# Patient Record
Sex: MALE | Race: WHITE | HISPANIC OR LATINO | Employment: OTHER | URBAN - METROPOLITAN AREA
[De-identification: names, ages, dates, MRNs, and addresses within clinical notes are randomized per-mention and may not be internally consistent; named-entity substitution may affect disease eponyms.]

---

## 2023-03-12 ENCOUNTER — APPOINTMENT (OUTPATIENT)
Dept: GENERAL RADIOLOGY | Facility: HOSPITAL | Age: 73
DRG: 64 | End: 2023-03-12
Payer: MEDICAID

## 2023-03-12 ENCOUNTER — APPOINTMENT (OUTPATIENT)
Dept: CT IMAGING | Facility: HOSPITAL | Age: 73
DRG: 64 | End: 2023-03-12
Payer: MEDICAID

## 2023-03-12 ENCOUNTER — HOSPITAL ENCOUNTER (INPATIENT)
Facility: HOSPITAL | Age: 73
LOS: 57 days | DRG: 64 | End: 2023-05-08
Attending: EMERGENCY MEDICINE | Admitting: INTERNAL MEDICINE
Payer: MEDICAID

## 2023-03-12 DIAGNOSIS — R41.82 ALTERED MENTAL STATUS, UNSPECIFIED ALTERED MENTAL STATUS TYPE: ICD-10-CM

## 2023-03-12 DIAGNOSIS — I62.9 INTRACRANIAL HEMORRHAGE: Primary | ICD-10-CM

## 2023-03-12 LAB
ALBUMIN SERPL-MCNC: 4.9 G/DL (ref 3.5–5.2)
ALBUMIN/GLOB SERPL: 1.8 G/DL
ALP SERPL-CCNC: 107 U/L (ref 39–117)
ALT SERPL W P-5'-P-CCNC: 25 U/L (ref 1–41)
ANION GAP SERPL CALCULATED.3IONS-SCNC: 14.3 MMOL/L (ref 5–15)
APTT PPP: 30.3 SECONDS (ref 22.7–35.4)
AST SERPL-CCNC: 35 U/L (ref 1–40)
BACTERIA UR QL AUTO: NORMAL /HPF
BASOPHILS # BLD AUTO: 0.03 10*3/MM3 (ref 0–0.2)
BASOPHILS NFR BLD AUTO: 0.3 % (ref 0–1.5)
BILIRUB SERPL-MCNC: 0.7 MG/DL (ref 0–1.2)
BILIRUB UR QL STRIP: NEGATIVE
BUN SERPL-MCNC: 15 MG/DL (ref 8–23)
BUN/CREAT SERPL: 17 (ref 7–25)
CALCIUM SPEC-SCNC: 9.5 MG/DL (ref 8.6–10.5)
CHLORIDE SERPL-SCNC: 103 MMOL/L (ref 98–107)
CLARITY UR: CLEAR
CO2 SERPL-SCNC: 23.7 MMOL/L (ref 22–29)
COLOR UR: ABNORMAL
CREAT SERPL-MCNC: 0.88 MG/DL (ref 0.76–1.27)
D-LACTATE SERPL-SCNC: 2 MMOL/L (ref 0.5–2)
DEPRECATED RDW RBC AUTO: 43.8 FL (ref 37–54)
EGFRCR SERPLBLD CKD-EPI 2021: 91.9 ML/MIN/1.73
EOSINOPHIL # BLD AUTO: 0.02 10*3/MM3 (ref 0–0.4)
EOSINOPHIL NFR BLD AUTO: 0.2 % (ref 0.3–6.2)
ERYTHROCYTE [DISTWIDTH] IN BLOOD BY AUTOMATED COUNT: 13.3 % (ref 12.3–15.4)
ETHANOL BLD-MCNC: <10 MG/DL (ref 0–10)
ETHANOL UR QL: <0.01 %
GLOBULIN UR ELPH-MCNC: 2.7 GM/DL
GLUCOSE BLDC GLUCOMTR-MCNC: 103 MG/DL (ref 70–130)
GLUCOSE BLDC GLUCOMTR-MCNC: 97 MG/DL (ref 70–130)
GLUCOSE SERPL-MCNC: 112 MG/DL (ref 65–99)
GLUCOSE UR STRIP-MCNC: NEGATIVE MG/DL
HCT VFR BLD AUTO: 46.4 % (ref 37.5–51)
HGB BLD-MCNC: 15.9 G/DL (ref 13–17.7)
HGB UR QL STRIP.AUTO: NEGATIVE
HYALINE CASTS UR QL AUTO: NORMAL /LPF
IMM GRANULOCYTES # BLD AUTO: 0.03 10*3/MM3 (ref 0–0.05)
IMM GRANULOCYTES NFR BLD AUTO: 0.3 % (ref 0–0.5)
INR PPP: 1.07 (ref 0.9–1.1)
KETONES UR QL STRIP: ABNORMAL
LEUKOCYTE ESTERASE UR QL STRIP.AUTO: NEGATIVE
LIPASE SERPL-CCNC: 17 U/L (ref 13–60)
LYMPHOCYTES # BLD AUTO: 1.49 10*3/MM3 (ref 0.7–3.1)
LYMPHOCYTES NFR BLD AUTO: 13 % (ref 19.6–45.3)
MCH RBC QN AUTO: 31 PG (ref 26.6–33)
MCHC RBC AUTO-ENTMCNC: 34.3 G/DL (ref 31.5–35.7)
MCV RBC AUTO: 90.4 FL (ref 79–97)
MONOCYTES # BLD AUTO: 0.97 10*3/MM3 (ref 0.1–0.9)
MONOCYTES NFR BLD AUTO: 8.5 % (ref 5–12)
NEUTROPHILS NFR BLD AUTO: 77.7 % (ref 42.7–76)
NEUTROPHILS NFR BLD AUTO: 8.91 10*3/MM3 (ref 1.7–7)
NITRITE UR QL STRIP: NEGATIVE
NRBC BLD AUTO-RTO: 0.1 /100 WBC (ref 0–0.2)
PH UR STRIP.AUTO: 5.5 [PH] (ref 5–8)
PLATELET # BLD AUTO: 252 10*3/MM3 (ref 140–450)
PMV BLD AUTO: 10.7 FL (ref 6–12)
POTASSIUM SERPL-SCNC: 3.8 MMOL/L (ref 3.5–5.2)
PROCALCITONIN SERPL-MCNC: 0.07 NG/ML (ref 0–0.25)
PROT SERPL-MCNC: 7.6 G/DL (ref 6–8.5)
PROT UR QL STRIP: ABNORMAL
PROTHROMBIN TIME: 14.1 SECONDS (ref 11.7–14.2)
RBC # BLD AUTO: 5.13 10*6/MM3 (ref 4.14–5.8)
RBC # UR STRIP: NORMAL /HPF
REF LAB TEST METHOD: NORMAL
SODIUM SERPL-SCNC: 141 MMOL/L (ref 136–145)
SP GR UR STRIP: >=1.03 (ref 1–1.03)
SQUAMOUS #/AREA URNS HPF: NORMAL /HPF
TROPONIN T SERPL HS-MCNC: 9 NG/L
UROBILINOGEN UR QL STRIP: ABNORMAL
WBC # UR STRIP: NORMAL /HPF
WBC NRBC COR # BLD: 11.45 10*3/MM3 (ref 3.4–10.8)

## 2023-03-12 PROCEDURE — 85610 PROTHROMBIN TIME: CPT | Performed by: EMERGENCY MEDICINE

## 2023-03-12 PROCEDURE — 82077 ASSAY SPEC XCP UR&BREATH IA: CPT | Performed by: EMERGENCY MEDICINE

## 2023-03-12 PROCEDURE — 87040 BLOOD CULTURE FOR BACTERIA: CPT | Performed by: EMERGENCY MEDICINE

## 2023-03-12 PROCEDURE — 80053 COMPREHEN METABOLIC PANEL: CPT | Performed by: EMERGENCY MEDICINE

## 2023-03-12 PROCEDURE — 80307 DRUG TEST PRSMV CHEM ANLYZR: CPT | Performed by: EMERGENCY MEDICINE

## 2023-03-12 PROCEDURE — 71045 X-RAY EXAM CHEST 1 VIEW: CPT

## 2023-03-12 PROCEDURE — 84145 PROCALCITONIN (PCT): CPT | Performed by: EMERGENCY MEDICINE

## 2023-03-12 PROCEDURE — 74176 CT ABD & PELVIS W/O CONTRAST: CPT

## 2023-03-12 PROCEDURE — 93010 ELECTROCARDIOGRAM REPORT: CPT | Performed by: INTERNAL MEDICINE

## 2023-03-12 PROCEDURE — 84484 ASSAY OF TROPONIN QUANT: CPT | Performed by: EMERGENCY MEDICINE

## 2023-03-12 PROCEDURE — 36415 COLL VENOUS BLD VENIPUNCTURE: CPT | Performed by: EMERGENCY MEDICINE

## 2023-03-12 PROCEDURE — 82962 GLUCOSE BLOOD TEST: CPT

## 2023-03-12 PROCEDURE — 81001 URINALYSIS AUTO W/SCOPE: CPT | Performed by: EMERGENCY MEDICINE

## 2023-03-12 PROCEDURE — 25010000002 ONDANSETRON PER 1 MG: Performed by: EMERGENCY MEDICINE

## 2023-03-12 PROCEDURE — 85730 THROMBOPLASTIN TIME PARTIAL: CPT | Performed by: EMERGENCY MEDICINE

## 2023-03-12 PROCEDURE — 93005 ELECTROCARDIOGRAM TRACING: CPT | Performed by: EMERGENCY MEDICINE

## 2023-03-12 PROCEDURE — 70450 CT HEAD/BRAIN W/O DYE: CPT

## 2023-03-12 PROCEDURE — 85025 COMPLETE CBC W/AUTO DIFF WBC: CPT | Performed by: EMERGENCY MEDICINE

## 2023-03-12 PROCEDURE — P9612 CATHETERIZE FOR URINE SPEC: HCPCS

## 2023-03-12 PROCEDURE — 25010000002 LEVETRIRACETAM PER 10 MG: Performed by: EMERGENCY MEDICINE

## 2023-03-12 PROCEDURE — 99285 EMERGENCY DEPT VISIT HI MDM: CPT

## 2023-03-12 PROCEDURE — 83690 ASSAY OF LIPASE: CPT | Performed by: EMERGENCY MEDICINE

## 2023-03-12 PROCEDURE — 83605 ASSAY OF LACTIC ACID: CPT | Performed by: EMERGENCY MEDICINE

## 2023-03-12 RX ORDER — MAGNESIUM SULFATE HEPTAHYDRATE 40 MG/ML
2 INJECTION, SOLUTION INTRAVENOUS AS NEEDED
Status: DISCONTINUED | OUTPATIENT
Start: 2023-03-12 | End: 2023-05-04

## 2023-03-12 RX ORDER — LEVETIRACETAM 500 MG/5ML
1000 INJECTION, SOLUTION, CONCENTRATE INTRAVENOUS ONCE
Status: COMPLETED | OUTPATIENT
Start: 2023-03-12 | End: 2023-03-12

## 2023-03-12 RX ORDER — SODIUM CHLORIDE 0.9 % (FLUSH) 0.9 %
10 SYRINGE (ML) INJECTION AS NEEDED
Status: DISCONTINUED | OUTPATIENT
Start: 2023-03-12 | End: 2023-05-08 | Stop reason: HOSPADM

## 2023-03-12 RX ORDER — CALCIUM GLUCONATE 20 MG/ML
1 INJECTION, SOLUTION INTRAVENOUS AS NEEDED
Status: DISCONTINUED | OUTPATIENT
Start: 2023-03-12 | End: 2023-05-04

## 2023-03-12 RX ORDER — AMOXICILLIN 250 MG
2 CAPSULE ORAL 2 TIMES DAILY
Status: DISCONTINUED | OUTPATIENT
Start: 2023-03-12 | End: 2023-04-15

## 2023-03-12 RX ORDER — POTASSIUM CHLORIDE 1.5 G/1.77G
40 POWDER, FOR SOLUTION ORAL AS NEEDED
Status: DISCONTINUED | OUTPATIENT
Start: 2023-03-12 | End: 2023-05-04

## 2023-03-12 RX ORDER — MAGNESIUM SULFATE HEPTAHYDRATE 40 MG/ML
4 INJECTION, SOLUTION INTRAVENOUS AS NEEDED
Status: DISCONTINUED | OUTPATIENT
Start: 2023-03-12 | End: 2023-05-04

## 2023-03-12 RX ORDER — BISACODYL 5 MG/1
5 TABLET, DELAYED RELEASE ORAL DAILY PRN
Status: DISCONTINUED | OUTPATIENT
Start: 2023-03-12 | End: 2023-04-15

## 2023-03-12 RX ORDER — POTASSIUM CHLORIDE 750 MG/1
40 TABLET, FILM COATED, EXTENDED RELEASE ORAL AS NEEDED
Status: DISCONTINUED | OUTPATIENT
Start: 2023-03-12 | End: 2023-05-04

## 2023-03-12 RX ORDER — SODIUM CHLORIDE 9 MG/ML
40 INJECTION, SOLUTION INTRAVENOUS AS NEEDED
Status: DISCONTINUED | OUTPATIENT
Start: 2023-03-12 | End: 2023-05-08 | Stop reason: HOSPADM

## 2023-03-12 RX ORDER — POTASSIUM CHLORIDE 7.45 MG/ML
10 INJECTION INTRAVENOUS
Status: DISCONTINUED | OUTPATIENT
Start: 2023-03-12 | End: 2023-05-04

## 2023-03-12 RX ORDER — SODIUM CHLORIDE 9 MG/ML
75 INJECTION, SOLUTION INTRAVENOUS CONTINUOUS
Status: DISCONTINUED | OUTPATIENT
Start: 2023-03-12 | End: 2023-03-15

## 2023-03-12 RX ORDER — ONDANSETRON 2 MG/ML
4 INJECTION INTRAMUSCULAR; INTRAVENOUS ONCE
Status: COMPLETED | OUTPATIENT
Start: 2023-03-12 | End: 2023-03-12

## 2023-03-12 RX ORDER — SODIUM CHLORIDE 0.9 % (FLUSH) 0.9 %
10 SYRINGE (ML) INJECTION AS NEEDED
Status: DISCONTINUED | OUTPATIENT
Start: 2023-03-12 | End: 2023-05-04

## 2023-03-12 RX ORDER — LABETALOL HYDROCHLORIDE 5 MG/ML
20 INJECTION, SOLUTION INTRAVENOUS
Status: DISCONTINUED | OUTPATIENT
Start: 2023-03-12 | End: 2023-03-16

## 2023-03-12 RX ORDER — BISACODYL 10 MG
10 SUPPOSITORY, RECTAL RECTAL DAILY PRN
Status: DISCONTINUED | OUTPATIENT
Start: 2023-03-12 | End: 2023-04-15

## 2023-03-12 RX ORDER — POLYETHYLENE GLYCOL 3350 17 G/17G
17 POWDER, FOR SOLUTION ORAL DAILY PRN
Status: DISCONTINUED | OUTPATIENT
Start: 2023-03-12 | End: 2023-04-15

## 2023-03-12 RX ORDER — SODIUM CHLORIDE 0.9 % (FLUSH) 0.9 %
10 SYRINGE (ML) INJECTION EVERY 12 HOURS SCHEDULED
Status: DISCONTINUED | OUTPATIENT
Start: 2023-03-12 | End: 2023-05-08 | Stop reason: HOSPADM

## 2023-03-12 RX ADMIN — Medication 10 ML: at 22:59

## 2023-03-12 RX ADMIN — LEVETIRACETAM 1000 MG: 100 INJECTION INTRAVENOUS at 20:40

## 2023-03-12 RX ADMIN — SODIUM CHLORIDE 75 ML/HR: 9 INJECTION, SOLUTION INTRAVENOUS at 22:58

## 2023-03-12 RX ADMIN — NICARDIPINE HYDROCHLORIDE 5 MG/HR: 25 INJECTION, SOLUTION INTRAVENOUS at 20:03

## 2023-03-12 RX ADMIN — ONDANSETRON 4 MG: 2 INJECTION INTRAMUSCULAR; INTRAVENOUS at 20:03

## 2023-03-12 RX ADMIN — SODIUM CHLORIDE, POTASSIUM CHLORIDE, SODIUM LACTATE AND CALCIUM CHLORIDE 1000 ML: 600; 310; 30; 20 INJECTION, SOLUTION INTRAVENOUS at 19:12

## 2023-03-12 NOTE — Clinical Note
Level of Care: Critical Care [6]   Diagnosis: Intracranial hemorrhage (HCC) [774021]   Admitting Physician: TERESA SCHAEFFER JR [[id number]]   Attending Physician: TERESA SCHAEFFER JR [[id number]]

## 2023-03-12 NOTE — ED PROVIDER NOTES
EMERGENCY DEPARTMENT ENCOUNTER    Room Number:  37/37  Date seen:  3/12/2023  PCP: Provider, No Known  Historian: Patient, paramedics      HPI:  Chief Complaint: Altered mental status, vomiting, headache and chest pain  A complete HPI/ROS/PMH/PSH/SH/FH are somewhat limited due to: Language barrier and altered mental status  Context: Manuel Durant is a 73 y.o. male who presents to the ED via EMS from home for evaluation of mental status change with report of nausea and vomiting and presumed abdominal pain.  Upon arrival here, patient also complains of headache and some chest pain.  He denies abdominal pain to me.  Apparently one of his local friends or neighbors called paramedics today when they found the patient appearing ill and confused.  Little else is known about his presentation on arrival.      PAST MEDICAL HISTORY  Active Ambulatory Problems     Diagnosis Date Noted   • No Active Ambulatory Problems     Resolved Ambulatory Problems     Diagnosis Date Noted   • No Resolved Ambulatory Problems     No Additional Past Medical History         PAST SURGICAL HISTORY  No past surgical history on file.      FAMILY HISTORY  No family history on file.      SOCIAL HISTORY  Social History     Socioeconomic History   • Marital status: Unknown         ALLERGIES  Patient has no allergy information on record.        REVIEW OF SYSTEMS  Review of Systems   Unable to perform ROS: Mental status change   Cardiovascular: Positive for chest pain.   Gastrointestinal: Positive for vomiting.   Neurological: Positive for headaches.            PHYSICAL EXAM  ED Triage Vitals [03/12/23 1900]   Temp Heart Rate Resp BP SpO2   98.4 °F (36.9 °C) 102 18 (!) 190/100 98 %      Temp src Heart Rate Source Patient Position BP Location FiO2 (%)   Oral -- -- -- --       Physical Exam      GENERAL: Older  gentleman, appears ill, has some appearance of vomitus with possible blood products on the shirt that he is wearing.  No  diaphoresis.  HENT: nares patent, normocephalic, atraumatic.  Mucous membranes are dry.  EYES: no scleral icterus mild photophobia demonstrated.  Conjunctiva are normal.  Pupils are equally round and reactive  CV: regular rhythm, heart rate in the low 100s.  No murmurs.  RESPIRATORY: normal effort, no stridor.  Lungs clear bilaterally.  ABDOMEN: soft, mild diffuse tenderness to palpation is elicited but no peritoneal features are detected anywhere.  MUSCULOSKELETAL: no deformity, no edema or asymmetry  NEURO: alert, moves all extremities, follows commands but does not appear to be completely oriented with normal high functioning cognition.  PSYCH: Somewhat limited insight, cooperative  SKIN: warm, dry    Vital signs and nursing notes reviewed.        LAB RESULTS  Recent Results (from the past 24 hour(s))   Comprehensive Metabolic Panel    Collection Time: 03/12/23  7:07 PM    Specimen: Blood   Result Value Ref Range    Glucose 112 (H) 65 - 99 mg/dL    BUN 15 8 - 23 mg/dL    Creatinine 0.88 0.76 - 1.27 mg/dL    Sodium 141 136 - 145 mmol/L    Potassium 3.8 3.5 - 5.2 mmol/L    Chloride 103 98 - 107 mmol/L    CO2 23.7 22.0 - 29.0 mmol/L    Calcium 9.5 8.6 - 10.5 mg/dL    Total Protein 7.6 6.0 - 8.5 g/dL    Albumin 4.9 3.5 - 5.2 g/dL    ALT (SGPT) 25 1 - 41 U/L    AST (SGOT) 35 1 - 40 U/L    Alkaline Phosphatase 107 39 - 117 U/L    Total Bilirubin 0.7 0.0 - 1.2 mg/dL    Globulin 2.7 gm/dL    A/G Ratio 1.8 g/dL    BUN/Creatinine Ratio 17.0 7.0 - 25.0    Anion Gap 14.3 5.0 - 15.0 mmol/L    eGFR 91.9 >60.0 mL/min/1.73   Protime-INR    Collection Time: 03/12/23  7:07 PM    Specimen: Blood   Result Value Ref Range    Protime 14.1 11.7 - 14.2 Seconds    INR 1.07 0.90 - 1.10   aPTT    Collection Time: 03/12/23  7:07 PM    Specimen: Blood   Result Value Ref Range    PTT 30.3 22.7 - 35.4 seconds   Lipase    Collection Time: 03/12/23  7:07 PM    Specimen: Blood   Result Value Ref Range    Lipase 17 13 - 60 U/L   Single High  Sensitivity Troponin T    Collection Time: 03/12/23  7:07 PM    Specimen: Blood   Result Value Ref Range    HS Troponin T 9 <15 ng/L   Lactic Acid, Plasma    Collection Time: 03/12/23  7:07 PM    Specimen: Blood   Result Value Ref Range    Lactate 2.0 0.5 - 2.0 mmol/L   Procalcitonin    Collection Time: 03/12/23  7:07 PM    Specimen: Blood   Result Value Ref Range    Procalcitonin 0.07 0.00 - 0.25 ng/mL   Ethanol    Collection Time: 03/12/23  7:07 PM    Specimen: Blood   Result Value Ref Range    Ethanol <10 0 - 10 mg/dL    Ethanol % <0.010 %   CBC Auto Differential    Collection Time: 03/12/23  7:07 PM    Specimen: Blood   Result Value Ref Range    WBC 11.45 (H) 3.40 - 10.80 10*3/mm3    RBC 5.13 4.14 - 5.80 10*6/mm3    Hemoglobin 15.9 13.0 - 17.7 g/dL    Hematocrit 46.4 37.5 - 51.0 %    MCV 90.4 79.0 - 97.0 fL    MCH 31.0 26.6 - 33.0 pg    MCHC 34.3 31.5 - 35.7 g/dL    RDW 13.3 12.3 - 15.4 %    RDW-SD 43.8 37.0 - 54.0 fl    MPV 10.7 6.0 - 12.0 fL    Platelets 252 140 - 450 10*3/mm3    Neutrophil % 77.7 (H) 42.7 - 76.0 %    Lymphocyte % 13.0 (L) 19.6 - 45.3 %    Monocyte % 8.5 5.0 - 12.0 %    Eosinophil % 0.2 (L) 0.3 - 6.2 %    Basophil % 0.3 0.0 - 1.5 %    Immature Grans % 0.3 0.0 - 0.5 %    Neutrophils, Absolute 8.91 (H) 1.70 - 7.00 10*3/mm3    Lymphocytes, Absolute 1.49 0.70 - 3.10 10*3/mm3    Monocytes, Absolute 0.97 (H) 0.10 - 0.90 10*3/mm3    Eosinophils, Absolute 0.02 0.00 - 0.40 10*3/mm3    Basophils, Absolute 0.03 0.00 - 0.20 10*3/mm3    Immature Grans, Absolute 0.03 0.00 - 0.05 10*3/mm3    nRBC 0.1 0.0 - 0.2 /100 WBC   Urinalysis With Culture If Indicated - Urine, Catheter    Collection Time: 03/12/23  7:13 PM    Specimen: Urine, Catheter   Result Value Ref Range    Color, UA Dark Yellow (A) Yellow, Straw    Appearance, UA Clear Clear    pH, UA 5.5 5.0 - 8.0    Specific Gravity, UA >=1.030 1.005 - 1.030    Glucose, UA Negative Negative    Ketones, UA 15 mg/dL (1+) (A) Negative    Bilirubin, UA Negative  Negative    Blood, UA Negative Negative    Protein, UA 30 mg/dL (1+) (A) Negative    Leuk Esterase, UA Negative Negative    Nitrite, UA Negative Negative    Urobilinogen, UA 1.0 E.U./dL 0.2 - 1.0 E.U./dL   Urinalysis, Microscopic Only - Urine, Catheter    Collection Time: 03/12/23  7:13 PM    Specimen: Urine, Catheter   Result Value Ref Range    RBC, UA 0-2 None Seen, 0-2 /HPF    WBC, UA 0-2 None Seen, 0-2 /HPF    Bacteria, UA None Seen None Seen /HPF    Squamous Epithelial Cells, UA 0-2 None Seen, 0-2 /HPF    Hyaline Casts, UA None Seen None Seen /LPF    Methodology Automated Microscopy    ECG 12 Lead Other; AMS    Collection Time: 03/12/23  7:21 PM   Result Value Ref Range    QT Interval 395 ms       Ordered the above labs and reviewed the results.        RADIOLOGY  CT Abdomen Pelvis Without Contrast    Result Date: 3/12/2023  CT ABDOMEN AND PELVIS WITHOUT IV CONTRAST  HISTORY: 73-year-old male with altered mental status and vomiting.  TECHNIQUE: Radiation dose reduction techniques were utilized, including automated exposure control and exposure modulation based on body size. 3 mm images were obtained through the abdomen and pelvis without the administration of IV contrast. There is no previous CT for comparison.  FINDINGS: There is motion/breathing artifact. There is also some streak artifact from the patient's arms. Noncontrasted liver, gallbladder, spleen, pancreas, and adrenals appear unremarkable. There are no renal or ureteral stones and there is no hydronephrosis bilaterally. There is mild bilateral perinephric stranding which may be chronic. The prostate measures 4 cm in diameter and the urinary bladder is nearly collapsed. The stomach is nearly collapsed. There is no bowel ileus or obstruction. Given constraints of the breathing and motion artifact, no definitive bowel wall thickening is seen. The appendix is air-filled and appears within normal limits. There is no free fluid or lymphadenopathy. There are  moderate abdominal aortic atherosclerotic changes without aneurysmal dilatation. There is a minimal right pleural effusion.      1. No definite acute abnormality is seen. The perinephric stranding may be chronic. Please correlate clinically for UTI and possible pyelonephritis. 2. No acute bowel abnormality is seen.      CT Head Without Contrast    Result Date: 3/12/2023  CT HEAD WITHOUT CONTRAST  HISTORY: Headache, altered mental status.  TECHNIQUE: Noncontrast head CT is provided. No previous imaging for correlation.  Radiation dose reduction techniques were utilized, including automated exposure control and exposure modulation based on body size.  FINDINGS: Left thalamus and posterior basal ganglia parenchymal hemorrhage measures 4.2 x 3.2 cm and there is intraventricular extension of the left lateral ventricle. No hydrocephalus or midline shift. Patchy low-density in the cerebral white matter fairly extensively. The brain parenchyma otherwise appears normal. Extra-axial spaces and bones of the skull appear normal. Visualized orbital structures and paranasal sinuses appear normal.      Left cerebral parenchymal hemorrhage with intraventricular extension as described. Findings called to Rocky Perez in the emergency department at around 804 cm.  This report was finalized on 3/12/2023 8:07 PM by Dr. Brad De León M.D.      XR Chest 1 View    Result Date: 3/12/2023  XR CHEST 1 VW-  HISTORY: 73-year-old male with chest pain and vomiting.  FINDINGS: There is an elevated right hemidiaphragm and the increased markings at the right lower lung field may represent compressive atelectasis. As there are no previous radiographs for comparison, pneumonia cannot be excluded. There is no evidence for CHF.  This report was finalized on 3/12/2023 7:49 PM by Dr. Elizabeth Dumont M.D.        Ordered the above noted radiological studies. Reviewed by me in PACS.        PROCEDURES  Critical Care  Performed by: Rocky Perez,  MD  Authorized by: Ricardo Cormier Jr., MD     Critical care provider statement:     Critical care time (minutes):  45    Critical care time was exclusive of:  Separately billable procedures and treating other patients and teaching time    Critical care was necessary to treat or prevent imminent or life-threatening deterioration of the following conditions:  CNS failure or compromise    Critical care was time spent personally by me on the following activities:  Development of treatment plan with patient or surrogate, discussions with consultants, evaluation of patient's response to treatment, examination of patient, interpretation of cardiac output measurements, obtaining history from patient or surrogate, ordering and performing treatments and interventions, ordering and review of laboratory studies, ordering and review of radiographic studies, pulse oximetry and re-evaluation of patient's condition    I assumed direction of critical care for this patient from another provider in my specialty: no      Care discussed with: admitting provider          EKG           EKG time/Interp time: 1921/1923  Rhythm/Rate: Sinus rhythm, 85 bpm  P waves and VT: Present, 171 ms  QRS, axis: 103 ms, normal axis  ST and T waves: No ST segment elevations are notable.    Independently interpreted by me contemporaneously with treatment        MEDICATIONS GIVEN IN ER  Medications   sodium chloride 0.9 % flush 10 mL (has no administration in time range)   niCARdipine (CARDENE) 25 mg in 250 mL NS infusion kit (5 mg/hr Intravenous New Bag 3/12/23 2003)   lactated ringers bolus 1,000 mL (0 mL Intravenous Stopped 3/12/23 1955)   ondansetron (ZOFRAN) injection 4 mg (4 mg Intravenous Given 3/12/23 2003)   levETIRAcetam (KEPPRA) injection 1,000 mg (1,000 mg Intravenous Given 3/12/23 2040)           MEDICAL DECISION MAKING, PROGRESS, and CONSULTS    All labs have been independently reviewed by me.  All radiology studies have been reviewed by  me and I have also reviewed the radiology report.   EKG's independently viewed and interpreted by me.  Discussion below represents my analysis of pertinent findings related to patient's condition, differential diagnosis, treatment plan and final disposition.      Additional sources:  - Discussed/ obtained information from independent historians: Discussed with paramedics at time of arrival as they were the primary source of information for history.  They reported elevated blood pressures in route with systolic around 190.    - External (non-ED) record review: Unfortunately, there are no records on file for comparison for further review.    - Chronic or social conditions impacting care: Unknown if patient lives alone.  Paramedics told us that a neighbor or friend had called them to the scene.      Orders placed during this visit:  Orders Placed This Encounter   Procedures   • Critical Care   • Blood Culture - Blood,   • Blood Culture - Blood,   • XR Chest 1 View   • CT Head Without Contrast   • CT Abdomen Pelvis Without Contrast   • Comprehensive Metabolic Panel   • Protime-INR   • aPTT   • Lipase   • Urinalysis With Culture If Indicated - Urine, Catheter   • Single High Sensitivity Troponin T   • Lactic Acid, Plasma   • Procalcitonin   • Ethanol   • Urine Drug Screen - Urine, Clean Catch   • CBC Auto Differential   • Urinalysis, Microscopic Only - Urine, Clean Catch   • Monitor Blood Pressure   • Cardiac Monitoring   • Pulse Oximetry, Continuous   • Neurosurgery (on-call MD unless specified)   • Pulmonology (on-call MD unless specified)   • ECG 12 Lead Other; AMS   • Insert Peripheral IV   • Inpatient Admission   • ED Bed Request   • CBC & Differential             Differential diagnosis:    Acute stroke, subarachnoid hemorrhage, sepsis, UTI, alcohol or substance abuse.      Independent interpretation of labs, radiology studies, and discussions with consultants:  ED Course as of 03/12/23 2112   Sun Mar 12, 2023    1907 Patient just arrived via EMS.  History information is a little bit limited due to his condition as he does seem to be a little bit confused or altered with his mentation, however I do not have a clear understanding what his baseline cognition is.  He complains of a headache and chest pain to me.  There was report of nausea and vomiting at the scene.  He was found to be hypertensive here.  He is not able to establish me a timeline of onset for the symptoms at all.  Therefore, I certainly do not think that he is a reasonable candidate for thrombolytic intervention even if there was a high clinical suspicion for acute ischemic stroke.  Rather we will pursue multiple investigations to explore possible reasons for his clinical presentation here with confusion.  Checking a head CT as well as a chest x-ray.  Checking alcohol level and urine drug screen as well as urinalysis for possible UTI or other infection.  We will start some IV fluids and antiemetics for now.  We will continue to monitor his blood pressure carefully. [MUNIR]   1952 I just independently interpreted the head CT and my finding is: Acute hemorrhagic component in the left basal ganglia region. [MUNIR]   1953 Paging neurosurgery at this time.  Also placing an order for nicardipine drip. [MUNIR]   1958 I independently interpreted the chest x-ray and my findings are: Low lung volumes.  Mild interstitial changes noted in the right lower lobe which may be atelectasis versus possible infiltrate. [MUNIR]   2009 I just discussed with neurosurgery Dr. Logan about this patient.  He reviewed the head CT independently himself.  He request that we keep the blood pressure below 140 systolic.  He request that we get a CT scan in the morning as well.  And he request that we give a dose of Keppra.  Will page ICU doctor at this time also. [MUNIR]   2031 I just discussed with Dr. Cormier from the ICU about this patient.  He agrees to accept him for further management tonight.  He  agrees with no need for antibiotics at this time since the CT abdomen and pelvis does not seem to indicate any infiltrative change in the lower lung fields and patient shows no sign of respiratory distress. [MUNIR]   2033 Blood pressure seems to be improving overall on the nicardipine drip.  We will continue to monitor that carefully.  Mental status has not really changed.  He is still arousable and will respond to verbal stimulation and follow commands but seems confused.  No further vomiting here. [MUNIR]   2111 I reviewed the CT abdomen pelvis which was reassuring for no particularly worrisome findings.  I had ordered that test because of his vomiting when he first arrived but I think his vomiting is simply a result of the intracranial hemorrhage problem. [MUNIR]   2111 BP: 134/69 [MUNIR]      ED Course User Index  [MUNIR] Rocky Perez MD             Patient was placed in face mask during triage.  Patient was wearing face mask throughout encounter.  I wore personal protective equipment throughout the encounter.  Hand hygiene was performed before and after patient encounter.     DIAGNOSIS  Final diagnoses:   Intracranial hemorrhage (HCC)   Altered mental status, unspecified altered mental status type         DISPOSITION  Admit to ICU            Latest Documented Vital Signs:  As of 21:12 EDT  BP- 152/80 HR- 93 Temp- 98.4 °F (36.9 °C) (Oral) O2 sat- 93%              --    Please note that portions of this were completed with a voice recognition program.       Note Disclaimer: At [facility], we believe that sharing information builds trust and better relationships. You are receiving this note because you are receiving care at [facility] or recently visited. It is possible you will see health information before a provider has talked with you about it. This kind of information can be easy to misunderstand. To help you fully understand what it means for your health, we urge you to discuss this note with your  provider.           Rocky Perez MD  03/12/23 2482

## 2023-03-12 NOTE — ED NOTES
Pt from home via EMS. EMS reports the pt is Egyptian speaking. EMS reports there being dried bloody emesis next to pt on the floor and in the garbage. Pt C/o cp, and headache to the MD in pt room.

## 2023-03-13 ENCOUNTER — APPOINTMENT (OUTPATIENT)
Dept: CT IMAGING | Facility: HOSPITAL | Age: 73
DRG: 64 | End: 2023-03-13
Payer: MEDICAID

## 2023-03-13 LAB
AMPHET+METHAMPHET UR QL: NEGATIVE
ANION GAP SERPL CALCULATED.3IONS-SCNC: 12 MMOL/L (ref 5–15)
BARBITURATES UR QL SCN: NEGATIVE
BENZODIAZ UR QL SCN: NEGATIVE
BUN SERPL-MCNC: 16 MG/DL (ref 8–23)
BUN/CREAT SERPL: 18.6 (ref 7–25)
CALCIUM SPEC-SCNC: 8.2 MG/DL (ref 8.6–10.5)
CANNABINOIDS SERPL QL: NEGATIVE
CHLORIDE SERPL-SCNC: 107 MMOL/L (ref 98–107)
CO2 SERPL-SCNC: 23 MMOL/L (ref 22–29)
COCAINE UR QL: NEGATIVE
CREAT SERPL-MCNC: 0.86 MG/DL (ref 0.76–1.27)
DEPRECATED RDW RBC AUTO: 44.7 FL (ref 37–54)
EGFRCR SERPLBLD CKD-EPI 2021: 92.6 ML/MIN/1.73
ERYTHROCYTE [DISTWIDTH] IN BLOOD BY AUTOMATED COUNT: 13.3 % (ref 12.3–15.4)
GLUCOSE BLDC GLUCOMTR-MCNC: 101 MG/DL (ref 70–130)
GLUCOSE BLDC GLUCOMTR-MCNC: 105 MG/DL (ref 70–130)
GLUCOSE BLDC GLUCOMTR-MCNC: 96 MG/DL (ref 70–130)
GLUCOSE SERPL-MCNC: 97 MG/DL (ref 65–99)
HCT VFR BLD AUTO: 43.3 % (ref 37.5–51)
HGB BLD-MCNC: 14.9 G/DL (ref 13–17.7)
MCH RBC QN AUTO: 31.6 PG (ref 26.6–33)
MCHC RBC AUTO-ENTMCNC: 34.4 G/DL (ref 31.5–35.7)
MCV RBC AUTO: 91.7 FL (ref 79–97)
METHADONE UR QL SCN: NEGATIVE
OPIATES UR QL: NEGATIVE
OXYCODONE UR QL SCN: NEGATIVE
PLATELET # BLD AUTO: 207 10*3/MM3 (ref 140–450)
PMV BLD AUTO: 10.8 FL (ref 6–12)
POTASSIUM SERPL-SCNC: 3.6 MMOL/L (ref 3.5–5.2)
QT INTERVAL: 395 MS
RBC # BLD AUTO: 4.72 10*6/MM3 (ref 4.14–5.8)
SODIUM SERPL-SCNC: 142 MMOL/L (ref 136–145)
WBC NRBC COR # BLD: 10.66 10*3/MM3 (ref 3.4–10.8)

## 2023-03-13 PROCEDURE — 70450 CT HEAD/BRAIN W/O DYE: CPT

## 2023-03-13 PROCEDURE — 99222 1ST HOSP IP/OBS MODERATE 55: CPT | Performed by: NEUROLOGICAL SURGERY

## 2023-03-13 PROCEDURE — 82962 GLUCOSE BLOOD TEST: CPT

## 2023-03-13 PROCEDURE — 80048 BASIC METABOLIC PNL TOTAL CA: CPT | Performed by: INTERNAL MEDICINE

## 2023-03-13 PROCEDURE — 85027 COMPLETE CBC AUTOMATED: CPT | Performed by: INTERNAL MEDICINE

## 2023-03-13 RX ADMIN — Medication 10 ML: at 08:46

## 2023-03-13 RX ADMIN — Medication 10 ML: at 20:52

## 2023-03-13 RX ADMIN — DOCUSATE SODIUM 50MG AND SENNOSIDES 8.6MG 2 TABLET: 8.6; 5 TABLET, FILM COATED ORAL at 08:39

## 2023-03-13 RX ADMIN — NICARDIPINE HYDROCHLORIDE 7.5 MG/HR: 25 INJECTION, SOLUTION INTRAVENOUS at 13:36

## 2023-03-13 RX ADMIN — NICARDIPINE HYDROCHLORIDE 7.5 MG/HR: 25 INJECTION, SOLUTION INTRAVENOUS at 02:40

## 2023-03-13 RX ADMIN — SODIUM CHLORIDE 75 ML/HR: 9 INJECTION, SOLUTION INTRAVENOUS at 11:34

## 2023-03-13 RX ADMIN — NICARDIPINE HYDROCHLORIDE 12.5 MG/HR: 25 INJECTION, SOLUTION INTRAVENOUS at 09:51

## 2023-03-13 RX ADMIN — NICARDIPINE HYDROCHLORIDE 15 MG/HR: 25 INJECTION, SOLUTION INTRAVENOUS at 07:43

## 2023-03-13 RX ADMIN — NICARDIPINE HYDROCHLORIDE 15 MG/HR: 25 INJECTION, SOLUTION INTRAVENOUS at 05:20

## 2023-03-13 NOTE — PROGRESS NOTES
LOS: 1 day   Patient Care Team:  Provider, No Known as PCP - General    Chief Complaint:  F/up hemorrhagic stroke and critical care management.    Subjective   Interval History  I reviewed the admission note, progress notes, PMH, PSH, Family hx, social history, imagings and prior records on this admission, summarized the finding in my note and formulated a transition of care plan.    Patient moves all his extremities but seems to be somnolent.  He is able to protect his airways.  He was moaning when I entered the room.  No significant desaturation.  He is on RA.  He vomited twice this morning but otherwise he is hemodynamically stable and there is no worsening level of consciousness.    Nursing staff reported that he appears to be weaker on the right.  NIH 11.  Blood pressure is elevated.      REVIEW OF SYSTEMS:   Cannot obtain due to aphasia    Ventilator/Non-Invasive Ventilation Settings (From admission, onward)    None                Physical Exam:     Vital Signs  Temp:  [98.1 °F (36.7 °C)-98.4 °F (36.9 °C)] 98.3 °F (36.8 °C)  Heart Rate:  [] 84  Resp:  [18] 18  BP: (100-190)/() 113/67    Intake/Output Summary (Last 24 hours) at 3/13/2023 0718  Last data filed at 3/13/2023 0500  Gross per 24 hour   Intake 1050 ml   Output --   Net 1050 ml     Flowsheet Rows    Flowsheet Row First Filed Value   Admission Height --   Admission Weight 69.7 kg (153 lb 10.6 oz) Documented at 03/12/2023 2147          PPE used per hospital policy    General Appearance:   Somnolent but awakens to verbal stimulus.  Not in distress.  Snoring noted   ENMT:  Yudelka 3.  Dry tongue.  External ears normal.   Eyes:  Pupils equal and reactive to light.  Injected conjunctiva   Neck:   Large. Trachea midline. No thyromegaly.   Lungs:    Clear to auscultation,respirations regular, even and nonlabored    Heart:   Regular rhythm and normal rate, normal S1 and S2, no         murmur   Skin:    No rash or ecchymosis   Abdomen:    Obese. Soft. No tenderness. No HSM.   Neuro/psych:  Somnolent.  Awakens to verbal stimulus.  Does not follow commands but moves all his extremities.  Exam is limited due to lack of cooperation.  Aphasic.   Extremities:  No cyanosis, clubbing or edema.  Warm extremities and well-perfused          Results Review:        Results from last 7 days   Lab Units 03/13/23  0259 03/12/23 1907   SODIUM mmol/L 142 141   POTASSIUM mmol/L 3.6 3.8   CHLORIDE mmol/L 107 103   CO2 mmol/L 23.0 23.7   BUN mg/dL 16 15   CREATININE mg/dL 0.86 0.88   GLUCOSE mg/dL 97 112*   CALCIUM mg/dL 8.2* 9.5     Results from last 7 days   Lab Units 03/12/23 1907   HSTROP T ng/L 9     Results from last 7 days   Lab Units 03/13/23  0259 03/12/23 1907   WBC 10*3/mm3 10.66 11.45*   HEMOGLOBIN g/dL 14.9 15.9   HEMATOCRIT % 43.3 46.4   PLATELETS 10*3/mm3 207 252     Results from last 7 days   Lab Units 03/12/23 1907   INR  1.07   APTT seconds 30.3         I reviewed the patient's new clinical results.        Medication Review:   senna-docusate sodium, 2 tablet, Oral, BID  sodium chloride, 10 mL, Intravenous, Q12H        niCARdipine, 5-15 mg/hr, Last Rate: 12.5 mg/hr (03/13/23 0608)  sodium chloride, 75 mL/hr, Last Rate: 75 mL/hr (03/12/23 4972)        Diagnostic imaging:  I personally and independently reviewed the following images:  CT brain 3/13/23: Left intracranial hemorrhage.         Assessment     1. Acute intracranial hemorrhage, left thalamic and basal ganglia, likely hypertensive in etiology  2. Intraventricular hemorrhage, left lateral ventricle  3. Brain edema and compression, 4 mm shift  4. Hypertensive emergency  5. Abnormal CT abdomen: Perinephric stranding.  No UTI  6. Snoring, suspected sleep apnea    All problems new to me    Plan     · ICU care.  Neurochecks.  Monitoring for signs of increased intracranial pressure which include alteration in mental status, hemodynamic instability, or vomiting.  CT  brain in a.m.  · Nicardipine to keep SBP <140  · Maintenance IV fluids normal saline 1 n.p.o.  · He will likely need enteral nutrition at some point but will hold off for now due to vomiting.  Will reassess tomorrow.  He will probably need a core track as his unlikely to be able to swallow soon.  · PT OT  · DVT prophylaxis with SCD  · Head of bed elevation.  Aspiration precaution.  · Outpatient evaluation for sleep apnea      Jelena Abbott MD  03/13/23  07:18 EDT      Time: Critical care 35 min      This note was dictated utilizing [employer] dictation

## 2023-03-13 NOTE — PROGRESS NOTES
Continued Stay Note  [facility]     Patient Name: Manuel Durant  MRN: [id number]  Today's Date: 3/13/2023    Admit Date: 3/12/2023    Plan: Undetermined   Discharge Plan     Row Name 03/13/23 1542       Plan    Plan Undetermined    Plan Comments Spoke to patient at bedside  He is lethargic  difficult to understand  CCP to follow up for discharge plan               Discharge Codes    No documentation.                     Jennifer Thomas RN

## 2023-03-13 NOTE — H&P
Patient Care Team:  Provider, No Known as PCP - General      Subjective     Patient is a 73 y.o. male.  Apparently found down by some friends EMS called and CT shows a large left parenchymal hemorrhage with intraventricular extension.  Patient is not responding verbally English or Eritrean.  They have been unable to contact any family.      Review of Systems:  Unable to obtain      History  No past medical history on file.  No past surgical history on file.  Social History     Socioeconomic History   • Marital status: Unknown     No family history on file.      Allergies:  Patient has no allergy information on record.    Medications:  Prior to Admission medications    Not on File     levETIRAcetam, 1,000 mg, Intravenous, Once      niCARdipine, 5-15 mg/hr, Last Rate: 5 mg/hr (03/12/23 2003)        Objective     Vital Signs  Vital Sign Min/Max for last 24 hours  Temp  Min: 98.4 °F (36.9 °C)  Max: 98.4 °F (36.9 °C)   BP  Min: 167/92  Max: 190/100   Pulse  Min: 68  Max: 102   Resp  Min: 18  Max: 18   SpO2  Min: 98 %  Max: 98 %   No data recorded   No data recorded     No intake or output data in the 24 hours ending 03/12/23 2033  No intake/output data recorded.  Last Weight and Admission Weight    There were no vitals filed for this visit.      There is no height or weight on file to calculate BMI.           Physical Exam:  General Appearance: Elderly  male he is resting in bed he does not follow commands or speak.  English or Eritrean  Eyes: Conjunctiva are clear and anicteric pupils are small about 1-1/2 mm not a lot of reaction to light.  ENT: Is hard to get him to open his mouth very far it looks like he has bitten the tip of his tongue no gross bleeding just a linear red line on the inferior margin of his tongue on the right side.  Mucous membranes are little dry  Neck: Trachea midline no visible jugular venous tension no palpable adenopathy no crepitance  Lungs: Clear nonlabored symmetric  expansion  Cardiac: Regular rate rhythm no murmur  Abdomen: Soft no palpable hepatosplenomegaly or masses  : Not examined  Musculoskeletal: Grossly normal  Skin: Warm and dry no jaundice no petechiae noted  Neuro: Patient is not following commands in English or Greenlandic he is nonverbal.  He seems to have a subtle right facial droop.  He keeps his right arm flexed and his fingers contracted and his arm is fairly rigid.  He does have some movement of his left arm but actually seems to move it less than the right he does withdraw a little bit on both to nailbed pressure.  He seems to be spontaneously moving the right limb some and he definitely withdraws to nailbed pressure he has very minimal withdraw in the left lower extremity to nailbed pressure.  Extremities/P Vascular: No clubbing no cyanosis no edema palpable radial and dorsalis pedis pulses  MSE: Unable to assess      Labs:  Results from last 7 days   Lab Units 03/12/23  1907   GLUCOSE mg/dL 112*   SODIUM mmol/L 141   POTASSIUM mmol/L 3.8   CO2 mmol/L 23.7   CHLORIDE mmol/L 103   ANION GAP mmol/L 14.3   CREATININE mg/dL 0.88   BUN mg/dL 15   BUN / CREAT RATIO  17.0   CALCIUM mg/dL 9.5   ALK PHOS U/L 107   TOTAL PROTEIN g/dL 7.6   ALT (SGPT) U/L 25   AST (SGOT) U/L 35   BILIRUBIN mg/dL 0.7   ALBUMIN g/dL 4.9   GLOBULIN gm/dL 2.7     CrCl cannot be calculated (Unknown ideal weight.).      Results from last 7 days   Lab Units 03/12/23  1907   WBC 10*3/mm3 11.45*   RBC 10*6/mm3 5.13   HEMOGLOBIN g/dL 15.9   HEMATOCRIT % 46.4   MCV fL 90.4   MCH pg 31.0   MCHC g/dL 34.3   RDW % 13.3   RDW-SD fl 43.8   MPV fL 10.7   PLATELETS 10*3/mm3 252   NEUTROPHIL % % 77.7*   LYMPHOCYTE % % 13.0*   MONOCYTES % % 8.5   EOSINOPHIL % % 0.2*   BASOPHIL % % 0.3   IMM GRAN % % 0.3   NEUTROS ABS 10*3/mm3 8.91*   LYMPHS ABS 10*3/mm3 1.49   MONOS ABS 10*3/mm3 0.97*   EOS ABS 10*3/mm3 0.02   BASOS ABS 10*3/mm3 0.03   IMMATURE GRANS (ABS) 10*3/mm3 0.03   NRBC /100 WBC 0.1         Results  from last 7 days   Lab Units 03/12/23  1907   HSTROP T ng/L 9             Results from last 7 days   Lab Units 03/12/23  1907   LACTATE mmol/L 2.0   PROCALCITONIN ng/mL 0.07     Results from last 7 days   Lab Units 03/12/23  1907   INR  1.07     Microbiology Results (last 10 days)     ** No results found for the last 240 hours. **            Diagnostics:  CT Abdomen Pelvis Without Contrast    Result Date: 3/12/2023  CT ABDOMEN AND PELVIS WITHOUT IV CONTRAST  HISTORY: 73-year-old male with altered mental status and vomiting.  TECHNIQUE: Radiation dose reduction techniques were utilized, including automated exposure control and exposure modulation based on body size. 3 mm images were obtained through the abdomen and pelvis without the administration of IV contrast. There is no previous CT for comparison.  FINDINGS: There is motion/breathing artifact. There is also some streak artifact from the patient's arms. Noncontrasted liver, gallbladder, spleen, pancreas, and adrenals appear unremarkable. There are no renal or ureteral stones and there is no hydronephrosis bilaterally. There is mild bilateral perinephric stranding which may be chronic. The prostate measures 4 cm in diameter and the urinary bladder is nearly collapsed. The stomach is nearly collapsed. There is no bowel ileus or obstruction. Given constraints of the breathing and motion artifact, no definitive bowel wall thickening is seen. The appendix is air-filled and appears within normal limits. There is no free fluid or lymphadenopathy. There are moderate abdominal aortic atherosclerotic changes without aneurysmal dilatation. There is a minimal right pleural effusion.      1. No definite acute abnormality is seen. The perinephric stranding may be chronic. Please correlate clinically for UTI and possible pyelonephritis. 2. No acute bowel abnormality is seen.      CT Head Without Contrast    Result Date: 3/12/2023  CT HEAD WITHOUT CONTRAST  HISTORY: Headache,  altered mental status.  TECHNIQUE: Noncontrast head CT is provided. No previous imaging for correlation.  Radiation dose reduction techniques were utilized, including automated exposure control and exposure modulation based on body size.  FINDINGS: Left thalamus and posterior basal ganglia parenchymal hemorrhage measures 4.2 x 3.2 cm and there is intraventricular extension of the left lateral ventricle. No hydrocephalus or midline shift. Patchy low-density in the cerebral white matter fairly extensively. The brain parenchyma otherwise appears normal. Extra-axial spaces and bones of the skull appear normal. Visualized orbital structures and paranasal sinuses appear normal.      Left cerebral parenchymal hemorrhage with intraventricular extension as described. Findings called to Rocky Perez in the emergency department at around 804 cm.  This report was finalized on 3/12/2023 8:07 PM by Dr. Brad De León M.D.      XR Chest 1 View    Result Date: 3/12/2023  XR CHEST 1 VW-  HISTORY: 73-year-old male with chest pain and vomiting.  FINDINGS: There is an elevated right hemidiaphragm and the increased markings at the right lower lung field may represent compressive atelectasis. As there are no previous radiographs for comparison, pneumonia cannot be excluded. There is no evidence for CHF.  This report was finalized on 3/12/2023 7:49 PM by Dr. Elizabeth Dumont M.D.          I did independently review the CT head and there is a large left intraparenchymal hemorrhage that extends into the basal ganglia area with intraventricular extension into the lateral left ventricle  Chest x-ray looks like there might be a little compressive atelectasis the left base CT abdomen reviewed cuts through the lung base do not reveal any infiltrates very minimal atelectasis just high riding diaphragm.  CT abdomen report noted did not appreciate too much the stranding around both kidneys question chronic versus acute    Assessment & Plan     1. Left  intracranial hemorrhage left thalamic and basal ganglia area with extension into the left lateral ventricle.  He is likely to worsen concern would be with developing hydrocephalus or increasing edema we will have to monitor his airway closely.  Neurosurgery has already been consulted and will rely on their guidance for following this we will try and keep his blood pressure under 140 systolic as requested.  2. Hypertensive emergency blood pressure controlled now with Cardene continue  3. Questionable perinephric stranding bilaterally on CT acute versus chronic is urinalysis really does not suggest any urinary infections I think we just acknowledge and follow.  4. Fluids/electrolytes/nutrition we will start some IV fluids.      Ricardo Cormier Jr, MD  03/12/23  20:33 EDT    Time: Critical care time 44 minutes

## 2023-03-13 NOTE — PLAN OF CARE
Goal Outcome Evaluation:       Pt is alert to self. Was able to reach pts wife in [place] with the help of Tiffanie Swanson ( nurse assistant). Pt has a nephew (Jg Durant) in [place] who will be coming to [place] Tues morning. Pt weaned off Cardene. Pt can understand English and will try to follow commands. His speech is garbled so hard to understand him. Adequate UOP. NSR. SBP < 140. Afebrile. Plan for follow-up CT in AM.

## 2023-03-13 NOTE — PLAN OF CARE
Problem: Fall Injury Risk  Goal: Absence of Fall and Fall-Related Injury  3/12/2023 2250 by Silke Grace, RN  Outcome: Ongoing, Progressing  3/12/2023 2222 by Silke Grace, RN  Outcome: Ongoing, Progressing     Problem: Skin Injury Risk Increased  Goal: Skin Health and Integrity  Outcome: Ongoing, Progressing   Goal Outcome Evaluation:

## 2023-03-13 NOTE — CONSULTS
Patient Care Team:  Provider, Shakila Known as PCP - General    Chief complaint hemorrhagic stroke    Subjective .     History of present illness: This patient was apparently found down by some friends.  EMS was called and he was transported to the hospital in [place].  CT scan there was performed and revealed a hemorrhagic stroke and he was transferred here.  Patient does not respond verbally in English or Lithuanian he apparently does not speak English.    Review of Systems  Review of systems not obtained due to patient factors.  History    No past medical history on file., No past surgical history on file.,   Family History   Family history unknown: Yes   ,   Social History     Socioeconomic History   • Marital status: Unknown   Tobacco Use   • Smoking status: Never   • Smokeless tobacco: Never   Vaping Use   • Vaping Use: Unknown   Substance and Sexual Activity   • Alcohol use: Never   • Drug use: Never   • Sexual activity: Never     E-cigarette/Vaping   • E-cigarette/Vaping Use Unknown If Ever Used    • Passive Exposure No    • Counseling Given No      E-cigarette/Vaping Substances   • Nicotine No    • THC No    • CBD No    • Flavoring No      E-cigarette/Vaping Devices   • Disposable No    • Pre-filled or Refillable Cartridge No    • Refillable Tank No    • Pre-filled Pod No        ,   No medications prior to admission.    and Allergies:  Patient has no known allergies.    Objective     Vital Signs   Temp:  [98.1 °F (36.7 °C)-98.4 °F (36.9 °C)] 98.1 °F (36.7 °C)  Heart Rate:  [] 128  Resp:  [18] 18  BP: (100-190)/() 121/69    Physical Exam:   Physical Exam  Neurological:      Deep Tendon Reflexes:      Reflex Scores:       Bicep reflexes are 3+ on the right side and 2+ on the left side.       Patellar reflexes are 3+ on the right side and 2+ on the left side.         Neurologic Exam     Mental Status   Level of consciousness: responsive to painful stimuli    Cranial Nerves     CN VII   Right facial  weakness: central    Motor Exam Patient has a right hemiparesis     Gait, Coordination, and Reflexes     Reflexes   Right biceps: 3+  Left biceps: 2+  Right patellar: 3+  Left patellar: 2+  Right plantar: upgoing  Left plantar: normal      Results Review:   I reviewed the patient's new clinical results.  I reviewed his CT of the head.  I compared it to his CT done at the outside hospital.  There is no change in the size of the hemorrhage in the left basal ganglia.  There is some blood in the ventricles as well.      Assessment & Plan       Intracranial hemorrhage (HCC)      I discussed the situation with the nurses.  There is no family available to talk to.  Assuming the hemorrhage does not get any bigger the patient should survive this.  He will probably be left with some element of right hemiparesis.  From my point of view we need to control his blood pressure to 140 or less.  We can start physical therapy and Occupational Therapy today.  We will recheck a CT in the morning.    I discussed the patients findings and my recommendations with nursing staff    Jorge Logan MD  03/13/23  09:24 EDT

## 2023-03-13 NOTE — NURSING NOTE
Pt arrived to ICU around 2130. Pt is Tajik speaking only. Ipad  used. NIH 11. Pt intermittently follows commands. Right sided weakness in upper and lower extremity. PERRLA. Pt aware he is in [place], but does not know he is in the hospital or why he is here. Pt reports he has no PMH or family to contact. See am labs. See am CT.

## 2023-03-13 NOTE — ED NOTES
.Nursing report ED to floor  Manuel Durant  73 y.o.  male    HPI :   Chief Complaint   Patient presents with    Altered Mental Status       Admitting doctor:   Ricardo Cormier Jr., MD    Admitting diagnosis:   The primary encounter diagnosis was Intracranial hemorrhage (HCC). A diagnosis of Altered mental status, unspecified altered mental status type was also pertinent to this visit.    Code status:   Current Code Status       Date Active Code Status Order ID Comments User Context       Not on file            Allergies:   Patient has no allergy information on record.    Isolation:   No active isolations    Intake and Output  No intake or output data in the 24 hours ending 03/12/23 2039    Weight:   There were no vitals filed for this visit.    Most recent vitals:   Vitals:    03/12/23 1900 03/12/23 2003 03/12/23 2006   BP: (!) 190/100 167/92 (!) 187/101   Pulse: 102 68 71   Resp: 18     Temp: 98.4 °F (36.9 °C)     TempSrc: Oral     SpO2: 98%  98%       Active LDAs/IV Access:   Lines, Drains & Airways       Active LDAs       Name Placement date Placement time Site Days    Peripheral IV 03/12/23 1900 Left Antecubital 03/12/23 1900  Antecubital  less than 1                    Labs (abnormal labs have a star):   Labs Reviewed   COMPREHENSIVE METABOLIC PANEL - Abnormal; Notable for the following components:       Result Value    Glucose 112 (*)     All other components within normal limits    Narrative:     GFR Normal >60  Chronic Kidney Disease <60  Kidney Failure <15    The GFR formula is only valid for adults with stable renal function between ages 18 and 70.   URINALYSIS W/ CULTURE IF INDICATED - Abnormal; Notable for the following components:    Color, UA Dark Yellow (*)     Ketones, UA 15 mg/dL (1+) (*)     Protein, UA 30 mg/dL (1+) (*)     All other components within normal limits    Narrative:     In absence of clinical symptoms, the presence of pyuria, bacteria, and/or nitrites on the urinalysis result  "does not correlate with infection.   CBC WITH AUTO DIFFERENTIAL - Abnormal; Notable for the following components:    WBC 11.45 (*)     Neutrophil % 77.7 (*)     Lymphocyte % 13.0 (*)     Eosinophil % 0.2 (*)     Neutrophils, Absolute 8.91 (*)     Monocytes, Absolute 0.97 (*)     All other components within normal limits   PROTIME-INR - Normal   APTT - Normal   LIPASE - Normal   SINGLE HSTROPONIN T - Normal    Narrative:     High Sensitive Troponin T Reference Range:  <10.0 ng/L- Negative Female for AMI  <15.0 ng/L- Negative Male for AMI  >=10 - Abnormal Female indicating possible myocardial injury.  >=15 - Abnormal Male indicating possible myocardial injury.   Clinicians would have to utilize clinical acumen, EKG, Troponin, and serial changes to determine if it is an Acute Myocardial Infarction or myocardial injury due to an underlying chronic condition.        LACTIC ACID, PLASMA - Normal   PROCALCITONIN - Normal    Narrative:     As a Marker for Sepsis (Non-Neonates):    1. <0.5 ng/mL represents a low risk of severe sepsis and/or septic shock.  2. >2 ng/mL represents a high risk of severe sepsis and/or septic shock.    As a Marker for Lower Respiratory Tract Infections that require antibiotic therapy:    PCT on Admission    Antibiotic Therapy       6-12 Hrs later    >0.5                Strongly Recommended  >0.25 - <0.5        Recommended   0.1 - 0.25          Discouraged              Remeasure/reassess PCT  <0.1                Strongly Discouraged     Remeasure/reassess PCT    As 28 day mortality risk marker: \"Change in Procalcitonin Result\" (>80% or <=80%) if Day 0 (or Day 1) and Day 4 values are available. Refer to http://www.[facility]-pct-calculator.com    Change in PCT <=80%  A decrease of PCT levels below or equal to 80% defines a positive change in PCT test result representing a higher risk for 28-day all-cause mortality of patients diagnosed with severe sepsis for septic shock.    Change in PCT >80%  A " decrease of PCT levels of more than 80% defines a negative change in PCT result representing a lower risk for 28-day all-cause mortality of patients diagnosed with severe sepsis or septic shock.      BLOOD CULTURE   BLOOD CULTURE   ETHANOL   URINALYSIS, MICROSCOPIC ONLY   URINE DRUG SCREEN   CBC AND DIFFERENTIAL    Narrative:     The following orders were created for panel order CBC & Differential.  Procedure                               Abnormality         Status                     ---------                               -----------         ------                     CBC Auto Differential[[id number]]        Abnormal            Final result                 Please view results for these tests on the individual orders.       EKG:   ECG 12 Lead Other; AMS   Preliminary Result   HEART RATE= 85  bpm   RR Interval= 706  ms   CT Interval= 171  ms   P Horizontal Axis= -62  deg   P Front Axis= 16  deg   QRSD Interval= 103  ms   QT Interval= 395  ms   QRS Axis= -18  deg   T Wave Axis= 40  deg   - ABNORMAL ECG -   Sinus rhythm   Left atrial enlargement   Inferior infarct, old   Electronically Signed By:    Date and Time of Study: 2023-03-12 19:21:25          Meds given in ED:   Medications   sodium chloride 0.9 % flush 10 mL (has no administration in time range)   niCARdipine (CARDENE) 25 mg in 250 mL NS infusion kit (5 mg/hr Intravenous New Bag 3/12/23 2003)   levETIRAcetam (KEPPRA) injection 1,000 mg (has no administration in time range)   lactated ringers bolus 1,000 mL (0 mL Intravenous Stopped 3/12/23 1955)   ondansetron (ZOFRAN) injection 4 mg (4 mg Intravenous Given 3/12/23 2003)       Imaging results:  CT Abdomen Pelvis Without Contrast    Result Date: 3/12/2023  1. No definite acute abnormality is seen. The perinephric stranding may be chronic. Please correlate clinically for UTI and possible pyelonephritis. 2. No acute bowel abnormality is seen.      CT Head Without Contrast    Result Date: 3/12/2023  Left cerebral  parenchymal hemorrhage with intraventricular extension as described. Findings called to Rocky Perez in the emergency department at around 804 cm.  This report was finalized on 3/12/2023 8:07 PM by Dr. Brad De León M.D.       Ambulatory status:   - bedrest    Social issues:   Social History     Socioeconomic History    Marital status: Unknown       NIH Stroke Scale:         Juli Alcaraz RN  03/12/23 20:39 EDT

## 2023-03-14 ENCOUNTER — APPOINTMENT (OUTPATIENT)
Dept: CT IMAGING | Facility: HOSPITAL | Age: 73
DRG: 64 | End: 2023-03-14
Payer: MEDICAID

## 2023-03-14 LAB
GLUCOSE BLDC GLUCOMTR-MCNC: 87 MG/DL (ref 70–130)
GLUCOSE BLDC GLUCOMTR-MCNC: 93 MG/DL (ref 70–130)
GLUCOSE BLDC GLUCOMTR-MCNC: 95 MG/DL (ref 70–130)

## 2023-03-14 PROCEDURE — 97166 OT EVAL MOD COMPLEX 45 MIN: CPT

## 2023-03-14 PROCEDURE — 97535 SELF CARE MNGMENT TRAINING: CPT

## 2023-03-14 PROCEDURE — 82962 GLUCOSE BLOOD TEST: CPT

## 2023-03-14 PROCEDURE — 97163 PT EVAL HIGH COMPLEX 45 MIN: CPT

## 2023-03-14 PROCEDURE — 92610 EVALUATE SWALLOWING FUNCTION: CPT

## 2023-03-14 PROCEDURE — 97110 THERAPEUTIC EXERCISES: CPT

## 2023-03-14 PROCEDURE — 99232 SBSQ HOSP IP/OBS MODERATE 35: CPT | Performed by: NURSE PRACTITIONER

## 2023-03-14 PROCEDURE — 70450 CT HEAD/BRAIN W/O DYE: CPT

## 2023-03-14 RX ADMIN — DOCUSATE SODIUM 50MG AND SENNOSIDES 8.6MG 2 TABLET: 8.6; 5 TABLET, FILM COATED ORAL at 20:47

## 2023-03-14 RX ADMIN — Medication 10 ML: at 20:48

## 2023-03-14 RX ADMIN — NICARDIPINE HYDROCHLORIDE 15 MG/HR: 25 INJECTION, SOLUTION INTRAVENOUS at 22:16

## 2023-03-14 RX ADMIN — LABETALOL HYDROCHLORIDE 20 MG: 5 INJECTION, SOLUTION INTRAVENOUS at 06:46

## 2023-03-14 RX ADMIN — NICARDIPINE HYDROCHLORIDE 7.5 MG/HR: 25 INJECTION, SOLUTION INTRAVENOUS at 19:47

## 2023-03-14 RX ADMIN — Medication 10 ML: at 09:44

## 2023-03-14 RX ADMIN — NICARDIPINE HYDROCHLORIDE 7.5 MG/HR: 25 INJECTION, SOLUTION INTRAVENOUS at 15:44

## 2023-03-14 RX ADMIN — NICARDIPINE HYDROCHLORIDE 5 MG/HR: 25 INJECTION, SOLUTION INTRAVENOUS at 05:34

## 2023-03-14 RX ADMIN — SODIUM CHLORIDE 75 ML/HR: 9 INJECTION, SOLUTION INTRAVENOUS at 00:56

## 2023-03-14 RX ADMIN — LABETALOL HYDROCHLORIDE 20 MG: 5 INJECTION, SOLUTION INTRAVENOUS at 01:19

## 2023-03-14 RX ADMIN — DOCUSATE SODIUM 50MG AND SENNOSIDES 8.6MG 2 TABLET: 8.6; 5 TABLET, FILM COATED ORAL at 09:45

## 2023-03-14 RX ADMIN — NICARDIPINE HYDROCHLORIDE 10 MG/HR: 25 INJECTION, SOLUTION INTRAVENOUS at 12:40

## 2023-03-14 NOTE — PLAN OF CARE
The pt was admitted to [facility] 2/2 to [facility]. The pt speaks little English and Vietnamese is his primary language. He is independent at baseline and works at [employer]. R side UE/LE weakness, visual inattention and balance deficits present during OT/PT evaluations today. Mod-Max A x2 required for bed mobility. Mod-Max A provided for sitting balance 2/2 to extensor tone in his trunk/sliding towards the EOB. He became lethargic and increased weakness present. Max A x2 provided to assist him to supine. /64 and 133/60 after several minutes. Min A provided for oral hygiene. OT to continue working with the pt during his hospital admission. Anticipate a d/c to acute rehab pending progress/insurance approval.     Patient was not wearing a face mask during this therapy encounter. Therapist used appropriate personal protective equipment including mask and gloves. Mask used was standard procedure mask. Appropriate PPE was worn during the entire therapy session. Hand hygiene was completed before and after therapy session. Patient is not in enhanced droplet precautions.

## 2023-03-14 NOTE — THERAPY EVALUATION
Acute Care - Speech Language Pathology   Swallow Initial Evaluation [facility]     Patient Name: Manuel Durant  : 1950  MRN: [id number]  Today's Date: 3/14/2023               Admit Date: 3/12/2023    Visit Dx:     ICD-10-CM ICD-9-CM   1. Intracranial hemorrhage (HCC)  I62.9 432.9   2. Altered mental status, unspecified altered mental status type  R41.82 780.97     Patient Active Problem List   Diagnosis   • Intracranial hemorrhage (HCC)     History reviewed. No pertinent past medical history.  History reviewed. No pertinent surgical history.    SLP Recommendation and Plan  SLP Swallowing Diagnosis: suspected pharyngeal dysphagia (23)  SLP Diet Recommendation: chopped, mechanical ground textures, nectar thick liquids (23)  Recommended Precautions and Strategies: upright posture during/after eating, small bites of food and sips of liquid, general aspiration precautions, assist with feeding (23)  SLP Rec. for Method of Medication Administration: meds crushed, with thick liquids, with puree, as tolerated (23)     Monitor for Signs of Aspiration: yes, notify SLP if any concerns (23)  Recommended Diagnostics: reassess via [place]FSS ([facility] (23)     Anticipated Discharge Disposition (SLP): anticipate therapy at next level of care (23)  Rehab Potential/Prognosis, Swallowing: adequate, monitor progress closely (23)  Therapy Frequency (Swallow): PRN (23)  Predicted Duration Therapy Intervention (Days): until discharge (23)                                               SWALLOW EVALUATION (last 72 hours)     SLP Adult Swallow Evaluation     Row Name 23                   Rehab Evaluation    Document Type evaluation  -CB        Subjective Information no complaints  -CB        Patient Observations alert;cooperative;agree to therapy  -CB        Patient Effort adequate  -CB        Comment Patient speaks  "minimal English, family at bedside  -CB           General Information    Patient Profile Reviewed yes  -CB        Pertinent History Of Current Problem 73 year old male admitted with altered mental status.  Per imaging - \"Left intracranial hemorrhage left thalamic and basal ganglia area with extension into the left lateral ventricle.\"  Pt is currently NPO pending SLP evaluation.  -CB        Current Method of Nutrition NPO  -CB        Precautions/Limitations, Vision WFL;for purposes of eval  -CB        Precautions/Limitations, Hearing WFL;for purposes of eval  -CB        Prior Level of Function-Communication English as a second language;receptive language impairment;expressive language impairment  -CB        Prior Level of Function-Swallowing safe, efficient swallowing in all situations  -CB        Plans/Goals Discussed with patient and family;agreed upon  -CB           Clinical Swallow Eval    Clinical Swallow Evaluation Summary Pt presents with right lingual deviation, natural dentition.  Trial of water from cup with multiple swallows noted, NTL with initially no overt s/s aspiration but when self-fed, coughing noted after multiple sequential sips.  Puree and mechanical soft with rotary mastication noted, bolus cohesion/oral clearing noted.  Regular with mod residuals remaining in right buccal cavity, verbal cues to perform lingual sweep with removal of bolus noted.  Pt appears appropriate for mechanical soft/NTL, meds crushed w/puree or NTL, MBSS to assess pharyngeal stage of swallow, upright for all PO, alternate liquids/solids, feed only when alert.  ST to follow.  -CB           SLP Evaluation Clinical Impression    SLP Swallowing Diagnosis suspected pharyngeal dysphagia  -CB        Functional Impact risk of aspiration/pneumonia  -CB        Rehab Potential/Prognosis, Swallowing adequate, monitor progress closely  -CB           Recommendations    Therapy Frequency (Swallow) PRN  -CB        Predicted Duration " Therapy Intervention (Days) until discharge  -CB        SLP Diet Recommendation chopped;mechanical ground textures;nectar thick liquids  -CB        Recommended Diagnostics reassess via VFSS ([facility])  -CB        Recommended Precautions and Strategies upright posture during/after eating;small bites of food and sips of liquid;general aspiration precautions;assist with feeding  -CB        Oral Care Recommendations Oral Care BID/PRN  -CB        SLP Rec. for Method of Medication Administration meds crushed;with thick liquids;with puree;as tolerated  -CB        Monitor for Signs of Aspiration yes;notify SLP if any concerns  -CB        Anticipated Discharge Disposition (SLP) anticipate therapy at next level of care  -CB           (LTG) Swallow    (LTG) Swallow Pt will participate in MBSS to assess pahrygneal stage of swallow.  -CB        Routt (Swallow Long Term Goal) independently (over 90% accuracy)  -CB        Time Frame (Swallow Long Term Goal) by discharge  -CB              User Key  (r) = Recorded By, (t) = Taken By, (c) = Cosigned By    Initials Name Effective Dates    Marlee Herrera CCC-SLP 11/10/22 -                 EDUCATION  The patient has been educated in the following areas:   Dysphagia (Swallowing Impairment).        SLP GOALS     Row Name 03/14/23 1050             (LTG) Swallow    (LTG) Swallow Pt will participate in MBSS to assess pahrygneal stage of swallow.  -CB      Routt (Swallow Long Term Goal) independently (over 90% accuracy)  -CB      Time Frame (Swallow Long Term Goal) by discharge  -CB            User Key  (r) = Recorded By, (t) = Taken By, (c) = Cosigned By    Initials Name Provider Type    Marlee Herrera CCC-SLP Speech and Language Pathologist                   Time Calculation:    Time Calculation- SLP     Row Name 03/14/23 1121             Time Calculation- SLP    SLP Start Time 1040  -CB      SLP Received On 03/14/23  -CB         Untimed Charges    [id number] Eval Oral Pharyng  Swallow Minutes 45  -CB         Total Minutes    Untimed Charges Total Minutes 45  -CB       Total Minutes 45  -CB            User Key  (r) = Recorded By, (t) = Taken By, (c) = Cosigned By    Initials Name Provider Type    Marlee Herrera CCC-SLP Speech and Language Pathologist                Therapy Charges for Today     Code Description Service Date Service Provider Modifiers Qty    [id number]  ST EVAL ORAL PHARYNG SWALLOW 3 3/14/2023 Marlee Marroquin CCC-SLP GN 1               DEEPAK Yates  3/14/2023

## 2023-03-14 NOTE — PROGRESS NOTES
"Discharge Planning Assessment  [facility]     Patient Name: Manuel Durant  MRN: [id number]  Today's Date: 3/14/2023    Admit Date: 3/12/2023    Plan: Undetermined   Discharge Needs Assessment     Row Name 03/14/23 1133       Living Environment    People in Home alone    Current Living Arrangements apartment    Potentially Unsafe Housing Conditions none    Primary Care Provided by self    Provides Primary Care For no one    Family Caregiver if Needed none    Quality of Family Relationships supportive    Able to Return to Prior Arrangements other (see comments)       Resource/Environmental Concerns    Resource/Environmental Concerns none       Food Insecurity    Within the past 12 months, you worried that your food would run out before you got the money to buy more. Never true    Within the past 12 months, the food you bought just didn't last and you didn't have money to get more. Never true       Transition Planning    Patient/Family Anticipates Transition to inpatient rehabilitation facility;home with help/services;home;home with family    Patient/Family Anticipated Services at Transition none    Transportation Anticipated family or friend will provide       Discharge Needs Assessment    Equipment Currently Used at Home none    Concerns to be Addressed discharge planning               Discharge Plan     Row Name 03/14/23 1139       Plan    Plan Undetermined    Plan Comments CCP spoke to patient’s nephew at bedside.   CCP role explained.  Discharge planning discussed.  Face sheet verified.   Pt emergency contact is his son Ariel.   Pt lives in an apartment alone.  He uses no DME to ambulate  He is independent weith ADL's.  Spoke to tim Jonas who translated for patient.  [phone].  Pt works at [employer]  He does not have insurance.  He does not have a green card.  He has a SSN for tax purposes.  He does not take any medication  He uses the \"clinic\" for care.   PCCP following for discharge needs          "     Continued Care and Services - Admitted Since 3/12/2023    Coordination has not been started for this encounter.          Demographic Summary    No documentation.                Functional Status    No documentation.                Psychosocial    No documentation.                Abuse/Neglect    No documentation.                Legal    No documentation.                Substance Abuse    No documentation.                Patient Forms    No documentation.                   Jennifer Thomas RN

## 2023-03-14 NOTE — PROGRESS NOTES
LOS: 2 days   Patient Care Team:  Provider, No Known as PCP - General    Chief Complaint:  F/up hemorrhagic stroke and critical care management.    Subjective   Interval History  Seems to be doing better today.  He can speak legibly.  Exam slightly limited due to language barrier.  He moves all his extremities but he appears to be significantly weaker than his right arm.    REVIEW OF SYSTEMS:   Gastrointestinal: No more vomiting episodes.  He stated that his hungry.  Respiratory: On RA.  Denies shortness of breath or cough    Ventilator/Non-Invasive Ventilation Settings (From admission, onward)    None                Physical Exam:     Vital Signs  Temp:  [97.9 °F (36.6 °C)-98.4 °F (36.9 °C)] 98 °F (36.7 °C)  Heart Rate:  [] 84  BP: (117-163)/() 159/84    Intake/Output Summary (Last 24 hours) at 3/14/2023 0854  Last data filed at 3/14/2023 0500  Gross per 24 hour   Intake --   Output 1700 ml   Net -1700 ml     Flowsheet Rows    Flowsheet Row First Filed Value   Admission Height --   Admission Weight 69.7 kg (153 lb 10.6 oz) Documented at 03/12/2023 2147          PPE used per hospital policy    General Appearance:   Alert.  Cooperative.  NAD.   ENMT:  Jha 3.  Dry tongue.  External ears normal.   Eyes:  Pupils equal and reactive to light.  Injected conjunctiva   Neck:   Large. Trachea midline. No thyromegaly.   Lungs:    Clear to auscultation,respirations regular, even and nonlabored    Heart:   Regular rhythm and normal rate, normal S1 and S2, no         murmur   Skin:   No rash or ecchymosis   Abdomen:    Obese. Soft. No tenderness. No HSM.   Neuro/psych:  Conscious, alert.  Moves all extremities.  Strength 5/5 on the left, 4/5 right leg and 2/5 right arm.   Extremities:  No cyanosis, clubbing or edema.  Warm extremities and well-perfused          Results Review:        Results from last 7 days   Lab Units 03/13/23  0259 03/12/23  1907   SODIUM mmol/L  142 141   POTASSIUM mmol/L 3.6 3.8   CHLORIDE mmol/L 107 103   CO2 mmol/L 23.0 23.7   BUN mg/dL 16 15   CREATININE mg/dL 0.86 0.88   GLUCOSE mg/dL 97 112*   CALCIUM mg/dL 8.2* 9.5     Results from last 7 days   Lab Units 03/12/23 1907   HSTROP T ng/L 9     Results from last 7 days   Lab Units 03/13/23  0259 03/12/23 1907   WBC 10*3/mm3 10.66 11.45*   HEMOGLOBIN g/dL 14.9 15.9   HEMATOCRIT % 43.3 46.4   PLATELETS 10*3/mm3 207 252     Results from last 7 days   Lab Units 03/12/23 1907   INR  1.07   APTT seconds 30.3         I reviewed the patient's new clinical results.        Medication Review:   senna-docusate sodium, 2 tablet, Oral, BID  sodium chloride, 10 mL, Intravenous, Q12H        niCARdipine, 5-15 mg/hr, Last Rate: Stopped (03/14/23 0652)  sodium chloride, 75 mL/hr, Last Rate: 75 mL/hr (03/14/23 0056)        Diagnostic imaging:  I personally and independently reviewed the following images:  CT brain 3/13/23: Left intracranial hemorrhage.       CT brain 3/14/2023: No change      Assessment     1. Acute intracranial hemorrhage, left thalamic and basal ganglia, likely hypertensive in etiology  2. Intraventricular hemorrhage, left lateral ventricle  3. Brain edema and compression, 2.5 mm shift  4. Hypertensive emergency  5. Abnormal CT abdomen: Perinephric stranding.  No UTI  6. Snoring, suspected sleep apnea        Plan     · ICU care.  Neurochecks.  Monitoring for signs of increased intracranial pressure which include alteration in mental status, hemodynamic instability, or vomiting.  CT brain in a.m.  · Nicardipine to keep SBP <140.  Start PRN Labetalol  · Maintenance IV fluids normal saline while NPO.  Speech eval  · PT OT  · A.m. labs  · DVT prophylaxis with SCD  · Head of bed elevation.  Aspiration precaution.  · Outpatient evaluation for sleep apnea      Jelena Abbott MD  03/14/23  08:54 EDT            This note was dictated utilizing [employer] dictation

## 2023-03-14 NOTE — PLAN OF CARE
Goal Outcome Evaluation:  Plan of Care Reviewed With: (P) patient           Outcome Evaluation: (P) 72 y/o male pt admitted to [facility] on 3/12 with an intracranial hemorrhage after being brought in by a neighbor with c/o alterned mental status, nausea/vomiting, and R side weakness. Pt is [place]ish speaking, does understand some english and is able to follow simple commmands however full, accurate hx is not able to be obtained. CCP note states pt lives alone in an apartment and he is IND with all ADLs and mob at baseline, currently employeed at [employer]. Today at evaluation, he comes to EOB with mod Ax2, maintains static sitting with mod/max Ax2 with significant extensor tone and R lean. BP elevates to 147/64 in sitting, attempted to let it level out however pt appeared to be less oriented, and returns to bed with mod Ax2, supine BP returned to 133/60. He presents with mild R visual neglect, R hemiparesis with tone present in UE and LE, strength, and balance deficits imparing function and will benefit from skilled PT to address. Pt does not currently have insurance so will continue to discuss and update [facility] in regards to d/c plan.

## 2023-03-14 NOTE — PLAN OF CARE
Goal Outcome Evaluation:      Pt more alert throughout shift. PRN labetalol given. Cardene restarted to maintain SBP less than 140. See am CT.

## 2023-03-14 NOTE — PROGRESS NOTES
[facility] NEUROSURGERY INTRACRANIAL PROGRESS NOTE    PATIENT IDENTIFICATION:   Name:  Manuel Durant      MRN:  [id number]     73 y.o.  male               CC: ICH      Subjective     Interval History:  Had CTH. On cardene. Family located. Patient now verbalizing some and following commands. Admits to headache.     ROS:  HEENT: headache  GI: No nausea, vomiting  Neuro: Right-sided weakness, speech difficulties    Objective     Vital signs in last 24 hours:  Temp:  [98 °F (36.7 °C)-98.4 °F (36.9 °C)] 98.2 °F (36.8 °C)  Heart Rate:  [64-97] 89  BP: (118-163)/(56-85) 133/67      Intake/Output this shift:  No intake/output data recorded.      Intake/Output last 3 shifts:  I/O last 3 completed shifts:  In: 1050 [I.V.:1050]  Out: 1700 [Urine:1700]    LABS:  Blood culture 3/12-NGTD     IMAGING STUDIES:  CT head- left thalamic and internal capsule intraparenchymal hemorrhage with vasogenic edema and mild left to right midline shift.    I personally viewed and interpreted the patient's ET head.  Also reviewed by discussed with Dr. Logan..    Meds reviewed/changed: Yes  Cardene drip  Labetalol 20 mg IV every 2 minutes as needed-2 doses    Physical Exam:    General:   Awake, alert, speaks very little English.  Able to answer some questions in [place]ic.  Oriented to self.  Slight dysarthria.  Cranial nerve II:  Able to accurately count fingers  CN III IV VI: Extraocular movements are full , PERRL 4 mm  CN VII:    Right facial weakness-moderate  Motor: Normal movement left side.  Right side hemiparesis.  Withdraws to pain   reflexes: Right toe upgoing  Sensation: Able to sense touch bilateral  Station and Gait: Per PT note 3/14-comes to EOB with mod Ax2, maintains static sitting with mod/max Ax2 with significant extensor tone and R lean.   Coordination:  Finger-to-nose test intact left upper extremity.  Unable to assess right side due to weakness   Extremities:   Wearing SCD    Assessment & Plan     ASSESSMENT:      Intracranial  "hemorrhage (HCC)    Patient with spontaneous left thalamic/internal capsule hemorrhage with extension into the left lateral ventricle.  Likely related to hypertension.  More alert today and conversing some in his native language.  He does understand some English.  He follows commands on the left but is hemiparetic on the right.  He does endorse some headache.  Blood pressure has been around 140 but still above at times.  Discussed with nursing.  Currently on Cardene drip.  CT head is remained stable over serial studies.  From neurosurgery standpoint, okay for transfer to floor once blood pressure is controlled under 140.  He may begin working with therapies.  Will need to consider MRI after some of the clot has resolved.  He will need rehab at discharge but upon review of other notes, financial concerns are present as patient currently has no insurance.  Note that he passed speech eval for modified diet    PLAN:   Keep SBP <140  Therapies and rehab evals  Neuro check every 4 hours  Okay for transfer to floor once SBP controlled    I discussed the patient's findings and my recommendations with patient, nursing staff and Dr. Logan    During patient visit, I utilized appropriate personal protective equipment including mask and gloves.  Mask used was standard procedure mask. Appropriate PPE was worn during the entire visit.  Hand hygiene was completed before and after.      LOS: 2 days       Roxanna Ramirez, APRN  3/14/2023  15:53 EDT    \"Dictated utilizing Dragon dictation\".      "

## 2023-03-14 NOTE — THERAPY EVALUATION
Patient Name: Manuel Durant  : 1950    MRN: [id number]                              Today's Date: 3/14/2023       Admit Date: 3/12/2023    Visit Dx:     ICD-10-CM ICD-9-CM   1. Intracranial hemorrhage (HCC)  I62.9 432.9   2. Altered mental status, unspecified altered mental status type  R41.82 780.97     Patient Active Problem List   Diagnosis   • Intracranial hemorrhage (HCC)     History reviewed. No pertinent past medical history.  History reviewed. No pertinent surgical history.   General Information     Row Name 23 1612          OT Time and Intention    Document Type evaluation  -RB     Mode of Treatment co-treatment;physical therapy;occupational therapy  -RB     Row Name 23 161          General Information    Patient Profile Reviewed yes  -RB     Prior Level of Function independent:;ADL's;transfer  -RB     Existing Precautions/Restrictions fall  -RB     Barriers to Rehab medically complex;environmental barriers  -RB     Row Name 23 1612          Living Environment    People in Home alone  -RB     Row Name 23 1612          Cognition    Orientation Status (Cognition) oriented x 3  -RB     Row Name 23 161          Safety Issues, Functional Mobility    Safety Issues Affecting Function (Mobility) safety precautions follow-through/compliance;safety precaution awareness;awareness of need for assistance;insight into deficits/self-awareness  -RB     Impairments Affecting Function (Mobility) balance;strength;endurance/activity tolerance;cognition;coordination;motor control;motor planning;visual/perceptual;postural/trunk control;range of motion (ROM)  -RB           User Key  (r) = Recorded By, (t) = Taken By, (c) = Cosigned By    Initials Name Provider Type    RB Ana Lui OT Occupational Therapist                 Mobility/ADL's     Row Name 23 1614          Bed Mobility    Bed Mobility supine-sit;sit-supine  -RB     Supine-Sit [place] (Bed Mobility) moderate  assist (50% patient effort);2 person assist;verbal cues;nonverbal cues (demo/gesture)  -RB     Sit-Supine [place] (Bed Mobility) moderate assist (50% patient effort);2 person assist;verbal cues;nonverbal cues (demo/gesture)  -RB     Bed Mobility, Safety Issues decreased use of arms for pushing/pulling;decreased use of legs for bridging/pushing;impaired trunk control for bed mobility  -RB     Assistive Device (Bed Mobility) bed rails  -RB     Row Name 03/14/23 1614          Sit-Stand Transfer    Sit-Stand [place] (Transfers) not tested;unable to assess  -RB     Row Name 03/14/23 1614          Stand-Sit Transfer    Stand-Sit [place] (Transfers) not tested;unable to assess  -RB     Row Name 03/14/23 1614          Functional Mobility    Functional Mobility- Ind. Level not appropriate to assess  -RB     Row Name 03/14/23 1614          Activities of Daily Living    BADL Assessment/Intervention grooming;lower body dressing  -RB     Row Name 03/14/23 1614          Grooming Assessment/Training    [place] Level (Grooming) grooming skills;set up;verbal cues;nonverbal cues (demo/gesture);minimum assist (75% patient effort)  -RB     Position (Grooming) supine  -RB     Comment, (Grooming) able to brush his teeth via [place] on suction with use of LUE  -RB     Row Name 03/14/23 1614          Lower Body Dressing Assessment/Training    [place] Level (Lower Body Dressing) lower body dressing skills;dependent (less than 25% patient effort)  -RB     Position (Lower Body Dressing) supine  -RB           User Key  (r) = Recorded By, (t) = Taken By, (c) = Cosigned By    Initials Name Provider Type    RB Ana Lui OT Occupational Therapist               Obj/Interventions     Row Name 03/14/23 1616          Sensory Assessment (Somatosensory)    Sensory Assessment (Somatosensory) unable/difficult to assess  -RB     Row Name 03/14/23 1616          Vision Assessment/Intervention    Vision Assessment Comment  Appears he has R inattention  -RB     Row Name 03/14/23 1616          Range of Motion Comprehensive    Comment, General Range of Motion RUE impaired, trace movement seen in RUE/RLE  -RB     Row Name 03/14/23 1616          Strength Comprehensive (MMT)    Comment, General Manual Muscle Testing (MMT) Assessment RUE/RLE 1/5  -RB     Row Name 03/14/23 1616          Motor Skills    Therapeutic Exercise --  OT provided passive/AAROM in his RUE  -RB     Row Name 03/14/23 1616          Balance    Comment, Balance Extensor tone in his trunk - strong posterior/R lateral lean requiring Mod-Max A to maintain  -RB           User Key  (r) = Recorded By, (t) = Taken By, (c) = Cosigned By    Initials Name Provider Type    Ana Banks OT Occupational Therapist               Goals/Plan     Row Name 03/14/23 1618          Bed Mobility Goal 1 (OT)    Activity/Assistive Device (Bed Mobility Goal 1, OT) bed mobility activities, all  -RB     [place] Level/Cues Needed (Bed Mobility Goal 1, OT) minimum assist (75% or more patient effort)  -RB     Time Frame (Bed Mobility Goal 1, OT) short term goal (STG);2 weeks  -RB     Progress/Outcomes (Bed Mobility Goal 1, OT) continuing progress toward goal  -RB     Row Name 03/14/23 1618          Transfer Goal 1 (OT)    Activity/Assistive Device (Transfer Goal 1, OT) transfers, all  -RB     [place] Level/Cues Needed (Transfer Goal 1, OT) minimum assist (75% or more patient effort)  -RB     Time Frame (Transfer Goal 1, OT) short term goal (STG);2 weeks  -RB     Progress/Outcome (Transfer Goal 1, OT) continuing progress toward goal  -RB     Row Name 03/14/23 1618          Dressing Goal 1 (OT)    Activity/Device (Dressing Goal 1, OT) dressing skills, all  -RB     [place]/Cues Needed (Dressing Goal 1, OT) minimum assist (75% or more patient effort)  -RB     Time Frame (Dressing Goal 1, OT) short term goal (STG);2 weeks  -RB     Progress/Outcome (Dressing Goal 1, OT) continuing  progress toward goal  -RB     Row Name 03/14/23 1618          Toileting Goal 1 (OT)    Activity/Device (Toileting Goal 1, OT) toileting skills, all  -RB     [place] Level/Cues Needed (Toileting Goal 1, OT) minimum assist (75% or more patient effort)  -RB     Time Frame (Toileting Goal 1, OT) short term goal (STG);2 weeks  -RB     Progress/Outcome (Toileting Goal 1, OT) continuing progress toward goal  -RB     Row Name 03/14/23 1618          Grooming Goal 1 (OT)    Activity/Device (Grooming Goal 1, OT) grooming skills, all  -RB     [place] (Grooming Goal 1, OT) supervision required  -RB     Time Frame (Grooming Goal 1, OT) short term goal (STG);2 weeks  -RB     Progress/Outcome (Grooming Goal 1, OT) continuing progress toward goal  -RB     Row Name 03/14/23 1618          Self-Feeding Goal 1 (OT)    Activity/Device (Self-Feeding Goal 1, OT) self-feeding skills, all  -RB     [place] Level/Cues Needed (Self-Feeding Goal 1, OT) supervision required  -RB     Time Frame (Self-Feeding Goal 1, OT) short term goal (STG);2 weeks  -RB     Progress/Outcomes (Self-Feeding Goal 1, OT) continuing progress toward goal  -RB     Row Name 03/14/23 1618          Therapy Assessment/Plan (OT)    Planned Therapy Interventions (OT) transfer/mobility retraining;strengthening exercise;ROM/therapeutic exercise;activity tolerance training;adaptive equipment training;BADL retraining;functional balance retraining;occupation/activity based interventions;patient/caregiver education/training;neuromuscular control/coordination retraining;cognitive/visual perception retraining;edema control/reduction;passive ROM/stretching  -RB           User Key  (r) = Recorded By, (t) = Taken By, (c) = Cosigned By    Initials Name Provider Type    RB Ana Lui, OT Occupational Therapist               Clinical Impression     Row Name 03/14/23 1617          Pain Assessment    Pretreatment Pain Rating 0/10 - no pain  -RB     Posttreatment Pain  Rating 0/10 - no pain  -RB     Row Name 03/14/23 1617          Plan of Care Review    Plan of Care Reviewed With patient  -RB     Progress no change  -RB     Row Name 03/14/23 1617          Therapy Assessment/Plan (OT)    Rehab Potential (OT) good, to achieve stated therapy goals  -RB     Criteria for Skilled Therapeutic Interventions Met (OT) yes;skilled treatment is necessary  -RB     Therapy Frequency (OT) 5 times/wk  -RB     Row Name 03/14/23 1617          Therapy Plan Review/Discharge Plan (OT)    Anticipated Discharge Disposition (OT) inpatient rehabilitation facility;skilled nursing facility  -RB     Row Name 03/14/23 1617          Vital Signs    Pre Systolic BP Rehab 131  -RB     Pre Treatment Diastolic BP 64  -RB     Intra Systolic BP Rehab 147  -RB     Intra Treatment Diastolic BP 64  -RB     Post Systolic BP Rehab 133  -RB     Post Treatment Diastolic BP 60  -RB     O2 Delivery Pre Treatment room air  -RB     O2 Delivery Intra Treatment room air  -RB     O2 Delivery Post Treatment room air  -RB     Pre Patient Position Supine  -RB     Intra Patient Position Standing  -RB     Post Patient Position Supine  -RB     Row Name 03/14/23 1617          Positioning and Restraints    Pre-Treatment Position in bed  -RB     Post Treatment Position bed  -RB     In Bed notified nsg;supine;call light within reach;encouraged to call for assist;exit alarm on;fowlers  -RB           User Key  (r) = Recorded By, (t) = Taken By, (c) = Cosigned By    Initials Name Provider Type    RB Ana Lui, MARK Occupational Therapist               Outcome Measures     Row Name 03/14/23 1618          How much help from another is currently needed...    Putting on and taking off regular lower body clothing? 1  -RB     Bathing (including washing, rinsing, and drying) 1  -RB     Toileting (which includes using toilet bed pan or urinal) 1  -RB     Putting on and taking off regular upper body clothing 1  -RB     Taking care of personal  grooming (such as brushing teeth) 2  -RB     Eating meals 1  -RB     AM-PAC 6 Clicks Score (OT) 7  -RB     Row Name 03/14/23 1458          How much help from another person do you currently need...    Turning from your back to your side while in flat bed without using bedrails? 2  -PC (r) MO (t) PC (c)     Moving from lying on back to sitting on the side of a flat bed without bedrails? 1  -PC (r) MO (t) PC (c)     Moving to and from a bed to a chair (including a wheelchair)? 1  -PC (r) MO (t) PC (c)     Standing up from a chair using your arms (e.g., wheelchair, bedside chair)? 1  -PC (r) MO (t) PC (c)     Climbing 3-5 steps with a railing? 1  -PC (r) MO (t) PC (c)     To walk in hospital room? 1  -PC (r) MO (t) PC (c)     AM-PAC 6 Clicks Score (PT) 7  -PC (r) MO (t)     Highest level of mobility 2 --> Bed activities/dependent transfer  -PC (r) MO (t)     Row Name 03/14/23 1618          Modified [place] Scale    Modified [place] Scale 4 - Moderately severe disability.  Unable to walk without assistance, and unable to attend to own bodily needs without assistance.  -RB     Row Name 03/14/23 1618 03/14/23 1458       Functional Assessment    Outcome Measure Options AM-PAC 6 Clicks Daily Activity (OT);Modified [place]  -RB AM-PAC 6 Clicks Basic Mobility (PT)  -PC (r) MO (t) PC (c)          User Key  (r) = Recorded By, (t) = Taken By, (c) = Cosigned By    Initials Name Provider Type    PC Theresa Gardner, PT Physical Therapist    Ana Banks, OT Occupational Therapist    Lacie Carson, PT Student PT Student                Occupational Therapy Education     Title: PT OT SLP Therapies (In Progress)     Topic: Occupational Therapy (Not Started)     Point: ADL training (Not Started)     Description:   Instruct learner(s) on proper safety adaptation and remediation techniques during self care or transfers.   Instruct in proper use of assistive devices.              Learner Progress:  Not documented in this visit.           Point: Home exercise program (Not Started)     Description:   Instruct learner(s) on appropriate technique for monitoring, assisting and/or progressing therapeutic exercises/activities.              Learner Progress:  Not documented in this visit.          Point: Precautions (Not Started)     Description:   Instruct learner(s) on prescribed precautions during self-care and functional transfers.              Learner Progress:  Not documented in this visit.          Point: Body mechanics (Not Started)     Description:   Instruct learner(s) on proper positioning and spine alignment during self-care, functional mobility activities and/or exercises.              Learner Progress:  Not documented in this visit.                          OT Recommendation and Plan  Planned Therapy Interventions (OT): transfer/mobility retraining, strengthening exercise, ROM/therapeutic exercise, activity tolerance training, adaptive equipment training, BADL retraining, functional balance retraining, occupation/activity based interventions, patient/caregiver education/training, neuromuscular control/coordination retraining, cognitive/visual perception retraining, edema control/reduction, passive ROM/stretching  Therapy Frequency (OT): 5 times/wk  Plan of Care Review  Plan of Care Reviewed With: patient  Progress: no change     Time Calculation:    Time Calculation- OT     Row Name 03/14/23 1611             Time Calculation- OT    OT Start Time 1318  -RB      OT Stop Time 1340  -RB      OT Time Calculation (min) 22 min  -RB      Total Timed Code Minutes- OT 12 minute(s)  -RB      OT Received On 03/14/23  -RB      OT - Next Appointment 03/15/23  -RB      OT Goal Re-Cert Due Date 03/28/23  -RB         Timed Charges    61619 - OT Self Care/Mgmt Minutes 12  -RB         Untimed Charges    OT Eval/Re-eval Minutes 10  -RB         Total Minutes    Timed Charges Total Minutes 12  -RB      Untimed Charges Total Minutes 10  -RB       Total Minutes  22  -RB            User Key  (r) = Recorded By, (t) = Taken By, (c) = Cosigned By    Initials Name Provider Type    RB Ana Lui OT Occupational Therapist              Therapy Charges for Today     Code Description Service Date Service Provider Modifiers Qty    [id number]  OT SELF CARE/MGMT/TRAIN EA 15 MIN 3/14/2023 Ana Lui OT GO 1    [id number]  OT EVAL MOD COMPLEXITY 3 3/14/2023 Ana Lui OT GO 1               Ana Lui OT  3/14/2023

## 2023-03-14 NOTE — PLAN OF CARE
Goal Outcome Evaluation:            BSE completed, patient appears appropriate for mechanical soft diet/nectar thickened liquids, meds crushed w/puree or thick liquids, upright for all PO, feed only when alert, alternate liquids/solids, assist with feeding.  MBSS next date to assess pharyngeal stage of swallow.  ST to follow.  SLE/BOBBI pending.

## 2023-03-14 NOTE — THERAPY EVALUATION
Patient Name: Manuel Durant  : 1950    MRN: [id number]                              Today's Date: 3/14/2023       Admit Date: 3/12/2023    Visit Dx:     ICD-10-CM ICD-9-CM   1. Intracranial hemorrhage (HCC)  I62.9 432.9   2. Altered mental status, unspecified altered mental status type  R41.82 780.97     Patient Active Problem List   Diagnosis   • Intracranial hemorrhage (HCC)     History reviewed. No pertinent past medical history.  History reviewed. No pertinent surgical history.   General Information     Row Name 23 [facility]          Physical Therapy Time and Intention    Document Type evaluation (P)   -MO     Mode of Treatment co-treatment;physical therapy;occupational therapy (P)   -MO     Row Name 23 [facility]          General Information    Patient Profile Reviewed yes (P)   -MO     Prior Level of Function independent: (P)   IND with ADLs and mob at baseline  -MO     Existing Precautions/Restrictions no known precautions/restrictions (P)   -MO     Barriers to Rehab -- (P)   Currently has no insurance  -MO     Row Name 23 [facility]          Living Environment    People in Home alone (P)   -MO     Row Name 23 [facility]          Stairs Within Home, Primary    Stairs, Within Home, Primary No stairs in apartment (P)   -MO     Number of Stairs, Within Home, Primary none (P)   -MO     Row Name 23 [facility]          Cognition    Orientation Status (Cognition) oriented x 3 (P)   -MO     Row Name 23 [facility]          Safety Issues, Functional Mobility    Impairments Affecting Function (Mobility) balance;endurance/activity tolerance;muscle tone abnormal;postural/trunk control;range of motion (ROM);strength (P)   -MO           User Key  (r) = Recorded By, (t) = Taken By, (c) = Cosigned By    Initials Name Provider Type    Lacie Carson, PT Student PT Student               Mobility     Row Name 23 1435          Bed Mobility    Bed Mobility supine-sit;sit-supine (P)   -MO     Supine-Sit  Andrew (Bed Mobility) moderate assist (50% patient effort);2 person assist (P)   -MO     Sit-Supine Andrew (Bed Mobility) moderate assist (50% patient effort);2 person assist (P)   -MO     Comment, (Bed Mobility) Assist required for BLE and trunk support (P)   -MO     Row Name 03/14/23 1435          Sit-Stand Transfer    Sit-Stand Andrew (Transfers) not tested (P)   -MO     Comment, (Sit-Stand Transfer) Not appropraite to assess at this time (P)   -MO           User Key  (r) = Recorded By, (t) = Taken By, (c) = Cosigned By    Initials Name Provider Type    Lacie Carson, PT Student PT Student               Obj/Interventions     Row Name 03/14/23 1436          Range of Motion Comprehensive    General Range of Motion lower extremity range of motion deficits identified (P)   -MO     Comment, General Range of Motion LLE WFL; no AROM in RLE. Increased tone with passive ROM in RLE, loosens with movement, close to functional (P)   -MO     Row Name 03/14/23 1436          Strength Comprehensive (MMT)    General Manual Muscle Testing (MMT) Assessment lower extremity strength deficits identified (P)   -MO     Comment, General Manual Muscle Testing (MMT) Assessment LLE WFL. RLE grossly 1/5 (P)   -MO     Row Name 03/14/23 1436          Balance    Balance Assessment sitting static balance (P)   -MO     Static Sitting Balance moderate assist;maximum assist;2-person assist (P)   -MO     Position, Sitting Balance sitting edge of bed (P)   -MO     Comment, Balance Significant extensor tone in sitting with R lean, requires consistent mod/max A to maintain. Following cueing to lean forward, able to sustain for several seconds with assist before tone kicks back in. Significant posterior lean with assisted RLE extension (P)   -MO           User Key  (r) = Recorded By, (t) = Taken By, (c) = Cosigned By    Initials Name Provider Type    Lacie Carson, PT Student PT Student               Goals/Plan     Row Name  03/14/23 1457          Bed Mobility Goal 1 (PT)    Activity/Assistive Device (Bed Mobility Goal 1, PT) sit to supine/supine to sit (P)   -MO     Pike Level/Cues Needed (Bed Mobility Goal 1, PT) minimum assist (75% or more patient effort) (P)   -MO     Time Frame (Bed Mobility Goal 1, PT) 2 weeks (P)   -MO     [facility] Name 03/14/23 1457          Transfer Goal 1 (PT)    Activity/Assistive Device (Transfer Goal 1, PT) sit-to-stand/stand-to-sit;bed-to-chair/chair-to-bed (P)   -MO     [place]ike Level/Cues Needed (Transfer Goal 1, PT) minimum assist (75% or more patient effort) (P)   -MO     Time Frame (Transfer Goal 1, PT) 3 weeks (P)   -MO     [facility] Name 03/14/23 1457          Gait Training Goal 1 (PT)    Activity/Assistive Device (Gait Training Goal 1, PT) gait (walking locomotion) (P)   -MO     Pike Level (Gait Training Goal 1, PT) minimum assist (75% or more patient effort) (P)   -MO     Time Frame (Gait Training Goal 1, PT) 3 weeks (P)   -MO     Row Name 03/14/23 1457          Therapy Assessment/Plan (PT)    Planned Therapy Interventions (PT) balance training;bed mobility training;gait training;home exercise program;neuromuscular re-education;patient/family education;ROM (range of motion);strengthening;stretching;transfer training (P)   -MO           User Key  (r) = Recorded By, (t) = Taken By, (c) = Cosigned By    Initials Name Provider Type    Lacie Carson, PT Student PT Student               Clinical Impression     Row Name 03/14/23 2918          Pain    Additional Documentation Pain Scale: FACES Pre/Post-Treatment (Group) (P)   -MO     Row Name 03/14/23 9777          Pain Scale: FACES Pre/Post-Treatment    Pain: FACES Scale, Pretreatment 4-->hurts little more (P)   -MO     Posttreatment Pain Rating 4-->hurts little more (P)   -MO     Pre/Posttreatment Pain Comment Pain increases with movement, appears to subside quickly with rest (P)   -MO     Row Name 03/14/23 2750          Plan of Care Review     Plan of Care Reviewed With patient (P)   -MO     Outcome Evaluation 72 y/o male pt admitted to [facility] on 3/12 with an intracranial hemorrhage after being brought in by a neighbor with c/o alterned mental status, nausea/vomiting, and R side weakness. Pt is Latvian speaking, does understand some english and is able to follow simple commmands however full, accurate hx is not able to be obtained. [facility] [place] lives alone in an apartment and he is IND with all ADLs and mob at baseline, currently employeed at [employer] in [place]. Today at evaluation, he comes to EOB with mod Ax2, maintains static sitting with mod/max Ax2 with significant extensor tone and R lean. BP elevates to 147/64 in sitting, attempted to let it level out however pt appeared to be less oriented, and returns to bed with mod Ax2, supine BP returned to 133/60. He presents with mild R visual neglect, R hemiparesis with tone present in UE and LE, strength, and balance deficits imparing function and will benefit from skilled PT to address. Pt does not currently have insurance so will continue to discuss and update [facility] [place] in regards to d/c plan. (P)   -MO     Row Name 03/14/23 1442          Therapy Assessment/Plan (PT)    Rehab Potential (PT) fair, will monitor progress closely (P)   -MO     Criteria for Skilled Interventions Met (PT) yes;meets criteria;skilled treatment is necessary (P)   -MO     Therapy Frequency (PT) 6 times/wk (P)   -MO     Row Name 03/14/23 1442          Vital Signs    Pre Systolic BP Rehab 131 (P)   -MO     Pre Treatment Diastolic BP 64 (P)   -MO     Intra Systolic BP Rehab 147 (P)   -MO     Intra Treatment Diastolic BP 64 (P)   -MO     Post Systolic BP Rehab 133 (P)   -MO     Post Treatment Diastolic BP 60 (P)   -MO     Posttreatment Heart Rate (beats/min) 80 (P)   -MO     O2 Delivery Pre Treatment room air (P)   -MO     O2 Delivery Intra Treatment room air (P)   -MO     Post SpO2 (%) 92 (P)   -MO     O2 Delivery Post Treatment room air (P)    -MO     Row Name 03/14/23 1442          Positioning and Restraints    Pre-Treatment Position in bed (P)   -MO     Post Treatment Position bed (P)   -MO     In Bed supine;call light within reach;encouraged to call for assist;exit alarm on (P)   -MO           User Key  (r) = Recorded By, (t) = Taken By, (c) = Cosigned By    Initials Name Provider Type    Lacie Carson, PT Student PT Student               Outcome Measures     Row Name 03/14/23 1458          How much help from another person do you currently need...    Turning from your back to your side while in flat bed without using bedrails? 2 (P)   -MO     Moving from lying on back to sitting on the side of a flat bed without bedrails? 1 (P)   -MO     Moving to and from a bed to a chair (including a wheelchair)? 1 (P)   -MO     Standing up from a chair using your arms (e.g., wheelchair, bedside chair)? 1 (P)   -MO     Climbing 3-5 steps with a railing? 1 (P)   -MO     To walk in hospital room? 1 (P)   -MO     AM-PAC 6 Clicks Score (PT) 7 (P)   -MO     Highest level of mobility 2 --> Bed activities/dependent transfer (P)   -MO     Row Name 03/14/23 1458          Functional Assessment    Outcome Measure Options AM-PAC 6 Clicks Basic Mobility (PT) (P)   -MO           User Key  (r) = Recorded By, (t) = Taken By, (c) = Cosigned By    Initials Name Provider Type    Lacie Carson, PT Student PT Student                             Physical Therapy Education     Title: PT OT SLP Therapies (Done)     Topic: Physical Therapy (Done)     Point: Mobility training (Done)     Learning Progress Summary           Patient Acceptance, E,D, DU,NR by MO at 3/14/2023 1458                   Point: Home exercise program (Done)     Learning Progress Summary           Patient Acceptance, E,D, DU,NR by MO at 3/14/2023 1458                   Point: Body mechanics (Done)     Learning Progress Summary           Patient Acceptance, E,D, DU,NR by MO at 3/14/2023 1458                   Point:  Precautions (Done)     Learning Progress Summary           Patient Acceptance, E,D, DU,NR by MO at 3/14/2023 2208                               User Key     Initials Effective Dates Name Provider Type Discipline    MO 01/27/23 -  Lacie Tanner, PT Student PT Student PT              PT Recommendation and Plan  Planned Therapy Interventions (PT): (P) balance training, bed mobility training, gait training, home exercise program, neuromuscular re-education, patient/family education, ROM (range of motion), strengthening, stretching, transfer training  Plan of Care Reviewed With: (P) patient  Outcome Evaluation: (P) 74 y/o male pt admitted to [facility] on 3/12 with an intracranial hemorrhage after being brought in by a neighbor with c/o alterned mental status, nausea/vomiting, and R side weakness. Pt is Bolivian speaking, does understand some english and is able to follow simple commmands however full, accurate hx is not able to be obtained. [facility] note states pt lives alone in an apartment and he is IND with all ADLs and mob at baseline, currently employeed at [employer]. Today at evaluation, he comes to EOB with mod Ax2, maintains static sitting with mod/max Ax2 with significant extensor tone and R lean. BP elevates to 147/64 in sitting, attempted to let it level out however pt appeared to be less oriented, and returns to bed with mod Ax2, supine BP returned to 133/60. He presents with mild R visual neglect, R hemiparesis with tone present in UE and LE, strength, and balance deficits imparing function and will benefit from skilled PT to address. Pt does not currently have insurance so will continue to discuss and update [facility] in regards to d/c plan.     Time Calculation:    PT Charges     Row Name 03/14/23 6111             Time Calculation    Start Time 1318 (P)   -MO      Stop Time 1340 (P)   -MO      Time Calculation (min) 22 min (P)   -MO      PT Received On 03/14/23 (P)   -MO      PT - Next Appointment 03/15/23 (P)   -MO      PT  Goal Re-Cert Due Date 03/21/23 (P)   -MO         Time Calculation- PT    Total Timed Code Minutes- PT 15 minute(s) (P)   -MO            User Key  (r) = Recorded By, (t) = Taken By, (c) = Cosigned By    Initials Name Provider Type    Lacie Carson, PT Student PT Student              Therapy Charges for Today     Code Description Service Date Service Provider Modifiers Qty    27280102494 HC PT THER PROC EA 15 MIN 3/14/2023 Lacie Tanner, PT Student GP 1    42336660399 HC PT EVAL HIGH COMPLEXITY 3 3/14/2023 Lacie Tanner, PT Student GP 1          PT G-Codes  Outcome Measure Options: (P) AM-PAC 6 Clicks Basic Mobility (PT)  AM-PAC 6 Clicks Score (PT): (P) 7       Lacie Tanner PT Student  3/14/2023

## 2023-03-14 NOTE — PROGRESS NOTES
[facility] Acute Inpt Rehab Note     Referral received via stroke order set.  Please note this is a screening only, rehab admissions will not actively be evaluating this patient.  If felt patient is appropriate for our services once therapies begin, please call our office at [phone], to initiate a full referral.    Thank you,   Sarah Grace RN   Rehab Admission Nurse
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
## 2023-03-15 ENCOUNTER — APPOINTMENT (OUTPATIENT)
Dept: GENERAL RADIOLOGY | Facility: HOSPITAL | Age: 73
DRG: 64 | End: 2023-03-15
Payer: MEDICAID

## 2023-03-15 LAB
ANION GAP SERPL CALCULATED.3IONS-SCNC: 13.6 MMOL/L (ref 5–15)
BASOPHILS # BLD AUTO: 0.04 10*3/MM3 (ref 0–0.2)
BASOPHILS NFR BLD AUTO: 0.4 % (ref 0–1.5)
BUN SERPL-MCNC: 13 MG/DL (ref 8–23)
BUN/CREAT SERPL: 17.6 (ref 7–25)
CALCIUM SPEC-SCNC: 8.1 MG/DL (ref 8.6–10.5)
CHLORIDE SERPL-SCNC: 113 MMOL/L (ref 98–107)
CHOLEST SERPL-MCNC: 156 MG/DL (ref 0–200)
CO2 SERPL-SCNC: 17.4 MMOL/L (ref 22–29)
CREAT SERPL-MCNC: 0.74 MG/DL (ref 0.76–1.27)
DEPRECATED RDW RBC AUTO: 44.5 FL (ref 37–54)
EGFRCR SERPLBLD CKD-EPI 2021: 96.9 ML/MIN/1.73
EOSINOPHIL # BLD AUTO: 0.07 10*3/MM3 (ref 0–0.4)
EOSINOPHIL NFR BLD AUTO: 0.8 % (ref 0.3–6.2)
ERYTHROCYTE [DISTWIDTH] IN BLOOD BY AUTOMATED COUNT: 13.2 % (ref 12.3–15.4)
GLUCOSE BLDC GLUCOMTR-MCNC: 107 MG/DL (ref 70–130)
GLUCOSE BLDC GLUCOMTR-MCNC: 134 MG/DL (ref 70–130)
GLUCOSE BLDC GLUCOMTR-MCNC: 138 MG/DL (ref 70–130)
GLUCOSE SERPL-MCNC: 97 MG/DL (ref 65–99)
HBA1C MFR BLD: 5.6 % (ref 4.8–5.6)
HCT VFR BLD AUTO: 38.5 % (ref 37.5–51)
HDLC SERPL-MCNC: 38 MG/DL (ref 40–60)
HGB BLD-MCNC: 13.1 G/DL (ref 13–17.7)
IMM GRANULOCYTES # BLD AUTO: 0.04 10*3/MM3 (ref 0–0.05)
IMM GRANULOCYTES NFR BLD AUTO: 0.4 % (ref 0–0.5)
LDLC SERPL CALC-MCNC: 102 MG/DL (ref 0–100)
LDLC/HDLC SERPL: 2.65 {RATIO}
LYMPHOCYTES # BLD AUTO: 1.27 10*3/MM3 (ref 0.7–3.1)
LYMPHOCYTES NFR BLD AUTO: 14.2 % (ref 19.6–45.3)
MCH RBC QN AUTO: 31.5 PG (ref 26.6–33)
MCHC RBC AUTO-ENTMCNC: 34 G/DL (ref 31.5–35.7)
MCV RBC AUTO: 92.5 FL (ref 79–97)
MONOCYTES # BLD AUTO: 0.88 10*3/MM3 (ref 0.1–0.9)
MONOCYTES NFR BLD AUTO: 9.9 % (ref 5–12)
NEUTROPHILS NFR BLD AUTO: 6.62 10*3/MM3 (ref 1.7–7)
NEUTROPHILS NFR BLD AUTO: 74.3 % (ref 42.7–76)
NRBC BLD AUTO-RTO: 0.1 /100 WBC (ref 0–0.2)
PLATELET # BLD AUTO: 185 10*3/MM3 (ref 140–450)
PMV BLD AUTO: 11 FL (ref 6–12)
POTASSIUM SERPL-SCNC: 3.5 MMOL/L (ref 3.5–5.2)
RBC # BLD AUTO: 4.16 10*6/MM3 (ref 4.14–5.8)
SODIUM SERPL-SCNC: 144 MMOL/L (ref 136–145)
TRIGL SERPL-MCNC: 86 MG/DL (ref 0–150)
VLDLC SERPL-MCNC: 16 MG/DL (ref 5–40)
WBC NRBC COR # BLD: 8.92 10*3/MM3 (ref 3.4–10.8)

## 2023-03-15 PROCEDURE — 97530 THERAPEUTIC ACTIVITIES: CPT

## 2023-03-15 PROCEDURE — 83036 HEMOGLOBIN GLYCOSYLATED A1C: CPT | Performed by: INTERNAL MEDICINE

## 2023-03-15 PROCEDURE — 85025 COMPLETE CBC W/AUTO DIFF WBC: CPT | Performed by: INTERNAL MEDICINE

## 2023-03-15 PROCEDURE — 74230 X-RAY XM SWLNG FUNCJ C+: CPT

## 2023-03-15 PROCEDURE — 80048 BASIC METABOLIC PNL TOTAL CA: CPT | Performed by: INTERNAL MEDICINE

## 2023-03-15 PROCEDURE — 80061 LIPID PANEL: CPT | Performed by: INTERNAL MEDICINE

## 2023-03-15 PROCEDURE — 92611 MOTION FLUOROSCOPY/SWALLOW: CPT

## 2023-03-15 PROCEDURE — 99232 SBSQ HOSP IP/OBS MODERATE 35: CPT | Performed by: NURSE PRACTITIONER

## 2023-03-15 PROCEDURE — 82962 GLUCOSE BLOOD TEST: CPT

## 2023-03-15 PROCEDURE — 97110 THERAPEUTIC EXERCISES: CPT

## 2023-03-15 RX ORDER — BACLOFEN 10 MG/1
5 TABLET ORAL EVERY 8 HOURS SCHEDULED
Status: DISCONTINUED | OUTPATIENT
Start: 2023-03-15 | End: 2023-03-21

## 2023-03-15 RX ORDER — AMLODIPINE BESYLATE 10 MG/1
10 TABLET ORAL
Status: DISCONTINUED | OUTPATIENT
Start: 2023-03-15 | End: 2023-03-18

## 2023-03-15 RX ADMIN — LABETALOL HYDROCHLORIDE 20 MG: 5 INJECTION, SOLUTION INTRAVENOUS at 04:39

## 2023-03-15 RX ADMIN — LABETALOL HYDROCHLORIDE 20 MG: 5 INJECTION, SOLUTION INTRAVENOUS at 01:20

## 2023-03-15 RX ADMIN — BACLOFEN 5 MG: 10 TABLET ORAL at 22:24

## 2023-03-15 RX ADMIN — BACLOFEN 5 MG: 10 TABLET ORAL at 13:12

## 2023-03-15 RX ADMIN — NICARDIPINE HYDROCHLORIDE 7.5 MG/HR: 25 INJECTION, SOLUTION INTRAVENOUS at 13:18

## 2023-03-15 RX ADMIN — DOCUSATE SODIUM 50MG AND SENNOSIDES 8.6MG 2 TABLET: 8.6; 5 TABLET, FILM COATED ORAL at 20:52

## 2023-03-15 RX ADMIN — NICARDIPINE HYDROCHLORIDE 15 MG/HR: 25 INJECTION, SOLUTION INTRAVENOUS at 00:02

## 2023-03-15 RX ADMIN — AMLODIPINE BESYLATE 10 MG: 10 TABLET ORAL at 09:59

## 2023-03-15 RX ADMIN — METOPROLOL TARTRATE 25 MG: 25 TABLET ORAL at 09:59

## 2023-03-15 RX ADMIN — BARIUM SULFATE 1 TEASPOON(S): 0.6 CREAM ORAL at 14:12

## 2023-03-15 RX ADMIN — METOPROLOL TARTRATE 25 MG: 25 TABLET ORAL at 20:52

## 2023-03-15 RX ADMIN — NICARDIPINE HYDROCHLORIDE 12.5 MG/HR: 25 INJECTION, SOLUTION INTRAVENOUS at 20:51

## 2023-03-15 RX ADMIN — BARIUM SULFATE 4 ML: 980 POWDER, FOR SUSPENSION ORAL at 14:12

## 2023-03-15 RX ADMIN — BARIUM SULFATE 50 ML: 400 SUSPENSION ORAL at 14:12

## 2023-03-15 RX ADMIN — Medication 10 ML: at 20:56

## 2023-03-15 RX ADMIN — NICARDIPINE HYDROCHLORIDE 12.5 MG/HR: 25 INJECTION, SOLUTION INTRAVENOUS at 01:58

## 2023-03-15 RX ADMIN — NICARDIPINE HYDROCHLORIDE 10 MG/HR: 25 INJECTION, SOLUTION INTRAVENOUS at 18:00

## 2023-03-15 RX ADMIN — Medication 10 ML: at 09:59

## 2023-03-15 RX ADMIN — NICARDIPINE HYDROCHLORIDE 12.5 MG/HR: 25 INJECTION, SOLUTION INTRAVENOUS at 23:23

## 2023-03-15 NOTE — THERAPY EVALUATION
Acute Care - Speech Language Pathology   Swallow Initial Evaluation [facility]     Patient Name: Manuel Durant  : 1950  MRN: [id number]  Today's Date: 3/15/2023               Admit Date: 3/12/2023    Visit Dx:     ICD-10-CM ICD-9-CM   1. Intracranial hemorrhage (HCC)  I62.9 432.9   2. Altered mental status, unspecified altered mental status type  R41.82 780.97     Patient Active Problem List   Diagnosis   • Intracranial hemorrhage (HCC)     History reviewed. No pertinent past medical history.  History reviewed. No pertinent surgical history.    SLP Recommendation and Plan  SLP Swallowing Diagnosis: moderate, oral dysphagia, pharyngeal dysphagia (03/15/23 1500)  SLP Diet Recommendation: honey thick liquids, puree (03/15/23 1500)  Recommended Precautions and Strategies: upright posture during/after eating, small bites of food and sips of liquid, assist with feeding (03/15/23 1500)  SLP Rec. for Method of Medication Administration: meds whole, meds crushed, with puree, as tolerated (03/15/23 1500)     Monitor for Signs of Aspiration: yes, notify SLP if any concerns (03/15/23 1500)  Recommended Diagnostics: reassess via clinical swallow evaluation, SLE/Cog/Motor Speech Evaluation (03/15/23 1500)     Anticipated Discharge Disposition (SLP): anticipate therapy at next level of care (03/15/23 1500)     Therapy Frequency (Swallow): PRN (03/15/23 1500)  Predicted Duration Therapy Intervention (Days): until discharge (03/15/23 1500)                                        Plan of Care Reviewed With: patient  Outcome Evaluation: VFSS completed. Pt with silent aspiration nectar and severe oral residue with solids. SLP recs honey and puree. Straws okay. Meds crushed in puree.  ipad utilized.      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\"  -    Current Method of Nutrition mechanical ground textures;nectar/syrup-thick liquids  -    Precautions/Limitations, Vision difficult to assess  -    Precautions/Limitations, Hearing difficult to assess  -    Prior Level of Function-Communication English as a second language;receptive language impairment;expressive language impairment  -    Prior Level of Function-Swallowing safe, efficient swallowing in all situations  -    Plans/Goals Discussed with patient;agreed upon  -    Barriers to Rehab medically complex  -    Patient's Goals for Discharge patient did not state  -          Pain: FACES Scale, Pretreatment 2-->hurts little bit  -    Posttreatment Pain Rating 2-->hurts little bit  -          Dentition Assessment natural, present and adequate  -          Oral Motor General Assessment oral labial or buccal impairment  -          Clinical Swallow Evaluation Summary --          VFSS Summary Patient presents with moderate oropharyngeal dysphagia characterized by poor labial seal, ineffective response to aspiration, swallow mistiming, and buccal weakness.  Swallow elicited at the valleculae with honey via spoon and straw without penetration. Adequate swallow initiation with honey via cup. Trace posterior aspiration during the swallow with nectar via cup without cough response. Anterior loss with nectar and honey trials. Swallow elicited at the valleculae with puree. No oral residue post swallow. Minimal mastication of mech soft before swallow elicited. Poor bolus formation with regular. Severe oral residue, which patient was unable to clear despite cues and a liquid wash. SLP removed remaining bolus from oral cavity.  -          Summary Statement Radiologist present: Dr. Carranza. Severe oral " residue with solids. Trace aspiration nectar via cup. No aspiration or penetration demonstrated with puree or honey trials.  -          SLP Swallowing Diagnosis moderate;oral dysphagia;pharyngeal dysphagia  -    Functional Impact risk of aspiration/pneumonia  -    Rehab Potential/Prognosis, Swallowing --          Therapy Frequency (Swallow) PRN  -    Predicted Duration Therapy Intervention (Days) until discharge  -    SLP Diet Recommendation honey thick liquids;puree  -    Recommended Diagnostics reassess via clinical swallow evaluation;SLE/Cog/Motor Speech Evaluation  -    Recommended Precautions and Strategies upright posture during/after eating;small bites of food and sips of liquid;assist with feeding  -    Oral Care Recommendations Oral Care BID/PRN  -    SLP Rec. for Method of Medication Administration meds whole;meds crushed;with puree;as tolerated  -    Monitor for Signs of Aspiration yes;notify SLP if any concerns  -    Anticipated Discharge Disposition (SLP) anticipate therapy at next level of care  -          Swallow LTGs Patient will demonstrate functional swallow for  -          Diet Texture (Demonstrate functional swallow) pureed textures  -    Liquid viscosity (Demonstrate functional swallow) honey/  moderately thick liquids  -    [place] (Demonstrate functional swallow) independently (over 90% accuracy)  -          (LTG) Swallow Pt will participate in MBSS to assess pahrygneal stage of swallow.  -    [place] (Swallow Long Term Goal) independently (over 90% accuracy)  -    Time Frame (Swallow Long Term Goal) by discharge  -    Progress/Outcomes (Swallow Long Term Goal) goal met  -          User Key  (r) = Recorded By, (t) = Taken By, (c) = Cosigned By    Initials Name Effective Dates     Radha Reinoso MS CCC-SLP 06/16/21 -     CB Marlee Marroquin CCC-SLP 11/10/22 -                 EDUCATION  The patient has been educated in the following areas:    Dysphagia (Swallowing Impairment).        SLP GOALS     Row Name 03/15/23 1500 03/14/23 1050          (LTG) Patient will demonstrate functional swallow for    Diet Texture (Demonstrate functional swallow) pureed textures  -SH --     Liquid viscosity (Demonstrate functional swallow) honey/  moderately thick liquids  -SH --     [place] (Demonstrate functional swallow) independently (over 90% accuracy)  -SH --        (LTG) Swallow    (LTG) Swallow Pt will participate in MBSS to assess pahrygneal stage of swallow.  -SH Pt will participate in MBSS to assess pahrygneal stage of swallow.  -CB     [place] (Swallow Long Term Goal) independently (over 90% accuracy)  -SH independently (over 90% accuracy)  -CB     Time Frame (Swallow Long Term Goal) by discharge  -SH by discharge  -CB     Progress/Outcomes (Swallow Long Term Goal) goal met  - --           User Key  (r) = Recorded By, (t) = Taken By, (c) = Cosigned By    Initials Name Provider Type     Radha Reinoso MS CCC-SLP Speech and Language Pathologist    Marlee Herrera CCC-SLP Speech and Language Pathologist                   Time Calculation:    Time Calculation- SLP     Row Name 03/15/23 1529             Time Calculation- SLP    SLP Start Time 1315  -      SLP Received On 03/15/23  -         Untimed Charges    78044-BI Motion Fluoro Eval Swallow Minutes 75  -SH         Total Minutes    Untimed Charges Total Minutes 75  -SH       Total Minutes 75  -SH            User Key  (r) = Recorded By, (t) = Taken By, (c) = Cosigned By    Initials Name Provider Type     Radha Reinoso MS CCC-SLP Speech and Language Pathologist                Therapy Charges for Today     Code Description Service Date Service Provider Modifiers Qty    [id number]  ST MOTION FLUORO EVAL SWALLOW 5 3/15/2023 Radha Reinoso MS CCC-SLP GN 1               Radha Reinoso MS CCC-SLP  3/15/2023

## 2023-03-15 NOTE — PLAN OF CARE
Goal Outcome Evaluation:  Plan of Care Reviewed With: patient           Outcome Evaluation: VFSS completed. Pt with silent aspiration nectar and severe oral residue with solids. SLP recs honey and puree. Straws okay. Meds crushed in puree.  ipad utilized.      Patient was not wearing a face mask during this therapy encounter. Therapist used appropriate personal protective equipment including mask, eye protection and gloves.  Mask used was standard procedure mask. Appropriate PPE was worn during the entire therapy session. Hand hygiene was completed before and after therapy session. Patient is not in enhanced droplet precautions.

## 2023-03-15 NOTE — PROGRESS NOTES
[facility] NEUROSURGERY INTRACRANIAL PROGRESS NOTE    PATIENT IDENTIFICATION:   Name:  Manuel Durant      MRN:  [id number]     73 y.o.  male               CC: ICH      Subjective     Interval History:  service utilized for visit.  Patient reports mild headache and low back pain.  He states back pain is chronic every few days.  He does report some pain in his right arm.   feels that his speech is a little difficult to understand and has some issues answering appropriately.     ROS:  HEENT: headache  MS: Back pain  GI: No nausea, vomiting  Neuro: Right-sided weakness, speech difficulties    Objective     Vital signs in last 24 hours:  Temp:  [98.3 °F (36.8 °C)-98.7 °F (37.1 °C)] 98.3 °F (36.8 °C)  Heart Rate:  [] 72  BP: (122-155)/(55-82) 139/61      Intake/Output this shift:  No intake/output data recorded.      Intake/Output last 3 shifts:  I/O last 3 completed shifts:  In: -   Out: 1700 [Urine:1700]    LABS:  Blood culture 3/12-NGTD     IMAGING STUDIES:  No new imaging    I personally viewed and interpreted the patient's chart.    Meds reviewed/changed: Yes  Cardene drip  Labetalol 20 mg IV every 2 minutes as needed-2 doses    Physical Exam:    General:   Awake, alert, speaks very little English.   utilized.  Some dysarthria and possibly some mild aphasia.  Is able to name a pen appropriately.  He knows he is in the hospital and can state his name.    Cranial nerve II:  Able to accurately count fingers  CN III IV VI: Not crossing midline to right well today.  Some right-sided visual neglect, PERRL 4 mm  CN VII: Right facial weakness-moderate.  Motor:    Normal movement left side.  Right Arm weakness> leg weakness.  Antigravity with leg.  Increased tone right arm and leg  Sensation: Pain sensation intact right  Station and Gait: Per PT note 3/14-comes to EOB with mod Ax2, maintains static sitting with mod/max Ax2 with significant extensor tone and R lean.  "  Coordination:  Finger-to-nose test intact left upper extremity.  Unable to assess right side due to weakness   Extremities:   Not wearing SCD    Assessment & Plan     ASSESSMENT:      Intracranial hemorrhage (HCC)    Patient remains in ICU on Cardene drip for blood pressure control after spontaneously thalamic/internal capsule hemorrhage with intraventricular extension.  He does report a mild headache today.  He seems to be quite uncomfortable in bed and states he is having back pain.  Per his report it is chronic but he does appear quite uncomfortable.  Is uncertain whether he fell so we will get CT of thoracic and lumbar spine to evaluate for acute abnormality.  He remains with right-sided weakness although he is moving the leg better.  His tone is increased.  We will try some baclofen which will help both his back pain and his increased tone.  He worked with therapies yesterday.  He is safe for transfer to floor from our standpoint once his blood pressure is well controlled under 140.  Nursing reports they are awaiting oral medications.  He will definitely need some level of rehab at discharge.  Consider MRI brain once clot dissipating.    PLAN:   Keep SBP <140  Therapies and rehab evals  Neuro check every 4 hours  Okay for transfer to floor once SBP controlled  Baclofen 5 mg PO TID  CT T and L spine  SCD RE-ORDERED    I discussed the patient's findings and my recommendations with patient and nursing staff    During patient visit, I utilized appropriate personal protective equipment including mask and gloves.  Mask used was standard procedure mask. Appropriate PPE was worn during the entire visit.  Hand hygiene was completed before and after.      chief complaint,  exam, MDM data copied forward from previous encounters in EMR is unchanged.        LOS: 3 days       Roxanna Ramirez, APRN  3/15/2023  08:48 EDT    \"Dictated utilizing Dragon dictation\".      "

## 2023-03-15 NOTE — PLAN OF CARE
Goal Outcome Evaluation:      Neuro exam unchanged. Pt alert to self. Cardene maxed to maintain SBP less than 140. PRN Lebatolol given. Dr. Cormier notified and new orders given. 750 cc UOP. Will continue to monitor. See am labs.

## 2023-03-15 NOTE — DISCHARGE PLACEMENT REQUEST
"Manuel Durant (73 y.o. Male)     Date of Birth   1950    Social Security Number       Address   [address] [place] [address]    Home Phone   [phone]    MRN   [id number]       Catholic   None    Marital Status   Unknown                            Admission Date   3/12/23    Admission Type   Emergency    Admitting Provider   Ricardo Cormier Jr., MD    Attending Provider   Ricardo Cormier Jr., MD    Department, Room/Bed   [facility] INTENSIVE CARE, I371/1       Discharge Date       Discharge Disposition       Discharge Destination                               Attending Provider: Ricardo Cormier Jr., MD    Allergies: No Known Allergies    Isolation: None   Infection: None   Code Status: CPR    Ht: 167.6 cm (66\")   Wt: 71.3 kg (157 lb 3 oz)    Admission Cmt: None   Principal Problem: Intracranial hemorrhage (HCC) [I62.9]                 Active Insurance as of 3/12/2023     Patient has no active insurance coverage on file for 3/12/2023.          Emergency Contacts      (Rel.) Home Phone Work Phone Mobile Phone    ABRAM Klein (Friend) -- -- [phone]    (SON)Ariel -- -- [phone]    [employer] (Friend) -- -- [phone]    Bernard Sevilla (Friend) -- -- [phone]    Jg Durant (Relative) -- -- [phone]              "

## 2023-03-15 NOTE — PLAN OF CARE
Goal Outcome Evaluation:              Outcome Evaluation: ANDRADE evaluating patient, VFSS rescheduled to 1315.

## 2023-03-15 NOTE — THERAPY TREATMENT NOTE
Patient Name: Manuel Durant  : 1950    MRN: [id number]                              Today's Date: 3/15/2023       Admit Date: 3/12/2023    Visit Dx:     ICD-10-CM ICD-9-CM   1. Intracranial hemorrhage (HCC)  I62.9 432.9   2. Altered mental status, unspecified altered mental status type  R41.82 780.97     Patient Active Problem List   Diagnosis   • Intracranial hemorrhage (HCC)     History reviewed. No pertinent past medical history.  History reviewed. No pertinent surgical history.   General Information     Row Name 03/15/23 1133          Physical Therapy Time and Intention    Document Type therapy note (daily note) (P)   -MO     Mode of Treatment co-treatment;physical therapy;occupational therapy (P)   -MO     Row Name 03/15/23 1133          General Information    Patient Profile Reviewed yes (P)   -MO     Existing Precautions/Restrictions fall (P)   -MO     Row Name 03/15/23 1133          Cognition    Orientation Status (Cognition) oriented x 3 (P)   -MO     Row Name 03/15/23 1133          Safety Issues, Functional Mobility    Impairments Affecting Function (Mobility) balance;endurance/activity tolerance;strength;motor control;visual/perceptual;postural/trunk control;range of motion (ROM);cognition;grasp;pain;muscle tone abnormal (P)   -MO           User Key  (r) = Recorded By, (t) = Taken By, (c) = Cosigned By    Initials Name Provider Type    Lacie Carson, PT Student PT Student               Mobility     Row Name 03/15/23 1134          Bed Mobility    Bed Mobility supine-sit;sit-supine (P)   -MO     Supine-Sit [place] (Bed Mobility) moderate assist (50% patient effort);2 person assist;maximum assist (25% patient effort) (P)   -MO     Sit-Supine [place] (Bed Mobility) moderate assist (50% patient effort);maximum assist (25% patient effort);2 person assist (P)   -MO     Assistive Device (Bed Mobility) bed rails;draw sheet (P)   -MO     Row Name 03/15/23 1134          Sit-Stand Transfer     Sit-Stand [place] (Transfers) not tested (P)   -MO           User Key  (r) = Recorded By, (t) = Taken By, (c) = Cosigned By    Initials Name Provider Type    Lacie Carson, PT Student PT Student               Obj/Interventions     Row Name 03/15/23 1136          Motor Skills    Therapeutic Exercise -- (P)   Worked on trunk activation and WBing through BL elbows with push to come up. X8 bilaterally  -MO     Row Name 03/15/23 1136          Balance    Balance Assessment sitting static balance;sitting dynamic balance (P)   -MO     Static Sitting Balance maximum assist;2-person assist (P)   -MO     Dynamic Sitting Balance maximum assist;2-person assist (P)   -MO     Position, Sitting Balance sitting edge of bed (P)   -MO     Comment, Balance EOB sitting 7-8 minutes. Max Ax2 d/t significant R lean and extensor tone. Once positioned in midline, pt is able to maintain with min A for several seconds before leaning to R again. Requires mod/max Ax1 when working on WBing through RUE, min/mod Ax1 with LUE. (P)   -MO           User Key  (r) = Recorded By, (t) = Taken By, (c) = Cosigned By    Initials Name Provider Type    Lacie Carson, PT Student PT Student               Goals/Plan    No documentation.                Clinical Impression     Row Name 03/15/23 1149          Pain Scale: FACES Pre/Post-Treatment    Pain: FACES Scale, Pretreatment 4-->hurts little more (P)   -MO     Posttreatment Pain Rating 4-->hurts little more (P)   -MO     Pre/Posttreatment Pain Comment Back pain, increased generalized pain with movement (P)   -MO     Row Name 03/15/23 1148          Plan of Care Review    Plan of Care Reviewed With patient (P)   -MO     Outcome Evaluation Pt seen this am for PT/OT co-treat. Pt has improved RLE active movement today, performing several unassisted heel slides as well as requiring less assist with BLE to come to side of the bed. He sits with mod/max Ax2 and maintains sitting for 7-8 minutes with max Ax2.  Today pt is able to intermittently maintain midline with min Ax1 for several seconds on multiple occasions. Worked on upright tolerance, midline control, and increasing trunk activation with WBing through BUE.  Of note, BP elevates to 154/80 upon sitting, attempted to let level out for 4 minutes and richard to 159/80. Returned to supine with mod/max Ax2. (P)   -MO     Row Name 03/15/23 1149          Vital Signs    Pre Systolic BP Rehab 136 (P)   -MO     Pre Treatment Diastolic BP 61 (P)   -MO     Intra Systolic BP Rehab 154 (P)   -MO     Intra Treatment Diastolic BP 80 (P)   -MO     Post Systolic BP Rehab 137 (P)   -MO     Post Treatment Diastolic BP 88 (P)   -MO     Posttreatment Heart Rate (beats/min) 83 (P)   -MO     Post SpO2 (%) 95 (P)   -MO     O2 Delivery Post Treatment room air (P)   -MO     Row Name 03/15/23 1143          Positioning and Restraints    Pre-Treatment Position in bed (P)   -MO     Post Treatment Position bed (P)   -MO     In Bed supine;call light within reach;encouraged to call for assist;exit alarm on (P)   -MO           User Key  (r) = Recorded By, (t) = Taken By, (c) = Cosigned By    Initials Name Provider Type    Lacie Carson, PT Student PT Student               Outcome Measures     Row Name 03/15/23 2959          How much help from another person do you currently need...    Turning from your back to your side while in flat bed without using bedrails? 2 (P)   -MO     Moving from lying on back to sitting on the side of a flat bed without bedrails? 1 (P)   -MO     Moving to and from a bed to a chair (including a wheelchair)? 1 (P)   -MO     Standing up from a chair using your arms (e.g., wheelchair, bedside chair)? 1 (P)   -MO     Climbing 3-5 steps with a railing? 1 (P)   -MO     To walk in hospital room? 1 (P)   -MO     AM-PAC 6 Clicks Score (PT) 7 (P)   -MO     Highest level of mobility 2 --> Bed activities/dependent transfer (P)   -MO     Row Name 03/15/23 4980          Functional  Assessment    Outcome Measure Options AM-PAC 6 Clicks Basic Mobility (PT) (P)   -MO           User Key  (r) = Recorded By, (t) = Taken By, (c) = Cosigned By    Initials Name Provider Type    MO Lacie Tanner, PT Student PT Student                             Physical Therapy Education     Title: PT OT SLP Therapies (In Progress)     Topic: Physical Therapy (In Progress)     Point: Mobility training (Done)     Learning Progress Summary           Patient Acceptance, E, DU,NR by MO at 3/15/2023 1200    Acceptance, E, NR by LM at 3/15/2023 0521    Acceptance, E,D, DU,NR by MO at 3/14/2023 1458                   Point: Home exercise program (In Progress)     Learning Progress Summary           Patient Acceptance, E, NR by LM at 3/15/2023 0521    Acceptance, E,D, DU,NR by MO at 3/14/2023 1458                   Point: Body mechanics (Done)     Learning Progress Summary           Patient Acceptance, E, DU,NR by MO at 3/15/2023 1200    Acceptance, E, NR by LM at 3/15/2023 0521    Acceptance, E,D, DU,NR by MO at 3/14/2023 1458                   Point: Precautions (Done)     Learning Progress Summary           Patient Acceptance, E, DU,NR by MO at 3/15/2023 1200    Acceptance, E, NR by LM at 3/15/2023 0521    Acceptance, E,D, DU,NR by MO at 3/14/2023 1458                               User Key     Initials Effective Dates Name Provider Type Discipline     10/13/22 -  Silke Grace, RN Registered Nurse Nurse    MO 01/27/23 -  Lacie Tanner, PT Student PT Student PT              PT Recommendation and Plan  Planned Therapy Interventions (PT): balance training, bed mobility training, gait training, home exercise program, neuromuscular re-education, patient/family education, ROM (range of motion), strengthening, stretching, transfer training  Plan of Care Reviewed With: (P) patient  Outcome Evaluation: (P) Pt seen this am for PT/OT co-treat. Pt has improved RLE active movement today, performing several unassisted heel slides as  well as requiring less assist with BLE to come to side of the bed. He sits with mod/max Ax2 and maintains sitting for 7-8 minutes with max Ax2. Today pt is able to intermittently maintain midline with min Ax1 for several seconds on multiple occasions. Worked on upright tolerance, midline control, and increasing trunk activation with WBing through BUE.  Of note, BP elevates to 154/80 upon sitting, attempted to let level out for 4 minutes and richard to 159/80. Returned to supine with mod/max Ax2.     Time Calculation:    PT Charges     Row Name 03/15/23 1202             Time Calculation    Start Time 1104 (P)   -MO      Stop Time 1128 (P)   -MO      Time Calculation (min) 24 min (P)   -MO      PT Received On 03/15/23 (P)   -MO      PT - Next Appointment 03/16/23 (P)   -MO         Time Calculation- PT    Total Timed Code Minutes- PT 24 minute(s) (P)   -MO            User Key  (r) = Recorded By, (t) = Taken By, (c) = Cosigned By    Initials Name Provider Type    Lacie Carson PT Student PT Student              Therapy Charges for Today     Code Description Service Date Service Provider Modifiers Qty    59672464191 HC PT THER PROC EA 15 MIN 3/14/2023 Lacie Tanner, PT Student GP 1    11455804724 HC PT EVAL HIGH COMPLEXITY 3 3/14/2023 Lacie Tanner, PT Student GP 1    76726875713 HC PT THER PROC EA 15 MIN 3/15/2023 Lacie Tanner, PT Student GP 2          PT G-Codes  Outcome Measure Options: (P) AM-PAC 6 Clicks Basic Mobility (PT)  AM-PAC 6 Clicks Score (PT): (P) 7  AM-PAC 6 Clicks Score (OT): 7  Modified [place]x Scale: 4 - Moderately severe disability.  Unable to walk without assistance, and unable to attend to own bodily needs without assistance.       Lacie Tanner PT Student  3/15/2023

## 2023-03-15 NOTE — PLAN OF CARE
Goal Outcome Evaluation:  Plan of Care Reviewed With: patient        Progress: no change     Pt alert, but confused. Remembers he is at the hospital.  used. NIH 11. Right sided neglect noted. IV Cardene in place and infusing. Pt endorse back pain- Baclofen started per Neurosurgery. Video swallow completed. Pt tolerating diet. No emesis or nausea complaints. Son called and updated. SCDs in place. RN to continue to monitor and progress towards goals as tolerated.

## 2023-03-15 NOTE — PLAN OF CARE
Goal Outcome Evaluation:  Plan of Care Reviewed With: patient           Outcome Evaluation: Pt seen for OT, cotx with PT due to medical complexity. Per RN pt having CT of spine later to r/o fx following being found down. Also reported pt had preexisting back condition that is painful. Pt agreeable to participate in OT. Performed bed mobilty with max assist x2. Sat eob with min to max assist for balance, cont to demo significant pushing, extensor tone to right. Cues and assist to correct. Sat eob to perform balance tasks, wb laterally and rom to right UE. Cont to demo poor control of right side, extensor tone limiting ability to perform functional tasks. Plan to cont OT. Would benefit from inpatient rehab. OT wore appropriate PPE during session and performed hand hygiene before/after session.

## 2023-03-15 NOTE — PROGRESS NOTES
LOS: 3 days   Patient Care Team:  Provider, No Known as PCP - General    Chief Complaint:  F/up hemorrhagic stroke and critical care management.    Subjective   Interval History  Blood pressure remains elevated.  Remains aphasic but also exam is limited due to grazier barrier.  I could understand some of his words in English.  He does follow commands.    REVIEW OF SYSTEMS:   Could not obtain    Ventilator/Non-Invasive Ventilation Settings (From admission, onward)    None                Physical Exam:     Vital Signs  Temp:  [98.2 °F (36.8 °C)-98.7 °F (37.1 °C)] 98.3 °F (36.8 °C)  Heart Rate:  [] 72  BP: (122-159)/(55-84) 139/61  No intake or output data in the 24 hours ending 03/15/23 0737  Flowsheet Rows    Flowsheet Row First Filed Value   Admission Height --   Admission Weight 69.7 kg (153 lb 10.6 oz) Documented at 03/12/2023 2147          PPE used per hospital policy    General Appearance:   Alert.  Cooperative.  NAD.   ENMT:  Jha 3.  Dry tongue.  External ears normal.   Eyes:  Pupils equal and reactive to light.  Injected conjunctiva   Neck:   Large. Trachea midline. No thyromegaly.   Lungs:    Clear to auscultation,respirations regular, even and nonlabored    Heart:   Regular rhythm and normal rate, normal S1 and S2, no         murmur   Skin:   No rash or ecchymosis   Abdomen:    Obese. Soft. No tenderness. No HSM.   Neuro/psych:  Conscious, alert.  Moves all extremities.  Strength 5/5 on the left, 2/5 on the right.  Contracture of the right arm.  Only withdraw right leg to stimulus   Extremities:  No cyanosis, clubbing or edema.  Warm extremities and well-perfused          Results Review:        Results from last 7 days   Lab Units 03/15/23  0333 03/13/23  0259 03/12/23  1907   SODIUM mmol/L 144 142 141   POTASSIUM mmol/L 3.5 3.6 3.8   CHLORIDE mmol/L 113* 107 103   CO2 mmol/L 17.4* 23.0 23.7   BUN mg/dL 13 16 15   CREATININE mg/dL 0.74* 0.86 0.88    GLUCOSE mg/dL 97 97 112*   CALCIUM mg/dL 8.1* 8.2* 9.5     Results from last 7 days   Lab Units 03/12/23 1907   HSTROP T ng/L 9     Results from last 7 days   Lab Units 03/15/23  0333 03/13/23  0259 03/12/23 1907   WBC 10*3/mm3 8.92 10.66 11.45*   HEMOGLOBIN g/dL 13.1 14.9 15.9   HEMATOCRIT % 38.5 43.3 46.4   PLATELETS 10*3/mm3 185 207 252     Results from last 7 days   Lab Units 03/12/23 1907   INR  1.07   APTT seconds 30.3         I reviewed the patient's new clinical results.        Medication Review:   amLODIPine, 10 mg, Oral, Q24H  metoprolol tartrate, 25 mg, Oral, Q12H  senna-docusate sodium, 2 tablet, Oral, BID  sodium chloride, 10 mL, Intravenous, Q12H        niCARdipine, 5-15 mg/hr, Last Rate: 10 mg/hr (03/15/23 0637)  sodium chloride, 75 mL/hr, Last Rate: 75 mL/hr (03/14/23 0056)        Diagnostic imaging:  I personally and independently reviewed the following images:  CT brain 3/13/23: Left intracranial hemorrhage.       CT brain 3/14/2023: No change      Assessment     1. Acute intracranial hemorrhage, left thalamic and basal ganglia, likely hypertensive in etiology  2. Intraventricular hemorrhage, left lateral ventricle  3. Brain edema and compression, 2.5 mm shift  4. Hypertensive emergency  5. Abnormal CT abdomen: Perinephric stranding.  No UTI  6. Snoring, suspected sleep apnea        Plan       · Nicardipine to keep SBP <140.  Amlodipine 10 mg daily. PRN labetalol  · VFSS today  · Baclofen initiated for back pain  · DC IV fluid  · PT OT  · Thoracic and lumbar spine CT per neurosurgery  · DVT prophylaxis with SCD  · Head of bed elevation.  Aspiration precaution.  · Outpatient evaluation for sleep apnea    Dispo: Can be transferred to telemetry once blood pressure is controlled off drips.    Jelena Abbott MD  03/15/23  07:37 EDT            This note was dictated utilizing [employer] dictation

## 2023-03-15 NOTE — THERAPY TREATMENT NOTE
Patient Name: Manuel Durant  : 1950    MRN: [id number]                              Today's Date: 3/15/2023       Admit Date: 3/12/2023    Visit Dx:     ICD-10-CM ICD-9-CM   1. Intracranial hemorrhage (HCC)  I62.9 432.9   2. Altered mental status, unspecified altered mental status type  R41.82 780.97     Patient Active Problem List   Diagnosis   • Intracranial hemorrhage (HCC)     History reviewed. No pertinent past medical history.  History reviewed. No pertinent surgical history.   General Information     Row Name 03/15/23 1218          OT Time and Intention    Document Type therapy note (daily note)  -KB     Mode of Treatment occupational therapy;co-treatment  cotx due to medical complexity  -KB     Row Name 03/15/23 1218          General Information    Patient Profile Reviewed yes  -KB     Existing Precautions/Restrictions fall  -KB     Row Name 03/15/23 1218          Cognition    Orientation Status (Cognition) oriented x 3  -KB     Row Name 03/15/23 1218          Safety Issues, Functional Mobility    Safety Issues Affecting Function (Mobility) insight into deficits/self-awareness;safety precautions follow-through/compliance  -KB     Impairments Affecting Function (Mobility) balance;strength;endurance/activity tolerance;cognition;coordination;motor control;motor planning;visual/perceptual;postural/trunk control;range of motion (ROM)  -KB           User Key  (r) = Recorded By, (t) = Taken By, (c) = Cosigned By    Initials Name Provider Type    KB Dayna Stephenson OT Occupational Therapist                 Mobility/ADL's     Row Name 03/15/23 1220          Bed Mobility    Bed Mobility supine-sit-supine  -KB     Supine-Sit Borden (Bed Mobility) maximum assist (25% patient effort);2 person assist  -KB     Sit-Supine [place] (Bed Mobility) maximum assist (25% patient effort);2 person assist  -KB     Bed Mobility, Safety Issues decreased use of arms for pushing/pulling;decreased use of legs for  bridging/pushing;impaired trunk control for bed mobility  -     Assistive Device (Bed Mobility) bed rails  -[facility] Name 03/15/23 1220          Sit-Stand Transfer    Sit-Stand Saunders (Transfers) not tested;unable to assess  -[facility] Name 03/15/23 1220          Stand-Sit Transfer    Stand-Sit Saunders (Transfers) not tested;unable to assess  -[facility] Name 03/15/23 1220          Functional Mobility    Functional Mobility- Ind. Level not appropriate to assess  -           User Key  (r) = Recorded By, (t) = Taken By, (c) = Cosigned By    Initials Name Provider Type     Dayna Stephenson OT Occupational Therapist               Obj/Interventions     [facility] Name 03/15/23 1221          Sensory Assessment (Somatosensory)    Sensory Assessment (Somatosensory) unable/difficult to assess  -[facility] Name 03/15/23 1221          Vision Assessment/Intervention    Visual Impairment/Limitations unable/difficult to assess  -[facility] Name 03/15/23 1221          Shoulder (Therapeutic Exercise)    Shoulder (Therapeutic Exercise) PROM (passive range of motion)  -     Shoulder PROM (Therapeutic Exercise) right;5 repetitions;flexion;extension  -[facility] Name 03/15/23 1221          Elbow/Forearm (Therapeutic Exercise)    Elbow/Forearm (Therapeutic Exercise) PROM (passive range of motion)  -     Elbow/Forearm PROM (Therapeutic Exercise) right;flexion;extension;5 repetitions  -[facility] Name 03/15/23 1221          Wrist (Therapeutic Exercise)    Wrist (Therapeutic Exercise) PROM (passive range of motion)  -     Wrist PROM (Therapeutic Exercise) right;flexion;extension  -[facility] Name 03/15/23 1221          Hand (Therapeutic Exercise)    Hand (Therapeutic Exercise) PROM (passive range of motion)  -     Hand PROM (Therapeutic Exercise) bilateral;finger flexion;finger extension  -[facility] Name 03/15/23 1221          Motor Skills    Motor Skills muscle tone  -     Muscle Tone right;hypertonia;flaccidity   -KB     Therapeutic Exercise shoulder;wrist;hand;elbow/forearm  -     Row Name 03/15/23 1221          Balance    Balance Assessment sitting static balance  -     Static Sitting Balance minimal assist;maximum assist;2-person assist  -     Comment, Balance cont to demo extensor tone in trunk, pushing to R, mod/max cues and assist to correct  -           User Key  (r) = Recorded By, (t) = Taken By, (c) = Cosigned By    Initials Name Provider Type    Dayna Randolph OT Occupational Therapist               Goals/Plan    No documentation.                Clinical Impression     Row Name 03/15/23 1227          Pain Assessment    Pre/Posttreatment Pain Comment unable to rate but grimaced a litte due to prexisting  -     Row Name 03/15/23 1227          Pain Scale: FACES Pre/Post-Treatment    Pain: FACES Scale, Pretreatment 4-->hurts little more  -KB     Posttreatment Pain Rating 4-->hurts little more  -KB     Row Name 03/15/23 1227          Plan of Care Review    Plan of Care Reviewed With patient  -     Outcome Evaluation Pt seen for OT, cotx with PT due to medical complexity. Per RN pt having CT of spine later to r/o fx following being found down. Also reported pt had preexisting back condition that is painful. Pt agreeable to participate in OT. Performed bed mobilty with max assist x2. Sat eob with min to max assist for balance, cont to demo significant pushing, extensor tone to right. Cues and assist to correct. Sat eob to perform balance tasks, wb laterally and rom to right UE. Cont to demo poor control of right side, extensor tone limiting ability to perform functional tasks. Plan to cont OT. Would benefit from inpatient rehab  -     Row Name 03/15/23 1227          Therapy Assessment/Plan (OT)    Rehab Potential (OT) good, to achieve stated therapy goals  -     Criteria for Skilled Therapeutic Interventions Met (OT) yes;skilled treatment is necessary  -     Therapy Frequency (OT) 5 times/wk  -      Row Name 03/15/23 1227          Therapy Plan Review/Discharge Plan (OT)    Anticipated Discharge Disposition (OT) inpatient rehabilitation facility;skilled nursing facility  -KB     Row Name 03/15/23 1227          Vital Signs    Pre Systolic BP Rehab 136  -KB     Pre Treatment Diastolic BP 61  -KB     Intra Systolic BP Rehab 159  -KB     Intra Treatment Diastolic BP 80  -KB     Row Name 03/15/23 1227          Positioning and Restraints    Pre-Treatment Position in bed  -KB     Post Treatment Position bed  -KB     In Bed notified nsg;call light within reach;encouraged to call for assist;supine;with nsg  -KB           User Key  (r) = Recorded By, (t) = Taken By, (c) = Cosigned By    Initials Name Provider Type    Dayna Randolph OT Occupational Therapist               Outcome Measures     Row Name 03/15/23 1240          How much help from another is currently needed...    Putting on and taking off regular lower body clothing? 1  -KB     Bathing (including washing, rinsing, and drying) 1  -KB     Toileting (which includes using toilet bed pan or urinal) 1  -KB     Putting on and taking off regular upper body clothing 1  -KB     Taking care of personal grooming (such as brushing teeth) 2  -KB     Eating meals 1  -KB     AM-PAC 6 Clicks Score (OT) 7  -KB     Row Name 03/15/23 1159          How much help from another person do you currently need...    Turning from your back to your side while in flat bed without using bedrails? 2 (P)   -MO     Moving from lying on back to sitting on the side of a flat bed without bedrails? 1 (P)   -MO     Moving to and from a bed to a chair (including a wheelchair)? 1 (P)   -MO     Standing up from a chair using your arms (e.g., wheelchair, bedside chair)? 1 (P)   -MO     Climbing 3-5 steps with a railing? 1 (P)   -MO     To walk in hospital room? 1 (P)   -MO     AM-PAC 6 Clicks Score (PT) 7 (P)   -MO     Highest level of mobility 2 --> Bed activities/dependent transfer (P)   -MO      Row Name 03/15/23 1240          Modified Kimberly Scale    Modified Kimberly Scale 4 - Moderately severe disability.  Unable to walk without assistance, and unable to attend to own bodily needs without assistance.  -KB     Row Name 03/15/23 1159          Functional Assessment    Outcome Measure Options AM-PAC 6 Clicks Basic Mobility (PT) (P)   -MO           User Key  (r) = Recorded By, (t) = Taken By, (c) = Cosigned By    Initials Name Provider Type    Dayna Randolph, OT Occupational Therapist    Lacie Carson, PT Student PT Student                Occupational Therapy Education     Title: PT OT SLP Therapies (In Progress)     Topic: Occupational Therapy (In Progress)     Point: ADL training (In Progress)     Description:   Instruct learner(s) on proper safety adaptation and remediation techniques during self care or transfers.   Instruct in proper use of assistive devices.              Learning Progress Summary           Patient Acceptance, E, NR by LM at 3/15/2023 0521                   Point: Home exercise program (In Progress)     Description:   Instruct learner(s) on appropriate technique for monitoring, assisting and/or progressing therapeutic exercises/activities.              Learning Progress Summary           Patient Acceptance, E, NR by LM at 3/15/2023 0521                   Point: Precautions (In Progress)     Description:   Instruct learner(s) on prescribed precautions during self-care and functional transfers.              Learning Progress Summary           Patient Acceptance, E, NR by LM at 3/15/2023 0521                   Point: Body mechanics (In Progress)     Description:   Instruct learner(s) on proper positioning and spine alignment during self-care, functional mobility activities and/or exercises.              Learning Progress Summary           Patient Acceptance, E, NR by LM at 3/15/2023 0521                               User Key     Initials Effective Dates Name Provider Type Discipline     MAGDALENE 10/13/22 -  Silke Grace, RN Registered Nurse Nurse              OT Recommendation and Plan  Therapy Frequency (OT): 5 times/wk  Plan of Care Review  Plan of Care Reviewed With: patient  Outcome Evaluation: Pt seen for OT, cotx with PT due to medical complexity. Per RN pt having CT of spine later to r/o fx following being found down. Also reported pt had preexisting back condition that is painful. Pt agreeable to participate in OT. Performed bed mobilty with max assist x2. Sat eob with min to max assist for balance, cont to demo significant pushing, extensor tone to right. Cues and assist to correct. Sat eob to perform balance tasks, wb laterally and rom to right UE. Cont to demo poor control of right side, extensor tone limiting ability to perform functional tasks. Plan to cont OT. Would benefit from inpatient rehab     Time Calculation:    Time Calculation- OT     Row Name 03/15/23 1240             Time Calculation- OT    OT Start Time 1104  -KB      OT Stop Time 1127  -KB      OT Time Calculation (min) 23 min  -KB      Total Timed Code Minutes- OT 23 minute(s)  -KB      OT Received On 03/15/23  -KB      OT - Next Appointment 03/16/23  -KB      OT Goal Re-Cert Due Date 03/28/23  -KB         Timed Charges    21425 - OT Therapeutic Activity Minutes 23  -KB         Total Minutes    Timed Charges Total Minutes 23  -KB       Total Minutes 23  -KB            User Key  (r) = Recorded By, (t) = Taken By, (c) = Cosigned By    Initials Name Provider Type    KB Dayna Stephenson OT Occupational Therapist              Therapy Charges for Today     Code Description Service Date Service Provider Modifiers Qty    [id number]  OT THERAPEUTIC ACT EA 15 MIN 3/15/2023 Dayna Stephenson OT GO 2               Dayna Stephenson OT  3/15/2023

## 2023-03-15 NOTE — PLAN OF CARE
Goal Outcome Evaluation:  Plan of Care Reviewed With: (P) patient           Outcome Evaluation: (P) Pt seen this am for PT/OT co-treat. Pt has improved RLE active movement today, performing several unassisted heel slides as well as requiring less assist with BLE to come to side of the bed. He sits with mod/max Ax2 and maintains sitting for 7-8 minutes with max Ax2. Today pt is able to intermittently maintain midline with min Ax1 for several seconds on multiple occasions. Worked on upright tolerance, midline control, and increasing trunk activation with WBing through BUE.  Of note, BP elevates to 154/80 upon sitting, attempted to let level out for 4 minutes and richard to 159/80. Returned to supine with mod/max Ax2.

## 2023-03-16 ENCOUNTER — APPOINTMENT (OUTPATIENT)
Dept: CT IMAGING | Facility: HOSPITAL | Age: 73
DRG: 64 | End: 2023-03-16
Payer: MEDICAID

## 2023-03-16 PROBLEM — I16.1 HYPERTENSIVE EMERGENCY: Status: ACTIVE | Noted: 2023-03-16

## 2023-03-16 LAB
ANION GAP SERPL CALCULATED.3IONS-SCNC: 10.2 MMOL/L (ref 5–15)
BUN SERPL-MCNC: 14 MG/DL (ref 8–23)
BUN/CREAT SERPL: 17.3 (ref 7–25)
CALCIUM SPEC-SCNC: 8.6 MG/DL (ref 8.6–10.5)
CHLORIDE SERPL-SCNC: 114 MMOL/L (ref 98–107)
CO2 SERPL-SCNC: 19.8 MMOL/L (ref 22–29)
CREAT SERPL-MCNC: 0.81 MG/DL (ref 0.76–1.27)
DEPRECATED RDW RBC AUTO: 44.7 FL (ref 37–54)
EGFRCR SERPLBLD CKD-EPI 2021: 94.3 ML/MIN/1.73
ERYTHROCYTE [DISTWIDTH] IN BLOOD BY AUTOMATED COUNT: 13.3 % (ref 12.3–15.4)
GLUCOSE BLDC GLUCOMTR-MCNC: 113 MG/DL (ref 70–130)
GLUCOSE SERPL-MCNC: 121 MG/DL (ref 65–99)
HCT VFR BLD AUTO: 39.1 % (ref 37.5–51)
HGB BLD-MCNC: 13.4 G/DL (ref 13–17.7)
MCH RBC QN AUTO: 31.5 PG (ref 26.6–33)
MCHC RBC AUTO-ENTMCNC: 34.3 G/DL (ref 31.5–35.7)
MCV RBC AUTO: 92 FL (ref 79–97)
PLATELET # BLD AUTO: 199 10*3/MM3 (ref 140–450)
PMV BLD AUTO: 10.9 FL (ref 6–12)
POTASSIUM SERPL-SCNC: 3.4 MMOL/L (ref 3.5–5.2)
POTASSIUM SERPL-SCNC: 3.8 MMOL/L (ref 3.5–5.2)
RBC # BLD AUTO: 4.25 10*6/MM3 (ref 4.14–5.8)
SODIUM SERPL-SCNC: 144 MMOL/L (ref 136–145)
WBC NRBC COR # BLD: 10.28 10*3/MM3 (ref 3.4–10.8)

## 2023-03-16 PROCEDURE — 80048 BASIC METABOLIC PNL TOTAL CA: CPT | Performed by: INTERNAL MEDICINE

## 2023-03-16 PROCEDURE — 72131 CT LUMBAR SPINE W/O DYE: CPT

## 2023-03-16 PROCEDURE — 72128 CT CHEST SPINE W/O DYE: CPT

## 2023-03-16 PROCEDURE — 97110 THERAPEUTIC EXERCISES: CPT

## 2023-03-16 PROCEDURE — 85027 COMPLETE CBC AUTOMATED: CPT | Performed by: INTERNAL MEDICINE

## 2023-03-16 PROCEDURE — 84132 ASSAY OF SERUM POTASSIUM: CPT | Performed by: INTERNAL MEDICINE

## 2023-03-16 PROCEDURE — 82962 GLUCOSE BLOOD TEST: CPT

## 2023-03-16 PROCEDURE — 99232 SBSQ HOSP IP/OBS MODERATE 35: CPT | Performed by: NURSE PRACTITIONER

## 2023-03-16 RX ORDER — ACETAMINOPHEN 325 MG/1
650 TABLET ORAL EVERY 6 HOURS PRN
Status: DISCONTINUED | OUTPATIENT
Start: 2023-03-16 | End: 2023-05-08 | Stop reason: HOSPADM

## 2023-03-16 RX ORDER — LABETALOL HYDROCHLORIDE 5 MG/ML
20 INJECTION, SOLUTION INTRAVENOUS EVERY 4 HOURS PRN
Status: DISCONTINUED | OUTPATIENT
Start: 2023-03-16 | End: 2023-05-04

## 2023-03-16 RX ADMIN — METOPROLOL TARTRATE 25 MG: 25 TABLET ORAL at 21:10

## 2023-03-16 RX ADMIN — LABETALOL HYDROCHLORIDE 20 MG: 5 INJECTION, SOLUTION INTRAVENOUS at 21:23

## 2023-03-16 RX ADMIN — POTASSIUM CHLORIDE FOR ORAL SOLUTION 40 MEQ: 1.5 POWDER, FOR SOLUTION ORAL at 14:40

## 2023-03-16 RX ADMIN — LABETALOL HYDROCHLORIDE 20 MG: 5 INJECTION, SOLUTION INTRAVENOUS at 11:41

## 2023-03-16 RX ADMIN — NICARDIPINE HYDROCHLORIDE 7.5 MG/HR: 25 INJECTION, SOLUTION INTRAVENOUS at 03:48

## 2023-03-16 RX ADMIN — DOCUSATE SODIUM 50MG AND SENNOSIDES 8.6MG 2 TABLET: 8.6; 5 TABLET, FILM COATED ORAL at 21:10

## 2023-03-16 RX ADMIN — AMLODIPINE BESYLATE 10 MG: 10 TABLET ORAL at 08:31

## 2023-03-16 RX ADMIN — BACLOFEN 5 MG: 10 TABLET ORAL at 21:15

## 2023-03-16 RX ADMIN — NICARDIPINE HYDROCHLORIDE 12.5 MG/HR: 25 INJECTION, SOLUTION INTRAVENOUS at 01:19

## 2023-03-16 RX ADMIN — POTASSIUM CHLORIDE FOR ORAL SOLUTION 40 MEQ: 1.5 POWDER, FOR SOLUTION ORAL at 05:33

## 2023-03-16 RX ADMIN — BACLOFEN 5 MG: 10 TABLET ORAL at 14:40

## 2023-03-16 RX ADMIN — Medication 10 ML: at 21:23

## 2023-03-16 RX ADMIN — Medication 10 ML: at 11:41

## 2023-03-16 RX ADMIN — ACETAMINOPHEN 650 MG: 325 TABLET, FILM COATED ORAL at 11:41

## 2023-03-16 RX ADMIN — LABETALOL HYDROCHLORIDE 20 MG: 5 INJECTION, SOLUTION INTRAVENOUS at 02:22

## 2023-03-16 RX ADMIN — METOPROLOL TARTRATE 25 MG: 25 TABLET ORAL at 08:31

## 2023-03-16 RX ADMIN — ACETAMINOPHEN 650 MG: 325 TABLET, FILM COATED ORAL at 21:47

## 2023-03-16 RX ADMIN — BACLOFEN 5 MG: 10 TABLET ORAL at 05:33

## 2023-03-16 NOTE — PROGRESS NOTES
[facility] NEUROSURGERY INTRACRANIAL PROGRESS NOTE    PATIENT IDENTIFICATION:   Name:  Manuel Durant      MRN:  [id number]     73 y.o.  male               CC: ICH      Subjective     Interval History:  service utilized for visit.  Denies headache.  Reports mild low back pain.  Worked with PT yesterday.  CCP evaluating rehab options.  Cardene off this morning.    ROS:  HEENT: No headache  MS: Mild back pain  GI: No nausea, vomiting  Neuro: Right-sided weakness, speech difficulties    Objective     Vital signs in last 24 hours:  Temp:  [98.5 °F (36.9 °C)-99.9 °F (37.7 °C)] 98.6 °F (37 °C)  Heart Rate:  [] 75  Resp:  [16] 16  BP: (105-165)/() 141/79      Intake/Output this shift:  I/O this shift:  In: 240 [P.O.:240]  Out: -       Intake/Output last 3 shifts:  I/O last 3 completed shifts:  In: 5003.2 [P.O.:1200; I.V.:3803.2]  Out: 300 [Urine:300]    LABS:  Results from last 7 days   Lab Units 03/16/23  0346 03/15/23  0333 03/13/23  0259   WBC 10*3/mm3 10.28 8.92 10.66   HEMOGLOBIN g/dL 13.4 13.1 14.9   HEMATOCRIT % 39.1 38.5 43.3   PLATELETS 10*3/mm3 199 185 207     Results from last 7 days   Lab Units 03/16/23  0346 03/15/23  0333 03/13/23  0259 03/12/23  1907   SODIUM mmol/L 144 144 142 141   POTASSIUM mmol/L 3.4* 3.5 3.6 3.8   CHLORIDE mmol/L 114* 113* 107 103   CO2 mmol/L 19.8* 17.4* 23.0 23.7   BUN mg/dL 14 13 16 15   CREATININE mg/dL 0.81 0.74* 0.86 0.88   CALCIUM mg/dL 8.6 8.1* 8.2* 9.5   BILIRUBIN mg/dL  --   --   --  0.7   ALK PHOS U/L  --   --   --  107   ALT (SGPT) U/L  --   --   --  25   AST (SGOT) U/L  --   --   --  35   GLUCOSE mg/dL 121* 97 97 112*       Blood culture 3/12-NGTD     IMAGING STUDIES:  CT thoracic and lumbar spine-no evidence of fracture or malalignment. Fairly diffuse ankylosis right side of anterior vertebral bodies in the thoracic spine.  Anterior osteophytosis at multiple levels throughout the lumbar spine as well.  Severe DDD L5/S1 with mild retrolisthesis of L5  on S1.  Moderate least severe to severe canal stenosis L3/4 and L4/5 due to disc bulging and ligamentum flavum hypertrophy.    I personally viewed and interpreted the patient's chart.    Meds reviewed/changed: Yes  Baclofen 5 mg p.o. every 8 hours  Cardene drip- off  Labetalol 20 mg IV every 2 minutes as needed-1 doses    Physical Exam:    General:   Awake, alert, speaks very little English.   utilized.  Some dysarthria and is becoming more clear that he has some aphasia, likely more receptive as he has some difficulty following commands.  Continues to have some right-sided neglect  Cranial nerve II:  Able to accurately count fingers  CN III IV VI: Some movement past midline to right today.  No disconjugate gaze.    CN VII: Right facial weakness-moderate.  Motor:    Normal movement left side. Withdrawals RLE> RUE.  Increased tone right arm and leg  Sensation: Pain sensation intact right  Station and Gait: Per PT note 3/15-improved RLE active movement today, performing several unassisted heel slides as well as requiring less assist with BLE to come to side of the bed. He sits with mod/max Ax2 and maintains sitting for 7-8 minutes with max Ax2. Today pt is able to intermittently maintain midline with min Ax1 for several seconds on multiple occasions. Worked on upright tolerance, midline control, and increasing trunk activation with WBing through BUE.    Coordination:  Finger-to-nose test intact left upper extremity.  Unable to assess right side due to weakness   Extremities:    wearing SCD    Assessment & Plan     ASSESSMENT:      Intracranial hemorrhage (HCC)    Patient with spontaneous left basal ganglia/internal capsule hemorrhage remains in ICU due to blood pressure issues.  He is right hemiparetic with weakness more in the arm than the leg.  He has some withdrawal of the leg but minimal movement of the arm.  Tone still seems elevated but somewhat better than yesterday after induction of baclofen.  CT  "lumbar and thoracic spine for back pain show no evidence of any acute abnormality.  He does have some spinal stenosis at L3/4.  With use of  he is evaluated today and reports no headache.  He does have some difficulty following some commands which I believe is related to aphasia from dominant side hemorrhage.  He remains with some right-sided neglect.  His Cardene was stopped this morning and he is now on oral medications.  [facility] is assisting with rehab evaluations.  We will continue to follow.  We will determine clearance for DVT prophylaxis after CT tomorrow.    PLAN:   CTH AM  Tylenol PRN  Keep SBP <140  Cont Therapies and rehab evals  Okay for transfer to floor once SBP controlled    I discussed the patient's findings and my recommendations with patient, nursing staff and Dr. Logan    During patient visit, I utilized appropriate personal protective equipment including mask and gloves.  Mask used was standard procedure mask. Appropriate PPE was worn during the entire visit.  Hand hygiene was completed before and after.      chief complaint, exam, MDM data copied forward from previous encounters in EMR is unchanged.           LOS: 4 days       Roxanna Ramirez, APRN  3/16/2023  13:06 EDT    \"Dictated utilizing Dragon dictation\".      "

## 2023-03-16 NOTE — PLAN OF CARE
Goal Outcome Evaluation:  Plan of Care Reviewed With: (P) patient           Outcome Evaluation: (P) Pt seen this pm for PT, agreeable to treatment upon entry. Pt comes to EOB max Ax2 and sits ~8mins max A with intermitent Min on multiple occasions for several seconds. He performed seated ther ex working on BL WBing, trunk activation, and upright tolerance, good improvement shown with WBing through RUE, able to push back up into sitting with CGA/Min A. Pt performs STS x3 with min/mod Ax2 for several seconds, good midline control and LE support, fatigues quickly by 3rd attempt. Returned to supine Max x2. Pt will benefit from continued standing trials and potential transfer training to chair.

## 2023-03-16 NOTE — CONSULTS
Internal Medicine Consult  INTERNAL MEDICINE   [facility]       Patient Identification:  Name: Manuel Durant  Age: 73 y.o.  Sex: male  :  1950  MRN: [id number]                   Primary Care Physician: Provider, No Known                               Requesting physician: Dr. Abbott  Date of consultation: 3/16/2023    Reason for consultation: Management of medical issues out of ICU.    History of Present Illness:   Patient is a 81-year-old male who was admitted to our facility on 3/12/2022 when he was propped to the emergency room at [facility] via EMS as per request by his friends found him down.  Work-up in the emergency room revealed hemorrhagic stroke with intraventricular bleed.  He was noted to have poorly controlled blood pressure and right-sided hemiplegia.  Patient was transferred to our facility and admitted to ICU and had neurosurgical evaluation.  Patient has been managed conservatively by neurosurgery service with controlled blood pressure requiring Cardene drip.  Patient was subsequently improved and had a stable hemodynamics after Cardene drip was turned off.  Patient is cleared to be transferred out of ICU by neurosurgery service with continued management of his blood pressure.  Patient speaks Croatian and does not have much English language ability.  Tradition [employer] jimmy was used to talk to the patient.  At present he denies any new complaints such as headache nausea vomiting.  Does have some difficulty in delivering words and has dysarthria.  Has continued weakness of the right side with some movement in the right leg.  Internal medicine service consulted to manage his blood pressure and other medical issues out of ICU.      Past Medical History:  History reviewed. No pertinent past medical history.  Past Surgical History:  History reviewed. No pertinent surgical history.   Home Meds:  No medications prior to admission.     Current Meds:     Current  Facility-Administered Medications:   •  acetaminophen (TYLENOL) tablet 650 mg, 650 mg, Oral, Q6H PRN, Jelena Abbott MD, 650 mg at 03/16/23 1141  •  amLODIPine (NORVASC) tablet 10 mg, 10 mg, Oral, Q24H, Jelena Abbott MD, 10 mg at 03/16/23 0831  •  baclofen (LIORESAL) tablet 5 mg, 5 mg, Oral, Q8H, Jelena Abbott MD, 5 mg at 03/16/23 1440  •  sennosides-docusate (PERICOLACE) 8.6-50 MG per tablet 2 tablet, 2 tablet, Oral, BID, 2 tablet at 03/15/23 2052 **AND** polyethylene glycol (MIRALAX) packet 17 g, 17 g, Oral, Daily PRN **AND** bisacodyl (DULCOLAX) EC tablet 5 mg, 5 mg, Oral, Daily PRN **AND** bisacodyl (DULCOLAX) suppository 10 mg, 10 mg, Rectal, Daily PRN, Jelena Abbott MD  •  calcium gluconate 1g/50ml 0.675% NaCl IV SOLN, 1 g, Intravenous, PRN **AND** calcium gluconate 6 g in sodium chloride 0.9 % 500 mL IVPB, 6 g, Intravenous, PRN **AND** Calcium, , , PRN, Jelena Abbott MD  •  labetalol (NORMODYNE,TRANDATE) injection 20 mg, 20 mg, Intravenous, Q4H PRN, Jelena Abbott MD  •  Magnesium Sulfate - Total Dose 10 grams - Magnesium 1 or Less, 2 g, Intravenous, PRN **OR** Magnesium Sulfate - Total Dose 6 grams - Magnesium 1.1 - 1.5, 2 g, Intravenous, PRN **OR** Magnesium Sulfate - Total Dose 4 grams - Magnesium 1.6 - 1.9, 4 g, Intravenous, PRN, Jelena Abbott MD  •  metoprolol tartrate (LOPRESSOR) tablet 25 mg, 25 mg, Oral, Q12H, Jelena Abbott MD, 25 mg at 03/16/23 0831  •  potassium chloride (K-DUR,KLOR-CON) ER tablet 40 mEq, 40 mEq, Oral, PRN **OR** potassium chloride (KLOR-CON) packet 40 mEq, 40 mEq, Oral, PRN, 40 mEq at 03/16/23 1440 **OR** potassium chloride 10 mEq in 100 mL IVPB, 10 mEq, Intravenous, Q1H PRN, Jelena Abbott MD  •  potassium phosphate 45 mmol in sodium chloride 0.9 % 500 mL infusion, 45 mmol, Intravenous, PRN **OR** potassium phosphate 30 mmol in sodium chloride 0.9 % 250 mL infusion, 30 mmol, Intravenous, PRN **OR** potassium phosphate 15 mmol in sodium chloride 0.9 % 100 mL infusion, 15  "mmol, Intravenous, PRN **OR** sodium phosphates 40 mmol in sodium chloride 0.9 % 500 mL IVPB, 40 mmol, Intravenous, PRN **OR** sodium phosphates 20 mmol in sodium chloride 0.9 % 250 mL IVPB, 20 mmol, Intravenous, PRN, Jelena Abbott MD  •  [COMPLETED] Insert Peripheral IV, , , Once **AND** sodium chloride 0.9 % flush 10 mL, 10 mL, Intravenous, PRN, Jelena Abbott MD  •  sodium chloride 0.9 % flush 10 mL, 10 mL, Intravenous, Q12H, Jelena Abbott MD, 10 mL at 03/16/23 1141  •  sodium chloride 0.9 % flush 10 mL, 10 mL, Intravenous, PRNScar Rami, MD  •  sodium chloride 0.9 % infusion 40 mL, 40 mL, Intravenous, PRMELBA, Jelena Abbott MD  Allergies:  No Known Allergies  Social History:   Social History     Tobacco Use   • Smoking status: Never   • Smokeless tobacco: Never   Substance Use Topics   • Alcohol use: Never      Family History:  Family History   Family history unknown: Yes          Review of Systems  See history of present illness and past medical history.  Limited due to stroke and speech impediment and language.  Using Tianji  and review of chart following review system was recorded.  Constitutional: Remarkable for no fever or chills  Cardiovascular: Remarkable for no chest pain or shortness of breath  GI: Remarkable for no nausea vomiting or diarrhea  : Remarkable for no burning urination frequency urgency patient has external catheter in place  Neurological: Remarkable for no headache or photophobia right-sided weakness still present  Musculoskeletal: Remarkable for no new joint aches and pain.  Have some abrasion on the elbow.      Vitals:   /92 (BP Location: Right arm, Patient Position: Lying)   Pulse 78   Temp 97.8 °F (36.6 °C) (Oral)   Resp 16   Ht 167.6 cm (66\")   Wt 78.4 kg (172 lb 13.5 oz)   SpO2 94%   BMI 27.90 kg/m²   I/O:     Intake/Output Summary (Last 24 hours) at 3/16/2023 1607  Last data filed at 3/16/2023 1439  Gross per 24 hour   Intake 4763.17 ml   Output 580 ml "   Net 4183.17 ml     Exam:  Patient is examined using the personal protective equipment as per guidelines from infection control for this particular patient as enacted.  Hand washing was performed before and after patient interaction.  General Appearance:   Awake cooperative male who speaks Sami and does not understand   Head:    Normocephalic, without obvious abnormality, atraumatic   Eyes:    PERRL, conjunctiva/corneas clear, EOM's intact, both eyes   Ears:    Normal external ear canals, both ears   Nose:   Nares normal, septum midline, mucosa normal, no drainage    or sinus tenderness   Throat:  No tongue deviation noted   Neck:   Supple, symmetrical, trachea midline, no adenopathy;     thyroid:  no enlargement/tenderness/nodules; no carotid    bruit or JVD   Back:     Symmetric, no curvature, ROM normal, no CVA tenderness   Lungs:     Clear to auscultation bilaterally, respirations unlabored   Chest Wall:    No tenderness or deformity    Heart:   S1-S2 regular   Abdomen:    Soft nontender   Extremities:  Disuse edema and contracture of the right upper extremity and abrasion on the external aspect of the right elbow   Pulses:   Pulses palpable in all extremities; symmetric all extremities   Skin:   Skin color normal, Skin is warm and dry,  no rashes or palpable lesions   Neurologic:  Dysarthria associated with right-sided hemiplegia with some movement in the right leg.       Data Review:      I reviewed the patient's new clinical results.  Results from last 7 days   Lab Units 03/16/23  0346 03/15/23  0333 03/13/23  0259 03/12/23  1907   WBC 10*3/mm3 10.28 8.92 10.66 11.45*   HEMOGLOBIN g/dL 13.4 13.1 14.9 15.9   PLATELETS 10*3/mm3 199 185 207 252     Results from last 7 days   Lab Units 03/16/23  0346 03/15/23  0333 03/13/23 0259 03/12/23  1907   SODIUM mmol/L 144 144 142 141   POTASSIUM mmol/L 3.4* 3.5 3.6 3.8   CHLORIDE mmol/L 114* 113* 107 103   CO2 mmol/L 19.8* 17.4* 23.0 23.7   BUN mg/dL 14 13 16 15    CREATININE mg/dL 0.81 0.74* 0.86 0.88   CALCIUM mg/dL 8.6 8.1* 8.2* 9.5   GLUCOSE mg/dL 121* 97 97 112*     CT Abdomen Pelvis Without Contrast    Result Date: 3/13/2023  1. No definite acute abnormality is seen. The perinephric stranding may be chronic. Please correlate clinically for UTI and possible pyelonephritis. 2. No acute bowel abnormality is seen.  This report was finalized on 3/13/2023 7:23 AM by Dr. Elizabeth Dumont M.D.      CT Head Without Contrast    Result Date: 3/14/2023  Electronically signed by Katt Dickens MD on 03-14-23 at 0742    CT Head Without Contrast    Result Date: 3/13/2023  Electronically signed by Jerad Pulido M.D. on 03-13-23 at 0647    CT Head Without Contrast    Result Date: 3/12/2023  Left cerebral parenchymal hemorrhage with intraventricular extension as described. Findings called to Rocky Perez in the emergency department at around 804 cm.  This report was finalized on 3/12/2023 8:07 PM by Dr. Brad De León M.D.      CT Thoracic Spine Without Contrast    Result Date: 3/16/2023  Electronically signed by Jerad Pulido M.D. on 03-16-23 at 0502    CT Lumbar Spine Without Contrast    Result Date: 3/16/2023  Electronically signed by Jerad Pulido M.D. on 03-16-23 at 0500    Microbiology Results (last 10 days)     Procedure Component Value - Date/Time    Blood Culture - Blood, Arm, Left [[id number]]  (Normal) Collected: 03/12/23 2149    Lab Status: Preliminary result Specimen: Blood from Arm, Left Updated: 03/16/23 2215     Blood Culture No growth at 4 days    Blood Culture - Blood, Arm, Left [[id number]]  (Normal) Collected: 03/12/23 1933    Lab Status: Preliminary result Specimen: Blood from Arm, Left Updated: 03/16/23 1946     Blood Culture No growth at 4 days        ECG 12 Lead Other; AMS   Final Result   HEART RATE= 85  bpm   RR Interval= 706  ms   ME Interval= 171  ms   P Horizontal Axis= -62  deg   P Front Axis= 16  deg   QRSD Interval= 103  ms   QT Interval= 395  ms   QRS  Axis= -18  deg   T Wave Axis= 40  deg   - NORMAL ECG -   Sinus rhythm   No Prior Tracing for Comparison   Electronically Signed By: Karyn Lee ([facility]) 13-Mar-2023 08:09:56   Date and Time of Study: 2023-03-12 19:21:25          Assessment:  Active Hospital Problems    Diagnosis  POA   • **Intracranial hemorrhage (HCC) [I62.9]  Yes   • Hypertensive emergency [I16.1]  Unknown   · Right-sided hemiplegia  · Dysarthria with dysphagia and speech evaluation on 3/15/2023 noted silent aspiration with nectar and severe oral residue with solids.  Patient is recommended to have honey and puréed diet straws are okay meds to be crushed.    Recommendations:  · Continue with aspiration precaution  · Continue OT PT evaluation  · Case management consultation for placement  · Decubitus prevention  · Continue with speech therapy recommendation about diet and consistency and aspiration precautions.  · Follow-up and repeat CT scan of the head in the morning  · Continue with metoprolol with close monitoring of blood pressure with goal to keep systolic blood pressure 140 or less.    Arie Lopez MD   3/16/2023  16:07 EDT    Parts of this note may be an electronic transcription/translation of spoken language to printed text using the Dragon dictation system.

## 2023-03-16 NOTE — PLAN OF CARE
Goal Outcome Evaluation:  Plan of Care Reviewed With: patient        Progress: no change     Pt alert, remains confused and having some aphasia.  iPad used. NIH 11, right side neglect present. Able to withdraw to pain on RLE. PRN Tylenol and Baclofen given for back pain and right arm discomfort. RUE elevated, slight edema noted and increased tone still present. Pt weaned off Cardene this AM. PRN Labetalol given once. Large loose BM this shift. Tolerates diet and requiring assistance. K replacement completed-recheck 1840. Telemetry orders in and report given to 5P RN. Son updated. RN to continue to monitor and progress towards goals as tolerated.

## 2023-03-16 NOTE — PROGRESS NOTES
Continued Stay Note  [facility]     Patient Name: Manuel Durant  MRN: [id number]  Today's Date: 3/16/2023    Admit Date: 3/12/2023    Plan: SNF referrals pending   Discharge Plan     Row Name 03/16/23 0845       Plan    Plan SNF referrals pending    Plan Comments Spoke with Flakita from [facility] and she will speak to family to see if they could admit patient with a contract.  Other SNF referrals  pending  [facility]P following for needs               Discharge Codes    No documentation.                     Jennifer Thomas RN

## 2023-03-16 NOTE — THERAPY TREATMENT NOTE
Patient Name: Manuel Durant  : 1950    MRN: [id number]                              Today's Date: 3/16/2023       Admit Date: 3/12/2023    Visit Dx:     ICD-10-CM ICD-9-CM   1. Intracranial hemorrhage (HCC)  I62.9 432.9   2. Altered mental status, unspecified altered mental status type  R41.82 780.97     Patient Active Problem List   Diagnosis   • Intracranial hemorrhage (HCC)     History reviewed. No pertinent past medical history.  History reviewed. No pertinent surgical history.   General Information     Row Name 23 [facility]          Physical Therapy Time and Intention    Document Type therapy note (daily note) (P)   -MO     Mode of Treatment physical therapy (P)   -MO     Row Name 23 [facility]          General Information    Patient Profile Reviewed yes (P)   -MO     Existing Precautions/Restrictions fall (P)   -MO     Row Name 23 142          Cognition    Orientation Status (Cognition) oriented x 3 (P)   -MO     Row Name 23 142          Safety Issues, Functional Mobility    Impairments Affecting Function (Mobility) balance;strength;endurance/activity tolerance;cognition;pain;muscle tone abnormal;postural/trunk control (P)   -MO           User Key  (r) = Recorded By, (t) = Taken By, (c) = Cosigned By    Initials Name Provider Type    Lacie Carson, PT Student PT Student               Mobility     Row Name 23 1430          Bed Mobility    Bed Mobility supine-sit;sit-supine (P)   -MO     Supine-Sit Gonzales (Bed Mobility) maximum assist (25% patient effort);2 person assist (P)   -MO     Sit-Supine Gonzales (Bed Mobility) maximum assist (25% patient effort);2 person assist (P)   -MO     Assistive Device (Bed Mobility) bed rails;draw sheet (P)   -MO     Row Name 23 1430          Sit-Stand Transfer    Sit-Stand Gonzales (Transfers) moderate assist (50% patient effort);2 person assist (P)   -MO     Comment, (Sit-Stand Transfer) STS x3 with HHA, Mod Ax2, several  seconds each attempt. Good WBing through RLE, able to stand and maintain midline, good trunk control (P)   -MO           User Key  (r) = Recorded By, (t) = Taken By, (c) = Cosigned By    Initials Name Provider Type    Lacie Carson, PT Student PT Student               Obj/Interventions     Row Name 03/16/23 1434          Motor Skills    Therapeutic Exercise -- (P)   Seated EOB 8 minutes. 2x10 BL trunk activation with WBing through elbows and pushing back up. Worked on core control with reaching outside of CAROL with LUE  -MO     Row Name 03/16/23 1434          Balance    Static Sitting Balance minimal assist;maximum assist;2-person assist (P)   -MO     Dynamic Sitting Balance maximum assist;2-person assist;minimal assist (P)   -MO     Position, Sitting Balance sitting edge of bed (P)   -MO     Comment, Balance R lean, Min A with wbing on R and pushing back up, max A when wbing on L. Static sitting Max A, with intermitent independence for several seconds (P)   -MO           User Key  (r) = Recorded By, (t) = Taken By, (c) = Cosigned By    Initials Name Provider Type    Lacie Carson, PT Student PT Student               Goals/Plan    No documentation.                Clinical Impression     Row Name 03/16/23 1441          Pain    Additional Documentation Pain Scale: FACES Pre/Post-Treatment (Group) (P)   -MO     [facility] Name 03/16/23 1441          Pain Scale: FACES Pre/Post-Treatment    Pain: FACES Scale, Pretreatment 2-->hurts little bit (P)   -MO     Posttreatment Pain Rating 2-->hurts little bit (P)   -MO     Row Name 03/16/23 1441          Plan of Care Review    Plan of Care Reviewed With patient (P)   -MO     Outcome Evaluation Pt seen this pm for PT, agreeable to treatment upon entry. Pt comes to EOB max Ax2 and sits ~8mins max A with intermitent Min on multiple occasions for several seconds. He performed seated ther ex working on BL WBing, trunk activation, and upright tolerance, good improvement shown with WBing  through RUE, able to push back up into sitting with CGA/Min A. Pt performs STS x3 with min/mod Ax2 for several seconds, good midline control and LE support, fatigues quickly by 3rd attempt. Returned to supine Max x2. Pt will benefit from continued standing trials and potential transfer training to chair. (P)   -MO     Row Name 03/16/23 1441          Vital Signs    Intra Systolic BP Rehab 134 (P)   -MO     Intra Treatment Diastolic BP 78 (P)   -MO     O2 Delivery Pre Treatment room air (P)   -MO     O2 Delivery Intra Treatment room air (P)   -MO     O2 Delivery Post Treatment room air (P)   -MO     Row Name 03/16/23 1441          Positioning and Restraints    Pre-Treatment Position in bed (P)   -MO     Post Treatment Position bed (P)   -MO     In Bed supine;call light within reach;encouraged to call for assist;notified nsg (P)   -MO           User Key  (r) = Recorded By, (t) = Taken By, (c) = Cosigned By    Initials Name Provider Type    Lacie Carson, PT Student PT Student               Outcome Measures     Row Name 03/16/23 1451          How much help from another person do you currently need...    Turning from your back to your side while in flat bed without using bedrails? 2 (P)   -MO     Moving from lying on back to sitting on the side of a flat bed without bedrails? 1 (P)   -MO     Moving to and from a bed to a chair (including a wheelchair)? 1 (P)   -MO     Standing up from a chair using your arms (e.g., wheelchair, bedside chair)? 1 (P)   -MO     Climbing 3-5 steps with a railing? 1 (P)   -MO     To walk in hospital room? 1 (P)   -MO     AM-PAC 6 Clicks Score (PT) 7 (P)   -MO     Highest level of mobility 2 --> Bed activities/dependent transfer (P)   -MO     Row Name 03/16/23 1451          Functional Assessment    Outcome Measure Options AM-PAC 6 Clicks Basic Mobility (PT) (P)   -MO           User Key  (r) = Recorded By, (t) = Taken By, (c) = Cosigned By    Initials Name Provider Type    Lacie Carson, PT  Student PT Student                             Physical Therapy Education     Title: PT OT SLP Therapies (In Progress)     Topic: Physical Therapy (Done)     Point: Mobility training (Done)     Learning Progress Summary           Patient Acceptance, E,D, DU,NR by MO at 3/16/2023 1451    Acceptance, E, DU,NR by MO at 3/15/2023 1200    Acceptance, E, NR by LM at 3/15/2023 0521    Acceptance, E,D, DU,NR by MO at 3/14/2023 1458                   Point: Home exercise program (Done)     Learning Progress Summary           Patient Acceptance, E,D, DU,NR by MO at 3/16/2023 1451    Acceptance, E, NR by LM at 3/15/2023 0521    Acceptance, E,D, DU,NR by MO at 3/14/2023 1458                   Point: Body mechanics (Done)     Learning Progress Summary           Patient Acceptance, E,D, DU,NR by MO at 3/16/2023 1451    Acceptance, E, DU,NR by MO at 3/15/2023 1200    Acceptance, E, NR by LM at 3/15/2023 0521    Acceptance, E,D, DU,NR by MO at 3/14/2023 1458                   Point: Precautions (Done)     Learning Progress Summary           Patient Acceptance, E,D, DU,NR by MO at 3/16/2023 1451    Acceptance, E, DU,NR by MO at 3/15/2023 1200    Acceptance, E, NR by LM at 3/15/2023 0521    Acceptance, E,D, DU,NR by MO at 3/14/2023 1458                               User Key     Initials Effective Dates Name Provider Type Discipline     10/13/22 -  Silke Grace, RN Registered Nurse Nurse    MO 01/27/23 -  Lacie Tanner, JOSE ELIAS Student PT Student PT              PT Recommendation and Plan  Planned Therapy Interventions (PT): balance training, bed mobility training, gait training, home exercise program, neuromuscular re-education, patient/family education, ROM (range of motion), strengthening, stretching, transfer training  Plan of Care Reviewed With: (P) patient  Outcome Evaluation: (P) Pt seen this pm for PT, agreeable to treatment upon entry. Pt comes to EOB max Ax2 and sits ~8mins max A with intermitent Min on multiple occasions for  several seconds. He performed seated ther ex working on BL WBing, trunk activation, and upright tolerance, good improvement shown with WBing through RUE, able to push back up into sitting with CGA/Min A. Pt performs STS x3 with min/mod Ax2 for several seconds, good midline control and LE support, fatigues quickly by 3rd attempt. Returned to supine Max x2. Pt will benefit from continued standing trials and potential transfer training to chair.     Time Calculation:    PT Charges     Row Name 03/16/23 1451             Time Calculation    Start Time 1357 (P)   -MO      Stop Time 1412 (P)   -MO      Time Calculation (min) 15 min (P)   -MO      PT Received On 03/16/23 (P)   -MO      PT - Next Appointment 03/17/23 (P)   -MO         Time Calculation- PT    Total Timed Code Minutes- PT 15 minute(s) (P)   -MO            User Key  (r) = Recorded By, (t) = Taken By, (c) = Cosigned By    Initials Name Provider Type    Lacie Carson, PT Student PT Student              Therapy Charges for Today     Code Description Service Date Service Provider Modifiers Qty    93029365051 HC PT THER PROC EA 15 MIN 3/15/2023 Lacie Tanner, PT Student GP 2    76289663255 HC PT THER PROC EA 15 MIN 3/16/2023 Lacie Tanner, PT Student GP 1    71692880271 HC PT THER SUPP EA 15 MIN 3/16/2023 Lacie Tanner, PT Student GP 1          PT G-Codes  Outcome Measure Options: (P) AM-PAC 6 Clicks Basic Mobility (PT)  AM-PAC 6 Clicks Score (PT): (P) 7  AM-PAC 6 Clicks Score (OT): 7  Modified [place] Scale: 4 - Moderately severe disability.  Unable to walk without assistance, and unable to attend to own bodily needs without assistance.       Lacie Tanner PT Student  3/16/2023

## 2023-03-16 NOTE — PLAN OF CARE
Problem: Fall Injury Risk  Goal: Absence of Fall and Fall-Related Injury  Outcome: Ongoing, Progressing  Intervention: Identify and Manage Contributors  Recent Flowsheet Documentation  Taken 3/16/2023 1800 by Angel Luis Cr RN  Medication Review/Management: medications reviewed  Taken 3/16/2023 1610 by Angel Luis Cr RN  Medication Review/Management: medications reviewed  Intervention: Promote Injury-Free Environment  Recent Flowsheet Documentation  Taken 3/16/2023 1800 by Angel Luis Cr RN  Safety Promotion/Fall Prevention:   activity supervised   assistive device/personal items within reach   clutter free environment maintained   elopement precautions   fall prevention program maintained   gait belt   lighting adjusted   mobility aid in reach   muscle strengthening facilitated   nonskid shoes/slippers when out of bed   safety round/check completed   room organization consistent   toileting scheduled  Taken 3/16/2023 1610 by Angel Luis Cr RN  Safety Promotion/Fall Prevention:   activity supervised   assistive device/personal items within reach   clutter free environment maintained   lighting adjusted   elopement precautions   fall prevention program maintained   gait belt   mobility aid in reach   muscle strengthening facilitated   nonskid shoes/slippers when out of bed   room organization consistent   safety round/check completed   toileting scheduled     Problem: Skin Injury Risk Increased  Goal: Skin Health and Integrity  Outcome: Ongoing, Progressing  Intervention: Optimize Skin Protection  Recent Flowsheet Documentation  Taken 3/16/2023 1800 by Angel Luis Cr RN  Head of Bed (HOB) Positioning: HOB at 30 degrees  Taken 3/16/2023 1610 by Angel Luis Cr RN  Head of Bed (HOB) Positioning: HOB at 30-45 degrees  Pressure Reduction Devices: alternating pressure pump (ADD)     Problem: Adult Inpatient Plan of Care  Goal: Plan of Care Review  Outcome: Ongoing, Progressing  Goal: Patient-Specific Goal  (Individualized)  Outcome: Ongoing, Progressing  Goal: Absence of Hospital-Acquired Illness or Injury  Outcome: Ongoing, Progressing  Intervention: Identify and Manage Fall Risk  Recent Flowsheet Documentation  Taken 3/16/2023 1800 by Angel Luis Cr RN  Safety Promotion/Fall Prevention:   activity supervised   assistive device/personal items within reach   clutter free environment maintained   elopement precautions   fall prevention program maintained   gait belt   lighting adjusted   mobility aid in reach   muscle strengthening facilitated   nonskid shoes/slippers when out of bed   safety round/check completed   room organization consistent   toileting scheduled  Taken 3/16/2023 1610 by Angel Luis Cr RN  Safety Promotion/Fall Prevention:   activity supervised   assistive device/personal items within reach   clutter free environment maintained   lighting adjusted   elopement precautions   fall prevention program maintained   gait belt   mobility aid in reach   muscle strengthening facilitated   nonskid shoes/slippers when out of bed   room organization consistent   safety round/check completed   toileting scheduled  Intervention: Prevent Skin Injury  Recent Flowsheet Documentation  Taken 3/16/2023 1800 by Angel Luis Cr RN  Body Position: sitting up in bed  Taken 3/16/2023 1610 by Angel Luis Cr RN  Body Position: supine  Intervention: Prevent and Manage VTE (Venous Thromboembolism) Risk  Recent Flowsheet Documentation  Taken 3/16/2023 1800 by Angel Luis Cr RN  Activity Management: activity adjusted per tolerance  VTE Prevention/Management:   bilateral   sequential compression devices on  Taken 3/16/2023 1610 by Angel Luis Cr RN  Activity Management: activity adjusted per tolerance  VTE Prevention/Management:   bilateral   sequential compression devices on  Intervention: Prevent Infection  Recent Flowsheet Documentation  Taken 3/16/2023 1800 by Angel Luis Cr RN  Infection Prevention:   cohorting  utilized   environmental surveillance performed   equipment surfaces disinfected   hand hygiene promoted   personal protective equipment utilized   rest/sleep promoted   single patient room provided   visitors restricted/screened  Taken 3/16/2023 1610 by Angel Luis Cr RN  Infection Prevention:   cohorting utilized   environmental surveillance performed   equipment surfaces disinfected   hand hygiene promoted   personal protective equipment utilized   rest/sleep promoted   single patient room provided   visitors restricted/screened  Goal: Optimal Comfort and Wellbeing  Outcome: Ongoing, Progressing  Goal: Readiness for Transition of Care  Outcome: Ongoing, Progressing   Goal Outcome Evaluation:

## 2023-03-16 NOTE — PROGRESS NOTES
LOS: 4 days   Patient Care Team:  Provider, No Known as PCP - General    Chief Complaint:  F/up hemorrhagic stroke and critical care management.    Subjective   Interval History  Blood pressure remains elevated but improved compared to yesterday.  Seems to be aphasic.  Still weak on his right side.    REVIEW OF SYSTEMS:   Musculoskeletal: Back pain.  Gastrointestinal: No nausea, vomiting or abdominal pain  Respiratory: No shortness of breath or cough.  On RA.    Ventilator/Non-Invasive Ventilation Settings (From admission, onward)    None                Physical Exam:     Vital Signs  Temp:  [98.2 °F (36.8 °C)-99.9 °F (37.7 °C)] 98.9 °F (37.2 °C)  Heart Rate:  [] 77  Resp:  [16] 16  BP: (105-165)/() 134/62    Intake/Output Summary (Last 24 hours) at 3/16/2023 0817  Last data filed at 3/16/2023 0336  Gross per 24 hour   Intake 4763.17 ml   Output 300 ml   Net 4463.17 ml     Flowsheet Rows    Flowsheet Row First Filed Value   Admission Height --   Admission Weight 69.7 kg (153 lb 10.6 oz) Documented at 03/12/2023 2147          PPE used per hospital policy    General Appearance:   Alert.  Cooperative.  NAD.   ENMT:  Jha 3.  Dry tongue.  External ears normal.   Eyes:  Pupils equal and reactive to light.  Injected conjunctiva   Neck:   Large. Trachea midline. No thyromegaly.   Lungs:    Clear to auscultation,respirations regular, even and nonlabored    Heart:   Regular rhythm and normal rate, normal S1 and S2, no         murmur   Skin:   No rash or ecchymosis   Abdomen:    Obese. Soft. No tenderness. No HSM.   Neuro/psych:  Conscious, alert.  Moves all extremities.  Strength 5/5 on the left, 1/5 in the right leg and only withdraw to pain slightly at with the right arm which is contracted   Extremities:  No cyanosis, clubbing or edema.  Warm extremities and well-perfused          Results Review:        Results from last 7 days   Lab Units 03/16/23  4642  03/15/23  0333 03/13/23  0259   SODIUM mmol/L 144 144 142   POTASSIUM mmol/L 3.4* 3.5 3.6   CHLORIDE mmol/L 114* 113* 107   CO2 mmol/L 19.8* 17.4* 23.0   BUN mg/dL 14 13 16   CREATININE mg/dL 0.81 0.74* 0.86   GLUCOSE mg/dL 121* 97 97   CALCIUM mg/dL 8.6 8.1* 8.2*     Results from last 7 days   Lab Units 03/12/23  1907   HSTROP T ng/L 9     Results from last 7 days   Lab Units 03/16/23  0346 03/15/23  0333 03/13/23  0259   WBC 10*3/mm3 10.28 8.92 10.66   HEMOGLOBIN g/dL 13.4 13.1 14.9   HEMATOCRIT % 39.1 38.5 43.3   PLATELETS 10*3/mm3 199 185 207     Results from last 7 days   Lab Units 03/12/23 1907   INR  1.07   APTT seconds 30.3         I reviewed the patient's new clinical results.        Medication Review:   amLODIPine, 10 mg, Oral, Q24H  baclofen, 5 mg, Oral, Q8H  metoprolol tartrate, 25 mg, Oral, Q12H  senna-docusate sodium, 2 tablet, Oral, BID  sodium chloride, 10 mL, Intravenous, Q12H        niCARdipine, 5-15 mg/hr, Last Rate: 5 mg/hr (03/16/23 0653)        Diagnostic imaging:  I personally and independently reviewed the following images:  CT brain 3/13/23: Left intracranial hemorrhage.       CT brain 3/14/2023: No change  CT thoracic and lumbar spine 3/16/2023: No fracture or spondylolisthesis    Assessment     1. Acute intracranial hemorrhage, left thalamic and basal ganglia, likely hypertensive in etiology  2. Intraventricular hemorrhage, left lateral ventricle  3. Brain edema and compression, 2.5 mm shift  4. Hypertensive emergency  5. Abnormal CT abdomen: Perinephric stranding.  No UTI  6. Snoring, suspected sleep apnea  7. Disturbance: Hypokalemia        Plan       · Nicardipine to keep SBP <140.  Amlodipine 10 mg daily.  Metoprolol 25 mg twice daily. Add Labetalol 20 mg every 4 hours as needed for SBP >160 in order to wean off nicardipine.    · Baclofen initiated for back pain.  CT spine negative  · PT OT  · DVT prophylaxis with SCD for now but we would use subcu heparin or Lovenox when okay with  neurosurgery  · Head of bed elevation.  Aspiration precaution.  · Outpatient evaluation for sleep apnea    Dispo: Transfer to telemetry after ensuring blood pressure is stable off nicardipine.  Consult medicine team to address the issues above.  Patient will not be discharged soon.    Jelena Abbott MD  03/16/23  08:17 EDT            This note was dictated utilizing [facility] dictation

## 2023-03-17 ENCOUNTER — APPOINTMENT (OUTPATIENT)
Dept: CARDIOLOGY | Facility: HOSPITAL | Age: 73
DRG: 64 | End: 2023-03-17
Payer: MEDICAID

## 2023-03-17 ENCOUNTER — APPOINTMENT (OUTPATIENT)
Dept: CT IMAGING | Facility: HOSPITAL | Age: 73
DRG: 64 | End: 2023-03-17
Payer: MEDICAID

## 2023-03-17 PROBLEM — E87.6 HYPOKALEMIA: Status: ACTIVE | Noted: 2023-03-17

## 2023-03-17 PROBLEM — I82.409 ACUTE DVT (DEEP VENOUS THROMBOSIS): Status: ACTIVE | Noted: 2023-03-17

## 2023-03-17 PROBLEM — I10 HTN (HYPERTENSION): Status: ACTIVE | Noted: 2023-03-17

## 2023-03-17 LAB
BACTERIA SPEC AEROBE CULT: NORMAL
BACTERIA SPEC AEROBE CULT: NORMAL
BH CV LOW VAS RIGHT SOLEAL VESSEL: 1
BH CV LOWER VASCULAR LEFT COMMON FEMORAL AUGMENT: NORMAL
BH CV LOWER VASCULAR LEFT COMMON FEMORAL COMPETENT: NORMAL
BH CV LOWER VASCULAR LEFT COMMON FEMORAL COMPRESS: NORMAL
BH CV LOWER VASCULAR LEFT COMMON FEMORAL PHASIC: NORMAL
BH CV LOWER VASCULAR LEFT COMMON FEMORAL SPONT: NORMAL
BH CV LOWER VASCULAR LEFT DISTAL FEMORAL COMPRESS: NORMAL
BH CV LOWER VASCULAR LEFT GASTRONEMIUS COMPRESS: NORMAL
BH CV LOWER VASCULAR LEFT GREATER SAPH AK COMPRESS: NORMAL
BH CV LOWER VASCULAR LEFT GREATER SAPH BK COMPRESS: NORMAL
BH CV LOWER VASCULAR LEFT LESSER SAPH COMPRESS: NORMAL
BH CV LOWER VASCULAR LEFT MID FEMORAL AUGMENT: NORMAL
BH CV LOWER VASCULAR LEFT MID FEMORAL COMPETENT: NORMAL
BH CV LOWER VASCULAR LEFT MID FEMORAL COMPRESS: NORMAL
BH CV LOWER VASCULAR LEFT MID FEMORAL PHASIC: NORMAL
BH CV LOWER VASCULAR LEFT MID FEMORAL SPONT: NORMAL
BH CV LOWER VASCULAR LEFT PERONEAL COMPRESS: NORMAL
BH CV LOWER VASCULAR LEFT POPLITEAL AUGMENT: NORMAL
BH CV LOWER VASCULAR LEFT POPLITEAL COMPETENT: NORMAL
BH CV LOWER VASCULAR LEFT POPLITEAL COMPRESS: NORMAL
BH CV LOWER VASCULAR LEFT POPLITEAL PHASIC: NORMAL
BH CV LOWER VASCULAR LEFT POPLITEAL SPONT: NORMAL
BH CV LOWER VASCULAR LEFT POSTERIOR TIBIAL COMPRESS: NORMAL
BH CV LOWER VASCULAR LEFT PROFUNDA FEMORAL COMPRESS: NORMAL
BH CV LOWER VASCULAR LEFT PROXIMAL FEMORAL COMPRESS: NORMAL
BH CV LOWER VASCULAR LEFT SAPHENOFEMORAL JUNCTION COMPRESS: NORMAL
BH CV LOWER VASCULAR RIGHT COMMON FEMORAL AUGMENT: NORMAL
BH CV LOWER VASCULAR RIGHT COMMON FEMORAL COMPETENT: NORMAL
BH CV LOWER VASCULAR RIGHT COMMON FEMORAL COMPRESS: NORMAL
BH CV LOWER VASCULAR RIGHT COMMON FEMORAL PHASIC: NORMAL
BH CV LOWER VASCULAR RIGHT COMMON FEMORAL SPONT: NORMAL
BH CV LOWER VASCULAR RIGHT DISTAL FEMORAL COMPRESS: NORMAL
BH CV LOWER VASCULAR RIGHT GASTRONEMIUS COMPRESS: NORMAL
BH CV LOWER VASCULAR RIGHT GREATER SAPH AK COMPRESS: NORMAL
BH CV LOWER VASCULAR RIGHT GREATER SAPH BK COMPRESS: NORMAL
BH CV LOWER VASCULAR RIGHT LESSER SAPH COMPRESS: NORMAL
BH CV LOWER VASCULAR RIGHT MID FEMORAL AUGMENT: NORMAL
BH CV LOWER VASCULAR RIGHT MID FEMORAL COMPETENT: NORMAL
BH CV LOWER VASCULAR RIGHT MID FEMORAL COMPRESS: NORMAL
BH CV LOWER VASCULAR RIGHT MID FEMORAL PHASIC: NORMAL
BH CV LOWER VASCULAR RIGHT MID FEMORAL SPONT: NORMAL
BH CV LOWER VASCULAR RIGHT PERONEAL COMPRESS: NORMAL
BH CV LOWER VASCULAR RIGHT POPLITEAL AUGMENT: NORMAL
BH CV LOWER VASCULAR RIGHT POPLITEAL COMPETENT: NORMAL
BH CV LOWER VASCULAR RIGHT POPLITEAL COMPRESS: NORMAL
BH CV LOWER VASCULAR RIGHT POPLITEAL PHASIC: NORMAL
BH CV LOWER VASCULAR RIGHT POPLITEAL SPONT: NORMAL
BH CV LOWER VASCULAR RIGHT POSTERIOR TIBIAL COMPRESS: NORMAL
BH CV LOWER VASCULAR RIGHT PROFUNDA FEMORAL COMPRESS: NORMAL
BH CV LOWER VASCULAR RIGHT PROXIMAL FEMORAL COMPRESS: NORMAL
BH CV LOWER VASCULAR RIGHT SAPHENOFEMORAL JUNCTION COMPRESS: NORMAL
BH CV LOWER VASCULAR RIGHT SOLEAL COMPRESS: NORMAL
BH CV LOWER VASCULAR RIGHT SOLEAL THROMBUS: NORMAL
GLUCOSE BLDC GLUCOMTR-MCNC: 110 MG/DL (ref 70–130)
MAXIMAL PREDICTED HEART RATE: 149 BPM
STRESS TARGET HR: 127 BPM

## 2023-03-17 PROCEDURE — 93970 EXTREMITY STUDY: CPT

## 2023-03-17 PROCEDURE — 99232 SBSQ HOSP IP/OBS MODERATE 35: CPT | Performed by: NURSE PRACTITIONER

## 2023-03-17 PROCEDURE — 25010000002 ENOXAPARIN PER 10 MG: Performed by: NURSE PRACTITIONER

## 2023-03-17 PROCEDURE — 70450 CT HEAD/BRAIN W/O DYE: CPT

## 2023-03-17 PROCEDURE — 82962 GLUCOSE BLOOD TEST: CPT

## 2023-03-17 RX ORDER — VALSARTAN 80 MG/1
80 TABLET ORAL
Status: DISCONTINUED | OUTPATIENT
Start: 2023-03-17 | End: 2023-03-18

## 2023-03-17 RX ORDER — ENOXAPARIN SODIUM 100 MG/ML
40 INJECTION SUBCUTANEOUS EVERY 24 HOURS
Status: DISCONTINUED | OUTPATIENT
Start: 2023-03-17 | End: 2023-03-17 | Stop reason: ALTCHOICE

## 2023-03-17 RX ORDER — CARVEDILOL 6.25 MG/1
6.25 TABLET ORAL 2 TIMES DAILY WITH MEALS
Status: DISCONTINUED | OUTPATIENT
Start: 2023-03-17 | End: 2023-03-18

## 2023-03-17 RX ORDER — CARVEDILOL 6.25 MG/1
6.25 TABLET ORAL 2 TIMES DAILY WITH MEALS
Status: DISCONTINUED | OUTPATIENT
Start: 2023-03-17 | End: 2023-03-17

## 2023-03-17 RX ORDER — NIFEDIPINE 30 MG/1
30 TABLET, EXTENDED RELEASE ORAL
Status: DISCONTINUED | OUTPATIENT
Start: 2023-03-17 | End: 2023-03-17

## 2023-03-17 RX ORDER — ENOXAPARIN SODIUM 100 MG/ML
1 INJECTION SUBCUTANEOUS EVERY 12 HOURS
Status: DISCONTINUED | OUTPATIENT
Start: 2023-03-17 | End: 2023-03-24

## 2023-03-17 RX ADMIN — Medication 10 ML: at 22:27

## 2023-03-17 RX ADMIN — BACLOFEN 5 MG: 10 TABLET ORAL at 14:25

## 2023-03-17 RX ADMIN — DOCUSATE SODIUM 50MG AND SENNOSIDES 8.6MG 2 TABLET: 8.6; 5 TABLET, FILM COATED ORAL at 22:13

## 2023-03-17 RX ADMIN — LABETALOL HYDROCHLORIDE 20 MG: 5 INJECTION, SOLUTION INTRAVENOUS at 12:11

## 2023-03-17 RX ADMIN — CARVEDILOL 6.25 MG: 6.25 TABLET, FILM COATED ORAL at 17:59

## 2023-03-17 RX ADMIN — AMLODIPINE BESYLATE 10 MG: 10 TABLET ORAL at 09:00

## 2023-03-17 RX ADMIN — BACLOFEN 5 MG: 10 TABLET ORAL at 05:48

## 2023-03-17 RX ADMIN — ACETAMINOPHEN 650 MG: 325 TABLET, FILM COATED ORAL at 22:13

## 2023-03-17 RX ADMIN — BACLOFEN 5 MG: 10 TABLET ORAL at 22:13

## 2023-03-17 RX ADMIN — CARVEDILOL 6.25 MG: 6.25 TABLET, FILM COATED ORAL at 12:53

## 2023-03-17 RX ADMIN — LABETALOL HYDROCHLORIDE 20 MG: 5 INJECTION, SOLUTION INTRAVENOUS at 06:00

## 2023-03-17 RX ADMIN — METOPROLOL TARTRATE 25 MG: 25 TABLET ORAL at 09:00

## 2023-03-17 RX ADMIN — VALSARTAN 80 MG: 80 TABLET, FILM COATED ORAL at 17:59

## 2023-03-17 RX ADMIN — LABETALOL HYDROCHLORIDE 20 MG: 5 INJECTION, SOLUTION INTRAVENOUS at 01:56

## 2023-03-17 RX ADMIN — Medication 10 ML: at 10:51

## 2023-03-17 RX ADMIN — ENOXAPARIN SODIUM 40 MG: 100 INJECTION SUBCUTANEOUS at 12:12

## 2023-03-17 RX ADMIN — LABETALOL HYDROCHLORIDE 20 MG: 5 INJECTION, SOLUTION INTRAVENOUS at 22:26

## 2023-03-17 RX ADMIN — DOCUSATE SODIUM 50MG AND SENNOSIDES 8.6MG 2 TABLET: 8.6; 5 TABLET, FILM COATED ORAL at 10:51

## 2023-03-17 NOTE — PROGRESS NOTES
"Nutrition Services    Patient Name:  Manuel Durant  YOB: 1950  MRN: [id number]  Admit Date:  3/12/2023  Assessment Date:  03/17/23    Comment: Nutrition screen for dysphagia diet x 5 days    74 y/o male admitted d/t spontaneous left basal ganglia/internal capsule hemorrhage.     Pt off the floor for repeat head CT this morning. RN reports pt tolerating current diet. Documented PO intake 75% x 3 meals. SLP is following, pt had video swallow 3/15.     RD will continue to follow per protocol.     CLINICAL NUTRITION ASSESSMENT      Reason for Assessment Per Organizational Policy     Diagnosis/Problem   Intracranial hemorrhage   Medical/Surgical History History reviewed. No pertinent past medical history.    History reviewed. No pertinent surgical history.     Encounter Information        Nutrition History:     Food Preferences:    Supplements:    Factors Affecting Intake: No factors at this time     Anthropometrics        Current Height  Current Weight  BMI kg/m2 Height: 167.6 cm (66\")  Weight: 78.5 kg (173 lb 1 oz) (03/17/23 0400)  Body mass index is 27.93 kg/m².   Adjusted BMI (if applicable)        Admission Weight 69.7kg       Ideal Body Weight (IBW) 63.8kg   Adjusted IBW (if applicable)        Usual Body Weight (UBW)    Weight Change/Trend Gain       Weight History Wt Readings from Last 30 Encounters:   03/17/23 0400 78.5 kg (173 lb 1 oz)   03/16/23 0348 78.4 kg (172 lb 13.5 oz)   03/15/23 0503 71.3 kg (157 lb 3 oz)   03/14/23 0539 71.2 kg (156 lb 15.5 oz)   03/13/23 0436 69.7 kg (153 lb 10.6 oz)   03/12/23 2147 69.7 kg (153 lb 10.6 oz)           --  Tests/Procedures        Tests/Procedures CT scan, VFSS     Labs       Pertinent Labs    Results from last 7 days   Lab Units 03/16/23  1920 03/16/23  0346 03/15/23  0333 03/13/23  0259 03/12/23  1907   SODIUM mmol/L  --  144 144 142 141   POTASSIUM mmol/L 3.8 3.4* 3.5 3.6 3.8   CHLORIDE mmol/L  --  114* 113* 107 103   CO2 mmol/L  --  19.8* 17.4* 23.0 " 23.7   BUN mg/dL  --  14 13 16 15   CREATININE mg/dL  --  0.81 0.74* 0.86 0.88   CALCIUM mg/dL  --  8.6 8.1* 8.2* 9.5   BILIRUBIN mg/dL  --   --   --   --  0.7   ALK PHOS U/L  --   --   --   --  107   ALT (SGPT) U/L  --   --   --   --  25   AST (SGOT) U/L  --   --   --   --  35   GLUCOSE mg/dL  --  121* 97 97 112*     Results from last 7 days   Lab Units 03/16/23  0346 03/15/23  0333 03/13/23  0259 03/12/23  1907   HEMOGLOBIN g/dL 13.4 13.1   < > 15.9   HEMATOCRIT % 39.1 38.5   < > 46.4   WBC 10*3/mm3 10.28 8.92   < > 11.45*   TRIGLYCERIDES mg/dL  --  86  --   --    ALBUMIN g/dL  --   --   --  4.9    < > = values in this interval not displayed.     Results from last 7 days   Lab Units 03/16/23  0346 03/15/23  0333 03/13/23  0259 03/12/23  1907   INR   --   --   --  1.07   APTT seconds  --   --   --  30.3   PLATELETS 10*3/mm3 199 185 207 252     No results found for: COVID19  Lab Results   Component Value Date    HGBA1C 5.60 03/15/2023          Medications           Scheduled Medications amLODIPine, 10 mg, Oral, Q24H  baclofen, 5 mg, Oral, Q8H  carvedilol, 6.25 mg, Oral, BID With Meals  senna-docusate sodium, 2 tablet, Oral, BID  sodium chloride, 10 mL, Intravenous, Q12H       Infusions     PRN Medications •  acetaminophen  •  senna-docusate sodium **AND** polyethylene glycol **AND** bisacodyl **AND** bisacodyl  •  Calcium Gluconate-NaCl **AND** calcium gluconate IVPB **AND** Calcium  •  labetalol  •  magnesium sulfate **OR** magnesium sulfate **OR** magnesium sulfate  •  potassium chloride **OR** potassium chloride **OR** potassium chloride  •  potassium phosphate infusion greater than 15 mMoles **OR** potassium phosphate infusion greater than 15 mMoles **OR** potassium phosphate **OR** sodium phosphate IVPB **OR** sodium phosphate IVPB  •  [COMPLETED] Insert Peripheral IV **AND** sodium chloride  •  sodium chloride  •  sodium chloride     Physical Findings          Physical Appearance alert, disoriented    Oral/Mouth Cavity teeth missing   Edema  2+ (mild) arm   Gastrointestinal fecal incontinence, hypoactive bowel sounds, last bowel movement:3/16   Skin  skin intact   Tubes/Drains none   NFPE Unable to perform due to: pt off the floor   --  Current Nutrition Orders & Evaluation of Intake       Oral Nutrition     Food Allergies NKFA   Current PO Diet Diet: Regular/House Diet; No Mixed Consistencies, Feeding Assistance - Nursing; Texture: Pureed (NDD 1); Fluid Consistency: Honey Thick   Supplement n/a   PO Evaluation     % PO Intake     # of Days Evaluated    --  PES STATEMENT / NUTRITION DIAGNOSIS      Nutrition Dx Problem  Problem: Swallowing Difficulty  Etiology: Medical Diagnosis ICH  Signs/Symptoms: SLP/Swallow Evaluation    Comment:    --  NUTRITION INTERVENTION / PLAN OF CARE      Intervention Goal(s) Maintain nutrition status, Maintain intake and Maintain weight         RD Intervention/Action Follow Tx Progress         Prescription/Orders:       PO Diet       Supplements       Snacks       Enteral Nutrition       Parenteral Nutrition    New Prescription Ordered? No changes at this time   --      Monitor/Evaluation Per protocol, PO intake, Swallow function   Discharge Plan/Needs No discharge needs identified at this time   Education Education not appropriate at this time   --    RD to follow per protocol.      Electronically signed by:  Masha Perdomo RD  03/17/23 11:09 EDT

## 2023-03-17 NOTE — PROGRESS NOTES
"[facility] Clinical Pharmacy Services: Enoxaparin Consult    Manuel Durant has a pharmacy consult to dose full-dose enoxaparin per Dr. Maxwell's request.     Indication: DVT/PE (active thrombosis)  Home Anticoagulation: none    Relevant clinical data and objective history reviewed:  73 y.o. male 167.6 cm (66\") 78.5 kg (173 lb 1 oz)   Body mass index is 27.93 kg/m².   Results from last 7 days   Lab Units 03/16/23  0346   PLATELETS 10*3/mm3 199     Estimated Creatinine Clearance: 82.5 mL/min (by C-G formula based on SCr of 0.81 mg/dL).    Assessment/Plan    Will start patient on  80 (1mg/kg) subcutaneous every 12 hours, adjusted for renal function. Consult order will be discontinued but pharmacy will continue to follow. Patient was given 40mg today at 1200. Will start 80mg at 2100 tonight and then on a 9-9 schedule.     Jerad Wilde, [facility]  Clinical Pharmacist   "

## 2023-03-17 NOTE — PROGRESS NOTES
"[facility] Clinical Pharmacy Services: Enoxaparin Consult    Manuel Durant has a pharmacy consult to dose prophylactic enoxaparin per Roxanna KRUGER's request.     Indication: VTE Prophylaxis, I am aware that the patient has a positive soleal DVT on Doppler, but at this time I want to just began prophylaxis.  Will address the acute DVT with Dr. Logan before escalating dosing  Home Anticoagulation: none     Relevant clinical data and objective history reviewed:  73 y.o. male 167.6 cm (66\") 78.5 kg (173 lb 1 oz)   Body mass index is 27.93 kg/m².   Results from last 7 days   Lab Units 03/16/23  0346   PLATELETS 10*3/mm3 199     Estimated Creatinine Clearance: 82.5 mL/min (by C-G formula based on SCr of 0.81 mg/dL).    Assessment/Plan    Will start patient on 40mg subcutaneous every 24 hours, adjusted for renal function. Consult order will be discontinued but pharmacy will continue to follow.     Jerad Wilde, [facility]  Clinical Pharmacist   "

## 2023-03-17 NOTE — NURSING NOTE
Call placed to Neurosurgery Leanne. Attempting to complete NIH on patient using . Patient not responding to  appropriately or not able to hear  this nurse is not sure. However when this nurse talks to patient, patient will respond not sure if appropriate related to language barrier. Awaiting return call

## 2023-03-17 NOTE — PLAN OF CARE
Goal Outcome Evaluation:  Plan of Care Reviewed With: patient     Problem: Adult Inpatient Plan of Care  Goal: Absence of Hospital-Acquired Illness or Injury  Intervention: Prevent and Manage VTE (Venous Thromboembolism) Risk  Recent Flowsheet Documentation  Taken 3/16/2023 2000 by Cherry Bean, RN  Activity Management: activity adjusted per tolerance  VTE Prevention/Management:   bilateral   sequential compression devices on  Range of Motion: ROM (range of motion) performed

## 2023-03-17 NOTE — PROGRESS NOTES
Continued Stay Note  [facility]     Patient Name: Manuel Durant  MRN: [id number]  Today's Date: 3/17/2023    Admit Date: 3/12/2023    Plan: SNF referrals pending   Discharge Plan     Row Name 03/17/23 0954       Plan    Plan SNF referrals pending    Patient/Family in Agreement with Plan yes    Plan Comments SNF referrals pending insurance approval, Medicaid application in process per Med Assist note. CCP to follow. Shaniqua Harmon LCSW               Discharge Codes    No documentation.                     Leeann Harmon LCSW

## 2023-03-17 NOTE — SIGNIFICANT NOTE
03/17/23 1321   OTHER   Discipline occupational therapist   Rehab Time/Intention   Session Not Performed unable to treat, medical status change  (noted acute DVT, OT to hold today, RN aware, will f/u next service date.)   Recommendation   OT - Next Appointment 03/20/23

## 2023-03-17 NOTE — PROGRESS NOTES
The preliminary findings of today's bilateral lower extremity venous duplex are positive for acute right calf deep vein thrombosis. These preliminary findings were called to Nissa KRAFT RN on [address] park.

## 2023-03-17 NOTE — PROGRESS NOTES
Name: Manuel Durant ADMIT: 3/12/2023   : 1950  PCP: Provider, No Known    MRN: [id number] LOS: 5 days   AGE/SEX: 73 y.o. male  ROOM: [facility]     Subjective   Subjective   Resting in bed today. Attempted to use  phone but pt stated he was too tired at this time.     Objective   Objective   Vital Signs  Temp:  [97.5 °F (36.4 °C)-99 °F (37.2 °C)] 97.5 °F (36.4 °C)  Heart Rate:  [63-89] 72  Resp:  [16-18] 18  BP: (140-182)/(64-98) 169/79  SpO2:  [93 %-97 %] 93 %  on   ;   Device (Oxygen Therapy): room air  Body mass index is 27.93 kg/m².  Physical Exam  Constitutional:       General: He is not in acute distress.     Appearance: He is ill-appearing. He is not toxic-appearing or diaphoretic.   Cardiovascular:      Rate and Rhythm: Normal rate.      Heart sounds: No murmur heard.    No gallop.   Pulmonary:      Effort: Pulmonary effort is normal. No respiratory distress.      Breath sounds: Normal breath sounds. No wheezing.   Abdominal:      General: Abdomen is flat. There is no distension.      Palpations: Abdomen is soft.      Tenderness: There is no abdominal tenderness.   Musculoskeletal:      Right lower leg: No edema.      Left lower leg: No edema.         Results Review     I reviewed the patient's new clinical results.  Results from last 7 days   Lab Units 23  0346 03/15/23  0333 03/13/23  0259 03/12/23  1907   WBC 10*3/mm3 10.28 8.92 10.66 11.45*   HEMOGLOBIN g/dL 13.4 13.1 14.9 15.9   PLATELETS 10*3/mm3 199 185 207 252     Results from last 7 days   Lab Units 23  1920 23  0346 03/15/23  0333 23  02523   SODIUM mmol/L  --  144 144 142 141   POTASSIUM mmol/L 3.8 3.4* 3.5 3.6 3.8   CHLORIDE mmol/L  --  114* 113* 107 103   CO2 mmol/L  --  19.8* 17.4* 23.0 23.7   BUN mg/dL  --  14 13 16 15   CREATININE mg/dL  --  0.81 0.74* 0.86 0.88   GLUCOSE mg/dL  --  121* 97 97 112*   EGFR mL/min/1.73  --  94.3 96.9 92.6 91.9     Results from last 7 days   Lab Units  03/12/23  1907   ALBUMIN g/dL 4.9   BILIRUBIN mg/dL 0.7   ALK PHOS U/L 107   AST (SGOT) U/L 35   ALT (SGPT) U/L 25     Results from last 7 days   Lab Units 03/16/23  0346 03/15/23  0333 03/13/23  0259 03/12/23  1907   CALCIUM mg/dL 8.6 8.1* 8.2* 9.5   ALBUMIN g/dL  --   --   --  4.9     Results from last 7 days   Lab Units 03/12/23  1907   PROCALCITONIN ng/mL 0.07   LACTATE mmol/L 2.0     Hemoglobin A1C   Date/Time Value Ref Range Status   03/15/2023 0333 5.60 4.80 - 5.60 % Final     Glucose   Date/Time Value Ref Range Status   03/17/2023 0919 110 70 - 130 mg/dL Final     Comment:     Meter: [id number] : [email or web address] Gavino Shelley RN   03/16/2023 1123 113 70 - 130 mg/dL Final     Comment:     Meter: [id number] : [id number] Whatley Kenny NA   03/15/2023 2347 107 70 - 130 mg/dL Final     Comment:     Meter: [id number] : [email or web address] Ottoniel Ennis RN   03/15/2023 1726 134 (H) 70 - 130 mg/dL Final     Comment:     Meter: [id number] : [id number] Salazar MOLINA   03/15/2023 1110 138 (H) 70 - 130 mg/dL Final     Comment:     Meter: [id number] : [id number] Salazar MOLINA       CT Head Without Contrast    Result Date: 3/17/2023  The intraparenchymal hemorrhage involving the thalamus on the left is again noted which is unchanged in size or configuration as compared to the prior examination. They're there is extension to the posterior limb of the internal capsule laterally and to the cerebral peduncle medially, unchanged. Intraventricular blood is appreciated which was present previously. The intraventricular hemorrhage has decreased slightly in volume as compared to the prior study. Midline shift to the right appears similar to the prior examination estimated to be 4 mm. There is no evidence of new hemorrhage, hydrocephalus or of acute infarction.    Radiation dose reduction techniques were utilized, including automated exposure control and exposure modulation based on body size.  This report  was finalized on 3/17/2023 12:40 PM by Dr. Lalo Hernandez M.D.      CT Thoracic Spine Without Contrast    Result Date: 3/16/2023  Electronically signed by Jerad Pulido M.D. on 03-16-23 at 0502    CT Lumbar Spine Without Contrast    Result Date: 3/16/2023  Electronically signed by Jerad Pulido M.D. on 03-16-23 at 0500    Scheduled Medications  amLODIPine, 10 mg, Oral, Q24H  baclofen, 5 mg, Oral, Q8H  carvedilol, 6.25 mg, Oral, BID With Meals  enoxaparin, 1 mg/kg, Subcutaneous, Q12H  senna-docusate sodium, 2 tablet, Oral, BID  sodium chloride, 10 mL, Intravenous, Q12H  valsartan, 80 mg, Oral, Q24H    Infusions   Diet  Diet: Regular/House Diet; No Mixed Consistencies, Feeding Assistance - Nursing; Texture: Pureed (NDD 1); Fluid Consistency: Honey Thick       Assessment/Plan     Active Hospital Problems    Diagnosis  POA   • **Intracranial hemorrhage (HCC) [I62.9]  Yes   • Acute DVT (deep venous thrombosis) (HCC) [I82.409]  Unknown   • HTN (hypertension) [I10]  Unknown   • Hypokalemia [E87.6]  Unknown   • Hypertensive emergency [I16.1]  Unknown      Resolved Hospital Problems   No resolved problems to display.       73 y.o. male admitted with Intracranial hemorrhage (HCC).      03/17/23  Plan to start AC for DVT. Adjust BP meds.    Spontaneous intracranial hemorrhage  Hypertensive emergency  -repeat CTH (3/17/23): stable L basal ganglia/internal capsule hemorrhage w/ L lateral ventricular hemorrhage  -SBP goal <140  -amlodipine 10mg  -switch metoprolol to Coreg 6.25mg BID  -start valsartan 80mg  -cont PRN labetalol for SBP >140 ; would prefer to see how BP responds to 2 new agents prior to initiating new agent given risk of dropping too quickly    Acute DVT  -Doppler (3/17/23): acute RLE DVT soleal  -start therapeutic Lovenox for now; discuss with pharmacy for best option for DOAC  -no need for Vascular consult if cleared for AC      · Therapeutic Lovenox for DVT prophylaxis.  · Full code.  · Discussed with  patient.  · Anticipate discharge to SNF when cleared by consultants      Redd Maxwell MD  [facility]  03/17/23  20:09 EDT

## 2023-03-17 NOTE — PROGRESS NOTES
[facility] NEUROSURGERY INTRACRANIAL PROGRESS NOTE    PATIENT IDENTIFICATION:   Name:  Manuel Durant      MRN:  [id number]     73 y.o.  male               CC: [facility]      Subjective     Interval History: Nursing contacted our service secondary to concerns for elevation in NIH.  Called rapid response.  Worried about decreased extraocular movements and weakness.  Patient denies headache.  Does report some pain in his left leg.  CT head ordered for this morning still pending    ROS:  HEENT: No headache  MS: Left leg pain  GI: No nausea, vomiting  Neuro: Right-sided weakness, speech difficulties    Objective     Vital signs in last 24 hours:  Temp:  [97.8 °F (36.6 °C)-99 °F (37.2 °C)] 99 °F (37.2 °C)  Heart Rate:  [61-89] 76  Resp:  [16-18] 18  BP: (120-170)/() 163/83      Intake/Output this shift:  No intake/output data recorded.      Intake/Output last 3 shifts:  I/O last 3 completed shifts:  In: 1909 [P.O.:840; I.V.:1069]  Out: 1800 [Urine:1800]    LABS:  Results from last 7 days   Lab Units 03/16/23  0346 03/15/23  0333 03/13/23  0259   WBC 10*3/mm3 10.28 8.92 10.66   HEMOGLOBIN g/dL 13.4 13.1 14.9   HEMATOCRIT % 39.1 38.5 43.3   PLATELETS 10*3/mm3 199 185 207     Results from last 7 days   Lab Units 03/16/23  1920 03/16/23  0346 03/15/23  0333 03/13/23  0259 03/12/23  1907   SODIUM mmol/L  --  144 144 142 141   POTASSIUM mmol/L 3.8 3.4* 3.5 3.6 3.8   CHLORIDE mmol/L  --  114* 113* 107 103   CO2 mmol/L  --  19.8* 17.4* 23.0 23.7   BUN mg/dL  --  14 13 16 15   CREATININE mg/dL  --  0.81 0.74* 0.86 0.88   CALCIUM mg/dL  --  8.6 8.1* 8.2* 9.5   BILIRUBIN mg/dL  --   --   --   --  0.7   ALK PHOS U/L  --   --   --   --  107   ALT (SGPT) U/L  --   --   --   --  25   AST (SGOT) U/L  --   --   --   --  35   GLUCOSE mg/dL  --  121* 97 97 112*       Blood culture 3/12-NGTD     IMAGING STUDIES:  CT head pending    I personally viewed and interpreted the patient's chart.    Meds reviewed/changed: Yes  Baclofen 5 mg p.o.  every 8 hours  Amlodipine 10 mg p.o. daily  Metoprolol 25 mg twice daily  Labetalol 20 mg IV every 4 hours as needed-3 doses    Physical Exam:    General:   Awake, alert.  More interactive today.  Still with right neglect but seems slightly better.  Continues to have some aphasia.  He is following commands more readily.  CN III IV VI: Some movement past midline to right today.  No disconjugate gaze.    CN VII: Right facial weakness-moderate.  Motor:    Normal movement left side.  No drift.  Withdrawals RLE> RUE.  Still some slight increase in tone right side but improved  Sensation: Pain sensation intact right  Station and Gait:  Per PT note 3/16-comes to EOB max Ax2 and sits ~8mins max A with intermitent Min on multiple occasions for several seconds. He performed seated ther ex working on BL WBing, trunk activation, and upright tolerance, good improvement shown with WBing through RUE, able to push back up into sitting with CGA/Min A. Pt performs STS x3 with min/mod Ax2 for several seconds, good midline control and LE support, fatigues quickly by 3rd attempt.   Coordination:  Finger-to-nose test intact left upper extremity.  Unable to assess right side due to weakness   Extremities:    wearing SCD. Tenderness L calf to palpation    Assessment & Plan     ASSESSMENT:      Intracranial hemorrhage (HCC)    Hypertensive emergency    Ongoing follow-up of patient with spontaneous left basal ganglia/internal capsule hemorrhage.  Transferred from ICU to [facility] yesterday.  Cardene drip stopped yesterday.  Blood pressure parameters still remain SBP <140, but overnight blood pressures have been 130s to 170s.  Nursing concerned this morning about NIH changes.  Patient appears essentially stable to me on exam.  I believe that his right neglect actually may be slightly better today but he still shows right neglect with difficulty with EOMs to the right.  Ongoing right hemiparesis.  Making progress per PT notes.  CCP following for  "discharge planning.  He does complain of some left leg pain.  We are awaiting repeat CT head for today.  If stable, patient may begin DVT prophylaxis.  However, complaining of some left calf pain and he is slightly tender.  Will get Doppler studies.  Discussed hypertension issues with primary service, Dr. Maxwell.  He will address.  Appreciate [facility] assistance    PLAN:   CTH today  Clear for DVT prophylaxis if CT head stable  Doppler nic LEs  Keep SBP <140, needs better control please    Addendum: CT head reveals stable area of left basal ganglia/internal capsule intracranial hemorrhage with left lateral ventricular hemorrhage.  Size is may be slightly smaller to stable but density has diminished some in both areas.  Doppler shows soleal acute RLE DVT.  We will begin DVT prophylaxis with Lovenox for now but will discuss vascular consult versus DVT treatment dose with Dr. Logan.     This part is from Dr. Logan: From my point of view it is fine to anticoagulate this patient.  I would defer to A as to whether to consult vascular surgery but it is fine with me to just anticoagulate him.    I discussed the patient's findings and my recommendations with patient, nursing staff and Dr. Logan    During patient visit, I utilized appropriate personal protective equipment including mask and gloves.  Mask used was standard procedure mask. Appropriate PPE was worn during the entire visit.  Hand hygiene was completed before and after.      chief complaint, exam, copied forward from previous encounters in EMR is unchanged.             LOS: 5 days       Roxanna Ramirez, APRN  3/17/2023  09:47 EDT    \"Dictated utilizing Dragon dictation\".      "

## 2023-03-17 NOTE — PROGRESS NOTES
Acute soleal DVT confirmed by doppler. Spoke with Dr. Logan and christian for anticoagulation- will defer to hospitalist on which medication and will also leave vascular consult up to them.    Cheyenne Newton, APRN  3/17/23  [id number]

## 2023-03-18 ENCOUNTER — APPOINTMENT (OUTPATIENT)
Dept: CT IMAGING | Facility: HOSPITAL | Age: 73
DRG: 64 | End: 2023-03-18
Payer: MEDICAID

## 2023-03-18 LAB
ANION GAP SERPL CALCULATED.3IONS-SCNC: 11.6 MMOL/L (ref 5–15)
BUN SERPL-MCNC: 21 MG/DL (ref 8–23)
BUN/CREAT SERPL: 26.3 (ref 7–25)
CALCIUM SPEC-SCNC: 8.8 MG/DL (ref 8.6–10.5)
CHLORIDE SERPL-SCNC: 107 MMOL/L (ref 98–107)
CO2 SERPL-SCNC: 20.4 MMOL/L (ref 22–29)
CREAT SERPL-MCNC: 0.8 MG/DL (ref 0.76–1.27)
DEPRECATED RDW RBC AUTO: 44.8 FL (ref 37–54)
EGFRCR SERPLBLD CKD-EPI 2021: 94.6 ML/MIN/1.73
ERYTHROCYTE [DISTWIDTH] IN BLOOD BY AUTOMATED COUNT: 13.3 % (ref 12.3–15.4)
GLUCOSE SERPL-MCNC: 86 MG/DL (ref 65–99)
HCT VFR BLD AUTO: 40.6 % (ref 37.5–51)
HGB BLD-MCNC: 13.5 G/DL (ref 13–17.7)
MAGNESIUM SERPL-MCNC: 2 MG/DL (ref 1.6–2.4)
MCH RBC QN AUTO: 30.5 PG (ref 26.6–33)
MCHC RBC AUTO-ENTMCNC: 33.3 G/DL (ref 31.5–35.7)
MCV RBC AUTO: 91.9 FL (ref 79–97)
PHOSPHATE SERPL-MCNC: 3.8 MG/DL (ref 2.5–4.5)
PLATELET # BLD AUTO: 213 10*3/MM3 (ref 140–450)
PMV BLD AUTO: 11 FL (ref 6–12)
POTASSIUM SERPL-SCNC: 4.1 MMOL/L (ref 3.5–5.2)
RBC # BLD AUTO: 4.42 10*6/MM3 (ref 4.14–5.8)
SODIUM SERPL-SCNC: 139 MMOL/L (ref 136–145)
WBC NRBC COR # BLD: 8.58 10*3/MM3 (ref 3.4–10.8)

## 2023-03-18 PROCEDURE — 83735 ASSAY OF MAGNESIUM: CPT | Performed by: STUDENT IN AN ORGANIZED HEALTH CARE EDUCATION/TRAINING PROGRAM

## 2023-03-18 PROCEDURE — 80048 BASIC METABOLIC PNL TOTAL CA: CPT | Performed by: STUDENT IN AN ORGANIZED HEALTH CARE EDUCATION/TRAINING PROGRAM

## 2023-03-18 PROCEDURE — 85027 COMPLETE CBC AUTOMATED: CPT | Performed by: STUDENT IN AN ORGANIZED HEALTH CARE EDUCATION/TRAINING PROGRAM

## 2023-03-18 PROCEDURE — 25010000002 ENOXAPARIN PER 10 MG: Performed by: STUDENT IN AN ORGANIZED HEALTH CARE EDUCATION/TRAINING PROGRAM

## 2023-03-18 PROCEDURE — 70450 CT HEAD/BRAIN W/O DYE: CPT

## 2023-03-18 PROCEDURE — 84100 ASSAY OF PHOSPHORUS: CPT | Performed by: STUDENT IN AN ORGANIZED HEALTH CARE EDUCATION/TRAINING PROGRAM

## 2023-03-18 RX ORDER — CARVEDILOL 12.5 MG/1
12.5 TABLET ORAL 2 TIMES DAILY WITH MEALS
Status: DISCONTINUED | OUTPATIENT
Start: 2023-03-18 | End: 2023-03-19

## 2023-03-18 RX ORDER — VALSARTAN 80 MG/1
80 TABLET ORAL
Status: DISCONTINUED | OUTPATIENT
Start: 2023-03-18 | End: 2023-03-21

## 2023-03-18 RX ORDER — AMLODIPINE BESYLATE 10 MG/1
10 TABLET ORAL
Status: DISCONTINUED | OUTPATIENT
Start: 2023-03-18 | End: 2023-05-08 | Stop reason: HOSPADM

## 2023-03-18 RX ADMIN — LABETALOL HYDROCHLORIDE 20 MG: 5 INJECTION, SOLUTION INTRAVENOUS at 04:57

## 2023-03-18 RX ADMIN — CARVEDILOL 12.5 MG: 12.5 TABLET, FILM COATED ORAL at 18:57

## 2023-03-18 RX ADMIN — BACLOFEN 5 MG: 10 TABLET ORAL at 06:13

## 2023-03-18 RX ADMIN — Medication 10 ML: at 20:45

## 2023-03-18 RX ADMIN — Medication 10 ML: at 11:23

## 2023-03-18 RX ADMIN — ENOXAPARIN SODIUM 80 MG: 100 INJECTION SUBCUTANEOUS at 11:23

## 2023-03-18 RX ADMIN — ENOXAPARIN SODIUM 80 MG: 100 INJECTION SUBCUTANEOUS at 20:45

## 2023-03-18 RX ADMIN — CARVEDILOL 12.5 MG: 12.5 TABLET, FILM COATED ORAL at 11:23

## 2023-03-18 RX ADMIN — AMLODIPINE BESYLATE 10 MG: 10 TABLET ORAL at 11:22

## 2023-03-18 RX ADMIN — BACLOFEN 5 MG: 10 TABLET ORAL at 23:20

## 2023-03-18 RX ADMIN — ENOXAPARIN SODIUM 80 MG: 100 INJECTION SUBCUTANEOUS at 02:44

## 2023-03-18 RX ADMIN — VALSARTAN 80 MG: 80 TABLET, FILM COATED ORAL at 11:22

## 2023-03-18 NOTE — PLAN OF CARE
Problem: Fall Injury Risk  Goal: Absence of Fall and Fall-Related Injury  Outcome: Ongoing, Progressing  Intervention: Identify and Manage Contributors  Recent Flowsheet Documentation  Taken 3/17/2023 1800 by Nissa Wilks RN  Medication Review/Management: medications reviewed  Taken 3/17/2023 1652 by Nissa Wilks RN  Medication Review/Management: medications reviewed  Taken 3/17/2023 1600 by Nissa Wilks RN  Medication Review/Management: medications reviewed  Taken 3/17/2023 1400 by Nissa Wilks RN  Medication Review/Management: medications reviewed  Taken 3/17/2023 1200 by Nissa Wilks RN  Medication Review/Management: medications reviewed  Taken 3/17/2023 1000 by Nissa Wilks RN  Medication Review/Management: medications reviewed  Intervention: Promote Injury-Free Environment  Recent Flowsheet Documentation  Taken 3/17/2023 1800 by Nissa Wilks RN  Safety Promotion/Fall Prevention:   activity supervised   assistive device/personal items within reach   clutter free environment maintained   fall prevention program maintained   nonskid shoes/slippers when out of bed   safety round/check completed  Taken 3/17/2023 1652 by Nissa Wilks RN  Safety Promotion/Fall Prevention:   activity supervised   assistive device/personal items within reach   clutter free environment maintained   fall prevention program maintained   safety round/check completed  Taken 3/17/2023 1600 by Nissa Wilks RN  Safety Promotion/Fall Prevention:   activity supervised   assistive device/personal items within reach   clutter free environment maintained   fall prevention program maintained   nonskid shoes/slippers when out of bed   safety round/check completed  Taken 3/17/2023 1400 by Nissa Wilks RN  Safety Promotion/Fall Prevention:   activity supervised   assistive device/personal items within reach   clutter free environment maintained   fall prevention program maintained   nonskid shoes/slippers when  out of bed   safety round/check completed  Taken 3/17/2023 1200 by Nissa Wilks RN  Safety Promotion/Fall Prevention:   activity supervised   assistive device/personal items within reach   clutter free environment maintained   fall prevention program maintained   nonskid shoes/slippers when out of bed  Taken 3/17/2023 1000 by Nissa Wilks, RN  Safety Promotion/Fall Prevention:   activity supervised   assistive device/personal items within reach   clutter free environment maintained   fall prevention program maintained   nonskid shoes/slippers when out of bed   safety round/check completed   Goal Outcome Evaluation:

## 2023-03-18 NOTE — PLAN OF CARE
Problem: Skin Injury Risk Increased  Goal: Skin Health and Integrity  Outcome: Ongoing, Progressing  Intervention: Optimize Skin Protection  Recent Flowsheet Documentation  Taken 3/17/2023 2000 by Cherry Bean RN  Pressure Reduction Techniques: frequent weight shift encouraged  Head of Bed (HOB) Positioning: HOB elevated  Pressure Reduction Devices: alternating pressure pump (ADD)     Problem: Communication Impairment (Stroke, Ischemic/Transient Ischemic Attack)  Goal: Improved Communication Skills  Outcome: Ongoing, Progressing   Goal Outcome Evaluation:  Plan of Care Reviewed With: patient

## 2023-03-18 NOTE — PROGRESS NOTES
Name: Manuel Durant ADMIT: 3/12/2023   : 1950  PCP: Provider, No Known    MRN: [id number] LOS: 6 days   AGE/SEX: 73 y.o. male  [facility]     Subjective   Subjective   Discussion had via  tablet.    No new issues today. No pain.  No signs of bleeding.    Objective   Objective   Vital Signs  Temp:  [97.5 °F (36.4 °C)-98.7 °F (37.1 °C)] 98.6 °F (37 °C)  Heart Rate:  [61-82] 71  Resp:  [16-18] 18  BP: (141-172)/(71-86) 161/86  SpO2:  [91 %-97 %] 92 %  on   ;   Device (Oxygen Therapy): room air  Body mass index is 26.72 kg/m².  Physical Exam  Constitutional:       General: He is not in acute distress.     Appearance: He is ill-appearing. He is not toxic-appearing or diaphoretic.   Cardiovascular:      Rate and Rhythm: Normal rate.      Heart sounds: No murmur heard.    No gallop.   Pulmonary:      Effort: Pulmonary effort is normal. No respiratory distress.      Breath sounds: Normal breath sounds. No wheezing.   Abdominal:      General: Abdomen is flat. There is no distension.      Palpations: Abdomen is soft.      Tenderness: There is no abdominal tenderness.   Musculoskeletal:      Right lower leg: No edema.      Left lower leg: No edema.         Results Review     I reviewed the patient's new clinical results.  Results from last 7 days   Lab Units 23  0559 23  0346 03/15/23  0333 03/13/23  0259   WBC 10*3/mm3 8.58 10.28 8.92 10.66   HEMOGLOBIN g/dL 13.5 13.4 13.1 14.9   PLATELETS 10*3/mm3 213 199 185 207     Results from last 7 days   Lab Units 23  0559 23  1920 23  0346 03/15/23  0333 23  0259   SODIUM mmol/L 139  --  144 144 142   POTASSIUM mmol/L 4.1 3.8 3.4* 3.5 3.6   CHLORIDE mmol/L 107  --  114* 113* 107   CO2 mmol/L 20.4*  --  19.8* 17.4* 23.0   BUN mg/dL 21  --  14 13 16   CREATININE mg/dL 0.80  --  0.81 0.74* 0.86   GLUCOSE mg/dL 86  --  121* 97 97   EGFR mL/min/1.73 94.6  --  94.3 96.9 92.6     Results from last 7 days   Lab Units  03/12/23  1907   ALBUMIN g/dL 4.9   BILIRUBIN mg/dL 0.7   ALK PHOS U/L 107   AST (SGOT) U/L 35   ALT (SGPT) U/L 25     Results from last 7 days   Lab Units 03/18/23  0559 03/16/23  0346 03/15/23  0333 03/13/23  0259 03/12/23  1907   CALCIUM mg/dL 8.8 8.6 8.1* 8.2* 9.5   ALBUMIN g/dL  --   --   --   --  4.9   MAGNESIUM mg/dL 2.0  --   --   --   --    PHOSPHORUS mg/dL 3.8  --   --   --   --      Results from last 7 days   Lab Units 03/12/23  1907   PROCALCITONIN ng/mL 0.07   LACTATE mmol/L 2.0     Glucose   Date/Time Value Ref Range Status   03/17/2023 0919 110 70 - 130 mg/dL Final     Comment:     Meter: [id number] : [email or web address] Gavino Shelley RN   03/16/2023 1123 113 70 - 130 mg/dL Final     Comment:     Meter: [id number] : [id number] Whatleysharan Cox NA   03/15/2023 2347 107 70 - 130 mg/dL Final     Comment:     Meter: [id number] : [id number] Ottoniel Ennis RN   03/15/2023 1726 134 (H) 70 - 130 mg/dL Final     Comment:     Meter: [id number] : [id number] Salazar Rowan JESSE       CT Head Without Contrast    Result Date: 3/17/2023  The intraparenchymal hemorrhage involving the thalamus on the left is again noted which is unchanged in size or configuration as compared to the prior examination. They're there is extension to the posterior limb of the internal capsule laterally and to the cerebral peduncle medially, unchanged. Intraventricular blood is appreciated which was present previously. The intraventricular hemorrhage has decreased slightly in volume as compared to the prior study. Midline shift to the right appears similar to the prior examination estimated to be 4 mm. There is no evidence of new hemorrhage, hydrocephalus or of acute infarction.    Radiation dose reduction techniques were utilized, including automated exposure control and exposure modulation based on body size.  This report was finalized on 3/17/2023 12:40 PM by Dr. Lalo Hernandez M.D.      Scheduled Medications  amLODIPine, 10  mg, Oral, QAM AC  baclofen, 5 mg, Oral, Q8H  carvedilol, 12.5 mg, Oral, BID With Meals  enoxaparin, 1 mg/kg, Subcutaneous, Q12H  senna-docusate sodium, 2 tablet, Oral, BID  sodium chloride, 10 mL, Intravenous, Q12H  valsartan, 80 mg, Oral, QAM AC    Infusions   Diet  Diet: Regular/House Diet; No Mixed Consistencies, Feeding Assistance - Nursing; Texture: Pureed (NDD 1); Fluid Consistency: Honey Thick       Assessment/Plan     Active Hospital Problems    Diagnosis  POA   • **Intracranial hemorrhage (HCC) [I62.9]  Yes   • Acute DVT (deep venous thrombosis) (HCC) [I82.409]  Unknown   • HTN (hypertension) [I10]  Unknown   • Hypokalemia [E87.6]  Unknown   • Hypertensive emergency [I16.1]  Unknown      Resolved Hospital Problems   No resolved problems to display.       73 y.o. male admitted with Intracranial hemorrhage (HCC).      03/18/23  Increase Coreg.  Plan for repeat CT head tonight after starting Lovenox yesterday per ANDRADE.    Spontaneous intracranial hemorrhage  Hypertensive emergency  -repeat CTH (3/17/23): stable L basal ganglia/internal capsule hemorrhage w/ L lateral ventricular hemorrhage  -SBP goal <140  -amlodipine 10mg, valsartan 80mg  -switched metoprolol to Coreg, increase to 12.5mg BID 3/18  -cont PRN labetalol for SBP >140 ; amlodipine and valsartan will both take several weeks to realize full BP lowering effects    Acute DVT  -Doppler (3/17/23): acute RLE DVT soleal  -start therapeutic Lovenox for now; discussed with Pharmacy, medicaid pending, DOAC at this time would be cost-prohibitive (>$600/mo) until insurance is approved  -no need for Vascular consult since cleared for AC      · Therapeutic Lovenox for DVT prophylaxis.  · Full code.  · Discussed with patient.  · Anticipate discharge to SNF when cleared by consultants      Redd Maxwell MD  [facility]  03/18/23  12:32 EDT

## 2023-03-18 NOTE — PROGRESS NOTES
Neurosurgery Progress Note    Date: 23     Patient: Manuel Durant   Sex: male   : 1950      SUBJECTIVE    Interval history:  Patient started on therapeutic Lovenox at 2100 yesterday evening due to lower extremity DVT.  Today patient reports no changes.  Still has no headache.  Continued right hemiparesis.  Moves left side well.        Current Medications:  Scheduled Meds:amLODIPine, 10 mg, Oral, QAM AC  baclofen, 5 mg, Oral, Q8H  carvedilol, 12.5 mg, Oral, BID With Meals  enoxaparin, 1 mg/kg, Subcutaneous, Q12H  senna-docusate sodium, 2 tablet, Oral, BID  sodium chloride, 10 mL, Intravenous, Q12H  valsartan, 80 mg, Oral, QAM AC      Continuous Infusions:   PRN Meds: •  acetaminophen  •  senna-docusate sodium **AND** polyethylene glycol **AND** bisacodyl **AND** bisacodyl  •  Calcium Gluconate-NaCl **AND** calcium gluconate IVPB **AND** Calcium  •  labetalol  •  magnesium sulfate **OR** magnesium sulfate **OR** magnesium sulfate  •  potassium chloride **OR** potassium chloride **OR** potassium chloride  •  potassium phosphate infusion greater than 15 mMoles **OR** potassium phosphate infusion greater than 15 mMoles **OR** potassium phosphate **OR** sodium phosphate IVPB **OR** sodium phosphate IVPB  •  [COMPLETED] Insert Peripheral IV **AND** sodium chloride  •  sodium chloride  •  sodium chloride      OBJECTIVE  Vitals:    23 0517 23 0615 23 1100 23 1125   BP: 147/73 154/74  161/86   BP Location: Left arm Left arm  Left arm   Patient Position: Lying Lying  Lying   Pulse: 66 61 74 71   Resp:     Temp: 98.5 °F (36.9 °C) 98.5 °F (36.9 °C) 98.6 °F (37 °C)    TempSrc: Oral Oral Oral    SpO2: 92%  91% 92%   Weight: 75.1 kg (165 lb 9.1 oz)      Height:           Physical exam    General  - WD/WN male, appears their stated age, awake, cooperative, in no acute distress  HEENT  - Normocephalic, atraumatic, PERRLA, EOM intact  Respiratory  - Normal respiratory rate and  effort  Musculoskeletal  - No joint redness, swelling, or tenderness  Skin  - Warm and dry, no bleeding, bruising, or rash  NEUROLOGIC  - Awake and alert  - Follows commands  - Still somewhat aphasic, possibly little better based on previous notes  - Continued right sided motor deficits with intact sensation  - Moves left side without difficulty      Results review  CBC    CBC 3/15/23 3/16/23 3/18/23   WBC 8.92 10.28 8.58   RBC 4.16 4.25 4.42   Hemoglobin 13.1 13.4 13.5   Hematocrit 38.5 39.1 40.6   MCV 92.5 92.0 91.9   MCH 31.5 31.5 30.5   MCHC 34.0 34.3 33.3   RDW 13.2 13.3 13.3   Platelets 185 199 213             BMP    BMP 3/15/23 3/16/23 3/16/23 3/18/23     0346 1920    BUN 13 14  21   Creatinine 0.74 (A) 0.81  0.80   Sodium 144 144  139   Potassium 3.5 3.4 (A) 3.8 4.1   Chloride 113 (A) 114 (A)  107   CO2 17.4 (A) 19.8 (A)  20.4 (A)   Calcium 8.1 (A) 8.6  8.8   (A) Abnormal value       Comments are available for some flowsheets but are not being displayed.                  CT Thoracic Spine Without Contrast    Result Date: 3/16/2023  Patient: SHABNAM MCDONALD  Time Out: 05:02 Exam(s): CT T SPINE EXAM:   CT Thoracic Spine Without Intravenous Contrast CLINICAL HISTORY:    Reason for exam: SPINE FRACTURE. TECHNIQUE:   Axial computed tomography images of the thoracic spine without intravenous contrast.  CTDI is 36.56 mGy and DLP is 1320.2 mGy-cm.  This CT exam was performed according to the principle of ALARA (As Low As Reasonably Achievable) by using one or more of the following dose reduction techniques: automated exposure control, adjustment of the mA and or kV according to patient size, and or use of iterative reconstruction technique. COMPARISON:   No relevant prior studies available. FINDINGS:   Vertebrae:  Unremarkable.  No acute fracture.  No spondylolisthesis.   Discs spinal canal neural foramina:  No acute findings.  No spinal canal stenosis.  Multilevel osteophytes throughout the mid and lower thoracic  spine.   Soft tissues: Small right pleural effusion and posterior right base consolidation. IMPRESSION:     No acute fractures or spondylolisthesis of the thoracic spine.  No significant spinal canal narrowing.    Impression: Electronically signed by Jerad Pulido M.D. on 03-16-23 at 0502    CT Lumbar Spine Without Contrast    Result Date: 3/16/2023  Patient: SHABNAM MCDONALD  Time Out: 05:00 Exam(s): CT L SPINE EXAM:   CT Lumbar Spine Without Intravenous Contrast CLINICAL HISTORY:    Reason for exam: SPINE FRACTURE. back pain. TECHNIQUE:   Axial computed tomography images of the lumbar spine without intravenous contrast.  CTDI is 37.66 mGy and DLP is 1067 mGy-cm.  This CT exam was performed according to the principle of ALARA (As Low As Reasonably Achievable) by using one or more of the following dose reduction techniques: automated exposure control, adjustment of the mA and or kV according to patient size, and or use of iterative reconstruction technique. COMPARISON: CT abdomen and pelvis dated 3 12 2023 FINDINGS:   Vertebrae:  Unremarkable.  No acute fracture.  No spondylolisthesis.   Discs spinal canal neural foramina:  No acute findings.  No spinal canal stenosis.  Severe degenerative disc disease at L5-S1 with moderate degenerative disc disease throughout the mid lower lumbar spine.  Posterior disc bulge at L3-L4 resulting in severe spinal canal and mild bilateral foraminal narrowing.   Soft tissues:  Unremarkable. IMPRESSION:     1. No acute fractures or spondylolisthesis of the lumbar spine. 2. Degenerative disc disease throughout the mid lower lumbar spine most prominent at L3-L4 resulting in severe spinal canal narrowing.    Impression: Electronically signed by Jerad Pulido M.D. on 03-16-23 at 0500       CT Head Without Contrast    Result Date: 3/17/2023  CT HEAD WITHOUT CONTRAST  HISTORY: Stroke. Follow-up.  COMPARISON: Comparison is made to multiple prior CT examinations of the brain with the most  recent examination being the CT examination of 03/14/2023.  FINDINGS: Again identified is an intraparenchymal hemorrhage involving the thalamus on the left with intraventricular extension. The inferior and medial aspect of the hemorrhage extends to the left cerebral peduncle which was the case previously. The intraparenchymal hematoma is unchanged in size or appearance as compared to the prior examination. The volume of intraventricular blood has decreased slightly. There is no evidence of new hemorrhage. There is approximately 4 mm of midline shift to the right, unchanged. Areas of decreased attenuation involving the white matter of cerebral hemispheres are noted bilaterally consistent with mild-to-moderate small vessel ischemic disease, unchanged. There is no evidence of hydrocephalus.      Impression: The intraparenchymal hemorrhage involving the thalamus on the left is again noted which is unchanged in size or configuration as compared to the prior examination. They're there is extension to the posterior limb of the internal capsule laterally and to the cerebral peduncle medially, unchanged. Intraventricular blood is appreciated which was present previously. The intraventricular hemorrhage has decreased slightly in volume as compared to the prior study. Midline shift to the right appears similar to the prior examination estimated to be 4 mm. There is no evidence of new hemorrhage, hydrocephalus or of acute infarction.    Radiation dose reduction techniques were utilized, including automated exposure control and exposure modulation based on body size.  This report was finalized on 3/17/2023 12:40 PM by Dr. Lalo Hernandez M.D.      CT Head Without Contrast    Result Date: 3/14/2023  Patient: SHABNAM MCDONALD  Time Out: 07:42 Exam(s): CT HEAD Without Contrast EXAM:   CT Head Without Intravenous Contrast CLINICAL HISTORY:    Reason for exam: fu. TECHNIQUE:   Axial computed tomography images of the head brain  without intravenous contrast.  CTDI is 55.44 mGy and DLP is 1017.3 mGy-cm.  This CT exam was performed according to the principle of ALARA (As Low As Reasonably Achievable) by using one or more of the following dose reduction techniques: automated exposure control, adjustment of the mA and or kV according to patient size, and or use of iterative reconstruction technique. COMPARISON: Comparison made to prior noncontrast head CT from March 13, 2023. FINDINGS:   Brain: Again, there is a large left thalamic and internal capsule intraparenchymal hemorrhage with moderate surrounding vasogenic edema and mild mass-effect with 2.5 mm of midline shift.  Moderate nonspecific white matter changes.   Ventricles: There is blood dissecting into the left lateral ventricle.  No ventriculomegaly.   Bones joints:  Unremarkable.  No acute fracture.   Soft tissues:  Unremarkable.   Sinuses:  Unremarkable as visualized.  No acute sinusitis.   Mastoid air cells:  Unremarkable as visualized.  No mastoid effusion. IMPRESSION:     Unchanged left thalamic and internal capsule parenchymal hemorrhage with surrounding vasogenic edema, and 2.5 mm of midline shift.    Impression: Electronically signed by Katt Dickens MD on 03-14-23 at 0742    CT Head Without Contrast    Result Date: 3/13/2023  Patient: SHABNAM MCDONALD  Time Out: 06:47 Exam(s): CT HEAD Without Contrast EXAM:   CT Head Without Intravenous Contrast CLINICAL HISTORY:    Reason for exam: Follow-up intracranial hemorrhage. TECHNIQUE:   Axial computed tomography images of the head brain without intravenous contrast.  CTDI is 55.44 mGy and DLP is 992.6 mGy-cm.  This CT exam was performed according to the principle of ALARA (As Low As Reasonably Achievable) by using one or more of the following dose reduction techniques: automated exposure control, adjustment of the mA and or kV according to patient size, and or use of iterative reconstruction technique. COMPARISON:   3 12 2023  FINDINGS:   Brain: Unchanged left thalamic hemorrhage measuring up to 4.1 cm in diameter with surrounding edema and intraventricular hemorrhage predominantly involving the left lateral ventricle.  No new hemorrhage.  Left to right midline shift measures approximately 4 mm, unchanged prior study.   Ventricles:  Unremarkable.  No ventriculomegaly.   Bones joints:  Unremarkable.  No acute fracture.   Soft tissues:  Unremarkable.   Sinuses:  Unremarkable as visualized.  No acute sinusitis.   Mastoid air cells:  Unremarkable as visualized.  No mastoid effusion. IMPRESSION:     1. Stable appearance of left thalamic hemorrhage with intraventricular extension.  No new hemorrhage. 2. Stable ventricular size.    Impression: Electronically signed by Jerad Pulido M.D. on 03-13-23 at 0647    CT Head Without Contrast    Result Date: 3/12/2023  CT HEAD WITHOUT CONTRAST  HISTORY: Headache, altered mental status.  TECHNIQUE: Noncontrast head CT is provided. No previous imaging for correlation.  Radiation dose reduction techniques were utilized, including automated exposure control and exposure modulation based on body size.  FINDINGS: Left thalamus and posterior basal ganglia parenchymal hemorrhage measures 4.2 x 3.2 cm and there is intraventricular extension of the left lateral ventricle. No hydrocephalus or midline shift. Patchy low-density in the cerebral white matter fairly extensively. The brain parenchyma otherwise appears normal. Extra-axial spaces and bones of the skull appear normal. Visualized orbital structures and paranasal sinuses appear normal.      Impression: Left cerebral parenchymal hemorrhage with intraventricular extension as described. Findings called to Rocky Perez in the emergency department at around 804 cm.  This report was finalized on 3/12/2023 8:07 PM by Dr. Brad De León M.D.             ASSESSMENT  This is a 73 y.o. 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  No significant change in patient's physical exam compared to previous notes.  Blood pressure has been somewhat labile and hovered around 170 while in the room.  Patient's blood pressure needs to be kept below 140 systolic.  He was anticoagulated with Lovenox starting yesterday evening d/t RLE DVT.  I have ordered a CT head to be done 24 hours after his first dose.  I will follow-up tomorrow to review this CT and make sure he has no neurologic changes.      PLAN  - CT head later tonight  - Please keep SBP <140  - Continue routine neurochecks  - Call any questions or concerns    I discussed my assessment and recommendations with Dr. Court Perdomo PA-C  03/12/2023  12:06 EDT      Part of this note may be an electronic transcription/translation of spoken language to printed text using the Dragon Dictation System.

## 2023-03-19 LAB
ANION GAP SERPL CALCULATED.3IONS-SCNC: 13 MMOL/L (ref 5–15)
BUN SERPL-MCNC: 22 MG/DL (ref 8–23)
BUN/CREAT SERPL: 30.1 (ref 7–25)
CALCIUM SPEC-SCNC: 8.2 MG/DL (ref 8.6–10.5)
CHLORIDE SERPL-SCNC: 107 MMOL/L (ref 98–107)
CO2 SERPL-SCNC: 20 MMOL/L (ref 22–29)
CREAT SERPL-MCNC: 0.73 MG/DL (ref 0.76–1.27)
DEPRECATED RDW RBC AUTO: 41.6 FL (ref 37–54)
EGFRCR SERPLBLD CKD-EPI 2021: 97.3 ML/MIN/1.73
ERYTHROCYTE [DISTWIDTH] IN BLOOD BY AUTOMATED COUNT: 12.6 % (ref 12.3–15.4)
GLUCOSE SERPL-MCNC: 85 MG/DL (ref 65–99)
HCT VFR BLD AUTO: 39.5 % (ref 37.5–51)
HGB BLD-MCNC: 13.8 G/DL (ref 13–17.7)
MAGNESIUM SERPL-MCNC: 2.1 MG/DL (ref 1.6–2.4)
MCH RBC QN AUTO: 31.3 PG (ref 26.6–33)
MCHC RBC AUTO-ENTMCNC: 34.9 G/DL (ref 31.5–35.7)
MCV RBC AUTO: 89.6 FL (ref 79–97)
PHOSPHATE SERPL-MCNC: 3.4 MG/DL (ref 2.5–4.5)
PLATELET # BLD AUTO: 208 10*3/MM3 (ref 140–450)
PMV BLD AUTO: 11.4 FL (ref 6–12)
POTASSIUM SERPL-SCNC: 3.8 MMOL/L (ref 3.5–5.2)
RBC # BLD AUTO: 4.41 10*6/MM3 (ref 4.14–5.8)
SODIUM SERPL-SCNC: 140 MMOL/L (ref 136–145)
WBC NRBC COR # BLD: 7.69 10*3/MM3 (ref 3.4–10.8)

## 2023-03-19 PROCEDURE — 85027 COMPLETE CBC AUTOMATED: CPT | Performed by: STUDENT IN AN ORGANIZED HEALTH CARE EDUCATION/TRAINING PROGRAM

## 2023-03-19 PROCEDURE — 83735 ASSAY OF MAGNESIUM: CPT | Performed by: STUDENT IN AN ORGANIZED HEALTH CARE EDUCATION/TRAINING PROGRAM

## 2023-03-19 PROCEDURE — 25010000002 ENOXAPARIN PER 10 MG: Performed by: STUDENT IN AN ORGANIZED HEALTH CARE EDUCATION/TRAINING PROGRAM

## 2023-03-19 PROCEDURE — 84100 ASSAY OF PHOSPHORUS: CPT | Performed by: STUDENT IN AN ORGANIZED HEALTH CARE EDUCATION/TRAINING PROGRAM

## 2023-03-19 PROCEDURE — 80048 BASIC METABOLIC PNL TOTAL CA: CPT | Performed by: STUDENT IN AN ORGANIZED HEALTH CARE EDUCATION/TRAINING PROGRAM

## 2023-03-19 RX ORDER — CARVEDILOL 25 MG/1
25 TABLET ORAL 2 TIMES DAILY WITH MEALS
Status: DISCONTINUED | OUTPATIENT
Start: 2023-03-19 | End: 2023-04-03

## 2023-03-19 RX ADMIN — BACLOFEN 5 MG: 10 TABLET ORAL at 14:38

## 2023-03-19 RX ADMIN — ENOXAPARIN SODIUM 80 MG: 100 INJECTION SUBCUTANEOUS at 09:55

## 2023-03-19 RX ADMIN — CARVEDILOL 25 MG: 12.5 TABLET, FILM COATED ORAL at 09:54

## 2023-03-19 RX ADMIN — Medication 10 ML: at 20:09

## 2023-03-19 RX ADMIN — VALSARTAN 80 MG: 80 TABLET, FILM COATED ORAL at 09:54

## 2023-03-19 RX ADMIN — DOCUSATE SODIUM 50MG AND SENNOSIDES 8.6MG 2 TABLET: 8.6; 5 TABLET, FILM COATED ORAL at 09:54

## 2023-03-19 RX ADMIN — BACLOFEN 5 MG: 10 TABLET ORAL at 22:55

## 2023-03-19 RX ADMIN — BACLOFEN 5 MG: 10 TABLET ORAL at 09:54

## 2023-03-19 RX ADMIN — LABETALOL HYDROCHLORIDE 20 MG: 5 INJECTION, SOLUTION INTRAVENOUS at 02:42

## 2023-03-19 RX ADMIN — AMLODIPINE BESYLATE 10 MG: 10 TABLET ORAL at 09:54

## 2023-03-19 RX ADMIN — CARVEDILOL 25 MG: 12.5 TABLET, FILM COATED ORAL at 18:26

## 2023-03-19 RX ADMIN — ENOXAPARIN SODIUM 80 MG: 100 INJECTION SUBCUTANEOUS at 20:09

## 2023-03-19 RX ADMIN — Medication 10 ML: at 09:55

## 2023-03-19 NOTE — PROGRESS NOTES
Neurosurgery Progress Note    Date: 23     Patient: Manuel Durant   Sex: male   : 1950      SUBJECTIVE    Interval history:  No adverse events overnight.  No changes in complaints or exam findings.  CT head 24hrs s/p therapeutic Lovenox reviewed below.        Current Medications:  Scheduled Meds:amLODIPine, 10 mg, Oral, QAM AC  baclofen, 5 mg, Oral, Q8H  carvedilol, 25 mg, Oral, BID With Meals  enoxaparin, 1 mg/kg, Subcutaneous, Q12H  senna-docusate sodium, 2 tablet, Oral, BID  sodium chloride, 10 mL, Intravenous, Q12H  valsartan, 80 mg, Oral, QAM AC      Continuous Infusions:   PRN Meds: •  acetaminophen  •  senna-docusate sodium **AND** polyethylene glycol **AND** bisacodyl **AND** bisacodyl  •  Calcium Gluconate-NaCl **AND** calcium gluconate IVPB **AND** Calcium  •  labetalol  •  magnesium sulfate **OR** magnesium sulfate **OR** magnesium sulfate  •  potassium chloride **OR** potassium chloride **OR** potassium chloride  •  potassium phosphate infusion greater than 15 mMoles **OR** potassium phosphate infusion greater than 15 mMoles **OR** potassium phosphate **OR** sodium phosphate IVPB **OR** sodium phosphate IVPB  •  [COMPLETED] Insert Peripheral IV **AND** sodium chloride  •  sodium chloride  •  sodium chloride      OBJECTIVE  Vitals:    23 0242 23 0426 23 0537 23 0851   BP: 167/75  140/78 162/80   BP Location: Left arm  Left arm Left arm   Patient Position: Lying  Lying Lying   Pulse: 74  63 69   Resp: 18  20 18   Temp: 98.8 °F (37.1 °C)  99 °F (37.2 °C) 98.7 °F (37.1 °C)   TempSrc: Oral  Oral Oral   SpO2: 95%  96% 96%   Weight:  75.1 kg (165 lb 9.1 oz)     Height:           Physical exam    General  - awake, cooperative, in no acute distress  HEENT  - Normocephalic, atraumatic  - PERRLA, EOM intact  Respiratory  - Normal respiratory rate and effort  NEUROLOGIC  - Awake and alert  - Follows commands  - Still somewhat aphasic, possibly little better based on previous  notes  - Continued right sided neglect, slightly more movement proximal RUE today  - Moves left side without difficulty  - Normal sensation      Results review  CBC    CBC 3/16/23 3/18/23 3/19/23   WBC 10.28 8.58 7.69   RBC 4.25 4.42 4.41   Hemoglobin 13.4 13.5 13.8   Hematocrit 39.1 40.6 39.5   MCV 92.0 91.9 89.6   MCH 31.5 30.5 31.3   MCHC 34.3 33.3 34.9   RDW 13.3 13.3 12.6   Platelets 199 213 208             BMP    BMP 3/16/23 3/16/23 3/18/23 3/19/23    0346 1920     BUN 14  21 22   Creatinine 0.81  0.80 0.73 (A)   Sodium 144  139 140   Potassium 3.4 (A) 3.8 4.1 3.8   Chloride 114 (A)  107 107   CO2 19.8 (A)  20.4 (A) 20.0 (A)   Calcium 8.6  8.8 8.2 (A)   (A) Abnormal value       Comments are available for some flowsheets but are not being displayed.                  CT Head Without Contrast    Result Date: 3/19/2023  CT HEAD WITHOUT CONTRAST  HISTORY: Intracranial hemorrhage  COMPARISON: 03/17/2023  TECHNIQUE: Axial CT imaging was obtained through the brain. IV contrast was administered.  FINDINGS: Left thalamic intraparenchymal hematoma with intraventricular extension is again seen. It measures approximately 3.8 x 2.7 cm on the current study, and appears similar in size to the prior exam. The volume of intraventricular hemorrhage also does not appear significantly changed. There is stable mass effect on the left lateral ventricle. There is midline shift of approximately 3 mm, not significantly changed. There is surrounding edema. No new areas of hemorrhage are seen. There is atrophy. There is periventricular and deep white matter microangiopathic change. There is no midline shift or mass effect. Old lacunar infarct is noted within the right sarath. Paranasal sinuses and mastoid air cells appear clear.      Impression: No significant interval change.  Radiation dose reduction techniques were utilized, including automated exposure control and exposure modulation based on body size.  This report was finalized on  3/19/2023 4:52 AM by Dr. Jaky Limon M.D.      CT Head Without Contrast    Result Date: 3/17/2023  CT HEAD WITHOUT CONTRAST  HISTORY: Stroke. Follow-up.  COMPARISON: Comparison is made to multiple prior CT examinations of the brain with the most recent examination being the CT examination of 03/14/2023.  FINDINGS: Again identified is an intraparenchymal hemorrhage involving the thalamus on the left with intraventricular extension. The inferior and medial aspect of the hemorrhage extends to the left cerebral peduncle which was the case previously. The intraparenchymal hematoma is unchanged in size or appearance as compared to the prior examination. The volume of intraventricular blood has decreased slightly. There is no evidence of new hemorrhage. There is approximately 4 mm of midline shift to the right, unchanged. Areas of decreased attenuation involving the white matter of cerebral hemispheres are noted bilaterally consistent with mild-to-moderate small vessel ischemic disease, unchanged. There is no evidence of hydrocephalus.      Impression: The intraparenchymal hemorrhage involving the thalamus on the left is again noted which is unchanged in size or configuration as compared to the prior examination. They're there is extension to the posterior limb of the internal capsule laterally and to the cerebral peduncle medially, unchanged. Intraventricular blood is appreciated which was present previously. The intraventricular hemorrhage has decreased slightly in volume as compared to the prior study. Midline shift to the right appears similar to the prior examination estimated to be 4 mm. There is no evidence of new hemorrhage, hydrocephalus or of acute infarction.    Radiation dose reduction techniques were utilized, including automated exposure control and exposure modulation based on body size.  This report was finalized on 3/17/2023 12:40 PM by Dr. Lalo Hernandez M.D.      CT Head Without Contrast    Result  Date: 3/14/2023  Patient: SHABNAM MCDONALD  Time Out: 07:42 Exam(s): CT HEAD Without Contrast EXAM:   CT Head Without Intravenous Contrast CLINICAL HISTORY:    Reason for exam: fu. TECHNIQUE:   Axial computed tomography images of the head brain without intravenous contrast.  CTDI is 55.44 mGy and DLP is 1017.3 mGy-cm.  This CT exam was performed according to the principle of ALARA (As Low As Reasonably Achievable) by using one or more of the following dose reduction techniques: automated exposure control, adjustment of the mA and or kV according to patient size, and or use of iterative reconstruction technique. COMPARISON: Comparison made to prior noncontrast head CT from March 13, 2023. FINDINGS:   Brain: Again, there is a large left thalamic and internal capsule intraparenchymal hemorrhage with moderate surrounding vasogenic edema and mild mass-effect with 2.5 mm of midline shift.  Moderate nonspecific white matter changes.   Ventricles: There is blood dissecting into the left lateral ventricle.  No ventriculomegaly.   Bones joints:  Unremarkable.  No acute fracture.   Soft tissues:  Unremarkable.   Sinuses:  Unremarkable as visualized.  No acute sinusitis.   Mastoid air cells:  Unremarkable as visualized.  No mastoid effusion. IMPRESSION:     Unchanged left thalamic and internal capsule parenchymal hemorrhage with surrounding vasogenic edema, and 2.5 mm of midline shift.    Impression: Electronically signed by Katt Dickens MD on 03-14-23 at 0742    CT Head Without Contrast    Result Date: 3/13/2023  Patient: SHABNAM MCDONALD  Time Out: 06:47 Exam(s): CT HEAD Without Contrast EXAM:   CT Head Without Intravenous Contrast CLINICAL HISTORY:    Reason for exam: Follow-up intracranial hemorrhage. TECHNIQUE:   Axial computed tomography images of the head brain without intravenous contrast.  CTDI is 55.44 mGy and DLP is 992.6 mGy-cm.  This CT exam was performed according to the principle of ALARA (As Low As  Reasonably Achievable) by using one or more of the following dose reduction techniques: automated exposure control, adjustment of the mA and or kV according to patient size, and or use of iterative reconstruction technique. COMPARISON:   3 12 2023 FINDINGS:   Brain: Unchanged left thalamic hemorrhage measuring up to 4.1 cm in diameter with surrounding edema and intraventricular hemorrhage predominantly involving the left lateral ventricle.  No new hemorrhage.  Left to right midline shift measures approximately 4 mm, unchanged prior study.   Ventricles:  Unremarkable.  No ventriculomegaly.   Bones joints:  Unremarkable.  No acute fracture.   Soft tissues:  Unremarkable.   Sinuses:  Unremarkable as visualized.  No acute sinusitis.   Mastoid air cells:  Unremarkable as visualized.  No mastoid effusion. IMPRESSION:     1. Stable appearance of left thalamic hemorrhage with intraventricular extension.  No new hemorrhage. 2. Stable ventricular size.    Impression: Electronically signed by Jerad Pulido M.D. on 03-13-23 at 0647    CT Head Without Contrast    Result Date: 3/12/2023  CT HEAD WITHOUT CONTRAST  HISTORY: Headache, altered mental status.  TECHNIQUE: Noncontrast head CT is provided. No previous imaging for correlation.  Radiation dose reduction techniques were utilized, including automated exposure control and exposure modulation based on body size.  FINDINGS: Left thalamus and posterior basal ganglia parenchymal hemorrhage measures 4.2 x 3.2 cm and there is intraventricular extension of the left lateral ventricle. No hydrocephalus or midline shift. Patchy low-density in the cerebral white matter fairly extensively. The brain parenchyma otherwise appears normal. Extra-axial spaces and bones of the skull appear normal. Visualized orbital structures and paranasal sinuses appear normal.      Impression: Left cerebral parenchymal hemorrhage with intraventricular extension as described. Findings called to Rocky  Chris in the emergency department at around 804 cm.  This report was finalized on 3/12/2023 8:07 PM by Dr. Brad De León M.D.             ASSESSMENT  This is a 73 y.o. 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  He was started on therapeutic Lovenox Friday evening.  CT done yesterday shows no interval changes.  No significant changes in patient's physical exam.  Continue routine neurochecks and blood pressure goals.  Patient likely needs rehab.  No neurosurgical intervention necessary right now.    PLAN  - ok to continue Lovenox  - Every 4 hour neurochecks  - SBP <150  - Call with any questions or concerns    I discussed my assessment and recommendations with Dr. Court Perdomo PA-C  03/12/2023  12:06 EDT      Part of this note may be an electronic transcription/translation of spoken language to printed text using the Dragon Dictation System.

## 2023-03-19 NOTE — PROGRESS NOTES
Name: Manuel Durant ADMIT: 3/12/2023   : 1950  PCP: Provider, No Known    MRN: [id number] LOS: 7 days   AGE/SEX: 73 y.o. male  ROOM: [facility]     Subjective   Subjective   Discussion via  tablet.    No new issues, no headache, no evidence of bleeding.    Objective   Objective   Vital Signs  Temp:  [98.7 °F (37.1 °C)-99.4 °F (37.4 °C)] 99 °F (37.2 °C)  Heart Rate:  [62-79] 62  Resp:  [18-20] 18  BP: (137-167)/(71-83) 137/71  SpO2:  [90 %-100 %] 90 %  on   ;   Device (Oxygen Therapy): room air  Body mass index is 26.72 kg/m².  Physical Exam  Constitutional:       General: He is not in acute distress.     Appearance: He is ill-appearing. He is not toxic-appearing or diaphoretic.   Cardiovascular:      Rate and Rhythm: Normal rate.      Heart sounds: No murmur heard.    No gallop.   Pulmonary:      Effort: Pulmonary effort is normal. No respiratory distress.      Breath sounds: Normal breath sounds. No wheezing.   Abdominal:      General: Abdomen is flat. There is no distension.      Palpations: Abdomen is soft.      Tenderness: There is no abdominal tenderness.   Musculoskeletal:      Right lower leg: No edema.      Left lower leg: No edema.         Results Review     I reviewed the patient's new clinical results.  Results from last 7 days   Lab Units 23  0547 23  0559 23  0346 03/15/23  0333   WBC 10*3/mm3 7.69 8.58 10.28 8.92   HEMOGLOBIN g/dL 13.8 13.5 13.4 13.1   PLATELETS 10*3/mm3 208 213 199 185     Results from last 7 days   Lab Units 23  0547 23  0559 23  1920 23  0346 03/15/23  0333   SODIUM mmol/L 140 139  --  144 144   POTASSIUM mmol/L 3.8 4.1 3.8 3.4* 3.5   CHLORIDE mmol/L 107 107  --  114* 113*   CO2 mmol/L 20.0* 20.4*  --  19.8* 17.4*   BUN mg/dL 22 21  --  14 13   CREATININE mg/dL 0.73* 0.80  --  0.81 0.74*   GLUCOSE mg/dL 85 86  --  121* 97   EGFR mL/min/1.73 97.3 94.6  --  94.3 96.9     Results from last 7 days   Lab Units 23  5912    ALBUMIN g/dL 4.9   BILIRUBIN mg/dL 0.7   ALK PHOS U/L 107   AST (SGOT) U/L 35   ALT (SGPT) U/L 25     Results from last 7 days   Lab Units 03/19/23  0547 03/18/23  0559 03/16/23  0346 03/15/23  0333 03/13/23  0259 03/12/23  1907   CALCIUM mg/dL 8.2* 8.8 8.6 8.1*   < > 9.5   ALBUMIN g/dL  --   --   --   --   --  4.9   MAGNESIUM mg/dL 2.1 2.0  --   --   --   --    PHOSPHORUS mg/dL 3.4 3.8  --   --   --   --     < > = values in this interval not displayed.     Results from last 7 days   Lab Units 03/12/23  1907   PROCALCITONIN ng/mL 0.07   LACTATE mmol/L 2.0     Glucose   Date/Time Value Ref Range Status   03/17/2023 0919 110 70 - 130 mg/dL Final     Comment:     Meter: [id number] : juanita Shelley RN       CT Head Without Contrast    Result Date: 3/19/2023  No significant interval change.  Radiation dose reduction techniques were utilized, including automated exposure control and exposure modulation based on body size.  This report was finalized on 3/19/2023 4:52 AM by Dr. Jaky Limon M.D.      Scheduled Medications  amLODIPine, 10 mg, Oral, QAM AC  baclofen, 5 mg, Oral, Q8H  carvedilol, 25 mg, Oral, BID With Meals  enoxaparin, 1 mg/kg, Subcutaneous, Q12H  senna-docusate sodium, 2 tablet, Oral, BID  sodium chloride, 10 mL, Intravenous, Q12H  valsartan, 80 mg, Oral, QAM AC    Infusions   Diet  Diet: Regular/House Diet; No Mixed Consistencies, Feeding Assistance - Nursing; Texture: Pureed (NDD 1); Fluid Consistency: Honey Thick       Assessment/Plan     Active Hospital Problems    Diagnosis  POA   • **Intracranial hemorrhage (HCC) [I62.9]  Yes   • Acute DVT (deep venous thrombosis) (HCC) [I82.409]  Unknown   • HTN (hypertension) [I10]  Unknown   • Hypokalemia [E87.6]  Unknown   • Hypertensive emergency [I16.1]  Unknown      Resolved Hospital Problems   No resolved problems to display.       73 y.o. male admitted with Intracranial hemorrhage (HCC).      03/19/23  Increase Coreg.  Continue working  with PT    Spontaneous intracranial hemorrhage  Hypertensive emergency  -repeat CTH (3/17/23): stable L basal ganglia/internal capsule hemorrhage w/ L lateral ventricular hemorrhage  -repeat CTH (3/18/23) following Lovenox initiation- stable  -SBP goal <140  -amlodipine 10mg, valsartan 80mg  -switched metoprolol to Coreg, increase to 25mg BID 3/19  -cont PRN labetalol for SBP >140 ; amlodipine and valsartan will both take several weeks to realize full BP lowering effects    Acute DVT  -Doppler (3/17/23): acute RLE DVT soleal  -start therapeutic Lovenox for now; discussed with Pharmacy, medicaid pending, DOAC at this time would be cost-prohibitive (>$600/mo) until insurance is approved  -no need for Vascular consult since cleared for AC      · Therapeutic Lovenox for DVT prophylaxis.  · Full code.  · Discussed with patient.  · Anticipate discharge to SNF when cleared by consultants      Redd Maxwell MD  [facility]  03/19/23  14:26 EDT

## 2023-03-19 NOTE — PLAN OF CARE
Problem: Adjustment to Illness (Stroke, Ischemic/Transient Ischemic Attack)  Goal: Optimal Coping  Outcome: Ongoing, Progressing  Intervention: Support Psychosocial Response to Stroke  Recent Flowsheet Documentation  Taken 3/18/2023 2100 by Cherry Bean RN  Supportive Measures: active listening utilized  Family/Support System Care: support provided     Problem: Adjustment to Illness (Stroke, Ischemic/Transient Ischemic Attack)  Goal: Optimal Coping  Intervention: Support Psychosocial Response to Stroke  Recent Flowsheet Documentation  Taken 3/18/2023 2100 by Cherry Bean RN  Supportive Measures: active listening utilized  Family/Support System Care: support provided   Goal Outcome Evaluation:  Plan of Care Reviewed With: patient

## 2023-03-19 NOTE — PLAN OF CARE
Goal Outcome Evaluation:  Plan of Care Reviewed With: patient      Problem: Fall Injury Risk  Goal: Absence of Fall and Fall-Related Injury  Outcome: Ongoing, Progressing     Problem: Skin Injury Risk Increased  Goal: Skin Health and Integrity  Outcome: Ongoing, Progressing     Problem: Adult Inpatient Plan of Care  Goal: Plan of Care Review  Outcome: Ongoing, Progressing  Flowsheets (Taken 3/19/2023 1411)  Plan of Care Reviewed With: patient  Goal: Patient-Specific Goal (Individualized)  Outcome: Ongoing, Progressing  Goal: Absence of Hospital-Acquired Illness or Injury  Outcome: Ongoing, Progressing  Goal: Optimal Comfort and Wellbeing  Outcome: Ongoing, Progressing  Goal: Readiness for Transition of Care  Outcome: Ongoing, Progressing     Problem: Adjustment to Illness (Stroke, Ischemic/Transient Ischemic Attack)  Goal: Optimal Coping  Outcome: Ongoing, Progressing     Problem: Bowel Elimination Impaired (Stroke, Ischemic/Transient Ischemic Attack)  Goal: Effective Bowel Elimination  Outcome: Ongoing, Progressing     Problem: Cerebral Tissue Perfusion (Stroke, Ischemic/Transient Ischemic Attack)  Goal: Optimal Cerebral Tissue Perfusion  Outcome: Ongoing, Progressing     Problem: Cognitive Impairment (Stroke, Ischemic/Transient Ischemic Attack)  Goal: Optimal Cognitive Function  Outcome: Ongoing, Progressing     Problem: Communication Impairment (Stroke, Ischemic/Transient Ischemic Attack)  Goal: Improved Communication Skills  Outcome: Ongoing, Progressing     Problem: Functional Ability Impaired (Stroke, Ischemic/Transient Ischemic Attack)  Goal: Optimal Functional Ability  Outcome: Ongoing, Progressing     Problem: Respiratory Compromise (Stroke, Ischemic/Transient Ischemic Attack)  Goal: Effective Oxygenation and Ventilation  Outcome: Ongoing, Progressing     Problem: Sensorimotor Impairment (Stroke, Ischemic/Transient Ischemic Attack)  Goal: Improved Sensorimotor Function  Outcome: Ongoing, Progressing      Problem: Swallowing Impairment (Stroke, Ischemic/Transient Ischemic Attack)  Goal: Optimal Eating and Swallowing without Aspiration  Outcome: Ongoing, Progressing     Problem: Urinary Elimination Impaired (Stroke, Ischemic/Transient Ischemic Attack)  Goal: Effective Urinary Elimination  Outcome: Ongoing, Progressing

## 2023-03-19 NOTE — SIGNIFICANT NOTE
When checked on pt , he had BM and was unaware, notified nsg and waiting for clean-up, will F/U most likely 3/20

## 2023-03-20 LAB
ANION GAP SERPL CALCULATED.3IONS-SCNC: 13 MMOL/L (ref 5–15)
BUN SERPL-MCNC: 24 MG/DL (ref 8–23)
BUN/CREAT SERPL: 31.6 (ref 7–25)
CALCIUM SPEC-SCNC: 8.6 MG/DL (ref 8.6–10.5)
CHLORIDE SERPL-SCNC: 108 MMOL/L (ref 98–107)
CO2 SERPL-SCNC: 21 MMOL/L (ref 22–29)
CREAT SERPL-MCNC: 0.76 MG/DL (ref 0.76–1.27)
DEPRECATED RDW RBC AUTO: 44.1 FL (ref 37–54)
EGFRCR SERPLBLD CKD-EPI 2021: 96.1 ML/MIN/1.73
ERYTHROCYTE [DISTWIDTH] IN BLOOD BY AUTOMATED COUNT: 13.1 % (ref 12.3–15.4)
GLUCOSE SERPL-MCNC: 94 MG/DL (ref 65–99)
HCT VFR BLD AUTO: 41 % (ref 37.5–51)
HGB BLD-MCNC: 13.4 G/DL (ref 13–17.7)
MAGNESIUM SERPL-MCNC: 2.2 MG/DL (ref 1.6–2.4)
MCH RBC QN AUTO: 30.2 PG (ref 26.6–33)
MCHC RBC AUTO-ENTMCNC: 32.7 G/DL (ref 31.5–35.7)
MCV RBC AUTO: 92.3 FL (ref 79–97)
PHOSPHATE SERPL-MCNC: 3.3 MG/DL (ref 2.5–4.5)
PLATELET # BLD AUTO: 232 10*3/MM3 (ref 140–450)
PMV BLD AUTO: 10.8 FL (ref 6–12)
POTASSIUM SERPL-SCNC: 3.7 MMOL/L (ref 3.5–5.2)
RBC # BLD AUTO: 4.44 10*6/MM3 (ref 4.14–5.8)
SODIUM SERPL-SCNC: 142 MMOL/L (ref 136–145)
WBC NRBC COR # BLD: 7.71 10*3/MM3 (ref 3.4–10.8)

## 2023-03-20 PROCEDURE — 97530 THERAPEUTIC ACTIVITIES: CPT | Performed by: PHYSICAL THERAPIST

## 2023-03-20 PROCEDURE — 83735 ASSAY OF MAGNESIUM: CPT | Performed by: STUDENT IN AN ORGANIZED HEALTH CARE EDUCATION/TRAINING PROGRAM

## 2023-03-20 PROCEDURE — 80048 BASIC METABOLIC PNL TOTAL CA: CPT | Performed by: STUDENT IN AN ORGANIZED HEALTH CARE EDUCATION/TRAINING PROGRAM

## 2023-03-20 PROCEDURE — 85027 COMPLETE CBC AUTOMATED: CPT | Performed by: STUDENT IN AN ORGANIZED HEALTH CARE EDUCATION/TRAINING PROGRAM

## 2023-03-20 PROCEDURE — 25010000002 ENOXAPARIN PER 10 MG: Performed by: STUDENT IN AN ORGANIZED HEALTH CARE EDUCATION/TRAINING PROGRAM

## 2023-03-20 PROCEDURE — 84100 ASSAY OF PHOSPHORUS: CPT | Performed by: STUDENT IN AN ORGANIZED HEALTH CARE EDUCATION/TRAINING PROGRAM

## 2023-03-20 RX ADMIN — Medication 10 ML: at 22:03

## 2023-03-20 RX ADMIN — BACLOFEN 5 MG: 10 TABLET ORAL at 14:45

## 2023-03-20 RX ADMIN — BACLOFEN 5 MG: 10 TABLET ORAL at 22:07

## 2023-03-20 RX ADMIN — LABETALOL HYDROCHLORIDE 20 MG: 5 INJECTION, SOLUTION INTRAVENOUS at 14:45

## 2023-03-20 RX ADMIN — DOCUSATE SODIUM 50MG AND SENNOSIDES 8.6MG 2 TABLET: 8.6; 5 TABLET, FILM COATED ORAL at 08:16

## 2023-03-20 RX ADMIN — ENOXAPARIN SODIUM 80 MG: 100 INJECTION SUBCUTANEOUS at 22:00

## 2023-03-20 RX ADMIN — ENOXAPARIN SODIUM 80 MG: 100 INJECTION SUBCUTANEOUS at 08:15

## 2023-03-20 RX ADMIN — Medication 10 ML: at 08:15

## 2023-03-20 RX ADMIN — VALSARTAN 80 MG: 80 TABLET, FILM COATED ORAL at 08:15

## 2023-03-20 RX ADMIN — LABETALOL HYDROCHLORIDE 20 MG: 5 INJECTION, SOLUTION INTRAVENOUS at 04:50

## 2023-03-20 RX ADMIN — CARVEDILOL 25 MG: 12.5 TABLET, FILM COATED ORAL at 08:15

## 2023-03-20 RX ADMIN — BACLOFEN 5 MG: 10 TABLET ORAL at 08:14

## 2023-03-20 RX ADMIN — LABETALOL HYDROCHLORIDE 20 MG: 5 INJECTION, SOLUTION INTRAVENOUS at 22:02

## 2023-03-20 RX ADMIN — AMLODIPINE BESYLATE 10 MG: 10 TABLET ORAL at 08:15

## 2023-03-20 NOTE — PROGRESS NOTES
Name: Manuel Durant ADMIT: 3/12/2023   : 1950  PCP: Provider, No Known    MRN: [id number] LOS: 8 days   AGE/SEX: 73 y.o. male  ROOM: [facility]/1     Subjective   Subjective   Resting in bed. Just worked with PT who stays he does continue to have a right lean. Spoke with patient using the  I-pad and then the nurse who also speaks Mohawk. He is confused to time but fairly lucid to why he is here. Denies any pain, nausea, vomiting. No dyspnea. He is eating/drinking the modified diet per nursing. Two friends came to bedside to visit during my interview.     Objective   Objective   Vital Signs  Temp:  [98.1 °F (36.7 °C)-99.3 °F (37.4 °C)] 98.7 °F (37.1 °C)  Heart Rate:  [57-74] 74  Resp:  [18-20] 18  BP: (123-164)/(64-98) 159/82  SpO2:  [93 %-97 %] 96 %  on   ;   Device (Oxygen Therapy): room air  Body mass index is 25.55 kg/m².     Physical Exam  Vitals and nursing note reviewed.   Constitutional:       Appearance: He is ill-appearing.   Cardiovascular:      Rate and Rhythm: Normal rate and regular rhythm.      Pulses: Normal pulses.   Pulmonary:      Effort: Pulmonary effort is normal. No respiratory distress.      Breath sounds: Normal breath sounds.   Abdominal:      General: Bowel sounds are normal. There is no distension.      Palpations: Abdomen is soft.      Tenderness: There is no abdominal tenderness.   Musculoskeletal:         General: No deformity or signs of injury.      Cervical back: Normal range of motion and neck supple.   Skin:     General: Skin is warm and dry.      Findings: No bruising.   Neurological:      Mental Status: He is alert. He is disoriented.      Motor: Weakness present.      Coordination: Coordination abnormal.   Psychiatric:         Mood and Affect: Mood normal.         Behavior: Behavior normal.       Results Review:       I reviewed the patient's new clinical results.  Results from last 7 days   Lab Units 23  0550 23  0547 23  0559 23  0346    WBC 10*3/mm3 7.71 7.69 8.58 10.28   HEMOGLOBIN g/dL 13.4 13.8 13.5 13.4   PLATELETS 10*3/mm3 232 208 213 199     Results from last 7 days   Lab Units 03/20/23  0550 03/19/23  0547 03/18/23  0559 03/16/23  1920 03/16/23  0346   SODIUM mmol/L 142 140 139  --  144   POTASSIUM mmol/L 3.7 3.8 4.1 3.8 3.4*   CHLORIDE mmol/L 108* 107 107  --  114*   CO2 mmol/L 21.0* 20.0* 20.4*  --  19.8*   BUN mg/dL 24* 22 21  --  14   CREATININE mg/dL 0.76 0.73* 0.80  --  0.81   GLUCOSE mg/dL 94 85 86  --  121*   Estimated Creatinine Clearance: 90.5 mL/min (by C-G formula based on SCr of 0.76 mg/dL).    Results from last 7 days   Lab Units 03/20/23  0550 03/19/23  0547 03/18/23  0559 03/16/23  0346   CALCIUM mg/dL 8.6 8.2* 8.8 8.6   MAGNESIUM mg/dL 2.2 2.1 2.0  --    PHOSPHORUS mg/dL 3.3 3.4 3.8  --        No results found for: HGBA1C, POCGLU    amLODIPine, 10 mg, Oral, QAM AC  baclofen, 5 mg, Oral, Q8H  carvedilol, 25 mg, Oral, BID With Meals  enoxaparin, 1 mg/kg, Subcutaneous, Q12H  senna-docusate sodium, 2 tablet, Oral, BID  sodium chloride, 10 mL, Intravenous, Q12H  valsartan, 80 mg, Oral, QAM AC       Diet: Regular/House Diet; No Mixed Consistencies, Feeding Assistance - Nursing; Texture: Pureed (NDD 1); Fluid Consistency: Honey Thick       Assessment/Plan     Active Hospital Problems    Diagnosis  POA   • **Intracranial hemorrhage (HCC) [I62.9]  Yes   • Acute DVT (deep venous thrombosis) (HCC) [I82.409]  Unknown   • HTN (hypertension) [I10]  Unknown   • Hypokalemia [E87.6]  Unknown   • Hypertensive emergency [I16.1]  Unknown      Resolved Hospital Problems   No resolved problems to display.     Mr. Durant is a 73 year old male who initially presented to the hospital 3/12/23 for intracranial bleed. He was found unresponsive by friends and initially taken to [facility] where he was found to have poorly controlled BP and right sided hemiplegia. He was transferred here for closer monitoring in the ICU and neurosurgical  consultation. He was cleared to move out of the ICU on 3/16/23.    · Intracranial hemorrhage: CTH 3/17/23 stable left basal ganglia/internal capsule hemorrhage with left latera ventricular hemorrhage. Started on Lovenox for DVT and repeat CTH 3/18 also stable. [facility]RADE followed. Goal BP < 150 systolic on most recent ANDRADE note.    · HTN emergency: On amlodipine 10 mg, valsartan 80 mg and Coreg 25 mg BID. Has PRN labetolol for SBP > 140. Could consider increasing ARB if need further control. Renal function stable.     · Acute DVT: found 3/17/23 with acute RLE DVT in soleal. [facility] okayed for Lovenox. Plans for eventual oral therapy but medicaid pending at this time.     · Hypokalemia: Resolved.    Discussed with patient and nurse.     CCP following for SNF referrals once medicaid approved.     PT/OT following. On modified diet per ST.    VTE Prophylaxis - Pharmacy to dose Lovenox  Code Status - Full code  Disposition - Anticipate discharge to SNF once able.      SLICK Arroyo  [facility]  03/20/23  13:31 EDT

## 2023-03-20 NOTE — PLAN OF CARE
Goal Outcome Evaluation:              Outcome Evaluation: Pt was agreeable to therapy. mod-max A for bed mobility. Strong R lean and posterior lean when positioned into neutral. Address sitting balance, trunk activation and reaching outside CAROL. Did not attempt standing this session as second person assist was not available and pt was not safe to stand with assist x1 secondary to balance impairment. Will continue to progress as tolerated.

## 2023-03-20 NOTE — PROGRESS NOTES
Continued Stay Note  [facility]     Patient Name: Manuel Durant  MRN: [id number]  Today's Date: 3/20/2023    Admit Date: 3/12/2023    Plan: SNF referrals pending   Discharge Plan     Row Name 03/20/23 1121       Plan    Plan SNF referrals pending    Patient/Family in Agreement with Plan yes    Plan Comments SNF referrals pending insurance approval. Medicaid application in process per [facility]. Shaniqua Harmon LCSW               Discharge Codes    No documentation.               Expected Discharge Date and Time     Expected Discharge Date Expected Discharge Time    Mar 22, 2023             Leeann Harmon LCSW

## 2023-03-20 NOTE — THERAPY TREATMENT NOTE
Patient Name: Manuel Durant  : 1950    MRN: [id number]                              Today's Date: 3/20/2023       Admit Date: 3/12/2023    Visit Dx:     ICD-10-CM ICD-9-CM   1. Intracranial hemorrhage (HCC)  I62.9 432.9   2. Altered mental status, unspecified altered mental status type  R41.82 780.97     Patient Active Problem List   Diagnosis   • Intracranial hemorrhage (HCC)   • Hypertensive emergency   • Acute DVT (deep venous thrombosis) (HCC)   • HTN (hypertension)   • Hypokalemia     History reviewed. No pertinent past medical history.  History reviewed. No pertinent surgical history.   General Information     Row Name 23          Physical Therapy Time and Intention    Document Type therapy note (daily note)  -     Mode of Treatment physical therapy  -           User Key  (r) = Recorded By, (t) = Taken By, (c) = Cosigned By    Initials Name Provider Type    Rita Gibbs PT Physical Therapist               Mobility     Row Name 23          Bed Mobility    Bed Mobility supine-sit;sit-supine  -     Supine-Sit [place] (Bed Mobility) moderate assist (50% patient effort);maximum assist (25% patient effort)  -NA     Sit-Supine [place] (Bed Mobility) maximum assist (25% patient effort)  -     Assistive Device (Bed Mobility) bed rails;draw sheet  -     Comment, (Bed Mobility) strong R lean upon sitting  -     Row Name 23          Sit-Stand Transfer    Comment, (Sit-Stand Transfer) not assessed due to poor sitting balance  -           User Key  (r) = Recorded By, (t) = Taken By, (c) = Cosigned By    Initials Name Provider Type    Rita Gibbs PT Physical Therapist               Obj/Interventions     Row Name 23          Balance    Static Sitting Balance maximum assist;moderate assist  -NA     Comment, Balance R and posterior lean. worked on reaching outside CAROL, WB on Lforearm  -           User Key  (r) =  Recorded By, (t) = Taken By, (c) = Cosigned By    Initials Name Provider Type    Rita Gibbs PT Physical Therapist               Goals/Plan    No documentation.                Clinical Impression     Row Name 03/20/23 1649          Pain    Pretreatment Pain Rating 0/10 - no pain  -NA     Posttreatment Pain Rating 0/10 - no pain  -     Row Name 03/20/23 1649          Plan of Care Review    Outcome Evaluation Pt was agreeable to therapy. mod-max A for bed mobility. Strong R lean and posterior lean when positioned into neutral. Address sitting balance, trunk activation and reaching outside CAROL. Did not attempt standing this session as second person assist was not available and pt was not safe to stand with assist x1 secondary to balance impairment. Will continue to progress as tolerated.  -NA     Row Name 03/20/23 1649          Positioning and Restraints    Pre-Treatment Position in bed  -     Post Treatment Position bed  -KH     In Bed fowlers;call light within reach;encouraged to call for assist;exit alarm on  -           User Key  (r) = Recorded By, (t) = Taken By, (c) = Cosigned By    Initials Name Provider Type    Rita Gibbs PT Physical Therapist               Outcome Measures     Row Name 03/20/23 1652 03/20/23 0815       How much help from another person do you currently need...    Turning from your back to your side while in flat bed without using bedrails? 2  -KH 1  -AR    Moving from lying on back to sitting on the side of a flat bed without bedrails? 2  -KH 1  -AR    Moving to and from a bed to a chair (including a wheelchair)? 1  -KH 1  -AR    Standing up from a chair using your arms (e.g., wheelchair, bedside chair)? 1  -KH 1  -AR    Climbing 3-5 steps with a railing? 1  -KH 1  -AR    To walk in hospital room? 1  -KH 1  -AR    AM-PAC 6 Clicks Score (PT) 8  -KH 6  -AR    Highest level of mobility 3 --> Sat at edge of bed  -KH 2 --> Bed activities/dependent transfer   -AR          User Key  (r) = Recorded By, (t) = Taken By, (c) = Cosigned By    Initials Name Provider Type    Rita Gibbs, PT Physical Therapist    Caprice Sandoval, RN Registered Nurse                             Physical Therapy Education     Title: PT OT SLP Therapies (Done)     Topic: Physical Therapy (Done)     Point: Mobility training (Done)     Learning Progress Summary           Patient Acceptance, E,TB,H, VU by MS at 3/16/2023 1801    Acceptance, E,D, DU,NR by MO at 3/16/2023 1451    Acceptance, E, DU,NR by MO at 3/15/2023 1200    Acceptance, E, NR by LM at 3/15/2023 0521    Acceptance, E,D, DU,NR by MO at 3/14/2023 1458   Family Acceptance, E,TB,H, VU by MS at 3/16/2023 1801                   Point: Home exercise program (Done)     Learning Progress Summary           Patient Acceptance, E,TB,H, VU by MS at 3/16/2023 1801    Acceptance, E,D, DU,NR by MO at 3/16/2023 1451    Acceptance, E, NR by LM at 3/15/2023 0521    Acceptance, E,D, DU,NR by MO at 3/14/2023 1458   Family Acceptance, E,TB,H, VU by MS at 3/16/2023 1801                   Point: Body mechanics (Done)     Learning Progress Summary           Patient Acceptance, E,TB,H, VU by MS at 3/16/2023 1801    Acceptance, E,D, DU,NR by MO at 3/16/2023 1451    Acceptance, E, DU,NR by MO at 3/15/2023 1200    Acceptance, E, NR by LM at 3/15/2023 0521    Acceptance, E,D, DU,NR by MO at 3/14/2023 1458   Family Acceptance, E,TB,H, VU by MS at 3/16/2023 1801                   Point: Precautions (Done)     Learning Progress Summary           Patient Acceptance, E,TB,H, VU by MS at 3/16/2023 1801    Acceptance, E,D, DU,NR by MO at 3/16/2023 1451    Acceptance, E, DU,NR by MO at 3/15/2023 1200    Acceptance, E, NR by LM at 3/15/2023 0521    Acceptance, E,D, DU,NR by MO at 3/14/2023 1458   Family Acceptance, E,TB,H, VU by MS at 3/16/2023 1801                               User Key     Initials Effective Dates Name Provider Type Discipline    MS  06/16/21 -  Angel Luis Cr, RN Registered Nurse Nurse    LM 10/13/22 -  Silke Grace, RN Registered Nurse Nurse    MO 01/27/23 -  Lacie Tanner, JOSE ELIAS Student PT Student PT              PT Recommendation and Plan     Outcome Evaluation: Pt was agreeable to therapy. mod-max A for bed mobility. Strong R lean and posterior lean when positioned into neutral. Address sitting balance, trunk activation and reaching outside CAROL. Did not attempt standing this session as second person assist was not available and pt was not safe to stand with assist x1 secondary to balance impairment. Will continue to progress as tolerated.     Time Calculation:    PT Charges     Row Name 03/20/23 1653             Time Calculation    Start Time 1338  -KH      Stop Time 1354  -KH      Time Calculation (min) 16 min  -KH      PT Received On 03/20/23  -      PT - Next Appointment 03/21/23  -         Time Calculation- PT    Total Timed Code Minutes- PT 16 minute(s)  -KH         Timed Charges    47090 - PT Therapeutic Activity Minutes 16  -KH         Total Minutes    Timed Charges Total Minutes 16  -KH       Total Minutes 16  -KH            User Key  (r) = Recorded By, (t) = Taken By, (c) = Cosigned By    Initials Name Provider Type    Rita Gibbs, PT Physical Therapist              Therapy Charges for Today     Code Description Service Date Service Provider Modifiers Qty    [id number]  PT THERAPEUTIC ACT EA 15 MIN 3/20/2023 Rita Zelaya, PT GP 1          PT G-Codes  Outcome Measure Options: AM-PAC 6 Clicks Basic Mobility (PT)  AM-PAC 6 Clicks Score (PT): 8  AM-PAC 6 Clicks Score (OT): 7  Modified Levy Scale: 4 - Moderately severe disability.  Unable to walk without assistance, and unable to attend to own bodily needs without assistance.       Rita Zelyaa, JOSE ELIAS  3/20/2023

## 2023-03-21 LAB
ANION GAP SERPL CALCULATED.3IONS-SCNC: 16.4 MMOL/L (ref 5–15)
BUN SERPL-MCNC: 20 MG/DL (ref 8–23)
BUN/CREAT SERPL: 28.2 (ref 7–25)
CALCIUM SPEC-SCNC: 8.5 MG/DL (ref 8.6–10.5)
CHLORIDE SERPL-SCNC: 105 MMOL/L (ref 98–107)
CO2 SERPL-SCNC: 18.6 MMOL/L (ref 22–29)
CREAT SERPL-MCNC: 0.71 MG/DL (ref 0.76–1.27)
DEPRECATED RDW RBC AUTO: 42.1 FL (ref 37–54)
EGFRCR SERPLBLD CKD-EPI 2021: 98.1 ML/MIN/1.73
ERYTHROCYTE [DISTWIDTH] IN BLOOD BY AUTOMATED COUNT: 12.9 % (ref 12.3–15.4)
GLUCOSE SERPL-MCNC: 107 MG/DL (ref 65–99)
HCT VFR BLD AUTO: 39.8 % (ref 37.5–51)
HGB BLD-MCNC: 14 G/DL (ref 13–17.7)
MAGNESIUM SERPL-MCNC: 2.1 MG/DL (ref 1.6–2.4)
MCH RBC QN AUTO: 31.9 PG (ref 26.6–33)
MCHC RBC AUTO-ENTMCNC: 35.2 G/DL (ref 31.5–35.7)
MCV RBC AUTO: 90.7 FL (ref 79–97)
PHOSPHATE SERPL-MCNC: 3.6 MG/DL (ref 2.5–4.5)
PLATELET # BLD AUTO: 234 10*3/MM3 (ref 140–450)
PMV BLD AUTO: 11.4 FL (ref 6–12)
POTASSIUM SERPL-SCNC: 4 MMOL/L (ref 3.5–5.2)
RBC # BLD AUTO: 4.39 10*6/MM3 (ref 4.14–5.8)
SODIUM SERPL-SCNC: 140 MMOL/L (ref 136–145)
WBC NRBC COR # BLD: 7.06 10*3/MM3 (ref 3.4–10.8)

## 2023-03-21 PROCEDURE — 97530 THERAPEUTIC ACTIVITIES: CPT

## 2023-03-21 PROCEDURE — 25010000002 ENOXAPARIN PER 10 MG: Performed by: STUDENT IN AN ORGANIZED HEALTH CARE EDUCATION/TRAINING PROGRAM

## 2023-03-21 PROCEDURE — 80048 BASIC METABOLIC PNL TOTAL CA: CPT | Performed by: STUDENT IN AN ORGANIZED HEALTH CARE EDUCATION/TRAINING PROGRAM

## 2023-03-21 PROCEDURE — 84100 ASSAY OF PHOSPHORUS: CPT | Performed by: STUDENT IN AN ORGANIZED HEALTH CARE EDUCATION/TRAINING PROGRAM

## 2023-03-21 PROCEDURE — 83735 ASSAY OF MAGNESIUM: CPT | Performed by: STUDENT IN AN ORGANIZED HEALTH CARE EDUCATION/TRAINING PROGRAM

## 2023-03-21 PROCEDURE — 99231 SBSQ HOSP IP/OBS SF/LOW 25: CPT | Performed by: NURSE PRACTITIONER

## 2023-03-21 PROCEDURE — 97112 NEUROMUSCULAR REEDUCATION: CPT

## 2023-03-21 PROCEDURE — 85027 COMPLETE CBC AUTOMATED: CPT | Performed by: STUDENT IN AN ORGANIZED HEALTH CARE EDUCATION/TRAINING PROGRAM

## 2023-03-21 RX ORDER — VALSARTAN 80 MG/1
120 TABLET ORAL
Status: DISCONTINUED | OUTPATIENT
Start: 2023-03-22 | End: 2023-03-23

## 2023-03-21 RX ORDER — BACLOFEN 10 MG/1
5 TABLET ORAL EVERY 12 HOURS SCHEDULED
Status: DISCONTINUED | OUTPATIENT
Start: 2023-03-21 | End: 2023-03-23

## 2023-03-21 RX ADMIN — Medication 10 ML: at 10:24

## 2023-03-21 RX ADMIN — ENOXAPARIN SODIUM 80 MG: 100 INJECTION SUBCUTANEOUS at 10:35

## 2023-03-21 RX ADMIN — BACLOFEN 5 MG: 10 TABLET ORAL at 05:08

## 2023-03-21 RX ADMIN — VALSARTAN 80 MG: 80 TABLET, FILM COATED ORAL at 10:36

## 2023-03-21 RX ADMIN — LABETALOL HYDROCHLORIDE 20 MG: 5 INJECTION, SOLUTION INTRAVENOUS at 09:50

## 2023-03-21 RX ADMIN — AMLODIPINE BESYLATE 10 MG: 10 TABLET ORAL at 10:36

## 2023-03-21 RX ADMIN — CARVEDILOL 25 MG: 12.5 TABLET, FILM COATED ORAL at 16:56

## 2023-03-21 RX ADMIN — BACLOFEN 5 MG: 10 TABLET ORAL at 22:46

## 2023-03-21 RX ADMIN — ENOXAPARIN SODIUM 80 MG: 100 INJECTION SUBCUTANEOUS at 22:47

## 2023-03-21 RX ADMIN — Medication 10 ML: at 22:47

## 2023-03-21 NOTE — PROGRESS NOTES
[facility] NEUROSURGERY INTRACRANIAL PROGRESS NOTE    PATIENT IDENTIFICATION:   Name:  Manuel Durant      MRN:  [id number]     73 y.o.  male               CC: [facility]      Subjective    #[id number] used for visit    Interval History: Blood pressure still frequently over parameter of 140.  Received several as needed doses labetalol.  Denies headache    ROS:  HEENT: No headache  MS: Left leg pain  GI: No nausea, vomiting  Neuro: Right-sided weakness, speech difficulties    Objective     Vital signs in last 24 hours:  Temp:  [98.2 °F (36.8 °C)-98.9 °F (37.2 °C)] 98.4 °F (36.9 °C)  Heart Rate:  [51-98] 51  Resp:  [16-18] 18  BP: (148-178)/(63-98) 151/79      Intake/Output this shift:  No intake/output data recorded.      Intake/Output last 3 shifts:  I/O last 3 completed shifts:  In: 570 [P.O.:570]  Out: -     LABS:  Results from last 7 days   Lab Units 03/21/23  0554 03/20/23  0550 03/19/23  0547   WBC 10*3/mm3 7.06 7.71 7.69   HEMOGLOBIN g/dL 14.0 13.4 13.8   HEMATOCRIT % 39.8 41.0 39.5   PLATELETS 10*3/mm3 234 232 208     Results from last 7 days   Lab Units 03/21/23  0554 03/20/23  0550 03/19/23  0547   SODIUM mmol/L 140 142 140   POTASSIUM mmol/L 4.0 3.7 3.8   CHLORIDE mmol/L 105 108* 107   CO2 mmol/L 18.6* 21.0* 20.0*   BUN mg/dL 20 24* 22   CREATININE mg/dL 0.71* 0.76 0.73*   CALCIUM mg/dL 8.5* 8.6 8.2*   GLUCOSE mg/dL 107* 94 85       IMAGING STUDIES:  No new imaging    I personally viewed and interpreted the patient's chart.    Meds reviewed/changed: Yes  Baclofen 5 mg p.o. every 8 hours  Lovenox-DVT treatment dose  Valsartan 80 mg daily  Amlodipine 10 mg p.o. daily  Carvedilol 25 mg p.o. twice daily- 2 doses held due to bradycardia  Labetalol 20 mg IV every 4 as needed- 3 doses    Physical Exam:    General:   Awake, alert.  Still with right neglect.  Continues to have some aphasia.  He follows commands on left side  CN III IV VI: Some slight movement past midline to right. No disconjugate gaze.    CN  VII: Right facial weakness-moderate.  Motor:    Normal movement left side.  No drift.  Withdrawals RLE> RUE.  Still some slight increase in tone right side but improved  Sensation: Pain sensation intact right  Station and Gait:  Per PT note 3/20-mod-max A for bed mobility. Strong R lean and posterior lean when positioned into neutral. Address sitting balance, trunk activation and reaching outside CAROL. Did not attempt standing this session as second person assist was not available and pt was not safe to stand with assist x1 secondary to balance impairment  Coordination:  Finger-to-nose test intact left upper extremity.  Unable to assess right side due to weakness   Extremities:   No calf tenderness    Assessment & Plan     ASSESSMENT:      Intracranial hemorrhage (HCC)    Hypertensive emergency    Acute DVT (deep venous thrombosis) (HCC)    HTN (hypertension)    Hypokalemia    Patient with spontaneous left basal ganglia/internal capsule hemorrhage remains hospitalized awaiting rehab.  Still with some issues with hypertension, goal SBP <140.  Will relax blood pressure parameters slightly to<150.  utilized  for visit today.  Denies headache.  Still has some abnormal verbal response which is likely due to some expressive aphasia.  Follows commands on left with full strength.  Right hemiparesis with slight withdrawal to pain and remains right-sided neglect full.  He continues to work with therapy.  CCP following and working on Medicaid approval so they can determine rehab placement.  Patient remains on full-strength anticoagulation with Lovenox due to acute lower extremity DVT.  We will repeat CT head tomorrow.      PLAN:   CTH AM  Keep SBP <150  Awaiting rehab placement once Medicaid approved  We will likely plan MRI on outpatient basis after hemorrhage cleared    I discussed the patient's findings and my recommendations with patient and Dr. Logan    During patient visit, I utilized appropriate personal  "protective equipment including mask and gloves.  Mask used was standard procedure mask. Appropriate PPE was worn during the entire visit.  Hand hygiene was completed before and after.      chief complaint, exam, MDM data copied forward from previous encounters in EMR is unchanged.               LOS: 9 days       Roxanna Ramirez, APRN  3/21/2023  11:41 EDT    \"Dictated utilizing Dragon dictation\".      "

## 2023-03-21 NOTE — PLAN OF CARE
Goal Outcome Evaluation:  Plan of Care Reviewed With: patient        Progress: no change  Outcome Evaluation: Pt seen for OT/PT tx session, pt awake and agreeable. Understands/speaks some english. Pt required max A x2 for bed mobility, upon coming to EOB strong R lateral leaning, increased time to achieve midline, varying levels of assist at EOB for static and dynamic sitting from CGA to max A x1-2 with engagement in trunk control tasks, WB'ing on bilateral elbows to improve posture, functional dynamic reaching with pt having difficulty reaching across midline to R side due to R side inattention. Pt will continue to benefit from skilled OT to address deficits and progress toward goals and improved (I) with ADLs. RUE remains flaccid, no trace movements noted this date.

## 2023-03-21 NOTE — THERAPY TREATMENT NOTE
Patient Name: Manuel Durant  : 1950    MRN: [id number]                              Today's Date: 3/21/2023       Admit Date: 3/12/2023    Visit Dx:     ICD-10-CM ICD-9-CM   1. Intracranial hemorrhage (HCC)  I62.9 432.9   2. Altered mental status, unspecified altered mental status type  R41.82 780.97     Patient Active Problem List   Diagnosis   • Intracranial hemorrhage (HCC)   • Hypertensive emergency   • Acute DVT (deep venous thrombosis) (HCC)   • HTN (hypertension)   • Hypokalemia     History reviewed. No pertinent past medical history.  History reviewed. No pertinent surgical history.   General Information     Row Name 23 1533          Physical Therapy Time and Intention    Document Type therapy note (daily note)  -CB     Mode of Treatment co-treatment;occupational therapy;physical therapy  -CB     Row Name 23 1533          General Information    Existing Precautions/Restrictions fall  -CB     Row Name 23 1533          Cognition    Orientation Status (Cognition) oriented to;person;place  speaks some english  -CB     Row Name 23 1533          Safety Issues, Functional Mobility    Safety Issues Affecting Function (Mobility) insight into deficits/self-awareness;safety precautions follow-through/compliance;problem-solving  -CB     Impairments Affecting Function (Mobility) balance;strength;endurance/activity tolerance;cognition;pain;muscle tone abnormal;postural/trunk control;coordination;grasp;motor control  -CB           User Key  (r) = Recorded By, (t) = Taken By, (c) = Cosigned By    Initials Name Provider Type    CB Bety Benitez PT Physical Therapist               Mobility     Row Name 23 1534          Bed Mobility    Bed Mobility supine-sit;sit-supine  -CB     Supine-Sit [place] (Bed Mobility) maximum assist (25% patient effort);2 person assist;verbal cues  -CB     Sit-Supine [place] (Bed Mobility) maximum assist (25% patient effort);2 person assist;verbal  cues  -CB     Assistive Device (Bed Mobility) bed rails;draw sheet  -CB     Comment, (Bed Mobility) R lateral lean during sitting EOB with tactile and VC to correct  -CB     Row Name 03/21/23 1534          Transfers    Comment, (Transfers) not assessed due to poor sitting balance  -CB           User Key  (r) = Recorded By, (t) = Taken By, (c) = Cosigned By    Initials Name Provider Type    CB Bety Benitez, PT Physical Therapist               Obj/Interventions     Row Name 03/21/23 1536          Balance    Balance Assessment sitting static balance;sitting dynamic balance  -CB     Comment, Balance CGA-maxAx2 for sitting balance with weightshifting onto R and L UE  -CB           User Key  (r) = Recorded By, (t) = Taken By, (c) = Cosigned By    Initials Name Provider Type    Bety Carvalho, PT Physical Therapist               Goals/Plan    No documentation.                Clinical Impression     Row Name 03/21/23 1539          Pain    Pre/Posttreatment Pain Comment pt reports everywhere  -CB     Row Name 03/21/23 1539          Plan of Care Review    Plan of Care Reviewed With patient  -CB     Progress no change  -CB     Outcome Evaluation Patient seen for PT/OT this afternoon. Pt was maxAx2 for bed mobility and R lateral lean in sitting with variable assist level [id number]. VC and tactile cues for midline posture and weightshifting onto R and LUE. Pt completed dynamic reaching and difficulty reaching across midline with R inattention. Pt will continue to benefit from skilled PT to address functional deficits. Plan rehab at [place].  -CB     Row Name 03/21/23 1539          Positioning and Restraints    Pre-Treatment Position in bed  -CB     Post Treatment Position bed  -CB     In Bed notified nsg;fowlers;call light within reach;encouraged to call for assist;exit alarm on;side rails up x3;SCD pump applied  -CB           User Key  (r) = Recorded By, (t) = Taken By, (c) = Cosigned By    Initials Name Provider Type    CB  Bety Benitez, PT Physical Therapist               Outcome Measures     Row Name 03/21/23 1543 03/21/23 0718       How much help from another person do you currently need...    Turning from your back to your side while in flat bed without using bedrails? 2  -CB 2  -HW    Moving from lying on back to sitting on the side of a flat bed without bedrails? 1  -CB 2  -HW    Moving to and from a bed to a chair (including a wheelchair)? 1  -CB 1  -HW    Standing up from a chair using your arms (e.g., wheelchair, bedside chair)? 1  -CB 1  -HW    Climbing 3-5 steps with a railing? 1  -CB 1  -HW    To walk in hospital room? 1  -CB 1  -HW    AM-PAC 6 Clicks Score (PT) 7  -CB 8  -HW    Highest level of mobility 2 --> Bed activities/dependent transfer  -CB 3 --> Sat at edge of bed  -HW    Row Name 03/21/23 1543 03/21/23 1537       Functional Assessment    Outcome Measure Options AM-PAC 6 Clicks Basic Mobility (PT)  -CB AM-PAC 6 Clicks Daily Activity (OT)  -          User Key  (r) = Recorded By, (t) = Taken By, (c) = Cosigned By    Initials Name Provider Type    Bety Carvalho, PT Physical Therapist     Karishma Spence, OT Occupational Therapist    HW Soo Donaldson, RN Registered Nurse                             Physical Therapy Education     Title: PT OT SLP Therapies (Done)     Topic: Physical Therapy (Done)     Point: Mobility training (Done)     Learning Progress Summary           Patient Acceptance, E,TB, VU,NR by CB at 3/21/2023 1544    Acceptance, E,TB,H, VU by MS at 3/16/2023 1801    Acceptance, E,D, DU,NR by MO at 3/16/2023 1451    Acceptance, E, DU,NR by MO at 3/15/2023 1200    Acceptance, E, NR by LM at 3/15/2023 0521    Acceptance, E,D, DU,NR by MO at 3/14/2023 1458   Family Acceptance, E,TB,H, VU by MS at 3/16/2023 1801                   Point: Home exercise program (Done)     Learning Progress Summary           Patient Acceptance, E,TB, VU,NR by CB at 3/21/2023 1544    Acceptance, E,TB,H, VU by MS at  3/16/2023 1801    Acceptance, E,D, DU,NR by MO at 3/16/2023 1451    Acceptance, E, NR by LM at 3/15/2023 0521    Acceptance, E,D, DU,NR by MO at 3/14/2023 1458   Family Acceptance, E,TB,H, VU by MS at 3/16/2023 1801                   Point: Body mechanics (Done)     Learning Progress Summary           Patient Acceptance, E,TB, VU,NR by CB at 3/21/2023 1544    Acceptance, E,TB,H, VU by MS at 3/16/2023 1801    Acceptance, E,D, DU,NR by MO at 3/16/2023 1451    Acceptance, E, DU,NR by MO at 3/15/2023 1200    Acceptance, E, NR by LM at 3/15/2023 0521    Acceptance, E,D, DU,NR by MO at 3/14/2023 1458   Family Acceptance, E,TB,H, VU by MS at 3/16/2023 1801                   Point: Precautions (Done)     Learning Progress Summary           Patient Acceptance, E,TB, VU,NR by CB at 3/21/2023 1544    Acceptance, E,TB,H, VU by MS at 3/16/2023 1801    Acceptance, E,D, DU,NR by MO at 3/16/2023 1451    Acceptance, E, DU,NR by MO at 3/15/2023 1200    Acceptance, E, NR by LM at 3/15/2023 0521    Acceptance, E,D, DU,NR by MO at 3/14/2023 1458   Family Acceptance, E,TB,H, VU by MS at 3/16/2023 1801                               User Key     Initials Effective Dates Name Provider Type Discipline    MS 06/16/21 -  Angel Luis Cr, RN Registered Nurse Nurse    CB 10/22/21 -  Bety Benitez, PT Physical Therapist PT    LM 10/13/22 -  Silke Grace, RN Registered Nurse Nurse    MO 01/27/23 -  Lacie Tanner, PT Student PT Student PT              PT Recommendation and Plan     Plan of Care Reviewed With: patient  Progress: no change  Outcome Evaluation: Patient seen for PT/OT this afternoon. Pt was maxAx2 for bed mobility and R lateral lean in sitting with variable assist level [id number]. VC and tactile cues for midline posture and weightshifting onto R and LUE. Pt completed dynamic reaching and difficulty reaching across midline with R inattention. Pt will continue to benefit from skilled PT to address functional deficits. [place] rehab at  [place].     Time Calculation:    PT Charges     Row Name 03/21/23 1545             Time Calculation    Start Time 1433  -CB      Stop Time 1445  -CB      Time Calculation (min) 12 min  -CB      PT Received On 03/21/23  -CB      PT - Next Appointment 03/22/23  -CB         Time Calculation- PT    Total Timed Code Minutes- PT 12 minute(s)  -CB         Timed Charges    38549 - PT Therapeutic Activity Minutes 12  -CB         Total Minutes    Timed Charges Total Minutes 12  -CB       Total Minutes 12  -CB            User Key  (r) = Recorded By, (t) = Taken By, (c) = Cosigned By    Initials Name Provider Type    CB Bety Benitez, PT Physical Therapist              Therapy Charges for Today     Code Description Service Date Service Provider Modifiers Qty    71903116329 HC PT THERAPEUTIC ACT EA 15 MIN 3/21/2023 Bety Benitez, PT GP 1          PT G-Codes  Outcome Measure Options: AM-PAC 6 Clicks Basic Mobility (PT)  AM-PAC 6 Clicks Score (PT): 7  AM-PAC 6 Clicks Score (OT): 8  Modified Yamilex Scale: 4 - Moderately severe disability.  Unable to walk without assistance, and unable to attend to own bodily needs without assistance.       Bety Benitez, JOSE ELIAS  3/21/2023

## 2023-03-21 NOTE — PLAN OF CARE
Problem: Fall Injury Risk  Goal: Absence of Fall and Fall-Related Injury  Outcome: Ongoing, Progressing     Problem: Skin Injury Risk Increased  Goal: Skin Health and Integrity  Outcome: Ongoing, Progressing     Problem: Adult Inpatient Plan of Care  Goal: Plan of Care Review  Outcome: Ongoing, Progressing  Goal: Patient-Specific Goal (Individualized)  Outcome: Ongoing, Progressing  Goal: Absence of Hospital-Acquired Illness or Injury  Outcome: Ongoing, Progressing  Goal: Optimal Comfort and Wellbeing  Outcome: Ongoing, Progressing  Goal: Readiness for Transition of Care  Outcome: Ongoing, Progressing     Problem: Adjustment to Illness (Stroke, Ischemic/Transient Ischemic Attack)  Goal: Optimal Coping  Outcome: Ongoing, Progressing     Problem: Sensorimotor Impairment (Stroke, Ischemic/Transient Ischemic Attack)  Goal: Improved Sensorimotor Function  Outcome: Ongoing, Progressing     Problem: Swallowing Impairment (Stroke, Ischemic/Transient Ischemic Attack)  Goal: Optimal Eating and Swallowing without Aspiration  Outcome: Ongoing, Progressing   Goal Outcome Evaluation:

## 2023-03-21 NOTE — THERAPY TREATMENT NOTE
Patient Name: Manuel Durant  : 1950    MRN: [id number]                              Today's Date: 3/21/2023       Admit Date: 3/12/2023    Visit Dx:     ICD-10-CM ICD-9-CM   1. Intracranial hemorrhage (HCC)  I62.9 432.9   2. Altered mental status, unspecified altered mental status type  R41.82 780.97     Patient Active Problem List   Diagnosis   • Intracranial hemorrhage (HCC)   • Hypertensive emergency   • Acute DVT (deep venous thrombosis) (HCC)   • HTN (hypertension)   • Hypokalemia     History reviewed. No pertinent past medical history.  History reviewed. No pertinent surgical history.   General Information     Row Name 23 1528          OT Time and Intention    Document Type therapy note (daily note)  -     Mode of Treatment co-treatment;occupational therapy;physical therapy  -     Row Name 23 1528          General Information    Patient Profile Reviewed yes  -MW     Existing Precautions/Restrictions fall  -     Row Name 23 1528          Cognition    Orientation Status (Cognition) oriented to;person;place  speaks some english  -     Row Name 23 1528          Safety Issues, Functional Mobility    Impairments Affecting Function (Mobility) balance;strength;endurance/activity tolerance;cognition;pain;muscle tone abnormal;postural/trunk control;coordination;grasp;motor control  -           User Key  (r) = Recorded By, (t) = Taken By, (c) = Cosigned By    Initials Name Provider Type    Karishma Williamson OT Occupational Therapist                 Mobility/ADL's     Row Name 23 1529          Bed Mobility    Bed Mobility supine-sit;sit-supine  -MW     Supine-Sit [place] (Bed Mobility) maximum assist (25% patient effort);2 person assist;verbal cues  -MW     Sit-Supine [place] (Bed Mobility) maximum assist (25% patient effort);2 person assist;verbal cues  -MW     Assistive Device (Bed Mobility) bed rails;draw sheet  -     Comment, (Bed Mobility) strong R  lateral lean on sitting, increased time and cues for posture at midline, R side neglect  -MW     Row Name 03/21/23 1529          Transfers    Comment, (Transfers) deferred due to poor sitting balance at times requiring max A x2  -MW     Row Name 03/21/23 1529          Activities of Daily Living    BADL Assessment/Intervention lower body dressing;toileting  -MW     Row Name 03/21/23 1529          Lower Body Dressing Assessment/Training    [place] Level (Lower Body Dressing) lower body dressing skills;dependent (less than 25% patient effort)  -MW     Position (Lower Body Dressing) supine  -MW     Row Name 03/21/23 1529          Toileting Assessment/Training    Comment, (Toileting) total A, not safe to attempt BSC transfer  -MW           User Key  (r) = Recorded By, (t) = Taken By, (c) = Cosigned By    Initials Name Provider Type    Karishma Williamson OT Occupational Therapist               Obj/Interventions     Row Name 03/21/23 1533          Vision Assessment/Intervention    Vision Assessment Comment R side inattention, visual scanning tasks to improve R side neglect however pt not crossing midline  -MW     Row Name 03/21/23 1533          Shoulder (Therapeutic Exercise)    Shoulder PROM (Therapeutic Exercise) flexion;extension;10 repetitions;right  -MW     Row Name 03/21/23 1533          Motor Skills    Therapeutic Exercise shoulder  -MW     Row Name 03/21/23 1533          Balance    Balance Assessment sitting static balance;sitting dynamic balance  -MW     Static Sitting Balance contact guard;maximum assist;moderate assist;minimal assist  -MW     Dynamic Sitting Balance contact guard;minimal assist;moderate assist;maximum assist  -MW     Position, Sitting Balance supported  -MW     Comment, Balance R lateral leaning heavy at times, varying levels of assist at EOB from CGA to max A x2  -MW           User Key  (r) = Recorded By, (t) = Taken By, (c) = Cosigned By    Initials Name Provider Type    JOSE R Spence  "MARK Schwarz Occupational Therapist               Goals/Plan    No documentation.                Clinical Impression     Row Name 03/21/23 1534          Pain Assessment    Pre/Posttreatment Pain Comment pt reports \"everywhere\" however did not rate pain  -     Row Name 03/21/23 1534          Plan of Care Review    Plan of Care Reviewed With patient  -MW     Progress no change  -     Outcome Evaluation Pt seen for OT/PT tx session, pt awake and agreeable. Understands/speaks some english. Pt required max A x2 for bed mobility, upon coming to EOB strong R lateral leaning, increased time to achieve midline, varying levels of assist at EOB for static and dynamic sitting from CGA to max A x1-2 with engagement in trunk control tasks, WB'ing on bilateral elbows to improve posture, functional dynamic reaching with pt having difficulty reaching across midline to R side due to R side inattention. Pt will continue to benefit from skilled OT to address deficits and progress toward goals and improved (I) with ADLs. RUE remains flaccid, no trace movements noted this date.  -     Row Name 03/21/23 1534          Therapy Plan Review/Discharge Plan (OT)    Anticipated Discharge Disposition (OT) inpatient rehabilitation facility;skilled nursing facility  -     Row Name 03/21/23 1534          Vital Signs    O2 Delivery Pre Treatment room air  -MW     O2 Delivery Intra Treatment room air  -MW     Pre Patient Position Supine  -MW     Intra Patient Position Sitting  -MW     Post Patient Position Supine  -MW     Row Name 03/21/23 1534          Positioning and Restraints    Pre-Treatment Position in bed  -MW     Post Treatment Position bed  -MW     In Bed notified nsg;fowlers;encouraged to call for assist;call light within reach;SCD pump applied;exit alarm on;side rails up x3;RUE elevated  -MW           User Key  (r) = Recorded By, (t) = Taken By, (c) = Cosigned By    Initials Name Provider Type    Karishma Williamson OT Occupational " Therapist               Outcome Measures     Row Name 03/21/23 1537          How much help from another is currently needed...    Putting on and taking off regular lower body clothing? 1  -MW     Bathing (including washing, rinsing, and drying) 1  -MW     Toileting (which includes using toilet bed pan or urinal) 1  -MW     Putting on and taking off regular upper body clothing 1  -MW     Taking care of personal grooming (such as brushing teeth) 2  -MW     Eating meals 2  -MW     AM-PAC 6 Clicks Score (OT) 8  -MW     Row Name 03/21/23 0718          How much help from another person do you currently need...    Turning from your back to your side while in flat bed without using bedrails? 2  -HW     Moving from lying on back to sitting on the side of a flat bed without bedrails? 2  -HW     Moving to and from a bed to a chair (including a wheelchair)? 1  -HW     Standing up from a chair using your arms (e.g., wheelchair, bedside chair)? 1  -HW     Climbing 3-5 steps with a railing? 1  -HW     To walk in hospital room? 1  -HW     AM-PAC 6 Clicks Score (PT) 8  -HW     Highest level of mobility 3 --> Sat at edge of bed  -HW     Row Name 03/21/23 1537          Functional Assessment    Outcome Measure Options AM-PAC 6 Clicks Daily Activity (OT)  -MW           User Key  (r) = Recorded By, (t) = Taken By, (c) = Cosigned By    Initials Name Provider Type    Karishma Williamson OT Occupational Therapist     Soo Donaldson, RN Registered Nurse                Occupational Therapy Education     Title: PT OT SLP Therapies (Done)     Topic: Occupational Therapy (Done)     Point: ADL training (Done)     Description:   Instruct learner(s) on proper safety adaptation and remediation techniques during self care or transfers.   Instruct in proper use of assistive devices.              Learning Progress Summary           Patient Acceptance, E,TB,H, VU by MS at 3/16/2023 1801    Acceptance, E, NR by MAGDALENE at 3/15/2023 0521   Family  Acceptance, E,TB,H, VU by MS at 3/16/2023 1801                   Point: Home exercise program (Done)     Description:   Instruct learner(s) on appropriate technique for monitoring, assisting and/or progressing therapeutic exercises/activities.              Learning Progress Summary           Patient Acceptance, E,TB,H, VU by MS at 3/16/2023 1801    Acceptance, E, NR by LM at 3/15/2023 0521   Family Acceptance, E,TB,H, VU by MS at 3/16/2023 1801                   Point: Precautions (Done)     Description:   Instruct learner(s) on prescribed precautions during self-care and functional transfers.              Learning Progress Summary           Patient Acceptance, E,TB,H, VU by MS at 3/16/2023 1801    Acceptance, E, NR by LM at 3/15/2023 0521   Family Acceptance, E,TB,H, VU by MS at 3/16/2023 1801                   Point: Body mechanics (Done)     Description:   Instruct learner(s) on proper positioning and spine alignment during self-care, functional mobility activities and/or exercises.              Learning Progress Summary           Patient Acceptance, E,TB,H, VU by MS at 3/16/2023 1801    Acceptance, E, NR by LM at 3/15/2023 0521   Family Acceptance, E,TB,H, VU by MS at 3/16/2023 1801                               User Key     Initials Effective Dates Name Provider Type Discipline    MS 06/16/21 -  Angel Luis Cr, RN Registered Nurse Nurse     10/13/22 -  Silke Grace, RN Registered Nurse Nurse              OT Recommendation and Plan     Plan of Care Review  Plan of Care Reviewed With: patient  Progress: no change  Outcome Evaluation: Pt seen for OT/PT tx session, pt awake and agreeable. Understands/speaks some english. Pt required max A x2 for bed mobility, upon coming to EOB strong R lateral leaning, increased time to achieve midline, varying levels of assist at EOB for static and dynamic sitting from CGA to max A x1-2 with engagement in trunk control tasks, WB'ing on bilateral elbows to improve posture,  functional dynamic reaching with pt having difficulty reaching across midline to R side due to R side inattention. Pt will continue to benefit from skilled OT to address deficits and progress toward goals and improved (I) with ADLs. ALIZA remains flaccid, no trace movements noted this date.     Time Calculation:    Time Calculation- OT     Row Name 03/21/23 1538             Time Calculation- OT    OT Start Time 1430  -MW      OT Stop Time 1445  -MW      OT Time Calculation (min) 15 min  -MW      Total Timed Code Minutes- OT 15 minute(s)  -MW      OT Received On 03/21/23  -MW      OT - Next Appointment 03/22/23  -MW         Timed Charges    35641 -  OT Neuromuscular Reeducation Minutes 15  -MW         Total Minutes    Timed Charges Total Minutes 15  -MW       Total Minutes 15  -MW            User Key  (r) = Recorded By, (t) = Taken By, (c) = Cosigned By    Initials Name Provider Type    Karishma Williamson OT Occupational Therapist              Therapy Charges for Today     Code Description Service Date Service Provider Modifiers Qty    [id number] HC OT NEUROMUSC RE EDUCATION EA 15 MIN 3/21/2023 Karishma Spence OT GO 1               Karishma Spence OT  3/21/2023

## 2023-03-21 NOTE — PLAN OF CARE
Goal Outcome Evaluation:  Plan of Care Reviewed With: patient        Progress: no change  Outcome Evaluation: Patient seen for PT/OT this afternoon. Pt was maxAx2 for bed mobility and R lateral lean in sitting with variable assist level [id number]. VC and tactile cues for midline posture and weightshifting onto R and LUE. Pt completed dynamic reaching and difficulty reaching across midline with R inattention. Pt will continue to benefit from skilled PT to address functional deficits. Plan rehab at [place].

## 2023-03-21 NOTE — PROGRESS NOTES
Name: Manuel Durant ADMIT: 3/12/2023   : 1950  PCP: Provider, No Known    MRN: [id number] LOS: 9 days   AGE/SEX: 73 y.o. male  ROOM: [facility]     Subjective   Subjective   Asleep, awakens to voice. Ipad  used but pt not verbal, only shakes head yes/no. Denies pain/HA. Unable to complete ROS    Review of Systems   Unable to perform ROS: Patient nonverbal        Objective   Objective   Vital Signs  Temp:  [98.2 °F (36.8 °C)-98.9 °F (37.2 °C)] 98.4 °F (36.9 °C)  Heart Rate:  [51-98] 51  Resp:  [16-18] 18  BP: (148-178)/(63-98) 151/79  SpO2:  [95 %-98 %] 98 %  on   ;   Device (Oxygen Therapy): room air  Body mass index is 24.62 kg/m².  Physical Exam  Vitals and nursing note reviewed.   Constitutional:       General: He is sleeping. He is not in acute distress.     Appearance: He is ill-appearing.   HENT:      Head: Normocephalic.      Mouth/Throat:      Mouth: Mucous membranes are moist.   Eyes:      Conjunctiva/sclera: Conjunctivae normal.   Cardiovascular:      Rate and Rhythm: Normal rate and regular rhythm.   Pulmonary:      Effort: Pulmonary effort is normal. No respiratory distress.      Breath sounds: No wheezing or rales.      Comments: On room air  Abdominal:      General: Bowel sounds are normal.      Palpations: Abdomen is soft.   Musculoskeletal:      Cervical back: Neck supple.      Right lower leg: No edema.      Left lower leg: No edema.   Skin:     General: Skin is warm and dry.   Neurological:      Mental Status: He is easily aroused.      Motor: Weakness present.   Psychiatric:         Behavior: Behavior is slowed.       Results Review     I reviewed the patient's new clinical results.  Results from last 7 days   Lab Units 23  0554 23  0550 23  0547 23  0559   WBC 10*3/mm3 7.06 7.71 7.69 8.58   HEMOGLOBIN g/dL 14.0 13.4 13.8 13.5   PLATELETS 10*3/mm3 234 232 208 213     Results from last 7 days   Lab Units 23  0554 23  0550 23  0547  03/18/23  0559   SODIUM mmol/L 140 142 140 139   POTASSIUM mmol/L 4.0 3.7 3.8 4.1   CHLORIDE mmol/L 105 108* 107 107   CO2 mmol/L 18.6* 21.0* 20.0* 20.4*   BUN mg/dL 20 24* 22 21   CREATININE mg/dL 0.71* 0.76 0.73* 0.80   GLUCOSE mg/dL 107* 94 85 86   EGFR mL/min/1.73 98.1 96.1 97.3 94.6       Results from last 7 days   Lab Units 03/21/23  0554 03/20/23  0550 03/19/23  0547 03/18/23  0559   CALCIUM mg/dL 8.5* 8.6 8.2* 8.8   MAGNESIUM mg/dL 2.1 2.2 2.1 2.0   PHOSPHORUS mg/dL 3.6 3.3 3.4 3.8       No results found for: HGBA1C, POCGLU    No radiology results for the last day  I have personally reviewed all medications:  Scheduled Medications  amLODIPine, 10 mg, Oral, QAM AC  baclofen, 5 mg, Oral, Q8H  carvedilol, 25 mg, Oral, BID With Meals  enoxaparin, 1 mg/kg, Subcutaneous, Q12H  senna-docusate sodium, 2 tablet, Oral, BID  sodium chloride, 10 mL, Intravenous, Q12H  [START ON 3/22/2023] valsartan, 120 mg, Oral, QAM AC    Infusions   Diet  Diet: Regular/House Diet; No Mixed Consistencies, Feeding Assistance - Nursing; Texture: Pureed (NDD 1); Fluid Consistency: Honey Thick    I have personally reviewed:  [x]  Laboratory   [x]  Microbiology   [x]  Radiology   [x]  EKG/Telemetry  [x]  Cardiology/Vascular   [x]  Pathology    [x]  Records       Assessment/Plan     Active Hospital Problems    Diagnosis  POA   • **Intracranial hemorrhage (HCC) [I62.9]  Yes   • Acute DVT (deep venous thrombosis) (HCC) [I82.409]  Unknown   • HTN (hypertension) [I10]  Unknown   • Hypokalemia [E87.6]  Unknown   • Hypertensive emergency [I16.1]  Unknown      Resolved Hospital Problems   No resolved problems to display.       73 y.o. male admitted with Intracranial hemorrhage (HCC).    Mr. Durant is a 73 year old male who initially presented to the hospital 3/12/23 for intracranial bleed. He was found unresponsive by friends and initially taken to [facility] where he was found to have poorly controlled BP and right sided hemiplegia. He was  transferred here for closer monitoring in the ICU and neurosurgical consultation. He was cleared to move out of the ICU on 3/16/23.     • Intracranial hemorrhage: CTH 3/17/23 stable left basal ganglia/internal capsule hemorrhage with left lateral ventricular hemorrhage. Started on Lovenox for DVT and repeat CTH 3/18 also stable. [facility]RADE followed. Goal BP < 150 systolic on most recent [facility] note.      • HTN emergency: On amlodipine 10 mg, valsartan 80 mg and Coreg 25 mg BID. Has PRN labetolol for SBP > 140. Increase ARB to 120 mg for better BP control; SBP ranging 140s-170s.  Renal function stable.      • Acute DVT: found 3/17/23 with acute RLE DVT in soleal. [facility]RADE okayed for Lovenox. Plans for eventual oral therapy but medicaid pending at this time.      • Hypokalemia: Resolved.     Somewhat drowsy on exam. Noted on baclofen tid for back pain. Will decrease to BID to see if helps with alertness. Continue OT/PT/ST w/plan for rehab when medicaid active    · Lovenox for DVT prophylaxis.  · Full code.  · Discussed with patient and nursing staff.  · Anticipate discharge to SNU facility once arrangements have been made.      SLICK Triana  [facility]  03/21/23  13:29 EDT

## 2023-03-21 NOTE — PLAN OF CARE
Goal Outcome Evaluation:           Progress: no change     Problem: Fall Injury Risk  Goal: Absence of Fall and Fall-Related Injury  Outcome: Ongoing, Progressing  Intervention: Promote Injury-Free Environment  Recent Flowsheet Documentation  Taken 3/21/2023 0600 by Glo Ni RN  Safety Promotion/Fall Prevention: safety round/check completed  Taken 3/21/2023 0400 by Glo Ni RN  Safety Promotion/Fall Prevention: safety round/check completed  Taken 3/21/2023 0200 by Glo Ni RN  Safety Promotion/Fall Prevention: safety round/check completed  Taken 3/21/2023 0000 by Glo Ni RN  Safety Promotion/Fall Prevention: safety round/check completed  Taken 3/20/2023 2200 by Glo Ni RN  Safety Promotion/Fall Prevention: safety round/check completed  Taken 3/20/2023 2000 by Glo Ni RN  Safety Promotion/Fall Prevention: safety round/check completed     Problem: Skin Injury Risk Increased  Goal: Skin Health and Integrity  Outcome: Ongoing, Progressing  Intervention: Optimize Skin Protection  Recent Flowsheet Documentation  Taken 3/21/2023 0600 by Glo Ni RN  Head of Bed (HOB) Positioning: HOB at 20-30 degrees  Taken 3/21/2023 0400 by Glo Ni RN  Head of Bed (HOB) Positioning: HOB at 30 degrees  Taken 3/21/2023 0200 by Glo Ni RN  Head of Bed (HOB) Positioning: HOB at 30 degrees  Taken 3/21/2023 0000 by Glo Ni RN  Head of Bed (HOB) Positioning: HOB lowered  Taken 3/20/2023 2207 by Glo Ni RN  Pressure Reduction Techniques: frequent weight shift encouraged  Pressure Reduction Devices: alternating pressure pump (ADD)  Skin Protection: adhesive use limited  Taken 3/20/2023 2200 by Glo Ni RN  Head of Bed (HOB) Positioning: HOB at 30 degrees  Taken 3/20/2023 2000 by Glo Ni RN  Head of Bed (\[facility]\"") Positioning: HOB at 20-30 degrees     Problem: Adult Inpatient Plan of Care  Goal: Plan of Care Review  Outcome: Ongoing, Progressing  Flowsheets (Taken  3/21/2023 0623)  Progress: no change  Goal: Patient-Specific Goal (Individualized)  Outcome: Ongoing, Progressing  Goal: Absence of Hospital-Acquired Illness or Injury  Outcome: Ongoing, Progressing  Intervention: Identify and Manage Fall Risk  Recent Flowsheet Documentation  Taken 3/21/2023 0600 by Glo Ni RN  Safety Promotion/Fall Prevention: safety round/check completed  Taken 3/21/2023 0400 by Glo Ni RN  Safety Promotion/Fall Prevention: safety round/check completed  Taken 3/21/2023 0200 by Glo Ni RN  Safety Promotion/Fall Prevention: safety round/check completed  Taken 3/21/2023 0000 by Glo Ni RN  Safety Promotion/Fall Prevention: safety round/check completed  Taken 3/20/2023 2200 by Glo Ni RN  Safety Promotion/Fall Prevention: safety round/check completed  Taken 3/20/2023 2000 by Glo Ni RN  Safety Promotion/Fall Prevention: safety round/check completed  Intervention: Prevent Skin Injury  Recent Flowsheet Documentation  Taken 3/21/2023 0600 by Glo Ni RN  Body Position: sitting up in bed  Taken 3/21/2023 0400 by Glo Ni RN  Body Position:   weight shifting   right  Taken 3/21/2023 0200 by Glo Ni RN  Body Position:   weight shifting   left  Taken 3/21/2023 0000 by Glo Ni RN  Body Position:   weight shifting   supine, legs elevated  Taken 3/20/2023 2207 by Glo Ni RN  Skin Protection: adhesive use limited  Taken 3/20/2023 2200 by Glo Ni RN  Body Position:   weight shifting   right  Taken 3/20/2023 2000 by Glo Ni RN  Body Position:   weight shifting   left  Intervention: Prevent and Manage VTE (Venous Thromboembolism) Risk  Recent Flowsheet Documentation  Taken 3/21/2023 0600 by Glo Ni RN  Activity Management: activity adjusted per tolerance  Taken 3/21/2023 0400 by Glo Ni RN  Activity Management: activity adjusted per tolerance  Taken 3/21/2023 0200 by Glo Ni RN  Activity Management:  activity adjusted per tolerance  Taken 3/21/2023 0000 by Glo Ni RN  Activity Management: activity adjusted per tolerance  Taken 3/20/2023 2207 by Glo Ni RN  VTE Prevention/Management: (see MAR)   left   sequential compression devices on  Taken 3/20/2023 2200 by lGo Ni RN  Activity Management: activity adjusted per tolerance  Taken 3/20/2023 2000 by Glo Ni RN  Activity Management: activity adjusted per tolerance  Goal: Optimal Comfort and Wellbeing  Outcome: Ongoing, Progressing  Intervention: Provide Person-Centered Care  Recent Flowsheet Documentation  Taken 3/20/2023 2207 by Glo Ni RN  Trust Relationship/Rapport: care explained  Goal: Readiness for Transition of Care  Outcome: Ongoing, Progressing  Intervention: Mutually Develop Transition Plan  Recent Flowsheet Documentation  Taken 3/21/2023 0302 by Glo Ni RN  Equipment Currently Used at Home: none     Problem: Adjustment to Illness (Stroke, Ischemic/Transient Ischemic Attack)  Goal: Optimal Coping  Outcome: Ongoing, Progressing  Intervention: Support Psychosocial Response to Stroke  Recent Flowsheet Documentation  Taken 3/20/2023 2207 by Glo Ni RN  Supportive Measures: active listening utilized     Problem: Bowel Elimination Impaired (Stroke, Ischemic/Transient Ischemic Attack)  Goal: Effective Bowel Elimination  Outcome: Ongoing, Progressing     Problem: Cerebral Tissue Perfusion (Stroke, Ischemic/Transient Ischemic Attack)  Goal: Optimal Cerebral Tissue Perfusion  Outcome: Ongoing, Progressing  Intervention: Protect and Optimize Cerebral Perfusion  Recent Flowsheet Documentation  Taken 3/20/2023 2207 by Glo Ni RN  Cerebral Perfusion Promotion: blood pressure monitored     Problem: Cognitive Impairment (Stroke, Ischemic/Transient Ischemic Attack)  Goal: Optimal Cognitive Function  Outcome: Ongoing, Progressing     Problem: Communication Impairment (Stroke, Ischemic/Transient Ischemic Attack)  Goal:  Improved Communication Skills  Outcome: Ongoing, Progressing  Intervention: Optimize Communication Skills  Recent Flowsheet Documentation  Taken 3/20/2023 2207 by Glo Ni RN  Communication Enhancement Strategies:  utilized     Problem: Functional Ability Impaired (Stroke, Ischemic/Transient Ischemic Attack)  Goal: Optimal Functional Ability  Outcome: Ongoing, Progressing  Intervention: Optimize Functional Ability  Recent Flowsheet Documentation  Taken 3/21/2023 0600 by Glo Ni RN  Activity Management: activity adjusted per tolerance  Taken 3/21/2023 0400 by Glo Ni RN  Activity Management: activity adjusted per tolerance  Taken 3/21/2023 0200 by Glo Ni RN  Activity Management: activity adjusted per tolerance  Taken 3/21/2023 0000 by Glo Ni RN  Activity Management: activity adjusted per tolerance  Taken 3/20/2023 2200 by Glo Ni RN  Activity Management: activity adjusted per tolerance  Taken 3/20/2023 2000 by Glo Ni RN  Activity Management: activity adjusted per tolerance     Problem: Respiratory Compromise (Stroke, Ischemic/Transient Ischemic Attack)  Goal: Effective Oxygenation and Ventilation  Outcome: Ongoing, Progressing  Intervention: Optimize Oxygenation and Ventilation  Recent Flowsheet Documentation  Taken 3/21/2023 0600 by Glo Ni RN  Head of Bed (HOB) Positioning: HOB at 20-30 degrees  Taken 3/21/2023 0400 by Glo Ni RN  Head of Bed (HOB) Positioning: HOB at 30 degrees  Taken 3/21/2023 0200 by Glo Ni RN  Head of Bed (HOB) Positioning: HOB at 30 degrees  Taken 3/21/2023 0000 by Glo Ni RN  Head of Bed (HOB) Positioning: HOB lowered  Taken 3/20/2023 2200 by Glo Ni RN  Head of Bed (HOB) Positioning: HOB at 30 degrees  Taken 3/20/2023 2000 by Glo Ni RN  Head of Bed (HOB) Positioning: HOB at 20-30 degrees     Problem: Sensorimotor Impairment (Stroke, Ischemic/Transient Ischemic Attack)  Goal: Improved  Sensorimotor Function  Outcome: Ongoing, Progressing  Intervention: Optimize Range of Motion, Motor Control and Function  Recent Flowsheet Documentation  Taken 3/21/2023 0600 by Glo Ni RN  Positioning/Transfer Devices:   pillows   in use  Taken 3/21/2023 0400 by Glo Ni RN  Positioning/Transfer Devices:   pillows   in use  Taken 3/21/2023 0200 by Glo Ni RN  Positioning/Transfer Devices:   pillows   in use  Taken 3/21/2023 0000 by Glo Ni RN  Positioning/Transfer Devices:   pillows   in use  Taken 3/20/2023 2200 by Glo Ni RN  Positioning/Transfer Devices:   pillows   in use  Taken 3/20/2023 2000 by Glo Ni RN  Positioning/Transfer Devices:   pillows   in use  Intervention: Optimize Sensory and Perceptual Ability  Recent Flowsheet Documentation  Taken 3/20/2023 2207 by Glo Ni RN  Pressure Reduction Techniques: frequent weight shift encouraged  Pressure Reduction Devices: alternating pressure pump (ADD)     Problem: Swallowing Impairment (Stroke, Ischemic/Transient Ischemic Attack)  Goal: Optimal Eating and Swallowing without Aspiration  Outcome: Ongoing, Progressing  Intervention: Optimize Eating and Swallowing  Recent Flowsheet Documentation  Taken 3/20/2023 2207 by Glo Ni RN  Feeding/Eating Techniques: feeding assistance provided     Problem: Urinary Elimination Impaired (Stroke, Ischemic/Transient Ischemic Attack)  Goal: Effective Urinary Elimination  Outcome: Ongoing, Progressing     Problem: Adjustment to Illness (Stroke, Hemorrhagic)  Goal: Optimal Coping  Outcome: Ongoing, Progressing  Intervention: Support Psychosocial Response to Stroke  Recent Flowsheet Documentation  Taken 3/20/2023 2207 by Glo Ni RN  Supportive Measures: active listening utilized     Problem: Bowel Elimination Impaired (Stroke, Hemorrhagic)  Goal: Effective Bowel Elimination  Outcome: Ongoing, Progressing     Problem: Cerebral Tissue Perfusion (Stroke, Hemorrhagic)  Goal:  Optimal Cerebral Tissue Perfusion  Outcome: Ongoing, Progressing  Intervention: Protect and Optimize Cerebral Perfusion  Recent Flowsheet Documentation  Taken 3/20/2023 2207 by Glo Ni RN  Cerebral Perfusion Promotion: blood pressure monitored     Problem: Cognitive Impairment (Stroke, Hemorrhagic)  Goal: Optimal Cognitive Function  Outcome: Ongoing, Progressing     Problem: Communication Impairment (Stroke, Hemorrhagic)  Goal: Effective Communication Skills  Outcome: Ongoing, Progressing  Intervention: Optimize Communication Skills  Recent Flowsheet Documentation  Taken 3/20/2023 2207 by Glo Ni RN  Communication Enhancement Strategies:  utilized     Problem: Functional Ability Impaired (Stroke, Hemorrhagic)  Goal: Optimal Functional Ability  Outcome: Ongoing, Progressing  Intervention: Optimize Functional Ability  Recent Flowsheet Documentation  Taken 3/21/2023 0600 by Glo Ni RN  Activity Management: activity adjusted per tolerance  Taken 3/21/2023 0400 by Glo Ni RN  Activity Management: activity adjusted per tolerance  Taken 3/21/2023 0200 by Glo Ni RN  Activity Management: activity adjusted per tolerance  Taken 3/21/2023 0000 by Glo Ni RN  Activity Management: activity adjusted per tolerance  Taken 3/20/2023 2200 by Glo Ni RN  Activity Management: activity adjusted per tolerance  Taken 3/20/2023 2000 by Glo Ni RN  Activity Management: activity adjusted per tolerance     Problem: Pain (Stroke, Hemorrhagic)  Goal: Acceptable Pain Control  Outcome: Ongoing, Progressing     Problem: Respiratory Compromise (Stroke, Hemorrhagic)  Goal: Effective Oxygenation and Ventilation  Outcome: Ongoing, Progressing  Intervention: Optimize Oxygenation and Ventilation  Recent Flowsheet Documentation  Taken 3/21/2023 0600 by Glo Ni RN  Head of Bed (HOB) Positioning: HOB at 20-30 degrees  Taken 3/21/2023 0400 by Glo Ni RN  Head of Bed (HOB)  Positioning: HOB at 30 degrees  Taken 3/21/2023 0200 by Glo Ni RN  Head of Bed (HOB) Positioning: HOB at 30 degrees  Taken 3/21/2023 0000 by Glo Ni RN  Head of Bed (HOB) Positioning: HOB lowered  Taken 3/20/2023 2200 by Glo Ni RN  Head of Bed (HOB) Positioning: HOB at 30 degrees  Taken 3/20/2023 2000 by Glo Ni RN  Head of Bed (HOB) Positioning: HOB at 20-30 degrees     Problem: Sensorimotor Impairment (Stroke, Hemorrhagic)  Goal: Improved Sensorimotor Function  Outcome: Ongoing, Progressing  Intervention: Optimize Range of Motion, Motor Control and Function  Recent Flowsheet Documentation  Taken 3/21/2023 0600 by Glo Ni RN  Positioning/Transfer Devices:   pillows   in use  Taken 3/21/2023 0400 by Glo Ni RN  Positioning/Transfer Devices:   pillows   in use  Taken 3/21/2023 0200 by Glo Ni RN  Positioning/Transfer Devices:   pillows   in use  Taken 3/21/2023 0000 by Glo Ni RN  Positioning/Transfer Devices:   pillows   in use  Taken 3/20/2023 2200 by Glo Ni RN  Positioning/Transfer Devices:   pillows   in use  Taken 3/20/2023 2000 by Glo Ni RN  Positioning/Transfer Devices:   pillows   in use  Intervention: Optimize Sensory and Perceptual Ability  Recent Flowsheet Documentation  Taken 3/20/2023 2207 by Glo Ni RN  Pressure Reduction Techniques: frequent weight shift encouraged  Pressure Reduction Devices: alternating pressure pump (ADD)     Problem: Swallowing Impairment (Stroke, Hemorrhagic)  Goal: Optimal Eating and Swallowing Without Aspiration  Outcome: Ongoing, Progressing  Intervention: Optimize Eating and Swallowing  Recent Flowsheet Documentation  Taken 3/20/2023 2207 by Glo Ni RN  Feeding/Eating Techniques: feeding assistance provided     Problem: Urinary Elimination Impaired (Stroke, Hemorrhagic)  Goal: Effective Urinary Elimination  Outcome: Ongoing, Progressing

## 2023-03-22 LAB
ANION GAP SERPL CALCULATED.3IONS-SCNC: 9 MMOL/L (ref 5–15)
BUN SERPL-MCNC: 20 MG/DL (ref 8–23)
BUN/CREAT SERPL: 27.4 (ref 7–25)
CALCIUM SPEC-SCNC: 8.4 MG/DL (ref 8.6–10.5)
CHLORIDE SERPL-SCNC: 110 MMOL/L (ref 98–107)
CO2 SERPL-SCNC: 23 MMOL/L (ref 22–29)
CREAT SERPL-MCNC: 0.73 MG/DL (ref 0.76–1.27)
DEPRECATED RDW RBC AUTO: 41.7 FL (ref 37–54)
EGFRCR SERPLBLD CKD-EPI 2021: 97.3 ML/MIN/1.73
ERYTHROCYTE [DISTWIDTH] IN BLOOD BY AUTOMATED COUNT: 12.8 % (ref 12.3–15.4)
GLUCOSE SERPL-MCNC: 98 MG/DL (ref 65–99)
HCT VFR BLD AUTO: 38.5 % (ref 37.5–51)
HGB BLD-MCNC: 13.5 G/DL (ref 13–17.7)
MAGNESIUM SERPL-MCNC: 2.3 MG/DL (ref 1.6–2.4)
MCH RBC QN AUTO: 31.4 PG (ref 26.6–33)
MCHC RBC AUTO-ENTMCNC: 35.1 G/DL (ref 31.5–35.7)
MCV RBC AUTO: 89.5 FL (ref 79–97)
PHOSPHATE SERPL-MCNC: 3.4 MG/DL (ref 2.5–4.5)
PLATELET # BLD AUTO: 248 10*3/MM3 (ref 140–450)
PMV BLD AUTO: 10.6 FL (ref 6–12)
POTASSIUM SERPL-SCNC: 3.8 MMOL/L (ref 3.5–5.2)
RBC # BLD AUTO: 4.3 10*6/MM3 (ref 4.14–5.8)
SODIUM SERPL-SCNC: 142 MMOL/L (ref 136–145)
WBC NRBC COR # BLD: 7.02 10*3/MM3 (ref 3.4–10.8)

## 2023-03-22 PROCEDURE — 97112 NEUROMUSCULAR REEDUCATION: CPT

## 2023-03-22 PROCEDURE — 97110 THERAPEUTIC EXERCISES: CPT

## 2023-03-22 PROCEDURE — 85027 COMPLETE CBC AUTOMATED: CPT | Performed by: STUDENT IN AN ORGANIZED HEALTH CARE EDUCATION/TRAINING PROGRAM

## 2023-03-22 PROCEDURE — 80048 BASIC METABOLIC PNL TOTAL CA: CPT | Performed by: STUDENT IN AN ORGANIZED HEALTH CARE EDUCATION/TRAINING PROGRAM

## 2023-03-22 PROCEDURE — 84100 ASSAY OF PHOSPHORUS: CPT | Performed by: STUDENT IN AN ORGANIZED HEALTH CARE EDUCATION/TRAINING PROGRAM

## 2023-03-22 PROCEDURE — 25010000002 ENOXAPARIN PER 10 MG: Performed by: STUDENT IN AN ORGANIZED HEALTH CARE EDUCATION/TRAINING PROGRAM

## 2023-03-22 PROCEDURE — 83735 ASSAY OF MAGNESIUM: CPT | Performed by: STUDENT IN AN ORGANIZED HEALTH CARE EDUCATION/TRAINING PROGRAM

## 2023-03-22 PROCEDURE — 92526 ORAL FUNCTION THERAPY: CPT

## 2023-03-22 RX ADMIN — ENOXAPARIN SODIUM 80 MG: 100 INJECTION SUBCUTANEOUS at 08:07

## 2023-03-22 RX ADMIN — VALSARTAN 120 MG: 80 TABLET, FILM COATED ORAL at 08:07

## 2023-03-22 RX ADMIN — CARVEDILOL 25 MG: 12.5 TABLET, FILM COATED ORAL at 08:07

## 2023-03-22 RX ADMIN — BACLOFEN 5 MG: 10 TABLET ORAL at 08:07

## 2023-03-22 RX ADMIN — ENOXAPARIN SODIUM 80 MG: 100 INJECTION SUBCUTANEOUS at 20:22

## 2023-03-22 RX ADMIN — BACLOFEN 5 MG: 10 TABLET ORAL at 20:21

## 2023-03-22 RX ADMIN — DOCUSATE SODIUM 50MG AND SENNOSIDES 8.6MG 2 TABLET: 8.6; 5 TABLET, FILM COATED ORAL at 20:21

## 2023-03-22 RX ADMIN — AMLODIPINE BESYLATE 10 MG: 10 TABLET ORAL at 08:07

## 2023-03-22 RX ADMIN — CARVEDILOL 25 MG: 12.5 TABLET, FILM COATED ORAL at 17:04

## 2023-03-22 RX ADMIN — Medication 10 ML: at 20:22

## 2023-03-22 RX ADMIN — Medication 10 ML: at 09:44

## 2023-03-22 NOTE — PLAN OF CARE
Goal Outcome Evaluation:  Plan of Care Reviewed With: patient, other (see comments) (RN)           Outcome Evaluation: Clinical swallow re-evaluation completed at bedside.  iPad used for assessment. Anterior loss occurred as pt with impulsive self-feeding skills with HTL by cup due to R labial droop. Pt with no overt s/s of aspiration with NTL by cup/straw, HTL by cup/straw, puree, or soft solids. Wet voice demonstrated with ice chip trial. Unable to upgrade pt to nectar due to silent aspiration observed during VFSS on 3/15/23. Patient with adequate munching mastication prior to swallow initiation assessed by palpation. No oral residue appreciated. Recommend HTL and soft solid/ground diet. Sitting upright, slow rate, small bites/sips, assist with feed. Meds whole or crushed in puree. Speech to follow.    Patient was not wearing a face mask during this therapy encounter. Therapist used appropriate personal protective equipment including mask, eye protection and gloves.  Mask used was standard procedure mask. Appropriate PPE was worn during the entire therapy session. Hand hygiene was completed before and after therapy session. Patient is not in enhanced droplet precautions.

## 2023-03-22 NOTE — PROGRESS NOTES
Name: Manuel Durant ADMIT: 3/12/2023   : 1950  PCP: Provider, No Known    MRN: [id number] LOS: 10 days   AGE/SEX: 73 y.o. male  ROOM: [facility]     Subjective   Subjective   Resting in bed. No family present. Spoke with him using  ipad which is a little difficult at times as he is soft spoken. He denies any obvious complaints including pain, nausea, vomiting. States he is eating. Nursing reports no changes. He is more interactive when visitors present.      Objective   Objective   Vital Signs  Temp:  [97.9 °F (36.6 °C)-98.7 °F (37.1 °C)] 98.3 °F (36.8 °C)  Heart Rate:  [58-71] 62  Resp:  [16-18] 18  BP: (121-163)/(67-88) 121/67  SpO2:  [95 %-98 %] 97 %  on   ;   Device (Oxygen Therapy): room air  Body mass index is 25.66 kg/m².     Physical Exam  Vitals and nursing note reviewed.   Constitutional:       General: He is sleeping. He is not in acute distress.     Appearance: He is ill-appearing.   HENT:      Head: Normocephalic.      Mouth/Throat:      Mouth: Mucous membranes are moist.   Eyes:      Conjunctiva/sclera: Conjunctivae normal.   Cardiovascular:      Rate and Rhythm: Normal rate and regular rhythm.   Pulmonary:      Effort: Pulmonary effort is normal. No respiratory distress.      Breath sounds: No wheezing or rales.      Comments: On room air  Abdominal:      General: Bowel sounds are normal.      Palpations: Abdomen is soft.   Musculoskeletal:      Cervical back: Neck supple.      Right lower leg: No edema.      Left lower leg: No edema.   Skin:     General: Skin is warm and dry.   Neurological:      Mental Status: He is easily aroused.      Motor: Weakness present.   Psychiatric:         Behavior: Behavior and mood is normal.     Results Review:       I reviewed the patient's new clinical results.  Results from last 7 days   Lab Units 23  0704 23  0554 23  0550 23  0547   WBC 10*3/mm3 7.02 7.06 7.71 7.69   HEMOGLOBIN g/dL 13.5 14.0 13.4 13.8   PLATELETS  10*3/mm3 248 234 232 208     Results from last 7 days   Lab Units 03/22/23  0704 03/21/23  0554 03/20/23  0550 03/19/23  0547   SODIUM mmol/L 142 140 142 140   POTASSIUM mmol/L 3.8 4.0 3.7 3.8   CHLORIDE mmol/L 110* 105 108* 107   CO2 mmol/L 23.0 18.6* 21.0* 20.0*   BUN mg/dL 20 20 24* 22   CREATININE mg/dL 0.73* 0.71* 0.76 0.73*   GLUCOSE mg/dL 98 107* 94 85   Estimated Creatinine Clearance: 94.7 mL/min (A) (by C-G formula based on SCr of 0.73 mg/dL (L)).    Results from last 7 days   Lab Units 03/22/23  0704 03/21/23  0554 03/20/23  0550 03/19/23  0547   CALCIUM mg/dL 8.4* 8.5* 8.6 8.2*   MAGNESIUM mg/dL 2.3 2.1 2.2 2.1   PHOSPHORUS mg/dL 3.4 3.6 3.3 3.4       No results found for: HGBA1C, POCGLU    amLODIPine, 10 mg, Oral, QAM AC  baclofen, 5 mg, Oral, Q12H  carvedilol, 25 mg, Oral, BID With Meals  enoxaparin, 1 mg/kg, Subcutaneous, Q12H  senna-docusate sodium, 2 tablet, Oral, BID  sodium chloride, 10 mL, Intravenous, Q12H  valsartan, 120 mg, Oral, QAM AC       Diet: Regular/House Diet; No Mixed Consistencies; Texture: Soft to Chew (NDD 3); Soft to Chew: Ground Meat; Fluid Consistency: Honey Thick       Assessment/Plan     Active Hospital Problems    Diagnosis  POA   • **Intracranial hemorrhage (HCC) [I62.9]  Yes   • Acute DVT (deep venous thrombosis) (HCC) [I82.409]  Unknown   • HTN (hypertension) [I10]  Unknown   • Hypokalemia [E87.6]  Unknown   • Hypertensive emergency [I16.1]  Unknown      Resolved Hospital Problems   No resolved problems to display.     Mr. Durant is a 73 year old male who initially presented to the hospital 3/12/23 for intracranial bleed. He was found unresponsive by friends and initially taken to [facility] where he was found to have poorly controlled BP and right sided hemiplegia. He was transferred here for closer monitoring in the ICU and neurosurgical consultation. He was cleared to move out of the ICU on 3/16/23.     • Intracranial hemorrhage: CTH 3/17/23 stable left basal  ganglia/internal capsule hemorrhage with left lateral ventricular hemorrhage. Started on Lovenox for DVT and repeat CTH 3/18 also stable. ANDRADE following. Goal BP < 150 systolic on most recent ANDRADE note. Repeat CTH ordered. Eventual outpatient MRI once hemorrhage cleared.     • HTN emergency: On amlodipine 10 mg, valsartan 120 mg and Coreg 25 mg BID. BP stable.     • Acute DVT: found 3/17/23 with acute RLE DVT in soleal. ANDRADE okayed for Lovenox. Plans for eventual oral therapy but medicaid pending at this time.      • Hypokalemia: Resolved.     Discussed with patient (via ) and nursing staff.     Medically stable once insurance/placement issues addressed.     • Lovenox for DVT prophylaxis.  • Full code.  • Anticipate discharge to SNU facility once arrangements have been made.       SLICK Arroyo  [facility]  03/22/23  16:16 EDT

## 2023-03-22 NOTE — PLAN OF CARE
Problem: Fall Injury Risk  Goal: Absence of Fall and Fall-Related Injury  Outcome: Ongoing, Progressing     Problem: Skin Injury Risk Increased  Goal: Skin Health and Integrity  Outcome: Ongoing, Progressing     Problem: Adult Inpatient Plan of Care  Goal: Plan of Care Review  Outcome: Ongoing, Progressing  Goal: Patient-Specific Goal (Individualized)  Outcome: Ongoing, Progressing  Goal: Absence of Hospital-Acquired Illness or Injury  Outcome: Ongoing, Progressing  Goal: Optimal Comfort and Wellbeing  Outcome: Ongoing, Progressing  Goal: Readiness for Transition of Care  Outcome: Ongoing, Progressing     Problem: Adjustment to Illness (Stroke, Ischemic/Transient Ischemic Attack)  Goal: Optimal Coping  Outcome: Ongoing, Progressing     Problem: Respiratory Compromise (Stroke, Ischemic/Transient Ischemic Attack)  Goal: Effective Oxygenation and Ventilation  Outcome: Ongoing, Progressing     Problem: Sensorimotor Impairment (Stroke, Ischemic/Transient Ischemic Attack)  Goal: Improved Sensorimotor Function  Outcome: Ongoing, Progressing     Problem: Swallowing Impairment (Stroke, Ischemic/Transient Ischemic Attack)  Goal: Optimal Eating and Swallowing without Aspiration  Outcome: Ongoing, Progressing     Problem: Urinary Elimination Impaired (Stroke, Ischemic/Transient Ischemic Attack)  Goal: Effective Urinary Elimination  Outcome: Ongoing, Progressing   Goal Outcome Evaluation:

## 2023-03-22 NOTE — THERAPY TREATMENT NOTE
Patient Name: Manuel Durant  : 1950    MRN: [id number]                              Today's Date: 3/22/2023       Admit Date: 3/12/2023    Visit Dx:     ICD-10-CM ICD-9-CM   1. Intracranial hemorrhage (HCC)  I62.9 432.9   2. Altered mental status, unspecified altered mental status type  R41.82 780.97     Patient Active Problem List   Diagnosis   • Intracranial hemorrhage (HCC)   • Hypertensive emergency   • Acute DVT (deep venous thrombosis) (HCC)   • HTN (hypertension)   • Hypokalemia     History reviewed. No pertinent past medical history.  History reviewed. No pertinent surgical history.   General Information     Row Name 23 1444          OT Time and Intention    Document Type therapy note (daily note)  -     Mode of Treatment co-treatment;occupational therapy;physical therapy  -     Row Name 23 1444          General Information    Patient Profile Reviewed yes  -MW     Existing Precautions/Restrictions fall  -     Row Name 23 1444          Cognition    Orientation Status (Cognition) oriented to;person  visual cues  -     Row Name 23 1444          Safety Issues, Functional Mobility    Impairments Affecting Function (Mobility) balance;strength;endurance/activity tolerance;cognition;pain;muscle tone abnormal;postural/trunk control;coordination;grasp;motor control  -           User Key  (r) = Recorded By, (t) = Taken By, (c) = Cosigned By    Initials Name Provider Type    Karishma Williamson OT Occupational Therapist                 Mobility/ADL's     Row Name 23 1444          Bed Mobility    Bed Mobility supine-sit;sit-supine  -MW     Supine-Sit [place] (Bed Mobility) maximum assist (25% patient effort);2 person assist;verbal cues  -     Sit-Supine [place] (Bed Mobility) maximum assist (25% patient effort);2 person assist;verbal cues  -MW     Assistive Device (Bed Mobility) bed rails;draw sheet;head of bed elevated  -     Comment, (Bed Mobility)  strong R lateral leaning when coming to EOB, increased positioning to midline with LUE using rail at foot of bed to support self, CGA  -MW     Row Name 03/22/23 1444          Sit-Stand Transfer    Sit-Stand [place] (Transfers) not tested  -MW     Row Name 03/22/23 1444          Activities of Daily Living    BADL Assessment/Intervention lower body dressing;toileting  -MW     Row Name 03/22/23 1444          Lower Body Dressing Assessment/Training    [place] Level (Lower Body Dressing) lower body dressing skills;dependent (less than 25% patient effort)  -MW     Row Name 03/22/23 1444          Toileting Assessment/Training    Comment, (Toileting) total A, not safe for BSC transfer, nurse aide aware of BM noted on return to supine  -           User Key  (r) = Recorded By, (t) = Taken By, (c) = Cosigned By    Initials Name Provider Type     Karishma Spence OT Occupational Therapist               Obj/Interventions     Row Name 03/22/23 1445          Shoulder (Therapeutic Exercise)    Shoulder (Therapeutic Exercise) PROM (passive range of motion)  -     Shoulder PROM (Therapeutic Exercise) right;flexion;extension;horizontal aBduction/aDduction;10 repetitions;sitting  -MW     Row Name 03/22/23 1445          Elbow/Forearm (Therapeutic Exercise)    Elbow/Forearm (Therapeutic Exercise) PROM (passive range of motion)  -     Elbow/Forearm AROM (Therapeutic Exercise) right;flexion;extension;15 repititions;sitting  -MW     Row Name 03/22/23 1445          Wrist (Therapeutic Exercise)    Wrist (Therapeutic Exercise) PROM (passive range of motion)  -     Wrist PROM (Therapeutic Exercise) right;flexion;extension;10 repetitions  -MW     Row Name 03/22/23 1445          Hand (Therapeutic Exercise)    Hand (Therapeutic Exercise) PROM (passive range of motion)  -MW     Row Name 03/22/23 1445          Motor Skills    Motor Skills functional endurance;motor control/coordination interventions  -     Functional  Endurance fair -, quick to fatigue  -     Motor Control/Coordination Interventions motor pattern re-training;neuro-muscular re-education;weight-bearing activities;therapeutic exercise/ROM;functional task specific training  -MW     Therapeutic Exercise shoulder;elbow/forearm;wrist  -     Row Name 03/22/23 1445          Balance    Balance Assessment sitting static balance;sitting dynamic balance  -MW     Static Sitting Balance contact guard;minimal assist  -MW     Dynamic Sitting Balance minimal assist  -MW     Position, Sitting Balance sitting edge of bed  -     Row Name 03/22/23 1445          Neuromuscular Re-education    Positioning (Neuromuscular Re-education) sitting  -MW     Equipment Used (Neuromuscular Re-education) x10 total reps onto bilateral elbow for increased trunk control, min A for return to midline  -MW     Comment (Neuromuscular Re-education) WB'ing to RUE while at EOB  -MW           User Key  (r) = Recorded By, (t) = Taken By, (c) = Cosigned By    Initials Name Provider Type    Karishma Williamson OT Occupational Therapist               Goals/Plan    No documentation.                Clinical Impression     Row Name 03/22/23 1450          Pain Assessment    Pretreatment Pain Rating 0/10 - no pain  -MW     Posttreatment Pain Rating 0/10 - no pain  -MW     Row Name 03/22/23 1450          Pain Scale: FACES Pre/Post-Treatment    Pain: FACES Scale, Pretreatment 0-->no hurt  -MW     Posttreatment Pain Rating 0-->no hurt  -MW     Row Name 03/22/23 1453          Plan of Care Review    Plan of Care Reviewed With patient  -MW     Progress improving  -     Outcome Evaluation Pt seen for OT/PT tx session in PM, pt awake and agreeable. Alert to self, visual cues to assist with language barrier. Pt required max A x2 for bed mobility, once at EOB, strong R lateral leaning noted requiring max A for sit balance, improved to CGA/min A x1 with LUE support at bedrail at foot of bed. Pt able to sit EOB ~10 mins  focusing on trunk control, WB'ing onto bilateral elbow, PROM to RUE and functional reaching. Pt will continue to benefit from skilled OT to address noted functional deficits, standing deferred due to BM present, nurse aide notified at end of session. Plans SNF.  -MW     Row Name 03/22/23 1450          Vital Signs    O2 Delivery Pre Treatment room air  -MW     Pre Patient Position Supine  -MW     Intra Patient Position Sitting  -MW     Post Patient Position Supine  -MW     Row Name 03/22/23 1450          Positioning and Restraints    Pre-Treatment Position in bed  -MW     Post Treatment Position bed  -MW     In Bed notified nsg;fowlers;call light within reach;encouraged to call for assist;with nsg  -MW           User Key  (r) = Recorded By, (t) = Taken By, (c) = Cosigned By    Initials Name Provider Type    Karishma Williamson, OT Occupational Therapist               Outcome Measures     Row Name 03/22/23 1452          How much help from another is currently needed...    Putting on and taking off regular lower body clothing? 1  -MW     Bathing (including washing, rinsing, and drying) 1  -MW     Toileting (which includes using toilet bed pan or urinal) 1  -MW     Putting on and taking off regular upper body clothing 1  -MW     Taking care of personal grooming (such as brushing teeth) 2  -MW     Eating meals 2  -MW     AM-PAC 6 Clicks Score (OT) 8  -MW     Row Name 03/22/23 0700          How much help from another person do you currently need...    Turning from your back to your side while in flat bed without using bedrails? 2  -HW     Moving from lying on back to sitting on the side of a flat bed without bedrails? 1  -HW     Moving to and from a bed to a chair (including a wheelchair)? 1  -HW     Standing up from a chair using your arms (e.g., wheelchair, bedside chair)? 1  -HW     Climbing 3-5 steps with a railing? 1  -HW     To walk in hospital room? 1  -HW     AM-PAC 6 Clicks Score (PT) 7  -HW     Highest level of  mobility 2 --> Bed activities/dependent transfer  -     Row Name 03/22/23 1452          Functional Assessment    Outcome Measure Options AM-PAC 6 Clicks Daily Activity (OT)  -           User Key  (r) = Recorded By, (t) = Taken By, (c) = Cosigned By    Initials Name Provider Type    Karishma Williamson OT Occupational Therapist    Soo Tamayo, RN Registered Nurse                Occupational Therapy Education     Title: PT OT SLP Therapies (Done)     Topic: Occupational Therapy (Done)     Point: ADL training (Done)     Description:   Instruct learner(s) on proper safety adaptation and remediation techniques during self care or transfers.   Instruct in proper use of assistive devices.              Learning Progress Summary           Patient Acceptance, E,TB,H, VU by MS at 3/16/2023 1801    Acceptance, E, NR by LM at 3/15/2023 0521   Family Acceptance, E,TB,H, VU by MS at 3/16/2023 1801                   Point: Home exercise program (Done)     Description:   Instruct learner(s) on appropriate technique for monitoring, assisting and/or progressing therapeutic exercises/activities.              Learning Progress Summary           Patient Acceptance, E,TB,H, VU by MS at 3/16/2023 1801    Acceptance, E, NR by LM at 3/15/2023 0521   Family Acceptance, E,TB,H, VU by MS at 3/16/2023 1801                   Point: Precautions (Done)     Description:   Instruct learner(s) on prescribed precautions during self-care and functional transfers.              Learning Progress Summary           Patient Acceptance, E,TB,H, VU by MS at 3/16/2023 1801    Acceptance, E, NR by LM at 3/15/2023 0521   Family Acceptance, E,TB,H, VU by MS at 3/16/2023 1801                   Point: Body mechanics (Done)     Description:   Instruct learner(s) on proper positioning and spine alignment during self-care, functional mobility activities and/or exercises.              Learning Progress Summary           Patient Acceptance, E,TB,H, VU by MS at  3/16/2023 1801    Acceptance, E, NR by LM at 3/15/2023 0521   Family Acceptance, E,TB,H, VU by MS at 3/16/2023 1801                               User Key     Initials Effective Dates Name Provider Type Discipline    MS 06/16/21 -  Angel Luis Cr, RN Registered Nurse Nurse     10/13/22 -  Silke Grace, RN Registered Nurse Nurse              OT Recommendation and Plan     Plan of Care Review  Plan of Care Reviewed With: patient  Progress: improving  Outcome Evaluation: Pt seen for OT/PT tx session in PM, pt awake and agreeable. Alert to self, visual cues to assist with language barrier. Pt required max A x2 for bed mobility, once at EOB, strong R lateral leaning noted requiring max A for sit balance, improved to CGA/min A x1 with LUE support at bedrail at foot of bed. Pt able to sit EOB ~10 mins focusing on trunk control, WB'ing onto bilateral elbow, PROM to RUE and functional reaching. Pt will continue to benefit from skilled OT to address noted functional deficits, standing deferred due to BM present, nurse aide notified at end of session. Plans SNF.     Time Calculation:    Time Calculation- OT     Row Name 03/22/23 1452             Time Calculation- OT    OT Start Time 1417  -MW      OT Stop Time 1435  -MW      OT Time Calculation (min) 18 min  -MW      Total Timed Code Minutes- OT 18 minute(s)  -MW      OT Received On 03/22/23  -MW      OT - Next Appointment 03/23/23  -MW         Timed Charges    14124 -  OT Neuromuscular Reeducation Minutes 18  -MW         Total Minutes    Timed Charges Total Minutes 18  -MW       Total Minutes 18  -MW            User Key  (r) = Recorded By, (t) = Taken By, (c) = Cosigned By    Initials Name Provider Type     Karishma Spence OT Occupational Therapist              Therapy Charges for Today     Code Description Service Date Service Provider Modifiers Qty    [id number]  OT NEUROMUSC RE EDUCATION EA 15 MIN 3/21/2023 Karishma Spence OT GO 1    [id number]  OT  NEUROMUSC RE EDUCATION EA 15 MIN 3/22/2023 Karishma Spence, OT GO 1               Karishma Spence, MARK  3/22/2023

## 2023-03-22 NOTE — THERAPY TREATMENT NOTE
Acute Care - Speech Language Pathology   Swallow Re-Evaluation [facility]     Patient Name: Manuel Durant  : 1950  MRN: [id number]  Today's Date: 3/22/2023               Admit Date: 3/12/2023    Visit Dx:     ICD-10-CM ICD-9-CM   1. Intracranial hemorrhage (HCC)  I62.9 432.9   2. Altered mental status, unspecified altered mental status type  R41.82 780.97     Patient Active Problem List   Diagnosis   • Intracranial hemorrhage (HCC)   • Hypertensive emergency   • Acute DVT (deep venous thrombosis) (HCC)   • HTN (hypertension)   • Hypokalemia     History reviewed. No pertinent past medical history.  History reviewed. No pertinent surgical history.    SLP Recommendation and Plan  SLP Swallowing Diagnosis: moderate, oral dysphagia, pharyngeal dysphagia (23)  SLP Diet Recommendation: honey thick liquids, mechanical ground textures (23)  Recommended Precautions and Strategies: upright posture during/after eating, small bites of food and sips of liquid, assist with feeding (23)  SLP Rec. for Method of Medication Administration: meds whole, meds crushed, with puree, as tolerated (23)     Monitor for Signs of Aspiration: yes, notify SLP if any concerns (23)  Recommended Diagnostics: reassess via clinical swallow evaluation, SLE/Cog/Motor Speech Evaluation (23)  Swallow Criteria for Skilled Therapeutic Interventions Met: demonstrates skilled criteria (23)  Anticipated Discharge Disposition (SLP): anticipate therapy at next level of care (23)  Rehab Potential/Prognosis, Swallowing: adequate, monitor progress closely (23)  Therapy Frequency (Swallow): PRN (23)  Predicted Duration Therapy Intervention (Days): until discharge (23)                                        Plan of Care Reviewed With: patient, other (see comments) (RN)  Outcome Evaluation: Clinical swallow re-evaluation completed at  bedside.  iPad used for assessment. Anterior loss occurred as pt with impulsive self-feeding skills with HTL by cup due to R labial droop. Pt with no overt s/s of aspiration with NTL by cup/straw, HTL by cup/straw, puree, or soft solids. Wet voice demonstrated with ice chip trial. Unable to upgrade pt to nectar due to silent aspiration observed during VFSS on 3/15/23. Patient with adequate munching mastication prior to swallow initiation assessed by palpation. No oral residue appreciated. Recommend HTL and soft solid/ground diet. Sitting upright, slow rate, small bites/sips, assist with feed. Meds whole or crushed in puree. Speech to follow.      SWALLOW EVALUATION (last 72 hours)     SLP Adult Swallow Evaluation     Row Name 03/22/23 1100                   Rehab Evaluation    Document Type therapy note (daily note)  -CR        Subjective Information no complaints  -CR        Patient Observations alert;cooperative  -CR        Patient Effort good  -CR        Comment  iPad used  -CR        Symptoms Noted During/After Treatment none  -CR           General Information    Patient Profile Reviewed yes  -CR        Current Method of Nutrition honey-thick liquids;pureed  -CR        Precautions/Limitations, Vision difficult to assess  -CR        Precautions/Limitations, Hearing difficult to assess  -CR        Prior Level of Function-Communication English as a second language;receptive language impairment;expressive language impairment  -CR        Prior Level of Function-Swallowing safe, efficient swallowing in all situations  -CR        Plans/Goals Discussed with patient;agreed upon  -CR        Barriers to Rehab medically complex  -CR           Pain    Additional Documentation Pain Scale: FACES Pre/Post-Treatment (Group)  -CR           Pain Scale: FACES Pre/Post-Treatment    Pain: FACES Scale, Pretreatment 0-->no hurt  -CR        Posttreatment Pain Rating 0-->no hurt  -CR           Oral Motor Structure  and Function    Dentition Assessment natural, present and adequate  -CR        Secretion Management WNL/WFL  -CR        Mucosal Quality moist, healthy  -CR           Oral Musculature and Cranial Nerve Assessment    Oral Motor General Assessment oral labial or buccal impairment  -CR        Oral Labial or Buccal Impairment, Detail, Cranial Nerve VII (Facial): right labial droop  -CR           General Eating/Swallowing Observations    Respiratory Support Currently in Use room air  -CR           SLP Evaluation Clinical Impression    SLP Swallowing Diagnosis moderate;oral dysphagia;pharyngeal dysphagia  -CR        Functional Impact risk of aspiration/pneumonia  -CR        Rehab Potential/Prognosis, Swallowing adequate, monitor progress closely  -CR        Swallow Criteria for Skilled Therapeutic Interventions Met demonstrates skilled criteria  -CR           Recommendations    Therapy Frequency (Swallow) PRN  -CR        Predicted Duration Therapy Intervention (Days) until discharge  -CR        SLP Diet Recommendation honey thick liquids;mechanical ground textures  -CR        Recommended Diagnostics reassess via clinical swallow evaluation;SLE/Cog/Motor Speech Evaluation  -CR        Recommended Precautions and Strategies upright posture during/after eating;small bites of food and sips of liquid;assist with feeding  -CR        Oral Care Recommendations Oral Care BID/PRN  -CR        SLP Rec. for Method of Medication Administration meds whole;meds crushed;with puree;as tolerated  -CR        Monitor for Signs of Aspiration yes;notify SLP if any concerns  -CR        Anticipated Discharge Disposition (SLP) anticipate therapy at next level of care  -CR           Swallow Goals (SLP)    Swallow LTGs Patient will demonstrate functional swallow for  -CR           (LTG) Patient will demonstrate functional swallow for    Diet Texture (Demonstrate functional swallow) pureed textures  -CR        Liquid viscosity (Demonstrate functional  swallow) honey/  moderately thick liquids  -CR        [place] (Demonstrate functional swallow) independently (over 90% accuracy)  -CR        Comment (Demonstrate functional swallow) Clinical swallow re-evaluation completed at bedside.  iPad used for assessment. Anterior loss occurred as pt with impulsive self-feeding skills with HTL by cup due to R labial droop. Pt with no overt s/s of aspiration with NTL by cup/straw, HTL by cup/straw, puree, or soft solids. Wet voice demonstrated with ice chip trial. Unable to upgrade pt to nectar due to silent aspiration observed during VFSS on 3/15/23. Patient with adequate munching mastication prior to swallow initiation assessed by palpation. No oral residue appreciated. Recommend HTL and soft solid/ground diet. Sitting upright, slow rate, small bites/sips, assist with feed. Meds whole or crushed in puree. Speech to follow.  -CR              User Key  (r) = Recorded By, (t) = Taken By, (c) = Cosigned By    Initials Name Effective Dates    Maryam Gould CF-SLP 11/10/22 -                 EDUCATION  The patient has been educated in the following areas:   Dysphagia (Swallowing Impairment).        SLP GOALS     Row Name 03/22/23 1100             (LTG) Patient will demonstrate functional swallow for    Diet Texture (Demonstrate functional swallow) pureed textures  -CR      Liquid viscosity (Demonstrate functional swallow) honey/  moderately thick liquids  -CR      [place] (Demonstrate functional swallow) independently (over 90% accuracy)  -CR      Comment (Demonstrate functional swallow) Clinical swallow re-evaluation completed at bedside.  iPad used for assessment. Anterior loss occurred as pt with impulsive self-feeding skills with HTL by cup due to R labial droop. Pt with no overt s/s of aspiration with NTL by cup/straw, HTL by cup/straw, puree, or soft solids. Wet voice demonstrated with ice chip trial. Unable to upgrade pt to nectar due  to silent aspiration observed during VFSS on 3/15/23. Patient with adequate munching mastication prior to swallow initiation assessed by palpation. No oral residue appreciated. Recommend HTL and soft solid/ground diet. Sitting upright, slow rate, small bites/sips, assist with feed. Meds whole or crushed in puree. Speech to follow.  -CR            User Key  (r) = Recorded By, (t) = Taken By, (c) = Cosigned By    Initials Name Provider Type    Maryam Gould CF-SLP Speech and Language Pathologist                   Time Calculation:    Time Calculation- SLP     Row Name 03/22/23 1208             Time Calculation- SLP    SLP Start Time 1115  -CR      SLP Received On 03/22/23  -CR            User Key  (r) = Recorded By, (t) = Taken By, (c) = Cosigned By    Initials Name Provider Type    Maryam Gould CF-SLP Speech and Language Pathologist                Therapy Charges for Today     Code Description Service Date Service Provider Modifiers Qty    [id number]  ST TREATMENT SWALLOW 3 3/22/2023 Maryam Chandra CF-SLP GN 1               DAVID Whitlock  3/22/2023

## 2023-03-22 NOTE — PLAN OF CARE
Goal Outcome Evaluation:           Progress: improving     Problem: Fall Injury Risk  Goal: Absence of Fall and Fall-Related Injury  Outcome: Ongoing, Progressing  Intervention: Identify and Manage Contributors  Recent Flowsheet Documentation  Taken 3/21/2023 2247 by Glo Ni RN  Medication Review/Management: medications reviewed  Intervention: Promote Injury-Free Environment  Recent Flowsheet Documentation  Taken 3/22/2023 0613 by Glo Ni RN  Safety Promotion/Fall Prevention: safety round/check completed  Taken 3/22/2023 0442 by Glo Ni RN  Safety Promotion/Fall Prevention: safety round/check completed  Taken 3/22/2023 0200 by Glo Ni RN  Safety Promotion/Fall Prevention: safety round/check completed  Taken 3/22/2023 0000 by Glo Ni RN  Safety Promotion/Fall Prevention: safety round/check completed  Taken 3/21/2023 2247 by Glo Ni RN  Safety Promotion/Fall Prevention: safety round/check completed  Taken 3/21/2023 2200 by Glo Ni RN  Safety Promotion/Fall Prevention: safety round/check completed  Taken 3/21/2023 2000 by Glo Ni RN  Safety Promotion/Fall Prevention: safety round/check completed     Problem: Skin Injury Risk Increased  Goal: Skin Health and Integrity  Outcome: Ongoing, Progressing  Intervention: Optimize Skin Protection  Recent Flowsheet Documentation  Taken 3/22/2023 0613 by Glo Ni RN  Head of Bed (HOB) Positioning: HOB at 45 degrees  Taken 3/22/2023 0442 by Glo Ni RN  Head of Bed (HOB) Positioning: HOB at 30-45 degrees  Taken 3/22/2023 0200 by Glo Ni RN  Head of Bed (HOB) Positioning: HOB at 20-30 degrees  Taken 3/22/2023 0000 by Glo Ni RN  Head of Bed (HOB) Positioning: HOB at 30 degrees  Taken 3/21/2023 2247 by Glo Ni RN  Pressure Reduction Techniques: frequent weight shift encouraged  Pressure Reduction Devices: alternating pressure pump (ADD)  Skin Protection: adhesive use limited  Taken 3/21/2023 2200  by Glo Ni RN  Head of Bed (HOB) Positioning: HOB at 20 degrees  Taken 3/21/2023 2000 by Glo Ni RN  Head of Bed (HOB) Positioning: HOB at 30 degrees     Problem: Adult Inpatient Plan of Care  Goal: Plan of Care Review  Outcome: Ongoing, Progressing  Flowsheets (Taken 3/22/2023 0627)  Progress: improving  Goal: Patient-Specific Goal (Individualized)  Outcome: Ongoing, Progressing  Goal: Absence of Hospital-Acquired Illness or Injury  Outcome: Ongoing, Progressing  Intervention: Identify and Manage Fall Risk  Recent Flowsheet Documentation  Taken 3/22/2023 0613 by Glo Ni RN  Safety Promotion/Fall Prevention: safety round/check completed  Taken 3/22/2023 0442 by Glo Ni RN  Safety Promotion/Fall Prevention: safety round/check completed  Taken 3/22/2023 0200 by Glo Ni RN  Safety Promotion/Fall Prevention: safety round/check completed  Taken 3/22/2023 0000 by Glo Ni RN  Safety Promotion/Fall Prevention: safety round/check completed  Taken 3/21/2023 2247 by Glo Ni RN  Safety Promotion/Fall Prevention: safety round/check completed  Taken 3/21/2023 2200 by Glo Ni RN  Safety Promotion/Fall Prevention: safety round/check completed  Taken 3/21/2023 2000 by Glo Ni RN  Safety Promotion/Fall Prevention: safety round/check completed  Intervention: Prevent Skin Injury  Recent Flowsheet Documentation  Taken 3/22/2023 0613 by Glo Ni RN  Body Position: sitting up in bed  Taken 3/22/2023 0442 by Glo Ni RN  Body Position:   turned   right  Taken 3/22/2023 0200 by Glo Ni RN  Body Position: sitting up in bed  Taken 3/22/2023 0000 by Glo Ni RN  Body Position:   tilted   right  Taken 3/21/2023 2247 by Glo Ni RN  Skin Protection: adhesive use limited  Taken 3/21/2023 2200 by Glo Ni RN  Body Position: sitting up in bed  Taken 3/21/2023 2000 by Glo Ni RN  Body Position:   turned   left  Intervention: Prevent and Manage  VTE (Venous Thromboembolism) Risk  Recent Flowsheet Documentation  Taken 3/22/2023 0613 by Glo Ni RN  Activity Management: activity adjusted per tolerance  Taken 3/22/2023 0442 by Glo Ni RN  Activity Management: activity adjusted per tolerance  Taken 3/22/2023 0200 by Glo Ni RN  Activity Management: activity adjusted per tolerance  Taken 3/22/2023 0000 by Glo Ni RN  Activity Management: activity adjusted per tolerance  Taken 3/21/2023 2247 by Glo Ni RN  Activity Management: activity adjusted per tolerance  VTE Prevention/Management:   sequential compression devices on   left  Taken 3/21/2023 2200 by Gol Ni RN  Activity Management: activity adjusted per tolerance  Taken 3/21/2023 2015 by Glo Ni RN  VTE Prevention/Management:   bilateral   sequential compression devices on  Taken 3/21/2023 2000 by Glo Ni RN  Activity Management: activity adjusted per tolerance  Goal: Optimal Comfort and Wellbeing  Outcome: Ongoing, Progressing  Intervention: Provide Person-Centered Care  Recent Flowsheet Documentation  Taken 3/21/2023 2247 by Glo Ni RN  Trust Relationship/Rapport: care explained  Goal: Readiness for Transition of Care  Outcome: Ongoing, Progressing     Problem: Adjustment to Illness (Stroke, Ischemic/Transient Ischemic Attack)  Goal: Optimal Coping  Outcome: Ongoing, Progressing  Intervention: Support Psychosocial Response to Stroke  Recent Flowsheet Documentation  Taken 3/21/2023 2247 by Glo Ni RN  Supportive Measures: active listening utilized  Family/Support System Care: support provided     Problem: Bowel Elimination Impaired (Stroke, Ischemic/Transient Ischemic Attack)  Goal: Effective Bowel Elimination  Outcome: Ongoing, Progressing     Problem: Cerebral Tissue Perfusion (Stroke, Ischemic/Transient Ischemic Attack)  Goal: Optimal Cerebral Tissue Perfusion  Outcome: Ongoing, Progressing     Problem: Cognitive Impairment (Stroke,  Ischemic/Transient Ischemic Attack)  Goal: Optimal Cognitive Function  Outcome: Ongoing, Progressing  Intervention: Optimize Cognitive Function  Recent Flowsheet Documentation  Taken 3/21/2023 2247 by Glo Ni RN  Reorientation Measures: clock in view     Problem: Communication Impairment (Stroke, Ischemic/Transient Ischemic Attack)  Goal: Improved Communication Skills  Outcome: Ongoing, Progressing  Intervention: Optimize Communication Skills  Recent Flowsheet Documentation  Taken 3/21/2023 2247 by Glo Ni RN  Communication Enhancement Strategies:  utilized     Problem: Functional Ability Impaired (Stroke, Ischemic/Transient Ischemic Attack)  Goal: Optimal Functional Ability  Outcome: Ongoing, Progressing  Intervention: Optimize Functional Ability  Recent Flowsheet Documentation  Taken 3/22/2023 0613 by Glo Ni RN  Activity Management: activity adjusted per tolerance  Taken 3/22/2023 0442 by Glo Ni RN  Activity Management: activity adjusted per tolerance  Taken 3/22/2023 0200 by Glo Ni RN  Activity Management: activity adjusted per tolerance  Taken 3/22/2023 0000 by Glo Ni RN  Activity Management: activity adjusted per tolerance  Taken 3/21/2023 2247 by Glo Ni RN  Activity Management: activity adjusted per tolerance  Taken 3/21/2023 2200 by Glo Ni RN  Activity Management: activity adjusted per tolerance  Taken 3/21/2023 2000 by Glo iN RN  Activity Management: activity adjusted per tolerance     Problem: Respiratory Compromise (Stroke, Ischemic/Transient Ischemic Attack)  Goal: Effective Oxygenation and Ventilation  Outcome: Ongoing, Progressing  Intervention: Optimize Oxygenation and Ventilation  Recent Flowsheet Documentation  Taken 3/22/2023 0613 by Glo Ni RN  Head of Bed (HOB) Positioning: HOB at 45 degrees  Taken 3/22/2023 0442 by Glo Ni RN  Head of Bed (HOB) Positioning: HOB at 30-45 degrees  Taken 3/22/2023 0200 by  Glo Ni RN  Head of Bed (HOB) Positioning: HOB at 20-30 degrees  Taken 3/22/2023 0000 by Glo Ni RN  Head of Bed (HOB) Positioning: HOB at 30 degrees  Taken 3/21/2023 2200 by Glo Ni RN  Head of Bed (HOB) Positioning: HOB at 20 degrees  Taken 3/21/2023 2000 by Glo Ni RN  Head of Bed (HOB) Positioning: HOB at 30 degrees     Problem: Sensorimotor Impairment (Stroke, Ischemic/Transient Ischemic Attack)  Goal: Improved Sensorimotor Function  Outcome: Ongoing, Progressing  Intervention: Optimize Range of Motion, Motor Control and Function  Recent Flowsheet Documentation  Taken 3/22/2023 0613 by Glo Ni RN  Positioning/Transfer Devices:   pillows   in use  Taken 3/22/2023 0442 by Glo iN RN  Positioning/Transfer Devices:   pillows   in use  Taken 3/22/2023 0200 by Glo Ni RN  Positioning/Transfer Devices:   pillows   in use  Taken 3/22/2023 0000 by Glo Ni RN  Positioning/Transfer Devices:   pillows   in use  Taken 3/21/2023 2200 by Glo Ni RN  Positioning/Transfer Devices:   pillows   in use  Taken 3/21/2023 2000 by Glo Ni RN  Positioning/Transfer Devices:   pillows   in use  Intervention: Optimize Sensory and Perceptual Ability  Recent Flowsheet Documentation  Taken 3/21/2023 2247 by Glo Ni RN  Pressure Reduction Techniques: frequent weight shift encouraged  Pressure Reduction Devices: alternating pressure pump (ADD)     Problem: Swallowing Impairment (Stroke, Ischemic/Transient Ischemic Attack)  Goal: Optimal Eating and Swallowing without Aspiration  Outcome: Ongoing, Progressing  Intervention: Optimize Eating and Swallowing  Recent Flowsheet Documentation  Taken 3/21/2023 2247 by Glo Ni RN  Feeding/Eating Techniques: feeding assistance provided     Problem: Urinary Elimination Impaired (Stroke, Ischemic/Transient Ischemic Attack)  Goal: Effective Urinary Elimination  Outcome: Ongoing, Progressing     Problem: Adjustment to Illness  (Stroke, Hemorrhagic)  Goal: Optimal Coping  Outcome: Ongoing, Progressing  Intervention: Support Psychosocial Response to Stroke  Recent Flowsheet Documentation  Taken 3/21/2023 2247 by Glo Ni RN  Supportive Measures: active listening utilized  Family/Support System Care: support provided     Problem: Bowel Elimination Impaired (Stroke, Hemorrhagic)  Goal: Effective Bowel Elimination  Outcome: Ongoing, Progressing     Problem: Cerebral Tissue Perfusion (Stroke, Hemorrhagic)  Goal: Optimal Cerebral Tissue Perfusion  Outcome: Ongoing, Progressing     Problem: Cognitive Impairment (Stroke, Hemorrhagic)  Goal: Optimal Cognitive Function  Outcome: Ongoing, Progressing  Intervention: Optimize Cognitive Function  Recent Flowsheet Documentation  Taken 3/21/2023 2247 by Glo Ni RN  Reorientation Measures: clock in view     Problem: Communication Impairment (Stroke, Hemorrhagic)  Goal: Effective Communication Skills  Outcome: Ongoing, Progressing  Intervention: Optimize Communication Skills  Recent Flowsheet Documentation  Taken 3/21/2023 2247 by Glo Ni RN  Communication Enhancement Strategies:  utilized     Problem: Functional Ability Impaired (Stroke, Hemorrhagic)  Goal: Optimal Functional Ability  Outcome: Ongoing, Progressing  Intervention: Optimize Functional Ability  Recent Flowsheet Documentation  Taken 3/22/2023 0613 by Glo Ni RN  Activity Management: activity adjusted per tolerance  Taken 3/22/2023 0442 by Glo Ni RN  Activity Management: activity adjusted per tolerance  Taken 3/22/2023 0200 by Glo Ni RN  Activity Management: activity adjusted per tolerance  Taken 3/22/2023 0000 by Glo Ni RN  Activity Management: activity adjusted per tolerance  Taken 3/21/2023 2247 by Glo Ni RN  Activity Management: activity adjusted per tolerance  Taken 3/21/2023 2200 by Glo Ni RN  Activity Management: activity adjusted per tolerance  Taken 3/21/2023  2000 by Swing, Ariene, RN  Activity Management: activity adjusted per tolerance     Problem: Pain (Stroke, Hemorrhagic)  Goal: Acceptable Pain Control  Outcome: Ongoing, Progressing     Problem: Respiratory Compromise (Stroke, Hemorrhagic)  Goal: Effective Oxygenation and Ventilation  Outcome: Ongoing, Progressing  Intervention: Optimize Oxygenation and Ventilation  Recent Flowsheet Documentation  Taken 3/22/2023 0613 by Glo Ni RN  Head of Bed (HOB) Positioning: HOB at 45 degrees  Taken 3/22/2023 0442 by Glo Ni RN  Head of Bed (HOB) Positioning: HOB at 30-45 degrees  Taken 3/22/2023 0200 by Glo Ni RN  Head of Bed (HOB) Positioning: HOB at 20-30 degrees  Taken 3/22/2023 0000 by Glo Ni RN  Head of Bed (HOB) Positioning: HOB at 30 degrees  Taken 3/21/2023 2200 by Glo Ni RN  Head of Bed (HOB) Positioning: HOB at 20 degrees  Taken 3/21/2023 2000 by Glo Ni RN  Head of Bed (HOB) Positioning: HOB at 30 degrees     Problem: Sensorimotor Impairment (Stroke, Hemorrhagic)  Goal: Improved Sensorimotor Function  Outcome: Ongoing, Progressing  Intervention: Optimize Range of Motion, Motor Control and Function  Recent Flowsheet Documentation  Taken 3/22/2023 0613 by Glo Ni RN  Positioning/Transfer Devices:   pillows   in use  Taken 3/22/2023 0442 by Glo Ni RN  Positioning/Transfer Devices:   pillows   in use  Taken 3/22/2023 0200 by Glo Ni RN  Positioning/Transfer Devices:   pillows   in use  Taken 3/22/2023 0000 by Glo Ni RN  Positioning/Transfer Devices:   pillows   in use  Taken 3/21/2023 2200 by Glo Ni RN  Positioning/Transfer Devices:   pillows   in use  Taken 3/21/2023 2000 by Glo Ni RN  Positioning/Transfer Devices:   pillows   in use  Intervention: Optimize Sensory and Perceptual Ability  Recent Flowsheet Documentation  Taken 3/21/2023 2247 by Glo Ni RN  Pressure Reduction Techniques: frequent weight shift  encouraged  Pressure Reduction Devices: alternating pressure pump (ADD)     Problem: Swallowing Impairment (Stroke, Hemorrhagic)  Goal: Optimal Eating and Swallowing Without Aspiration  Outcome: Ongoing, Progressing  Intervention: Optimize Eating and Swallowing  Recent Flowsheet Documentation  Taken 3/21/2023 2247 by Glo Ni RN  Feeding/Eating Techniques: feeding assistance provided     Problem: Urinary Elimination Impaired (Stroke, Hemorrhagic)  Goal: Effective Urinary Elimination  Outcome: Ongoing, Progressing

## 2023-03-22 NOTE — PLAN OF CARE
Goal Outcome Evaluation:  Plan of Care Reviewed With: patient        Progress: improving  Outcome Evaluation: Pt seen for OT/PT tx session in PM, pt awake and agreeable. Alert to self, visual cues to assist with language barrier. Pt required max A x2 for bed mobility, once at EOB, strong R lateral leaning noted requiring max A for sit balance, improved to CGA/min A x1 with LUE support at bedrail at foot of bed. Pt able to sit EOB ~10 mins focusing on trunk control, WB'ing onto bilateral elbow, PROM to RUE and functional reaching. Pt will continue to benefit from skilled OT to address noted functional deficits, standing deferred due to BM present, nurse aide notified at end of session. Plans SNF.

## 2023-03-22 NOTE — PLAN OF CARE
"Goal Outcome Evaluation:  Plan of Care Reviewed With: patient           Outcome Evaluation: Pt agreed to PT/OT session, was fatigued at end of session, so feel co-tx still appropriate ; pt shay sitting EOB working on UE WB, LE exer, and elboe push-ups, pt motivated , but fatigued quickly, when asked to perf task, pt wants to do well, but \"lunges\" and becomes unsteady req assist of 2 to maintain safety, will need SNU/[facility] at DC pending progress    Patient was not wearing a face mask during this therapy encounter. Therapist used appropriate personal protective equipment including eye protection, mask, and gloves.  Mask used was standard procedure mask. Appropriate PPE was worn during the entire therapy session. Hand hygiene was completed before and after therapy session. Patient is not in enhanced droplet precautions.    "

## 2023-03-22 NOTE — THERAPY TREATMENT NOTE
Patient Name: Manuel Durant  : 1950    MRN: [id number]                              Today's Date: 3/22/2023       Admit Date: 3/12/2023    Visit Dx:     ICD-10-CM ICD-9-CM   1. Intracranial hemorrhage (HCC)  I62.9 432.9   2. Altered mental status, unspecified altered mental status type  R41.82 780.97     Patient Active Problem List   Diagnosis   • Intracranial hemorrhage (HCC)   • Hypertensive emergency   • Acute DVT (deep venous thrombosis) (HCC)   • HTN (hypertension)   • Hypokalemia     History reviewed. No pertinent past medical history.  History reviewed. No pertinent surgical history.   General Information     Row Name 23 1535          Physical Therapy Time and Intention    Document Type therapy note (daily note)  -     Mode of Treatment co-treatment;occupational therapy;physical therapy  -     Row Name 23 1535          General Information    Patient Profile Reviewed yes  -ZACH     Existing Precautions/Restrictions fall  -     Row Name 23 1535          Cognition    Orientation Status (Cognition) oriented to;person  -     Row Name 23 1535          Safety Issues, Functional Mobility    Impairments Affecting Function (Mobility) balance;strength;endurance/activity tolerance;cognition;pain;muscle tone abnormal;postural/trunk control;coordination;grasp;motor control  -           User Key  (r) = Recorded By, (t) = Taken By, (c) = Cosigned By    Initials Name Provider Type    Tiffanie Chase PTA Physical Therapist Assistant               Mobility     Row Name 23 1536          Bed Mobility    Bed Mobility supine-sit;sit-supine  -     Supine-Sit [place] (Bed Mobility) maximum assist (25% patient effort);2 person assist;verbal cues  -ZACH     Sit-Supine [place] (Bed Mobility) maximum assist (25% patient effort);2 person assist;verbal cues  -ZACH     Assistive Device (Bed Mobility) bed rails;draw sheet;head of bed elevated  -ZACH     Comment, (Bed Mobility) heavy  "R lean , when asked to come to EOB , pt \"lunged\" and was very unsteady, sat EOB , worked on LE exer x10, cues to slow pace , pt did improve balance once he could reach rail at bottom of bed(on footboard)  -     Row Name 03/22/23 1536          Sit-Stand Transfer    Sit-Stand [place] (Transfers) not tested  -     Comment, (Sit-Stand Transfer) unable to attempt due to R wkness , poor sitting balance when not cued or assisted  -           User Key  (r) = Recorded By, (t) = Taken By, (c) = Cosigned By    Initials Name Provider Type    Tiffanie Chase PTA Physical Therapist Assistant               Obj/Interventions     Row Name 03/22/23 1539          Motor Skills    Therapeutic Exercise --  worked on elbow push-ups x5, bilat  -     Row Name 03/22/23 1539          Balance    Static Sitting Balance contact guard;minimal assist;verbal cues  -     Position, Sitting Balance sitting edge of bed  -           User Key  (r) = Recorded By, (t) = Taken By, (c) = Cosigned By    Initials Name Provider Type    Tiffanie Chase PTA Physical Therapist Assistant               Goals/Plan    No documentation.                Clinical Impression     Row Name 03/22/23 1540          Pain    Pretreatment Pain Rating 0/10 - no pain  used google translate x2 during session to assess pain  -[facility] Name 03/22/23 1540          Plan of Care Review    Plan of Care Reviewed With patient  -     Outcome Evaluation Pt agreed to PT/OT session, was fatigued at end of session, so feel co-tx still appropriate ; pt shay sitting EOB working on UE WB, LE exer, and elboe push-ups, pt motivated , but fatigued quickly, when asked to perf task, pt wants to do well, but \"lunges\" and becomes unsteady req assist of 2 to maintain safety, will need SNU/RHB at [place] pending progress  -     Row Name 03/22/23 1540          Therapy Assessment/Plan (PT)    Rehab Potential (PT) fair, will monitor progress closely  -     Criteria for Skilled " Interventions Met (PT) yes  -     Row Name 03/22/23 1540          Vital Signs    O2 Delivery Pre Treatment room air  -     Row Name 03/22/23 1540          Positioning and Restraints    Pre-Treatment Position in bed  -     Post Treatment Position bed  -     In Bed fowlers;with nsg  pt had BM and nsg called to assist in clean-up  -           User Key  (r) = Recorded By, (t) = Taken By, (c) = Cosigned By    Initials Name Provider Type    Tiffanie Chase PTA Physical Therapist Assistant               Outcome Measures     Row Name 03/22/23 1544 03/22/23 0700       How much help from another person do you currently need...    Turning from your back to your side while in flat bed without using bedrails? 2  - 2  -HW    Moving from lying on back to sitting on the side of a flat bed without bedrails? 2  - 1  -HW    Moving to and from a bed to a chair (including a wheelchair)? 1  - 1  -HW    Standing up from a chair using your arms (e.g., wheelchair, bedside chair)? 1  - 1  -HW    Climbing 3-5 steps with a railing? 1  - 1  -HW    To walk in hospital room? 1  - 1  -HW    AM-PAC 6 Clicks Score (PT) 8  - 7  -    Highest level of mobility 3 --> Sat at edge of bed  - 2 --> Bed activities/dependent transfer  -    Row Name 03/22/23 1452          Functional Assessment    Outcome Measure Options AM-PAC 6 Clicks Daily Activity (OT)  -           User Key  (r) = Recorded By, (t) = Taken By, (c) = Cosigned By    Initials Name Provider Type    Tiffanie Chase PTA Physical Therapist Assistant     Karishma Spence OT Occupational Therapist    Soo Tamayo, RN Registered Nurse                             Physical Therapy Education     Title: PT OT SLP Therapies (In Progress)     Topic: Physical Therapy (In Progress)     Point: Mobility training (In Progress)     Learning Progress Summary           Patient Acceptance, E,D, NR by ZACH at 3/22/2023 1545    Acceptance, E,TB, VU,NR by TITA at 3/21/2023  1544    Acceptance, E,TB,H, VU by MS at 3/16/2023 1801    Acceptance, E,D, DU,NR by MO at 3/16/2023 1451    Acceptance, E, DU,NR by MO at 3/15/2023 1200    Acceptance, E, NR by LM at 3/15/2023 0521    Acceptance, E,D, DU,NR by MO at 3/14/2023 1458   Family Acceptance, E,TB,H, VU by MS at 3/16/2023 1801                   Point: Home exercise program (In Progress)     Learning Progress Summary           Patient Acceptance, E,D, NR by JM at 3/22/2023 1545    Acceptance, E,TB, VU,NR by CB at 3/21/2023 1544    Acceptance, E,TB,H, VU by MS at 3/16/2023 1801    Acceptance, E,D, DU,NR by MO at 3/16/2023 1451    Acceptance, E, NR by LM at 3/15/2023 0521    Acceptance, E,D, DU,NR by MO at 3/14/2023 1458   Family Acceptance, E,TB,H, VU by MS at 3/16/2023 1801                   Point: Body mechanics (In Progress)     Learning Progress Summary           Patient Acceptance, E,D, NR by JM at 3/22/2023 1545    Acceptance, E,TB, VU,NR by CB at 3/21/2023 1544    Acceptance, E,TB,H, VU by MS at 3/16/2023 1801    Acceptance, E,D, DU,NR by MO at 3/16/2023 1451    Acceptance, E, DU,NR by MO at 3/15/2023 1200    Acceptance, E, NR by LM at 3/15/2023 0521    Acceptance, E,D, DU,NR by MO at 3/14/2023 1458   Family Acceptance, E,TB,H, VU by MS at 3/16/2023 1801                   Point: Precautions (In Progress)     Learning Progress Summary           Patient Acceptance, E,D, NR by JM at 3/22/2023 1545    Acceptance, E,TB, VU,NR by CB at 3/21/2023 1544    Acceptance, E,TB,H, VU by MS at 3/16/2023 1801    Acceptance, E,D, DU,NR by MO at 3/16/2023 1451    Acceptance, E, DU,NR by MO at 3/15/2023 1200    Acceptance, E, NR by LM at 3/15/2023 0521    Acceptance, E,D, CURT,NR by MO at 3/14/2023 1458   Family Acceptance, E,TB,H, VU by MS at 3/16/2023 1801                               User Key     Initials Effective Dates Name Provider Type Discipline    ZACH 03/07/18 -  Tiffanie Grace PTA Physical Therapist Assistant PT    MS 06/16/21 -  Shaye,  "Angel Luis, RN Registered Nurse Nurse    CB 10/22/21 -  Bety Benitez, PT Physical Therapist PT    LM 10/13/22 -  Silke Grace, RN Registered Nurse Nurse    MO 01/27/23 -  Lacie Tanner, PT Student PT Student PT              PT Recommendation and Plan     Plan of Care Reviewed With: patient  Outcome Evaluation: Pt agreed to PT/OT session, was fatigued at end of session, so feel co-tx still appropriate ; pt shay sitting EOB working on UE WB, LE exer, and elboe push-ups, pt motivated , but fatigued quickly, when asked to perf task, pt wants to do well, but \"lunges\" and becomes unsteady req assist of 2 to maintain safety, will need SNU/RHB at [place] pending progress     Time Calculation:    PT Charges     Row Name 03/22/23 1545             Time Calculation    Start Time 1417  -      Stop Time 1435  -      Time Calculation (min) 18 min  -      PT Received On 03/22/23  -ZACH      PT - Next Appointment 03/23/23  -ZACH            User Key  (r) = Recorded By, (t) = Taken By, (c) = Cosigned By    Initials Name Provider Type    Tiffanie Chase PTA Physical Therapist Assistant              Therapy Charges for Today     Code Description Service Date Service Provider Modifiers Qty    11771591236 HC PT THER PROC EA 15 MIN 3/22/2023 Tiffanie Grace PTA GP 1    72511750881 HC PT THER SUPP EA 15 MIN 3/22/2023 Tiffanie Grace PTA GP 1          PT G-Codes  Outcome Measure Options: AM-PAC 6 Clicks Daily Activity (OT)  AM-PAC 6 Clicks Score (PT): 8  AM-PAC 6 Clicks Score (OT): 8  Modified [place] Scale: 4 - Moderately severe disability.  Unable to walk without assistance, and unable to attend to own bodily needs without assistance.  PT Discharge Summary  Anticipated Discharge Disposition (PT): skilled nursing facility, inpatient rehabilitation facility    Tiffanie Grace PTA  3/22/2023    "

## 2023-03-23 ENCOUNTER — APPOINTMENT (OUTPATIENT)
Dept: CT IMAGING | Facility: HOSPITAL | Age: 73
DRG: 64 | End: 2023-03-23
Payer: MEDICAID

## 2023-03-23 LAB
ANION GAP SERPL CALCULATED.3IONS-SCNC: 10 MMOL/L (ref 5–15)
BUN SERPL-MCNC: 20 MG/DL (ref 8–23)
BUN/CREAT SERPL: 29.9 (ref 7–25)
CALCIUM SPEC-SCNC: 8.3 MG/DL (ref 8.6–10.5)
CHLORIDE SERPL-SCNC: 109 MMOL/L (ref 98–107)
CO2 SERPL-SCNC: 23 MMOL/L (ref 22–29)
CREAT SERPL-MCNC: 0.67 MG/DL (ref 0.76–1.27)
DEPRECATED RDW RBC AUTO: 41.9 FL (ref 37–54)
EGFRCR SERPLBLD CKD-EPI 2021: 99.8 ML/MIN/1.73
ERYTHROCYTE [DISTWIDTH] IN BLOOD BY AUTOMATED COUNT: 12.8 % (ref 12.3–15.4)
GLUCOSE BLDC GLUCOMTR-MCNC: 132 MG/DL (ref 70–130)
GLUCOSE SERPL-MCNC: 84 MG/DL (ref 65–99)
HCT VFR BLD AUTO: 38.9 % (ref 37.5–51)
HGB BLD-MCNC: 13.6 G/DL (ref 13–17.7)
MAGNESIUM SERPL-MCNC: 2.4 MG/DL (ref 1.6–2.4)
MCH RBC QN AUTO: 31.4 PG (ref 26.6–33)
MCHC RBC AUTO-ENTMCNC: 35 G/DL (ref 31.5–35.7)
MCV RBC AUTO: 89.8 FL (ref 79–97)
PHOSPHATE SERPL-MCNC: 3.3 MG/DL (ref 2.5–4.5)
PLATELET # BLD AUTO: 268 10*3/MM3 (ref 140–450)
PMV BLD AUTO: 11.1 FL (ref 6–12)
POTASSIUM SERPL-SCNC: 3.8 MMOL/L (ref 3.5–5.2)
RBC # BLD AUTO: 4.33 10*6/MM3 (ref 4.14–5.8)
SODIUM SERPL-SCNC: 142 MMOL/L (ref 136–145)
WBC NRBC COR # BLD: 7.64 10*3/MM3 (ref 3.4–10.8)

## 2023-03-23 PROCEDURE — 85027 COMPLETE CBC AUTOMATED: CPT | Performed by: STUDENT IN AN ORGANIZED HEALTH CARE EDUCATION/TRAINING PROGRAM

## 2023-03-23 PROCEDURE — 25010000002 ENOXAPARIN PER 10 MG: Performed by: STUDENT IN AN ORGANIZED HEALTH CARE EDUCATION/TRAINING PROGRAM

## 2023-03-23 PROCEDURE — 80048 BASIC METABOLIC PNL TOTAL CA: CPT | Performed by: STUDENT IN AN ORGANIZED HEALTH CARE EDUCATION/TRAINING PROGRAM

## 2023-03-23 PROCEDURE — 99231 SBSQ HOSP IP/OBS SF/LOW 25: CPT | Performed by: NURSE PRACTITIONER

## 2023-03-23 PROCEDURE — 70450 CT HEAD/BRAIN W/O DYE: CPT

## 2023-03-23 PROCEDURE — 97530 THERAPEUTIC ACTIVITIES: CPT

## 2023-03-23 PROCEDURE — 97110 THERAPEUTIC EXERCISES: CPT

## 2023-03-23 PROCEDURE — 83735 ASSAY OF MAGNESIUM: CPT | Performed by: STUDENT IN AN ORGANIZED HEALTH CARE EDUCATION/TRAINING PROGRAM

## 2023-03-23 PROCEDURE — 82962 GLUCOSE BLOOD TEST: CPT

## 2023-03-23 PROCEDURE — 84100 ASSAY OF PHOSPHORUS: CPT | Performed by: STUDENT IN AN ORGANIZED HEALTH CARE EDUCATION/TRAINING PROGRAM

## 2023-03-23 RX ORDER — BACLOFEN 10 MG/1
2.5 TABLET ORAL EVERY 12 HOURS SCHEDULED
Status: DISCONTINUED | OUTPATIENT
Start: 2023-03-24 | End: 2023-04-11

## 2023-03-23 RX ORDER — LOPERAMIDE HYDROCHLORIDE 2 MG/1
2 CAPSULE ORAL EVERY 6 HOURS PRN
Status: DISCONTINUED | OUTPATIENT
Start: 2023-03-23 | End: 2023-05-08 | Stop reason: HOSPADM

## 2023-03-23 RX ORDER — VALSARTAN 160 MG/1
160 TABLET ORAL
Status: DISCONTINUED | OUTPATIENT
Start: 2023-03-24 | End: 2023-05-08 | Stop reason: HOSPADM

## 2023-03-23 RX ADMIN — VALSARTAN 120 MG: 80 TABLET, FILM COATED ORAL at 10:22

## 2023-03-23 RX ADMIN — ENOXAPARIN SODIUM 80 MG: 100 INJECTION SUBCUTANEOUS at 10:23

## 2023-03-23 RX ADMIN — CARVEDILOL 25 MG: 12.5 TABLET, FILM COATED ORAL at 10:22

## 2023-03-23 RX ADMIN — CARVEDILOL 25 MG: 12.5 TABLET, FILM COATED ORAL at 18:55

## 2023-03-23 RX ADMIN — LABETALOL HYDROCHLORIDE 20 MG: 5 INJECTION, SOLUTION INTRAVENOUS at 04:35

## 2023-03-23 RX ADMIN — LOPERAMIDE HYDROCHLORIDE 2 MG: 2 CAPSULE ORAL at 18:55

## 2023-03-23 RX ADMIN — BACLOFEN 5 MG: 10 TABLET ORAL at 10:22

## 2023-03-23 RX ADMIN — ACETAMINOPHEN 650 MG: 325 TABLET, FILM COATED ORAL at 20:02

## 2023-03-23 RX ADMIN — AMLODIPINE BESYLATE 10 MG: 10 TABLET ORAL at 10:23

## 2023-03-23 RX ADMIN — ENOXAPARIN SODIUM 80 MG: 100 INJECTION SUBCUTANEOUS at 20:02

## 2023-03-23 RX ADMIN — Medication 10 ML: at 20:02

## 2023-03-23 RX ADMIN — Medication 10 ML: at 10:24

## 2023-03-23 NOTE — PROGRESS NOTES
Name: Manuel Durant ADMIT: 3/12/2023   : 1950  PCP: Provider, No Known    MRN: [id number] LOS: 11 days   AGE/SEX: 73 y.o. male  ROOM: [facility]     Subjective   Subjective   Patient appears comfortable no apparent distress.  Used translating device to communicate with patient for [place] interpretation.  Tolerating diet.  Voices no new concerns.  Discussed with RN--no overnight events.    Review of Systems   Respiratory: Negative for cough and shortness of breath.    Cardiovascular: Negative for chest pain and leg swelling.   Gastrointestinal: Negative for abdominal pain and nausea.   Musculoskeletal: Negative for gait problem and myalgias.   Neurological: Negative for light-headedness and headaches.        Objective   Objective   Vital Signs  Temp:  [97.7 °F (36.5 °C)-98.4 °F (36.9 °C)] 98.3 °F (36.8 °C)  Heart Rate:  [60-64] 63  Resp:  [18] 18  BP: (127-169)/(75-87) 164/76  SpO2:  [90 %-99 %] 99 %  on   ;   Device (Oxygen Therapy): room air  Body mass index is 25.8 kg/m².     Physical Exam  Constitutional:       General: He is not in acute distress.     Appearance: He is not toxic-appearing.   Cardiovascular:      Rate and Rhythm: Normal rate.      Heart sounds: Normal heart sounds.   Pulmonary:      Effort: Pulmonary effort is normal.      Breath sounds: Normal breath sounds.   Abdominal:      General: Bowel sounds are normal.      Palpations: Abdomen is soft.   Musculoskeletal:      Right lower leg: No edema.      Left lower leg: No edema.   Skin:     General: Skin is warm and dry.   Neurological:      Mental Status: He is alert.       Results Review     I reviewed the patient's new clinical results.  Results from last 7 days   Lab Units 23  0636 23  0704 23  0554 23  0550   WBC 10*3/mm3 7.64 7.02 7.06 7.71   HEMOGLOBIN g/dL 13.6 13.5 14.0 13.4   PLATELETS 10*3/mm3 268 248 234 232     Results from last 7 days   Lab Units 23  0636 23  0704 23  0554  03/20/23  0550   SODIUM mmol/L 142 142 140 142   POTASSIUM mmol/L 3.8 3.8 4.0 3.7   CHLORIDE mmol/L 109* 110* 105 108*   CO2 mmol/L 23.0 23.0 18.6* 21.0*   BUN mg/dL 20 20 20 24*   CREATININE mg/dL 0.67* 0.73* 0.71* 0.76   GLUCOSE mg/dL 84 98 107* 94   EGFR mL/min/1.73 99.8 97.3 98.1 96.1       Results from last 7 days   Lab Units 03/23/23  0636 03/22/23  0704 03/21/23  0554 03/20/23  0550   CALCIUM mg/dL 8.3* 8.4* 8.5* 8.6   MAGNESIUM mg/dL 2.4 2.3 2.1 2.2   PHOSPHORUS mg/dL 3.3 3.4 3.6 3.3       No results found for: HGBA1C, POCGLU    CT Head Without Contrast    Result Date: 3/23/2023   1. Since most recent head CT 4 days ago on 03/19/2023, the multilobulated acute intraparenchymal hemorrhage extending from the left thalamus through the posterior limb of the left internal capsule into the posterior left putamen is stable to perhaps slightly contracted and decreased in size by 1 mm or 2, currently measures 4.2 x 2.8 x 2.8 cm, whereas previously measured 4.3 x 3 x 3.3 cm; however, the surrounding edema continues to increase, previously tracked 5 x 5.9 cm, now tracks up to 5.9 x 6.2 x 5.5 cm. There is relatively stable mass effect on the third ventricle with partial effacement of the mid and posterior third ventricle and 4 mm left to right midline shift. There has been interval decrease in intraventricular hemorrhage with only tiny amount of residual intraventricular hemorrhage in the frontal horn and posterior trigone of the left lateral ventricle. There is stable mild hydrocephalus when compared to 03/19/2023. The frontal horn of the left lateral ventricle and frontal horn and temporal horn of the right lateral ventricle in the anterior third ventricle are clearly slightly larger than the were on the presenting head CT on 03/12/2023, but they are without significant change to equivocally very slightly larger than the were on head CT 03/19/2023, and continued follow-up is suggested. The remainder of the head CT is  unremarkable.  I communicated the results to Roxanna Ramirez from neurosurgery 03/23/2023 at 10:15 a.m.  Radiation dose reduction techniques were utilized, including automated exposure control and exposure modulation based on body size.  This report was finalized on 3/23/2023 10:33 AM by Dr. Orlando Araujo M.D.      I have personally reviewed all medications:  Scheduled Medications  amLODIPine, 10 mg, Oral, QAM AC  [START ON 3/24/2023] baclofen, 2.5 mg, Oral, Q12H  carvedilol, 25 mg, Oral, BID With Meals  enoxaparin, 1 mg/kg, Subcutaneous, Q12H  senna-docusate sodium, 2 tablet, Oral, BID  sodium chloride, 10 mL, Intravenous, Q12H  [START ON 3/24/2023] valsartan, 160 mg, Oral, QAM AC    Infusions   Diet  Diet: Regular/House Diet; No Mixed Consistencies; Texture: Soft to Chew (NDD 3); Soft to Chew: Ground Meat; Fluid Consistency: Honey Thick    I have personally reviewed:  [x]  Laboratory   []  Microbiology   [x]  Radiology   [x]  EKG/Telemetry  []  Cardiology/Vascular   []  Pathology    []  Records       Assessment/Plan     Active Hospital Problems    Diagnosis  POA   • **Intracranial hemorrhage (HCC) [I62.9]  Yes   • Acute DVT (deep venous thrombosis) (HCC) [I82.409]  Unknown   • HTN (hypertension) [I10]  Unknown   • Hypokalemia [E87.6]  Unknown   • Hypertensive emergency [I16.1]  Unknown      Resolved Hospital Problems   No resolved problems to display.       Mr. Durant is a 73 year old male who initially presented to the hospital 3/12/23 for intracranial bleed. He was found unresponsive by friends and initially taken to [facility] where he was found to have poorly controlled BP and right sided hemiplegia. He was transferred here for closer monitoring in the ICU and neurosurgical consultation. He was cleared to move out of the ICU on 3/16/23.     • Intracranial hemorrhage: CTH 3/17/23 stable left basal ganglia/internal capsule hemorrhage with left lateral ventricular hemorrhage. Started on Lovenox for DVT and  repeat CTH on 3/18 & 3/23/2023 also stable. ANDRADE following. Goal BP < 150 systolic on most recent ANDRADE note. Repeat CTH ordered. Eventual outpatient MRI once hemorrhage cleared.     • HTN emergency: SBP greater than optimal >160.  On amlodipine 10 mg, increase valsartan 120 mg to 160 mg PO daily and continue Coreg 25 mg BID dose same.      • Acute DVT: found 3/17/23 with acute RLE DVT in soleal. ANDRADE okayed for Lovenox. Plans for eventual oral therapy but medicaid pending at this time.      • Hypokalemia: Resolved.     Discussed with patient (via ) and nursing staff.      · Treatment dose enoxaparin adequate for DVT prophylaxis.  · CPR  · Discussed with patient & RN.  · Anticipate discharge (remains medically stable) once insurance/placement issues addressed.      SLICK Lockhart  [facility]  03/23/23  15:18 EDT

## 2023-03-23 NOTE — THERAPY TREATMENT NOTE
Patient Name: Manuel Durant  : 1950    MRN: [id number]                              Today's Date: 3/23/2023       Admit Date: 3/12/2023    Visit Dx:     ICD-10-CM ICD-9-CM   1. Intracranial hemorrhage (HCC)  I62.9 432.9   2. Altered mental status, unspecified altered mental status type  R41.82 780.97     Patient Active Problem List   Diagnosis   • Intracranial hemorrhage (HCC)   • Hypertensive emergency   • Acute DVT (deep venous thrombosis) (HCC)   • HTN (hypertension)   • Hypokalemia     History reviewed. No pertinent past medical history.  History reviewed. No pertinent surgical history.   General Information     Row Name 23 1526          OT Time and Intention    Document Type therapy note (daily note)  -     Mode of Treatment co-treatment;occupational therapy;physical therapy  -     Row Name 23 1526          General Information    Patient Profile Reviewed yes  -MW     Existing Precautions/Restrictions fall  -     Row Name 23 1526          Cognition    Orientation Status (Cognition) oriented to;person  -     Row Name 23 1526          Safety Issues, Functional Mobility    Impairments Affecting Function (Mobility) balance;strength;endurance/activity tolerance;cognition;pain;muscle tone abnormal;postural/trunk control;coordination;grasp;motor control  -           User Key  (r) = Recorded By, (t) = Taken By, (c) = Cosigned By    Initials Name Provider Type    Karishma Williamson OT Occupational Therapist                 Mobility/ADL's     Row Name 23 1526          Bed Mobility    Bed Mobility supine-sit;sit-supine;scooting/bridging  -MW     Scooting/Bridging Long (Bed Mobility) dependent (less than 25% patient effort);2 person assist  -MW     Supine-Sit Long (Bed Mobility) maximum assist (25% patient effort);2 person assist;verbal cues  -MW     Sit-Supine Long (Bed Mobility) maximum assist (25% patient effort);2 person assist;verbal  cues;dependent (less than 25% patient effort)  -     Bed Mobility, Safety Issues decreased use of arms for pushing/pulling;decreased use of legs for bridging/pushing;impaired trunk control for bed mobility  -     Assistive Device (Bed Mobility) bed rails;draw sheet;head of bed elevated  -     Comment, (Bed Mobility) heavy R lean, pushing with LUE strength, able to support self at EOB with LUE holding onto bedrail, unable to engage in functional reaching as pt requires support or becomes max A for sitting balance at EOB  -     Row Name 03/23/23 1526          Transfers    Transfers sit-stand transfer  -     Comment, (Transfers) x3 STS from EOB  -     Row Name 03/23/23 1526          Sit-Stand Transfer    Sit-Stand [place] (Transfers) maximum assist (25% patient effort);2 person assist;verbal cues;nonverbal cues (demo/gesture)  -     Comment, (Sit-Stand Transfer) HHAx2, pt required R knee blocked on first two stands, able to shuffle L foot towward HOB in stand with max A x2  -     Row Name 03/23/23 1526          Activities of Daily Living    BADL Assessment/Intervention lower body dressing;toileting  -     Row Name 03/23/23 1526          Lower Body Dressing Assessment/Training    [place] Level (Lower Body Dressing) lower body dressing skills;dependent (less than 25% patient effort)  -     Row Name 03/23/23 1526          Toileting Assessment/Training    [place] Level (Toileting) dependent (less than 25% patient effort)  -     Comment, (Toileting) brief and purewick  -           User Key  (r) = Recorded By, (t) = Taken By, (c) = Cosigned By    Initials Name Provider Type    Karishma Williamson OT Occupational Therapist               Obj/Interventions     Row Name 03/23/23 1529          Motor Skills    Motor Skills motor control/coordination interventions  -     Row Name 03/23/23 1529          Balance    Balance Assessment sitting static balance;sitting dynamic balance;sit to  stand dynamic balance;standing dynamic balance;standing static balance  -MW     Static Sitting Balance contact guard  with LUE using bedrail for support  -MW     Dynamic Sitting Balance maximum assist  -MW     Position, Sitting Balance unsupported;sitting edge of bed  -MW     Sit to Stand Dynamic Balance maximum assist;2-person assist  -MW     Static Standing Balance maximum assist;2-person assist  -MW     Dynamic Standing Balance maximum assist;2-person assist  -MW     Position/Device Used, Standing Balance supported;other (see comments)  HHAx2  -     Comment, Balance max A due to heavy R lateral lean at times when pt not supporting self with LUE on bed rail, pushing R  -MW           User Key  (r) = Recorded By, (t) = Taken By, (c) = Cosigned By    Initials Name Provider Type    Karishma Williamson OT Occupational Therapist               Goals/Plan    No documentation.                Clinical Impression     Row Name 03/23/23 1530          Pain Assessment    Pretreatment Pain Rating 0/10 - no pain  -MW     Posttreatment Pain Rating 0/10 - no pain  -MW     Row Name 03/23/23 1530          Plan of Care Review    Plan of Care Reviewed With patient  -     Progress improving  -     Outcome Evaluation Pt seen this date for OT/PT tx session in PM, pt awake and agreeable. Pt required max Ax2 for bed mobility, while at EOB, focusing on upper trunk control and midline positioning with LUE support. Pt required max A for sitting balnace if not supporting and holding onto bed rail with LUE. x3 STS with max a x2, on third attempt, able to shuffle L foot toward HOB to progress towward functional transfers OOB activity. Pt will continue to benefit from skilled OT to address noted functional deficits and progress toward improved (I). Plans SNF.  -     Row Name 03/23/23 1530          Therapy Plan Review/Discharge Plan (OT)    Anticipated Discharge Disposition (OT) skilled nursing facility  -     Row Name 03/23/23 1530           Vital Signs    O2 Delivery Pre Treatment room air  -MW     O2 Delivery Post Treatment room air  -MW     Pre Patient Position Supine  -MW     Intra Patient Position Standing  -MW     Post Patient Position Supine  -MW     Row Name 03/23/23 1530          Positioning and Restraints    Pre-Treatment Position in bed  -MW     Post Treatment Position bed  -MW     In Bed notified nsg;fowlers;call light within reach;encouraged to call for assist;exit alarm on;side rails up x3  nurse aide  -           User Key  (r) = Recorded By, (t) = Taken By, (c) = Cosigned By    Initials Name Provider Type    Karishma Williamson, MARK Occupational Therapist               Outcome Measures     Row Name 03/23/23 1534          How much help from another is currently needed...    Putting on and taking off regular lower body clothing? 1  -MW     Bathing (including washing, rinsing, and drying) 1  -MW     Toileting (which includes using toilet bed pan or urinal) 1  -MW     Putting on and taking off regular upper body clothing 1  -MW     Taking care of personal grooming (such as brushing teeth) 2  -MW     Eating meals 2  -MW     AM-PAC 6 Clicks Score (OT) 8  -MW     Row Name 03/23/23 0745          How much help from another person do you currently need...    Turning from your back to your side while in flat bed without using bedrails? 2  -BC     Moving from lying on back to sitting on the side of a flat bed without bedrails? 2  -BC     Moving to and from a bed to a chair (including a wheelchair)? 1  -BC     Standing up from a chair using your arms (e.g., wheelchair, bedside chair)? 1  -BC     Climbing 3-5 steps with a railing? 1  -BC     To walk in hospital room? 1  -BC     AM-PAC 6 Clicks Score (PT) 8  -BC     Highest level of mobility 3 --> Sat at edge of bed  -BC     Row Name 03/23/23 1534          Functional Assessment    Outcome Measure Options AM-PAC 6 Clicks Daily Activity (OT)  -MW           User Key  (r) = Recorded By, (t) = Taken  By, (c) = Cosigned By    Initials Name Provider Type    Karishma Williamson OT Occupational Therapist    Carolyn Cavazos, RN Registered Nurse                Occupational Therapy Education     Title: PT OT SLP Therapies (In Progress)     Topic: Occupational Therapy (Done)     Point: ADL training (Done)     Description:   Instruct learner(s) on proper safety adaptation and remediation techniques during self care or transfers.   Instruct in proper use of assistive devices.              Learning Progress Summary           Patient Acceptance, E,TB,H, VU by MS at 3/16/2023 1801    Acceptance, E, NR by LM at 3/15/2023 0521   Family Acceptance, E,TB,H, VU by MS at 3/16/2023 1801                   Point: Home exercise program (Done)     Description:   Instruct learner(s) on appropriate technique for monitoring, assisting and/or progressing therapeutic exercises/activities.              Learning Progress Summary           Patient Acceptance, E,TB,H, VU by MS at 3/16/2023 1801    Acceptance, E, NR by LM at 3/15/2023 0521   Family Acceptance, E,TB,H, VU by MS at 3/16/2023 1801                   Point: Precautions (Done)     Description:   Instruct learner(s) on prescribed precautions during self-care and functional transfers.              Learning Progress Summary           Patient Acceptance, E,TB,H, VU by MS at 3/16/2023 1801    Acceptance, E, NR by LM at 3/15/2023 0521   Family Acceptance, E,TB,H, VU by MS at 3/16/2023 1801                   Point: Body mechanics (Done)     Description:   Instruct learner(s) on proper positioning and spine alignment during self-care, functional mobility activities and/or exercises.              Learning Progress Summary           Patient Acceptance, E,TB,H, VU by MS at 3/16/2023 1801    Acceptance, E, NR by LM at 3/15/2023 0521   Family Acceptance, E,TB,H, VU by MS at 3/16/2023 1801                               User Key     Initials Effective Dates Name Provider Type Discipline    MS  06/16/21 -  Angel Luis Cr, RN Registered Nurse Nurse     10/13/22 -  Silke Grace, RN Registered Nurse Nurse              OT Recommendation and Plan     Plan of Care Review  Plan of Care Reviewed With: patient  Progress: improving  Outcome Evaluation: Pt seen this date for OT/PT tx session in PM, pt awake and agreeable. Pt required max Ax2 for bed mobility, while at EOB, focusing on upper trunk control and midline positioning with LUE support. Pt required max A for sitting balnace if not supporting and holding onto bed rail with LUE. x3 STS with max a x2, on third attempt, able to shuffle L foot toward HOB to progress towward functional transfers OOB activity. Pt will continue to benefit from skilled OT to address noted functional deficits and progress toward improved (I). Plans SNF.     Time Calculation:    Time Calculation- OT     Row Name 03/23/23 1534             Time Calculation- OT    OT Start Time 1410  -MW      OT Stop Time 1428  -MW      OT Time Calculation (min) 18 min  -MW      Total Timed Code Minutes- OT 18 minute(s)  -MW      OT Received On 03/23/23  -MW      OT - Next Appointment 03/24/23  -MW         Timed Charges    18223 -  OT Neuromuscular Reeducation Minutes 8  -MW      34021 - OT Therapeutic Activity Minutes 10  -MW         Total Minutes    Timed Charges Total Minutes 18  -MW       Total Minutes 18  -MW            User Key  (r) = Recorded By, (t) = Taken By, (c) = Cosigned By    Initials Name Provider Type     Karishma Spence OT Occupational Therapist              Therapy Charges for Today     Code Description Service Date Service Provider Modifiers Qty    13570361881  OT NEUROMUSC RE EDUCATION EA 15 MIN 3/22/2023 Karishma Spence OT GO 1    [id number]  OT THERAPEUTIC ACT EA 15 MIN 3/23/2023 Karishma Spence OT GO 1               Karishma Spence OT  3/23/2023

## 2023-03-23 NOTE — PLAN OF CARE
Goal Outcome Evaluation:  Plan of Care Reviewed With: patient        Progress: improving  Outcome Evaluation: Pt perf co-tx w/OT/PT, pt awake, but fatigued quickly; pt tolerated sitting EOB -req MAX2 to get to EOB-worked on posture ,trunk strengthening, UE WB bilat , but noted incr tone RUE w/WB this session, pt perf STS x3, then took 3 shuffle steps w/LLE toward HOB-did trial L hand bed exit and seemed to help the side-step attempts , plans SNU at [place]    Patient was not wearing a face mask during this therapy encounter. Therapist used appropriate personal protective equipment including eye protection, mask, and gloves.  Mask used was standard procedure mask. Appropriate PPE was worn during the entire therapy session. Hand hygiene was completed before and after therapy session. Patient is not in enhanced droplet precautions.

## 2023-03-23 NOTE — PROGRESS NOTES
"  [facility] NEUROSURGERY INTRACRANIAL PROGRESS NOTE    PATIENT IDENTIFICATION:   Name:  Manuel Durant      MRN:  [id number]     73 y.o.  male               CC: ICH      Subjective   Translation jimmy utilized    Interval History: repeat CTH this AM reports mild headache but states \"I am fine\".    ROS:  HEENT: No headache  GI: No nausea, vomiting  Neuro: Right-sided weakness, speech difficulties    Objective     Vital signs in last 24 hours:  Temp:  [97.7 °F (36.5 °C)-98.4 °F (36.9 °C)] 98.3 °F (36.8 °C)  Heart Rate:  [60-64] 62  Resp:  [18] 18  BP: (121-169)/(67-87) 169/87      Intake/Output this shift:  No intake/output data recorded.      Intake/Output last 3 shifts:  I/O last 3 completed shifts:  In: 150 [P.O.:150]  Out: 300 [Urine:300]    LABS:  Results from last 7 days   Lab Units 03/23/23  0636 03/22/23  0704 03/21/23  0554   WBC 10*3/mm3 7.64 7.02 7.06   HEMOGLOBIN g/dL 13.6 13.5 14.0   HEMATOCRIT % 38.9 38.5 39.8   PLATELETS 10*3/mm3 268 248 234     Results from last 7 days   Lab Units 03/23/23  0636 03/22/23  0704 03/21/23  0554   SODIUM mmol/L 142 142 140   POTASSIUM mmol/L 3.8 3.8 4.0   CHLORIDE mmol/L 109* 110* 105   CO2 mmol/L 23.0 23.0 18.6*   BUN mg/dL 20 20 20   CREATININE mg/dL 0.67* 0.73* 0.71*   CALCIUM mg/dL 8.3* 8.4* 8.5*   GLUCOSE mg/dL 84 98 107*       IMAGING STUDIES:  CT head reveals evolution of left thalamic/basal ganglia intracranial hemorrhage.  The density and size has reduced although there is some increased surrounding edema as compared with initial study on March 12.  There is also some mild hydrocephalus frontal horns of lateral ventricles and anterior 3rd ventricle when compared with initial study on March 12 although stable when compared with CT head 4 days ago.  The left lateral ventricle hemorrhage has nearly resolved.    I personally viewed and interpreted the patient's chart.    Meds reviewed/changed: Yes  Baclofen 5 mg p.o. every 12 hours  Lovenox-DVT treatment " dose  Amlodipine 10 mg p.o. daily  Carvedilol 25 mg p.o. twice daily-   Labetalol 20 mg IV every 4 as needed- 1 doses    Physical Exam:    General:   Awake, alert.  Still with right neglect.  Continues to have some aphasia.  He follows commands on left side  CN III IV VI: Some slight movement past midline to right. No disconjugate gaze.    CN VII: Right facial weakness-moderate.  Motor:    Normal movement left side.  No drift.  Withdrawals RLE> RUE.  Still some slight increase in tone right side but improved  Sensation: Pain sensation intact right  Station and Gait:  Per PT note 3/20-mod-max A for bed mobility. Strong R lean and posterior lean when positioned into neutral. Address sitting balance, trunk activation and reaching outside CAROL. Did not attempt standing this session as second person assist was not available and pt was not safe to stand with assist x1 secondary to balance impairment  Coordination:  Finger-to-nose test intact left upper extremity.  Unable to assess right side due to weakness   Extremities:   No calf tenderness    Assessment & Plan     ASSESSMENT:      Intracranial hemorrhage (HCC)    Hypertensive emergency    Acute DVT (deep venous thrombosis) (HCC)    HTN (hypertension)    Hypokalemia    Patient being followed now for over a week with spontaneous left basal ganglia/internal capsule hemorrhage.  CT today shows evolving hematoma but some increase  in edema and mild hydrocephalus since initial study but stable from study 4 days ago.  He is awake alert and answers questions appropriately.  His neurologic exam has been unchanged for multiple visits.  He remains right plegic and aphasic.  No acute neurosurgical issues at this time and no intervention recommended.  We will plan follow-up with MRI brain and MRA head as outpatient approximately 1 month once hematoma has resolved to evaluate for underlying source of hemorrhage.  In the meantime, systolic blood pressure should remain well controlled  "less than 150.  If he develops worsening symptoms, worsening headache, or lethargy, repeat CT and reconsult of our service is indicated.  In the meantime please call with questions or concerns.  His baclofen can be weaned off.    PLAN:   Keep SBP <150  Awaiting rehab placement once Medicaid approved  F/u [facility] 1 month with MRI brain +/- and MRA head    I discussed the patient's findings and my recommendations with patient, nursing staff and Dr. Logan    During patient visit, I utilized appropriate personal protective equipment including mask and gloves.  Mask used was standard procedure mask. Appropriate PPE was worn during the entire visit.  Hand hygiene was completed before and after.      chief complaint, exam, MDM data copied forward from previous encounters in EMR is unchanged.      LOS: 11 days       Roxanna Ramirez, APRN  3/23/2023  10:23 EDT    \"Dictated utilizing Dragon dictation\".      "

## 2023-03-23 NOTE — PLAN OF CARE
Goal Outcome Evaluation:  Plan of Care Reviewed With: patient        Progress: improving  Outcome Evaluation: Pt seen this date for OT/PT tx session in PM, pt awake and agreeable. Pt required max Ax2 for bed mobility, while at EOB, focusing on upper trunk control and midline positioning with LUE support. Pt required max A for sitting balnace if not supporting and holding onto bed rail with LUE. x3 STS with max a x2, on third attempt, able to shuffle L foot toward HOB to progress towward functional transfers OOB activity. Pt will continue to benefit from skilled OT to address noted functional deficits and progress toward improved (I). Plans SNF.

## 2023-03-23 NOTE — PROGRESS NOTES
Continued Stay Note  [facility]     Patient Name: Manuel Durant  MRN: [id number]  Today's Date: 3/23/2023    Admit Date: 3/12/2023    Plan: Pending   Discharge Plan     Row Name 03/23/23 0925       Plan    Plan Pending    Patient/Family in Agreement with Plan yes    Plan Comments Med Assist pursuing Medicaid, however pt likely ineligible for ongoing coverage. D/w CCP manager Jenny. Shaniqua Harmon LCSW               Discharge Codes    No documentation.               Expected Discharge Date and Time     Expected Discharge Date Expected Discharge Time    Mar 31, 2023             Leeann Harmon LCSW

## 2023-03-23 NOTE — PROGRESS NOTES
"Nutrition Services    Patient Name:  Manuel Durant  YOB: 1950  MRN: [id number]  Admit Date:  3/12/2023    FOLLOW UP - CLINICAL NUTRITION    Assessment Date:  03/23/23    Encounter Information         Reason for Encounter Follow up protocol     Current Issues Patient has been eating well (75% most meals) along with eating snacks throughout the day. Per SLP, patient is s/p VFSS and silent aspiration was observed when trying to upgrade from honey thick liquids to nectar thick. Will continue to follow      Current Nutrition Orders & Evaluation of Intake       Oral Nutrition     Current PO Diet Diet: Regular/House Diet; No Mixed Consistencies; Texture: Soft to Chew (NDD 3); Soft to Chew: Ground Meat; Fluid Consistency: Honey Thick   Supplement n/a   PO Evaluation     % PO Intake 75%     # of Days Evaluated     Factors Affecting Intake  swallow impairment   --  Anthropometrics          Height    Weight Height: 167.6 cm (66\")  Weight: 72.5 kg (159 lb 13.3 oz) (03/23/23 0427)    BMI kg/m2 Body mass index is 25.8 kg/m².    Weight trend Unknown     Labs        Pertinent Labs Reviewed, listed below     Results from last 7 days   Lab Units 03/23/23  0636 03/22/23  0704 03/21/23  0554   SODIUM mmol/L 142 142 140   POTASSIUM mmol/L 3.8 3.8 4.0   CHLORIDE mmol/L 109* 110* 105   CO2 mmol/L 23.0 23.0 18.6*   BUN mg/dL 20 20 20   CREATININE mg/dL 0.67* 0.73* 0.71*   CALCIUM mg/dL 8.3* 8.4* 8.5*   GLUCOSE mg/dL 84 98 107*     Results from last 7 days   Lab Units 03/23/23  0636 03/22/23  0704 03/21/23  0554   MAGNESIUM mg/dL 2.4 2.3 2.1   PHOSPHORUS mg/dL 3.3 3.4 3.6   HEMOGLOBIN g/dL 13.6 13.5 14.0   HEMATOCRIT % 38.9 38.5 39.8   WBC 10*3/mm3 7.64 7.02 7.06     Results from last 7 days   Lab Units 03/23/23  0636 03/22/23  0704 03/21/23  0554 03/20/23  0550 03/19/23  0547   PLATELETS 10*3/mm3 268 248 234 232 208     No results found for: COVID19  Lab Results   Component Value Date    HGBA1C 5.60 03/15/2023      "     Medications            Scheduled Medications amLODIPine, 10 mg, Oral, QAM AC  baclofen, 5 mg, Oral, Q12H  carvedilol, 25 mg, Oral, BID With Meals  enoxaparin, 1 mg/kg, Subcutaneous, Q12H  senna-docusate sodium, 2 tablet, Oral, BID  sodium chloride, 10 mL, Intravenous, Q12H  valsartan, 120 mg, Oral, QAM AC        Infusions      PRN Medications •  acetaminophen  •  senna-docusate sodium **AND** polyethylene glycol **AND** bisacodyl **AND** bisacodyl  •  Calcium Gluconate-NaCl **AND** calcium gluconate IVPB **AND** Calcium  •  labetalol  •  magnesium sulfate **OR** magnesium sulfate **OR** magnesium sulfate  •  potassium chloride **OR** potassium chloride **OR** potassium chloride  •  potassium phosphate infusion greater than 15 mMoles **OR** potassium phosphate infusion greater than 15 mMoles **OR** potassium phosphate **OR** sodium phosphate IVPB **OR** sodium phosphate IVPB  •  [COMPLETED] Insert Peripheral IV **AND** sodium chloride  •  sodium chloride  •  sodium chloride     Physical Findings          Physical Appearance alert, disoriented, room air   Oral/Mouth Cavity dental caries   Edema  1+ (trace)   Gastrointestinal diarrhea, hypoactive bowel sounds, last bowel movement:3/22   Skin  skin intact   Tubes/Drains none   NFPE Not applicable at this time   --  NUTRITION INTERVENTION / PLAN OF CARE  Intervention Goal         Intervention Goal(s) Maintain nutrition status, Maintain intake and Maintain weight     Nutrition Intervention         RD Action Follow Tx Progress and Care plan reviewed     Nutrition Prescription         Diet Prescription     Supplement Prescription n/a   EN/PN Prescription    New Prescription Ordered? No changes at this time   --  Monitor/Evaluation        Monitor Per protocol, I&O, PO intake, Pertinent labs, Weight, Skin status, GI status, Symptoms, POC/GOC, Swallow function   Discharge Needs Pending clinical course   Education Will instruct as appropriate   --    RD to follow up per  protocol.    Electronically signed by:  Richelle Milligan RD  03/23/23 11:29 EDT

## 2023-03-23 NOTE — THERAPY TREATMENT NOTE
Patient Name: Manuel Durant  : 1950    MRN: [id number]                              Today's Date: 3/23/2023       Admit Date: 3/12/2023    Visit Dx:     ICD-10-CM ICD-9-CM   1. Intracranial hemorrhage (HCC)  I62.9 432.9   2. Altered mental status, unspecified altered mental status type  R41.82 780.97     Patient Active Problem List   Diagnosis   • Intracranial hemorrhage (HCC)   • Hypertensive emergency   • Acute DVT (deep venous thrombosis) (HCC)   • HTN (hypertension)   • Hypokalemia     History reviewed. No pertinent past medical history.  History reviewed. No pertinent surgical history.   General Information     Row Name 23          Physical Therapy Time and Intention    Document Type therapy note (daily note)  -     Mode of Treatment co-treatment;occupational therapy;physical therapy  -     Row Name 23          General Information    Patient Profile Reviewed yes  -ZACH     Existing Precautions/Restrictions fall  -     Row Name 23          Cognition    Orientation Status (Cognition) oriented to;person  -     Row Name 23          Safety Issues, Functional Mobility    Impairments Affecting Function (Mobility) balance;strength;endurance/activity tolerance;cognition;pain;muscle tone abnormal;postural/trunk control;coordination;grasp;motor control  -           User Key  (r) = Recorded By, (t) = Taken By, (c) = Cosigned By    Initials Name Provider Type    Tiffanie Chase PTA Physical Therapist Assistant               Mobility     Row Name 23          Bed Mobility    Bed Mobility supine-sit;sit-supine;scooting/bridging  -     Scooting/Bridging [place] (Bed Mobility) dependent (less than 25% patient effort);2 person assist  -     Supine-Sit [place] (Bed Mobility) maximum assist (25% patient effort);2 person assist;verbal cues  -     Sit-Supine [place] (Bed Mobility) maximum assist (25% patient effort);2 person  assist;verbal cues;dependent (less than 25% patient effort)  -     Assistive Device (Bed Mobility) bed rails;draw sheet;head of bed elevated  -[facility] Name 03/23/23 1646          Transfers    Comment, (Transfers) perf x3, knee buckling bilat first attempt  -JM     Row Name 03/23/23 1646          Sit-Stand Transfer    Sit-Stand [place] (Transfers) maximum assist (25% patient effort);2 person assist;verbal cues;nonverbal cues (demo/gesture)  -JM     Row Name 03/23/23 1646          Gait/Stairs (Locomotion)    Distance in Feet (Gait) able to take 3 shuffle steps w/LLE , some drag of RLE and assist  -           User Key  (r) = Recorded By, (t) = Taken By, (c) = Cosigned By    Initials Name Provider Type    Tiffanie Chase PTA Physical Therapist Assistant               Obj/Interventions     Row Name 03/23/23 1648          Motor Skills    Therapeutic Exercise --  LAQS x10, assist R  -JM     Row Name 03/23/23 1648          Balance    Comment, Balance heavy R lean at times, incr pushing w/L but to R side during sitting activities, pt also had noted incr in R neglect this visit, had visitors enter and he could not even see them when they stood to his R side  -           User Key  (r) = Recorded By, (t) = Taken By, (c) = Cosigned By    Initials Name Provider Type    Tiffanie Chase PTA Physical Therapist Assistant               Goals/Plan    No documentation.                Clinical Impression     Row Name 03/23/23 1650          Pain    Pretreatment Pain Rating 0/10 - no pain  -     Posttreatment Pain Rating 0/10 - no pain  -JM     Row Name 03/23/23 1650          Plan of Care Review    Plan of Care Reviewed With patient  -     Progress improving  -     Outcome Evaluation Pt perf co-tx w/OT/PT, pt awake, but fatigued quickly; pt tolerated sitting EOB -req MAX2 to get to EOB-worked on posture ,trunk strengthening, UE WB bilat , but noted incr tone RUE w/WB this session, pt perf STS x3, then took 3  shuffle steps w/LLE toward HOB-did trial L hand bed exit and seemed to help the side-step attempts , plans SNU at [place]  -     Row Name 03/23/23 1650          Therapy Assessment/Plan (PT)    Rehab Potential (PT) fair, will monitor progress [facility]  -     Criteria for Skilled Interventions Met (PT) yes  -     Row Name 03/23/23 1650          Vital Signs    O2 Delivery Pre Treatment room air  -     Row Name 03/23/23 1650          Positioning and Restraints    Pre-Treatment Position in bed  -     Post Treatment Position bed  -     In Bed with nsg  nsg to check brief  -           User Key  (r) = Recorded By, (t) = Taken By, (c) = Cosigned By    Initials Name Provider Type    Tiffanie Chase PTA Physical Therapist Assistant               Outcome Measures     Row Name 03/23/23 1654 03/23/23 0745       How much help from another person do you currently need...    Turning from your back to your side while in flat bed without using bedrails? 2  - 2  -BC    Moving from lying on back to sitting on the side of a flat bed without bedrails? 2  - 2  -BC    Moving to and from a bed to a chair (including a wheelchair)? 1  - 1  -BC    Standing up from a chair using your arms (e.g., wheelchair, bedside chair)? 2  - 1  -BC    Climbing 3-5 steps with a railing? 1  - 1  -BC    To walk in hospital room? 1  - 1  -BC    AM-PAC 6 Clicks Score (PT) 9  - 8  -BC    Highest level of mobility 3 --> Sat at edge of bed  - 3 --> Sat at edge of bed  -BC    Row Name 03/23/23 1534          Functional Assessment    Outcome Measure Options AM-PAC 6 Clicks Daily Activity (OT)  -           User Key  (r) = Recorded By, (t) = Taken By, (c) = Cosigned By    Initials Name Provider Type    Tiffanie Chase PTA Physical Therapist Assistant    Karishma Williamson, OT Occupational Therapist    Carolyn Cavazos RN Registered Nurse                             Physical Therapy Education     Title: PT OT SLP Therapies (In  Progress)     Topic: Physical Therapy (In Progress)     Point: Mobility training (In Progress)     Learning Progress Summary           Patient Acceptance, E,D, NR by ZACH at 3/23/2023 1655    Acceptance, E,D, NR by JM at 3/22/2023 1545    Acceptance, E,TB, VU,NR by CB at 3/21/2023 1544    Acceptance, E,TB,H, VU by MS at 3/16/2023 1801    Acceptance, E,D, DU,NR by MO at 3/16/2023 1451    Acceptance, E, DU,NR by MO at 3/15/2023 1200    Acceptance, E, NR by LM at 3/15/2023 0521    Acceptance, E,D, DU,NR by MO at 3/14/2023 1458   Family Acceptance, E,TB,H, VU by MS at 3/16/2023 1801                   Point: Home exercise program (In Progress)     Learning Progress Summary           Patient Acceptance, E,D, NR by ZACH at 3/23/2023 1655    Acceptance, E,D, NR by ZACH at 3/22/2023 1545    Acceptance, E,TB, VU,NR by CB at 3/21/2023 1544    Acceptance, E,TB,H, VU by MS at 3/16/2023 1801    Acceptance, E,D, DU,NR by MO at 3/16/2023 1451    Acceptance, E, NR by LM at 3/15/2023 0521    Acceptance, E,D, DU,NR by MO at 3/14/2023 1458   Family Acceptance, E,TB,H, VU by MS at 3/16/2023 1801                   Point: Body mechanics (In Progress)     Learning Progress Summary           Patient Acceptance, E,D, NR by ZACH at 3/23/2023 1655    Acceptance, E,D, NR by JM at 3/22/2023 1545    Acceptance, E,TB, VU,NR by CB at 3/21/2023 1544    Acceptance, E,TB,H, VU by MS at 3/16/2023 1801    Acceptance, E,D, DU,NR by MO at 3/16/2023 1451    Acceptance, E, DU,NR by MO at 3/15/2023 1200    Acceptance, E, NR by LM at 3/15/2023 0521    Acceptance, E,D, DU,NR by MO at 3/14/2023 1458   Family Acceptance, E,TB,H, VU by MS at 3/16/2023 1801                   Point: Precautions (In Progress)     Learning Progress Summary           Patient Acceptance, E,D, NR by JM at 3/23/2023 1655    Acceptance, E,D, NR by JM at 3/22/2023 1545    Acceptance, E,TB, VU,NR by CB at 3/21/2023 1544    Acceptance, E,TB,H, VU by MS at 3/16/2023 1801    Acceptance, E,D, DU,NR by  MO at 3/16/2023 1451    Acceptance, E, DU,NR by MO at 3/15/2023 1200    Acceptance, E, NR by LM at 3/15/2023 0521    Acceptance, E,D, DU,NR by MO at 3/14/2023 1458   Family Acceptance, E,TB,H, VU by MS at 3/16/2023 1801                               User Key     Initials Effective Dates Name Provider Type Discipline     03/07/18 -  Tiffanie Grace PTA Physical Therapist Assistant PT    MS 06/16/21 -  Angel Luis Cr, RN Registered Nurse Nurse    CB 10/22/21 -  Bety Benitez, PT Physical Therapist PT    LM 10/13/22 -  Silke Grace, RN Registered Nurse Nurse    MO 01/27/23 -  Lacie Tanner, PT Student PT Student PT              PT Recommendation and Plan     Plan of Care Reviewed With: patient  Progress: improving  Outcome Evaluation: Pt perf co-tx w/OT/PT, pt awake, but fatigued quickly; pt tolerated sitting EOB -req MAX2 to get to EOB-worked on posture ,trunk strengthening, UE WB bilat , but noted incr tone RUE w/WB this session, pt perf STS x3, then took 3 shuffle steps w/LLE toward HOB-did trial L hand bed exit and seemed to help the side-step attempts , [place] SNU at [place]     Time Calculation:    PT Charges     Row Name 03/23/23 1655             Time Calculation    Start Time 1410  -      Stop Time 1428  -      Time Calculation (min) 18 min  -      PT Received On 03/23/23  -      PT - Next Appointment 03/24/23  -            User Key  (r) = Recorded By, (t) = Taken By, (c) = Cosigned By    Initials Name Provider Type     Tiffanie Grace PTA Physical Therapist Assistant              Therapy Charges for Today     Code Description Service Date Service Provider Modifiers Qty    76640369025 HC PT THER PROC EA 15 MIN 3/22/2023 Tiffanie Grace PTA GP 1    87201730374 HC PT THER SUPP EA 15 MIN 3/22/2023 Tiffanie Grace PTA GP 1    29320803460 HC PT THER PROC EA 15 MIN 3/23/2023 Tiffanie Grace PTA GP 1    32388599552 HC PT THER SUPP EA 15 MIN 3/23/2023 Grace, Tiffanie, PTA GP 1          PT  G-Codes  Outcome Measure Options: AM-PAC 6 Clicks Daily Activity (OT)  AM-PAC 6 Clicks Score (PT): 9  AM-PAC 6 Clicks Score (OT): 8  Modified [place] Scale: 4 - Moderately severe disability.  Unable to walk without assistance, and unable to attend to own bodily needs without assistance.  PT Discharge Summary  Anticipated Discharge Disposition (PT): skilled nursing facility, inpatient rehabilitation facility (pending progress)    Tiffanie Grace, PTA  3/23/2023

## 2023-03-24 LAB
ANION GAP SERPL CALCULATED.3IONS-SCNC: 11.4 MMOL/L (ref 5–15)
BUN SERPL-MCNC: 22 MG/DL (ref 8–23)
BUN/CREAT SERPL: 33.8 (ref 7–25)
CALCIUM SPEC-SCNC: 8.7 MG/DL (ref 8.6–10.5)
CHLORIDE SERPL-SCNC: 109 MMOL/L (ref 98–107)
CO2 SERPL-SCNC: 22.6 MMOL/L (ref 22–29)
CREAT SERPL-MCNC: 0.65 MG/DL (ref 0.76–1.27)
DEPRECATED RDW RBC AUTO: 43.6 FL (ref 37–54)
EGFRCR SERPLBLD CKD-EPI 2021: 100.7 ML/MIN/1.73
ERYTHROCYTE [DISTWIDTH] IN BLOOD BY AUTOMATED COUNT: 13 % (ref 12.3–15.4)
GLUCOSE SERPL-MCNC: 84 MG/DL (ref 65–99)
HCT VFR BLD AUTO: 40.4 % (ref 37.5–51)
HGB BLD-MCNC: 13.4 G/DL (ref 13–17.7)
MAGNESIUM SERPL-MCNC: 2.1 MG/DL (ref 1.6–2.4)
MCH RBC QN AUTO: 30.3 PG (ref 26.6–33)
MCHC RBC AUTO-ENTMCNC: 33.2 G/DL (ref 31.5–35.7)
MCV RBC AUTO: 91.4 FL (ref 79–97)
PHOSPHATE SERPL-MCNC: 3.2 MG/DL (ref 2.5–4.5)
PLATELET # BLD AUTO: 271 10*3/MM3 (ref 140–450)
PMV BLD AUTO: 11.2 FL (ref 6–12)
POTASSIUM SERPL-SCNC: 3.7 MMOL/L (ref 3.5–5.2)
RBC # BLD AUTO: 4.42 10*6/MM3 (ref 4.14–5.8)
SODIUM SERPL-SCNC: 143 MMOL/L (ref 136–145)
WBC NRBC COR # BLD: 7.47 10*3/MM3 (ref 3.4–10.8)

## 2023-03-24 PROCEDURE — 80048 BASIC METABOLIC PNL TOTAL CA: CPT | Performed by: STUDENT IN AN ORGANIZED HEALTH CARE EDUCATION/TRAINING PROGRAM

## 2023-03-24 PROCEDURE — 84100 ASSAY OF PHOSPHORUS: CPT | Performed by: STUDENT IN AN ORGANIZED HEALTH CARE EDUCATION/TRAINING PROGRAM

## 2023-03-24 PROCEDURE — 92523 SPEECH SOUND LANG COMPREHEN: CPT

## 2023-03-24 PROCEDURE — 83735 ASSAY OF MAGNESIUM: CPT | Performed by: STUDENT IN AN ORGANIZED HEALTH CARE EDUCATION/TRAINING PROGRAM

## 2023-03-24 PROCEDURE — 97110 THERAPEUTIC EXERCISES: CPT

## 2023-03-24 PROCEDURE — 97530 THERAPEUTIC ACTIVITIES: CPT

## 2023-03-24 PROCEDURE — 85027 COMPLETE CBC AUTOMATED: CPT | Performed by: STUDENT IN AN ORGANIZED HEALTH CARE EDUCATION/TRAINING PROGRAM

## 2023-03-24 RX ORDER — HYDRALAZINE HYDROCHLORIDE 10 MG/1
10 TABLET, FILM COATED ORAL EVERY 8 HOURS SCHEDULED
Status: DISCONTINUED | OUTPATIENT
Start: 2023-03-24 | End: 2023-05-02

## 2023-03-24 RX ADMIN — DOCUSATE SODIUM 50MG AND SENNOSIDES 8.6MG 2 TABLET: 8.6; 5 TABLET, FILM COATED ORAL at 20:45

## 2023-03-24 RX ADMIN — Medication 10 ML: at 20:46

## 2023-03-24 RX ADMIN — BACLOFEN 2.5 MG: 10 TABLET ORAL at 13:01

## 2023-03-24 RX ADMIN — CARVEDILOL 25 MG: 12.5 TABLET, FILM COATED ORAL at 13:01

## 2023-03-24 RX ADMIN — Medication 10 ML: at 13:02

## 2023-03-24 RX ADMIN — HYDRALAZINE HYDROCHLORIDE 10 MG: 10 TABLET, FILM COATED ORAL at 22:21

## 2023-03-24 RX ADMIN — APIXABAN 5 MG: 5 TABLET, FILM COATED ORAL at 20:45

## 2023-03-24 RX ADMIN — AMLODIPINE BESYLATE 10 MG: 10 TABLET ORAL at 13:00

## 2023-03-24 RX ADMIN — VALSARTAN 160 MG: 160 TABLET, FILM COATED ORAL at 13:00

## 2023-03-24 RX ADMIN — APIXABAN 5 MG: 5 TABLET, FILM COATED ORAL at 13:00

## 2023-03-24 RX ADMIN — BACLOFEN 2.5 MG: 10 TABLET ORAL at 20:45

## 2023-03-24 RX ADMIN — CARVEDILOL 25 MG: 12.5 TABLET, FILM COATED ORAL at 17:05

## 2023-03-24 NOTE — THERAPY TREATMENT NOTE
Patient Name: Manuel Durant  : 1950    MRN: [id number]                              Today's Date: 3/24/2023       Admit Date: 3/12/2023    Visit Dx:     ICD-10-CM ICD-9-CM   1. Intracranial hemorrhage (HCC)  I62.9 432.9   2. Altered mental status, unspecified altered mental status type  R41.82 780.97     Patient Active Problem List   Diagnosis   • Intracranial hemorrhage (HCC)   • Hypertensive emergency   • Acute DVT (deep venous thrombosis) (HCC)   • HTN (hypertension)   • Hypokalemia     History reviewed. No pertinent past medical history.  History reviewed. No pertinent surgical history.   General Information     Row Name 23 1554          Physical Therapy Time and Intention    Document Type therapy note (daily note)  -     Mode of Treatment co-treatment;individual therapy;physical therapy  -     Row Name 23 1555          General Information    Patient Profile Reviewed yes  -     Existing Precautions/Restrictions fall  -     Row Name 23 1554          Living Environment    People in Home alone  -     Row Name 23 1556          Cognition    Orientation Status (Cognition) oriented to;person  maybe improved, pt has language barrier and unable to properly utilize  in room on Ipad  -     Row Name 23 1558          Safety Issues, Functional Mobility    Safety Issues Affecting Function (Mobility) insight into deficits/self-awareness;judgment;problem-solving;safety precaution awareness  -     Impairments Affecting Function (Mobility) balance;coordination;cognition;endurance/activity tolerance;grasp;motor control;postural/trunk control;strength  -           User Key  (r) = Recorded By, (t) = Taken By, (c) = Cosigned By    Initials Name Provider Type    Tiffanie Chase PTA Physical Therapist Assistant               Mobility     Row Name 23 1601          Bed Mobility    Supine-Sit [place] (Bed Mobility) moderate assist (50% patient effort);verbal  cues;2 person assist  req 2 attempts, but w/cues, pt completed w/assist of 1  -     Sit-Supine [place] (Bed Mobility) 2 person assist;moderate assist (50% patient effort);maximum assist (25% patient effort)  -     Assistive Device (Bed Mobility) bed rails;draw sheet  -     Comment, (Bed Mobility) pt still pushing heavily w/LUE, educ offered on pulling instead, pt encouraged WB through RUE while sitting EOB  -     Row Name 03/24/23 1601          Sit-Stand Transfer    Sit-Stand [place] (Transfers) 2 person assist;moderate assist (50% patient effort)  -     Comment, (Sit-Stand Transfer) STS x3, R knee blocked 2 of the attempts, but on 1 attempt pt shifted wt on LLE so R knee did not require blocking  -[facility] Name 03/24/23 1601          Gait/Stairs (Locomotion)    Distance in Feet (Gait) 4 side steps to Left , mod -2  -     Deviations/Abnormal Patterns (Gait) festinating/shuffling  -           User Key  (r) = Recorded By, (t) = Taken By, (c) = Cosigned By    Initials Name Provider Type    Tiffanie Chase PTA Physical Therapist Assistant               Obj/Interventions     Row Name 03/24/23 1625          Motor Skills    Therapeutic Exercise --  LAQS x10  -           User Key  (r) = Recorded By, (t) = Taken By, (c) = Cosigned By    Initials Name Provider Type    Tiffanie Chase PTA Physical Therapist Assistant               Goals/Plan    No documentation.                Clinical Impression     Row Name 03/24/23 1626          Pain    Pretreatment Pain Rating 0/10 - no pain  -     Posttreatment Pain Rating 0/10 - no pain  -     Row Name 03/24/23 1626          Plan of Care Review    Progress improving  -     Outcome Evaluation Pt agreed to PT/OT, pt req less assist STS today, seemed to be following directions more, even w/o ; pt able to perf STS x3 and MOD of 2, took 4 side steps toward HOB ; shay EOB activities w/cues to avoid pushing too far w/LUE, plans SNU at [facility]      Row Name 03/24/23 1626          Therapy Assessment/Plan (PT)    Rehab Potential (PT) fair, will monitor progress closely  -     Criteria for Skilled Interventions Met (PT) yes  -     Row Name 03/24/23 1626          Vital Signs    O2 Delivery Pre Treatment room air  -     Row Name 03/24/23 1626          Positioning and Restraints    Pre-Treatment Position in bed  -     Post Treatment Position bed  -JM     In Bed supine;with nsg  pt needed cleaned up  -           User Key  (r) = Recorded By, (t) = Taken By, (c) = Cosigned By    Initials Name Provider Type    Tiffanie Chase PTA Physical Therapist Assistant               Outcome Measures     Row Name 03/24/23 1630 03/24/23 0930       How much help from another person do you currently need...    Turning from your back to your side while in flat bed without using bedrails? 2  - 2  -BC    Moving from lying on back to sitting on the side of a flat bed without bedrails? 2  - 2  -BC    Moving to and from a bed to a chair (including a wheelchair)? 1  - 1  -BC    Standing up from a chair using your arms (e.g., wheelchair, bedside chair)? 2  - 2  -BC    Climbing 3-5 steps with a railing? 1  - 1  -BC    To walk in hospital room? 1  - 1  -BC    AM-PAC 6 Clicks Score (PT) 9  - 9  -BC    Highest level of mobility 3 --> Sat at edge of bed  - 3 --> Sat at edge of bed  -BC    Row Name 03/24/23 1614          Functional Assessment    Outcome Measure Options AM-PAC 6 Clicks Daily Activity (OT)  -BS           User Key  (r) = Recorded By, (t) = Taken By, (c) = Cosigned By    Initials Name Provider Type    Tiffanie Chase PTA Physical Therapist Assistant    Carolyn Cavazos, RN Registered Nurse    Noni Donaldson, MARK Occupational Therapist                             Physical Therapy Education     Title: PT OT SLP Therapies (Done)     Topic: Physical Therapy (Done)     Point: Mobility training (Done)     Learning Progress Summary           Patient  Acceptance, E,D, NR,VU by JM at 3/24/2023 1631    Comment: seemed to comprehend commands this session    Acceptance, E,D, NR by JM at 3/23/2023 1655    Acceptance, E,D, NR by JM at 3/22/2023 1545    Acceptance, E,TB, VU,NR by CB at 3/21/2023 1544    Acceptance, E,TB,H, VU by MS at 3/16/2023 1801    Acceptance, E,D, DU,NR by MO at 3/16/2023 1451    Acceptance, E, DU,NR by MO at 3/15/2023 1200    Acceptance, E, NR by LM at 3/15/2023 0521    Acceptance, E,D, DU,NR by MO at 3/14/2023 1458   Family Acceptance, E,TB,H, VU by MS at 3/16/2023 1801                   Point: Home exercise program (Done)     Learning Progress Summary           Patient Acceptance, E,D, NR,VU by ZACH at 3/24/2023 1631    Comment: seemed to comprehend commands this session    Acceptance, E,D, NR by ZACH at 3/23/2023 1655    Acceptance, E,D, NR by JM at 3/22/2023 1545    Acceptance, E,TB, VU,NR by CB at 3/21/2023 1544    Acceptance, E,TB,H, VU by MS at 3/16/2023 1801    Acceptance, E,D, DU,NR by MO at 3/16/2023 1451    Acceptance, E, NR by LM at 3/15/2023 0521    Acceptance, E,D, DU,NR by MO at 3/14/2023 1458   Family Acceptance, E,TB,H, VU by MS at 3/16/2023 1801                   Point: Body mechanics (Done)     Learning Progress Summary           Patient Acceptance, E,D, NR,VU by ZACH at 3/24/2023 1631    Comment: seemed to comprehend commands this session    Acceptance, E,D, NR by ZACH at 3/23/2023 1655    Acceptance, E,D, NR by JM at 3/22/2023 1545    Acceptance, E,TB, VU,NR by CB at 3/21/2023 1544    Acceptance, E,TB,H, VU by MS at 3/16/2023 1801    Acceptance, E,D, DU,NR by MO at 3/16/2023 1451    Acceptance, E, DU,NR by MO at 3/15/2023 1200    Acceptance, E, NR by LM at 3/15/2023 0521    Acceptance, E,D, DU,NR by MO at 3/14/2023 1458   Family Acceptance, E,TB,H, VU by MS at 3/16/2023 1801                   Point: Precautions (Done)     Learning Progress Summary           Patient Acceptance, E,D, NR,VU by JM at 3/24/2023 1631    Comment: seemed to  comprehend commands this session    Acceptance, E,D, NR by  at 3/23/2023 1655    Acceptance, E,D, NR by  at 3/22/2023 1545    Acceptance, E,TB, VU,NR by CB at 3/21/2023 1544    Acceptance, E,TB,H, VU by MS at 3/16/2023 1801    Acceptance, E,D, DU,NR by MO at 3/16/2023 1451    Acceptance, E, DU,NR by MO at 3/15/2023 1200    Acceptance, E, NR by LM at 3/15/2023 0521    Acceptance, E,D, DU,NR by MO at 3/14/2023 1458   Family Acceptance, E,TB,H, VU by MS at 3/16/2023 1801                               User Key     Initials Effective Dates Name Provider Type [facility] 03/07/18 -  Tiffanie Grace PTA Physical Therapist Assistant PT    MS 06/16/21 -  Angel Luis Cr, RN Registered Nurse Nurse    CB 10/22/21 -  Bety Benitez, PT Physical Therapist PT    LM 10/13/22 -  Silke Grace, RN Registered Nurse Nurse    MO 01/27/23 -  Lacie Tanner, PT Student PT Student PT              PT Recommendation and Plan     Plan of Care Reviewed With: patient  Progress: improving  Outcome Evaluation: Pt agreed to PT/OT, pt req less assist STS today, seemed to be following directions more, even w/o ; pt able to perf STS x3 and MOD of 2, took 4 side steps toward HOB ; shay EOB activities w/cues to avoid pushing too far w/LUE, [place] SNU at [place]     Time Calculation:    PT Charges     Row Name 03/24/23 1635             Time Calculation    Start Time 1446  -      Stop Time 1505  -      Time Calculation (min) 19 min  -      PT Received On 03/24/23  -      PT - Next Appointment 03/25/23  -            User Key  (r) = Recorded By, (t) = Taken By, (c) = Cosigned By    Initials Name Provider Type    Tiffanie Chase PTA Physical Therapist Assistant              Therapy Charges for Today     Code Description Service Date Service Provider Modifiers Qty    31240114056 HC PT THER PROC EA 15 MIN 3/23/2023 Tiffanie Grace PTA GP 1    46635209375 HC PT THER SUPP EA 15 MIN 3/23/2023 Tiffanie Grace, PTA GP 1     19626360222 HC PT THER PROC EA 15 MIN 3/24/2023 Tiffanie Grace, LUCERO GP 1    92618383069 HC PT THER SUPP EA 15 MIN 3/24/2023 Tiffanie Grace PTA GP 1          PT G-Codes  Outcome Measure Options: AM-PAC 6 Clicks Daily Activity (OT)  AM-PAC 6 Clicks Score (PT): 9  AM-PAC 6 Clicks Score (OT): 10  Modified [place] Scale: 4 - Moderately severe disability.  Unable to walk without assistance, and unable to attend to own bodily needs without assistance.  PT Discharge Summary  Anticipated Discharge Disposition (PT): skilled nursing facility    Tiffanie Grace PTA  3/24/2023

## 2023-03-24 NOTE — PLAN OF CARE
Goal Outcome Evaluation:  Plan of Care Reviewed With: patient        Progress: improving  Outcome Evaluation: Pt agreed to PT/OT, pt req less assist STS today, seemed to be following directions more, even w/o ; pt able to perf STS x3 and MOD of 2, took 4 side steps toward HOB ; shay EOB activities w/cues to avoid pushing too far w/LUE, [place] SNU at [place]    Patient was not wearing a face mask during this therapy encounter. Therapist used appropriate personal protective equipment including eye protection, mask, and gloves.  Mask used was standard procedure mask. Appropriate PPE was worn during the entire therapy session. Hand hygiene was completed before and after therapy session. Patient is not in enhanced droplet precautions.

## 2023-03-24 NOTE — PLAN OF CARE
Goal Outcome Evaluation:  Plan of Care Reviewed With: patient        Progress: no change  Outcome Evaluation: Speech language evaluation completed, iPad  utilized throughout. MS Aphasia Screening Test (MAST) administered. Pt obtains a total score of 35/100 with combined receptive and expressive subscales.     (MAST) Pt obtains a total score of 15/50 within the expressive subscale. Subscores include: naming (0/10) circumlocutory “That is the right answer,” “What is the name of the name I’m asking?”; automatic speech (2/10) - can count, mod cues required; repetition: (8/10) breakdown only at sentence level; writing (0/10); verbal fluency: (5/10) circumlocutions present. (MAST) Pt obtains a total score of 20/50 within the receptive subscale. Subscores include: yes/no accuracy: (6/20) - yes bias only; object recognition (8/10); following instructions: (6/10) - difficulty with two step directions; reading instructions: (0/10) - reads aloud, unable to follow directions he has read.     Per , pt speech sounds slurred, frequent requests for clarification from  are required ranging from 1-4 for any phrase level responses and 50 of single word responses. Subjectively, fast rate, poor transitions between word with intelligibility further decreased as length increases.     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 Moderate dysarthria. Goals implemented to target: expressive and receptive language, motor speech. Will continue to follow while in house. Pt will require ongoing dysphagia, speech, language therapy at next level of care.

## 2023-03-24 NOTE — PLAN OF CARE
Problem: Fall Injury Risk  Goal: Absence of Fall and Fall-Related Injury  Outcome: Ongoing, Progressing  Intervention: Identify and Manage Contributors  Recent Flowsheet Documentation  Taken 3/23/2023 2002 by Elyssa Thorne RN  Medication Review/Management: medications reviewed  Intervention: Promote Injury-Free Environment  Recent Flowsheet Documentation  Taken 3/23/2023 2002 by Elyssa Thorne RN  Safety Promotion/Fall Prevention:   safety round/check completed   assistive device/personal items within reach     Problem: Skin Injury Risk Increased  Goal: Skin Health and Integrity  Outcome: Ongoing, Progressing  Intervention: Optimize Skin Protection  Recent Flowsheet Documentation  Taken 3/23/2023 2002 by Elyssa Thorne RN  Head of Bed (HOB) Positioning: HOB elevated     Problem: Adult Inpatient Plan of Care  Goal: Plan of Care Review  Outcome: Ongoing, Progressing  Flowsheets (Taken 3/23/2023 2131)  Progress: improving  Plan of Care Reviewed With: patient  Goal: Patient-Specific Goal (Individualized)  Outcome: Ongoing, Progressing  Goal: Absence of Hospital-Acquired Illness or Injury  Outcome: Ongoing, Progressing  Intervention: Identify and Manage Fall Risk  Recent Flowsheet Documentation  Taken 3/23/2023 2002 by Elyssa Thorne RN  Safety Promotion/Fall Prevention:   safety round/check completed   assistive device/personal items within reach  Intervention: Prevent Skin Injury  Recent Flowsheet Documentation  Taken 3/23/2023 2002 by Elyssa Thorne RN  Body Position:   supine   tilted  Intervention: Prevent and Manage VTE (Venous Thromboembolism) Risk  Recent Flowsheet Documentation  Taken 3/23/2023 2002 by Elyssa Thorne RN  Activity Management: activity adjusted per tolerance  Goal: Optimal Comfort and Wellbeing  Outcome: Ongoing, Progressing  Intervention: Provide Person-Centered Care  Recent Flowsheet Documentation  Taken 3/23/2023 2002 by Elyssa Thorne RN  Trust  Relationship/Rapport:   care explained   reassurance provided  Goal: Readiness for Transition of Care  Outcome: Ongoing, Progressing     Problem: Adjustment to Illness (Stroke, Ischemic/Transient Ischemic Attack)  Goal: Optimal Coping  Outcome: Ongoing, Progressing     Problem: Bowel Elimination Impaired (Stroke, Ischemic/Transient Ischemic Attack)  Goal: Effective Bowel Elimination  Outcome: Ongoing, Progressing     Problem: Cerebral Tissue Perfusion (Stroke, Ischemic/Transient Ischemic Attack)  Goal: Optimal Cerebral Tissue Perfusion  Outcome: Ongoing, Progressing     Problem: Cognitive Impairment (Stroke, Ischemic/Transient Ischemic Attack)  Goal: Optimal Cognitive Function  Outcome: Ongoing, Progressing     Problem: Communication Impairment (Stroke, Ischemic/Transient Ischemic Attack)  Goal: Improved Communication Skills  Outcome: Ongoing, Progressing     Problem: Functional Ability Impaired (Stroke, Ischemic/Transient Ischemic Attack)  Goal: Optimal Functional Ability  Outcome: Ongoing, Progressing  Intervention: Optimize Functional Ability  Recent Flowsheet Documentation  Taken 3/23/2023 2002 by Elyssa Thorne RN  Activity Management: activity adjusted per tolerance     Problem: Respiratory Compromise (Stroke, Ischemic/Transient Ischemic Attack)  Goal: Effective Oxygenation and Ventilation  Outcome: Ongoing, Progressing  Intervention: Optimize Oxygenation and Ventilation  Recent Flowsheet Documentation  Taken 3/23/2023 2002 by Elyssa Thorne RN  Head of Bed (HOB) Positioning: HOB elevated     Problem: Sensorimotor Impairment (Stroke, Ischemic/Transient Ischemic Attack)  Goal: Improved Sensorimotor Function  Outcome: Ongoing, Progressing     Problem: Swallowing Impairment (Stroke, Ischemic/Transient Ischemic Attack)  Goal: Optimal Eating and Swallowing without Aspiration  Outcome: Ongoing, Progressing     Problem: Urinary Elimination Impaired (Stroke, Ischemic/Transient Ischemic Attack)  Goal:  Effective Urinary Elimination  Outcome: Ongoing, Progressing     Problem: Adjustment to Illness (Stroke, Hemorrhagic)  Goal: Optimal Coping  Outcome: Ongoing, Progressing     Problem: Bowel Elimination Impaired (Stroke, Hemorrhagic)  Goal: Effective Bowel Elimination  Outcome: Ongoing, Progressing     Problem: Cerebral Tissue Perfusion (Stroke, Hemorrhagic)  Goal: Optimal Cerebral Tissue Perfusion  Outcome: Ongoing, Progressing     Problem: Cognitive Impairment (Stroke, Hemorrhagic)  Goal: Optimal Cognitive Function  Outcome: Ongoing, Progressing     Problem: Communication Impairment (Stroke, Hemorrhagic)  Goal: Effective Communication Skills  Outcome: Ongoing, Progressing     Problem: Functional Ability Impaired (Stroke, Hemorrhagic)  Goal: Optimal Functional Ability  Outcome: Ongoing, Progressing  Intervention: Optimize Functional Ability  Recent Flowsheet Documentation  Taken 3/23/2023 2002 by Elyssa Thorne RN  Activity Management: activity adjusted per tolerance     Problem: Pain (Stroke, Hemorrhagic)  Goal: Acceptable Pain Control  Outcome: Ongoing, Progressing     Problem: Respiratory Compromise (Stroke, Hemorrhagic)  Goal: Effective Oxygenation and Ventilation  Outcome: Ongoing, Progressing  Intervention: Optimize Oxygenation and Ventilation  Recent Flowsheet Documentation  Taken 3/23/2023 2002 by Elyssa Thorne RN  Head of Bed (HOB) Positioning: HOB elevated     Problem: Sensorimotor Impairment (Stroke, Hemorrhagic)  Goal: Improved Sensorimotor Function  Outcome: Ongoing, Progressing     Problem: Swallowing Impairment (Stroke, Hemorrhagic)  Goal: Optimal Eating and Swallowing Without Aspiration  Outcome: Ongoing, Progressing     Problem: Urinary Elimination Impaired (Stroke, Hemorrhagic)  Goal: Effective Urinary Elimination  Outcome: Ongoing, Progressing   Goal Outcome Evaluation:  Plan of Care Reviewed With: patient        Progress: improving

## 2023-03-24 NOTE — PLAN OF CARE
Goal Outcome Evaluation:           Progress: improving  Outcome Evaluation: Pt agreeable to OT/PT session this p.m. Pt participated in bed mobility with Mod Ax2 and was dependent for LBD and toileting tasks. Pt continues to present with strength, endurance, and balance impacting ADL independence. Weight shifting and weight bearing implemented to address L lateral lean. Continue with skilled IPOT services per POC. Rec d/c to SNF.

## 2023-03-24 NOTE — PROGRESS NOTES
Name: Manuel Durant ADMIT: 3/12/2023   : 1950  PCP: Provider, No Known    MRN: [id number] LOS: 12 days   AGE/SEX: 73 y.o. male  ROOM: [facility]     Subjective   Subjective   Patient appears comfortable & in no apparent distress.  Used translating device to communicate with patient for Serbian interpretation with minimal understanding.  Tolerating diet.  Voices no new concerns.  Discussed with RN--no overnight events.    Review of Systems   Respiratory: Negative for cough and shortness of breath.    Cardiovascular: Negative for chest pain and leg swelling.   Gastrointestinal: Negative for abdominal pain and nausea.   Musculoskeletal: Negative for gait problem and myalgias.   Neurological: Negative for light-headedness and headaches.        Objective   Objective   Vital Signs  Temp:  [98.2 °F (36.8 °C)-99.1 °F (37.3 °C)] 98.2 °F (36.8 °C)  Heart Rate:  [53-66] 63  Resp:  [18] 18  BP: (139-178)/(75-88) 154/80  SpO2:  [91 %-97 %] 91 %  on   ;   Device (Oxygen Therapy): room air  Body mass index is 25.23 kg/m².     Physical Exam  Constitutional:       General: He is not in acute distress.     Appearance: He is not toxic-appearing.   Cardiovascular:      Rate and Rhythm: Normal rate.      Heart sounds: Normal heart sounds.   Pulmonary:      Effort: Pulmonary effort is normal.      Breath sounds: Normal breath sounds.   Abdominal:      General: Bowel sounds are normal.      Palpations: Abdomen is soft.   Musculoskeletal:      Right lower leg: No edema.      Left lower leg: No edema.   Skin:     General: Skin is warm and dry.   Neurological:      Mental Status: He is alert.       Physical exam on 3/24/2023 as per above.    Results Review     I reviewed the patient's new clinical results.  Results from last 7 days   Lab Units 23  0643 23  0636 23  0704 23  0554   WBC 10*3/mm3 7.47 7.64 7.02 7.06   HEMOGLOBIN g/dL 13.4 13.6 13.5 14.0   PLATELETS 10*3/mm3 271 268 248 234     Results from  11-Jan-2023 14:25 last 7 days   Lab Units 03/24/23  0643 03/23/23  0636 03/22/23  0704 03/21/23  0554   SODIUM mmol/L 143 142 142 140   POTASSIUM mmol/L 3.7 3.8 3.8 4.0   CHLORIDE mmol/L 109* 109* 110* 105   CO2 mmol/L 22.6 23.0 23.0 18.6*   BUN mg/dL 22 20 20 20   CREATININE mg/dL 0.65* 0.67* 0.73* 0.71*   GLUCOSE mg/dL 84 84 98 107*   EGFR mL/min/1.73 100.7 99.8 97.3 98.1       Results from last 7 days   Lab Units 03/24/23  0643 03/23/23  0636 03/22/23  0704 03/21/23  0554   CALCIUM mg/dL 8.7 8.3* 8.4* 8.5*   MAGNESIUM mg/dL 2.1 2.4 2.3 2.1   PHOSPHORUS mg/dL 3.2 3.3 3.4 3.6       Glucose   Date/Time Value Ref Range Status   03/23/2023 1659 132 (H) 70 - 130 mg/dL Final     Comment:     Meter: [id number] : [id number] Jhon Rojas JESSE       CT Head Without Contrast    Result Date: 3/23/2023   1. Since most recent head CT 4 days ago on 03/19/2023, the multilobulated acute intraparenchymal hemorrhage extending from the left thalamus through the posterior limb of the left internal capsule into the posterior left putamen is stable to perhaps slightly contracted and decreased in size by 1 mm or 2, currently measures 4.2 x 2.8 x 2.8 cm, whereas previously measured 4.3 x 3 x 3.3 cm; however, the surrounding edema continues to increase, previously tracked 5 x 5.9 cm, now tracks up to 5.9 x 6.2 x 5.5 cm. There is relatively stable mass effect on the third ventricle with partial effacement of the mid and posterior third ventricle and 4 mm left to right midline shift. There has been interval decrease in intraventricular hemorrhage with only tiny amount of residual intraventricular hemorrhage in the frontal horn and posterior trigone of the left lateral ventricle. There is stable mild hydrocephalus when compared to 03/19/2023. The frontal horn of the left lateral ventricle and frontal horn and temporal horn of the right lateral ventricle in the anterior third ventricle are clearly slightly larger than the were on the presenting head CT on  03/12/2023, but they are without significant change to equivocally very slightly larger than the were on head CT 03/19/2023, and continued follow-up is suggested. The remainder of the head CT is unremarkable.  I communicated the results to Roxanna Ramirez from neurosurgery 03/23/2023 at 10:15 a.m.  Radiation dose reduction techniques were utilized, including automated exposure control and exposure modulation based on body size.  This report was finalized on 3/23/2023 10:33 AM by Dr. Orlando Araujo M.D.      I have personally reviewed all medications:  Scheduled Medications  amLODIPine, 10 mg, Oral, QAM AC  apixaban, 5 mg, Oral, Q12H  baclofen, 2.5 mg, Oral, Q12H  carvedilol, 25 mg, Oral, BID With Meals  hydrALAZINE, 10 mg, Oral, Q8H  senna-docusate sodium, 2 tablet, Oral, BID  sodium chloride, 10 mL, Intravenous, Q12H  valsartan, 160 mg, Oral, QAM AC    Infusions   Diet  Diet: Regular/House Diet; No Mixed Consistencies; Texture: Soft to Chew (NDD 3); Soft to Chew: Ground Meat; Fluid Consistency: Honey Thick    I have personally reviewed:  [x]  Laboratory   []  Microbiology   [x]  Radiology   [x]  EKG/Telemetry  []  Cardiology/Vascular   []  Pathology    []  Records       Assessment/Plan     Active Hospital Problems    Diagnosis  POA   • **Intracranial hemorrhage (HCC) [I62.9]  Yes   • Acute DVT (deep venous thrombosis) (HCC) [I82.409]  Unknown   • HTN (hypertension) [I10]  Unknown   • Hypokalemia [E87.6]  Unknown   • Hypertensive emergency [I16.1]  Unknown      Resolved Hospital Problems   No resolved problems to display.       Mr. Durant is a 73 year old male who initially presented to the hospital 3/12/23 for intracranial bleed. He was found unresponsive by friends and initially taken to [facility] where he was found to have poorly controlled BP and right sided hemiplegia. He was transferred here for closer monitoring in the ICU and neurosurgical consultation. He was cleared to move out of the ICU on  3/16/23.     • Intracranial hemorrhage: CTH 3/17/23 stable left basal ganglia/internal capsule hemorrhage with left lateral ventricular hemorrhage. Started on Lovenox for DVT and repeat CTH on 3/18 & 3/23/2023 also stable. ANDRADE following. Goal BP < 150 systolic on most recent ANDRADE note. Repeat CTH stable. Eventual outpatient MRI once hemorrhage cleared.     • HTN emergency: SBP slightly greater than optimal at present.  SBP goal less than 150.  Amlodipine 10 mg PO daily, valsartan 160 mg PO daily, Coreg 25 mg BID dose, & add hydralazine 10 mg PO TID.  Monitor BP for changes.      • Acute DVT: found 3/17/23 with acute RLE DVT in soleal. ANDRADE okayed switch from Lovenox to apixaban BID.      • Hypokalemia: Resolved.     Discussed with patient (via ) and nursing staff.      · Treatment dose enoxaparin adequate for DVT prophylaxis.  · CPR  · Discussed with patient & RN & family friend--Mr. Ball ([facility] [facility] [place] to relocate Mr. Durant to family in  & also certain that Mr. Durant carries insurance through current employer at [employer]) via telephone  · Anticipate discharge (remains medically stable) once insurance/placement issues addressed--CCP following      SLICK Lockhart  [facility]  03/24/23  16:09 EDT

## 2023-03-24 NOTE — THERAPY TREATMENT NOTE
Patient Name: Manuel Durant  : 1950    MRN: [id number]                              Today's Date: 3/24/2023       Admit Date: 3/12/2023    Visit Dx:     ICD-10-CM ICD-9-CM   1. Intracranial hemorrhage (HCC)  I62.9 432.9   2. Altered mental status, unspecified altered mental status type  R41.82 780.97     Patient Active Problem List   Diagnosis   • Intracranial hemorrhage (HCC)   • Hypertensive emergency   • Acute DVT (deep venous thrombosis) (HCC)   • HTN (hypertension)   • Hypokalemia     History reviewed. No pertinent past medical history.  History reviewed. No pertinent surgical history.   General Information     Row Name 23 1604          OT Time and Intention    Document Type therapy note (daily note)  -BS     Mode of Treatment co-treatment;occupational therapy;physical therapy  -BS     Row Name 23 1606          General Information    Patient Profile Reviewed yes  -BS     Existing Precautions/Restrictions fall  -BS     Barriers to Rehab medically complex;previous functional deficit  -BS     Row Name 23 1604          Cognition    Orientation Status (Cognition) oriented to;person  Language barrier. Unable to use Ipad with vison deficits  -BS     Row Name 23 1601          Safety Issues, Functional Mobility    Safety Issues Affecting Function (Mobility) impulsivity;insight into deficits/self-awareness;judgment;problem-solving;safety precaution awareness;sequencing abilities  -BS     Impairments Affecting Function (Mobility) balance;coordination;cognition;endurance/activity tolerance;grasp;motor control;postural/trunk control;strength  -BS           User Key  (r) = Recorded By, (t) = Taken By, (c) = Cosigned By    Initials Name Provider Type    BS Noni Mcdowell OT Occupational Therapist                 Mobility/ADL's     Row Name 23 1600          Bed Mobility    Bed Mobility supine-sit;sit-supine;scooting/bridging  -BS     Scooting/Bridging [place] (Bed Mobility) verbal  cues;nonverbal cues (demo/gesture);maximum assist (25% patient effort)  -BS     Supine-Sit [place] (Bed Mobility) moderate assist (50% patient effort);2 person assist;verbal cues;nonverbal cues (demo/gesture)  -BS     Sit-Supine [place] (Bed Mobility) moderate assist (50% patient effort);2 person assist;verbal cues;nonverbal cues (demo/gesture)  -BS     Bed Mobility, Safety Issues decreased use of arms for pushing/pulling;decreased use of legs for bridging/pushing;impaired trunk control for bed mobility  -BS     Assistive Device (Bed Mobility) bed rails;draw sheet;head of bed elevated  -BS     Row Name 03/24/23 1605          Transfers    Transfers sit-stand transfer;stand-sit transfer  -BS     Comment, (Transfers) x3 RLE block  -BS     Row Name 03/24/23 1605          Sit-Stand Transfer    Sit-Stand [place] (Transfers) moderate assist (50% patient effort);2 person assist;verbal cues;nonverbal cues (demo/gesture)  -BS     Comment, (Sit-Stand Transfer) BUE support  -BS     Row Name 03/24/23 1605          Stand-Sit Transfer    Stand-Sit [place] (Transfers) verbal cues;nonverbal cues (demo/gesture);moderate assist (50% patient effort);2 person assist  -BS     Row Name 03/24/23 1605          Activities of Daily Living    BADL Assessment/Intervention lower body dressing;toileting  -     Row Name 03/24/23 1605          Lower Body Dressing Assessment/Training    [place] Level (Lower Body Dressing) lower body dressing skills;dependent (less than 25% patient effort)  -BS     Position (Lower Body Dressing) supine  -BS     Row Name 03/24/23 1605          Toileting Assessment/Training    [place] Level (Toileting) dependent (less than 25% patient effort);adjust/manage clothing;change pad/brief;perform perineal hygiene  -BS     Comment, (Toileting) Pt saturated upon arrival with purewick removed  -BS           User Key  (r) = Recorded By, (t) = Taken By, (c) = Cosigned By    Initials Name Provider  Type    Noni Donaldson OT Occupational Therapist               Obj/Interventions     Row Name 03/24/23 1607          Shoulder (Therapeutic Exercise)    Shoulder (Therapeutic Exercise) AROM (active range of motion);PROM (passive range of motion)  -     Shoulder AROM (Therapeutic Exercise) left;flexion;extension;aBduction;aDduction;10 repetitions  -BS     Shoulder PROM (Therapeutic Exercise) flexion;extension;aBduction;aDduction;10 repetitions;right  -BS     Row Name 03/24/23 1607          Motor Skills    Therapeutic Exercise shoulder  -     Row Name 03/24/23 1607          Balance    Balance Assessment sitting static balance;sitting dynamic balance;standing static balance  -BS     Static Sitting Balance minimal assist  -BS     Dynamic Sitting Balance moderate assist;maximum assist;1-person assist  -BS     Position, Sitting Balance unsupported;sitting edge of bed  -BS     Static Standing Balance maximum assist;2-person assist  -BS     Position/Device Used, Standing Balance supported  BUE support  -BS     Balance Interventions sitting;standing;sit to stand;supported;static;dynamic;occupation based/functional task;weight shifting activity  -     Comment, Balance R lateral lean. Practices weight shifting and weight bearing b/l  -     Row Name 03/24/23 1607          Neuromuscular Re-education    Interventions (Neuromuscular Re-education) deep pressure awareness;facilitation/inhibition;inhibitory positioning;pattern movements;weight bearing;weight shifting  -           User Key  (r) = Recorded By, (t) = Taken By, (c) = Cosigned By    Initials Name Provider Type    Noni Donaldson OT Occupational Therapist               Goals/Plan    No documentation.                Clinical Impression     Row Name 03/24/23 1609          Pain Assessment    Pretreatment Pain Rating 0/10 - no pain  -BS     Posttreatment Pain Rating 0/10 - no pain  -BS     Row Name 03/24/23 1609          Pain Scale: FACES Pre/Post-Treatment     Pain: FACES Scale, Pretreatment 0-->no hurt  -BS     Posttreatment Pain Rating 0-->no hurt  -BS     Row Name 03/24/23 1609          Plan of Care Review    Progress improving  -BS     Outcome Evaluation Pt agreeable to OT/PT session this p.m. Pt participated in bed mobility with Mod Ax2 and was dependent for LBD and toileting tasks. Pt continues to present with strength, endurance, and balance impacting ADL independence. Weight shifting and weight bearing implemented to address L lateral lean. Continue with skilled IPOT services per POC. Rec d/c to SNF.  -BS     Row Name 03/24/23 1609          Therapy Assessment/Plan (OT)    Rehab Potential (OT) good, to achieve stated therapy goals  -BS     Criteria for Skilled Therapeutic Interventions Met (OT) yes;skilled treatment is necessary  -BS     Therapy Frequency (OT) 5 times/wk  -BS     Row Name 03/24/23 1605          Therapy Plan Review/Discharge Plan (OT)    Anticipated Discharge Disposition (OT) skilled nursing facility  -BS     Row Name 03/24/23 1609          Vital Signs    O2 Delivery Pre Treatment room air  -BS     Pre Patient Position Supine  -BS     Intra Patient Position Standing  -BS     Post Patient Position Supine  -BS     Row Name 03/24/23 1600          Positioning and Restraints    Pre-Treatment Position in bed  -BS     Post Treatment Position bed  -BS     In Bed with other staff  With CNA  -BS           User Key  (r) = Recorded By, (t) = Taken By, (c) = Cosigned By    Initials Name Provider Type    Noni Donaldson, OT Occupational Therapist               Outcome Measures     Row Name 03/24/23 1619          How much help from another is currently needed...    Putting on and taking off regular lower body clothing? 1  -BS     Bathing (including washing, rinsing, and drying) 1  -BS     Toileting (which includes using toilet bed pan or urinal) 2  -BS     Putting on and taking off regular upper body clothing 2  -BS     Taking care of personal grooming (such as  brushing teeth) 2  -BS     Eating meals 2  -BS     AM-PAC 6 Clicks Score (OT) 10  -BS     Row Name 03/24/23 0930          How much help from another person do you currently need...    Turning from your back to your side while in flat bed without using bedrails? 2  -BC     Moving from lying on back to sitting on the side of a flat bed without bedrails? 2  -BC     Moving to and from a bed to a chair (including a wheelchair)? 1  -BC     Standing up from a chair using your arms (e.g., wheelchair, bedside chair)? 2  -BC     Climbing 3-5 steps with a railing? 1  -BC     To walk in hospital room? 1  -BC     AM-PAC 6 Clicks Score (PT) 9  -BC     Highest level of mobility 3 --> Sat at edge of bed  -BC     Row Name 03/24/23 1614          Functional Assessment    Outcome Measure Options AM-PAC 6 Clicks Daily Activity (OT)  -BS           User Key  (r) = Recorded By, (t) = Taken By, (c) = Cosigned By    Initials Name Provider Type    Carolyn Cavazos RN Registered Nurse    Noni Donaldson, MARK Occupational Therapist                Occupational Therapy Education     Title: PT OT SLP Therapies (In Progress)     Topic: Occupational Therapy (Done)     Point: ADL training (Done)     Description:   Instruct learner(s) on proper safety adaptation and remediation techniques during self care or transfers.   Instruct in proper use of assistive devices.              Learning Progress Summary           Patient Acceptance, E,TB,H, VU by MS at 3/16/2023 1801    Acceptance, E, NR by LM at 3/15/2023 0521   Family Acceptance, E,TB,H, VU by MS at 3/16/2023 1801                   Point: Home exercise program (Done)     Description:   Instruct learner(s) on appropriate technique for monitoring, assisting and/or progressing therapeutic exercises/activities.              Learning Progress Summary           Patient Acceptance, E,TB,H, VU by MS at 3/16/2023 1801    Acceptance, E, NR by LM at 3/15/2023 0521   Family Acceptance, E,TB,H, VU by MS at  3/16/2023 1801                   Point: Precautions (Done)     Description:   Instruct learner(s) on prescribed precautions during self-care and functional transfers.              Learning Progress Summary           Patient Acceptance, E,TB,H, VU by MS at 3/16/2023 1801    Acceptance, E, NR by LM at 3/15/2023 0521   Family Acceptance, E,TB,H, VU by MS at 3/16/2023 1801                   Point: Body mechanics (Done)     Description:   Instruct learner(s) on proper positioning and spine alignment during self-care, functional mobility activities and/or exercises.              Learning Progress Summary           Patient Acceptance, E,TB,H, VU by MS at 3/16/2023 1801    Acceptance, E, NR by LM at 3/15/2023 0521   Family Acceptance, E,TB,H, VU by MS at 3/16/2023 1801                               User Key     Initials Effective Dates Name Provider Type Discipline    MS 06/16/21 -  Angel Luis Cr, RN Registered Nurse Nurse     10/13/22 -  Silke Grace, RN Registered Nurse Nurse              OT Recommendation and Plan  Therapy Frequency (OT): 5 times/wk  Plan of Care Review  Progress: improving  Outcome Evaluation: Pt agreeable to OT/PT session this p.m. Pt participated in bed mobility with Mod Ax2 and was dependent for LBD and toileting tasks. Pt continues to present with strength, endurance, and balance impacting ADL independence. Weight shifting and weight bearing implemented to address L lateral lean. Continue with skilled IPOT services per POC. Rec d/c to SNF.     Time Calculation:    Time Calculation- OT     Row Name 03/24/23 1614             Time Calculation- OT    OT Start Time 1446  -BS      OT Stop Time 1505  -BS      OT Time Calculation (min) 19 min  -BS      Total Timed Code Minutes- OT 19 minute(s)  -BS      OT Received On 03/24/23  -BS      OT - Next Appointment 03/27/23  -BS      OT Goal Re-Cert Due Date 03/28/23  -BS         Timed Charges    50843 - OT Therapeutic Activity Minutes 19  -BS         Total  Minutes    Timed Charges Total Minutes 19  -BS       Total Minutes 19  -BS            User Key  (r) = Recorded By, (t) = Taken By, (c) = Cosigned By    Initials Name Provider Type    Noni Donaldson OT Occupational Therapist              Therapy Charges for Today     Code Description Service Date Service Provider Modifiers Qty    68011918361  OT THERAPEUTIC ACT EA 15 MIN 3/24/2023 Noni Mcdowell OT GO 1               Noni Mcdowell OT  3/24/2023

## 2023-03-24 NOTE — THERAPY EVALUATION
Acute Care - Speech Language Pathology Initial Evaluation  [facility]     Patient Name: Manuel Durant  : 1950  MRN: [id number]  Today's Date: 3/24/2023               Admit Date: 3/12/2023     Visit Dx:    ICD-10-CM ICD-9-CM   1. Intracranial hemorrhage (HCC)  I62.9 432.9   2. Altered mental status, unspecified altered mental status type  R41.82 780.97     Patient Active Problem List   Diagnosis   • Intracranial hemorrhage (HCC)   • Hypertensive emergency   • Acute DVT (deep venous thrombosis) (HCC)   • HTN (hypertension)   • Hypokalemia     History reviewed. No pertinent past medical history.  History reviewed. No pertinent surgical history.    SLP Recommendation and Plan    Speech language evaluation completed, iPad  utilized throughout. [place] Aphasia Screening Test (MAST) administered. Pt obtains a total score of 35/100 with combined receptive and expressive subscales.     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 Moderate dysarthria. Goals implemented to target: expressive and receptive language, motor speech. Will continue to follow while in house. Pt will require ongoing dysphagia, speech, language therapy at next level of care.     SLP EVALUATION (last 72 hours)     SLP SLC Evaluation     Row Name 23 1400       Communication Assessment/Intervention    Document Type evaluation  -AB    Subjective Information no complaints  -AB    Patient Observations alert;cooperative;agree to therapy  -AB    Patient/Family/Caregiver Comments/Observations [place],  utlized throughout  -AB    Patient Effort good  -AB    Symptoms Noted During/After Treatment none  -AB       General Information    Patient Profile Reviewed yes  -AB    Pertinent History Of Current Problem left basal ganglia/internal capsule hemorrhage  -AB     Precautions/Limitations, Vision WFL with corrective lenses;for purposes of eval  -AB    Precautions/Limitations, Hearing WFL;for purposes of eval  -AB    Patient Level of Education unknown  -AB    Prior Level of Function-Communication WFL  -AB    Plans/Goals Discussed with patient  -AB    Barriers to Rehab medically complex  -AB    Standardized Assessment Used --  MS Aphasia Screening Test  -AB       Pain    Additional Documentation Pain Scale: Numbers Pre/Post-Treatment (Group)  -AB       Pain Scale: Numbers Pre/Post-Treatment    Pretreatment Pain Rating 0/10 - no pain  -AB    Posttreatment Pain Rating 0/10 - no pain  -AB       Comprehension Assessment/Intervention    Comprehension Assessment/Intervention Auditory Comprehension  -AB       Auditory Comprehension Assessment/Intervention    Auditory Comprehension (Communication) moderate impairment  -AB    Auditory Comprehension Communication, Comment (MAST) Pt obtains a total score of 20/50 within the receptive subscale. Subscores include: yes/no accuracy: (6/20) - yes bias only; object recognition (8/10); following instructions: (6/10) - difficulty with two step directions; reading instructions: (0/10) - reads aloud, unable to follow directions he has read.  -AB       Expression Assessment/Intervention    Expression Assessment/Intervention verbal expression  -AB       Verbal Expression Assessment/Intervention    Verbal Expression moderate impairment  -AB    Verbal Expression, Comment (MAST) Pt obtains a total score of 15/50 within the expressive subscale. Subscores include: naming (0/10) circumlocutory “That is the right answer,” “What is the name of the name I’m asking?”; automatic speech (2/10) - can count, mod cues required; repetition: (8/10) breakdown only at sentence level; writing (0/10); verbal fluency: (5/10) circumlocutions present.  -AB       Oral Motor Structure and Function    Oral Motor Structure and Function moderate impairment  -AB    Oral Lesions or  Structural Abnormalities and/or variants none  -AB    Dentition Assessment natural, present and adequate  -AB    Mucosal Quality moist, healthy  -AB       Oral Musculature and Cranial Nerve Assessment    Oral Motor General Assessment oral labial or buccal impairment;lingual impairment  -AB    Oral Labial or Buccal Impairment, Detail, Cranial Nerve VII (Facial): right labial droop  -AB    Lingual Impairment, Detail. Cranial Nerves IX, XII (Glossopharyngeal and Hypoglossal) reduced strength left;reduced lingual ROM  -AB       Motor Speech Assessment/Intervention    Motor Speech Function moderate impairment  -AB    Characteristics Consistent with Dysarthria increased rate;decreased intensity;decreased articulation  -AB    Motor Speech, Comment Per , pt speech sounds slurred, frequent requests for clarification from  are required ranging from 1-4 for any phrase level responses and 50 of single word responses. Subjectively, fast rate, poor transitions between word with intelligibility further decreased as length increases.  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 Moderate dysarthria.  -AB    Rehab Potential/Prognosis good  -AB    SLC Criteria for Skilled Therapy Interventions Met yes  -AB    Functional Impact difficulty communicating wants, needs;difficulty communicating in an emergency;decreased ability to respond to situations safely;unable to care for self  -AB       SLP Treatment Clinical Impressions    Care Plan Review evaluation/treatment results reviewed;care plan/treatment goals reviewed;risks/benefits reviewed;current/potential barriers reviewed;patient/other agree to care plan  -AB       Recommendations    Therapy Frequency (SLP SLC) PRN   -AB    Predicted Duration Therapy Intervention (Days) until discharge  -AB    Anticipated Discharge Disposition (SLP) anticipate therapy at next level of care  -AB    Communication Strategy Suggestions avoid multi-step instructions;avoid open-ended questions  -AB       Communication Treatment Objective and Progress Goals (SLP)    Auditory Comprehension Treatment Objectives Auditory Comprehension Treatment Objectives (Group)  -AB    Verbal Expression Treatment Objectives Verbal Expression Treatment Objectives (Group)  -AB    Motor Speech/Dysarthria Treatment Objectives Motor Speech/Dysarthria Treatment Objectives (Group)  -AB       Auditory Comprehension Treatment Objectives    Comprehend Questions Selection comprehend questions, SLP goal 1  -AB    Follow Directions Selection follow directions, SLP goal 1  -AB       Comprehend Questions Goal 1 (SLP)    Improve Ability to Comprehend Questions Goal 1 (SLP) simple yes/no questions;complex yes/no questions;80%;with minimal cues (75-90%)  -AB    Time Frame (Comprehend Questions Goal 1, SLP) short term goal (STG)  -AB    Progress/Outcomes (Comprehend Questions Goal 1, SLP) new goal  -AB       Follow Directions Goal 2 (SLP)    Improve Ability to Follow Directions Goal 1 (SLP) 1 step direction with objects;1 step direction without objects;2 step commands;80%;with minimal cues (75-90%)  -AB    Time Frame (Follow Directions Goal 1, SLP) short term goal (STG)  -AB    Progress/Outcomes (Follow Directions Goal 1, SLP) new goal  -AB       Verbal Expression Treatment Objectives    Word Retrieval Skills Selection word retrieval, SLP goal 1;word retrieval, SLP goal 2  -AB       Word Retrieval Skills Goal 1 (SLP)    Improve Word Retrieval Skills By Goal 1 (SLP) confrontational naming task;80%;with minimal cues (75-90%)  -AB    Time Frame (Word Retrieval Goal 1, SLP) short term goal (STG)  -AB    Progress/Outcomes (Word Retrieval Goal 1, SLP) new goal  -AB       Word Retrieval Skills  Goal 2 (SLP)    Improve Word Retrieval Skills By Goal 2 (SLP) completing automatic speech task, sing “Happy Birthday”;completing automatic speech task, Pledge of Allegiance;completing automatic speech task, months;completing automatic speech task, days of the week;completing automatic speech task, alphabet;completing automatic speech task, counting;100%;independently (over 90% accuracy)  -AB    Time Frame (Word Retrieval Goal 2, SLP) short term goal (STG)  -AB    Progress/Outcomes (Word Retrieval Goal 2, SLP) new goal  -AB       Motor Speech/Dysarthria Treatment Objectives    Articulation Selection articulation, SLP goal 1  -AB       Articulation Goal 1 (SLP)    Improve Articulation Goal 1 (SLP) of specific sounds in phrases;by over-articulating at phrase level;80%;with minimal cues (75-90%)  -AB    Time Frame (Articulation Goal 1, SLP) short term goal (STG)  -AB    Progress/Outcomes (Articulation Goal 1, SLP) new goal  -AB          User Key  (r) = Recorded By, (t) = Taken By, (c) = Cosigned By    Initials Name Effective Dates    AB Nasrin Simms, MS CCC-SLP 12/27/22 -                    EDUCATION  The patient has been educated in the following areas:     Cognitive Impairment Communication Impairment.           SLP GOALS     Row Name 03/24/23 1400       Comprehend Questions Goal 1 (SLP)    Improve Ability to Comprehend Questions Goal 1 (SLP) simple yes/no questions;complex yes/no questions;80%;with minimal cues (75-90%)  -AB    Time Frame (Comprehend Questions Goal 1, SLP) short term goal (STG)  -AB    Progress/Outcomes (Comprehend Questions Goal 1, SLP) new goal  -AB       Follow Directions Goal 2 (SLP)    Improve Ability to Follow Directions Goal 1 (SLP) 1 step direction with objects;1 step direction without objects;2 step commands;80%;with minimal cues (75-90%)  -AB    Time Frame (Follow Directions Goal 1, SLP) short term goal (STG)  -AB    Progress/Outcomes (Follow Directions Goal 1, SLP) new goal  -AB        Word Retrieval Skills Goal 1 (SLP)    Improve Word Retrieval Skills By Goal 1 (SLP) confrontational naming task;80%;with minimal cues (75-90%)  -AB    Time Frame (Word Retrieval Goal 1, SLP) short term goal (STG)  -AB    Progress/Outcomes (Word Retrieval Goal 1, SLP) new goal  -AB       Word Retrieval Skills Goal 2 (SLP)    Improve Word Retrieval Skills By Goal 2 (SLP) completing automatic speech task, sing “Happy Birthday”;completing automatic speech task, Pledge of Allegiance;completing automatic speech task, months;completing automatic speech task, days of the week;completing automatic speech task, alphabet;completing automatic speech task, counting;100%;independently (over 90% accuracy)  -AB    Time Frame (Word Retrieval Goal 2, SLP) short term goal (STG)  -AB    Progress/Outcomes (Word Retrieval Goal 2, SLP) new goal  -AB       Articulation Goal 1 (SLP)    Improve Articulation Goal 1 (SLP) of specific sounds in phrases;by over-articulating at phrase level;80%;with minimal cues (75-90%)  -AB    Time Frame (Articulation Goal 1, SLP) short term goal (STG)  -AB    Progress/Outcomes (Articulation Goal 1, SLP) new goal  -AB          User Key  (r) = Recorded By, (t) = Taken By, (c) = Cosigned By    Initials Name Provider Type    Nasrin Forde MS CCC-SLP Speech and Language Pathologist    Maryam Gould CF-SLP Speech and Language Pathologist                        Time Calculation:      Time Calculation- SLP     Row Name 03/24/23 1500             Time Calculation- SLP    SLP Received On 03/24/23  -AB            User Key  (r) = Recorded By, (t) = Taken By, (c) = Cosigned By    Initials Name Provider Type    Nasrin Forde, MS CCC-SLP Speech and Language Pathologist                Therapy Charges for Today     Code Description Service Date Service Provider Modifiers Qty    99335145507 HC ST EVAL SPEECH AND PROD W LANG  4 3/24/2023 Nasrin Simms, MS CCC-SLP GN 1                     Nasrin Simms  MS CCC-SLP  3/24/2023

## 2023-03-25 LAB
ANION GAP SERPL CALCULATED.3IONS-SCNC: 9 MMOL/L (ref 5–15)
BUN SERPL-MCNC: 22 MG/DL (ref 8–23)
BUN/CREAT SERPL: 29.3 (ref 7–25)
CALCIUM SPEC-SCNC: 8.7 MG/DL (ref 8.6–10.5)
CHLORIDE SERPL-SCNC: 110 MMOL/L (ref 98–107)
CO2 SERPL-SCNC: 23 MMOL/L (ref 22–29)
CREAT SERPL-MCNC: 0.75 MG/DL (ref 0.76–1.27)
DEPRECATED RDW RBC AUTO: 44 FL (ref 37–54)
EGFRCR SERPLBLD CKD-EPI 2021: 96.5 ML/MIN/1.73
ERYTHROCYTE [DISTWIDTH] IN BLOOD BY AUTOMATED COUNT: 13.1 % (ref 12.3–15.4)
GLUCOSE SERPL-MCNC: 93 MG/DL (ref 65–99)
HCT VFR BLD AUTO: 41 % (ref 37.5–51)
HGB BLD-MCNC: 13.4 G/DL (ref 13–17.7)
MAGNESIUM SERPL-MCNC: 2.3 MG/DL (ref 1.6–2.4)
MCH RBC QN AUTO: 30 PG (ref 26.6–33)
MCHC RBC AUTO-ENTMCNC: 32.7 G/DL (ref 31.5–35.7)
MCV RBC AUTO: 91.7 FL (ref 79–97)
PHOSPHATE SERPL-MCNC: 3.2 MG/DL (ref 2.5–4.5)
PLATELET # BLD AUTO: 300 10*3/MM3 (ref 140–450)
PMV BLD AUTO: 10.8 FL (ref 6–12)
POTASSIUM SERPL-SCNC: 4 MMOL/L (ref 3.5–5.2)
RBC # BLD AUTO: 4.47 10*6/MM3 (ref 4.14–5.8)
SODIUM SERPL-SCNC: 142 MMOL/L (ref 136–145)
WBC NRBC COR # BLD: 7.8 10*3/MM3 (ref 3.4–10.8)

## 2023-03-25 PROCEDURE — 84100 ASSAY OF PHOSPHORUS: CPT | Performed by: STUDENT IN AN ORGANIZED HEALTH CARE EDUCATION/TRAINING PROGRAM

## 2023-03-25 PROCEDURE — 97530 THERAPEUTIC ACTIVITIES: CPT

## 2023-03-25 PROCEDURE — 80048 BASIC METABOLIC PNL TOTAL CA: CPT | Performed by: STUDENT IN AN ORGANIZED HEALTH CARE EDUCATION/TRAINING PROGRAM

## 2023-03-25 PROCEDURE — 83735 ASSAY OF MAGNESIUM: CPT | Performed by: STUDENT IN AN ORGANIZED HEALTH CARE EDUCATION/TRAINING PROGRAM

## 2023-03-25 PROCEDURE — 97110 THERAPEUTIC EXERCISES: CPT

## 2023-03-25 PROCEDURE — 85027 COMPLETE CBC AUTOMATED: CPT | Performed by: STUDENT IN AN ORGANIZED HEALTH CARE EDUCATION/TRAINING PROGRAM

## 2023-03-25 RX ADMIN — BACLOFEN 2.5 MG: 10 TABLET ORAL at 08:07

## 2023-03-25 RX ADMIN — HYDRALAZINE HYDROCHLORIDE 10 MG: 10 TABLET, FILM COATED ORAL at 15:05

## 2023-03-25 RX ADMIN — HYDRALAZINE HYDROCHLORIDE 10 MG: 10 TABLET, FILM COATED ORAL at 21:47

## 2023-03-25 RX ADMIN — CARVEDILOL 25 MG: 12.5 TABLET, FILM COATED ORAL at 18:42

## 2023-03-25 RX ADMIN — AMLODIPINE BESYLATE 10 MG: 10 TABLET ORAL at 08:07

## 2023-03-25 RX ADMIN — Medication 10 ML: at 21:47

## 2023-03-25 RX ADMIN — BACLOFEN 2.5 MG: 10 TABLET ORAL at 21:47

## 2023-03-25 RX ADMIN — APIXABAN 5 MG: 5 TABLET, FILM COATED ORAL at 08:07

## 2023-03-25 RX ADMIN — APIXABAN 5 MG: 5 TABLET, FILM COATED ORAL at 21:47

## 2023-03-25 RX ADMIN — HYDRALAZINE HYDROCHLORIDE 10 MG: 10 TABLET, FILM COATED ORAL at 05:40

## 2023-03-25 RX ADMIN — VALSARTAN 160 MG: 160 TABLET, FILM COATED ORAL at 08:07

## 2023-03-25 RX ADMIN — Medication 10 ML: at 08:08

## 2023-03-25 RX ADMIN — CARVEDILOL 25 MG: 12.5 TABLET, FILM COATED ORAL at 08:07

## 2023-03-25 NOTE — THERAPY PROGRESS REPORT/RE-CERT
Patient Name: Manuel Durant  : 1950    MRN: [id number]                              Today's Date: 3/25/2023       Admit Date: 3/12/2023    Visit Dx:     ICD-10-CM ICD-9-CM   1. Intracranial hemorrhage (HCC)  I62.9 432.9   2. Altered mental status, unspecified altered mental status type  R41.82 780.97     Patient Active Problem List   Diagnosis   • Intracranial hemorrhage (HCC)   • Hypertensive emergency   • Acute DVT (deep venous thrombosis) (HCC)   • HTN (hypertension)   • Hypokalemia     History reviewed. No pertinent past medical history.  History reviewed. No pertinent surgical history.   General Information     Row Name 23 103          Physical Therapy Time and Intention    Document Type therapy note (daily note)  -CW     Mode of Treatment physical therapy  -CW     Row Name 23 103          General Information    Patient Profile Reviewed yes  -CW     Existing Precautions/Restrictions fall  -CW     Row Name 23          Cognition    Orientation Status (Cognition) oriented to;person  -CW     Row Name 23          Safety Issues, Functional Mobility    Impairments Affecting Function (Mobility) balance;coordination;cognition;endurance/activity tolerance;grasp;motor control;postural/trunk control;strength  -CW           User Key  (r) = Recorded By, (t) = Taken By, (c) = Cosigned By    Initials Name Provider Type    CW Sj Garcia, PTA Physical Therapist Assistant               Mobility     Row Name 23 103          Bed Mobility    Supine-Sit [place] (Bed Mobility) moderate assist (50% patient effort);verbal cues;2 person assist  req 2 attempts, but w/cues, pt completed w/assist of 1  -CW     Sit-Supine [place] (Bed Mobility) 2 person assist;moderate assist (50% patient effort);maximum assist (25% patient effort)  -CW     Assistive Device (Bed Mobility) bed rails;draw sheet  -CW     Row Name 23          Bed-Chair Transfer    Bed-Chair  [place] (Transfers) maximum assist (25% patient effort);2 person assist  -CW     Assistive Device (Bed-Chair Transfers) other (see comments)  -CW     Comment, (Bed-Chair Transfer) sit to stand pivot to the chair  -CW     Row Name 03/25/23 1035          Sit-Stand Transfer    Sit-Stand [place] (Transfers) 2 person assist;moderate assist (50% patient effort)  -CW     Row Name 03/25/23 1035          Gait/Stairs (Locomotion)    Distance in Feet (Gait) 3 side steps to HOB and then transfered to the chair  -CW     Deviations/Abnormal Patterns (Gait) festinating/shuffling  -CW           User Key  (r) = Recorded By, (t) = Taken By, (c) = Cosigned By    Initials Name Provider Type    CW Sj Garcia PTA Physical Therapist Assistant               Obj/Interventions    No documentation.                Goals/Plan    No documentation.                Clinical Impression     Row Name 03/25/23 1037          Pain    Pretreatment Pain Rating 0/10 - no pain  -CW     Posttreatment Pain Rating 0/10 - no pain  -CW     Row Name 03/25/23 1037          Plan of Care Review    Plan of Care Reviewed With patient  -CW     Progress improving  -CW     Outcome Evaluation Pt able to sit EOB for >5 min and then transferto the chair from the EOB  -CW     Row Name 03/25/23 1037          Therapy Assessment/Plan (PT)    Rehab Potential (PT) fair, will monitor progress closely  -CW     Criteria for Skilled Interventions Met (PT) yes  -CW     Therapy Frequency (PT) 6 times/wk  -CW     Row Name 03/25/23 1037          Positioning and Restraints    Pre-Treatment Position in bed  -CW     Post Treatment Position chair  -CW     In Chair notified nsg;reclined;call light within reach;encouraged to call for assist;exit alarm on  -CW           User Key  (r) = Recorded By, (t) = Taken By, (c) = Cosigned By    Initials Name Provider Type    Sj Swenson PTA Physical Therapist Assistant               Outcome Measures     Row Name 03/25/23  1038          How much help from another person do you currently need...    Turning from your back to your side while in flat bed without using bedrails? 2  -CW     Moving from lying on back to sitting on the side of a flat bed without bedrails? 2  -CW     Moving to and from a bed to a chair (including a wheelchair)? 2  -CW     Standing up from a chair using your arms (e.g., wheelchair, bedside chair)? 2  -CW     Climbing 3-5 steps with a railing? 1  -CW     To walk in hospital room? 1  -CW     AM-PAC 6 Clicks Score (PT) 10  -CW     Highest level of mobility 4 --> Transferred to chair/commode  -CW           User Key  (r) = Recorded By, (t) = Taken By, (c) = Cosigned By    Initials Name Provider Type    CW Sj Garcia, PTA Physical Therapist Assistant                             Physical Therapy Education     Title: PT OT SLP Therapies (Done)     Topic: Physical Therapy (Done)     Point: Mobility training (Done)     Learning Progress Summary           Patient Acceptance, E,D, NR,VU by ZACH at 3/24/2023 1631    Comment: seemed to comprehend commands this session    Acceptance, E,D, NR by ZACH at 3/23/2023 1655    Acceptance, E,D, NR by ZACH at 3/22/2023 1545    Acceptance, E,TB, VU,NR by CB at 3/21/2023 1544    Acceptance, E,TB,H, VU by MS at 3/16/2023 1801    Acceptance, E,D, DU,NR by MO at 3/16/2023 1451    Acceptance, E, DU,NR by MO at 3/15/2023 1200    Acceptance, E, NR by LM at 3/15/2023 0521    Acceptance, E,D, DU,NR by MO at 3/14/2023 1458   Family Acceptance, E,TB,H, VU by MS at 3/16/2023 1801                   Point: Home exercise program (Done)     Learning Progress Summary           Patient Acceptance, E,D, NR,VU by ZACH at 3/24/2023 1631    Comment: seemed to comprehend commands this session    Acceptance, E,D, NR by ZACH at 3/23/2023 1655    Acceptance, E,D, NR by ZACH at 3/22/2023 1545    Acceptance, E,TB, VU,NR by CB at 3/21/2023 1544    Acceptance, E,TB,H, VU by MS at 3/16/2023 1801    Acceptance, E,D,  DU,NR by MO at 3/16/2023 1451    Acceptance, E, NR by LM at 3/15/2023 0521    Acceptance, E,D, DU,NR by MO at 3/14/2023 1458   Family Acceptance, E,TB,H, VU by MS at 3/16/2023 1801                   Point: Body mechanics (Done)     Learning Progress Summary           Patient Acceptance, E,D, NR,VU by ZACH at 3/24/2023 1631    Comment: seemed to comprehend commands this session    Acceptance, E,D, NR by ZACH at 3/23/2023 1655    Acceptance, E,D, NR by ZACH at 3/22/2023 1545    Acceptance, E,TB, VU,NR by CB at 3/21/2023 1544    Acceptance, E,TB,H, VU by MS at 3/16/2023 1801    Acceptance, E,D, DU,NR by MO at 3/16/2023 1451    Acceptance, E, DU,NR by MO at 3/15/2023 1200    Acceptance, E, NR by LM at 3/15/2023 0521    Acceptance, E,D, DU,NR by MO at 3/14/2023 1458   Family Acceptance, E,TB,H, VU by MS at 3/16/2023 1801                   Point: Precautions (Done)     Learning Progress Summary           Patient Acceptance, E,D, NR,VU by ZACH at 3/24/2023 1631    Comment: seemed to comprehend commands this session    Acceptance, E,D, NR by ZACH at 3/23/2023 1655    Acceptance, E,D, NR by ZACH at 3/22/2023 1545    Acceptance, E,TB, VU,NR by CB at 3/21/2023 1544    Acceptance, E,TB,H, VU by MS at 3/16/2023 1801    Acceptance, E,D, DU,NR by MO at 3/16/2023 1451    Acceptance, E, DU,NR by MO at 3/15/2023 1200    Acceptance, E, NR by LM at 3/15/2023 0521    Acceptance, E,D, DU,NR by MO at 3/14/2023 1458   Family Acceptance, E,TB,H, VU by MS at 3/16/2023 1801                               User Key     Initials Effective Dates Name Provider Type Discipline    ZACH 03/07/18 -  Tiffanie Grace, PTA Physical Therapist Assistant PT    MS 06/16/21 -  Angel Luis Cr, RN Registered Nurse Nurse    CB 10/22/21 -  Bety Benitez, PT Physical Therapist PT    LM 10/13/22 -  Silke Grace, RN Registered Nurse Nurse    MO 01/27/23 -  Lacie Tanner, JOSE ELIAS Student PT Student PT              PT Recommendation and Plan     Plan of Care Reviewed With:  patient  Progress: improving  Outcome Evaluation: Pt able to sit EOB for >5 min and then transferto the chair from the EOB     Time Calculation:    PT Charges     Row Name 03/25/23 1038             Time Calculation    Start Time 1013  -CW      Stop Time 1038  -CW      Time Calculation (min) 25 min  -CW      PT Received On 03/25/23  -CW      PT - Next Appointment 03/27/23  -CW         Timed Charges    11351 - PT Therapeutic Exercise Minutes 10  -CW      61445 - PT Therapeutic Activity Minutes 15  -CW         Total Minutes    Timed Charges Total Minutes 25  -CW       Total Minutes 25  -CW            User Key  (r) = Recorded By, (t) = Taken By, (c) = Cosigned By    Initials Name Provider Type    CW Sj Garcia PTA Physical Therapist Assistant              Therapy Charges for Today     Code Description Service Date Service Provider Modifiers Qty    36502251412 HC PT THER PROC EA 15 MIN 3/25/2023 Sj Garcia PTA GP 1    52707834384 HC PT THERAPEUTIC ACT EA 15 MIN 3/25/2023 Sj Garcia PTA GP 1          PT G-Codes  Outcome Measure Options: AM-PAC 6 Clicks Daily Activity (OT)  AM-PAC 6 Clicks Score (PT): 10  AM-PAC 6 Clicks Score (OT): 10  Modified Evans Scale: 4 - Moderately severe disability.  Unable to walk without assistance, and unable to attend to own bodily needs without assistance.  PT Discharge Summary  Anticipated Discharge Disposition (PT): skilled nursing facility    Sj Garcia PTA  3/25/2023

## 2023-03-25 NOTE — PLAN OF CARE
Goal Outcome Evaluation:  Plan of Care Reviewed With: patient        Progress: improving  Outcome Evaluation: Pt able to sit EOB for >5 min and then transferto the chair from the EOB      Patient was wearing a face mask during this therapy encounter. Therapist used appropriate personal protective equipment including eye protection, mask, and gloves.  Mask used was standard procedure mask. Appropriate PPE was worn during the entire therapy session. Hand hygiene was completed before and after therapy session. Patient is not in enhanced droplet precautions.

## 2023-03-25 NOTE — PROGRESS NOTES
Name: Manuel Durant ADMIT: 3/12/2023   : 1950  PCP: Provider, No Known    MRN: [id number] LOS: 13 days   AGE/SEX: 73 y.o. male  ROOM: [facility]     Subjective   Subjective     Patient is sitting on the recliner and has no specific complaints.  No reports of nausea, vomiting abdominal pain, chest pain.     Objective   Objective   Vital Signs  Temp:  [97.6 °F (36.4 °C)-98.7 °F (37.1 °C)] 97.9 °F (36.6 °C)  Heart Rate:  [55-62] 58  Resp:  [18] 18  BP: (116-169)/(59-93) 129/66  SpO2:  [92 %-97 %] 96 %  on   ;   Device (Oxygen Therapy): room air  Body mass index is 25.44 kg/m².     Physical Exam  Constitutional:       General: He is not in acute distress.     Appearance: He is not toxic-appearing.   HENT:      Mouth/Throat:      Mouth: Mucous membranes are moist.   Eyes:      Extraocular Movements: Extraocular movements intact.      Pupils: Pupils are equal, round, and reactive to light.   Cardiovascular:      Rate and Rhythm: Normal rate.      Heart sounds: Normal heart sounds.   Pulmonary:      Effort: Pulmonary effort is normal.      Breath sounds: Normal breath sounds.   Abdominal:      General: Bowel sounds are normal.      Palpations: Abdomen is soft.   Musculoskeletal:      Cervical back: Neck supple.      Right lower leg: No edema.      Left lower leg: No edema.   Skin:     General: Skin is warm and dry.   Neurological:      Mental Status: He is alert.      Comments: Right-sided weakness       Physical exam on 3/24/2023 as per above.    Results Review     I reviewed the patient's new clinical results.  Results from last 7 days   Lab Units 23  0638 23  0643 23  0636 23  0704   WBC 10*3/mm3 7.80 7.47 7.64 7.02   HEMOGLOBIN g/dL 13.4 13.4 13.6 13.5   PLATELETS 10*3/mm3 300 271 268 248     Results from last 7 days   Lab Units 23  0638 23  0643 23  0636 23  0704   SODIUM mmol/L 142 143 142 142   POTASSIUM mmol/L 4.0 3.7 3.8 3.8   CHLORIDE mmol/L 110* 109*  109* 110*   CO2 mmol/L 23.0 22.6 23.0 23.0   BUN mg/dL 22 22 20 20   CREATININE mg/dL 0.75* 0.65* 0.67* 0.73*   GLUCOSE mg/dL 93 84 84 98   EGFR mL/min/1.73 96.5 100.7 99.8 97.3       Results from last 7 days   Lab Units 03/25/23  0638 03/24/23  0643 03/23/23  0636 03/22/23  0704   CALCIUM mg/dL 8.7 8.7 8.3* 8.4*   MAGNESIUM mg/dL 2.3 2.1 2.4 2.3   PHOSPHORUS mg/dL 3.2 3.2 3.3 3.4       Glucose   Date/Time Value Ref Range Status   03/23/2023 1659 132 (H) 70 - 130 mg/dL Final     Comment:     Meter: [id number] : [id number] Jhon Chemell NA       No radiology results for the last day  I have personally reviewed all medications:  Scheduled Medications  amLODIPine, 10 mg, Oral, QAM AC  apixaban, 5 mg, Oral, Q12H  baclofen, 2.5 mg, Oral, Q12H  carvedilol, 25 mg, Oral, BID With Meals  hydrALAZINE, 10 mg, Oral, Q8H  senna-docusate sodium, 2 tablet, Oral, BID  sodium chloride, 10 mL, Intravenous, Q12H  valsartan, 160 mg, Oral, QAM AC    Infusions   Diet  Diet: Regular/House Diet; No Mixed Consistencies; Texture: Soft to Chew (NDD 3); Soft to Chew: Ground Meat; Fluid Consistency: Honey Thick    I have personally reviewed:  [x]  Laboratory   []  Microbiology   [x]  Radiology   [x]  EKG/Telemetry  []  Cardiology/Vascular   []  Pathology    []  Records       Assessment/Plan     Active Hospital Problems    Diagnosis  POA   • **Intracranial hemorrhage (HCC) [I62.9]  Yes   • Acute DVT (deep venous thrombosis) (HCC) [I82.409]  Unknown   • HTN (hypertension) [I10]  Unknown   • Hypokalemia [E87.6]  Unknown   • Hypertensive emergency [I16.1]  Unknown      Resolved Hospital Problems   No resolved problems to display.       Mr. Durant is a 73 year old male who initially presented to the hospital 3/12/23 for intracranial bleed. He was found unresponsive by friends and initially taken to [facility] where he was found to have poorly controlled BP and right sided hemiplegia. He was transferred here for closer monitoring in the ICU  and neurosurgical consultation. He was cleared to move out of the ICU on 3/16/23.     1. Intracranial hemorrhage, CT brain done on 3/17/2023 revealed stable left basal ganglia/internal capsule hemorrhage with left ventricular hemorrhage.  Developed DVT therefore Lovenox was initiated and CT brain on 3/18 and 3/20/2023 also revealed stable findings.  Neurosurgery did evaluate and cleared patient to be on Eliquis now.  Needs outpatient follow-up with neurosurgery as well as MRI brain as an outpatient basis once hemorrhage is cleared.  2.  Hypertensive emergency, currently blood pressure is reasonably well controlled and on amlodipine, valsartan, Coreg and hydralazine.  3.  Acute right lower extremity DVT, diagnosed on 3/17/2023 and is currently on Eliquis which will be continued and was on Lovenox earlier during this hospital stay.  Of note neurosurgery cleared to be on anticoagulation.  4.  Hypokalemia, on replacement protocol.  5.  Disposition, needs rehab placement and will discharge once a bed is available.      Howard Fry MD  [facility]  03/25/23  14:06 EDT

## 2023-03-25 NOTE — PLAN OF CARE
Problem: Fall Injury Risk  Goal: Absence of Fall and Fall-Related Injury  Outcome: Ongoing, Progressing  Intervention: Identify and Manage Contributors  Recent Flowsheet Documentation  Taken 3/25/2023 0200 by Elyssa Thorne RN  Medication Review/Management: medications reviewed  Taken 3/25/2023 0000 by Elyssa Thorne RN  Medication Review/Management: medications reviewed  Taken 3/24/2023 2200 by Elyssa Thorne RN  Medication Review/Management: medications reviewed  Taken 3/24/2023 2045 by Elyssa Thorne RN  Medication Review/Management: medications reviewed  Intervention: Promote Injury-Free Environment  Recent Flowsheet Documentation  Taken 3/25/2023 0200 by Elyssa Thorne RN  Safety Promotion/Fall Prevention: safety round/check completed  Taken 3/25/2023 0000 by Elyssa Thorne RN  Safety Promotion/Fall Prevention: safety round/check completed  Taken 3/24/2023 2200 by Elyssa Thorne RN  Safety Promotion/Fall Prevention: safety round/check completed  Taken 3/24/2023 2045 by Elyssa Thorne RN  Safety Promotion/Fall Prevention:   activity supervised   safety round/check completed     Problem: Skin Injury Risk Increased  Goal: Skin Health and Integrity  Outcome: Ongoing, Progressing  Intervention: Optimize Skin Protection  Recent Flowsheet Documentation  Taken 3/24/2023 2200 by Elyssa Thorne RN  Head of Bed (HOB) Positioning: HOB at 20-30 degrees  Taken 3/24/2023 2045 by Elyssa Thorne RN  Head of Bed (HOB) Positioning: HOB at 20-30 degrees     Problem: Adult Inpatient Plan of Care  Goal: Plan of Care Review  Outcome: Ongoing, Progressing  Flowsheets (Taken 3/25/2023 0401)  Progress: improving  Plan of Care Reviewed With: patient  Goal: Patient-Specific Goal (Individualized)  Outcome: Ongoing, Progressing  Goal: Absence of Hospital-Acquired Illness or Injury  Outcome: Ongoing, Progressing  Intervention: Identify and Manage Fall Risk  Recent Flowsheet  Documentation  Taken 3/25/2023 0200 by Elyssa Thorne RN  Safety Promotion/Fall Prevention: safety round/check completed  Taken 3/25/2023 0000 by Elyssa Thorne RN  Safety Promotion/Fall Prevention: safety round/check completed  Taken 3/24/2023 2200 by Elyssa Thorne RN  Safety Promotion/Fall Prevention: safety round/check completed  Taken 3/24/2023 2045 by Elyssa Thorne RN  Safety Promotion/Fall Prevention:   activity supervised   safety round/check completed  Intervention: Prevent Skin Injury  Recent Flowsheet Documentation  Taken 3/25/2023 0000 by Elyssa Thorne RN  Body Position: supine  Taken 3/24/2023 2200 by Elyssa Thorne RN  Body Position: position changed independently  Taken 3/24/2023 2045 by Elyssa Thorne RN  Body Position: position changed independently  Intervention: Prevent and Manage VTE (Venous Thromboembolism) Risk  Recent Flowsheet Documentation  Taken 3/25/2023 0200 by Elyssa Thorne RN  Activity Management: activity adjusted per tolerance  Taken 3/25/2023 0000 by Elyssa Thorne RN  Activity Management: activity adjusted per tolerance  Taken 3/24/2023 2200 by Elyssa Thorne RN  Activity Management: activity adjusted per tolerance  Taken 3/24/2023 2045 by Elyssa Thorne RN  Activity Management: activity adjusted per tolerance  Intervention: Prevent Infection  Recent Flowsheet Documentation  Taken 3/25/2023 0200 by Elyssa Thorne RN  Infection Prevention:   single patient room provided   rest/sleep promoted  Taken 3/25/2023 0000 by Elyssa Thorne RN  Infection Prevention:   single patient room provided   rest/sleep promoted  Taken 3/24/2023 2200 by Elyssa Thorne RN  Infection Prevention:   rest/sleep promoted   single patient room provided   hand hygiene promoted  Taken 3/24/2023 2045 by Elyssa Thorne RN  Infection Prevention:   rest/sleep promoted   single patient room provided  Goal: Optimal Comfort and  Wellbeing  Outcome: Ongoing, Progressing  Intervention: Provide Person-Centered Care  Recent Flowsheet Documentation  Taken 3/24/2023 2045 by Elyssa Thorne RN  Trust Relationship/Rapport:   reassurance provided   care explained  Goal: Readiness for Transition of Care  Outcome: Ongoing, Progressing     Problem: Adjustment to Illness (Stroke, Ischemic/Transient Ischemic Attack)  Goal: Optimal Coping  Outcome: Ongoing, Progressing  Intervention: Support Psychosocial Response to Stroke  Recent Flowsheet Documentation  Taken 3/24/2023 2045 by Elyssa Thorne RN  Family/Support System Care: involvement promoted     Problem: Bowel Elimination Impaired (Stroke, Ischemic/Transient Ischemic Attack)  Goal: Effective Bowel Elimination  Outcome: Ongoing, Progressing     Problem: Cerebral Tissue Perfusion (Stroke, Ischemic/Transient Ischemic Attack)  Goal: Optimal Cerebral Tissue Perfusion  Outcome: Ongoing, Progressing     Problem: Cognitive Impairment (Stroke, Ischemic/Transient Ischemic Attack)  Goal: Optimal Cognitive Function  Outcome: Ongoing, Progressing     Problem: Communication Impairment (Stroke, Ischemic/Transient Ischemic Attack)  Goal: Improved Communication Skills  Outcome: Ongoing, Progressing     Problem: Functional Ability Impaired (Stroke, Ischemic/Transient Ischemic Attack)  Goal: Optimal Functional Ability  Outcome: Ongoing, Progressing  Intervention: Optimize Functional Ability  Recent Flowsheet Documentation  Taken 3/25/2023 0200 by Elyssa Thorne RN  Activity Management: activity adjusted per tolerance  Taken 3/25/2023 0000 by Elyssa Thorne RN  Activity Management: activity adjusted per tolerance  Taken 3/24/2023 2200 by Elyssa Thorne RN  Activity Management: activity adjusted per tolerance  Taken 3/24/2023 2045 by Elyssa Thorne RN  Activity Management: activity adjusted per tolerance     Problem: Respiratory Compromise (Stroke, Ischemic/Transient Ischemic Attack)  Goal:  Effective Oxygenation and Ventilation  Outcome: Ongoing, Progressing  Intervention: Optimize Oxygenation and Ventilation  Recent Flowsheet Documentation  Taken 3/24/2023 2200 by Elyssa Thorne RN  Head of Bed (HOB) Positioning: HOB at 20-30 degrees  Taken 3/24/2023 2045 by Elyssa Thorne RN  Head of Bed (HOB) Positioning: HOB at 20-30 degrees     Problem: Sensorimotor Impairment (Stroke, Ischemic/Transient Ischemic Attack)  Goal: Improved Sensorimotor Function  Outcome: Ongoing, Progressing  Intervention: Optimize Range of Motion, Motor Control and Function  Recent Flowsheet Documentation  Taken 3/24/2023 2200 by Elyssa Thorne RN  Positioning/Transfer Devices:   in use   pillows  Taken 3/24/2023 2045 by Elyssa Thorne RN  Positioning/Transfer Devices:   in use   pillows     Problem: Swallowing Impairment (Stroke, Ischemic/Transient Ischemic Attack)  Goal: Optimal Eating and Swallowing without Aspiration  Outcome: Ongoing, Progressing     Problem: Urinary Elimination Impaired (Stroke, Ischemic/Transient Ischemic Attack)  Goal: Effective Urinary Elimination  Outcome: Ongoing, Progressing     Problem: Adjustment to Illness (Stroke, Hemorrhagic)  Goal: Optimal Coping  Outcome: Ongoing, Progressing  Intervention: Support Psychosocial Response to Stroke  Recent Flowsheet Documentation  Taken 3/24/2023 2045 by Elyssa Thorne RN  Family/Support System Care: involvement promoted     Problem: Bowel Elimination Impaired (Stroke, Hemorrhagic)  Goal: Effective Bowel Elimination  Outcome: Ongoing, Progressing     Problem: Cerebral Tissue Perfusion (Stroke, Hemorrhagic)  Goal: Optimal Cerebral Tissue Perfusion  Outcome: Ongoing, Progressing     Problem: Cognitive Impairment (Stroke, Hemorrhagic)  Goal: Optimal Cognitive Function  Outcome: Ongoing, Progressing     Problem: Communication Impairment (Stroke, Hemorrhagic)  Goal: Effective Communication Skills  Outcome: Ongoing, Progressing     Problem:  Functional Ability Impaired (Stroke, Hemorrhagic)  Goal: Optimal Functional Ability  Outcome: Ongoing, Progressing  Intervention: Optimize Functional Ability  Recent Flowsheet Documentation  Taken 3/25/2023 0200 by Elyssa Thorne RN  Activity Management: activity adjusted per tolerance  Taken 3/25/2023 0000 by Elyssa Thorne RN  Activity Management: activity adjusted per tolerance  Taken 3/24/2023 2200 by Elyssa Thorne RN  Activity Management: activity adjusted per tolerance  Taken 3/24/2023 2045 by Elyssa Thorne RN  Activity Management: activity adjusted per tolerance     Problem: Pain (Stroke, Hemorrhagic)  Goal: Acceptable Pain Control  Outcome: Ongoing, Progressing     Problem: Respiratory Compromise (Stroke, Hemorrhagic)  Goal: Effective Oxygenation and Ventilation  Outcome: Ongoing, Progressing  Intervention: Optimize Oxygenation and Ventilation  Recent Flowsheet Documentation  Taken 3/24/2023 2200 by Elyssa Thorne RN  Head of Bed (HOB) Positioning: HOB at 20-30 degrees  Taken 3/24/2023 2045 by Elyssa Thorne RN  Head of Bed (HOB) Positioning: HOB at 20-30 degrees     Problem: Sensorimotor Impairment (Stroke, Hemorrhagic)  Goal: Improved Sensorimotor Function  Outcome: Ongoing, Progressing  Intervention: Optimize Range of Motion, Motor Control and Function  Recent Flowsheet Documentation  Taken 3/24/2023 2200 by Elyssa Thorne RN  Positioning/Transfer Devices:   in use   pillows  Taken 3/24/2023 2045 by Elyssa Thorne RN  Positioning/Transfer Devices:   in use   pillows     Problem: Swallowing Impairment (Stroke, Hemorrhagic)  Goal: Optimal Eating and Swallowing Without Aspiration  Outcome: Ongoing, Progressing     Problem: Urinary Elimination Impaired (Stroke, Hemorrhagic)  Goal: Effective Urinary Elimination  Outcome: Ongoing, Progressing   Goal Outcome Evaluation:  Plan of Care Reviewed With: patient        Progress: improving

## 2023-03-26 LAB
ANION GAP SERPL CALCULATED.3IONS-SCNC: 7.5 MMOL/L (ref 5–15)
BUN SERPL-MCNC: 21 MG/DL (ref 8–23)
BUN/CREAT SERPL: 28 (ref 7–25)
CALCIUM SPEC-SCNC: 9 MG/DL (ref 8.6–10.5)
CHLORIDE SERPL-SCNC: 109 MMOL/L (ref 98–107)
CO2 SERPL-SCNC: 25.5 MMOL/L (ref 22–29)
CREAT SERPL-MCNC: 0.75 MG/DL (ref 0.76–1.27)
DEPRECATED RDW RBC AUTO: 42 FL (ref 37–54)
EGFRCR SERPLBLD CKD-EPI 2021: 96.5 ML/MIN/1.73
ERYTHROCYTE [DISTWIDTH] IN BLOOD BY AUTOMATED COUNT: 12.8 % (ref 12.3–15.4)
GLUCOSE SERPL-MCNC: 93 MG/DL (ref 65–99)
HCT VFR BLD AUTO: 39.6 % (ref 37.5–51)
HGB BLD-MCNC: 13.8 G/DL (ref 13–17.7)
MAGNESIUM SERPL-MCNC: 2.1 MG/DL (ref 1.6–2.4)
MCH RBC QN AUTO: 31.2 PG (ref 26.6–33)
MCHC RBC AUTO-ENTMCNC: 34.8 G/DL (ref 31.5–35.7)
MCV RBC AUTO: 89.6 FL (ref 79–97)
PHOSPHATE SERPL-MCNC: 3.5 MG/DL (ref 2.5–4.5)
PLATELET # BLD AUTO: 292 10*3/MM3 (ref 140–450)
PMV BLD AUTO: 10.9 FL (ref 6–12)
POTASSIUM SERPL-SCNC: 4 MMOL/L (ref 3.5–5.2)
RBC # BLD AUTO: 4.42 10*6/MM3 (ref 4.14–5.8)
SODIUM SERPL-SCNC: 142 MMOL/L (ref 136–145)
WBC NRBC COR # BLD: 7.82 10*3/MM3 (ref 3.4–10.8)

## 2023-03-26 PROCEDURE — 84100 ASSAY OF PHOSPHORUS: CPT | Performed by: STUDENT IN AN ORGANIZED HEALTH CARE EDUCATION/TRAINING PROGRAM

## 2023-03-26 PROCEDURE — 80048 BASIC METABOLIC PNL TOTAL CA: CPT | Performed by: STUDENT IN AN ORGANIZED HEALTH CARE EDUCATION/TRAINING PROGRAM

## 2023-03-26 PROCEDURE — 85027 COMPLETE CBC AUTOMATED: CPT | Performed by: STUDENT IN AN ORGANIZED HEALTH CARE EDUCATION/TRAINING PROGRAM

## 2023-03-26 PROCEDURE — 83735 ASSAY OF MAGNESIUM: CPT | Performed by: STUDENT IN AN ORGANIZED HEALTH CARE EDUCATION/TRAINING PROGRAM

## 2023-03-26 RX ADMIN — HYDRALAZINE HYDROCHLORIDE 10 MG: 10 TABLET, FILM COATED ORAL at 06:30

## 2023-03-26 RX ADMIN — BACLOFEN 2.5 MG: 10 TABLET ORAL at 21:01

## 2023-03-26 RX ADMIN — VALSARTAN 160 MG: 160 TABLET, FILM COATED ORAL at 06:30

## 2023-03-26 RX ADMIN — AMLODIPINE BESYLATE 10 MG: 10 TABLET ORAL at 06:30

## 2023-03-26 RX ADMIN — HYDRALAZINE HYDROCHLORIDE 10 MG: 10 TABLET, FILM COATED ORAL at 15:10

## 2023-03-26 RX ADMIN — DOCUSATE SODIUM 50MG AND SENNOSIDES 8.6MG 2 TABLET: 8.6; 5 TABLET, FILM COATED ORAL at 08:03

## 2023-03-26 RX ADMIN — APIXABAN 5 MG: 5 TABLET, FILM COATED ORAL at 08:04

## 2023-03-26 RX ADMIN — CARVEDILOL 25 MG: 12.5 TABLET, FILM COATED ORAL at 17:29

## 2023-03-26 RX ADMIN — APIXABAN 5 MG: 5 TABLET, FILM COATED ORAL at 21:01

## 2023-03-26 RX ADMIN — CARVEDILOL 25 MG: 12.5 TABLET, FILM COATED ORAL at 08:04

## 2023-03-26 RX ADMIN — Medication 10 ML: at 08:04

## 2023-03-26 RX ADMIN — Medication 10 ML: at 21:01

## 2023-03-26 RX ADMIN — HYDRALAZINE HYDROCHLORIDE 10 MG: 10 TABLET, FILM COATED ORAL at 21:01

## 2023-03-26 RX ADMIN — BACLOFEN 2.5 MG: 10 TABLET ORAL at 08:04

## 2023-03-26 NOTE — PROGRESS NOTES
Name: Manuel Durant ADMIT: 3/12/2023   : 1950  PCP: Provider, No Known    MRN: [id number] LOS: 14 days   AGE/SEX: 73 y.o. male  ROOM: [facility]     Subjective   Subjective     Patient is sitting on the recliner and has no specific complaints.  No reports of nausea, vomiting abdominal pain, chest pain.     Objective   Objective   Vital Signs  Temp:  [98.3 °F (36.8 °C)-99.1 °F (37.3 °C)] 98.3 °F (36.8 °C)  Heart Rate:  [52-68] 56  Resp:  [18] 18  BP: (129-161)/(67-87) 129/68  SpO2:  [90 %-98 %] 94 %  on   ;   Device (Oxygen Therapy): room air  Body mass index is 25.37 kg/m².     Physical Exam  Constitutional:       General: He is not in acute distress.     Appearance: He is not toxic-appearing.   HENT:      Mouth/Throat:      Mouth: Mucous membranes are moist.   Eyes:      Extraocular Movements: Extraocular movements intact.      Pupils: Pupils are equal, round, and reactive to light.   Cardiovascular:      Rate and Rhythm: Normal rate.      Heart sounds: Normal heart sounds.   Pulmonary:      Effort: Pulmonary effort is normal.      Breath sounds: Normal breath sounds.   Abdominal:      General: Bowel sounds are normal.      Palpations: Abdomen is soft.   Musculoskeletal:      Cervical back: Neck supple.      Right lower leg: No edema.      Left lower leg: No edema.   Skin:     General: Skin is warm and dry.   Neurological:      Mental Status: He is alert.      Comments: Right-sided weakness       Physical exam on 3/24/2023 as per above.    Results Review     I reviewed the patient's new clinical results.  Results from last 7 days   Lab Units 23  0635 23  0638 23  0643 23  0636   WBC 10*3/mm3 7.82 7.80 7.47 7.64   HEMOGLOBIN g/dL 13.8 13.4 13.4 13.6   PLATELETS 10*3/mm3 292 300 271 268     Results from last 7 days   Lab Units 23  0635 23  0638 23  0643 23  0636   SODIUM mmol/L 142 142 143 142   POTASSIUM mmol/L 4.0 4.0 3.7 3.8   CHLORIDE mmol/L 109* 110*  109* 109*   CO2 mmol/L 25.5 23.0 22.6 23.0   BUN mg/dL 21 22 22 20   CREATININE mg/dL 0.75* 0.75* 0.65* 0.67*   GLUCOSE mg/dL 93 93 84 84   EGFR mL/min/1.73 96.5 96.5 100.7 99.8       Results from last 7 days   Lab Units 03/26/23  0635 03/25/23  0638 03/24/23  0643 03/23/23  0636   CALCIUM mg/dL 9.0 8.7 8.7 8.3*   MAGNESIUM mg/dL 2.1 2.3 2.1 2.4   PHOSPHORUS mg/dL 3.5 3.2 3.2 3.3       Glucose   Date/Time Value Ref Range Status   03/23/2023 1659 132 (H) 70 - 130 mg/dL Final     Comment:     Meter: [id number] : [id number] Jhon Chemell NA       No radiology results for the last day  I have personally reviewed all medications:  Scheduled Medications  amLODIPine, 10 mg, Oral, QAM AC  apixaban, 5 mg, Oral, Q12H  baclofen, 2.5 mg, Oral, Q12H  carvedilol, 25 mg, Oral, BID With Meals  hydrALAZINE, 10 mg, Oral, Q8H  senna-docusate sodium, 2 tablet, Oral, BID  sodium chloride, 10 mL, Intravenous, Q12H  valsartan, 160 mg, Oral, QAM AC    Infusions   Diet  Diet: Regular/House Diet; No Mixed Consistencies; Texture: Soft to Chew (NDD 3); Soft to Chew: Ground Meat; Fluid Consistency: Honey Thick    I have personally reviewed:  [x]  Laboratory   []  Microbiology   [x]  Radiology   [x]  EKG/Telemetry  []  Cardiology/Vascular   []  Pathology    []  Records       Assessment/Plan     Active Hospital Problems    Diagnosis  POA   • **Intracranial hemorrhage (HCC) [I62.9]  Yes   • Acute DVT (deep venous thrombosis) (HCC) [I82.409]  Unknown   • HTN (hypertension) [I10]  Unknown   • Hypokalemia [E87.6]  Unknown   • Hypertensive emergency [I16.1]  Unknown      Resolved Hospital Problems   No resolved problems to display.       Mr. Durant is a 73 year old male who initially presented to the hospital 3/12/23 for intracranial bleed. He was found unresponsive by friends and initially taken to [facility] where he was found to have poorly controlled BP and right sided hemiplegia. He was transferred here for closer monitoring in the ICU  and neurosurgical consultation. He was cleared to move out of the ICU on 3/16/23.     1. Intracranial hemorrhage, CT brain done on 3/17/2023 revealed stable left basal ganglia/internal capsule hemorrhage with left ventricular hemorrhage.  Developed DVT therefore Lovenox was initiated and CT brain on 3/18 and 3/20/2023 also revealed stable findings.  Neurosurgery did evaluate and cleared patient to be on Eliquis now.  Needs outpatient follow-up with neurosurgery as well as MRI brain as an outpatient basis once hemorrhage is cleared.    2.  Hypertensive emergency, currently blood pressure is reasonably well controlled and on amlodipine, valsartan, Coreg and hydralazine.    3.  Acute right lower extremity DVT, diagnosed on 3/17/2023 and is currently on Eliquis which will be continued and was on Lovenox earlier during this hospital stay.  Of note neurosurgery cleared to be on anticoagulation.    4.  Hypokalemia, on replacement protocol.    5.  Disposition, needs rehab placement and will discharge once a bed is available.    Copied text on this note has been reviewed by me on 3/26/2023      Howard Fry MD  [facility]  03/26/23  15:15 EDT

## 2023-03-27 ENCOUNTER — TELEPHONE (OUTPATIENT)
Dept: NEUROSURGERY | Facility: CLINIC | Age: 73
End: 2023-03-27
Payer: COMMERCIAL

## 2023-03-27 DIAGNOSIS — I62.9 INTRACRANIAL HEMORRHAGE: Primary | ICD-10-CM

## 2023-03-27 LAB
ANION GAP SERPL CALCULATED.3IONS-SCNC: 7.7 MMOL/L (ref 5–15)
BUN SERPL-MCNC: 19 MG/DL (ref 8–23)
BUN/CREAT SERPL: 22.4 (ref 7–25)
CALCIUM SPEC-SCNC: 8.9 MG/DL (ref 8.6–10.5)
CHLORIDE SERPL-SCNC: 110 MMOL/L (ref 98–107)
CO2 SERPL-SCNC: 24.3 MMOL/L (ref 22–29)
CREAT SERPL-MCNC: 0.85 MG/DL (ref 0.76–1.27)
DEPRECATED RDW RBC AUTO: 43.2 FL (ref 37–54)
EGFRCR SERPLBLD CKD-EPI 2021: 92.9 ML/MIN/1.73
ERYTHROCYTE [DISTWIDTH] IN BLOOD BY AUTOMATED COUNT: 13.1 % (ref 12.3–15.4)
GLUCOSE SERPL-MCNC: 98 MG/DL (ref 65–99)
HCT VFR BLD AUTO: 40 % (ref 37.5–51)
HGB BLD-MCNC: 13.4 G/DL (ref 13–17.7)
MAGNESIUM SERPL-MCNC: 2.1 MG/DL (ref 1.6–2.4)
MCH RBC QN AUTO: 30.4 PG (ref 26.6–33)
MCHC RBC AUTO-ENTMCNC: 33.5 G/DL (ref 31.5–35.7)
MCV RBC AUTO: 90.7 FL (ref 79–97)
PHOSPHATE SERPL-MCNC: 3.4 MG/DL (ref 2.5–4.5)
PLATELET # BLD AUTO: 317 10*3/MM3 (ref 140–450)
PMV BLD AUTO: 10.6 FL (ref 6–12)
POTASSIUM SERPL-SCNC: 4 MMOL/L (ref 3.5–5.2)
RBC # BLD AUTO: 4.41 10*6/MM3 (ref 4.14–5.8)
SODIUM SERPL-SCNC: 142 MMOL/L (ref 136–145)
WBC NRBC COR # BLD: 8.35 10*3/MM3 (ref 3.4–10.8)

## 2023-03-27 PROCEDURE — 92526 ORAL FUNCTION THERAPY: CPT

## 2023-03-27 PROCEDURE — 84100 ASSAY OF PHOSPHORUS: CPT | Performed by: STUDENT IN AN ORGANIZED HEALTH CARE EDUCATION/TRAINING PROGRAM

## 2023-03-27 PROCEDURE — 80048 BASIC METABOLIC PNL TOTAL CA: CPT | Performed by: STUDENT IN AN ORGANIZED HEALTH CARE EDUCATION/TRAINING PROGRAM

## 2023-03-27 PROCEDURE — 83735 ASSAY OF MAGNESIUM: CPT | Performed by: STUDENT IN AN ORGANIZED HEALTH CARE EDUCATION/TRAINING PROGRAM

## 2023-03-27 PROCEDURE — 85027 COMPLETE CBC AUTOMATED: CPT | Performed by: STUDENT IN AN ORGANIZED HEALTH CARE EDUCATION/TRAINING PROGRAM

## 2023-03-27 PROCEDURE — 97530 THERAPEUTIC ACTIVITIES: CPT

## 2023-03-27 RX ADMIN — Medication 10 ML: at 21:27

## 2023-03-27 RX ADMIN — BACLOFEN 2.5 MG: 10 TABLET ORAL at 21:27

## 2023-03-27 RX ADMIN — HYDRALAZINE HYDROCHLORIDE 10 MG: 10 TABLET, FILM COATED ORAL at 14:56

## 2023-03-27 RX ADMIN — Medication 10 ML: at 08:17

## 2023-03-27 RX ADMIN — HYDRALAZINE HYDROCHLORIDE 10 MG: 10 TABLET, FILM COATED ORAL at 21:26

## 2023-03-27 RX ADMIN — APIXABAN 5 MG: 5 TABLET, FILM COATED ORAL at 21:26

## 2023-03-27 RX ADMIN — BACLOFEN 2.5 MG: 10 TABLET ORAL at 08:16

## 2023-03-27 RX ADMIN — HYDRALAZINE HYDROCHLORIDE 10 MG: 10 TABLET, FILM COATED ORAL at 06:25

## 2023-03-27 RX ADMIN — AMLODIPINE BESYLATE 10 MG: 10 TABLET ORAL at 06:25

## 2023-03-27 RX ADMIN — CARVEDILOL 25 MG: 12.5 TABLET, FILM COATED ORAL at 08:16

## 2023-03-27 RX ADMIN — APIXABAN 5 MG: 5 TABLET, FILM COATED ORAL at 08:16

## 2023-03-27 RX ADMIN — VALSARTAN 160 MG: 160 TABLET, FILM COATED ORAL at 06:25

## 2023-03-27 NOTE — TELEPHONE ENCOUNTER
S/w Laverne(patient's nurse) and informed of MRA/MRI @ [facility] on 04/14/2023 and F/U with Dr. Logan on 04/25/2023    Laverne expressed understanding     ----- Message from SLICK Henry sent at 3/23/2023 11:23 AM EDT -----  Regarding: f/u  Patient with spontaneous left basal ganglia hemorrhage needs follow-up 1 month with Dr. Logan with MRI brain with and without contrast and MRA head without contrast.  Thanks.  He will need a /extra time

## 2023-03-27 NOTE — PROGRESS NOTES
Name: Manuel Durant ADMIT: 3/12/2023   : 1950  PCP: Provider, No Known    MRN: [id number] LOS: 15 days   AGE/SEX: 73 y.o. male  ROOM: [facility]     Subjective   Subjective   No new complaints. Waiting on placement     Review of Systems     Objective   Objective   Vital Signs  Temp:  [97.6 °F (36.4 °C)-98.7 °F (37.1 °C)] 97.6 °F (36.4 °C)  Heart Rate:  [50-62] 59  Resp:  [16-18] 16  BP: (133-149)/(61-89) 139/72  SpO2:  [95 %-98 %] 95 %  on   ;   Device (Oxygen Therapy): room air  Body mass index is 25.55 kg/m².  Physical Exam  Vitals reviewed.   Constitutional:       Appearance: He is well-developed.   HENT:      Head: Normocephalic and atraumatic.      Mouth/Throat:      Mouth: Mucous membranes are moist.   Cardiovascular:      Rate and Rhythm: Normal rate and regular rhythm.   Pulmonary:      Effort: Pulmonary effort is normal. No respiratory distress.      Breath sounds: Normal breath sounds.   Abdominal:      General: Bowel sounds are normal. There is no distension.      Palpations: Abdomen is soft.      Tenderness: There is no abdominal tenderness.   Skin:     General: Skin is warm and dry.   Neurological:      Mental Status: He is alert and oriented to person, place, and time.      Comments: Right sided weakness   Psychiatric:         Mood and Affect: Mood normal.         Behavior: Behavior normal.         Thought Content: Thought content normal.       Results Review     I reviewed the patient's new clinical results.  Results from last 7 days   Lab Units 23  0545 23  0635 23  0638 23  0643   WBC 10*3/mm3 8.35 7.82 7.80 7.47   HEMOGLOBIN g/dL 13.4 13.8 13.4 13.4   PLATELETS 10*3/mm3 317 292 300 271     Results from last 7 days   Lab Units 23  0545 23  0635 23  0638 23  0643   SODIUM mmol/L 142 142 142 143   POTASSIUM mmol/L 4.0 4.0 4.0 3.7   CHLORIDE mmol/L 110* 109* 110* 109*   CO2 mmol/L 24.3 25.5 23.0 22.6   BUN mg/dL 19 21 22 22   CREATININE mg/dL  0.85 0.75* 0.75* 0.65*   GLUCOSE mg/dL 98 93 93 84   EGFR mL/min/1.73 92.9 96.5 96.5 100.7       Results from last 7 days   Lab Units 03/27/23  0545 03/26/23  0635 03/25/23  0638 03/24/23  0643   CALCIUM mg/dL 8.9 9.0 8.7 8.7   MAGNESIUM mg/dL 2.1 2.1 2.3 2.1   PHOSPHORUS mg/dL 3.4 3.5 3.2 3.2       No results found for: HGBA1C, POCGLU    No radiology results for the last day  I have personally reviewed all medications:  Scheduled Medications  amLODIPine, 10 mg, Oral, QAM AC  apixaban, 5 mg, Oral, Q12H  baclofen, 2.5 mg, Oral, Q12H  carvedilol, 25 mg, Oral, BID With Meals  hydrALAZINE, 10 mg, Oral, Q8H  senna-docusate sodium, 2 tablet, Oral, BID  sodium chloride, 10 mL, Intravenous, Q12H  valsartan, 160 mg, Oral, QAM AC    Infusions   Diet  Diet: Regular/House Diet; No Mixed Consistencies; Texture: Soft to Chew (NDD 3); Soft to Chew: Ground Meat; Fluid Consistency: Honey Thick    I have personally reviewed:  [x]  Laboratory   []  Microbiology   [x]  Radiology   []  EKG/Telemetry  [x]  Cardiology/Vascular   []  Pathology    [x]  Records       Assessment/Plan     Active Hospital Problems    Diagnosis  POA   • **Intracranial hemorrhage (HCC) [I62.9]  Yes   • Acute DVT (deep venous thrombosis) (HCC) [I82.409]  Unknown   • HTN (hypertension) [I10]  Unknown   • Hypokalemia [E87.6]  Unknown   • Hypertensive emergency [I16.1]  Unknown      Resolved Hospital Problems   No resolved problems to display.          Mr. Durant is a 73 year old male who initially presented to the hospital 3/12/23 for intracranial bleed. He was found unresponsive by friends and initially taken to [facility] where he was found to have poorly controlled BP and right sided hemiplegia. He was transferred here for closer monitoring in the ICU and neurosurgical consultation. He was cleared to move out of the ICU on 3/16/23.       Intracranial hemorrhage, CT brain done on 3/17/2023 revealed stable left basal ganglia/internal capsule hemorrhage with  left ventricular hemorrhage.  Developed DVT therefore Lovenox was initiated and CT brain on 3/18 and 3/20/2023 also revealed stable findings.  Neurosurgery did evaluate and cleared patient to be on Eliquis now.  Needs outpatient follow-up with neurosurgery as well as MRI brain as an outpatient basis once hemorrhage is cleared.        Hypertensive emergency, currently blood pressure is reasonably well controlled and on amlodipine, valsartan, Coreg and hydralazine.        Acute right lower extremity DVT, diagnosed on 3/17/2023 and is currently on Eliquis which will be continued and was on Lovenox earlier during this hospital stay.   neurosurgery cleared to be on anticoagulation.       Hypokalemia, on replacement protocol.        Disposition, needs rehab placement and will discharge once a bed is available.         · Eliquis (home med) for DVT prophylaxis.  · Full code.  · Discussed with patient, nursing staff and Dr Castro.  · Anticipate discharge rehab facility       SLICK Lerma  [facility]  03/27/23  12:40 EDT

## 2023-03-27 NOTE — THERAPY TREATMENT NOTE
Acute Care - Speech Language Pathology   Swallow Treatment Note [facility]     Patient Name: Manuel Durant  : 1950  MRN: [id number]  Today's Date: 3/27/2023               Admit Date: 3/12/2023    Visit Dx:     ICD-10-CM ICD-9-CM   1. Intracranial hemorrhage (HCC)  I62.9 432.9   2. Altered mental status, unspecified altered mental status type  R41.82 780.97     Patient Active Problem List   Diagnosis   • Intracranial hemorrhage (HCC)   • Hypertensive emergency   • Acute DVT (deep venous thrombosis) (HCC)   • HTN (hypertension)   • Hypokalemia     History reviewed. No pertinent past medical history.  History reviewed. No pertinent surgical history.    SLP Recommendation and Plan     SLP Diet Recommendation: honey thick liquids, mechanical ground textures (23 1500)  Recommended Precautions and Strategies: upright posture during/after eating, small bites of food and sips of liquid, assist with feeding (23 1500)  SLP Rec. for Method of Medication Administration: meds whole, meds crushed, with puree, as tolerated (23 1500)     Monitor for Signs of Aspiration: yes, notify SLP if any concerns (23 1500)        Anticipated Discharge Disposition (SLP): anticipate therapy at next level of care (23 1500)     Therapy Frequency (Swallow): PRN (23 1500)  Predicted Duration Therapy Intervention (Days): until discharge (23 1500)                                        Plan of Care Reviewed With: patient, other (see comments) (RN)  Outcome Evaluation: Clinical swallow re-evaluation completed at bedside.  iPad used for assessment. Anterior loss occurred as pt with impulsive self-feeding skills with HTL by cup due to R labial droop. Pt with no overt s/s of aspiration with NTL by cup/straw, HTL by cup/straw, puree, or soft solids. Wet voice demonstrated with ice chip trial. Unable to upgrade pt to nectar due to silent aspiration observed during VFSS on 3/15/23. Patient  with adequate munching mastication prior to swallow initiation assessed by palpation. No oral residue appreciated. Recommend HTL and soft solid/ground diet. Sitting upright, slow rate, small bites/sips, assist with feed. Meds whole or crushed in puree. Speech to follow.      SWALLOW EVALUATION (last 72 hours)     SLP Adult Swallow Evaluation     Row Name 03/27/23 1500                   Rehab Evaluation    Document Type therapy note (daily note)  -CR        Subjective Information no complaints  -CR        Patient Observations alert;cooperative  -CR        Patient Effort good  -CR        Symptoms Noted During/After Treatment none  -CR           General Information    Patient Profile Reviewed yes  -CR        Current Method of Nutrition honey-thick liquids;soft to chew textures;ground  -CR        Plans/Goals Discussed with patient;agreed upon  -CR        Barriers to Rehab medically complex  -CR           Pain    Additional Documentation Pain Scale: FACES Pre/Post-Treatment (Group)  -CR           Pain Scale: FACES Pre/Post-Treatment    Pain: FACES Scale, Pretreatment 0-->no hurt  -CR        Posttreatment Pain Rating 0-->no hurt  -CR           Recommendations    Therapy Frequency (Swallow) PRN  -CR        Predicted Duration Therapy Intervention (Days) until discharge  -CR        SLP Diet Recommendation honey thick liquids;mechanical ground textures  -CR        Recommended Diagnostics --  -CR        Recommended Precautions and Strategies upright posture during/after eating;small bites of food and sips of liquid;assist with feeding  -CR        Oral Care Recommendations Oral Care BID/PRN  -CR        SLP Rec. for Method of Medication Administration meds whole;meds crushed;with puree;as tolerated  -CR        Monitor for Signs of Aspiration yes;notify SLP if any concerns  -CR        Anticipated Discharge Disposition (SLP) anticipate therapy at next level of care  -CR           Swallow Goals (SLP)    Swallow LTGs Patient will  demonstrate functional swallow for  -CR           (LTG) Patient will demonstrate functional swallow for    Diet Texture (Demonstrate functional swallow) soft to chew (ground) textures  -CR        Liquid viscosity (Demonstrate functional swallow) honey/  moderately thick liquids  -CR        [place] (Demonstrate functional swallow) independently (over 90% accuracy)  -CR        Time Frame (Demonstrate functional swallow) by discharge  -CR        Comment (Demonstrate functional swallow) Patient seen for diet tolerance of mechanical soft/HTL diet. Pt exhibited no overt s/s of aspiration across multiple trials of soft solids and nectar thick liquid. No oral residue or vocal change appreciated. Speech to follow for swallow tx.  -CR              User Key  (r) = Recorded By, (t) = Taken By, (c) = Cosigned By    Initials Name Effective Dates    Maryam Gould CF-SLP 11/10/22 -                 EDUCATION  The patient has been educated in the following areas:   Dysphagia (Swallowing Impairment).        SLP GOALS     Row Name 03/27/23 1500             (LTG) Patient will demonstrate functional swallow for    Diet Texture (Demonstrate functional swallow) soft to chew (ground) textures  -CR      Liquid viscosity (Demonstrate functional swallow) honey/  moderately thick liquids  -CR      [place] (Demonstrate functional swallow) independently (over 90% accuracy)  -CR      Time Frame (Demonstrate functional swallow) by discharge  -CR      Comment (Demonstrate functional swallow) Patient seen for diet tolerance of mechanical soft/HTL diet. Pt exhibited no overt s/s of aspiration across multiple trials of soft solids and nectar thick liquid. No oral residue or vocal change appreciated. Speech to follow for swallow tx.  -CR            User Key  (r) = Recorded By, (t) = Taken By, (c) = Cosigned By    Initials Name Provider Type    Maryam Gould CF-SLP Speech and Language Pathologist                   Time  Calculation:    Time Calculation- SLP     Row Name 03/27/23 1514             Time Calculation- SLP    SLP Start Time 1500  -CR      SLP Received On 03/27/23  -CR            User Key  (r) = Recorded By, (t) = Taken By, (c) = Cosigned By    Initials Name Provider Type    Maryam Gould CF-SLP Speech and Language Pathologist                Therapy Charges for Today     Code Description Service Date Service Provider Modifiers Qty    [id number]  ST TREATMENT SWALLOW 2 3/27/2023 Maryam Chandra CF-SLP GN 1               DAVID Whitlock  3/27/2023

## 2023-03-27 NOTE — PLAN OF CARE
Goal Outcome Evaluation:  Plan of Care Reviewed With: patient           Outcome Evaluation: Pt seen for PT this PM. Focused on sitting balance at EOB. He required max A x1 for bed mobility, and mod to max A to maintain upright sitting balance. Does best when LUE assisting on bed rail. Will continue to follow and progress towards established goals.

## 2023-03-27 NOTE — THERAPY TREATMENT NOTE
Patient Name: Manuel Durant  : 1950    MRN: [id number]                              Today's Date: 3/27/2023       Admit Date: 3/12/2023    Visit Dx:     ICD-10-CM ICD-9-CM   1. Intracranial hemorrhage (HCC)  I62.9 432.9   2. Altered mental status, unspecified altered mental status type  R41.82 780.97     Patient Active Problem List   Diagnosis   • Intracranial hemorrhage (HCC)   • Hypertensive emergency   • Acute DVT (deep venous thrombosis) (HCC)   • HTN (hypertension)   • Hypokalemia     History reviewed. No pertinent past medical history.  History reviewed. No pertinent surgical history.   General Information     Row Name 23 [facility]          Physical Therapy Time and Intention    Document Type therapy note (daily note)  -CW     Mode of Treatment physical therapy;individual therapy  -CW     Row Name 23 [facility]          General Information    Patient Profile Reviewed yes  -CW     Existing Precautions/Restrictions fall  -CW     Row Name 23 [facility]          Cognition    Orientation Status (Cognition) oriented to;person  -CW     Row Name 23 [facility]          Safety Issues, Functional Mobility    Safety Issues Affecting Function (Mobility) insight into deficits/self-awareness;awareness of need for assistance;judgment;problem-solving;safety precautions follow-through/compliance;sequencing abilities  -CW     Impairments Affecting Function (Mobility) balance;coordination;cognition;endurance/activity tolerance;grasp;motor control;postural/trunk control;strength  -CW           User Key  (r) = Recorded By, (t) = Taken By, (c) = Cosigned By    Initials Name Provider Type    CW Tamica Willoughby PT Physical Therapist               Mobility     Row Name 23 [facility]          Bed Mobility    Scooting/Bridging [place] (Bed Mobility) maximum assist (25% patient effort);2 person assist;verbal cues  -CW     Supine-Sit [place] (Bed Mobility) moderate assist (50% patient effort);maximum assist (25%  patient effort);1 person assist  -CW     Sit-Supine [place] (Bed Mobility) maximum assist (25% patient effort);1 person assist;verbal cues  -CW     Assistive Device (Bed Mobility) head of bed elevated;bed rails  -CW     Comment, (Bed Mobility) Pushing with LUE towards his R. Does best when using LUE on rail at foot of bed to assist with balance  -CW           User Key  (r) = Recorded By, (t) = Taken By, (c) = Cosigned By    Initials Name Provider Type    Tamica Eddy, JOSE ELIAS Physical Therapist               Obj/Interventions     Row Name 03/27/23 1353          Motor Skills    Therapeutic Exercise other (see comments)  L laq x10 reps AROM. R laq x10 reps PROM  -CW     Row Name 03/27/23 1357          Balance    Comment, Balance R lateral lean requires mod to max A to correct. Cga for sititng balance when LUE used on rail at foot of bed  -CW           User Key  (r) = Recorded By, (t) = Taken By, (c) = Cosigned By    Initials Name Provider Type    Tamica Eddy, PT Physical Therapist               Goals/Plan    No documentation.                Clinical Impression     Row Name 03/27/23 1357          Pain    Pretreatment Pain Rating 0/10 - no pain  -CW     Posttreatment Pain Rating 0/10 - no pain  -CW     Row Name 03/27/23 1356          Plan of Care Review    Plan of Care Reviewed With patient  -CW     Outcome Evaluation Pt seen for PT this PM. Focused on sitting balance at EOB. He required max A x1 for bed mobility, and mod to max A to maintain upright sitting balance. Does best when LUE assisting on bed rail. Will continue to follow and progress towards established goals.  -CW     Row Name 03/27/23 1351          Vital Signs    O2 Delivery Pre Treatment room air  -CW     Row Name 03/27/23 1357          Positioning and Restraints    Pre-Treatment Position in bed  -CW     Post Treatment Position bed  -CW     In Bed call light within reach;notified nsg;encouraged to call for assist;exit alarm on;side rails  up x3;fowdelvin;ALIZA elevated  -CW           User Key  (r) = Recorded By, (t) = Taken By, (c) = Cosigned By    Initials Name Provider Type    Tamica Eddy PT Physical Therapist               Outcome Measures     Row Name 03/27/23 1401 03/27/23 0816       How much help from another person do you currently need...    Turning from your back to your side while in flat bed without using bedrails? 2  -CW 2  -TF    Moving from lying on back to sitting on the side of a flat bed without bedrails? 2  -CW 1  -TF    Moving to and from a bed to a chair (including a wheelchair)? 2  -CW 1  -TF    Standing up from a chair using your arms (e.g., wheelchair, bedside chair)? 2  -CW 1  -TF    Climbing 3-5 steps with a railing? 1  -CW 1  -TF    To walk in hospital room? 1  -CW 1  -TF    AM-PAC 6 Clicks Score (PT) 10  -CW 7  -TF    Highest level of mobility 4 --> Transferred to chair/commode  -CW 2 --> Bed activities/dependent transfer  -TF    Row Name 03/27/23 1401          Functional Assessment    Outcome Measure Options AM-PAC 6 Clicks Basic Mobility (PT)  -CW           User Key  (r) = Recorded By, (t) = Taken By, (c) = Cosigned By    Initials Name Provider Type    TF Miriam Smiley RN Registered Nurse    Tamica Eddy, PT Physical Therapist                             Physical Therapy Education     Title: PT OT SLP Therapies (In Progress)     Topic: Physical Therapy (In Progress)     Point: Mobility training (In Progress)     Learning Progress Summary           Patient Acceptance, E, NR by DANAY at 3/27/2023 1401    Acceptance, E,D, NR,VU by ZACH at 3/24/2023 1631    Comment: seemed to comprehend commands this session    Acceptance, E,D, NR by ZACH at 3/23/2023 1655    Acceptance, E,D, NR by ZACH at 3/22/2023 1545    Acceptance, E,TB, VU,NR by CB at 3/21/2023 1544    Acceptance, E,TB,H, VU by MS at 3/16/2023 1801    Acceptance, E,D, DU,NR by MO at 3/16/2023 1451    Acceptance, E, DU,NR by MO at 3/15/2023 1200    Acceptance,  E, NR by LM at 3/15/2023 0521    Acceptance, E,D, DU,NR by MO at 3/14/2023 1458   Family Acceptance, E,TB,H, VU by MS at 3/16/2023 1801                   Point: Home exercise program (In Progress)     Learning Progress Summary           Patient Acceptance, E, NR by CW at 3/27/2023 1401    Acceptance, E,D, NR,VU by JM at 3/24/2023 1631    Comment: seemed to comprehend commands this session    Acceptance, E,D, NR by ZACH at 3/23/2023 1655    Acceptance, E,D, NR by JM at 3/22/2023 1545    Acceptance, E,TB, VU,NR by CB at 3/21/2023 1544    Acceptance, E,TB,H, VU by MS at 3/16/2023 1801    Acceptance, E,D, DU,NR by MO at 3/16/2023 1451    Acceptance, E, NR by LM at 3/15/2023 0521    Acceptance, E,D, DU,NR by MO at 3/14/2023 1458   Family Acceptance, E,TB,H, VU by MS at 3/16/2023 1801                   Point: Body mechanics (Done)     Learning Progress Summary           Patient Acceptance, E,D, NR,VU by ZACH at 3/24/2023 1631    Comment: seemed to comprehend commands this session    Acceptance, E,D, NR by ZACH at 3/23/2023 1655    Acceptance, E,D, NR by ZACH at 3/22/2023 1545    Acceptance, E,TB, VU,NR by CB at 3/21/2023 1544    Acceptance, E,TB,H, VU by MS at 3/16/2023 1801    Acceptance, E,D, DU,NR by MO at 3/16/2023 1451    Acceptance, E, DU,NR by MO at 3/15/2023 1200    Acceptance, E, NR by LM at 3/15/2023 0521    Acceptance, E,D, DU,NR by MO at 3/14/2023 1458   Family Acceptance, E,TB,H, VU by MS at 3/16/2023 1801                   Point: Precautions (Done)     Learning Progress Summary           Patient Acceptance, E,D, NR,VU by JM at 3/24/2023 1631    Comment: seemed to comprehend commands this session    Acceptance, E,D, NR by JM at 3/23/2023 1655    Acceptance, E,D, NR by JM at 3/22/2023 1545    Acceptance, E,TB, VU,NR by CB at 3/21/2023 1544    Acceptance, E,TB,H, VU by MS at 3/16/2023 1801    Acceptance, E,D, DU,NR by MO at 3/16/2023 1451    Acceptance, E, DU,NR by MO at 3/15/2023 1200    Acceptance, E, NR by LM at  3/15/2023 0521    Acceptance, E,D, DU,NR by MO at 3/14/2023 1458   Family Acceptance, E,TB,H, VU by MS at 3/16/2023 1801                               User Key     Initials Effective Dates Name Provider Type Discipline    JM 03/07/18 -  Tiffanie Grace, PTA Physical Therapist Assistant PT    MS 06/16/21 -  Angel Luis Cr, RN Registered Nurse Nurse    CW 12/13/22 -  Tamica Willoughby, PT Physical Therapist PT    CB 10/22/21 -  Bety Benitez, PT Physical Therapist PT    LM 10/13/22 -  Silke Grace, RN Registered Nurse Nurse    MO 01/27/23 -  Lacie Tanner, PT Student PT Student PT              PT Recommendation and Plan     Plan of Care Reviewed With: patient  Outcome Evaluation: Pt seen for PT this PM. Focused on sitting balance at EOB. He required max A x1 for bed mobility, and mod to max A to maintain upright sitting balance. Does best when LUE assisting on bed rail. Will continue to follow and progress towards established goals.     Time Calculation:    PT Charges     Row Name 03/27/23 1354             Time Calculation    Start Time 1331  -CW      Stop Time 1345  -CW      Time Calculation (min) 14 min  -CW      PT Received On 03/27/23  -CW      PT - Next Appointment 03/28/23  -CW            User Key  (r) = Recorded By, (t) = Taken By, (c) = Cosigned By    Initials Name Provider Type    CW Tamica Willoughby, PT Physical Therapist              Therapy Charges for Today     Code Description Service Date Service Provider Modifiers Qty    14781727402 HC PT THERAPEUTIC ACT EA 15 MIN 3/27/2023 Tamica Willoughby, PT GP 1          PT G-Codes  Outcome Measure Options: AM-PAC 6 Clicks Basic Mobility (PT)  AM-PAC 6 Clicks Score (PT): 10  AM-PAC 6 Clicks Score (OT): 10  Modified Thurston Scale: 4 - Moderately severe disability.  Unable to walk without assistance, and unable to attend to own bodily needs without assistance.  PT Discharge Summary  Anticipated Discharge Disposition (PT): skilled nursing facility    Tamica  Case, PT  3/27/2023

## 2023-03-28 LAB
ANION GAP SERPL CALCULATED.3IONS-SCNC: 9.8 MMOL/L (ref 5–15)
BUN SERPL-MCNC: 21 MG/DL (ref 8–23)
BUN/CREAT SERPL: 28.8 (ref 7–25)
CALCIUM SPEC-SCNC: 9 MG/DL (ref 8.6–10.5)
CHLORIDE SERPL-SCNC: 108 MMOL/L (ref 98–107)
CO2 SERPL-SCNC: 24.2 MMOL/L (ref 22–29)
CREAT SERPL-MCNC: 0.73 MG/DL (ref 0.76–1.27)
DEPRECATED RDW RBC AUTO: 42.4 FL (ref 37–54)
EGFRCR SERPLBLD CKD-EPI 2021: 97.3 ML/MIN/1.73
ERYTHROCYTE [DISTWIDTH] IN BLOOD BY AUTOMATED COUNT: 13 % (ref 12.3–15.4)
GLUCOSE SERPL-MCNC: 89 MG/DL (ref 65–99)
HCT VFR BLD AUTO: 39.9 % (ref 37.5–51)
HGB BLD-MCNC: 13.5 G/DL (ref 13–17.7)
MAGNESIUM SERPL-MCNC: 2.1 MG/DL (ref 1.6–2.4)
MCH RBC QN AUTO: 30.6 PG (ref 26.6–33)
MCHC RBC AUTO-ENTMCNC: 33.8 G/DL (ref 31.5–35.7)
MCV RBC AUTO: 90.5 FL (ref 79–97)
PHOSPHATE SERPL-MCNC: 3.4 MG/DL (ref 2.5–4.5)
PLATELET # BLD AUTO: 324 10*3/MM3 (ref 140–450)
PMV BLD AUTO: 11 FL (ref 6–12)
POTASSIUM SERPL-SCNC: 3.9 MMOL/L (ref 3.5–5.2)
RBC # BLD AUTO: 4.41 10*6/MM3 (ref 4.14–5.8)
SODIUM SERPL-SCNC: 142 MMOL/L (ref 136–145)
WBC NRBC COR # BLD: 7.76 10*3/MM3 (ref 3.4–10.8)

## 2023-03-28 PROCEDURE — 84100 ASSAY OF PHOSPHORUS: CPT | Performed by: STUDENT IN AN ORGANIZED HEALTH CARE EDUCATION/TRAINING PROGRAM

## 2023-03-28 PROCEDURE — 80048 BASIC METABOLIC PNL TOTAL CA: CPT | Performed by: STUDENT IN AN ORGANIZED HEALTH CARE EDUCATION/TRAINING PROGRAM

## 2023-03-28 PROCEDURE — 97530 THERAPEUTIC ACTIVITIES: CPT

## 2023-03-28 PROCEDURE — 85027 COMPLETE CBC AUTOMATED: CPT | Performed by: STUDENT IN AN ORGANIZED HEALTH CARE EDUCATION/TRAINING PROGRAM

## 2023-03-28 PROCEDURE — 83735 ASSAY OF MAGNESIUM: CPT | Performed by: STUDENT IN AN ORGANIZED HEALTH CARE EDUCATION/TRAINING PROGRAM

## 2023-03-28 PROCEDURE — 97112 NEUROMUSCULAR REEDUCATION: CPT

## 2023-03-28 RX ADMIN — Medication 10 ML: at 20:00

## 2023-03-28 RX ADMIN — HYDRALAZINE HYDROCHLORIDE 10 MG: 10 TABLET, FILM COATED ORAL at 16:58

## 2023-03-28 RX ADMIN — DOCUSATE SODIUM 50MG AND SENNOSIDES 8.6MG 2 TABLET: 8.6; 5 TABLET, FILM COATED ORAL at 09:48

## 2023-03-28 RX ADMIN — BACLOFEN 2.5 MG: 10 TABLET ORAL at 20:00

## 2023-03-28 RX ADMIN — APIXABAN 5 MG: 5 TABLET, FILM COATED ORAL at 09:51

## 2023-03-28 RX ADMIN — AMLODIPINE BESYLATE 10 MG: 10 TABLET ORAL at 09:48

## 2023-03-28 RX ADMIN — Medication 10 ML: at 09:51

## 2023-03-28 RX ADMIN — BACLOFEN 2.5 MG: 10 TABLET ORAL at 09:51

## 2023-03-28 RX ADMIN — VALSARTAN 160 MG: 160 TABLET, FILM COATED ORAL at 09:51

## 2023-03-28 RX ADMIN — CARVEDILOL 25 MG: 12.5 TABLET, FILM COATED ORAL at 17:00

## 2023-03-28 RX ADMIN — APIXABAN 5 MG: 5 TABLET, FILM COATED ORAL at 20:00

## 2023-03-28 NOTE — PLAN OF CARE
Goal Outcome Evaluation:              Outcome Evaluation: (P) Pt in bed and agreeable to PT/OT this am. Pt req'd mod A for bed mobility, and demo'd R lean in sitting that req'd vcs and assist to correct. STS from EOB w/ min-modAx2, modA to maintain standing balance, and maxAx2 for txfr from bed to chair via stand pivot. Nsg notified that 2 person txfr req'd to return to bed via pivot from chair.

## 2023-03-28 NOTE — THERAPY TREATMENT NOTE
Patient Name: Manuel Durant  : 1950    MRN: [id number]                              Today's Date: 3/28/2023       Admit Date: 3/12/2023    Visit Dx:     ICD-10-CM ICD-9-CM   1. Intracranial hemorrhage (HCC)  I62.9 432.9   2. Altered mental status, unspecified altered mental status type  R41.82 780.97     Patient Active Problem List   Diagnosis   • Intracranial hemorrhage (HCC)   • Hypertensive emergency   • Acute DVT (deep venous thrombosis) (HCC)   • HTN (hypertension)   • Hypokalemia     History reviewed. No pertinent past medical history.  History reviewed. No pertinent surgical history.   General Information     Row Name 23 1233          OT Time and Intention    Document Type therapy note (daily note)  -     Mode of Treatment co-treatment;occupational therapy;physical therapy  -     Row Name 23 1233          General Information    Patient Profile Reviewed yes  -     Existing Precautions/Restrictions fall  -     Row Name 23 1233          Cognition    Orientation Status (Cognition) oriented to;person;place  some aphasia noted  -     Row Name 23 1233          Safety Issues, Functional Mobility    Safety Issues Affecting Function (Mobility) insight into deficits/self-awareness;problem-solving;safety precaution awareness  -     Impairments Affecting Function (Mobility) balance;coordination;cognition;endurance/activity tolerance;grasp;motor control;postural/trunk control;strength;range of motion (ROM)  -           User Key  (r) = Recorded By, (t) = Taken By, (c) = Cosigned By    Initials Name Provider Type     Evelia Kenney OT Occupational Therapist                 Mobility/ADL's     Row Name 23 1234          Bed Mobility    Supine-Sit [place] (Bed Mobility) moderate assist (50% patient effort);2 person assist;verbal cues  -     Sit-Supine [place] (Bed Mobility) maximum assist (25% patient effort);2 person assist;verbal cues  -     Bed Mobility,  Safety Issues decreased use of arms for pushing/pulling;decreased use of legs for bridging/pushing;impaired trunk control for bed mobility  -     Assistive Device (Bed Mobility) head of bed elevated;bed rails  -     Comment, (Bed Mobility) use of LUE to pull himself to EOB towards left side  -     Row Name 03/28/23 1234          Bed-Chair Transfer    Bed-Chair [place] (Transfers) maximum assist (25% patient effort);2 person assist;verbal cues;nonverbal cues (demo/gesture)  -     Comment, (Bed-Chair Transfer) stand pivot from EOB to chair with MaxA x2/HHA. Impaired coordination and strength to advance/pivot of RLE limiting mobility during TF  -     Row Name 03/28/23 1234          Sit-Stand Transfer    Sit-Stand [place] (Transfers) minimum assist (75% patient effort);moderate assist (50% patient effort);2 person assist;verbal cues  -     Comment, (Sit-Stand Transfer) HHA  -           User Key  (r) = Recorded By, (t) = Taken By, (c) = Cosigned By    Initials Name Provider Type     Evelia Kenney OT Occupational Therapist               Obj/Interventions     Row Name 03/28/23 1236          Sensory Assessment (Somatosensory)    Sensory Assessment reports impaired sensation in R digits  -[facility] Name 03/28/23 1236          Vision Assessment/Intervention    Visual Processing Deficit karen-inattention/neglect, right  -     Vision Assessment Comment continues to demo R inattention, however does respond to cues on R  -[facility] Name 03/28/23 1236          Shoulder (Therapeutic Exercise)    Shoulder (Therapeutic Exercise) AAROM (active assistive range of motion)  -     Shoulder AAROM (Therapeutic Exercise) right;flexion;extension;aBduction;aDduction;scapular retraction  -     Row Name 03/28/23 1236          Elbow/Forearm (Therapeutic Exercise)    Elbow/Forearm (Therapeutic Exercise) AAROM (active assistive range of motion)  -     Elbow/Forearm AAROM (Therapeutic Exercise)  right;flexion;extension;supination;pronation  -[facility] Name 03/28/23 1236          Wrist (Therapeutic Exercise)    Wrist PROM (Therapeutic Exercise) right;flexion;extension;10 repetitions  -[facility] Name 03/28/23 1236          Hand (Therapeutic Exercise)    Hand PROM (Therapeutic Exercise) right;finger flexion;finger extension;10 repetitions  -[facility] Name 03/28/23 1236          Motor Skills    Motor Control/Coordination Interventions therapeutic exercise/ROM;weight-bearing activities  -[facility] Name 03/28/23 1236          Balance    Static Sitting Balance contact guard  -     Dynamic Sitting Balance minimal assist  -     Position, Sitting Balance sitting edge of bed;supported  -     Static Standing Balance moderate assist  -     Dynamic Standing Balance maximum assist;2-person assist  -     Position/Device Used, Standing Balance supported  -     Comment, Balance demo's R lateral leaning req'ing modA for static standing balance, max dyn balance  -[facility] Name 03/28/23 1236          Neuromuscular Re-education    Interventions (Neuromuscular Re-education) pattern movements;weight bearing;weight shifting  -           User Key  (r) = Recorded By, (t) = Taken By, (c) = Cosigned By    Initials Name Provider Type     Evelia Kenney OT Occupational Therapist               Goals/Plan    No documentation.                Clinical Impression     [facility] Name 03/28/23 1244          Pain Assessment    Pretreatment Pain Rating 0/10 - no pain  -     Posttreatment Pain Rating 0/10 - no pain  -[facility] Name 03/28/23 1244          Plan of Care Review    Plan of Care Reviewed With patient  -     Outcome Evaluation Pt was found supine in bed, pleasant and agreeable to OT/PT session. He demo's some aphasia but increased command following today. Req's ModA to sit to left side of bed with heavy use of bed rail. Demo's R sided lean, cues and assist to attain midline. AROM/AAROM/PROM to RUE in all planes to  promote joint mobility for increased fxl use. Demo's some movement in proximal extremity, scapular protraction and slight shoulder flexion, no active movement in wrist/digits. STS with Min-ModAx2, ModA to maintain static standing balance with assist to weight shift. MaxA x2 for TF to bedside chair d/t dec coordination and strength to advance RLE. Left sitting UIC with RUE supported in neutral position.  -     Row Name 03/28/23 1244          Therapy Assessment/Plan (OT)    Rehab Potential (OT) good, to achieve stated therapy goals  -     Criteria for Skilled Therapeutic Interventions Met (OT) yes;skilled treatment is necessary  -     Therapy Frequency (OT) 5 times/wk  -     Row Name 03/28/23 1244          Therapy Plan Review/Discharge Plan (OT)    Anticipated Discharge Disposition (OT) inpatient rehabilitation facility  -     Row Name 03/28/23 1244          Positioning and Restraints    Pre-Treatment Position in bed  -     Post Treatment Position chair  -     In Chair notified nsg;sitting;call light within reach;encouraged to call for assist;exit alarm on;legs elevated;RUE elevated  -           User Key  (r) = Recorded By, (t) = Taken By, (c) = Cosigned By    Initials Name Provider Type    Evelia Faulkner OT Occupational Therapist               Outcome Measures    No documentation.                 Occupational Therapy Education     Title: PT OT SLP Therapies (In Progress)     Topic: Occupational Therapy (Done)     Point: ADL training (Done)     Description:   Instruct learner(s) on proper safety adaptation and remediation techniques during self care or transfers.   Instruct in proper use of assistive devices.              Learning Progress Summary           Patient Acceptance, E,TB,H, VU by MS at 3/16/2023 1801    Acceptance, E, NR by LM at 3/15/2023 0521   Family Acceptance, E,TB,H, VU by MS at 3/16/2023 1801                   Point: Home exercise program (Done)     Description:   Instruct learner(s)  on appropriate technique for monitoring, assisting and/or progressing therapeutic exercises/activities.              Learning Progress Summary           Patient Acceptance, E,TB,H, VU by MS at 3/16/2023 1801    Acceptance, E, NR by LM at 3/15/2023 0521   Family Acceptance, E,TB,H, VU by MS at 3/16/2023 1801                   Point: Precautions (Done)     Description:   Instruct learner(s) on prescribed precautions during self-care and functional transfers.              Learning Progress Summary           Patient Acceptance, E,TB,H, VU by MS at 3/16/2023 1801    Acceptance, E, NR by LM at 3/15/2023 0521   Family Acceptance, E,TB,H, VU by MS at 3/16/2023 1801                   Point: Body mechanics (Done)     Description:   Instruct learner(s) on proper positioning and spine alignment during self-care, functional mobility activities and/or exercises.              Learning Progress Summary           Patient Acceptance, E,TB,H, VU by MS at 3/16/2023 1801    Acceptance, E, NR by LM at 3/15/2023 0521   Family Acceptance, E,TB,H, VU by MS at 3/16/2023 1801                               User Key     Initials Effective Dates Name Provider Type Discipline    MS 06/16/21 -  Angel Luis Cr, RN Registered Nurse Nurse     10/13/22 -  Silke Grace, RN Registered Nurse Nurse              OT Recommendation and Plan  Therapy Frequency (OT): 5 times/wk  Plan of Care Review  Plan of Care Reviewed With: patient  Outcome Evaluation: Pt was found supine in bed, pleasant and agreeable to OT/PT session. He demo's some aphasia but increased command following today. Req's ModA to sit to left side of bed with heavy use of bed rail. Demo's R sided lean, cues and assist to attain midline. AROM/AAROM/PROM to RUE in all planes to promote joint mobility for increased fxl use. Demo's some movement in proximal extremity, scapular protraction and slight shoulder flexion, no active movement in wrist/digits. STS with Min-ModAx2, ModA to maintain  static standing balance with assist to weight shift. MaxA x2 for TF to bedside chair d/t dec coordination and strength to advance RLE. Left sitting UIC with RUE supported in neutral position.     Time Calculation:    Time Calculation- OT     Row Name 03/28/23 1253             Time Calculation- OT    OT Start Time 0829  -JW      OT Stop Time 0852  -      OT Time Calculation (min) 23 min  -      Total Timed Code Minutes- OT 23 minute(s)  -      OT Received On 03/28/23  -      OT - Next Appointment 03/29/23  -      OT Goal Re-Cert Due Date 04/11/23  -         Timed Charges    06588 -  OT Neuromuscular Reeducation Minutes 13  -JW      54221 - OT Therapeutic Activity Minutes 10  -         Total Minutes    Timed Charges Total Minutes 23  -       Total Minutes 23  -            User Key  (r) = Recorded By, (t) = Taken By, (c) = Cosigned By    Initials Name Provider Type    Evelia Faulkner OT Occupational Therapist              Therapy Charges for Today     Code Description Service Date Service Provider Modifiers Qty    [id number]  OT NEUROMUSC RE EDUCATION EA 15 MIN 3/28/2023 Evelia Kenney OT GO 1    [id number]  OT THERAPEUTIC ACT EA 15 MIN 3/28/2023 Evelia Kenney OT GO 1               Evelia Kenney OT  3/28/2023

## 2023-03-28 NOTE — PLAN OF CARE
Goal Outcome Evaluation:  Plan of Care Reviewed With: patient           Outcome Evaluation: Pt was found supine in bed, pleasant and agreeable to OT/PT session. He demo's some aphasia but increased command following today. Req's ModA to sit to left side of bed with heavy use of bed rail. Demo's R sided lean, cues and assist to attain midline. AROM/AAROM/PROM to RUE in all planes to promote joint mobility for increased fxl use. Demo's some movement in proximal extremity, scapular protraction and slight shoulder flexion, no active movement in wrist/digits. STS with Min-ModAx2, ModA to maintain static standing balance with assist to weight shift. MaxA x2 for TF to bedside chair d/t dec coordination and strength to advance RLE. Left sitting UIC with RUE supported in neutral position.

## 2023-03-28 NOTE — CASE MANAGEMENT/SOCIAL WORK
Continued Stay Note  [facility]     Patient Name: Manuel Durant  MRN: [id number]  Today's Date: 3/28/2023    Admit Date: 3/12/2023    Plan: Pending   Discharge Plan     Row Name 03/28/23 1712       Plan    Plan Comments Spoke with pt's granddaughter, Ariel Durant's daughter, Natalie Person, by phone.  She confirms that she has been speaking with her father and that he called her to ask her to help with her grandfather.  Gave her CCP contact information and verified her phone number as well as her father's number in [place].  She states that she has been in contact with pt's employer as well.  Natalie lives in [place] and will assist with pt's care and disposition planning.  She states that she is a  in [place] and is aware of the care her grandfather will need.  She is hoping to be able to arrange a plane ticket for Mr. Durant to fly from [place] to [facility].  He will need to fly alone but she will come to [place] to put him on the plane.  We discussed that her grandfather is not yet able to fly independently.  She understands and states that there is no one to care for him and she wants him to stay here until he is able to endure the trip by plane.   Will speak with MDs about travel restrictions at this time and monitor his rehab progress.  Natalie is working on the funds which will be needed to purchase the ticket.  She states that her family will meet the plane in [facility] and she has an uncle who is a physician and that he will be able to assist with Mr. Durant's care needs.  [facility] will continue to work with pt's family for DC planning.  Faye ARMSTRONG               Discharge Codes    No documentation.               Expected Discharge Date and Time     Expected Discharge Date Expected Discharge Time    Mar 31, 2023             Faye Moy RN

## 2023-03-28 NOTE — THERAPY TREATMENT NOTE
Patient Name: Manuel Durant  : 1950    MRN: [id number]                              Today's Date: 3/28/2023       Admit Date: 3/12/2023    Visit Dx:     ICD-10-CM ICD-9-CM   1. Intracranial hemorrhage (HCC)  I62.9 432.9   2. Altered mental status, unspecified altered mental status type  R41.82 780.97     Patient Active Problem List   Diagnosis   • Intracranial hemorrhage (HCC)   • Hypertensive emergency   • Acute DVT (deep venous thrombosis) (HCC)   • HTN (hypertension)   • Hypokalemia     History reviewed. No pertinent past medical history.  History reviewed. No pertinent surgical history.   General Information     Row Name 23 1303          Physical Therapy Time and Intention    Document Type therapy note (daily note) (P)   -ZB     Mode of Treatment co-treatment;occupational therapy;physical therapy (P)   -ZB     Row Name 23 1303          General Information    Patient Profile Reviewed yes (P)   -ZB     Existing Precautions/Restrictions fall (P)   -ZB     Row Name 23 1303          Cognition    Orientation Status (Cognition) oriented to;person;place (P)   -ZB     Row Name 23 1303          Safety Issues, Functional Mobility    Impairments Affecting Function (Mobility) balance;coordination;cognition;endurance/activity tolerance;grasp;motor control;postural/trunk control;strength;range of motion (ROM) (P)   -ZB           User Key  (r) = Recorded By, (t) = Taken By, (c) = Cosigned By    Initials Name Provider Type    ZB Nestor Rae PT Student PT Student               Mobility     Row Name 23 1306          Bed Mobility    Bed Mobility supine-sit;sit-supine (P)   -ZB     Supine-Sit [place] (Bed Mobility) moderate assist (50% patient effort);2 person assist;verbal cues (P)   -ZB     Sit-Supine [place] (Bed Mobility) maximum assist (25% patient effort);2 person assist;verbal cues (P)   -ZB     Assistive Device (Bed Mobility) head of bed elevated;bed rails (P)   -ZB      Row Name 03/28/23 1304          Bed-Chair Transfer    Bed-Chair [place] (Transfers) maximum assist (25% patient effort);2 person assist;verbal cues;nonverbal cues (demo/gesture) (P)   -ZB     Comment, (Bed-Chair Transfer) pt completed stand pivot max A x2 via hha from EOB to chair (P)   -ZB     Row Name 03/28/23 1304          Sit-Stand Transfer    Sit-Stand [place] (Transfers) minimum assist (75% patient effort);moderate assist (50% patient effort);2 person assist;verbal cues (P)   -ZB     Assistive Device (Sit-Stand Transfers) other (see comments) (P)   hha  -ZB     Row Name 03/28/23 1304          Gait/Stairs (Locomotion)    Distance in Feet (Gait) stand pivot to chair from EOB (P)   -ZB           User Key  (r) = Recorded By, (t) = Taken By, (c) = Cosigned By    Initials Name Provider Type    Nestor Howard, PT Student PT Student               Obj/Interventions     Row Name 03/28/23 1308          Balance    Balance Assessment sitting static balance;sitting dynamic balance;standing static balance;standing dynamic balance (P)   -ZB     Static Sitting Balance contact guard (P)   -ZB     Dynamic Sitting Balance minimal assist (P)   -ZB     Position, Sitting Balance supported;sitting edge of bed (P)   -ZB     Static Standing Balance moderate assist (P)   -ZB     Dynamic Standing Balance maximum assist;2-person assist (P)   -ZB     Position/Device Used, Standing Balance supported (P)   -ZB     Balance Interventions sitting;sit to stand;standing;supported;static;dynamic;weight shifting activity (P)   -ZB     Comment, Balance R lateral lean required mod A for correction w/ static stand (P)   -ZB           User Key  (r) = Recorded By, (t) = Taken By, (c) = Cosigned By    Initials Name Provider Type    Nestor Howard, PT Student PT Student               Goals/Plan    No documentation.                Clinical Impression     Row Name 03/28/23 1310          Pain    Pretreatment Pain Rating 0/10 - no pain (P)   -ZB      Posttreatment Pain Rating 0/10 - no pain (P)   -ZB     Pain Intervention(s) Repositioned;Ambulation/increased activity (P)   -ZB     Row Name 03/28/23 1310          Plan of Care Review    Outcome Evaluation Pt in bed and agreeable to PT/OT this am. Pt req'd mod A for bed mobility, and demo'd R lean in sitting that req'd vcs and assist to correct. STS from EOB w/ min-modAx2, modA to maintain standing balance, and maxAx2 for txfr from bed to chair via stand pivot. Nsg notified that 2 person txfr req'd to return to bed via pivot from chair. (P)   -ZB     Row Name 03/28/23 1310          Vital Signs    O2 Delivery Pre Treatment room air (P)   -ZB     O2 Delivery Intra Treatment room air (P)   -ZB     O2 Delivery Post Treatment room air (P)   -ZB     Row Name 03/28/23 1310          Positioning and Restraints    Pre-Treatment Position in bed (P)   -ZB     Post Treatment Position chair (P)   -ZB     In Chair notified nsg;reclined;call light within reach;encouraged to call for assist;exit alarm on (P)   -ZB           User Key  (r) = Recorded By, (t) = Taken By, (c) = Cosigned By    Initials Name Provider Type    Nestor Howard, PT Student PT Student               Outcome Measures     Row Name 03/28/23 1317 03/28/23 0945       How much help from another person do you currently need...    Turning from your back to your side while in flat bed without using bedrails? 3 (P)   -ZB 2  -AG    Moving from lying on back to sitting on the side of a flat bed without bedrails? 3 (P)   -ZB 2  -AG    Moving to and from a bed to a chair (including a wheelchair)? 2 (P)   -ZB 2  -AG    Standing up from a chair using your arms (e.g., wheelchair, bedside chair)? 2 (P)   -ZB 2  -AG    Climbing 3-5 steps with a railing? 1 (P)   -ZB 1  -AG    To walk in hospital room? 1 (P)   -ZB 1  -AG    AM-PAC 6 Clicks Score (PT) 12 (P)   -ZB 10  -AG    Highest level of mobility 4 --> Transferred to chair/commode (P)   -ZB 4 --> Transferred to  chair/commode  -AG    Row Name 03/28/23 1317          Functional Assessment    Outcome Measure Options AM-PAC 6 Clicks Basic Mobility (PT) (P)   -ZB           User Key  (r) = Recorded By, (t) = Taken By, (c) = Cosigned By    Initials Name Provider Type    Ngozi Matos, RN Registered Nurse    Nestor Howard, PT Student PT Student                             Physical Therapy Education     Title: PT OT SLP Therapies (In Progress)     Topic: Physical Therapy (In Progress)     Point: Mobility training (In Progress)     Learning Progress Summary           Patient Acceptance, E, NR by CHARLY at 3/28/2023 1317    Acceptance, E, NR by DANAY at 3/27/2023 1401    Acceptance, E,D, NR,VU by ZACH at 3/24/2023 1631    Comment: seemed to comprehend commands this session    Acceptance, E,D, NR by ZACH at 3/23/2023 1655    Acceptance, E,D, NR by ZACH at 3/22/2023 1545    Acceptance, E,TB, VU,NR by CB at 3/21/2023 1544    Acceptance, E,TB,H, VU by MS at 3/16/2023 1801    Acceptance, E,D, DU,NR by MO at 3/16/2023 1451    Acceptance, E, DU,NR by MO at 3/15/2023 1200    Acceptance, E, NR by LM at 3/15/2023 0521    Acceptance, E,D, DU,NR by MO at 3/14/2023 1458   Family Acceptance, E,TB,H, VU by MS at 3/16/2023 1801                   Point: Home exercise program (In Progress)     Learning Progress Summary           Patient Acceptance, E, NR by CHARLY at 3/28/2023 1317    Acceptance, E, NR by DANAY at 3/27/2023 1401    Acceptance, E,D, NR,VU by ZACH at 3/24/2023 1631    Comment: seemed to comprehend commands this session    Acceptance, E,D, NR by ZACH at 3/23/2023 1655    Acceptance, E,D, NR by JM at 3/22/2023 1545    Acceptance, E,TB, VU,NR by CB at 3/21/2023 1544    Acceptance, E,TB,H, VU by MS at 3/16/2023 1801    Acceptance, E,D, DU,NR by MO at 3/16/2023 1451    Acceptance, E, NR by LM at 3/15/2023 0521    Acceptance, E,D, DU,NR by MO at 3/14/2023 1458   Family Acceptance, E,TB,H, VU by MS at 3/16/2023 1801                   Point: Body mechanics  (Done)     Learning Progress Summary           Patient Acceptance, E,D, NR,VU by JM at 3/24/2023 1631    Comment: seemed to comprehend commands this session    Acceptance, E,D, NR by JM at 3/23/2023 1655    Acceptance, E,D, NR by JM at 3/22/2023 1545    Acceptance, E,TB, VU,NR by CB at 3/21/2023 1544    Acceptance, E,TB,H, VU by MS at 3/16/2023 1801    Acceptance, E,D, DU,NR by MO at 3/16/2023 1451    Acceptance, E, DU,NR by MO at 3/15/2023 1200    Acceptance, E, NR by LM at 3/15/2023 0521    Acceptance, E,D, DU,NR by MO at 3/14/2023 1458   Family Acceptance, E,TB,H, VU by MS at 3/16/2023 1801                   Point: Precautions (Done)     Learning Progress Summary           Patient Acceptance, E,D, NR,VU by ZACH at 3/24/2023 1631    Comment: seemed to comprehend commands this session    Acceptance, E,D, NR by JM at 3/23/2023 1655    Acceptance, E,D, NR by JM at 3/22/2023 1545    Acceptance, E,TB, VU,NR by CB at 3/21/2023 1544    Acceptance, E,TB,H, VU by MS at 3/16/2023 1801    Acceptance, E,D, DU,NR by MO at 3/16/2023 1451    Acceptance, E, DU,NR by MO at 3/15/2023 1200    Acceptance, E, NR by LM at 3/15/2023 0521    Acceptance, E,D, DU,NR by MO at 3/14/2023 1458   Family Acceptance, E,TB,H, VU by MS at 3/16/2023 1801                               User Key     Initials Effective Dates Name Provider Type Discipline     03/07/18 -  Tiffanie Grace, PTA Physical Therapist Assistant PT    MS 06/16/21 -  Angel Luis Cr, RN Registered Nurse Nurse    CW 12/13/22 -  Tamica Willoughby, PT Physical Therapist PT    CB 10/22/21 -  Bety Benitez, PT Physical Therapist PT    LM 10/13/22 -  Silke Grace, RN Registered Nurse Nurse    MO 01/27/23 -  Lacie Tanner, PT Student PT Student PT    ZB 03/10/23 -  Nestor Rae, PT Student PT Student PT              PT Recommendation and Plan     Outcome Evaluation: (P) Pt in bed and agreeable to PT/OT this am. Pt req'd mod A for bed mobility, and demo'd R lean in sitting that req'd  vcs and assist to correct. STS from EOB w/ min-modAx2, modA to maintain standing balance, and maxAx2 for txfr from bed to chair via stand pivot. Nsg notified that 2 person txfr req'd to return to bed via pivot from chair.     Time Calculation:    PT Charges     Row Name 03/28/23 1322             Time Calculation    Start Time 0830 (P)   -ZB      Stop Time 0853 (P)   -ZB      Time Calculation (min) 23 min (P)   -ZB      PT Received On 03/28/23 (P)   -ZB      PT - Next Appointment 03/29/23 (P)   -ZB         Time Calculation- PT    Total Timed Code Minutes- PT 23 minute(s) (P)   -ZB         Timed Charges    53534 - PT Therapeutic Activity Minutes 23 (P)   -ZB         Total Minutes    Timed Charges Total Minutes 23 (P)   -ZB       Total Minutes 23 (P)   -ZB            User Key  (r) = Recorded By, (t) = Taken By, (c) = Cosigned By    Initials Name Provider Type    JEROMYB Nestor Rae, PT Student PT Student              Therapy Charges for Today     Code Description Service Date Service Provider Modifiers Qty    [id number]  PT THERAPEUTIC ACT EA 15 MIN 3/28/2023 Nestor Rae, PT Student GP 2          PT G-Codes  Outcome Measure Options: (P) AM-PAC 6 Clicks Basic Mobility (PT)  AM-PAC 6 Clicks Score (PT): (P) 12  AM-PAC 6 Clicks Score (OT): 10  Modified Lincoln Scale: 4 - Moderately severe disability.  Unable to walk without assistance, and unable to attend to own bodily needs without assistance.       Nestor Rae PT Student  3/28/2023

## 2023-03-28 NOTE — PROGRESS NOTES
Name: Manuel Durant ADMIT: 3/12/2023   : 1950  PCP: Provider, No Known    MRN: [id number] LOS: 16 days   AGE/SEX: 73 y.o. male  ROOM: [facility]     Subjective   Subjective   No new issues or events.  Placement has been an issue because he cannot obtain insurance since not a [place] citizen.  CCP working on DC arrangements    Review of Systems     Objective   Objective   Vital Signs  Temp:  [97.6 °F (36.4 °C)-98.1 °F (36.7 °C)] 97.9 °F (36.6 °C)  Heart Rate:  [43-65] 57  Resp:  [18] 18  BP: (128-177)/(60-95) 157/77  SpO2:  [90 %-96 %] 90 %  on  Flow (L/min):  [1] 1;   Device (Oxygen Therapy): room air  Body mass index is 25.55 kg/m².  Physical Exam  Vitals reviewed.   Constitutional:       Appearance: He is well-developed.   HENT:      Head: Normocephalic and atraumatic.      Mouth/Throat:      Mouth: Mucous membranes are moist.   Pulmonary:      Effort: Pulmonary effort is normal. No respiratory distress.      Breath sounds: Normal breath sounds.   Abdominal:      General: Bowel sounds are normal. There is no distension.      Palpations: Abdomen is soft.      Tenderness: There is no abdominal tenderness.   Musculoskeletal:         General: Normal range of motion.   Skin:     General: Skin is warm and dry.   Neurological:      Mental Status: He is alert. Mental status is at baseline.      Comments: Right sided weakness   Psychiatric:         Mood and Affect: Mood normal.         Behavior: Behavior normal.       Results Review     I reviewed the patient's new clinical results.  Results from last 7 days   Lab Units 23  0613 23  0545 23  0635 23  0638   WBC 10*3/mm3 7.76 8.35 7.82 7.80   HEMOGLOBIN g/dL 13.5 13.4 13.8 13.4   PLATELETS 10*3/mm3 324 317 292 300     Results from last 7 days   Lab Units 23  0613 23  0545 23  0635 23  0638   SODIUM mmol/L 142 142 142 142   POTASSIUM mmol/L 3.9 4.0 4.0 4.0   CHLORIDE mmol/L 108* 110* 109* 110*   CO2 mmol/L 24.2 24.3 25.5  23.0   BUN mg/dL 21 19 21 22   CREATININE mg/dL 0.73* 0.85 0.75* 0.75*   GLUCOSE mg/dL 89 98 93 93   EGFR mL/min/1.73 97.3 92.9 96.5 96.5       Results from last 7 days   Lab Units 03/28/23  0613 03/27/23  0545 03/26/23  0635 03/25/23  0638   CALCIUM mg/dL 9.0 8.9 9.0 8.7   MAGNESIUM mg/dL 2.1 2.1 2.1 2.3   PHOSPHORUS mg/dL 3.4 3.4 3.5 3.2       No results found for: HGBA1C, POCGLU    No radiology results for the last day  I have personally reviewed all medications:  Scheduled Medications  amLODIPine, 10 mg, Oral, QAM AC  apixaban, 5 mg, Oral, Q12H  baclofen, 2.5 mg, Oral, Q12H  carvedilol, 25 mg, Oral, BID With Meals  hydrALAZINE, 10 mg, Oral, Q8H  senna-docusate sodium, 2 tablet, Oral, BID  sodium chloride, 10 mL, Intravenous, Q12H  valsartan, 160 mg, Oral, QAM AC    Infusions   Diet  Diet: Regular/House Diet; No Mixed Consistencies; Texture: Soft to Chew (NDD 3); Soft to Chew: Ground Meat; Fluid Consistency: Honey Thick    I have personally reviewed:  [x]  Laboratory   []  Microbiology   [x]  Radiology   []  EKG/Telemetry  [x]  Cardiology/Vascular   []  Pathology    [x]  Records       Assessment/Plan     Active Hospital Problems    Diagnosis  POA   • **Intracranial hemorrhage (HCC) [I62.9]  Yes   • Acute DVT (deep venous thrombosis) (HCC) [I82.409]  Unknown   • HTN (hypertension) [I10]  Unknown   • Hypokalemia [E87.6]  Unknown   • Hypertensive emergency [I16.1]  Unknown      Resolved Hospital Problems   No resolved problems to display.          Mr. Durant is a 73 year old male who initially presented to the hospital 3/12/23 for intracranial bleed. He was found unresponsive by friends and initially taken to [facility] where he was found to have poorly controlled BP and right sided hemiplegia. He was transferred here for closer monitoring in the ICU and neurosurgical consultation. He was cleared to move out of the ICU on 3/16/23.       Intracranial hemorrhage, CT brain done on 3/17/2023 revealed stable left  basal ganglia/internal capsule hemorrhage with left ventricular hemorrhage.  Developed DVT therefore Lovenox was initiated and CT brain on 3/18 and 3/20/2023 also revealed stable findings.  Neurosurgery did evaluate and cleared patient to be on Eliquis now.  Needs outpatient follow-up with neurosurgery as well as MRI brain as an outpatient basis once hemorrhage is cleared.        Hypertensive emergency, currently blood pressure is reasonably well controlled and on amlodipine, valsartan, Coreg and hydralazine.        Acute right lower extremity DVT, diagnosed on 3/17/2023 and is currently on Eliquis which will be continued and was on Lovenox earlier during this hospital stay.   neurosurgery cleared to be on anticoagulation.       Hypokalemia, on replacement protocol.        Disposition is an issue.  Patient is NOT a [place] citizen so does not qualify for insurance limiting placement. [facility] is trying to get him to [place] so he can be with family       · Eliquis (home med) for DVT prophylaxis.  · Full code.  · Discussed with patient, nursing staff and Dr Castro.  · Anticipate discharge TBD but stable for DC      SLICK Lerma  [facility]  03/28/23  15:49 EDT

## 2023-03-29 LAB
ANION GAP SERPL CALCULATED.3IONS-SCNC: 9.8 MMOL/L (ref 5–15)
BUN SERPL-MCNC: 20 MG/DL (ref 8–23)
BUN/CREAT SERPL: 27.4 (ref 7–25)
CALCIUM SPEC-SCNC: 8.9 MG/DL (ref 8.6–10.5)
CHLORIDE SERPL-SCNC: 106 MMOL/L (ref 98–107)
CO2 SERPL-SCNC: 23.2 MMOL/L (ref 22–29)
CREAT SERPL-MCNC: 0.73 MG/DL (ref 0.76–1.27)
DEPRECATED RDW RBC AUTO: 42 FL (ref 37–54)
EGFRCR SERPLBLD CKD-EPI 2021: 97.3 ML/MIN/1.73
ERYTHROCYTE [DISTWIDTH] IN BLOOD BY AUTOMATED COUNT: 13 % (ref 12.3–15.4)
GLUCOSE SERPL-MCNC: 91 MG/DL (ref 65–99)
HCT VFR BLD AUTO: 39.9 % (ref 37.5–51)
HGB BLD-MCNC: 13.5 G/DL (ref 13–17.7)
MAGNESIUM SERPL-MCNC: 2.1 MG/DL (ref 1.6–2.4)
MCH RBC QN AUTO: 30.3 PG (ref 26.6–33)
MCHC RBC AUTO-ENTMCNC: 33.8 G/DL (ref 31.5–35.7)
MCV RBC AUTO: 89.7 FL (ref 79–97)
PHOSPHATE SERPL-MCNC: 3.4 MG/DL (ref 2.5–4.5)
PLATELET # BLD AUTO: 326 10*3/MM3 (ref 140–450)
PMV BLD AUTO: 10.6 FL (ref 6–12)
POTASSIUM SERPL-SCNC: 4.2 MMOL/L (ref 3.5–5.2)
RBC # BLD AUTO: 4.45 10*6/MM3 (ref 4.14–5.8)
SODIUM SERPL-SCNC: 139 MMOL/L (ref 136–145)
WBC NRBC COR # BLD: 7.52 10*3/MM3 (ref 3.4–10.8)

## 2023-03-29 PROCEDURE — 97112 NEUROMUSCULAR REEDUCATION: CPT

## 2023-03-29 PROCEDURE — 97530 THERAPEUTIC ACTIVITIES: CPT

## 2023-03-29 PROCEDURE — 84100 ASSAY OF PHOSPHORUS: CPT | Performed by: STUDENT IN AN ORGANIZED HEALTH CARE EDUCATION/TRAINING PROGRAM

## 2023-03-29 PROCEDURE — 83735 ASSAY OF MAGNESIUM: CPT | Performed by: STUDENT IN AN ORGANIZED HEALTH CARE EDUCATION/TRAINING PROGRAM

## 2023-03-29 PROCEDURE — 85027 COMPLETE CBC AUTOMATED: CPT | Performed by: STUDENT IN AN ORGANIZED HEALTH CARE EDUCATION/TRAINING PROGRAM

## 2023-03-29 PROCEDURE — 80048 BASIC METABOLIC PNL TOTAL CA: CPT | Performed by: STUDENT IN AN ORGANIZED HEALTH CARE EDUCATION/TRAINING PROGRAM

## 2023-03-29 RX ADMIN — Medication 10 ML: at 08:13

## 2023-03-29 RX ADMIN — DOCUSATE SODIUM 50MG AND SENNOSIDES 8.6MG 2 TABLET: 8.6; 5 TABLET, FILM COATED ORAL at 21:32

## 2023-03-29 RX ADMIN — Medication 10 ML: at 21:27

## 2023-03-29 RX ADMIN — DOCUSATE SODIUM 50MG AND SENNOSIDES 8.6MG 2 TABLET: 8.6; 5 TABLET, FILM COATED ORAL at 08:12

## 2023-03-29 RX ADMIN — APIXABAN 5 MG: 5 TABLET, FILM COATED ORAL at 08:13

## 2023-03-29 RX ADMIN — HYDRALAZINE HYDROCHLORIDE 10 MG: 10 TABLET, FILM COATED ORAL at 13:55

## 2023-03-29 RX ADMIN — VALSARTAN 160 MG: 160 TABLET, FILM COATED ORAL at 08:13

## 2023-03-29 RX ADMIN — HYDRALAZINE HYDROCHLORIDE 10 MG: 10 TABLET, FILM COATED ORAL at 21:39

## 2023-03-29 RX ADMIN — APIXABAN 5 MG: 5 TABLET, FILM COATED ORAL at 21:31

## 2023-03-29 RX ADMIN — CARVEDILOL 25 MG: 12.5 TABLET, FILM COATED ORAL at 08:13

## 2023-03-29 RX ADMIN — CARVEDILOL 25 MG: 12.5 TABLET, FILM COATED ORAL at 18:03

## 2023-03-29 RX ADMIN — BACLOFEN 2.5 MG: 10 TABLET ORAL at 21:31

## 2023-03-29 RX ADMIN — AMLODIPINE BESYLATE 10 MG: 10 TABLET ORAL at 08:13

## 2023-03-29 RX ADMIN — BACLOFEN 2.5 MG: 10 TABLET ORAL at 08:13

## 2023-03-29 NOTE — PLAN OF CARE
Goal Outcome Evaluation:  Plan of Care Reviewed With: patient           Outcome Evaluation: Pt was found supine in bed, appears to be more confused today and unaware of current situation. He req's frequent reorientation and cues for safety today. Use of  improved communication- more interactive but cont to demo confusion. Mod-maxA provided for bed mobility, he impulsively attempts to stand multiple times despite cues. Min-modA provided to attain standing position with L lateral lean noted. Pt with ataxic movements and dec coordination. Weight bearing onto RUE while seated at EOB and some ROM tasks completed. Discussed session today with RN and CCP. OT will cont to follow.

## 2023-03-29 NOTE — CASE MANAGEMENT/SOCIAL WORK
Continued Stay Note  [facility]     Patient Name: Manuel Durant  MRN: [id number]  Today's Date: 3/29/2023    Admit Date: 3/12/2023    Plan: Pending   Discharge Plan     Row Name 03/29/23 2468       Plan    Plan Comments Spoke with pt's son Ariel Durant by phone.  He is aware of all and confirms that he has been in contact with his daughter Natalie who lives in [place].  Discussed pt's current needs and abilities.  Ariel agrees that his father will not be able to travel unaccompanied.  He states that he will speak with Natalie about the possibility of her coming to [place] and flying with her grandfather back to [place].  He is aware of Mr. Durant's care needs.  He states that [place] [facility].  He has also been speaking with Bridgett Shook, [phone], a friend of Mr. Astudillo from [facility].  He states that he would like Bridgett to be given pt's keys so that she and pt's friends can load his belongings into his truck and store it at one of their homes.  His rent is due April 1 and if not paid, they will put his belongings out.  CCP will check on the process for release of his keys and get back with them.  Ariel was very appreciative for the help and states that he will try to get the plan together as soon as possible.  CCP will discuss with PT/OT/MD about pt's ability to sit for the period of approximately 4.5 hours necessary to fly to [place].  Faye ARMSTRONG               Discharge Codes    No documentation.               Expected Discharge Date and Time     Expected Discharge Date Expected Discharge Time    Mar 31, 2023             Faye Moy RN

## 2023-03-29 NOTE — THERAPY TREATMENT NOTE
Patient Name: Manuel Durant  : 1950    MRN: [id number]                              Today's Date: 3/29/2023       Admit Date: 3/12/2023    Visit Dx:     ICD-10-CM ICD-9-CM   1. Intracranial hemorrhage (HCC)  I62.9 432.9   2. Altered mental status, unspecified altered mental status type  R41.82 780.97     Patient Active Problem List   Diagnosis   • Intracranial hemorrhage (HCC)   • Hypertensive emergency   • Acute DVT (deep venous thrombosis) (HCC)   • HTN (hypertension)   • Hypokalemia     History reviewed. No pertinent past medical history.  History reviewed. No pertinent surgical history.   General Information     Row Name 23 1318          OT Time and Intention    Document Type therapy note (daily note)  -     Mode of Treatment co-treatment;occupational therapy;physical therapy  -     Row Name 23 1318          General Information    Patient Profile Reviewed yes  -     Existing Precautions/Restrictions fall  -     Row Name 23 1318          Cognition    Orientation Status (Cognition) oriented to;person  increased confusion today. Unaware of current situation\  -     Row Name 23 1318          Safety Issues, Functional Mobility    Safety Issues Affecting Function (Mobility) ability to follow commands;awareness of need for assistance;impulsivity;insight into deficits/self-awareness;judgment;problem-solving;safety precaution awareness;safety precautions follow-through/compliance;sequencing abilities  -     Impairments Affecting Function (Mobility) balance;coordination;cognition;endurance/activity tolerance;grasp;motor control;postural/trunk control;strength;range of motion (ROM)  -     Cognitive Impairments, Mobility Safety/Performance awareness, need for assistance;impulsivity;insight into deficits/self-awareness;judgment;problem-solving/reasoning;safety precaution awareness;sequencing abilities;safety precaution follow-through  -           User Key  (r) = Recorded By,  (t) = Taken By, (c) = Cosigned By    Initials Name Provider Type    Evelia Faulkner OT Occupational Therapist                 Mobility/ADL's     Row Name 03/29/23 1320          Bed Mobility    Supine-Sit Doon (Bed Mobility) moderate assist (50% patient effort);maximum assist (25% patient effort);verbal cues  -     Sit-Supine Doon (Bed Mobility) maximum assist (25% patient effort);2 person assist;verbal cues  -     Bed Mobility, Safety Issues cognitive deficits limit understanding  -     Assistive Device (Bed Mobility) head of bed elevated;bed rails  -     Comment, (Bed Mobility) increased assist d/t safety concerns today. Heavy use of bed rail on left for sup>sit. Strong posterior leaning noted  -     Row Name 03/29/23 1320          Bed-Chair Transfer    Comment, (Bed-Chair Transfer) deferred today d/t safety concerns- increased confusion and impulsive today  -     Row Name 03/29/23 1320          Sit-Stand Transfer    Sit-Stand Doon (Transfers) minimum assist (75% patient effort);moderate assist (50% patient effort);2 person assist;verbal cues  -     Comment, (Sit-Stand Transfer) impulsively attempting to stand multiple times today despite cues to remain seated. Min-ModAx2 d/t dec coordination, lateral leaning and ataxic movements  -           User Key  (r) = Recorded By, (t) = Taken By, (c) = Cosigned By    Initials Name Provider Type    Evelia Faulkner OT Occupational Therapist               Obj/Interventions     Row Name 03/29/23 1323          Vision Assessment/Intervention    Visual Processing Deficit karen-inattention/neglect, right  -     Row Name 03/29/23 1323          Range of Motion Comprehensive    Comment, General Range of Motion decreased AROM today. increased weakness vs confusion?  -     Row Name 03/29/23 1323          Neuromuscular Re-education    Interventions (Neuromuscular Re-education) weight bearing;weight shifting  -     Comment (Neuromuscular  Re-education) weight bearing onto RUE when seated at EOB.  -           User Key  (r) = Recorded By, (t) = Taken By, (c) = Cosigned By    Initials Name Provider Type    Evelia Faulkner, MARK Occupational Therapist               Goals/Plan    No documentation.                Clinical Impression     Row Name 03/29/23 1324          Pain Assessment    Pretreatment Pain Rating 0/10 - no pain  -     Posttreatment Pain Rating 0/10 - no pain  -     Row Name 03/29/23 1324          Plan of Care Review    Plan of Care Reviewed With patient  -     Outcome Evaluation Pt was found supine in bed, appears to be more confused today and unaware of current situation. He req's frequent reorientation and cues for safety today. Use of  improved communication- more interactive but cont to demo confusion. Mod-maxA provided for bed mobility, he impulsively attempts to stand multiple times despite cues. Min-modA provided to attain standing position with L lateral lean noted. Pt with ataxic movements and dec coordination. Weight bearing onto RUE while seated at EOB and some ROM tasks completed. Discussed session today with RN and CCP. OT will cont to follow.  -     Row Name 03/29/23 1324          Therapy Assessment/Plan (OT)    Rehab Potential (OT) good, to achieve stated therapy goals  -     Criteria for Skilled Therapeutic Interventions Met (OT) yes;skilled treatment is necessary  -     Therapy Frequency (OT) 5 times/wk  -     Row Name 03/29/23 1324          Therapy Plan Review/Discharge Plan (OT)    Anticipated Discharge Disposition (OT) inpatient rehabilitation facility  -     Row Name 03/29/23 1324          Positioning and Restraints    Pre-Treatment Position in bed  -     Post Treatment Position bed  -JW     In Bed notified nsg;fowlers;call light within reach;encouraged to call for assist;exit alarm on  -           User Key  (r) = Recorded By, (t) = Taken By, (c) = Cosigned By    Initials Name Provider Type     Evelia Faulkner OT Occupational Therapist               Outcome Measures     Row Name 03/29/23 0800          How much help from another person do you currently need...    Turning from your back to your side while in flat bed without using bedrails? 3  -JM     Moving from lying on back to sitting on the side of a flat bed without bedrails? 3  -JM     Moving to and from a bed to a chair (including a wheelchair)? 2  -JM     Standing up from a chair using your arms (e.g., wheelchair, bedside chair)? 2  -JM     Climbing 3-5 steps with a railing? 1  -JM     To walk in hospital room? 1  -JM     AM-PAC 6 Clicks Score (PT) 12  -JM     Highest level of mobility 4 --> Transferred to chair/commode  -           User Key  (r) = Recorded By, (t) = Taken By, (c) = Cosigned By    Initials Name Provider Type    Mae Dupont, RN Registered Nurse                Occupational Therapy Education     Title: PT OT SLP Therapies (In Progress)     Topic: Occupational Therapy (Done)     Point: ADL training (Done)     Description:   Instruct learner(s) on proper safety adaptation and remediation techniques during self care or transfers.   Instruct in proper use of assistive devices.              Learning Progress Summary           Patient Acceptance, E,TB,H, VU by MS at 3/16/2023 1801    Acceptance, E, NR by LM at 3/15/2023 0521   Family Acceptance, E,TB,H, VU by MS at 3/16/2023 1801                   Point: Home exercise program (Done)     Description:   Instruct learner(s) on appropriate technique for monitoring, assisting and/or progressing therapeutic exercises/activities.              Learning Progress Summary           Patient Acceptance, E,TB,H, VU by MS at 3/16/2023 1801    Acceptance, E, NR by LM at 3/15/2023 0521   Family Acceptance, E,TB,H, VU by MS at 3/16/2023 1801                   Point: Precautions (Done)     Description:   Instruct learner(s) on prescribed precautions during self-care and functional transfers.               Learning Progress Summary           Patient Acceptance, E,TB,H, VU by MS at 3/16/2023 1801    Acceptance, E, NR by LM at 3/15/2023 0521   Family Acceptance, E,TB,H, VU by MS at 3/16/2023 1801                   Point: Body mechanics (Done)     Description:   Instruct learner(s) on proper positioning and spine alignment during self-care, functional mobility activities and/or exercises.              Learning Progress Summary           Patient Acceptance, E,TB,H, VU by MS at 3/16/2023 1801    Acceptance, E, NR by LM at 3/15/2023 0521   Family Acceptance, E,TB,H, VU by MS at 3/16/2023 1801                               User Key     Initials Effective Dates Name Provider Type Discipline    MS 06/16/21 -  Angel Luis Cr, RN Registered Nurse Nurse     10/13/22 -  Silke Grace, RN Registered Nurse Nurse              OT Recommendation and Plan  Therapy Frequency (OT): 5 times/wk  Plan of Care Review  Plan of Care Reviewed With: patient  Outcome Evaluation: Pt was found supine in bed, appears to be more confused today and unaware of current situation. He req's frequent reorientation and cues for safety today. Use of  improved communication- more interactive but cont to demo confusion. Mod-maxA provided for bed mobility, he impulsively attempts to stand multiple times despite cues. Min-modA provided to attain standing position with L lateral lean noted. Pt with ataxic movements and dec coordination. Weight bearing onto RUE while seated at EOB and some ROM tasks completed. Discussed session today with RN and CCP. OT will cont to follow.     Time Calculation:    Time Calculation- OT     Row Name 03/29/23 1329             Time Calculation- OT    OT Start Time 1107  -      OT Stop Time 1134  -      OT Time Calculation (min) 27 min  -      Total Timed Code Minutes- OT 27 minute(s)  -      OT Received On 03/29/23  -      OT - Next Appointment 03/30/23  -         Timed Charges    98587 -  OT Neuromuscular  Reeducation Minutes 10  -JW      60617 - OT Therapeutic Activity Minutes 17  -JW         Total Minutes    Timed Charges Total Minutes 27  -JW       Total Minutes 27  -JW            User Key  (r) = Recorded By, (t) = Taken By, (c) = Cosigned By    Initials Name Provider Type    Evelia Faulkner OT Occupational Therapist              Therapy Charges for Today     Code Description Service Date Service Provider Modifiers Qty    58592199899 HC OT NEUROMUSC RE EDUCATION EA 15 MIN 3/28/2023 Evelia Kenney OT GO 1    05491969180 HC OT THERAPEUTIC ACT EA 15 MIN 3/28/2023 Evelia Kenney OT GO 1    92314576465 HC OT NEUROMUSC RE EDUCATION EA 15 MIN 3/29/2023 Evelia Kenney OT GO 1    81256463361 HC OT THERAPEUTIC ACT EA 15 MIN 3/29/2023 Evelia Kenney OT GO 1               Evelia Kenney OT  3/29/2023

## 2023-03-29 NOTE — THERAPY TREATMENT NOTE
Patient Name: Manuel Durant  : 1950    MRN: [id number]                              Today's Date: 3/29/2023       Admit Date: 3/12/2023    Visit Dx:     ICD-10-CM ICD-9-CM   1. Intracranial hemorrhage (HCC)  I62.9 432.9   2. Altered mental status, unspecified altered mental status type  R41.82 780.97     Patient Active Problem List   Diagnosis   • Intracranial hemorrhage (HCC)   • Hypertensive emergency   • Acute DVT (deep venous thrombosis) (HCC)   • HTN (hypertension)   • Hypokalemia     History reviewed. No pertinent past medical history.  History reviewed. No pertinent surgical history.   General Information     Row Name 23 1324          Physical Therapy Time and Intention    Document Type therapy note (daily note)  -CW     Mode of Treatment physical therapy;individual therapy  -CW     Row Name 23 1326          General Information    Patient Profile Reviewed yes  -CW     Existing Precautions/Restrictions fall  -CW     Barriers to Rehab language barrier   [place] speaking, does best with interpretor ipad  -CW     Row Name 23 1322          Cognition    Orientation Status (Cognition) oriented to;person  states  for year, does not know where he is. Generally confused. Utilized interpretor  -CW     Row Name 23 1328          Safety Issues, Functional Mobility    Safety Issues Affecting Function (Mobility) insight into deficits/self-awareness;awareness of need for assistance;impulsivity;safety precaution awareness;safety precautions follow-through/compliance;judgment;problem-solving  -CW     Impairments Affecting Function (Mobility) balance;coordination;cognition;endurance/activity tolerance;grasp;motor control;postural/trunk control;strength;range of motion (ROM)  -CW           User Key  (r) = Recorded By, (t) = Taken By, (c) = Cosigned By    Initials Name Provider Type    CW Tamica Willoughby, JOSE ELIAS Physical Therapist               Mobility     Row Name 23 1328          Bed  Mobility    Bed Mobility supine-sit;sit-supine  -CW     Scooting/Bridging [place] (Bed Mobility) maximum assist (25% patient effort);2 person assist;verbal cues  -CW     Supine-Sit [place] (Bed Mobility) maximum assist (25% patient effort);2 person assist;verbal cues;nonverbal cues (demo/gesture)  -CW     Sit-Supine [place] (Bed Mobility) maximum assist (25% patient effort);2 person assist;verbal cues;nonverbal cues (demo/gesture)  -CW     Comment, (Bed Mobility) Very impulsive today, not following safety cues. Strong R lean and posterior lean at times - encouraged use of L rial  -CW     Row Name 03/29/23 1328          Bed-Chair Transfer    Comment, (Bed-Chair Transfer) Deferred due to safety concerns  -CW     Row Name 03/29/23 1328          Sit-Stand Transfer    Sit-Stand [place] (Transfers) minimum assist (75% patient effort);moderate assist (50% patient effort);2 person assist;verbal cues  -CW     Assistive Device (Sit-Stand Transfers) other (see comments)  HHA x2  -CW     Comment, (Sit-Stand Transfer) impulsively attempted standing  multiple times, leaning R. Cues for safety and use of interpretor ipad to communicate  -     Row Name 03/29/23 1328          Gait/Stairs (Locomotion)    [place] Level (Gait) not tested  -           User Key  (r) = Recorded By, (t) = Taken By, (c) = Cosigned By    Initials Name Provider Type    Tamica Eddy, PT Physical Therapist               Obj/Interventions    No documentation.                Goals/Plan    No documentation.                Clinical Impression     Row Name 03/29/23 1332          Pain    Posttreatment Pain Rating 0/10 - no pain  -CW     Row Name 03/29/23 1332          Plan of Care Review    Plan of Care Reviewed With patient  -CW     Outcome Evaluation Pt seen for PT?OT session this AM. Pt agreeable but appears more confused today. He required continuous cues for safety due to impulsivity. He attempted to stand impulsively  numerous times and had a strong R and posterior lean at EOB. Utilized interpretor ipad today which aided in communication. He was oriented to self only and was very concerned about getting in touch with his roommate and family. Was able to stand with min to mod A x2 but unable to coordinate steps. Not safe to transfer to the chair this date. Discussed with STANLEY Wall and CCP today. Will continue to follow and progress as able.  -CW     Row Name 03/29/23 1332          Vital Signs    O2 Delivery Pre Treatment room air  -CW     Row Name 03/29/23 1332          Positioning and Restraints    Pre-Treatment Position in bed  -CW     Post Treatment Position bed  -CW     In Bed notified nsg;call light within reach;encouraged to call for assist;exit alarm on;fowlers;side rails up x3  -CW           User Key  (r) = Recorded By, (t) = Taken By, (c) = Cosigned By    Initials Name Provider Type    Tamica Eddy, PT Physical Therapist               Outcome Measures     Row Name 03/29/23 1335 03/29/23 0800       How much help from another person do you currently need...    Turning from your back to your side while in flat bed without using bedrails? 2  -CW 3  -JM    Moving from lying on back to sitting on the side of a flat bed without bedrails? 2  -CW 3  -JM    Moving to and from a bed to a chair (including a wheelchair)? 2  -CW 2  -JM    Standing up from a chair using your arms (e.g., wheelchair, bedside chair)? 2  -CW 2  -JM    Climbing 3-5 steps with a railing? 1  -CW 1  -JM    To walk in hospital room? 1  -CW 1  -JM    AM-PAC 6 Clicks Score (PT) 10  -CW 12  -JM    Highest level of mobility 4 --> Transferred to chair/commode  -CW 4 --> Transferred to chair/commode  -JM    Row Name 03/29/23 1335          Functional Assessment    Outcome Measure Options AM-PAC 6 Clicks Basic Mobility (PT)  -CW           User Key  (r) = Recorded By, (t) = Taken By, (c) = Cosigned By    Initials Name Provider Type    Mae Dupont, STANLEY  Registered Nurse    Tamica Eddy, PT Physical Therapist                             Physical Therapy Education     Title: PT OT SLP Therapies (In Progress)     Topic: Physical Therapy (In Progress)     Point: Mobility training (In Progress)     Learning Progress Summary           Patient Acceptance, E, NR,NL by DANAY at 3/29/2023 1336    Acceptance, E, NR by ZB at 3/28/2023 1317    Acceptance, E, NR by DANAY at 3/27/2023 1401    Acceptance, E,D, NR,VU by ZACH at 3/24/2023 1631    Comment: seemed to comprehend commands this session    Acceptance, E,D, NR by ZACH at 3/23/2023 1655    Acceptance, E,D, NR by ZACH at 3/22/2023 1545    Acceptance, E,TB, VU,NR by CB at 3/21/2023 1544    Acceptance, E,TB,H, VU by MS at 3/16/2023 1801    Acceptance, E,D, DU,NR by MO at 3/16/2023 1451    Acceptance, E, DU,NR by MO at 3/15/2023 1200    Acceptance, E, NR by LM at 3/15/2023 0521    Acceptance, E,D, DU,NR by MO at 3/14/2023 1458   Family Acceptance, E,TB,H, VU by MS at 3/16/2023 1801                   Point: Home exercise program (In Progress)     Learning Progress Summary           Patient Acceptance, E, NR by JEROMYB at 3/28/2023 1317    Acceptance, E, NR by DANAY at 3/27/2023 1401    Acceptance, E,D, NR,VU by ZACH at 3/24/2023 1631    Comment: seemed to comprehend commands this session    Acceptance, E,D, NR by ZACH at 3/23/2023 1655    Acceptance, E,D, NR by JM at 3/22/2023 1545    Acceptance, E,TB, VU,NR by CB at 3/21/2023 1544    Acceptance, E,TB,H, VU by MS at 3/16/2023 1801    Acceptance, E,D, DU,NR by MO at 3/16/2023 1451    Acceptance, E, NR by LM at 3/15/2023 0521    Acceptance, E,D, DU,NR by MO at 3/14/2023 1458   Family Acceptance, E,TB,H, VU by MS at 3/16/2023 1801                   Point: Body mechanics (Done)     Learning Progress Summary           Patient Acceptance, E,D, NR,VU by ZACH at 3/24/2023 1631    Comment: seemed to comprehend commands this session    Acceptance, E,D, NR by ZACH at 3/23/2023 1655    Acceptance, E,D, NR by  ZACH at 3/22/2023 1545    Acceptance, E,TB, VU,NR by CB at 3/21/2023 1544    Acceptance, E,TB,H, VU by MS at 3/16/2023 1801    Acceptance, E,D, DU,NR by MO at 3/16/2023 1451    Acceptance, E, DU,NR by MO at 3/15/2023 1200    Acceptance, E, NR by LM at 3/15/2023 0521    Acceptance, E,D, DU,NR by MO at 3/14/2023 1458   Family Acceptance, E,TB,H, VU by MS at 3/16/2023 1801                   Point: Precautions (Done)     Learning Progress Summary           Patient Acceptance, E,D, NR,VU by ZACH at 3/24/2023 1631    Comment: seemed to comprehend commands this session    Acceptance, E,D, NR by ZACH at 3/23/2023 1655    Acceptance, E,D, NR by JM at 3/22/2023 1545    Acceptance, E,TB, VU,NR by CB at 3/21/2023 1544    Acceptance, E,TB,H, VU by MS at 3/16/2023 1801    Acceptance, E,D, DU,NR by MO at 3/16/2023 1451    Acceptance, E, DU,NR by MO at 3/15/2023 1200    Acceptance, E, NR by LM at 3/15/2023 0521    Acceptance, E,D, DU,NR by MO at 3/14/2023 1458   Family Acceptance, E,TB,H, VU by MS at 3/16/2023 1801                               User Key     Initials Effective Dates Name Provider Type Discipline     03/07/18 -  Tiffanie Grace, PTA Physical Therapist Assistant PT    MS 06/16/21 -  Angel Luis Cr, RN Registered Nurse Nurse    CW 12/13/22 -  Tamica Willoughby, PT Physical Therapist PT    CB 10/22/21 -  Bety Benitez, PT Physical Therapist PT    LM 10/13/22 -  Silke Grace, RN Registered Nurse Nurse    MO 01/27/23 -  Lacie Tanner, PT Student PT Student PT    ZB 03/10/23 -  Nestor Rae, PT Student PT Student PT              PT Recommendation and Plan     Plan of Care Reviewed With: patient  Outcome Evaluation: Pt seen for PT?OT session this AM. Pt agreeable but appears more confused today. He required continuous cues for safety due to impulsivity. He attempted to stand impulsively numerous times and had a strong R and posterior lean at EOB. Utilized interpretor ipad today which aided in communication. He was oriented  to self only and was very concerned about getting in touch with his roommate and family. Was able to stand with min to mod A x2 but unable to coordinate steps. Not safe to transfer to the chair this date. Discussed with STANLEY Wall and CCP today. Will continue to follow and progress as able.     Time Calculation:    PT Charges     Row Name 03/29/23 1322             Time Calculation    Start Time 1108  -CW      Stop Time 1134  -CW      Time Calculation (min) 26 min  -CW      PT Received On 03/29/23  -CW      PT - Next Appointment 03/30/23  -CW      PT Goal Re-Cert Due Date 04/12/23  -CW            User Key  (r) = Recorded By, (t) = Taken By, (c) = Cosigned By    Initials Name Provider Type    Tamica Eddy, PT Physical Therapist              Therapy Charges for Today     Code Description Service Date Service Provider Modifiers Qty    09208556016  PT THERAPEUTIC ACT EA 15 MIN 3/29/2023 Tamica Willoughby, PT GP 2    26974678897 HC PT THER SUPP EA 15 MIN 3/29/2023 Tamica Willoughby, PT GP 1          PT G-Codes  Outcome Measure Options: AM-PAC 6 Clicks Basic Mobility (PT)  AM-PAC 6 Clicks Score (PT): 10  AM-PAC 6 Clicks Score (OT): 10  Modified [place] Scale: 4 - Moderately severe disability.  Unable to walk without assistance, and unable to attend to own bodily needs without assistance.  PT Discharge Summary  Anticipated Discharge Disposition (PT): skilled nursing facility    Tamica Willoughby PT  3/29/2023

## 2023-03-29 NOTE — PROGRESS NOTES
Name: Manuel Durant ADMIT: 3/12/2023   : 1950  PCP: Provider, No Known    MRN: [id number] LOS: 17 days   AGE/SEX: 73 y.o. male  ROOM: [facility]     Subjective   Subjective   No new issues or events.  Per PT he seems little more confused today.  Difficulty with standing with PT. not able to safely transfer to chair today.  CCP working on DC arrangements which proved challenging.    Review of Systems     Objective   Objective   Vital Signs  Temp:  [97.8 °F (36.6 °C)-98.9 °F (37.2 °C)] 98.3 °F (36.8 °C)  Heart Rate:  [50-77] 54  Resp:  [16-18] 18  BP: (135-155)/(74-94) 144/77  SpO2:  [87 %-96 %] 94 %  on   ;   Device (Oxygen Therapy): room air  Body mass index is 25.55 kg/m².  Physical Exam  Vitals reviewed.   Constitutional:       Appearance: He is well-developed.      Comments: Chronically ill-appearing   HENT:      Head: Normocephalic and atraumatic.      Mouth/Throat:      Mouth: Mucous membranes are moist.   Pulmonary:      Effort: Pulmonary effort is normal. No respiratory distress.      Breath sounds: Normal breath sounds.   Abdominal:      General: Bowel sounds are normal. There is no distension.      Palpations: Abdomen is soft.      Tenderness: There is no abdominal tenderness.   Musculoskeletal:         General: Normal range of motion.   Skin:     General: Skin is warm and dry.   Neurological:      Mental Status: He is alert. Mental status is at baseline.      Comments: Right sided weakness.,  Oriented to person.  Seems intermittently confused to situation   Psychiatric:         Mood and Affect: Mood normal.         Behavior: Behavior normal.       Results Review     I reviewed the patient's new clinical results.  Results from last 7 days   Lab Units 23  0745 23  0613 23  0545 23  0635   WBC 10*3/mm3 7.52 7.76 8.35 7.82   HEMOGLOBIN g/dL 13.5 13.5 13.4 13.8   PLATELETS 10*3/mm3 326 324 317 292     Results from last 7 days   Lab Units 23  0745 23  0613  03/27/23  0545 03/26/23  0635   SODIUM mmol/L 139 142 142 142   POTASSIUM mmol/L 4.2 3.9 4.0 4.0   CHLORIDE mmol/L 106 108* 110* 109*   CO2 mmol/L 23.2 24.2 24.3 25.5   BUN mg/dL 20 21 19 21   CREATININE mg/dL 0.73* 0.73* 0.85 0.75*   GLUCOSE mg/dL 91 89 98 93   EGFR mL/min/1.73 97.3 97.3 92.9 96.5       Results from last 7 days   Lab Units 03/29/23  0745 03/28/23  0613 03/27/23  0545 03/26/23  0635   CALCIUM mg/dL 8.9 9.0 8.9 9.0   MAGNESIUM mg/dL 2.1 2.1 2.1 2.1   PHOSPHORUS mg/dL 3.4 3.4 3.4 3.5       No results found for: HGBA1C, POCGLU    No radiology results for the last day  I have personally reviewed all medications:  Scheduled Medications  amLODIPine, 10 mg, Oral, QAM AC  apixaban, 5 mg, Oral, Q12H  baclofen, 2.5 mg, Oral, Q12H  carvedilol, 25 mg, Oral, BID With Meals  hydrALAZINE, 10 mg, Oral, Q8H  senna-docusate sodium, 2 tablet, Oral, BID  sodium chloride, 10 mL, Intravenous, Q12H  valsartan, 160 mg, Oral, QAM AC    Infusions   Diet  Diet: Regular/House Diet; No Mixed Consistencies; Texture: Soft to Chew (NDD 3); Soft to Chew: Ground Meat; Fluid Consistency: Honey Thick    I have personally reviewed:  [x]  Laboratory   []  Microbiology   [x]  Radiology   []  EKG/Telemetry  [x]  Cardiology/Vascular   []  Pathology    [x]  Records       Assessment/Plan     Active Hospital Problems    Diagnosis  POA   • **Intracranial hemorrhage (HCC) [I62.9]  Yes   • Acute DVT (deep venous thrombosis) (HCC) [I82.409]  Unknown   • HTN (hypertension) [I10]  Unknown   • Hypokalemia [E87.6]  Unknown   • Hypertensive emergency [I16.1]  Unknown      Resolved Hospital Problems   No resolved problems to display.          Mr. Durant is a 73 year old male who initially presented to the hospital 3/12/23 for intracranial bleed. He was found unresponsive by friends and initially taken to [facility] where he was found to have poorly controlled BP and right sided hemiplegia. He was transferred here for closer monitoring in the ICU  and neurosurgical consultation. He was cleared to move out of the ICU on 3/16/23.       Intracranial hemorrhage, CT brain done on 3/17/2023 revealed stable left basal ganglia/internal capsule hemorrhage with left ventricular hemorrhage.  Developed DVT therefore Lovenox was initiated and CT brain on 3/18 and 3/20/2023 also revealed stable findings.  Neurosurgery did evaluate and cleared patient to be on Eliquis now.  Needs outpatient follow-up with neurosurgery as well as MRI brain as an outpatient basis once hemorrhage is cleared.        Hypertensive emergency, currently blood pressure is reasonably well controlled and on amlodipine, valsartan, Coreg and hydralazine.   Avoid hypotension in this frail gentleman.        Acute right lower extremity DVT, diagnosed on 3/17/2023 and is currently on Eliquis which will be continued and was on Lovenox earlier during this hospital stay.   neurosurgery cleared to be on anticoagulation.       Hypokalemia, on replacement protocol.  Labs stable.  Lab break tomorrow.        Disposition is an issue.  Patient is not a [place] citizen so does not qualify for insurance thus limiting placement. CCP has been working with daughter who resides in [place]. She is willing to get him from the hospital and put him on a flying back to [place] but he would have to fly alone. Pt cannot go to [place] with her.   Patient is not suitable to be flying such long distances by himself confirmed by  PT OT notes.        · Eliquis (home med) for DVT prophylaxis.  · Full code.  · Discussed with patient, nursing staff and Dr Castro.  · Anticipate discharge TBD but stable for DC      SLICK Lerma  [facility]  03/29/23  14:20 EDT

## 2023-03-29 NOTE — PLAN OF CARE
Goal Outcome Evaluation:  Plan of Care Reviewed With: patient           Outcome Evaluation: Pt seen for PT?OT session this AM. Pt agreeable but appears more confused today. He required continuous cues for safety due to impulsivity. He attempted to stand impulsively numerous times and had a strong R and posterior lean at EOB. Utilized interpretor ipad today which aided in communication. He was oriented to self only and was very concerned about getting in touch with his roommate and family. Was able to stand with min to mod A x2 but unable to coordinate steps. Not safe to transfer to the chair this date. Discussed with STANLEY Wall and CCP today. Will continue to follow and progress as able.

## 2023-03-29 NOTE — PROGRESS NOTES
"Nutrition Services    Patient Name:  Manuel Durant  YOB: 1950  MRN: [id number]  Admit Date:  3/12/2023    FOLLOW UP - CLINICAL NUTRITION    Assessment Date:  03/29/23    Encounter Information         Reason for Encounter Follow up     Current Issues Patient asleep at time of visit. RD did not awaken. Per EMR, he seems to be tolerating diet and eating well. Patient unable to go be d/c'd to any facility due to being undocumented. Plans for patient to d/c when medically appropriate and fly to [facility] where family will care for him. +BM 3/28. Will continue to follow      Current Nutrition Orders & Evaluation of Intake       Oral Nutrition     Current PO Diet Diet: Regular/House Diet; No Mixed Consistencies; Texture: Soft to Chew (NDD 3); Soft to Chew: Ground Meat; Fluid Consistency: Honey Thick   Supplement n/a   PO Evaluation     % PO Intake 75% x 1 meal     # of Days Evaluated     Factors Affecting Intake  swallow impairment   --  Anthropometrics          Height    Weight Height: 167.6 cm (66\")  Weight: 71.8 kg (158 lb 4.6 oz) (03/27/23 0429)    BMI kg/m2 Body mass index is 25.55 kg/m².    Weight trend Stable     Labs        Pertinent Labs Reviewed, listed below     Results from last 7 days   Lab Units 03/29/23  0745 03/28/23 0613 03/27/23  0545   SODIUM mmol/L 139 142 142   POTASSIUM mmol/L 4.2 3.9 4.0   CHLORIDE mmol/L 106 108* 110*   CO2 mmol/L 23.2 24.2 24.3   BUN mg/dL 20 21 19   CREATININE mg/dL 0.73* 0.73* 0.85   CALCIUM mg/dL 8.9 9.0 8.9   GLUCOSE mg/dL 91 89 98     Results from last 7 days   Lab Units 03/29/23  0745 03/28/23  0613 03/27/23  0545   MAGNESIUM mg/dL 2.1 2.1 2.1   PHOSPHORUS mg/dL 3.4 3.4 3.4   HEMOGLOBIN g/dL 13.5 13.5 13.4   HEMATOCRIT % 39.9 39.9 40.0   WBC 10*3/mm3 7.52 7.76 8.35     Results from last 7 days   Lab Units 03/29/23  0745 03/28/23  0613 03/27/23  0545 03/26/23  0635 03/25/23  0638   PLATELETS 10*3/mm3 326 324 317 292 300     No results found for: " COVID19  Lab Results   Component Value Date    HGBA1C 5.60 03/15/2023          Medications            Scheduled Medications amLODIPine, 10 mg, Oral, QAM AC  apixaban, 5 mg, Oral, Q12H  baclofen, 2.5 mg, Oral, Q12H  carvedilol, 25 mg, Oral, BID With Meals  hydrALAZINE, 10 mg, Oral, Q8H  senna-docusate sodium, 2 tablet, Oral, BID  sodium chloride, 10 mL, Intravenous, Q12H  valsartan, 160 mg, Oral, QAM AC        Infusions      PRN Medications •  acetaminophen  •  senna-docusate sodium **AND** polyethylene glycol **AND** bisacodyl **AND** bisacodyl  •  Calcium Gluconate-NaCl **AND** calcium gluconate IVPB **AND** Calcium  •  labetalol  •  loperamide  •  magnesium sulfate **OR** magnesium sulfate **OR** magnesium sulfate  •  potassium chloride **OR** potassium chloride **OR** potassium chloride  •  potassium phosphate infusion greater than 15 mMoles **OR** potassium phosphate infusion greater than 15 mMoles **OR** potassium phosphate **OR** sodium phosphate IVPB **OR** sodium phosphate IVPB  •  [COMPLETED] Insert Peripheral IV **AND** sodium chloride  •  sodium chloride  •  sodium chloride     Physical Findings          Physical Appearance disoriented, overweight, somnolent   Oral/Mouth Cavity teeth missing   Edema  no edema   Gastrointestinal normoactive, last bowel movement:3/28   Skin  skin intact   Tubes/Drains none   NFPE Not applicable at this time   --  NUTRITION INTERVENTION / PLAN OF CARE  Intervention Goal         Intervention Goal(s) Maintain intake and Maintain weight     Nutrition Intervention         RD Action Follow Tx Progress and Care plan reviewed     Nutrition Prescription         Diet Prescription     Supplement Prescription n/a   EN/PN Prescription    New Prescription Ordered? No changes at this time   --  Monitor/Evaluation        Monitor Per protocol   Discharge Needs Pending clinical course   Education Will instruct as appropriate   --    RD to follow up per protocol.    Electronically signed  by:  Richelle Milligan RD  03/29/23 15:09 EDT

## 2023-03-30 LAB
ANION GAP SERPL CALCULATED.3IONS-SCNC: 7.5 MMOL/L (ref 5–15)
BUN SERPL-MCNC: 21 MG/DL (ref 8–23)
BUN/CREAT SERPL: 27.3 (ref 7–25)
CALCIUM SPEC-SCNC: 8.7 MG/DL (ref 8.6–10.5)
CHLORIDE SERPL-SCNC: 110 MMOL/L (ref 98–107)
CO2 SERPL-SCNC: 24.5 MMOL/L (ref 22–29)
CREAT SERPL-MCNC: 0.77 MG/DL (ref 0.76–1.27)
DEPRECATED RDW RBC AUTO: 43.1 FL (ref 37–54)
EGFRCR SERPLBLD CKD-EPI 2021: 95.7 ML/MIN/1.73
ERYTHROCYTE [DISTWIDTH] IN BLOOD BY AUTOMATED COUNT: 13 % (ref 12.3–15.4)
GLUCOSE SERPL-MCNC: 89 MG/DL (ref 65–99)
HCT VFR BLD AUTO: 39.3 % (ref 37.5–51)
HGB BLD-MCNC: 13.1 G/DL (ref 13–17.7)
MAGNESIUM SERPL-MCNC: 2 MG/DL (ref 1.6–2.4)
MCH RBC QN AUTO: 30.4 PG (ref 26.6–33)
MCHC RBC AUTO-ENTMCNC: 33.3 G/DL (ref 31.5–35.7)
MCV RBC AUTO: 91.2 FL (ref 79–97)
PHOSPHATE SERPL-MCNC: 3.2 MG/DL (ref 2.5–4.5)
PLATELET # BLD AUTO: 323 10*3/MM3 (ref 140–450)
PMV BLD AUTO: 10.8 FL (ref 6–12)
POTASSIUM SERPL-SCNC: 4.1 MMOL/L (ref 3.5–5.2)
RBC # BLD AUTO: 4.31 10*6/MM3 (ref 4.14–5.8)
SODIUM SERPL-SCNC: 142 MMOL/L (ref 136–145)
WBC NRBC COR # BLD: 8.31 10*3/MM3 (ref 3.4–10.8)

## 2023-03-30 PROCEDURE — 97112 NEUROMUSCULAR REEDUCATION: CPT

## 2023-03-30 PROCEDURE — 85027 COMPLETE CBC AUTOMATED: CPT | Performed by: STUDENT IN AN ORGANIZED HEALTH CARE EDUCATION/TRAINING PROGRAM

## 2023-03-30 PROCEDURE — 92526 ORAL FUNCTION THERAPY: CPT

## 2023-03-30 PROCEDURE — 80048 BASIC METABOLIC PNL TOTAL CA: CPT | Performed by: STUDENT IN AN ORGANIZED HEALTH CARE EDUCATION/TRAINING PROGRAM

## 2023-03-30 PROCEDURE — 83735 ASSAY OF MAGNESIUM: CPT | Performed by: STUDENT IN AN ORGANIZED HEALTH CARE EDUCATION/TRAINING PROGRAM

## 2023-03-30 PROCEDURE — 84100 ASSAY OF PHOSPHORUS: CPT | Performed by: STUDENT IN AN ORGANIZED HEALTH CARE EDUCATION/TRAINING PROGRAM

## 2023-03-30 PROCEDURE — 97530 THERAPEUTIC ACTIVITIES: CPT

## 2023-03-30 PROCEDURE — 92507 TX SP LANG VOICE COMM INDIV: CPT

## 2023-03-30 RX ADMIN — VALSARTAN 160 MG: 160 TABLET, FILM COATED ORAL at 06:43

## 2023-03-30 RX ADMIN — CARVEDILOL 25 MG: 12.5 TABLET, FILM COATED ORAL at 08:33

## 2023-03-30 RX ADMIN — AMLODIPINE BESYLATE 10 MG: 10 TABLET ORAL at 06:43

## 2023-03-30 RX ADMIN — Medication 10 ML: at 21:59

## 2023-03-30 RX ADMIN — HYDRALAZINE HYDROCHLORIDE 10 MG: 10 TABLET, FILM COATED ORAL at 14:40

## 2023-03-30 RX ADMIN — APIXABAN 5 MG: 5 TABLET, FILM COATED ORAL at 21:59

## 2023-03-30 RX ADMIN — APIXABAN 5 MG: 5 TABLET, FILM COATED ORAL at 08:33

## 2023-03-30 RX ADMIN — DOCUSATE SODIUM 50MG AND SENNOSIDES 8.6MG 2 TABLET: 8.6; 5 TABLET, FILM COATED ORAL at 08:33

## 2023-03-30 RX ADMIN — BACLOFEN 2.5 MG: 10 TABLET ORAL at 08:33

## 2023-03-30 RX ADMIN — Medication 10 ML: at 08:33

## 2023-03-30 RX ADMIN — CARVEDILOL 25 MG: 12.5 TABLET, FILM COATED ORAL at 18:51

## 2023-03-30 RX ADMIN — HYDRALAZINE HYDROCHLORIDE 10 MG: 10 TABLET, FILM COATED ORAL at 06:43

## 2023-03-30 RX ADMIN — BACLOFEN 2.5 MG: 10 TABLET ORAL at 21:59

## 2023-03-30 RX ADMIN — HYDRALAZINE HYDROCHLORIDE 10 MG: 10 TABLET, FILM COATED ORAL at 21:59

## 2023-03-30 NOTE — THERAPY TREATMENT NOTE
Patient Name: Manuel Durant  : 1950    MRN: [id number]                              Today's Date: 3/30/2023       Admit Date: 3/12/2023    Visit Dx:     ICD-10-CM ICD-9-CM   1. Intracranial hemorrhage (HCC)  I62.9 432.9   2. Altered mental status, unspecified altered mental status type  R41.82 780.97     Patient Active Problem List   Diagnosis   • Intracranial hemorrhage (HCC)   • Hypertensive emergency   • Acute DVT (deep venous thrombosis) (HCC)   • HTN (hypertension)   • Hypokalemia     History reviewed. No pertinent past medical history.  History reviewed. No pertinent surgical history.   General Information     Row Name 23 1150          Physical Therapy Time and Intention    Document Type therapy note (daily note)  -CW     Mode of Treatment physical therapy;individual therapy  -CW     Row Name 23 1150          General Information    Patient Profile Reviewed yes  -CW     Existing Precautions/Restrictions fall  -CW     Barriers to Rehab language barrier  -CW     Row Name 23 1150          Cognition    Orientation Status (Cognition) oriented to;person  -CW     Row Name 23 1150          Safety Issues, Functional Mobility    Safety Issues Affecting Function (Mobility) insight into deficits/self-awareness;judgment;problem-solving;impulsivity;awareness of need for assistance;safety precautions follow-through/compliance;safety precaution awareness  -CW     Impairments Affecting Function (Mobility) balance;coordination;cognition;endurance/activity tolerance;grasp;motor control;postural/trunk control;strength;range of motion (ROM)  -CW           User Key  (r) = Recorded By, (t) = Taken By, (c) = Cosigned By    Initials Name Provider Type    CW Tamica Willoughby PT Physical Therapist               Mobility     Row Name 23 1157          Bed Mobility    Bed Mobility supine-sit;sit-supine  -CW     Supine-Sit [place] (Bed Mobility) maximum assist (25% patient effort);2 person  assist;verbal cues;nonverbal cues (demo/gesture)  -CW     Sit-Supine [place] (Bed Mobility) maximum assist (25% patient effort);2 person assist;verbal cues;nonverbal cues (demo/gesture)  -CW     Assistive Device (Bed Mobility) head of bed elevated;bed rails  -CW     Row Name 03/30/23 1157          Transfers    Comment, (Transfers) Heavy lean during transfers, R LE blocked due to buckling  -     Row Name 03/30/23 1157          Bed-Chair Transfer    Bed-Chair [place] (Transfers) maximum assist (25% patient effort);2 person assist;verbal cues;nonverbal cues (demo/gesture)  -CW     Comment, (Bed-Chair Transfer) HHA, x2 total transfers. Not safe to leave in chair. Pt also wanting to return to bed to rest  -     Row Name 03/30/23 1157          Sit-Stand Transfer    Sit-Stand [place] (Transfers) minimum assist (75% patient effort);moderate assist (50% patient effort);2 person assist;verbal cues  -     Assistive Device (Sit-Stand Transfers) other (see comments)  -CW     Comment, (Sit-Stand Transfer) HHA x2, x4 trials total  -           User Key  (r) = Recorded By, (t) = Taken By, (c) = Cosigned By    Initials Name Provider Type    Tamica Eddy PT Physical Therapist               Obj/Interventions     Row Name 03/30/23 1203          Motor Skills    Therapeutic Exercise other (see comments)  PROM to RUE and RLE  -     Row Name 03/30/23 1203          Balance    Dynamic Standing Balance maximum assist;2-person assist  -           User Key  (r) = Recorded By, (t) = Taken By, (c) = Cosigned By    Initials Name Provider Type    Tamica Eddy PT Physical Therapist               Goals/Plan    No documentation.                Clinical Impression     Row Name 03/30/23 1204          Pain    Pretreatment Pain Rating 0/10 - no pain  -CW     Posttreatment Pain Rating 0/10 - no pain  -CW     Row Name 03/30/23 1204          Plan of Care Review    Plan of Care Reviewed With patient  -CW     Outcome  Evaluation Pt participated with PT this morning. Pt is pleasant and agreement to session. Pt still demo limited mobility in R UE and R LE. Encouraged pt to keep trying to mobilize it to regain function. He stood 4 times and practiced a few transfers. Pt with R knee blocked 2/2 buckling and required max A x2 for the transfer due to heavy leaning. Limited carryover from session to session- confusion limiting. Pt is also Filipino speaking. PT will continue to follow and progress as able. Discussed with CCP.  -CW     Row Name 03/30/23 1204          Vital Signs    O2 Delivery Pre Treatment room air  -CW     Row Name 03/30/23 1204          Positioning and Restraints    Pre-Treatment Position in bed  -CW     Post Treatment Position bed  -CW     In Bed notified nsg;call light within reach;encouraged to call for assist;exit alarm on;fowlers  -CW           User Key  (r) = Recorded By, (t) = Taken By, (c) = Cosigned By    Initials Name Provider Type    CW Tamica Willoughby, PT Physical Therapist               Outcome Measures     Row Name 03/30/23 1208 03/30/23 0800       How much help from another person do you currently need...    Turning from your back to your side while in flat bed without using bedrails? 2  -CW 2  -JM    Moving from lying on back to sitting on the side of a flat bed without bedrails? 2  -CW 2  -JM    Moving to and from a bed to a chair (including a wheelchair)? 2  -CW 2  -JM    Standing up from a chair using your arms (e.g., wheelchair, bedside chair)? 2  -CW 2  -JM    Climbing 3-5 steps with a railing? 1  -CW 1  -JM    To walk in hospital room? 1  -CW 1  -JM    AM-PAC 6 Clicks Score (PT) 10  -CW 10  -JM    Highest level of mobility 4 --> Transferred to chair/commode  -CW 4 --> Transferred to chair/commode  -JM    Row Name 03/30/23 1208          Functional Assessment    Outcome Measure Options AM-PAC 6 Clicks Basic Mobility (PT)  -CW           User Key  (r) = Recorded By, (t) = Taken By, (c) = Cosigned By     Initials Name Provider Type    Mae Dupont, RN Registered Nurse    Tamica Eddy, PT Physical Therapist                             Physical Therapy Education     Title: PT OT SLP Therapies (In Progress)     Topic: Physical Therapy (In Progress)     Point: Mobility training (In Progress)     Learning Progress Summary           Patient Acceptance, E, NR by CW at 3/30/2023 1209    Acceptance, E, NR,NL by CW at 3/29/2023 1336    Acceptance, E, NR by ZB at 3/28/2023 1317    Acceptance, E, NR by CW at 3/27/2023 1401    Acceptance, E,D, NR,VU by ZACH at 3/24/2023 1631    Comment: seemed to comprehend commands this session    Acceptance, E,D, NR by ZACH at 3/23/2023 1655    Acceptance, E,D, NR by ZACH at 3/22/2023 1545    Acceptance, E,TB, VU,NR by CB at 3/21/2023 1544    Acceptance, E,TB,H, VU by MS at 3/16/2023 1801    Acceptance, E,D, DU,NR by MO at 3/16/2023 1451    Acceptance, E, DU,NR by MO at 3/15/2023 1200    Acceptance, E, NR by LM at 3/15/2023 0521    Acceptance, E,D, DU,NR by MO at 3/14/2023 1458   Family Acceptance, E,TB,H, VU by MS at 3/16/2023 1801                   Point: Home exercise program (In Progress)     Learning Progress Summary           Patient Acceptance, E, NR by CHARLY at 3/28/2023 1317    Acceptance, E, NR by DANAY at 3/27/2023 1401    Acceptance, E,D, NR,VU by ZACH at 3/24/2023 1631    Comment: seemed to comprehend commands this session    Acceptance, E,D, NR by ZACH at 3/23/2023 1655    Acceptance, E,D, NR by ZACH at 3/22/2023 1545    Acceptance, E,TB, VU,NR by CB at 3/21/2023 1544    Acceptance, E,TB,H, VU by MS at 3/16/2023 1801    Acceptance, E,D, DU,NR by MO at 3/16/2023 1451    Acceptance, E, NR by LM at 3/15/2023 0521    Acceptance, E,D, DU,NR by MO at 3/14/2023 1458   Family Acceptance, E,TB,H, VU by MS at 3/16/2023 1801                   Point: Body mechanics (Done)     Learning Progress Summary           Patient Acceptance, E,D, NR,VU by JM at 3/24/2023 1631    Comment: seemed to  comprehend commands this session    Acceptance, E,D, NR by JM at 3/23/2023 1655    Acceptance, E,D, NR by JM at 3/22/2023 1545    Acceptance, E,TB, VU,NR by CB at 3/21/2023 1544    Acceptance, E,TB,H, VU by MS at 3/16/2023 1801    Acceptance, E,D, DU,NR by MO at 3/16/2023 1451    Acceptance, E, DU,NR by MO at 3/15/2023 1200    Acceptance, E, NR by LM at 3/15/2023 0521    Acceptance, E,D, DU,NR by MO at 3/14/2023 1458   Family Acceptance, E,TB,H, VU by MS at 3/16/2023 1801                   Point: Precautions (Done)     Learning Progress Summary           Patient Acceptance, E,D, NR,VU by JM at 3/24/2023 1631    Comment: seemed to comprehend commands this session    Acceptance, E,D, NR by ZACH at 3/23/2023 1655    Acceptance, E,D, NR by JM at 3/22/2023 1545    Acceptance, E,TB, VU,NR by CB at 3/21/2023 1544    Acceptance, E,TB,H, VU by MS at 3/16/2023 1801    Acceptance, E,D, DU,NR by MO at 3/16/2023 1451    Acceptance, E, DU,NR by MO at 3/15/2023 1200    Acceptance, E, NR by LM at 3/15/2023 0521    Acceptance, E,D, DU,NR by MO at 3/14/2023 1458   Family Acceptance, E,TB,H, VU by MS at 3/16/2023 1801                               User Key     Initials Effective Dates Name Provider Type Discipline    JM 03/07/18 -  Tiffanie Grace, PTA Physical Therapist Assistant PT    MS 06/16/21 -  Angel Luis Cr, RN Registered Nurse Nurse    CW 12/13/22 -  Tamica Willoughby, PT Physical Therapist PT    CB 10/22/21 -  Bety Benitez, PT Physical Therapist PT    LM 10/13/22 -  Silke Grace, RN Registered Nurse Nurse    MO 01/27/23 -  Lacie Tanner, PT Student PT Student PT    ZB 03/10/23 -  Nestor Rae, PT Student PT Student PT              PT Recommendation and Plan     Plan of Care Reviewed With: patient  Outcome Evaluation: Pt participated with PT this morning. Pt is pleasant and agreement to session. Pt still demo limited mobility in R UE and R LE. Encouraged pt to keep trying to mobilize it to regain function. He stood 4  times and practiced a few transfers. Pt with R knee blocked 2/2 buckling and required max A x2 for the transfer due to heavy leaning. Limited carryover from session to session- confusion limiting. Pt is also Setswana speaking. PT will continue to follow and progress as able. Discussed with CCP.     Time Calculation:    PT Charges     Row Name 03/30/23 1146             Time Calculation    Start Time 0944  -CW      Stop Time 1014  -CW      Time Calculation (min) 30 min  -CW      PT Received On 03/30/23  -CW      PT - Next Appointment 03/31/23  -CW            User Key  (r) = Recorded By, (t) = Taken By, (c) = Cosigned By    Initials Name Provider Type    CW Tamica Willoughby, JOSE ELIAS Physical Therapist              Therapy Charges for Today     Code Description Service Date Service Provider Modifiers Qty    95591188960  PT THERAPEUTIC ACT EA 15 MIN 3/29/2023 Tamica Willoughby, PT GP 2    21517792046 HC PT THER SUPP EA 15 MIN 3/29/2023 Tamica Willoughby, PT GP 1    [id number]  PT NEUROMUSC RE EDUCATION EA 15 MIN 3/30/2023 Tamica Willoughby, PT GP 1    09609064197 HC PT THERAPEUTIC ACT EA 15 MIN 3/30/2023 Tamica Willoughby, PT GP 1    17913825750 HC PT THER SUPP EA 15 MIN 3/30/2023 Tamica Willoughby, PT GP 2          PT G-Codes  Outcome Measure Options: AM-PAC 6 Clicks Basic Mobility (PT)  AM-PAC 6 Clicks Score (PT): 10  AM-PAC 6 Clicks Score (OT): 10  Modified Ross Scale: 4 - Moderately severe disability.  Unable to walk without assistance, and unable to attend to own bodily needs without assistance.  PT Discharge Summary  Anticipated Discharge Disposition (PT): inpatient rehabilitation facility, skilled nursing facility    Tamica Willoughby PT  3/30/2023

## 2023-03-30 NOTE — CASE MANAGEMENT/SOCIAL WORK
Call placed to Ariel Durant, Manuel Durant's son and NOK.  He requests that Bridgett Shook, [phone], be given the keys his father brought to the hospital upon admission.  Witnessed by two RNs.  [facility] will be in security lockup when Ms. Shook arrives this evening.

## 2023-03-30 NOTE — CASE MANAGEMENT/SOCIAL WORK
Continued Stay Note  [facility]     Patient Name: Manuel Durant  MRN: [id number]  Today's Date: 3/30/2023    Admit Date: 3/12/2023    Plan: Pending   Discharge Plan     Row Name 03/30/23 1621       Plan    Plan Comments Discussed again proposed discharge plan with Ariel.  Pt has no ID which will allow him to board an airplane.  Discussed with son that CCP will get estimates from two [facility] [employer] providers and provide family with choices.  He agrees to this plan and will review quotes when available.  Also spoke with [employer] with [facility].  They will review pt for progress and possible rehab.  Faye ARMSTRONG    Row Name        Plan    Plan Comments                Discharge Codes    No documentation.               Expected Discharge Date and Time     Expected Discharge Date Expected Discharge Time    Apr 9, 2023             Faye Moy RN

## 2023-03-30 NOTE — THERAPY TREATMENT NOTE
Acute Care - Speech Language Pathology Treatment Note    [facility]     Patient Name: Manuel Durant  : 1950  MRN: [id number]    Today's Date: 3/30/2023                   Admit Date: 3/12/2023       Visit Dx:      ICD-10-CM ICD-9-CM   1. Intracranial hemorrhage (HCC)  I62.9 432.9   2. Altered mental status, unspecified altered mental status type  R41.82 780.97       Patient Active Problem List   Diagnosis   • Intracranial hemorrhage (HCC)   • Hypertensive emergency   • Acute DVT (deep venous thrombosis) (HCC)   • HTN (hypertension)   • Hypokalemia       History reviewed. No pertinent past medical history.    History reviewed. No pertinent surgical history.    SLP Recommendation and Plan                                                            SLP EVALUATION (last 72 hours)     SLP SLC Evaluation     Row Name 23 1400                   Communication Assessment/Intervention    Document Type therapy note (daily note)  -        Patient Effort fair  -           General Information    Patient Profile Reviewed yes  -SH        Pertinent History Of Current Problem Pt agreed to tx,  ipad utilized.  -           Pain Scale: Numbers Pre/Post-Treatment    Pretreatment Pain Rating 0/10 - no pain  -              User Key  (r) = Recorded By, (t) = Taken By, (c) = Cosigned By    Initials Name Effective Dates     Radha Reinoso MS CCC-SLP 21 -                    EDUCATION    The patient has been educated in the following areas:       Communication Impairment Dysphagia (Swallowing Impairment).             SLP GOALS     Row Name 23 1400 23 1500          (LTG) Patient will demonstrate functional swallow for    Diet Texture (Demonstrate functional swallow) soft to chew (ground) textures  - soft to chew (ground) textures  -CR     Liquid viscosity (Demonstrate functional swallow) honey/  moderately thick liquids  - honey/  moderately thick liquids  -CR     [place]  (Demonstrate functional swallow) independently (over 90% accuracy)  -SH independently (over 90% accuracy)  -CR     Time Frame (Demonstrate functional swallow) by discharge  -SH by discharge  -CR     Comment (Demonstrate functional swallow) Patient seen for re evaluation of swallow function. Moderate oral residue with regular on R, patient unable to clear independantly. Unable to follow directions for small sips, but no overt s/s of aspiration with this via cup. Weak cough with thins via straw. Will plan for repeat VFSS as pt approaches D/C.  -SH Patient seen for diet tolerance of mechanical soft/HTL diet. Pt exhibited no overt s/s of aspiration across multiple trials of soft solids and nectar thick liquid. No oral residue or vocal change appreciated. Speech to follow for swallow tx.  -CR        Follow Directions Goal 2 (SLP)    Improve Ability to Follow Directions Goal 1 (SLP) 1 step direction without objects  -SH --     Time Frame (Follow Directions Goal 1, SLP) short term goal (STG)  -SH --     Comment (Follow Directions Goal 1, SLP) Pt required visual cues to follow single step directions this date.  -SH --           User Key  (r) = Recorded By, (t) = Taken By, (c) = Cosigned By    Initials Name Provider Type     Radha Reinoso MS CCC-SLP Speech and Language Pathologist    Maryam Gould CF-SLP Speech and Language Pathologist                            Time Calculation:        Time Calculation- SLP     Row Name 03/30/23 1406             Time Calculation- SLP    SLP Start Time 1300  -      SLP Received On 03/30/23  -         Untimed Charges    SLP Treatment --  15  -SH      89379-[id number] Treatment Swallow Minutes 45  -SH         Total Minutes    Untimed Charges Total Minutes 45  -SH       Total Minutes 45  -SH            User Key  (r) = Recorded By, (t) = Taken By, (c) = Cosigned By    Initials Name Provider Type     Radha Reinoso MS CCC-SLP Speech and Language Pathologist                  Therapy  Charges for Today     Code Description Service Date Service Provider Modifiers Qty    28499204643 HC ST TREATMENT SWALLOW 3 3/30/2023 Radha Reinoso, MS CCC-SLP GN 1    51120649502 HC ST TREATMENT SPEECH 1 3/30/2023 Radha Reinoso, MS CCC-SLP GN 1                           Radha Reinoso MS CCC-SLP  3/30/2023

## 2023-03-30 NOTE — PROGRESS NOTES
Name: Manuel Durant ADMIT: 3/12/2023   : 1950  PCP: Provider, No Known    MRN: [id number] LOS: 18 days   AGE/SEX: 73 y.o. male  [facility]     Subjective   Subjective    No change. Still working on DC       Review of Systems     Objective   Objective   Vital Signs  Temp:  [98 °F (36.7 °C)-98.3 °F (36.8 °C)] 98.1 °F (36.7 °C)  Heart Rate:  [55-66] 59  Resp:  [16-18] 16  BP: (110-144)/(58-73) 110/58  SpO2:  [95 %-100 %] 97 %  on   ;   Device (Oxygen Therapy): room air  Body mass index is 25.12 kg/m².  Physical Exam  Vitals reviewed.   Constitutional:       General: He is not in acute distress.     Appearance: He is well-developed.      Comments: Chronically ill-appearing   HENT:      Head: Normocephalic.      Mouth/Throat:      Mouth: Mucous membranes are moist.   Cardiovascular:      Rate and Rhythm: Normal rate and regular rhythm.   Pulmonary:      Effort: Pulmonary effort is normal. No respiratory distress.      Breath sounds: Normal breath sounds.   Abdominal:      General: Bowel sounds are normal. There is no distension.      Palpations: Abdomen is soft.      Tenderness: There is no abdominal tenderness.   Musculoskeletal:         General: Normal range of motion.   Skin:     General: Skin is warm and dry.   Neurological:      Mental Status: He is alert. Mental status is at baseline.      Comments: Right sided weakness.,  Oriented to person.  Seems intermittently confused to situation       Results Review     I reviewed the patient's new clinical results.  Results from last 7 days   Lab Units 23  0610 23  0745 23  0613 23  0545   WBC 10*3/mm3 8.31 7.52 7.76 8.35   HEMOGLOBIN g/dL 13.1 13.5 13.5 13.4   PLATELETS 10*3/mm3 323 326 324 317     Results from last 7 days   Lab Units 23  0610 23  0745 23  0613 23  0545   SODIUM mmol/L 142 139 142 142   POTASSIUM mmol/L 4.1 4.2 3.9 4.0   CHLORIDE mmol/L 110* 106 108* 110*   CO2 mmol/L 24.5 23.2 24.2 24.3    BUN mg/dL 21 20 21 19   CREATININE mg/dL 0.77 0.73* 0.73* 0.85   GLUCOSE mg/dL 89 91 89 98   EGFR mL/min/1.73 95.7 97.3 97.3 92.9       Results from last 7 days   Lab Units 03/30/23  0610 03/29/23  0745 03/28/23  0613 03/27/23  0545   CALCIUM mg/dL 8.7 8.9 9.0 8.9   MAGNESIUM mg/dL 2.0 2.1 2.1 2.1   PHOSPHORUS mg/dL 3.2 3.4 3.4 3.4       No results found for: HGBA1C, POCGLU    No radiology results for the last day  I have personally reviewed all medications:  Scheduled Medications  amLODIPine, 10 mg, Oral, QAM AC  apixaban, 5 mg, Oral, Q12H  baclofen, 2.5 mg, Oral, Q12H  carvedilol, 25 mg, Oral, BID With Meals  hydrALAZINE, 10 mg, Oral, Q8H  senna-docusate sodium, 2 tablet, Oral, BID  sodium chloride, 10 mL, Intravenous, Q12H  valsartan, 160 mg, Oral, QAM AC    Infusions   Diet  Diet: Regular/House Diet; No Mixed Consistencies; Texture: Soft to Chew (NDD 3); Soft to Chew: Ground Meat; Fluid Consistency: Honey Thick    I have personally reviewed:  [x]  Laboratory   []  Microbiology   [x]  Radiology   []  EKG/Telemetry  [x]  Cardiology/Vascular   []  Pathology    [x]  Records       Assessment/Plan     Active Hospital Problems    Diagnosis  POA   • **Intracranial hemorrhage (HCC) [I62.9]  Yes   • Acute DVT (deep venous thrombosis) (HCC) [I82.409]  Unknown   • HTN (hypertension) [I10]  Unknown   • Hypokalemia [E87.6]  Unknown   • Hypertensive emergency [I16.1]  Unknown      Resolved Hospital Problems   No resolved problems to display.          Mr. Durant is a 73 year old male who initially presented to the hospital 3/12/23 for intracranial bleed. He was found unresponsive by friends and initially taken to [facility] where he was found to have poorly controlled BP and right sided hemiplegia. He was transferred here for closer monitoring in the ICU and neurosurgical consultation. He was cleared to move out of the ICU on 3/16/23.       Intracranial hemorrhage, CT brain done on 3/17/2023 revealed stable left basal  ganglia/internal capsule hemorrhage with left ventricular hemorrhage.  Developed DVT therefore Lovenox was initiated and CT brain on 3/18 and 3/20/2023 also revealed stable findings.  Neurosurgery did evaluate and cleared patient to be on Eliquis now.  Needs outpatient follow-up with neurosurgery as well as MRI brain as an outpatient basis once hemorrhage is cleared.    Dysphagia  Diet per SLP. VFSS prior to DC        Hypertensive emergency, currently blood pressure is reasonably well controlled and on amlodipine, valsartan, Coreg and hydralazine.   Avoid hypotension in this frail gentleman.        Acute right lower extremity DVT, diagnosed on 3/17/2023 and is currently on Eliquis which will be continued and was on Lovenox earlier during this hospital stay.   neurosurgery cleared to be on anticoagulation.       Hypokalemia, on replacement protocol.  Labs stable.  Lab break tomorrow.        Disposition is an issue.  Patient is not a [place] citizen so does not qualify for insurance thus limiting placement. CCP has been working with daughter who resides in [place]. She is willing to get him from the hospital and put him on a flying back to [place] but he would have to fly alone. Pt cannot go to [place] with her.   Patient is not suitable to be flying such long distances by himself confirmed by  PT OT notes. Decisions pending with family        · Eliquis (home med) for DVT prophylaxis.  · Full code.  · Discussed with patient and nursing staff Dr Romeo.  · Anticipate discharge TBD but stable for DC      SLICK Lerma  [facility]  03/30/23  15:19 EDT

## 2023-03-30 NOTE — PLAN OF CARE
Goal Outcome Evaluation:  Plan of Care Reviewed With: patient           Outcome Evaluation: Pt participated with PT this morning. Pt is pleasant and agreement to session. Pt still demo limited mobility in R UE and R LE. Encouraged pt to keep trying to mobilize it to regain function. He stood 4 times and practiced a few transfers. Pt with R knee blocked 2/2 buckling and required max A x2 for the transfer due to heavy leaning. Limited carryover from session to session- confusion limiting. Pt is also [place] speaking. PT will continue to follow and progress as able. Discussed with CCP.

## 2023-03-31 ENCOUNTER — APPOINTMENT (OUTPATIENT)
Dept: GENERAL RADIOLOGY | Facility: HOSPITAL | Age: 73
DRG: 64 | End: 2023-03-31
Payer: MEDICAID

## 2023-03-31 PROCEDURE — 97110 THERAPEUTIC EXERCISES: CPT

## 2023-03-31 PROCEDURE — 97112 NEUROMUSCULAR REEDUCATION: CPT

## 2023-03-31 PROCEDURE — 97530 THERAPEUTIC ACTIVITIES: CPT

## 2023-03-31 PROCEDURE — 92611 MOTION FLUOROSCOPY/SWALLOW: CPT

## 2023-03-31 PROCEDURE — 74230 X-RAY XM SWLNG FUNCJ C+: CPT

## 2023-03-31 RX ADMIN — BACLOFEN 2.5 MG: 10 TABLET ORAL at 10:23

## 2023-03-31 RX ADMIN — APIXABAN 5 MG: 5 TABLET, FILM COATED ORAL at 21:09

## 2023-03-31 RX ADMIN — HYDRALAZINE HYDROCHLORIDE 10 MG: 10 TABLET, FILM COATED ORAL at 16:39

## 2023-03-31 RX ADMIN — BARIUM SULFATE 4 ML: 980 POWDER, FOR SUSPENSION ORAL at 14:31

## 2023-03-31 RX ADMIN — Medication 10 ML: at 21:09

## 2023-03-31 RX ADMIN — Medication 10 ML: at 10:23

## 2023-03-31 RX ADMIN — BARIUM SULFATE 55 ML: 0.81 POWDER, FOR SUSPENSION ORAL at 14:31

## 2023-03-31 RX ADMIN — APIXABAN 5 MG: 5 TABLET, FILM COATED ORAL at 10:23

## 2023-03-31 RX ADMIN — AMLODIPINE BESYLATE 10 MG: 10 TABLET ORAL at 06:34

## 2023-03-31 RX ADMIN — HYDRALAZINE HYDROCHLORIDE 10 MG: 10 TABLET, FILM COATED ORAL at 06:34

## 2023-03-31 RX ADMIN — BACLOFEN 2.5 MG: 10 TABLET ORAL at 21:09

## 2023-03-31 RX ADMIN — BARIUM SULFATE 50 ML: 400 SUSPENSION ORAL at 14:31

## 2023-03-31 RX ADMIN — CARVEDILOL 25 MG: 12.5 TABLET, FILM COATED ORAL at 18:23

## 2023-03-31 RX ADMIN — BARIUM SULFATE 1 TEASPOON(S): 0.6 CREAM ORAL at 14:31

## 2023-03-31 RX ADMIN — DOCUSATE SODIUM 50MG AND SENNOSIDES 8.6MG 2 TABLET: 8.6; 5 TABLET, FILM COATED ORAL at 10:23

## 2023-03-31 RX ADMIN — VALSARTAN 160 MG: 160 TABLET, FILM COATED ORAL at 06:34

## 2023-03-31 RX ADMIN — HYDRALAZINE HYDROCHLORIDE 10 MG: 10 TABLET, FILM COATED ORAL at 21:09

## 2023-03-31 RX ADMIN — CARVEDILOL 25 MG: 12.5 TABLET, FILM COATED ORAL at 10:23

## 2023-03-31 NOTE — PLAN OF CARE
Goal Outcome Evaluation:  Plan of Care Reviewed With: patient        Progress: no change  Outcome Evaluation: Swallow study done, otherwise no changes.            Problem: Fall Injury Risk  Goal: Absence of Fall and Fall-Related Injury  Outcome: Ongoing, Progressing  Intervention: Promote Injury-Free Environment  Recent Flowsheet Documentation  Taken 3/31/2023 0800 by Johanny Buchanan RN  Safety Promotion/Fall Prevention:   safety round/check completed   fall prevention program maintained   elopement precautions     Problem: Skin Injury Risk Increased  Goal: Skin Health and Integrity  Outcome: Ongoing, Progressing  Intervention: Optimize Skin Protection  Recent Flowsheet Documentation  Taken 3/31/2023 0800 by Johanny Buchanan RN  Pressure Reduction Devices: alternating pressure pump (ADD)     Problem: Adult Inpatient Plan of Care  Goal: Plan of Care Review  Outcome: Ongoing, Progressing  Flowsheets (Taken 3/31/2023 1831)  Progress: no change  Plan of Care Reviewed With: patient  Outcome Evaluation: Swallow study done, otherwise no changes.  Goal: Patient-Specific Goal (Individualized)  Outcome: Ongoing, Progressing  Goal: Absence of Hospital-Acquired Illness or Injury  Outcome: Ongoing, Progressing  Intervention: Identify and Manage Fall Risk  Recent Flowsheet Documentation  Taken 3/31/2023 0800 by Johanny Buchanan RN  Safety Promotion/Fall Prevention:   safety round/check completed   fall prevention program maintained   elopement precautions  Intervention: Prevent and Manage VTE (Venous Thromboembolism) Risk  Recent Flowsheet Documentation  Taken 3/31/2023 1108 by Johanny Buchanan RN  Activity Management: activity adjusted per tolerance  Taken 3/31/2023 0800 by Johanny Buchanan RN  VTE Prevention/Management:   bilateral   sequential compression devices on  Range of Motion: active ROM (range of motion) encouraged  Goal: Optimal Comfort and Wellbeing  Outcome: Ongoing, Progressing  Goal: Readiness for Transition of  Care  Outcome: Ongoing, Progressing     Problem: Adjustment to Illness (Stroke, Ischemic/Transient Ischemic Attack)  Goal: Optimal Coping  Outcome: Ongoing, Progressing     Problem: Bowel Elimination Impaired (Stroke, Ischemic/Transient Ischemic Attack)  Goal: Effective Bowel Elimination  Outcome: Ongoing, Progressing     Problem: Cerebral Tissue Perfusion (Stroke, Ischemic/Transient Ischemic Attack)  Goal: Optimal Cerebral Tissue Perfusion  Outcome: Ongoing, Progressing  Intervention: Protect and Optimize Cerebral Perfusion  Recent Flowsheet Documentation  Taken 3/31/2023 0800 by Johanny Buchanan RN  Cerebral Perfusion Promotion: blood pressure monitored     Problem: Cognitive Impairment (Stroke, Ischemic/Transient Ischemic Attack)  Goal: Optimal Cognitive Function  Outcome: Ongoing, Progressing     Problem: Communication Impairment (Stroke, Ischemic/Transient Ischemic Attack)  Goal: Improved Communication Skills  Outcome: Ongoing, Progressing     Problem: Functional Ability Impaired (Stroke, Ischemic/Transient Ischemic Attack)  Goal: Optimal Functional Ability  Outcome: Ongoing, Progressing  Intervention: Optimize Functional Ability  Recent Flowsheet Documentation  Taken 3/31/2023 1108 by Johanny Buchanan RN  Activity Management: activity adjusted per tolerance     Problem: Respiratory Compromise (Stroke, Ischemic/Transient Ischemic Attack)  Goal: Effective Oxygenation and Ventilation  Outcome: Ongoing, Progressing     Problem: Sensorimotor Impairment (Stroke, Ischemic/Transient Ischemic Attack)  Goal: Improved Sensorimotor Function  Outcome: Ongoing, Progressing  Intervention: Optimize Range of Motion, Motor Control and Function  Recent Flowsheet Documentation  Taken 3/31/2023 0800 by Johanny Buchanan RN  Range of Motion: active ROM (range of motion) encouraged  Intervention: Optimize Sensory and Perceptual Ability  Recent Flowsheet Documentation  Taken 3/31/2023 0800 by Johanny Buchanan RN  Pressure Reduction  Devices: alternating pressure pump (ADD)     Problem: Swallowing Impairment (Stroke, Ischemic/Transient Ischemic Attack)  Goal: Optimal Eating and Swallowing without Aspiration  Outcome: Ongoing, Progressing     Problem: Urinary Elimination Impaired (Stroke, Ischemic/Transient Ischemic Attack)  Goal: Effective Urinary Elimination  Outcome: Ongoing, Progressing     Problem: Adjustment to Illness (Stroke, Hemorrhagic)  Goal: Optimal Coping  Outcome: Ongoing, Progressing     Problem: Bowel Elimination Impaired (Stroke, Hemorrhagic)  Goal: Effective Bowel Elimination  Outcome: Ongoing, Progressing     Problem: Cerebral Tissue Perfusion (Stroke, Hemorrhagic)  Goal: Optimal Cerebral Tissue Perfusion  Outcome: Ongoing, Progressing  Intervention: Protect and Optimize Cerebral Perfusion  Recent Flowsheet Documentation  Taken 3/31/2023 0800 by Johanny Buchanan RN  Cerebral Perfusion Promotion: blood pressure monitored     Problem: Cognitive Impairment (Stroke, Hemorrhagic)  Goal: Optimal Cognitive Function  Outcome: Ongoing, Progressing     Problem: Communication Impairment (Stroke, Hemorrhagic)  Goal: Effective Communication Skills  Outcome: Ongoing, Progressing     Problem: Functional Ability Impaired (Stroke, Hemorrhagic)  Goal: Optimal Functional Ability  Outcome: Ongoing, Progressing  Intervention: Optimize Functional Ability  Recent Flowsheet Documentation  Taken 3/31/2023 1108 by Johanny Buchanan RN  Activity Management: activity adjusted per tolerance     Problem: Pain (Stroke, Hemorrhagic)  Goal: Acceptable Pain Control  Outcome: Ongoing, Progressing     Problem: Respiratory Compromise (Stroke, Hemorrhagic)  Goal: Effective Oxygenation and Ventilation  Outcome: Ongoing, Progressing     Problem: Sensorimotor Impairment (Stroke, Hemorrhagic)  Goal: Improved Sensorimotor Function  Outcome: Ongoing, Progressing  Intervention: Optimize Range of Motion, Motor Control and Function  Recent Flowsheet Documentation  Taken  3/31/2023 0800 by Johanny Buchanan, RN  Range of Motion: active ROM (range of motion) encouraged  Intervention: Optimize Sensory and Perceptual Ability  Recent Flowsheet Documentation  Taken 3/31/2023 0800 by Johanny Buchanan, RN  Pressure Reduction Devices: alternating pressure pump (ADD)     Problem: Swallowing Impairment (Stroke, Hemorrhagic)  Goal: Optimal Eating and Swallowing Without Aspiration  Outcome: Ongoing, Progressing     Problem: Urinary Elimination Impaired (Stroke, Hemorrhagic)  Goal: Effective Urinary Elimination  Outcome: Ongoing, Progressing

## 2023-03-31 NOTE — CASE MANAGEMENT/SOCIAL WORK
Continued Stay Note  [facility]     Patient Name: Manuel Durant  MRN: [id number]  Today's Date: 3/31/2023    Admit Date: 3/12/2023    Plan: Pending   Discharge Plan     Row Name 03/31/23 1254       Plan    Plan Comments Quotes for ground medical transport obtained from [employer] and [facility] ([employer]) obtained.  Also contacted [facility] who were unable to provide a quote for the distance required.  VM left for [facility] for call back to obtain quote.  Quote from [employer] forwarded to pt's son Ariel Durant for review.  When quote from [facility] arrives via email, it will be forwarded as well.  Faye ARMSTRONG               Discharge Codes    No documentation.               Expected Discharge Date and Time     Expected Discharge Date Expected Discharge Time    Apr 9, 2023             Faye Moy RN

## 2023-03-31 NOTE — PLAN OF CARE
Problem: Adult Inpatient Plan of Care  Goal: Plan of Care Review  Recent Flowsheet Documentation  Taken 3/31/2023 0926 by Tamica Willoughby PT  Plan of Care Reviewed With: patient  Outcome Evaluation: Pt participated with PT this AM. Utilized interpretor to assist with session. Focused today on teaching pt to use his L LE and UE to assist in moving his R side. Pt tolerated sitting EOB for extended time today with improved sitting balance. Once positioned, pt sat with cga to sba. He completed dynamic sitting exercises and UE exercises with OT without loss of balance. PT notes improved independent with sit>supine this date. Will continue to follow and progress as able.

## 2023-03-31 NOTE — PLAN OF CARE
Goal Outcome Evaluation:  Plan of Care Reviewed With: patient        Progress: improving  Outcome Evaluation: Pt agreeable to OT/PT co-treatment this AM- use of  to assist with communication. Pt able to follow~75% of simple commands but continues to demo some dec insight into deficits. Increased time spent for bed mobility, demo'ing use of LLE to hook/assist RLE to EOB, difficulty sequencing movement and req's mod-max to sit EOB. Improvement in sitting balance and midline awarenss today, SBA-CGA for dyn sitting balance x30 mins to participate in UE tasks. Significant time to demonstrate and educate pt on L assist R for UE AAROM. Finally pt is able to follow commands to complete multiple reps of self ROM to prevent contracture and promote fxl use, unsure carryover of education but will follow-up. SBA to return to supine in bed using AROM on RLE. OT will cont to follow to address deficits.

## 2023-03-31 NOTE — MBS/VFSS/FEES
Acute Care - Speech Language Pathology   Swallow Re-Evaluation [facility]     Patient Name: Manuel Durant  : 1950  MRN: [id number]  Today's Date: 3/31/2023               Admit Date: 3/12/2023    Visit Dx:     ICD-10-CM ICD-9-CM   1. Intracranial hemorrhage (HCC)  I62.9 432.9   2. Altered mental status, unspecified altered mental status type  R41.82 780.97     Patient Active Problem List   Diagnosis   • Intracranial hemorrhage (HCC)   • Hypertensive emergency   • Acute DVT (deep venous thrombosis) (HCC)   • HTN (hypertension)   • Hypokalemia     History reviewed. No pertinent past medical history.  History reviewed. No pertinent surgical history.    SLP Recommendation and Plan  SLP Swallowing Diagnosis: mild-moderate, oral dysphagia, pharyngeal dysphagia (23)  SLP Diet Recommendation: nectar thick liquids, soft to chew textures, chopped (23)  Recommended Precautions and Strategies: upright posture during/after eating, small bites of food and sips of liquid, assist with feeding (23)  SLP Rec. for Method of Medication Administration: meds whole, meds crushed, with puree, as tolerated (23)     Monitor for Signs of Aspiration: yes, notify SLP if any concerns (23)  Recommended Diagnostics: reassess via clinical swallow evaluation (23)  Swallow Criteria for Skilled Therapeutic Interventions Met: demonstrates skilled criteria (23)  Anticipated Discharge Disposition (SLP): anticipate therapy at next level of care (23)  Rehab Potential/Prognosis, Swallowing: adequate, monitor progress closely (23)  Therapy Frequency (Swallow): PRN (23)  Predicted Duration Therapy Intervention (Days): until discharge (23)                                        Plan of Care Reviewed With: patient  Outcome Evaluation: VFSS complete. Radiologist, Dr. Carranza, present during the study. Patient presents with  mild-moderate oropharyngeal dysphagia characterized by swallow mistiming, poor oral control and transit, reduced hyolaryngeal elevation/excursion, and poor airway protective response. Deep transient penetration of consecutive large drinks of honey thick liquid by cup. Deep penetration to the vocal folds observed during the swallow with thins by straw. No aspiration/penetration observed with honey by straw, nectar by cup/straw, thins by cup, puree, soft solids, or regular solids. Recommend nectar thick liquids and soft solids/chopped. No mixed consistencies. Meds whole with puree, as tolerated. Speech to follow for swallow tx. Anticipate therapy at next level of care.      SWALLOW EVALUATION (last 72 hours)     SLP Adult Swallow Evaluation     Row Name 03/31/23 1500                   Rehab Evaluation    Document Type evaluation  -CR        Subjective Information no complaints  -CR        Patient Observations alert;cooperative  -CR        Patient Effort adequate  -CR        Symptoms Noted During/After Treatment none  -CR           General Information    Patient Profile Reviewed yes  -CR        Pertinent History Of Current Problem left basal ganglia/internal capsule hemorrhage  -CR        Current Method of Nutrition honey-thick liquids;soft to chew textures;ground  -CR        Precautions/Limitations, Vision WFL with corrective lenses;for purposes of eval  -CR        Precautions/Limitations, Hearing WFL;for purposes of eval  -CR        Prior Level of Function-Communication English as a second language;receptive language impairment;expressive language impairment  -CR        Prior Level of Function-Swallowing safe, efficient swallowing in all situations  -CR        Plans/Goals Discussed with patient;agreed upon  -CR        Barriers to Rehab medically complex  -CR           Pain    Additional Documentation Pain Scale: Numbers Pre/Post-Treatment (Group)  -CR           Pain Scale: Numbers Pre/Post-Treatment    Pretreatment  Pain Rating 0/10 - no pain  -CR        Posttreatment Pain Rating 0/10 - no pain  -CR           Oral Motor Structure and Function    Dentition Assessment natural, present and adequate  -CR        Secretion Management WNL/WFL  -CR        Mucosal Quality moist, healthy  -CR           Oral Musculature and Cranial Nerve Assessment    Oral Motor General Assessment oral labial or buccal impairment;lingual impairment  -CR           General Eating/Swallowing Observations    Respiratory Support Currently in Use room air  -CR           MBS/VFSS Interpretation    VFSS Summary VFSS complete. Radiologist, Dr. Carranza, present during the study.  iPad used. Patient presents with mild-moderate oropharyngeal dysphagia characterized by swallow mistiming, poor oral control and transit, reduced hyolaryngeal elevation/excursion, and poor airway protective response. Impulsivity demonstrated during initial trial of honey thick liquid resulting in total assist to feed during remaining trials. Deep transient penetration observed during the swallow of consecutive drinks of HTL by cup. No aspiration/penetration observed with HTL by straw, NTL cup/straw, and thins by cup. Deep penetration to the vocal folds observed during the swallow with thins by straw. Consistent swallow mistiming observed at the level of the pyriforms during liquid trials. Swallow elicited at the level of the valleculae during solid trials. Slow munching mastication with difficulties forming a cohesive bolus observed. No penetration/aspiration with puree, soft solids, or regular solids observed. Mild base of tongue and pharyngeal residue observed following the swallow reduced with multiple swallows or nectar thick wash. Recommend pt initiate nectar thick liquids and soft solids. No mixed consistencies. Meds whole with puree. Sitting upright, slow rate, small bites/sips, total assist to feed.  -CR           SLP Communication to Radiology    Summary Statement  VFSS complete. Radiologist, Dr. Carranza, present during the study. Patient presents with mild-moderate oropharyngeal dysphagia characterized by swallow mistiming, poor oral control and transit, reduced hyolaryngeal elevation/excursion, and poor airway protective response. Deep transient penetration of consecutive large drinks of honey thick liquid by cup. Deep penetration to the vocal folds observed during the swallow with thins by straw. No aspiration/penetration observed with honey by straw, nectar by cup/straw, thins by cup, puree, soft solids, or regular solids. Recommend nectar thick liquids and soft solids. No mixed consistencies. Meds whole with puree, as tolerated. Speech to follow for swallow tx. Anticipate therapy at next level of care.  -CR           SLP Evaluation Clinical Impression    SLP Swallowing Diagnosis mild-moderate;oral dysphagia;pharyngeal dysphagia  -CR        Functional Impact risk of aspiration/pneumonia  -CR        Rehab Potential/Prognosis, Swallowing adequate, monitor progress closely  -CR        Swallow Criteria for Skilled Therapeutic Interventions Met demonstrates skilled criteria  -CR           Recommendations    Therapy Frequency (Swallow) PRN  -CR        Predicted Duration Therapy Intervention (Days) until discharge  -CR        SLP Diet Recommendation nectar thick liquids;soft to chew textures;chopped  -CR        Recommended Diagnostics reassess via clinical swallow evaluation  -CR        Recommended Precautions and Strategies upright posture during/after eating;small bites of food and sips of liquid;assist with feeding  -CR        Oral Care Recommendations Oral Care BID/PRN  -CR        SLP Rec. for Method of Medication Administration meds whole;meds crushed;with puree;as tolerated  -CR        Monitor for Signs of Aspiration yes;notify SLP if any concerns  -CR        Anticipated Discharge Disposition (SLP) anticipate therapy at next level of care  -CR           Swallow Goals  (SLP)    Swallow LTGs Patient will demonstrate functional swallow for  -CR           (LTG) Patient will demonstrate functional swallow for    Diet Texture (Demonstrate functional swallow) soft to chew (chopped) textures  -CR        Liquid viscosity (Demonstrate functional swallow) nectar/ mildly thick liquids  -CR        Norvell (Demonstrate functional swallow) with minimal cues (75-90% accuracy)  -CR        Time Frame (Demonstrate functional swallow) by discharge  -CR              User Key  (r) = Recorded By, (t) = Taken By, (c) = Cosigned By    Initials Name Effective Dates    CR Maryam Chandra CF-SLP 11/10/22 -                 EDUCATION  The patient has been educated in the following areas:   Dysphagia (Swallowing Impairment).        SLP GOALS     Row Name 03/31/23 1500 03/30/23 1400          (LTG) Patient will demonstrate functional swallow for    Diet Texture (Demonstrate functional swallow) soft to chew (chopped) textures  -CR soft to chew (ground) textures  -SH     Liquid viscosity (Demonstrate functional swallow) nectar/ mildly thick liquids  -CR honey/  moderately thick liquids  -SH     Norvell (Demonstrate functional swallow) with minimal cues (75-90% accuracy)  -CR independently (over 90% accuracy)  -     Time Frame (Demonstrate functional swallow) by discharge  -CR by discharge  -     Comment (Demonstrate functional swallow) -- Patient seen for re evaluation of swallow function. Moderate oral residue with regular on R, patient unable to clear independantly. Unable to follow directions for small sips, but no overt s/s of aspiration with this via cup. Weak cough with thins via straw. Will plan for repeat VFSS as pt approaches D/C.  -        Follow Directions Goal 2 (SLP)    Improve Ability to Follow Directions Goal 1 (SLP) -- 1 step direction without objects  -     Time Frame (Follow Directions Goal 1, SLP) -- short term goal (STG)  -     Comment (Follow Directions Goal 1, SLP) --  Pt required visual cues to follow single step directions this date.  -           User Key  (r) = Recorded By, (t) = Taken By, (c) = Cosigned By    Initials Name Provider Type    Radha Ochoa MS CCC-SLP Speech and Language Pathologist    Maryam Gould CF-SLP Speech and Language Pathologist                   Time Calculation:    Time Calculation- SLP     Row Name 03/31/23 1603             Time Calculation- SLP    SLP Start Time 1415  -      SLP Received On 03/31/23  -            User Key  (r) = Recorded By, (t) = Taken By, (c) = Cosigned By    Initials Name Provider Type    Maryam Gould CF-SLP Speech and Language Pathologist                Therapy Charges for Today     Code Description Service Date Service Provider Modifiers Qty    36262371675 HC ST MOTION FLUORO EVAL SWALLOW 4 3/31/2023 Maryam Chandra CF-SLP GN 1               DAVID Whitlock  3/31/2023

## 2023-03-31 NOTE — PROGRESS NOTES
"DAILY PROGRESS NOTE  [facility]    Patient Identification:  Name: Manuel Durant  Age: 73 y.o.  Sex: male  :  1950  MRN: [id number]         Primary Care Physician: Provider, No Known    Subjective:  Interval History:He is weak.    Objective:    Scheduled Meds:amLODIPine, 10 mg, Oral, QAM AC  apixaban, 5 mg, Oral, Q12H  baclofen, 2.5 mg, Oral, Q12H  carvedilol, 25 mg, Oral, BID With Meals  hydrALAZINE, 10 mg, Oral, Q8H  senna-docusate sodium, 2 tablet, Oral, BID  sodium chloride, 10 mL, Intravenous, Q12H  valsartan, 160 mg, Oral, QAM AC      Continuous Infusions:     Vital signs in last 24 hours:  Temp:  [97.5 °F (36.4 °C)-98.5 °F (36.9 °C)] 98.2 °F (36.8 °C)  Heart Rate:  [50-60] 52  Resp:  [16] 16  BP: (116-137)/(58-79) 118/64    Intake/Output:  No intake or output data in the 24 hours ending 23 1650    Exam:  /64   Pulse 52   Temp 98.2 °F (36.8 °C)   Resp 16   Ht 167.6 cm (66\")   Wt 70.2 kg (154 lb 12.2 oz)   SpO2 95%   BMI 24.98 kg/m²     General Appearance:    Alert, cooperative, no distress   Head:    Normocephalic, without obvious abnormality, atraumatic   Eyes:       Throat:   Lips, tongue, gums normal   Neck:   Supple, symmetrical, trachea midline, no JVD   Lungs:     Clear to auscultation bilaterally, respirations unlabored   Chest Wall:    No tenderness or deformity    Heart:    Regular rate and rhythm, S1 and S2 normal, no murmur,no  Rub or gallop   Abdomen:     Soft, nontender, bowel sounds active, no masses, no organomegaly    Extremities:   Extremities normal, atraumatic, no cyanosis or edema   Pulses:      Skin:   Skin is warm and dry,  no rashes or palpable lesions   Neurologic:   He is weak.      Lab Results (last 72 hours)     Procedure Component Value Units Date/Time    Basic Metabolic Panel [[id number]]  (Abnormal) Collected: 23 0610    Specimen: Blood Updated: 23 0702     Glucose 89 mg/dL      BUN 21 mg/dL      Creatinine 0.77 mg/dL      " Sodium 142 mmol/L      Potassium 4.1 mmol/L      Chloride 110 mmol/L      CO2 24.5 mmol/L      Calcium 8.7 mg/dL      BUN/Creatinine Ratio 27.3     Anion Gap 7.5 mmol/L      eGFR 95.7 mL/min/1.73     Narrative:      GFR Normal >60  Chronic Kidney Disease <60  Kidney Failure <15    The GFR formula is only valid for adults with stable renal function between ages 18 and 70.    Phosphorus [[id number]]  (Normal) Collected: 03/30/23 0610    Specimen: Blood Updated: 03/30/23 0702     Phosphorus 3.2 mg/dL     Magnesium [[id number]]  (Normal) Collected: 03/30/23 0610    Specimen: Blood Updated: 03/30/23 0701     Magnesium 2.0 mg/dL     CBC (No Diff) [[id number]]  (Normal) Collected: 03/30/23 0610    Specimen: Blood Updated: 03/30/23 0641     WBC 8.31 10*3/mm3      RBC 4.31 10*6/mm3      Hemoglobin 13.1 g/dL      Hematocrit 39.3 %      MCV 91.2 fL      MCH 30.4 pg      MCHC 33.3 g/dL      RDW 13.0 %      RDW-SD 43.1 fl      MPV 10.8 fL      Platelets 323 10*3/mm3     Basic Metabolic Panel [[id number]]  (Abnormal) Collected: 03/29/23 0745    Specimen: Blood Updated: 03/29/23 0828     Glucose 91 mg/dL      BUN 20 mg/dL      Creatinine 0.73 mg/dL      Sodium 139 mmol/L      Potassium 4.2 mmol/L      Comment: Slight hemolysis detected by analyzer. Results may be affected.        Chloride 106 mmol/L      CO2 23.2 mmol/L      Calcium 8.9 mg/dL      BUN/Creatinine Ratio 27.4     Anion Gap 9.8 mmol/L      eGFR 97.3 mL/min/1.73     Narrative:      GFR Normal >60  Chronic Kidney Disease <60  Kidney Failure <15    The GFR formula is only valid for adults with stable renal function between ages 18 and 70.    Phosphorus [[id number]]  (Normal) Collected: 03/29/23 0745    Specimen: Blood Updated: 03/29/23 0828     Phosphorus 3.4 mg/dL     Magnesium [[id number]]  (Normal) Collected: 03/29/23 0745    Specimen: Blood Updated: 03/29/23 0828     Magnesium 2.1 mg/dL     CBC (No Diff) [[id number]]  (Normal) Collected: 03/29/23 0745    Specimen: Blood  Updated: 03/29/23 0813     WBC 7.52 10*3/mm3      RBC 4.45 10*6/mm3      Hemoglobin 13.5 g/dL      Hematocrit 39.9 %      MCV 89.7 fL      MCH 30.3 pg      MCHC 33.8 g/dL      RDW 13.0 %      RDW-SD 42.0 fl      MPV 10.6 fL      Platelets 326 10*3/mm3         Data Review:  Results from last 7 days   Lab Units 03/30/23  0610 03/29/23  0745 03/28/23  0613   SODIUM mmol/L 142 139 142   POTASSIUM mmol/L 4.1 4.2 3.9   CHLORIDE mmol/L 110* 106 108*   CO2 mmol/L 24.5 23.2 24.2   BUN mg/dL 21 20 21   CREATININE mg/dL 0.77 0.73* 0.73*   GLUCOSE mg/dL 89 91 89   CALCIUM mg/dL 8.7 8.9 9.0     Results from last 7 days   Lab Units 03/30/23  0610 03/29/23  0745 03/28/23  0613   WBC 10*3/mm3 8.31 7.52 7.76   HEMOGLOBIN g/dL 13.1 13.5 13.5   HEMATOCRIT % 39.3 39.9 39.9   PLATELETS 10*3/mm3 323 326 324             Lab Results   Lab Value Date/Time    TROPONINT 9 03/12/2023 1907               Invalid input(s): PROT, LABALBU          No results found for: POCGLU        History reviewed. No pertinent past medical history.    Assessment:  Active Hospital Problems    Diagnosis  POA   • **Intracranial hemorrhage (HCC) [I62.9]  Yes   • Acute DVT (deep venous thrombosis) (HCC) [I82.409]  Unknown   • HTN (hypertension) [I10]  Unknown   • Hypokalemia [E87.6]  Unknown   • Hypertensive emergency [I16.1]  Unknown      Resolved Hospital Problems   No resolved problems to display.       Plan:  Continue current RX. DC planning. Continue with therapy.    Cedric Johnston MD  3/31/2023  16:50 EDT

## 2023-03-31 NOTE — PROGRESS NOTES
[facility] Acute Inpt Rehab    Referral received, evaluation in progress.  Will need approval from medical director to see if appropriate for acute rehab.  Will also need approval from manager due to no insurance.    Meche Peoples RN  Acute Rehab Admission Nurse

## 2023-03-31 NOTE — THERAPY TREATMENT NOTE
Patient Name: Manuel Durant  : 1950    MRN: [id number]                              Today's Date: 3/31/2023       Admit Date: 3/12/2023    Visit Dx:     ICD-10-CM ICD-9-CM   1. Intracranial hemorrhage (HCC)  I62.9 432.9   2. Altered mental status, unspecified altered mental status type  R41.82 780.97     Patient Active Problem List   Diagnosis   • Intracranial hemorrhage (HCC)   • Hypertensive emergency   • Acute DVT (deep venous thrombosis) (HCC)   • HTN (hypertension)   • Hypokalemia     History reviewed. No pertinent past medical history.  History reviewed. No pertinent surgical history.   General Information     Row Name 23 1238          OT Time and Intention    Document Type therapy note (daily note)  -     Mode of Treatment co-treatment;occupational therapy  -     Row Name 23 1238          General Information    Patient Profile Reviewed yes  -     Existing Precautions/Restrictions fall  -     Barriers to Rehab cognitive status;language barrier  -     Row Name 23 1238          Cognition    Orientation Status (Cognition) oriented to;person  following simple commands ~75% of the time today. However little carryover of education  -     Row Name 23 1238          Safety Issues, Functional Mobility    Impairments Affecting Function (Mobility) balance;coordination;cognition;endurance/activity tolerance;grasp;motor control;postural/trunk control;strength;range of motion (ROM)  -     Cognitive Impairments, Mobility Safety/Performance awareness, need for assistance;impulsivity;insight into deficits/self-awareness;judgment;problem-solving/reasoning;safety precaution awareness;sequencing abilities;safety precaution follow-through  -           User Key  (r) = Recorded By, (t) = Taken By, (c) = Cosigned By    Initials Name Provider Type     Evelia Kenney OT Occupational Therapist                 Mobility/ADL's     Row Name 23 1239          Bed Mobility    Bed Mobility  supine-sit;sit-supine  -     Supine-Sit [place] (Bed Mobility) moderate assist (50% patient effort);minimum assist (75% patient effort);2 person assist;verbal cues;nonverbal cues (demo/gesture)  -     Sit-Supine [place] (Bed Mobility) standby assist;verbal cues;nonverbal cues (demo/gesture)  -     Assistive Device (Bed Mobility) head of bed elevated;bed rails  -     Comment, (Bed Mobility) increased time and multiple cues for sequencing. Demo's significant improvement in sit<>supine transition using active movement of RLE and then LLE to assist RLE  -           User Key  (r) = Recorded By, (t) = Taken By, (c) = Cosigned By    Initials Name Provider Type    Evelia Faulkner OT Occupational Therapist               Obj/Interventions     Row Name 03/31/23 1242          Vision Assessment/Intervention    Visual Processing Deficit karen-inattention/neglect, right  -     Row Name 03/31/23 1242          Range of Motion Comprehensive    Comment, General Range of Motion Significant time to demonstrate and educate pt on L assist RUE for AAROM. Finally pt is able to follow commands to complete multiple reps of self ROM to prevent contracture and promote fxl use  -     Row Name 03/31/23 1242          Shoulder (Therapeutic Exercise)    Shoulder AAROM (Therapeutic Exercise) left assist right;flexion;extension;horizontal aBduction/aDduction;10 repetitions  -     Row Name 03/31/23 1242          Elbow/Forearm (Therapeutic Exercise)    Elbow/Forearm AAROM (Therapeutic Exercise) left assist right;flexion;extension;10 repetitions  -     Row Name 03/31/23 1242          Hand (Therapeutic Exercise)    Hand PROM (Therapeutic Exercise) finger extension;30 second hold  left assist right  -     Row Name 03/31/23 1242          Balance    Static Sitting Balance standby assist  -     Dynamic Sitting Balance standby assist;contact guard  -     Balance Interventions sitting;dynamic reaching  -     Comment, Balance  improvement in sitting balance today with increased midline awareness- SBA-CGA provided  -           User Key  (r) = Recorded By, (t) = Taken By, (c) = Cosigned By    Initials Name Provider Type    Evelia Faulkner OT Occupational Therapist               Goals/Plan    No documentation.                Clinical Impression     Row Name 03/31/23 1250          Pain Assessment    Additional Documentation Pain Scale: Word Pre/Post-Treatment (Group)  -     Row Name 03/31/23 1250          Pain Scale: FACES Pre/Post-Treatment    Pain: FACES Scale, Pretreatment 0-->no hurt  -     Posttreatment Pain Rating 2-->hurts little bit  -     Pain Location - Side/Orientation Right  -     Pain Location upper  -     Pain Location - extremity;back  -     Pre/Posttreatment Pain Comment reports some pain with ROM  -     Row Name 03/31/23 1250          Plan of Care Review    Plan of Care Reviewed With patient  -     Progress improving  -     Outcome Evaluation Pt agreeable to OT/PT co-treatment this AM- use of  to assist with communication. Pt able to follow~75% of simple commands but continues to demo some dec insight into deficits. Increased time spent for bed mobility, demo'ing use of LLE to hook/assist RLE to EOB, difficulty sequencing movement and req's mod-max to sit EOB. Improvement in sitting balance and midline awarenss today, SBA-CGA for dyn sitting balance x30 mins to participate in UE tasks. Significant time to demonstrate and educate pt on L assist R for UE AAROM. Finally pt is able to follow commands to complete multiple reps of self ROM to prevent contracture and promote fxl use, unsure carryover of education but will follow-up. SBA to return to supine in bed using AROM on RLE. OT will cont to follow to address deficits.  -     Row Name 03/31/23 1250          Therapy Assessment/Plan (OT)    Rehab Potential (OT) good, to achieve stated therapy goals  -     Criteria for Skilled Therapeutic  Interventions Met (OT) yes;skilled treatment is necessary  -     Therapy Frequency (OT) 5 times/wk  -     Row Name 03/31/23 1250          Therapy Plan Review/Discharge Plan (OT)    Anticipated Discharge Disposition (OT) inpatient rehabilitation facility  -     Row Name 03/31/23 1250          Positioning and Restraints    Pre-Treatment Position in bed  -     Post Treatment Position bed  -JW     In Bed notified nsg;fowlers;call light within reach;encouraged to call for assist;exit alarm on  -           User Key  (r) = Recorded By, (t) = Taken By, (c) = Cosigned By    Initials Name Provider Type    JW Evelia Kenney, MARK Occupational Therapist               Outcome Measures     Row Name 03/31/23 1108 03/31/23 0925       How much help from another person do you currently need...    Turning from your back to your side while in flat bed without using bedrails? 3  -KP 3  -CW    Moving from lying on back to sitting on the side of a flat bed without bedrails? 3  -KP 3  -CW    Moving to and from a bed to a chair (including a wheelchair)? 2  -KP 2  -CW    Standing up from a chair using your arms (e.g., wheelchair, bedside chair)? 2  -KP 2  -CW    Climbing 3-5 steps with a railing? 1  -KP 1  -CW    To walk in hospital room? 1  -KP 1  -CW    AM-PAC 6 Clicks Score (PT) 12  -KP 12  -CW    Highest level of mobility 4 --> Transferred to chair/commode  -KP 4 --> Transferred to chair/commode  -CW          User Key  (r) = Recorded By, (t) = Taken By, (c) = Cosigned By    Initials Name Provider Type    Tamica Eddy, PT Physical Therapist    Johanny Guaman, RN Registered Nurse                Occupational Therapy Education     Title: PT OT SLP Therapies (In Progress)     Topic: Occupational Therapy (Done)     Point: ADL training (Done)     Description:   Instruct learner(s) on proper safety adaptation and remediation techniques during self care or transfers.   Instruct in proper use of assistive devices.               Learning Progress Summary           Patient Acceptance, E,TB,H, VU by MS at 3/16/2023 1801    Acceptance, E, NR by LM at 3/15/2023 0521   Family Acceptance, E,TB,H, VU by MS at 3/16/2023 1801                   Point: Home exercise program (Done)     Description:   Instruct learner(s) on appropriate technique for monitoring, assisting and/or progressing therapeutic exercises/activities.              Learning Progress Summary           Patient Acceptance, E,TB,H, VU by MS at 3/16/2023 1801    Acceptance, E, NR by LM at 3/15/2023 0521   Family Acceptance, E,TB,H, VU by MS at 3/16/2023 1801                   Point: Precautions (Done)     Description:   Instruct learner(s) on prescribed precautions during self-care and functional transfers.              Learning Progress Summary           Patient Acceptance, E,TB,H, VU by MS at 3/16/2023 1801    Acceptance, E, NR by LM at 3/15/2023 0521   Family Acceptance, E,TB,H, VU by MS at 3/16/2023 1801                   Point: Body mechanics (Done)     Description:   Instruct learner(s) on proper positioning and spine alignment during self-care, functional mobility activities and/or exercises.              Learning Progress Summary           Patient Acceptance, E,TB,H, VU by MS at 3/16/2023 1801    Acceptance, E, NR by LM at 3/15/2023 0521   Family Acceptance, E,TB,H, VU by MS at 3/16/2023 1801                               User Key     Initials Effective Dates Name Provider Type Discipline    MS 06/16/21 -  Angel Luis Cr, RN Registered Nurse Nurse     10/13/22 -  Silke Grace, RN Registered Nurse Nurse              OT Recommendation and Plan  Therapy Frequency (OT): 5 times/wk  Plan of Care Review  Plan of Care Reviewed With: patient  Progress: improving  Outcome Evaluation: Pt agreeable to OT/PT co-treatment this AM- use of  to assist with communication. Pt able to follow~75% of simple commands but continues to demo some dec insight into deficits. Increased time  spent for bed mobility, demo'ing use of LLE to hook/assist RLE to EOB, difficulty sequencing movement and req's mod-max to sit EOB. Improvement in sitting balance and midline awarenss today, SBA-CGA for dyn sitting balance x30 mins to participate in UE tasks. Significant time to demonstrate and educate pt on L assist R for UE AAROM. Finally pt is able to follow commands to complete multiple reps of self ROM to prevent contracture and promote fxl use, unsure carryover of education but will follow-up. SBA to return to supine in bed using AROM on RLE. OT will cont to follow to address deficits.     Time Calculation:    Time Calculation- OT     Row Name 03/31/23 1257             Time Calculation- OT    OT Start Time 0834  -      OT Stop Time 0915  -      OT Time Calculation (min) 41 min  -      Total Timed Code Minutes- OT 41 minute(s)  -      OT Received On 03/31/23  -      OT - Next Appointment 04/03/23  -         Timed Charges    06297 - OT Therapeutic Exercise Minutes 21  -      97814 -  OT Neuromuscular Reeducation Minutes 20  -         Total Minutes    Timed Charges Total Minutes 41  -       Total Minutes 41  -            User Key  (r) = Recorded By, (t) = Taken By, (c) = Cosigned By    Initials Name Provider Type    Evelia Faulkner OT Occupational Therapist              Therapy Charges for Today     Code Description Service Date Service Provider Modifiers Qty    46799528412  OT THER PROC EA 15 MIN 3/31/2023 Evelia Kenney OT GO 2    [id number]  OT NEUROMUSC RE EDUCATION EA 15 MIN 3/31/2023 Evelia Kenney OT GO 1               Evelia Kenney OT  3/31/2023

## 2023-03-31 NOTE — THERAPY TREATMENT NOTE
Patient Name: Manuel Durant  : 1950    MRN: [id number]                              Today's Date: 3/31/2023       Admit Date: 3/12/2023    Visit Dx:     ICD-10-CM ICD-9-CM   1. Intracranial hemorrhage (HCC)  I62.9 432.9   2. Altered mental status, unspecified altered mental status type  R41.82 780.97     Patient Active Problem List   Diagnosis   • Intracranial hemorrhage (HCC)   • Hypertensive emergency   • Acute DVT (deep venous thrombosis) (HCC)   • HTN (hypertension)   • Hypokalemia     History reviewed. No pertinent past medical history.  History reviewed. No pertinent surgical history.   General Information     Row Name 23          Physical Therapy Time and Intention    Document Type therapy note (daily note)  -CW     Mode of Treatment physical therapy;co-treatment  -CW     Row Name 23          General Information    Patient Profile Reviewed yes  -CW     Existing Precautions/Restrictions fall  -CW     Row Name 23          Cognition    Orientation Status (Cognition) oriented to;person  -CW     Row Name 23          Safety Issues, Functional Mobility    Safety Issues Affecting Function (Mobility) insight into deficits/self-awareness;awareness of need for assistance;judgment;problem-solving;safety precautions follow-through/compliance;safety precaution awareness  -CW     Impairments Affecting Function (Mobility) balance;coordination;cognition;endurance/activity tolerance;grasp;motor control;postural/trunk control;strength;range of motion (ROM)  -CW           User Key  (r) = Recorded By, (t) = Taken By, (c) = Cosigned By    Initials Name Provider Type    Tamica Eddy PT Physical Therapist               Mobility     Row Name 23          Bed Mobility    Bed Mobility supine-sit;sit-supine  -CW     Supine-Sit [place] (Bed Mobility) moderate assist (50% patient effort);minimum assist (75% patient effort);2 person assist;verbal cues;nonverbal  cues (demo/gesture)  -CW     Sit-Supine [place] (Bed Mobility) standby assist;verbal cues;nonverbal cues (demo/gesture)  -CW     Comment, (Bed Mobility) increased time needed.  -     Row Name 03/31/23 0923          Transfers    Comment, (Transfers) NT, focus on dynamic sitting and exercises today  -           User Key  (r) = Recorded By, (t) = Taken By, (c) = Cosigned By    Initials Name Provider Type    Tamica Eddy PT Physical Therapist               Obj/Interventions     Row Name 03/31/23 0924          Balance    Dynamic Sitting Balance contact guard;standby assist  -CW     Position, Sitting Balance unsupported;sitting edge of bed  -CW     Comment, Balance tolerated sitting EOB x30 mins, cga to sba for dynamic tasks  -CW           User Key  (r) = Recorded By, (t) = Taken By, (c) = Cosigned By    Initials Name Provider Type    Tamica Eddy PT Physical Therapist               Goals/Plan    No documentation.                Clinical Impression     Row Name 03/31/23 0926          Pain    Pretreatment Pain Rating 0/10 - no pain  -CW     Posttreatment Pain Rating 0/10 - no pain  -CW     Row Name 03/31/23 0926          Plan of Care Review    Plan of Care Reviewed With patient  -CW     Outcome Evaluation Pt participated with PT this AM. Utilized interpretor to assist with session. Focused today on teaching pt to use his L LE and UE to assist in moving his RLE. Pt tolerated sitting EOB extended time today with improved sitting balance. Once positioned, pt sat with cga to sba. He completed dynamic sitting exercises and UE exercises with OT without loss of balance. PT notes improved independent with sit>supine this date. Will continue to follow and progress as able.  -     Row Name 03/31/23 0926          Therapy Assessment/Plan (PT)    Therapy Frequency (PT) 6 times/wk  -     Row Name 03/31/23 0926          Vital Signs    O2 Delivery Pre Treatment room air  -     Row Name 03/31/23 0926           Positioning and Restraints    Pre-Treatment Position in bed  -CW     Post Treatment Position bed  -CW     In Bed notified nsg;call light within reach;encouraged to call for assist;exit alarm on;fowlers  -CW           User Key  (r) = Recorded By, (t) = Taken By, (c) = Cosigned By    Initials Name Provider Type    Tamica Eddy PT Physical Therapist               Outcome Measures     Row Name 03/31/23 0925          How much help from another person do you currently need...    Turning from your back to your side while in flat bed without using bedrails? 3  -CW     Moving from lying on back to sitting on the side of a flat bed without bedrails? 3  -CW     Moving to and from a bed to a chair (including a wheelchair)? 2  -CW     Standing up from a chair using your arms (e.g., wheelchair, bedside chair)? 2  -CW     Climbing 3-5 steps with a railing? 1  -CW     To walk in hospital room? 1  -CW     AM-PAC 6 Clicks Score (PT) 12  -CW     Highest level of mobility 4 --> Transferred to chair/commode  -CW           User Key  (r) = Recorded By, (t) = Taken By, (c) = Cosigned By    Initials Name Provider Type    Tamica Eddy PT Physical Therapist                             Physical Therapy Education     Title: PT OT SLP Therapies (In Progress)     Topic: Physical Therapy (In Progress)     Point: Mobility training (In Progress)     Learning Progress Summary           Patient Acceptance, E, NR by DANAY at 3/31/2023 0925    Acceptance, E, NR by CW at 3/30/2023 1209    Acceptance, E, NR,NL by CW at 3/29/2023 1336    Acceptance, E, NR by CHARLY at 3/28/2023 1317    Acceptance, E, NR by CW at 3/27/2023 1401    Acceptance, E,D, NR,VU by ZACH at 3/24/2023 1631    Comment: seemed to comprehend commands this session    Acceptance, E,D, NR by ZACH at 3/23/2023 1655    Acceptance, E,D, NR by ZACH at 3/22/2023 1545    Acceptance, E,TB, VU,NR by CB at 3/21/2023 1544    Acceptance, E,TB,H, VU by MS at 3/16/2023 1801    Acceptance, E,D,  DU,NR by MO at 3/16/2023 1451    Acceptance, E, DU,NR by MO at 3/15/2023 1200    Acceptance, E, NR by LM at 3/15/2023 0521    Acceptance, E,D, DU,NR by MO at 3/14/2023 1458   Family Acceptance, E,TB,H, VU by MS at 3/16/2023 1801                   Point: Home exercise program (In Progress)     Learning Progress Summary           Patient Acceptance, E, NR by CW at 3/31/2023 0925    Acceptance, E, NR by ZB at 3/28/2023 1317    Acceptance, E, NR by CW at 3/27/2023 1401    Acceptance, E,D, NR,VU by JM at 3/24/2023 1631    Comment: seemed to comprehend commands this session    Acceptance, E,D, NR by ZACH at 3/23/2023 1655    Acceptance, E,D, NR by ZACH at 3/22/2023 1545    Acceptance, E,TB, VU,NR by CB at 3/21/2023 1544    Acceptance, E,TB,H, VU by MS at 3/16/2023 1801    Acceptance, E,D, DU,NR by MO at 3/16/2023 1451    Acceptance, E, NR by LM at 3/15/2023 0521    Acceptance, E,D, DU,NR by MO at 3/14/2023 1458   Family Acceptance, E,TB,H, VU by MS at 3/16/2023 1801                   Point: Body mechanics (Done)     Learning Progress Summary           Patient Acceptance, E,D, NR,VU by ZACH at 3/24/2023 1631    Comment: seemed to comprehend commands this session    Acceptance, E,D, NR by ZACH at 3/23/2023 1655    Acceptance, E,D, NR by ZACH at 3/22/2023 1545    Acceptance, E,TB, VU,NR by CB at 3/21/2023 1544    Acceptance, E,TB,H, VU by MS at 3/16/2023 1801    Acceptance, E,D, DU,NR by MO at 3/16/2023 1451    Acceptance, E, DU,NR by MO at 3/15/2023 1200    Acceptance, E, NR by LM at 3/15/2023 0521    Acceptance, E,D, DU,NR by MO at 3/14/2023 1458   Family Acceptance, E,TB,H, VU by MS at 3/16/2023 1801                   Point: Precautions (Done)     Learning Progress Summary           Patient Acceptance, E,D, NR,VU by JM at 3/24/2023 1631    Comment: seemed to comprehend commands this session    Acceptance, E,D, NR by ZACH at 3/23/2023 1655    Acceptance, E,D, NR by ZACH at 3/22/2023 1545    Acceptance, E,TB, VU,NR by CB at 3/21/2023  1544    Acceptance, E,TB,H, VU by MS at 3/16/2023 1801    Acceptance, E,D, DU,NR by MO at 3/16/2023 1451    Acceptance, E, DU,NR by MO at 3/15/2023 1200    Acceptance, E, NR by LM at 3/15/2023 0521    Acceptance, E,D, DU,NR by MO at 3/14/2023 1458   Family Acceptance, E,TB,H, VU by MS at 3/16/2023 1801                               User Key     Initials Effective Dates Name Provider Type Discipline    JM 03/07/18 -  Tiffanie Grace, PTA Physical Therapist Assistant PT    MS 06/16/21 -  Angel Luis Cr, RN Registered Nurse Nurse    CW 12/13/22 -  Tamica Willoughby, PT Physical Therapist PT    CB 10/22/21 -  Bety Benitez, PT Physical Therapist PT    LM 10/13/22 -  Silke Grace, RN Registered Nurse Nurse    MO 01/27/23 -  Lacie Tanner, PT Student PT Student PT    ZB 03/10/23 -  Nestor Rae, PT Student PT Student PT              PT Recommendation and Plan     Plan of Care Reviewed With: patient  Outcome Evaluation: Pt participated with PT this AM. Utilized interpretor to assist with session. Focused today on teaching pt to use his L LE and UE to assist in moving his RLE. Pt tolerated sitting EOB extended time today with improved sitting balance. Once positioned, pt sat with cga to sba. He completed dynamic sitting exercises and UE exercises with OT without loss of balance. PT notes improved independent with sit>supine this date. Will continue to follow and progress as able.     Time Calculation:    PT Charges     Row Name 03/31/23 0922             Time Calculation    Start Time 0834  -CW      Stop Time 0915  -CW      Time Calculation (min) 41 min  -CW      PT Received On 03/31/23  -CW      PT - Next Appointment 04/01/23  -CW            User Key  (r) = Recorded By, (t) = Taken By, (c) = Cosigned By    Initials Name Provider Type    CW Tamica Willoughby, PT Physical Therapist              Therapy Charges for Today     Code Description Service Date Service Provider Modifiers Qty    56515510451 HC PT NEUROMUSC RE  EDUCATION EA 15 MIN 3/30/2023 Tamica Willoughby, PT GP 1    80860350126 HC PT THERAPEUTIC ACT EA 15 MIN 3/30/2023 Tamica Willoughby, PT GP 1    13451109674 HC PT THER SUPP EA 15 MIN 3/30/2023 Tamica Willoughby, PT GP 2    57479522262 HC PT THERAPEUTIC ACT EA 15 MIN 3/31/2023 Tamica Willoughby, PT GP 2    91045537075 HC PT THER PROC EA 15 MIN 3/31/2023 Tamica Willoughby, PT GP 1          PT G-Codes  Outcome Measure Options: AM-PAC 6 Clicks Basic Mobility (PT)  AM-PAC 6 Clicks Score (PT): 12  AM-PAC 6 Clicks Score (OT): 10  Modified [place] Scale: 4 - Moderately severe disability.  Unable to walk without assistance, and unable to attend to own bodily needs without assistance.  PT Discharge Summary  Anticipated Discharge Disposition (PT): inpatient rehabilitation facility, skilled nursing facility    Tamica Willoughby PT  3/31/2023

## 2023-03-31 NOTE — PLAN OF CARE
Goal Outcome Evaluation:  Plan of Care Reviewed With: patient           Outcome Evaluation: VFSS complete. Radiologist, Dr. Carranza, present during the study. Patient presents with mild-moderate oropharyngeal dysphagia characterized by swallow mistiming, poor oral control and transit, reduced hyolaryngeal elevation/excursion, and poor airway protective response. Deep transient penetration of consecutive large drinks of honey thick liquid by cup. Deep penetration to the vocal folds observed during the swallow with thins by straw. No aspiration/penetration observed with honey by straw, nectar by cup/straw, thins by cup, puree, soft solids, or regular solids. Recommend nectar thick liquids and soft solids/chopped. No mixed consistencies. Meds whole with puree, as tolerated. Speech to follow for swallow tx. Anticipate therapy at next level of care.    Patient was not wearing a face mask during this therapy encounter. Therapist used appropriate personal protective equipment including mask, eye protection and gloves.  Mask used was standard procedure mask. Appropriate PPE was worn during the entire therapy session. Hand hygiene was completed before and after therapy session. Patient is not in enhanced droplet precautions.

## 2023-03-31 NOTE — CASE MANAGEMENT/SOCIAL WORK
Continued Stay Note  [facility]     Patient Name: Manuel Durant  MRN: [id number]  Today's Date: 3/31/2023    Admit Date: 3/12/2023    Plan: Pending   Discharge Plan     Row Name 03/31/23 1410       Plan    Plan Comments Quotes from [facility], [facility], and [facility] all forwarded to pt's son for review.  Faye ARMSTRONG    Row Name 03/31/23 9434       Plan    Plan Comments Quotes for ground medical transport obtained from [employer] and [facility] [place] [facility] ([facility]) obtained.  Also contacted [facility] who were unable to provide a quote for the distance required.  VM left for [facility] for call back to obtain quote.  Quote from [employer] forwarded to pt's son Ariel Durant for review.  When quote from [facility] arrives via email, it will be forwarded as well.  Faye ARMSTRONG               Discharge Codes    No documentation.               Expected Discharge Date and Time     Expected Discharge Date Expected Discharge Time    Apr 9, 2023             Faye Moy RN

## 2023-04-01 PROCEDURE — 97530 THERAPEUTIC ACTIVITIES: CPT

## 2023-04-01 RX ADMIN — BACLOFEN 2.5 MG: 10 TABLET ORAL at 08:38

## 2023-04-01 RX ADMIN — DOCUSATE SODIUM 50MG AND SENNOSIDES 8.6MG 2 TABLET: 8.6; 5 TABLET, FILM COATED ORAL at 08:39

## 2023-04-01 RX ADMIN — HYDRALAZINE HYDROCHLORIDE 10 MG: 10 TABLET, FILM COATED ORAL at 15:57

## 2023-04-01 RX ADMIN — Medication 10 ML: at 20:25

## 2023-04-01 RX ADMIN — APIXABAN 5 MG: 5 TABLET, FILM COATED ORAL at 20:25

## 2023-04-01 RX ADMIN — CARVEDILOL 25 MG: 12.5 TABLET, FILM COATED ORAL at 08:38

## 2023-04-01 RX ADMIN — BACLOFEN 2.5 MG: 10 TABLET ORAL at 20:25

## 2023-04-01 RX ADMIN — AMLODIPINE BESYLATE 10 MG: 10 TABLET ORAL at 08:39

## 2023-04-01 RX ADMIN — VALSARTAN 160 MG: 160 TABLET, FILM COATED ORAL at 08:39

## 2023-04-01 RX ADMIN — APIXABAN 5 MG: 5 TABLET, FILM COATED ORAL at 08:39

## 2023-04-01 RX ADMIN — Medication 10 ML: at 08:39

## 2023-04-01 NOTE — PLAN OF CARE
Problem: Fall Injury Risk  Goal: Absence of Fall and Fall-Related Injury  Intervention: Identify and Manage Contributors  Recent Flowsheet Documentation  Taken 3/31/2023 2109 by Keisha Kiser, RN  Medication Review/Management: medications reviewed  Intervention: Promote Injury-Free Environment  Recent Flowsheet Documentation  Taken 3/31/2023 2109 by Keisha Kiser, RN  Safety Promotion/Fall Prevention:   activity supervised   assistive device/personal items within reach   clutter free environment maintained   safety round/check completed   Goal Outcome Evaluation:

## 2023-04-01 NOTE — PLAN OF CARE
Problem: Adult Inpatient Plan of Care  Goal: Absence of Hospital-Acquired Illness or Injury  Intervention: Identify and Manage Fall Risk  Recent Flowsheet Documentation  Taken 3/31/2023 2109 by Keisha Kiser, RN  Safety Promotion/Fall Prevention:   activity supervised   assistive device/personal items within reach   clutter free environment maintained   safety round/check completed  Intervention: Prevent Skin Injury  Recent Flowsheet Documentation  Taken 3/31/2023 2109 by Keisha Kiser, RN  Skin Protection: incontinence pads utilized  Goal: Optimal Comfort and Wellbeing  Intervention: Provide Person-Centered Care  Recent Flowsheet Documentation  Taken 3/31/2023 2109 by Keisha Kiser, RN  Trust Relationship/Rapport: care explained   Goal Outcome Evaluation:

## 2023-04-01 NOTE — PROGRESS NOTES
".DAILY PROGRESS NOTE  [facility]    Patient Identification:  Name: Manuel Durant  Age: 73 y.o.  Sex: male  :  1950  MRN: [id number]         Primary Care Physician: Provider, No Known    Subjective:  Interval History:He is weak.    Objective:    Scheduled Meds:amLODIPine, 10 mg, Oral, QAM AC  apixaban, 5 mg, Oral, Q12H  baclofen, 2.5 mg, Oral, Q12H  carvedilol, 25 mg, Oral, BID With Meals  hydrALAZINE, 10 mg, Oral, Q8H  senna-docusate sodium, 2 tablet, Oral, BID  sodium chloride, 10 mL, Intravenous, Q12H  valsartan, 160 mg, Oral, QAM AC      Continuous Infusions:     Vital signs in last 24 hours:  Temp:  [97.9 °F (36.6 °C)-98.3 °F (36.8 °C)] 97.9 °F (36.6 °C)  Heart Rate:  [52-62] 61  Resp:  [18] 18  BP: (118-151)/(64-79) 145/77    Intake/Output:  No intake or output data in the 24 hours ending 23 1230    Exam:  /77 (BP Location: Left arm, Patient Position: Lying)   Pulse 61   Temp 97.9 °F (36.6 °C) (Oral)   Resp 18   Ht 167.6 cm (66\")   Wt 70.2 kg (154 lb 12.2 oz)   SpO2 93%   BMI 24.98 kg/m²     General Appearance:    Alert, cooperative, no distress   Head:    Normocephalic, without obvious abnormality, atraumatic   Eyes:       Throat:   Lips, tongue, gums normal   Neck:   Supple, symmetrical, trachea midline, no JVD   Lungs:     Clear to auscultation bilaterally, respirations unlabored   Chest Wall:    No tenderness or deformity    Heart:    Regular rate and rhythm, S1 and S2 normal, no murmur,no  Rub or gallop   Abdomen:     Soft, nontender, bowel sounds active, no masses, no organomegaly    Extremities:   Extremities normal, atraumatic, no cyanosis or edema   Pulses:      Skin:   Skin is warm and dry,  no rashes or palpable lesions   Neurologic:   He is weak.      Lab Results (last 72 hours)     Procedure Component Value Units Date/Time    Basic Metabolic Panel [[id number]]  (Abnormal) Collected: 23    Specimen: Blood Updated: 23 0702     Glucose 89 " mg/dL      BUN 21 mg/dL      Creatinine 0.77 mg/dL      Sodium 142 mmol/L      Potassium 4.1 mmol/L      Chloride 110 mmol/L      CO2 24.5 mmol/L      Calcium 8.7 mg/dL      BUN/Creatinine Ratio 27.3     Anion Gap 7.5 mmol/L      eGFR 95.7 mL/min/1.73     Narrative:      GFR Normal >60  Chronic Kidney Disease <60  Kidney Failure <15    The GFR formula is only valid for adults with stable renal function between ages 18 and 70.    Phosphorus [[id number]]  (Normal) Collected: 03/30/23 0610    Specimen: Blood Updated: 03/30/23 0702     Phosphorus 3.2 mg/dL     Magnesium [[id number]]  (Normal) Collected: 03/30/23 0610    Specimen: Blood Updated: 03/30/23 0701     Magnesium 2.0 mg/dL     CBC (No Diff) [[id number]]  (Normal) Collected: 03/30/23 0610    Specimen: Blood Updated: 03/30/23 0641     WBC 8.31 10*3/mm3      RBC 4.31 10*6/mm3      Hemoglobin 13.1 g/dL      Hematocrit 39.3 %      MCV 91.2 fL      MCH 30.4 pg      MCHC 33.3 g/dL      RDW 13.0 %      RDW-SD 43.1 fl      MPV 10.8 fL      Platelets 323 10*3/mm3     Basic Metabolic Panel [[id number]]  (Abnormal) Collected: 03/29/23 0745    Specimen: Blood Updated: 03/29/23 0828     Glucose 91 mg/dL      BUN 20 mg/dL      Creatinine 0.73 mg/dL      Sodium 139 mmol/L      Potassium 4.2 mmol/L      Comment: Slight hemolysis detected by analyzer. Results may be affected.        Chloride 106 mmol/L      CO2 23.2 mmol/L      Calcium 8.9 mg/dL      BUN/Creatinine Ratio 27.4     Anion Gap 9.8 mmol/L      eGFR 97.3 mL/min/1.73     Narrative:      GFR Normal >60  Chronic Kidney Disease <60  Kidney Failure <15    The GFR formula is only valid for adults with stable renal function between ages 18 and 70.    Phosphorus [[id number]]  (Normal) Collected: 03/29/23 0745    Specimen: Blood Updated: 03/29/23 0828     Phosphorus 3.4 mg/dL     Magnesium [[id number]]  (Normal) Collected: 03/29/23 0745    Specimen: Blood Updated: 03/29/23 0828     Magnesium 2.1 mg/dL     CBC (No Diff) [[id number]]   (Normal) Collected: 03/29/23 0745    Specimen: Blood Updated: 03/29/23 0813     WBC 7.52 10*3/mm3      RBC 4.45 10*6/mm3      Hemoglobin 13.5 g/dL      Hematocrit 39.9 %      MCV 89.7 fL      MCH 30.3 pg      MCHC 33.8 g/dL      RDW 13.0 %      RDW-SD 42.0 fl      MPV 10.6 fL      Platelets 326 10*3/mm3         Data Review:  Results from last 7 days   Lab Units 03/30/23  0610 03/29/23  0745 03/28/23 0613   SODIUM mmol/L 142 139 142   POTASSIUM mmol/L 4.1 4.2 3.9   CHLORIDE mmol/L 110* 106 108*   CO2 mmol/L 24.5 23.2 24.2   BUN mg/dL 21 20 21   CREATININE mg/dL 0.77 0.73* 0.73*   GLUCOSE mg/dL 89 91 89   CALCIUM mg/dL 8.7 8.9 9.0     Results from last 7 days   Lab Units 03/30/23  0610 03/29/23  0745 03/28/23 0613   WBC 10*3/mm3 8.31 7.52 7.76   HEMOGLOBIN g/dL 13.1 13.5 13.5   HEMATOCRIT % 39.3 39.9 39.9   PLATELETS 10*3/mm3 323 326 324             Lab Results   Lab Value Date/Time    TROPONINT 9 03/12/2023 1907               Invalid input(s): PROT, LABALBU          No results found for: POCGLU        History reviewed. No pertinent past medical history.    Assessment:  Active Hospital Problems    Diagnosis  POA   • **Intracranial hemorrhage [I62.9]  Yes   • Acute DVT (deep venous thrombosis) [I82.409]  Unknown   • HTN (hypertension) [I10]  Unknown   • Hypokalemia [E87.6]  Unknown   • Hypertensive emergency [I16.1]  Unknown      Resolved Hospital Problems   No resolved problems to display.       Plan:  Continue current RX. DC planning. Continue with therapy.  Discussed with nurse.    Cedric Johnston MD  4/1/2023  12:30 EDT

## 2023-04-01 NOTE — PLAN OF CARE
Goal Outcome Evaluation:  Plan of Care Reviewed With: patient        Progress: improving  Outcome Evaluation: Mr. Durant agreeable for skilled PT this PM. Initiated session assisting NSG aid with perianal care. He demo improved functional ability when rolling in the bed with CGA/Amish. He then needed minAx1 for supine-sit and cues to utilize BUE support to achieve more neutral alignment and reduce R lateral lean. Patient then stoof EOB with modAx2 and continues to present with severe R lateral lean. Patient unable to march in place in attempt to increase WB through LLE. Patient stood EOB ~ 5 minutes with intermittent cues for more erect posture. Family arrived toward end of session and NSG aid assisted with functional mobility. He was returned to bed with RUE elevated. Utilized  ipad throughout session. He remains a candidate for skilled PT.

## 2023-04-01 NOTE — PLAN OF CARE
Goal Outcome Evaluation:              Outcome Evaluation: Pleasantly confused. Remains with right hemiplegia, right facial droop. No complaints. Fed meals per nursing staff. Awaiting final discharge plan.

## 2023-04-01 NOTE — THERAPY TREATMENT NOTE
Patient Name: Manuel Durant  : 1950    MRN: [id number]                              Today's Date: 2023       Admit Date: 3/12/2023    Visit Dx:     ICD-10-CM ICD-9-CM   1. Intracranial hemorrhage  I62.9 432.9   2. Altered mental status, unspecified altered mental status type  R41.82 780.97     Patient Active Problem List   Diagnosis   • Intracranial hemorrhage   • Hypertensive emergency   • Acute DVT (deep venous thrombosis)   • HTN (hypertension)   • Hypokalemia     History reviewed. No pertinent past medical history.  History reviewed. No pertinent surgical history.   General Information     Row Name 23 1519          Physical Therapy Time and Intention    Document Type therapy note (daily note)  -     Mode of Treatment individual therapy;physical therapy  -     Row Name 23 1519          General Information    Patient Profile Reviewed yes  -CAMERON     Existing Precautions/Restrictions fall  -     Row Name 23 1519          Cognition    Orientation Status (Cognition) oriented to;person  -     Row Name 23 1519          Safety Issues, Functional Mobility    Impairments Affecting Function (Mobility) balance;coordination;cognition;endurance/activity tolerance;grasp;motor control;postural/trunk control;strength;range of motion (ROM)  -     Comment, Safety Issues/Impairments (Mobility) patient confused throughout treatment, non-skid socks and gait belt used throughout treatment  -           User Key  (r) = Recorded By, (t) = Taken By, (c) = Cosigned By    Initials Name Provider Type    Leeann Godinez, JOSE ELIAS Physical Therapist               Mobility     Row Name 23 1519          Bed Mobility    Bed Mobility rolling left;rolling right;supine-sit-supine  -CAMERON     Rolling Left [place] (Bed Mobility) standby assist  -     Rolling Right [place] (Bed Mobility) minimum assist (75% patient effort)  -CAMERON     Scooting/Bridging [place] (Bed Mobility) maximum  assist (25% patient effort);2 person assist;verbal cues  -CAMERON     Supine-Sit [place] (Bed Mobility) minimum assist (75% patient effort);verbal cues;nonverbal cues (demo/gesture);1 person assist  -CAMERON     Sit-Supine [place] (Bed Mobility) moderate assist (50% patient effort);maximum assist (25% patient effort);2 person assist  -CAMERON     Assistive Device (Bed Mobility) head of bed elevated;bed rails;draw sheet  -     Comment, (Bed Mobility) inc time with frequent cues and assist with .  -     Row Name 04/01/23 1519          Bed-Chair Transfer    Bed-Chair [place] (Transfers) unable to assess  -     Comment, (Bed-Chair Transfer) patient decline bed-chair transfer due to stating he is unable to move his feet  -     Row Name 04/01/23 1519          Sit-Stand Transfer    Sit-Stand [place] (Transfers) minimum assist (75% patient effort);1 person assist;moderate assist (50% patient effort)  -     Comment, (Sit-Stand Transfer) cues for B foot placement prior to standing and assist with placing RUE onto RWx, cues for erect posture throughout  -     Row Name 04/01/23 1519          Gait/Stairs (Locomotion)    [place] Level (Gait) not tested  -           User Key  (r) = Recorded By, (t) = Taken By, (c) = Cosigned By    Initials Name Provider Type    Leeann Godinez, PT Physical Therapist               Obj/Interventions     Row Name 04/01/23 1522          Balance    Balance Assessment sitting static balance;sitting dynamic balance;sit to stand dynamic balance;standing static balance  -     Static Sitting Balance standby assist;contact guard  -CAMERON     Dynamic Sitting Balance contact guard  -CAMERON     Position, Sitting Balance supported;sitting edge of bed  -     Static Standing Balance moderate assist;1-person assist  -CAMERON     Dynamic Standing Balance moderate assist;2-person assist  -CAMERON     Position/Device Used, Standing Balance supported;walker, rolling  -     Comment,  Balance R lateral lean when sitting, improves with cues to achieve more neutral alignment with putting weight through RUE, increased R lateral lean upon standing and intermittent R knee buckling  -           User Key  (r) = Recorded By, (t) = Taken By, (c) = Cosigned By    Initials Name Provider Type    Leeann Godinez, PT Physical Therapist               Goals/Plan    No documentation.                Clinical Impression     Row Name 04/01/23 1523          Pain    Pretreatment Pain Rating 0/10 - no pain  -CAMERON     Posttreatment Pain Rating 0/10 - no pain  -CAMERON     Row Name 04/01/23 1523          Plan of Care Review    Plan of Care Reviewed With patient  -CAMERON     Progress improving  -     Outcome Evaluation Mr. Durant agreeable for skilled PT this PM. Initiated session assisting NSG aid with perianal care. He demo improved functional ability when rolling in the bed with CGA/Amish. He then needed minAx1 for supine-sit and cues to utilize BUE support to achieve more neutral alignment and reduce R lateral lean. Patient then stoof EOB with modAx2 and continues to present with severe R lateral lean. Patient unable to march in place in attempt to increase WB through LLE. Patient stood EOB ~ 5 minutes with intermittent cues for more erect posture. Family arrived toward end of session and NSG aid assisted with functional mobility. He was returned to bed with RUE elevated. Utilized  ipad throughout session. He remains a candidate for skilled PT.  -     Row Name 04/01/23 1523          Therapy Assessment/Plan (PT)    Rehab Potential (PT) fair, will monitor progress closely  -     Criteria for Skilled Interventions Met (PT) yes  -CAMERON     Therapy Frequency (PT) 6 times/wk  -     Row Name 04/01/23 1523          Vital Signs    O2 Delivery Pre Treatment room air  -CAMERON     O2 Delivery Intra Treatment room air  -CAMERON     O2 Delivery Post Treatment room air  -CAMERON     Pre Patient Position Supine  -CAMERON     Intra  Patient Position Standing  -CAMERON     Post Patient Position Supine  -CAMERON     Row Name 04/01/23 1523          Positioning and Restraints    Pre-Treatment Position in bed  -CAMERON     Post Treatment Position bed  -CAMERON     In Bed supine;call light within reach;encouraged to call for assist;exit alarm on;with family/caregiver;RUE elevated  -           User Key  (r) = Recorded By, (t) = Taken By, (c) = Cosigned By    Initials Name Provider Type    Leeann Godinez, JOSE ELIAS Physical Therapist               Outcome Measures     Row Name 04/01/23 1529 04/01/23 0839       How much help from another person do you currently need...    Turning from your back to your side while in flat bed without using bedrails? 2  -CAMERON 2  -TF    Moving from lying on back to sitting on the side of a flat bed without bedrails? 2  -CAMERON 2  -TF    Moving to and from a bed to a chair (including a wheelchair)? 1  -CAMERON 2  -TF    Standing up from a chair using your arms (e.g., wheelchair, bedside chair)? 2  -CAMERON 2  -TF    Climbing 3-5 steps with a railing? 1  -CAMERON 1  -TF    To walk in hospital room? 1  -CAMERON 1  -TF    AM-PAC 6 Clicks Score (PT) 9  -CAMERON 10  -TF    Highest level of mobility 3 --> Sat at edge of bed  -CAMERON 4 --> Transferred to chair/commode  -TF    Row Name 04/01/23 1529          Modified [place] Scale    Modified [place] Scale 4 - Moderately severe disability.  Unable to walk without assistance, and unable to attend to own bodily needs without assistance.  -     Row Name 04/01/23 1529          Functional Assessment    Outcome Measure Options AM-PAC 6 Clicks Basic Mobility (PT)  -           User Key  (r) = Recorded By, (t) = Taken By, (c) = Cosigned By    Initials Name Provider Type    TF Miriam Smiley RN Registered Nurse    Leeann Godinez, JOSE ELIAS Physical Therapist                             Physical Therapy Education     Title: PT OT SLP Therapies (Done)     Topic: Physical Therapy (Done)     Point: Mobility training (Done)      Learning Progress Summary           Patient Acceptance, E,TB, VU,NR by CAMERON at 4/1/2023 1530    Acceptance, E, NR by CW at 3/31/2023 0925    Acceptance, E, NR by CW at 3/30/2023 1209    Acceptance, E, NR,NL by CW at 3/29/2023 1336    Acceptance, E, NR by ZB at 3/28/2023 1317    Acceptance, E, NR by CW at 3/27/2023 1401    Acceptance, E,D, NR,VU by JM at 3/24/2023 1631    Comment: seemed to comprehend commands this session    Acceptance, E,D, NR by ZACH at 3/23/2023 1655    Acceptance, E,D, NR by JM at 3/22/2023 1545    Acceptance, E,TB, VU,NR by CB at 3/21/2023 1544    Acceptance, E,TB,H, VU by MS at 3/16/2023 1801    Acceptance, E,D, DU,NR by MO at 3/16/2023 1451    Acceptance, E, DU,NR by MO at 3/15/2023 1200    Acceptance, E, NR by LM at 3/15/2023 0521    Acceptance, E,D, DU,NR by MO at 3/14/2023 1458   Family Acceptance, E,TB,H, VU by MS at 3/16/2023 1801                   Point: Home exercise program (Done)     Learning Progress Summary           Patient Acceptance, E,TB, VU,NR by CAMERON at 4/1/2023 1530    Acceptance, E, NR by CW at 3/31/2023 0925    Acceptance, E, NR by ZB at 3/28/2023 1317    Acceptance, E, NR by CW at 3/27/2023 1401    Acceptance, E,D, NR,VU by JM at 3/24/2023 1631    Comment: seemed to comprehend commands this session    Acceptance, E,D, NR by ZACH at 3/23/2023 1655    Acceptance, E,D, NR by JM at 3/22/2023 1545    Acceptance, E,TB, VU,NR by CB at 3/21/2023 1544    Acceptance, E,TB,H, VU by MS at 3/16/2023 1801    Acceptance, E,D, DU,NR by MO at 3/16/2023 1451    Acceptance, E, NR by LM at 3/15/2023 0521    Acceptance, E,D, DU,NR by MO at 3/14/2023 1458   Family Acceptance, E,TB,H, VU by MS at 3/16/2023 1801                   Point: Body mechanics (Done)     Learning Progress Summary           Patient Acceptance, E,TB, VU,NR by CAMERON at 4/1/2023 1530    Acceptance, E,D, NR,VU by ZACH at 3/24/2023 1631    Comment: seemed to comprehend commands this session    Acceptance, E,D, NR by ZACH at 3/23/2023 0369     Acceptance, E,D, NR by JM at 3/22/2023 1545    Acceptance, E,TB, VU,NR by CB at 3/21/2023 1544    Acceptance, E,TB,H, VU by MS at 3/16/2023 1801    Acceptance, E,D, DU,NR by MO at 3/16/2023 1451    Acceptance, E, DU,NR by MO at 3/15/2023 1200    Acceptance, E, NR by LM at 3/15/2023 0521    Acceptance, E,D, DU,NR by MO at 3/14/2023 1458   Family Acceptance, E,TB,H, VU by MS at 3/16/2023 1801                   Point: Precautions (Done)     Learning Progress Summary           Patient Acceptance, E,TB, VU,NR by CAMERON at 4/1/2023 1530    Acceptance, E,D, NR,VU by JM at 3/24/2023 1631    Comment: seemed to comprehend commands this session    Acceptance, E,D, NR by ZACH at 3/23/2023 1655    Acceptance, E,D, NR by JM at 3/22/2023 1545    Acceptance, E,TB, VU,NR by CB at 3/21/2023 1544    Acceptance, E,TB,H, VU by MS at 3/16/2023 1801    Acceptance, E,D, DU,NR by MO at 3/16/2023 1451    Acceptance, E, DU,NR by MO at 3/15/2023 1200    Acceptance, E, NR by LM at 3/15/2023 0521    Acceptance, E,D, DU,NR by MO at 3/14/2023 1458   Family Acceptance, E,TB,H, VU by MS at 3/16/2023 1801                               User Key     Initials Effective Dates Name Provider Type Discipline     03/07/18 -  Tiffanie Grace, LUCERO Physical Therapist Assistant PT    CAMERON 05/19/21 -  Leeann Wiley, PT Physical Therapist PT    MS 06/16/21 -  Angel Luis Cr, RN Registered Nurse Nurse     12/13/22 -  Tamica Willoughby, PT Physical Therapist PT    CB 10/22/21 -  Bety Benitez, PT Physical Therapist PT    LM 10/13/22 -  Silke Grace, RN Registered Nurse Nurse    MO 01/27/23 -  Oetken, Lacie, PT Student PT Student PT    CHARLY 03/10/23 -  Nestor Rae, PT Student PT Student PT              PT Recommendation and Plan     Plan of Care Reviewed With: patient  Progress: improving  Outcome Evaluation: Mr. Durant agreeable for skilled PT this PM. Initiated session assisting NSG aid with perianal care. He demo improved functional ability when rolling  in the bed with CGA/Amish. He then needed minAx1 for supine-sit and cues to utilize BUE support to achieve more neutral alignment and reduce R lateral lean. Patient then stoof EOB with modAx2 and continues to present with severe R lateral lean. Patient unable to march in place in attempt to increase WB through LLE. Patient stood EOB ~ 5 minutes with intermittent cues for more erect posture. Family arrived toward end of session and NSG aid assisted with functional mobility. He was returned to bed with RUE elevated. Utilized  ipad throughout session. He remains a candidate for skilled PT.     Time Calculation:    PT Charges     Row Name 04/01/23 1530             Time Calculation    Start Time 1412  -CAMERON      Stop Time 1436  -CAMERON      Time Calculation (min) 24 min  -CAMERON      PT Received On 04/01/23  -CAMERON      PT - Next Appointment 04/02/23  -CAMERON      PT Goal Re-Cert Due Date 04/12/23  -CAMERON            User Key  (r) = Recorded By, (t) = Taken By, (c) = Cosigned By    Initials Name Provider Type    Leeann Godinez PT Physical Therapist              Therapy Charges for Today     Code Description Service Date Service Provider Modifiers Qty    [id number]  PT THERAPEUTIC ACT EA 15 MIN 4/1/2023 Leeann Wiley, PT GP 2    [id number]  PT THER SUPP EA 15 MIN 4/1/2023 Leeann Wiley, JOSE ELIAS GP 1          PT G-Codes  Outcome Measure Options: AM-PAC 6 Clicks Basic Mobility (PT)  AM-PAC 6 Clicks Score (PT): 9  AM-PAC 6 Clicks Score (OT): 10  Modified [place] Scale: 4 - Moderately severe disability.  Unable to walk without assistance, and unable to attend to own bodily needs without assistance.  PT Discharge Summary  Anticipated Discharge Disposition (PT): inpatient rehabilitation facility, skilled nursing facility    Leeann Wiley PT  4/1/2023

## 2023-04-02 RX ADMIN — APIXABAN 5 MG: 5 TABLET, FILM COATED ORAL at 08:39

## 2023-04-02 RX ADMIN — BACLOFEN 2.5 MG: 10 TABLET ORAL at 22:19

## 2023-04-02 RX ADMIN — APIXABAN 5 MG: 5 TABLET, FILM COATED ORAL at 22:19

## 2023-04-02 RX ADMIN — Medication 10 ML: at 08:40

## 2023-04-02 RX ADMIN — HYDRALAZINE HYDROCHLORIDE 10 MG: 10 TABLET, FILM COATED ORAL at 22:19

## 2023-04-02 RX ADMIN — CARVEDILOL 25 MG: 12.5 TABLET, FILM COATED ORAL at 08:39

## 2023-04-02 RX ADMIN — HYDRALAZINE HYDROCHLORIDE 10 MG: 10 TABLET, FILM COATED ORAL at 16:30

## 2023-04-02 RX ADMIN — HYDRALAZINE HYDROCHLORIDE 10 MG: 10 TABLET, FILM COATED ORAL at 10:09

## 2023-04-02 RX ADMIN — AMLODIPINE BESYLATE 10 MG: 10 TABLET ORAL at 08:39

## 2023-04-02 RX ADMIN — VALSARTAN 160 MG: 160 TABLET, FILM COATED ORAL at 08:38

## 2023-04-02 RX ADMIN — BACLOFEN 2.5 MG: 10 TABLET ORAL at 08:39

## 2023-04-02 NOTE — PROGRESS NOTES
".DAILY PROGRESS NOTE  [facility]    Patient Identification:  Name: Manuel Durant  Age: 73 y.o.  Sex: male  :  1950  MRN: [id number]         Primary Care Physician: Provider, No Known    Subjective:  Interval History:He is weak.    Objective:    Scheduled Meds:amLODIPine, 10 mg, Oral, QAM AC  apixaban, 5 mg, Oral, Q12H  baclofen, 2.5 mg, Oral, Q12H  carvedilol, 25 mg, Oral, BID With Meals  hydrALAZINE, 10 mg, Oral, Q8H  senna-docusate sodium, 2 tablet, Oral, BID  sodium chloride, 10 mL, Intravenous, Q12H  valsartan, 160 mg, Oral, QAM AC      Continuous Infusions:     Vital signs in last 24 hours:  Temp:  [97.5 °F (36.4 °C)-98.3 °F (36.8 °C)] 98.3 °F (36.8 °C)  Heart Rate:  [51-64] 59  Resp:  [16-22] 18  BP: (110-145)/(56-86) 126/68    Intake/Output:  No intake or output data in the 24 hours ending 23 1212    Exam:  /68 (BP Location: Left arm, Patient Position: Lying)   Pulse 59   Temp 98.3 °F (36.8 °C) (Oral)   Resp 18   Ht 167.6 cm (66\")   Wt 69.8 kg (153 lb 14.1 oz)   SpO2 92%   BMI 24.84 kg/m²     General Appearance:    Alert, cooperative, no distress   Head:    Normocephalic, without obvious abnormality, atraumatic   Eyes:       Throat:   Lips, tongue, gums normal   Neck:   Supple, symmetrical, trachea midline, no JVD   Lungs:     Clear to auscultation bilaterally, respirations unlabored   Chest Wall:    No tenderness or deformity    Heart:    Regular rate and rhythm, S1 and S2 normal, no murmur,no  Rub or gallop   Abdomen:     Soft, nontender, bowel sounds active, no masses, no organomegaly    Extremities:   Extremities normal, atraumatic, no cyanosis or edema   Pulses:      Skin:   Skin is warm and dry,  no rashes or palpable lesions   Neurologic:   He is weak.      Lab Results (last 72 hours)     Procedure Component Value Units Date/Time    Basic Metabolic Panel [[id number]]  (Abnormal) Collected: 23    Specimen: Blood Updated: 23 07     Glucose " 89 mg/dL      BUN 21 mg/dL      Creatinine 0.77 mg/dL      Sodium 142 mmol/L      Potassium 4.1 mmol/L      Chloride 110 mmol/L      CO2 24.5 mmol/L      Calcium 8.7 mg/dL      BUN/Creatinine Ratio 27.3     Anion Gap 7.5 mmol/L      eGFR 95.7 mL/min/1.73     Narrative:      GFR Normal >60  Chronic Kidney Disease <60  Kidney Failure <15    The GFR formula is only valid for adults with stable renal function between ages 18 and 70.    Phosphorus [[id number]]  (Normal) Collected: 03/30/23 0610    Specimen: Blood Updated: 03/30/23 0702     Phosphorus 3.2 mg/dL     Magnesium [[id number]]  (Normal) Collected: 03/30/23 0610    Specimen: Blood Updated: 03/30/23 0701     Magnesium 2.0 mg/dL     CBC (No Diff) [[id number]]  (Normal) Collected: 03/30/23 0610    Specimen: Blood Updated: 03/30/23 0641     WBC 8.31 10*3/mm3      RBC 4.31 10*6/mm3      Hemoglobin 13.1 g/dL      Hematocrit 39.3 %      MCV 91.2 fL      MCH 30.4 pg      MCHC 33.3 g/dL      RDW 13.0 %      RDW-SD 43.1 fl      MPV 10.8 fL      Platelets 323 10*3/mm3     Basic Metabolic Panel [[id number]]  (Abnormal) Collected: 03/29/23 0745    Specimen: Blood Updated: 03/29/23 0828     Glucose 91 mg/dL      BUN 20 mg/dL      Creatinine 0.73 mg/dL      Sodium 139 mmol/L      Potassium 4.2 mmol/L      Comment: Slight hemolysis detected by analyzer. Results may be affected.        Chloride 106 mmol/L      CO2 23.2 mmol/L      Calcium 8.9 mg/dL      BUN/Creatinine Ratio 27.4     Anion Gap 9.8 mmol/L      eGFR 97.3 mL/min/1.73     Narrative:      GFR Normal >60  Chronic Kidney Disease <60  Kidney Failure <15    The GFR formula is only valid for adults with stable renal function between ages 18 and 70.    Phosphorus [[id number]]  (Normal) Collected: 03/29/23 0745    Specimen: Blood Updated: 03/29/23 0828     Phosphorus 3.4 mg/dL     Magnesium [[id number]]  (Normal) Collected: 03/29/23 0745    Specimen: Blood Updated: 03/29/23 0828     Magnesium 2.1 mg/dL     CBC (No Diff)  [[id number]]  (Normal) Collected: 03/29/23 0745    Specimen: Blood Updated: 03/29/23 0813     WBC 7.52 10*3/mm3      RBC 4.45 10*6/mm3      Hemoglobin 13.5 g/dL      Hematocrit 39.9 %      MCV 89.7 fL      MCH 30.3 pg      MCHC 33.8 g/dL      RDW 13.0 %      RDW-SD 42.0 fl      MPV 10.6 fL      Platelets 326 10*3/mm3         Data Review:  Results from last 7 days   Lab Units 03/30/23  0610 03/29/23  0745 03/28/23 0613   SODIUM mmol/L 142 139 142   POTASSIUM mmol/L 4.1 4.2 3.9   CHLORIDE mmol/L 110* 106 108*   CO2 mmol/L 24.5 23.2 24.2   BUN mg/dL 21 20 21   CREATININE mg/dL 0.77 0.73* 0.73*   GLUCOSE mg/dL 89 91 89   CALCIUM mg/dL 8.7 8.9 9.0     Results from last 7 days   Lab Units 03/30/23  0610 03/29/23  0745 03/28/23 0613   WBC 10*3/mm3 8.31 7.52 7.76   HEMOGLOBIN g/dL 13.1 13.5 13.5   HEMATOCRIT % 39.3 39.9 39.9   PLATELETS 10*3/mm3 323 326 324             Lab Results   Lab Value Date/Time    TROPONINT 9 03/12/2023 1907               Invalid input(s): PROT, LABALBU          No results found for: POCGLU        History reviewed. No pertinent past medical history.    Assessment:  Active Hospital Problems    Diagnosis  POA   • **Intracranial hemorrhage [I62.9]  Yes   • Acute DVT (deep venous thrombosis) [I82.409]  Unknown   • HTN (hypertension) [I10]  Unknown   • Hypokalemia [E87.6]  Unknown   • Hypertensive emergency [I16.1]  Unknown      Resolved Hospital Problems   No resolved problems to display.       Plan:  Continue current RX. DC planning. Continue with therapy.  Discussed with nurse.    Cedric Johnston MD  4/2/2023  12:12 EDT

## 2023-04-02 NOTE — PROGRESS NOTES
[facility]    Per report, CCP working to try and get patient back home.  Acute rehab eval ongoing.  Will follow up on Monday on progress.    Thanks,   Sarah Grace RN  Rehab Admission Nurse

## 2023-04-02 NOTE — PLAN OF CARE
Goal Outcome Evaluation:              Outcome Evaluation: Pleasantly confused. Remains with right hemiplegia and right facial droop. Tolerating diet. Incontient of bowel and bladder. Held Coreg this afternoon due to HR 47. Awaiting discharge plan.

## 2023-04-03 PROCEDURE — 97110 THERAPEUTIC EXERCISES: CPT

## 2023-04-03 PROCEDURE — 97530 THERAPEUTIC ACTIVITIES: CPT

## 2023-04-03 RX ORDER — CARVEDILOL 12.5 MG/1
12.5 TABLET ORAL 2 TIMES DAILY WITH MEALS
Status: DISCONTINUED | OUTPATIENT
Start: 2023-04-03 | End: 2023-04-08

## 2023-04-03 RX ADMIN — CARVEDILOL 12.5 MG: 12.5 TABLET, FILM COATED ORAL at 18:22

## 2023-04-03 RX ADMIN — DOCUSATE SODIUM 50MG AND SENNOSIDES 8.6MG 2 TABLET: 8.6; 5 TABLET, FILM COATED ORAL at 08:03

## 2023-04-03 RX ADMIN — Medication 10 ML: at 08:05

## 2023-04-03 RX ADMIN — VALSARTAN 160 MG: 160 TABLET, FILM COATED ORAL at 08:04

## 2023-04-03 RX ADMIN — APIXABAN 5 MG: 5 TABLET, FILM COATED ORAL at 08:04

## 2023-04-03 RX ADMIN — BACLOFEN 2.5 MG: 10 TABLET ORAL at 08:04

## 2023-04-03 RX ADMIN — CARVEDILOL 12.5 MG: 12.5 TABLET, FILM COATED ORAL at 08:04

## 2023-04-03 RX ADMIN — HYDRALAZINE HYDROCHLORIDE 10 MG: 10 TABLET, FILM COATED ORAL at 08:04

## 2023-04-03 RX ADMIN — HYDRALAZINE HYDROCHLORIDE 10 MG: 10 TABLET, FILM COATED ORAL at 15:38

## 2023-04-03 RX ADMIN — APIXABAN 5 MG: 5 TABLET, FILM COATED ORAL at 20:07

## 2023-04-03 RX ADMIN — BACLOFEN 2.5 MG: 10 TABLET ORAL at 20:07

## 2023-04-03 RX ADMIN — AMLODIPINE BESYLATE 10 MG: 10 TABLET ORAL at 08:04

## 2023-04-03 NOTE — CASE MANAGEMENT/SOCIAL WORK
Continued Stay Note  [facility]     Patient Name: Manuel Durant  MRN: [id number]  Today's Date: 4/3/2023    Admit Date: 3/12/2023    Plan: Pending   Discharge Plan     Row Name 04/03/23 1431       Plan    Plan Comments Call received from Mr. Durant's granddaughter.  She states that she will not be able to care for pt at her home in [place].  She will speak with her father today about alternate plans.  She is checking into the documentation and financial requirements to fly him to [facility] directly from [place].  Family discussing driving as well.  This CCP did reiterate that pt would need an attendant with him for travel, even once he's able to sit independently.  She understands.  Faye ARMSTRONG    Row Name                Discharge Codes    No documentation.               Expected Discharge Date and Time     Expected Discharge Date Expected Discharge Time    Apr 9, 2023             Faye Moy RN

## 2023-04-03 NOTE — PROGRESS NOTES
".DAILY PROGRESS NOTE  [facility]    Patient Identification:  Name: Manuel Durant  Age: 73 y.o.  Sex: male  :  1950  MRN: [id number]         Primary Care Physician: Provider, No Known    Subjective:  Interval History:He is weak.    Objective:    Scheduled Meds:amLODIPine, 10 mg, Oral, QAM AC  apixaban, 5 mg, Oral, Q12H  baclofen, 2.5 mg, Oral, Q12H  carvedilol, 12.5 mg, Oral, BID With Meals  hydrALAZINE, 10 mg, Oral, Q8H  senna-docusate sodium, 2 tablet, Oral, BID  sodium chloride, 10 mL, Intravenous, Q12H  valsartan, 160 mg, Oral, QAM AC      Continuous Infusions:     Vital signs in last 24 hours:  Temp:  [97.8 °F (36.6 °C)-98.7 °F (37.1 °C)] 98.7 °F (37.1 °C)  Heart Rate:  [43-55] 55  Resp:  [18] 18  BP: (104-144)/(61-76) 104/61    Intake/Output:  No intake or output data in the 24 hours ending 23 1505    Exam:  /61 (BP Location: Left arm, Patient Position: Lying)   Pulse 55   Temp 98.7 °F (37.1 °C) (Oral)   Resp 18   Ht 167.6 cm (66\")   Wt 63.9 kg (140 lb 14 oz)   SpO2 95%   BMI 22.74 kg/m²     General Appearance:    Alert, cooperative, no distress   Head:    Normocephalic, without obvious abnormality, atraumatic   Eyes:       Throat:   Lips, tongue, gums normal   Neck:   Supple, symmetrical, trachea midline, no JVD   Lungs:     Clear to auscultation bilaterally, respirations unlabored   Chest Wall:    No tenderness or deformity    Heart:    Regular rate and rhythm, S1 and S2 normal, no murmur,no  Rub or gallop   Abdomen:     Soft, nontender, bowel sounds active, no masses, no organomegaly    Extremities:   Extremities normal, atraumatic, no cyanosis or edema   Pulses:      Skin:   Skin is warm and dry,  no rashes or palpable lesions   Neurologic:   He is weak.      Lab Results (last 72 hours)     Procedure Component Value Units Date/Time    Basic Metabolic Panel [[id number]]  (Abnormal) Collected: 23    Specimen: Blood Updated: 23 0702     Glucose 89 " mg/dL      BUN 21 mg/dL      Creatinine 0.77 mg/dL      Sodium 142 mmol/L      Potassium 4.1 mmol/L      Chloride 110 mmol/L      CO2 24.5 mmol/L      Calcium 8.7 mg/dL      BUN/Creatinine Ratio 27.3     Anion Gap 7.5 mmol/L      eGFR 95.7 mL/min/1.73     Narrative:      GFR Normal >60  Chronic Kidney Disease <60  Kidney Failure <15    The GFR formula is only valid for adults with stable renal function between ages 18 and 70.    Phosphorus [[id number]]  (Normal) Collected: 03/30/23 0610    Specimen: Blood Updated: 03/30/23 0702     Phosphorus 3.2 mg/dL     Magnesium [[id number]]  (Normal) Collected: 03/30/23 0610    Specimen: Blood Updated: 03/30/23 0701     Magnesium 2.0 mg/dL     CBC (No Diff) [[id number]]  (Normal) Collected: 03/30/23 0610    Specimen: Blood Updated: 03/30/23 0641     WBC 8.31 10*3/mm3      RBC 4.31 10*6/mm3      Hemoglobin 13.1 g/dL      Hematocrit 39.3 %      MCV 91.2 fL      MCH 30.4 pg      MCHC 33.3 g/dL      RDW 13.0 %      RDW-SD 43.1 fl      MPV 10.8 fL      Platelets 323 10*3/mm3     Basic Metabolic Panel [[id number]]  (Abnormal) Collected: 03/29/23 0745    Specimen: Blood Updated: 03/29/23 0828     Glucose 91 mg/dL      BUN 20 mg/dL      Creatinine 0.73 mg/dL      Sodium 139 mmol/L      Potassium 4.2 mmol/L      Comment: Slight hemolysis detected by analyzer. Results may be affected.        Chloride 106 mmol/L      CO2 23.2 mmol/L      Calcium 8.9 mg/dL      BUN/Creatinine Ratio 27.4     Anion Gap 9.8 mmol/L      eGFR 97.3 mL/min/1.73     Narrative:      GFR Normal >60  Chronic Kidney Disease <60  Kidney Failure <15    The GFR formula is only valid for adults with stable renal function between ages 18 and 70.    Phosphorus [[id number]]  (Normal) Collected: 03/29/23 0745    Specimen: Blood Updated: 03/29/23 0828     Phosphorus 3.4 mg/dL     Magnesium [[id number]]  (Normal) Collected: 03/29/23 0745    Specimen: Blood Updated: 03/29/23 0828     Magnesium 2.1 mg/dL     CBC (No Diff) [[id number]]   (Normal) Collected: 03/29/23 0745    Specimen: Blood Updated: 03/29/23 0813     WBC 7.52 10*3/mm3      RBC 4.45 10*6/mm3      Hemoglobin 13.5 g/dL      Hematocrit 39.9 %      MCV 89.7 fL      MCH 30.3 pg      MCHC 33.8 g/dL      RDW 13.0 %      RDW-SD 42.0 fl      MPV 10.6 fL      Platelets 326 10*3/mm3         Data Review:  Results from last 7 days   Lab Units 03/30/23  0610 03/29/23  0745 03/28/23 0613   SODIUM mmol/L 142 139 142   POTASSIUM mmol/L 4.1 4.2 3.9   CHLORIDE mmol/L 110* 106 108*   CO2 mmol/L 24.5 23.2 24.2   BUN mg/dL 21 20 21   CREATININE mg/dL 0.77 0.73* 0.73*   GLUCOSE mg/dL 89 91 89   CALCIUM mg/dL 8.7 8.9 9.0     Results from last 7 days   Lab Units 03/30/23  0610 03/29/23  0745 03/28/23 0613   WBC 10*3/mm3 8.31 7.52 7.76   HEMOGLOBIN g/dL 13.1 13.5 13.5   HEMATOCRIT % 39.3 39.9 39.9   PLATELETS 10*3/mm3 323 326 324             Lab Results   Lab Value Date/Time    TROPONINT 9 03/12/2023 1907               Invalid input(s): PROT, LABALBU          No results found for: POCGLU        History reviewed. No pertinent past medical history.    Assessment:  Active Hospital Problems    Diagnosis  POA   • **Intracranial hemorrhage [I62.9]  Yes   • Acute DVT (deep venous thrombosis) [I82.409]  Unknown   • HTN (hypertension) [I10]  Unknown   • Hypokalemia [E87.6]  Unknown   • Hypertensive emergency [I16.1]  Unknown      Resolved Hospital Problems   No resolved problems to display.       Plan:  Continue current RX. DC planning. Continue with therapy.  Discussed with nurse and case management.    Cedric Johnston MD  4/3/2023  15:05 EDT

## 2023-04-03 NOTE — PROGRESS NOTES
[facility]    Note CCP continues to work with family for a plan to get patient back home to [place]. Eval continues, continue to follow progress with therapies.    Joyce Keane RN  Rehab Admissions Coordinator  [phone]

## 2023-04-03 NOTE — CASE MANAGEMENT/SOCIAL WORK
Continued Stay Note  [facility]     Patient Name: Manuel Durant  MRN: [id number]  Today's Date: 4/3/2023    Admit Date: 3/12/2023    Plan: Pending   Discharge Plan     Row Name 04/03/23 1121       Plan    Plan Comments Communication received from pt's son Ariel.  He reports that family is unable to afford any of the quotes for medical transport to [place].  Faye ARMSTRONG               Discharge Codes    No documentation.               Expected Discharge Date and Time     Expected Discharge Date Expected Discharge Time    Apr 9, 2023             Faye Moy RN

## 2023-04-03 NOTE — PLAN OF CARE
Goal Outcome Evaluation:  Plan of Care Reviewed With: patient           Outcome Evaluation: Pt participated with PT this PM. Pt progressing with PT. He demo improved bed mobility today- req min A for supine>sit and cga only for sit>supine. He tolerates sitting EOB extended time with sba. Completed dynamic reaching task with LUE, crossing midline without loss of balance. Difficulty getting pt to understand to attempt to use his RUE for functional tasks to regain his strength. Also difficult to know if the limitation is confusion vs possible aphasia vs language barrier. PT will continue to follow and progress as able.

## 2023-04-03 NOTE — THERAPY TREATMENT NOTE
Patient Name: Manuel Durant  : 1950    MRN: [id number]                              Today's Date: 4/3/2023       Admit Date: 3/12/2023    Visit Dx:     ICD-10-CM ICD-9-CM   1. Intracranial hemorrhage  I62.9 432.9   2. Altered mental status, unspecified altered mental status type  R41.82 780.97     Patient Active Problem List   Diagnosis   • Intracranial hemorrhage   • Hypertensive emergency   • Acute DVT (deep venous thrombosis)   • HTN (hypertension)   • Hypokalemia     History reviewed. No pertinent past medical history.  History reviewed. No pertinent surgical history.   General Information     Row Name 23 1517          Physical Therapy Time and Intention    Document Type therapy note (daily note)  -CW     Mode of Treatment physical therapy;individual therapy  -     Row Name 23 151          General Information    Patient Profile Reviewed yes  -CW     Existing Precautions/Restrictions fall  -CW     Barriers to Rehab language barrier;cognitive status  -CW     Row Name 23 151          Cognition    Orientation Status (Cognition) oriented to;person  -CW     Row Name 23 151          Safety Issues, Functional Mobility    Safety Issues Affecting Function (Mobility) insight into deficits/self-awareness;awareness of need for assistance;problem-solving;safety precautions follow-through/compliance;judgment;safety precaution awareness  -CW     Impairments Affecting Function (Mobility) balance;coordination;cognition;endurance/activity tolerance;grasp;motor control;postural/trunk control;strength;range of motion (ROM)  -CW           User Key  (r) = Recorded By, (t) = Taken By, (c) = Cosigned By    Initials Name Provider Type    CW Tamica Willoughby PT Physical Therapist               Mobility     Row Name 23 1517          Bed Mobility    Bed Mobility supine-sit;sit-supine  -CW     Supine-Sit [place] (Bed Mobility) minimum assist (75% patient effort);1 person assist;verbal  cues;nonverbal cues (demo/gesture)  -CW     Sit-Supine [place] (Bed Mobility) verbal cues;contact guard  -CW     Assistive Device (Bed Mobility) head of bed elevated;bed rails  -CW     Row Name 04/03/23 1517          Sit-Stand Transfer    Sit-Stand [place] (Transfers) moderate assist (50% patient effort);2 person assist;verbal cues;nonverbal cues (demo/gesture)  -CW     Assistive Device (Sit-Stand Transfers) other (see comments)  hha x2  -CW     Comment, (Sit-Stand Transfer) Worked on standing weight shifting, strong R lateral lean. Requires R knee block intermittantly and assist with weightshifting L  -CW           User Key  (r) = Recorded By, (t) = Taken By, (c) = Cosigned By    Initials Name Provider Type    Tamica Eddy PT Physical Therapist               Obj/Interventions     Row Name 04/03/23 1524          Balance    Balance Interventions sitting;dynamic reaching;UE activity with balance activity  -CW     Comment, Balance dynamic sitting balance tasks completed with cga to sba. Able to complete dynamic reaching task without loss of balance today.  -CW           User Key  (r) = Recorded By, (t) = Taken By, (c) = Cosigned By    Initials Name Provider Type    Tamica Eddy PT Physical Therapist               Goals/Plan    No documentation.                Clinical Impression     Row Name 04/03/23 1526          Pain    Pretreatment Pain Rating 0/10 - no pain  -CW     Posttreatment Pain Rating 0/10 - no pain  -CW     Row Name 04/03/23 1526          Plan of Care Review    Plan of Care Reviewed With patient  -CW     Outcome Evaluation Pt participated with PT this PM. Pt progressing with PT. He demo improved bed mobility today- req min A for supine>sit and cga only for sit>supine. He tolerates sitting EOB extended time with sba. Completed dynamic reaching task with LUE, crossing midline without loss of balance. Difficulty getting pt to understand to attempt to use his RUE for functional tasks  to regain his strength. Also difficult to know if the limitation is confusion vs possible aphasia vs language barrier. PT will continue to follow and progress as able.  -CW     Row Name 04/03/23 1526          Vital Signs    O2 Delivery Pre Treatment room air  -CW     Row Name 04/03/23 1526          Positioning and Restraints    Pre-Treatment Position in bed  -CW     Post Treatment Position bed  -CW     In Bed notified nsg;call light within reach;encouraged to call for assist;exit alarm on;fowlers;RUE elevated  -CW           User Key  (r) = Recorded By, (t) = Taken By, (c) = Cosigned By    Initials Name Provider Type    Tamica Eddy PT Physical Therapist               Outcome Measures     Row Name 04/03/23 1531 04/03/23 0800       How much help from another person do you currently need...    Turning from your back to your side while in flat bed without using bedrails? 3  -CW 2  -KP    Moving from lying on back to sitting on the side of a flat bed without bedrails? 3  -CW 1  -KP    Moving to and from a bed to a chair (including a wheelchair)? 2  -CW 1  -KP    Standing up from a chair using your arms (e.g., wheelchair, bedside chair)? 2  -CW 1  -KP    Climbing 3-5 steps with a railing? 1  -CW 1  -KP    To walk in hospital room? 1  -CW 1  -KP    AM-PAC 6 Clicks Score (PT) 12  -CW 7  -KP    Highest level of mobility 4 --> Transferred to chair/commode  -CW 2 --> Bed activities/dependent transfer  -KP    Row Name 04/03/23 1531          Functional Assessment    Outcome Measure Options AM-PAC 6 Clicks Basic Mobility (PT)  -CW           User Key  (r) = Recorded By, (t) = Taken By, (c) = Cosigned By    Initials Name Provider Type    Tamica Eddy PT Physical Therapist    Johanny Guaman, RN Registered Nurse                             Physical Therapy Education     Title: PT OT SLP Therapies (In Progress)     Topic: Physical Therapy (In Progress)     Point: Mobility training (In Progress)     Learning  Progress Summary           Patient Acceptance, E, NR by CW at 4/3/2023 1531    Acceptance, E,TB, VU,NR by CAMERON at 4/1/2023 1530    Acceptance, E, NR by CW at 3/31/2023 0925    Acceptance, E, NR by CW at 3/30/2023 1209    Acceptance, E, NR,NL by CW at 3/29/2023 1336    Acceptance, E, NR by ZB at 3/28/2023 1317    Acceptance, E, NR by CW at 3/27/2023 1401    Acceptance, E,D, NR,VU by JM at 3/24/2023 1631    Comment: seemed to comprehend commands this session    Acceptance, E,D, NR by ZACH at 3/23/2023 1655    Acceptance, E,D, NR by JM at 3/22/2023 1545    Acceptance, E,TB, VU,NR by CB at 3/21/2023 1544    Acceptance, E,TB,H, VU by MS at 3/16/2023 1801    Acceptance, E,D, DU,NR by MO at 3/16/2023 1451    Acceptance, E, DU,NR by MO at 3/15/2023 1200    Acceptance, E, NR by LM at 3/15/2023 0521    Acceptance, E,D, DU,NR by MO at 3/14/2023 1458   Family Acceptance, E,TB,H, VU by MS at 3/16/2023 1801                   Point: Home exercise program (Done)     Learning Progress Summary           Patient Acceptance, E,TB, VU,NR by CAMERON at 4/1/2023 1530    Acceptance, E, NR by CW at 3/31/2023 0925    Acceptance, E, NR by ZB at 3/28/2023 1317    Acceptance, E, NR by CW at 3/27/2023 1401    Acceptance, E,D, NR,VU by JM at 3/24/2023 1631    Comment: seemed to comprehend commands this session    Acceptance, E,D, NR by JM at 3/23/2023 1655    Acceptance, E,D, NR by JM at 3/22/2023 1545    Acceptance, E,TB, VU,NR by CB at 3/21/2023 1544    Acceptance, E,TB,H, VU by MS at 3/16/2023 1801    Acceptance, E,D, DU,NR by MO at 3/16/2023 1451    Acceptance, E, NR by LM at 3/15/2023 0521    Acceptance, E,D, DU,NR by MO at 3/14/2023 1458   Family Acceptance, E,TB,H, VU by MS at 3/16/2023 1801                   Point: Body mechanics (In Progress)     Learning Progress Summary           Patient Acceptance, E, NR by CW at 4/3/2023 1531    Acceptance, E,TB, VU,NR by CAMERON at 4/1/2023 1530    Acceptance, E,D, NR,VU by JM at 3/24/2023 1631    Comment: seemed  to comprehend commands this session    Acceptance, E,D, NR by JM at 3/23/2023 1655    Acceptance, E,D, NR by JM at 3/22/2023 1545    Acceptance, E,TB, VU,NR by CB at 3/21/2023 1544    Acceptance, E,TB,H, VU by MS at 3/16/2023 1801    Acceptance, E,D, DU,NR by MO at 3/16/2023 1451    Acceptance, E, DU,NR by MO at 3/15/2023 1200    Acceptance, E, NR by LM at 3/15/2023 0521    Acceptance, E,D, DU,NR by MO at 3/14/2023 1458   Family Acceptance, E,TB,H, VU by MS at 3/16/2023 1801                   Point: Precautions (In Progress)     Learning Progress Summary           Patient Acceptance, E, NR by CW at 4/3/2023 1531    Acceptance, E,TB, VU,NR by CAMERON at 4/1/2023 1530    Acceptance, E,D, NR,VU by JM at 3/24/2023 1631    Comment: seemed to comprehend commands this session    Acceptance, E,D, NR by JM at 3/23/2023 1655    Acceptance, E,D, NR by JM at 3/22/2023 1545    Acceptance, E,TB, VU,NR by CB at 3/21/2023 1544    Acceptance, E,TB,H, VU by MS at 3/16/2023 1801    Acceptance, E,D, DU,NR by MO at 3/16/2023 1451    Acceptance, E, DU,NR by MO at 3/15/2023 1200    Acceptance, E, NR by LM at 3/15/2023 0521    Acceptance, E,D, DU,NR by MO at 3/14/2023 1458   Family Acceptance, E,TB,H, VU by MS at 3/16/2023 1801                               User Key     Initials Effective Dates Name Provider Type Discipline    ZACH 03/07/18 -  Tiffanie Grace, PTA Physical Therapist Assistant PT    CAMERON 05/19/21 -  Leeann Wiley, PT Physical Therapist PT    MS 06/16/21 -  Angel Luis Cr, RN Registered Nurse Nurse    CW 12/13/22 -  Tamica Willoughby, PT Physical Therapist PT    CB 10/22/21 -  Bety Benitez, PT Physical Therapist PT    LM 10/13/22 -  Silke Grace, RN Registered Nurse Nurse    MO 01/27/23 -  Lacie Tanner, PT Student PT Student PT    ZB 03/10/23 -  Nestor Rae, PT Student PT Student PT              PT Recommendation and Plan     Plan of Care Reviewed With: patient  Outcome Evaluation: Pt participated with PT this PM. Pt  progressing with PT. He demo improved bed mobility today- req min A for supine>sit and cga only for sit>supine. He tolerates sitting EOB extended time with sba. Completed dynamic reaching task with LUE, crossing midline without loss of balance. Difficulty getting pt to understand to attempt to use his RUE for functional tasks to regain his strength. Also difficult to know if the limitation is confusion vs possible aphasia vs language barrier. PT will continue to follow and progress as able.     Time Calculation:    PT Charges     Row Name 04/03/23 1516             Time Calculation    Start Time 1443  -CW      Stop Time 1509  -CW      Time Calculation (min) 26 min  -CW      PT Received On 04/03/23  -CW      PT - Next Appointment 04/04/23  -CW            User Key  (r) = Recorded By, (t) = Taken By, (c) = Cosigned By    Initials Name Provider Type    CW Tamica Willoughby, JOSE ELIAS Physical Therapist              Therapy Charges for Today     Code Description Service Date Service Provider Modifiers Qty    36220356163  PT THERAPEUTIC ACT EA 15 MIN 4/3/2023 Tamica Willoughby, PT GP 1    86274048581 HC PT THER PROC EA 15 MIN 4/3/2023 Tamica Willoughby, PT GP 1    50638714894 HC PT THER SUPP EA 15 MIN 4/3/2023 Tamica Willoughby, PT GP 1          PT G-Codes  Outcome Measure Options: AM-PAC 6 Clicks Basic Mobility (PT)  AM-PAC 6 Clicks Score (PT): 12  AM-PAC 6 Clicks Score (OT): 10  Modified Travis Scale: 4 - Moderately severe disability.  Unable to walk without assistance, and unable to attend to own bodily needs without assistance.  PT Discharge Summary  Anticipated Discharge Disposition (PT): inpatient rehabilitation facility, skilled nursing facility    Tamica Willoughby PT  4/3/2023

## 2023-04-03 NOTE — CASE MANAGEMENT/SOCIAL WORK
Continued Stay Note  [facility]     Patient Name: Manuel Durant  MRN: [id number]  Today's Date: 4/3/2023    Admit Date: 3/12/2023    Plan: Pending   Discharge Plan     Row Name 04/03/23 1403       Plan    Plan Comments Call placed to Ariel Durant in [place].  He states that he will discuss the plan with his daughter today and call CCP back tomorrow to discuss the discharge plan.  At this time, he is planning for his daughter to drive or fly to [place] to retrieve her grandfather and take him home to [place] with her.  Await more detailed plans.  Will speak with Mr. Durant again tomorrow.  In the meantime, hopefully pt will continue to strengthen and be able to sit independently for travel.  Faye ARMSTRONG    Row Name                Discharge Codes    No documentation.               Expected Discharge Date and Time     Expected Discharge Date Expected Discharge Time    Apr 9, 2023             Faye Moy RN

## 2023-04-03 NOTE — CASE MANAGEMENT/SOCIAL WORK
Continued Stay Note  [facility]     Patient Name: Manuel Durant  MRN: [id number]  Today's Date: 4/3/2023    Admit Date: 3/12/2023    Plan: Pending   Discharge Plan     Row Name 04/03/23 1250       Plan    Plan Comments Call received from Darien Brownlee.  He reports that pt's friend Bridgett Shook has been able to locate pt's passport.  He does not know at this time if it is up to date but they will keep it with pt's belongings to return to him at discharge.  They have stored pt's truck and belongings at a friend's house and have communicated that to pt's son Ariel.  Call placed to pt's granddaughter Natalie and VM left for call back to discuss discharge plan.  Eval in progress for [facility].  At this time,  pt will need to be able to sit independently and endure an extended trip by personal vehicle at discharge.  Faye ARMSTRONG    Row Name 04/03/23 1121       Plan    Plan Comments Communication received from pt's son Ariel.  He reports that family is unable to afford any of the quotes for medical transport to [place].  Faye ARMSTRONG               Discharge Codes    No documentation.               Expected Discharge Date and Time     Expected Discharge Date Expected Discharge Time    Apr 9, 2023             Faye Moy RN

## 2023-04-03 NOTE — PLAN OF CARE
Goal Outcome Evaluation:  Plan of Care Reviewed With: patient        Progress: no change  Outcome Evaluation: Swallow study done, otherwise no changes.        Problem: Fall Injury Risk  Goal: Absence of Fall and Fall-Related Injury  Outcome: Ongoing, Progressing  Intervention: Promote Injury-Free Environment  Recent Flowsheet Documentation  Taken 4/3/2023 0800 by Johanny Buchanan RN  Safety Promotion/Fall Prevention:   safety round/check completed   fall prevention program maintained   elopement precautions     Problem: Skin Injury Risk Increased  Goal: Skin Health and Integrity  Outcome: Ongoing, Progressing  Intervention: Optimize Skin Protection  Recent Flowsheet Documentation  Taken 4/3/2023 0800 by Johanny Buchanan RN  Pressure Reduction Techniques:   frequent weight shift encouraged   weight shift assistance provided  Pressure Reduction Devices: alternating pressure pump (ADD)  Skin Protection:   adhesive use limited   incontinence pads utilized   transparent dressing maintained   tubing/devices free from skin contact     Problem: Adult Inpatient Plan of Care  Goal: Plan of Care Review  Outcome: Ongoing, Progressing  Flowsheets (Taken 4/3/2023 0935)  Progress: no change  Plan of Care Reviewed With: patient  Goal: Patient-Specific Goal (Individualized)  Outcome: Ongoing, Progressing  Goal: Absence of Hospital-Acquired Illness or Injury  Outcome: Ongoing, Progressing  Intervention: Identify and Manage Fall Risk  Recent Flowsheet Documentation  Taken 4/3/2023 0800 by Johanny Buchanan RN  Safety Promotion/Fall Prevention:   safety round/check completed   fall prevention program maintained   elopement precautions  Intervention: Prevent Skin Injury  Recent Flowsheet Documentation  Taken 4/3/2023 0800 by Johanny Buchanan RN  Skin Protection:   adhesive use limited   incontinence pads utilized   transparent dressing maintained   tubing/devices free from skin contact  Intervention: Prevent and Manage VTE (Venous  Thromboembolism) Risk  Recent Flowsheet Documentation  Taken 4/3/2023 0800 by Johanny Buchanan RN  Activity Management: activity adjusted per tolerance  VTE Prevention/Management: (eliquis) other (see comments)  Goal: Optimal Comfort and Wellbeing  Outcome: Ongoing, Progressing  Goal: Readiness for Transition of Care  Outcome: Ongoing, Progressing     Problem: Adjustment to Illness (Stroke, Ischemic/Transient Ischemic Attack)  Goal: Optimal Coping  Outcome: Ongoing, Progressing     Problem: Bowel Elimination Impaired (Stroke, Ischemic/Transient Ischemic Attack)  Goal: Effective Bowel Elimination  Outcome: Ongoing, Progressing     Problem: Cerebral Tissue Perfusion (Stroke, Ischemic/Transient Ischemic Attack)  Goal: Optimal Cerebral Tissue Perfusion  Outcome: Ongoing, Progressing  Intervention: Protect and Optimize Cerebral Perfusion  Recent Flowsheet Documentation  Taken 4/3/2023 0800 by Johanny Buchanan RN  Cerebral Perfusion Promotion: blood pressure monitored     Problem: Cognitive Impairment (Stroke, Ischemic/Transient Ischemic Attack)  Goal: Optimal Cognitive Function  Outcome: Ongoing, Progressing     Problem: Communication Impairment (Stroke, Ischemic/Transient Ischemic Attack)  Goal: Improved Communication Skills  Outcome: Ongoing, Progressing     Problem: Functional Ability Impaired (Stroke, Ischemic/Transient Ischemic Attack)  Goal: Optimal Functional Ability  Outcome: Ongoing, Progressing  Intervention: Optimize Functional Ability  Recent Flowsheet Documentation  Taken 4/3/2023 0800 by Johanny Buchanan RN  Activity Management: activity adjusted per tolerance     Problem: Respiratory Compromise (Stroke, Ischemic/Transient Ischemic Attack)  Goal: Effective Oxygenation and Ventilation  Outcome: Ongoing, Progressing     Problem: Sensorimotor Impairment (Stroke, Ischemic/Transient Ischemic Attack)  Goal: Improved Sensorimotor Function  Outcome: Ongoing, Progressing  Intervention: Optimize Sensory and  Perceptual Ability  Recent Flowsheet Documentation  Taken 4/3/2023 0800 by Johanny Buchanan RN  Pressure Reduction Techniques:   frequent weight shift encouraged   weight shift assistance provided  Pressure Reduction Devices: alternating pressure pump (ADD)     Problem: Swallowing Impairment (Stroke, Ischemic/Transient Ischemic Attack)  Goal: Optimal Eating and Swallowing without Aspiration  Outcome: Ongoing, Progressing     Problem: Urinary Elimination Impaired (Stroke, Ischemic/Transient Ischemic Attack)  Goal: Effective Urinary Elimination  Outcome: Ongoing, Progressing     Problem: Adjustment to Illness (Stroke, Hemorrhagic)  Goal: Optimal Coping  Outcome: Ongoing, Progressing     Problem: Bowel Elimination Impaired (Stroke, Hemorrhagic)  Goal: Effective Bowel Elimination  Outcome: Ongoing, Progressing     Problem: Cerebral Tissue Perfusion (Stroke, Hemorrhagic)  Goal: Optimal Cerebral Tissue Perfusion  Outcome: Ongoing, Progressing  Intervention: Protect and Optimize Cerebral Perfusion  Recent Flowsheet Documentation  Taken 4/3/2023 0800 by Johanny Buchanan RN  Cerebral Perfusion Promotion: blood pressure monitored     Problem: Cognitive Impairment (Stroke, Hemorrhagic)  Goal: Optimal Cognitive Function  Outcome: Ongoing, Progressing     Problem: Communication Impairment (Stroke, Hemorrhagic)  Goal: Effective Communication Skills  Outcome: Ongoing, Progressing     Problem: Functional Ability Impaired (Stroke, Hemorrhagic)  Goal: Optimal Functional Ability  Outcome: Ongoing, Progressing  Intervention: Optimize Functional Ability  Recent Flowsheet Documentation  Taken 4/3/2023 0800 by Johanny Buchanan RN  Activity Management: activity adjusted per tolerance     Problem: Pain (Stroke, Hemorrhagic)  Goal: Acceptable Pain Control  Outcome: Ongoing, Progressing     Problem: Respiratory Compromise (Stroke, Hemorrhagic)  Goal: Effective Oxygenation and Ventilation  Outcome: Ongoing, Progressing     Problem:  Sensorimotor Impairment (Stroke, Hemorrhagic)  Goal: Improved Sensorimotor Function  Outcome: Ongoing, Progressing  Intervention: Optimize Sensory and Perceptual Ability  Recent Flowsheet Documentation  Taken 4/3/2023 0800 by Johanny Buchanan, RN  Pressure Reduction Techniques:   frequent weight shift encouraged   weight shift assistance provided  Pressure Reduction Devices: alternating pressure pump (ADD)     Problem: Swallowing Impairment (Stroke, Hemorrhagic)  Goal: Optimal Eating and Swallowing Without Aspiration  Outcome: Ongoing, Progressing     Problem: Urinary Elimination Impaired (Stroke, Hemorrhagic)  Goal: Effective Urinary Elimination  Outcome: Ongoing, Progressing

## 2023-04-04 PROCEDURE — 97530 THERAPEUTIC ACTIVITIES: CPT

## 2023-04-04 PROCEDURE — 97110 THERAPEUTIC EXERCISES: CPT

## 2023-04-04 PROCEDURE — 97112 NEUROMUSCULAR REEDUCATION: CPT

## 2023-04-04 RX ADMIN — HYDRALAZINE HYDROCHLORIDE 10 MG: 10 TABLET, FILM COATED ORAL at 22:00

## 2023-04-04 RX ADMIN — APIXABAN 5 MG: 5 TABLET, FILM COATED ORAL at 20:36

## 2023-04-04 RX ADMIN — DOCUSATE SODIUM 50MG AND SENNOSIDES 8.6MG 2 TABLET: 8.6; 5 TABLET, FILM COATED ORAL at 08:12

## 2023-04-04 RX ADMIN — BACLOFEN 2.5 MG: 10 TABLET ORAL at 20:36

## 2023-04-04 RX ADMIN — HYDRALAZINE HYDROCHLORIDE 10 MG: 10 TABLET, FILM COATED ORAL at 06:04

## 2023-04-04 RX ADMIN — Medication 10 ML: at 08:13

## 2023-04-04 RX ADMIN — CARVEDILOL 12.5 MG: 12.5 TABLET, FILM COATED ORAL at 08:13

## 2023-04-04 RX ADMIN — APIXABAN 5 MG: 5 TABLET, FILM COATED ORAL at 08:13

## 2023-04-04 RX ADMIN — BACLOFEN 2.5 MG: 10 TABLET ORAL at 08:13

## 2023-04-04 RX ADMIN — Medication 10 ML: at 20:36

## 2023-04-04 RX ADMIN — HYDRALAZINE HYDROCHLORIDE 10 MG: 10 TABLET, FILM COATED ORAL at 15:20

## 2023-04-04 RX ADMIN — VALSARTAN 160 MG: 160 TABLET, FILM COATED ORAL at 08:13

## 2023-04-04 RX ADMIN — AMLODIPINE BESYLATE 10 MG: 10 TABLET ORAL at 08:12

## 2023-04-04 NOTE — PLAN OF CARE
Goal Outcome Evaluation:              Outcome Evaluation: Alert and oriented x1. Pleasant. Tolerated diet. Incontinent of bowel and bladder. Right sided hemiplegia and right facial droop noted. Awaiting d/c plan.

## 2023-04-04 NOTE — THERAPY TREATMENT NOTE
Patient Name: Manuel Durant  : 1950    MRN: [id number]                              Today's Date: 2023       Admit Date: 3/12/2023    Visit Dx:     ICD-10-CM ICD-9-CM   1. Intracranial hemorrhage  I62.9 432.9   2. Altered mental status, unspecified altered mental status type  R41.82 780.97     Patient Active Problem List   Diagnosis   • Intracranial hemorrhage   • Hypertensive emergency   • Acute DVT (deep venous thrombosis)   • HTN (hypertension)   • Hypokalemia     History reviewed. No pertinent past medical history.  History reviewed. No pertinent surgical history.   General Information     Row Name 23          Physical Therapy Time and Intention    Document Type therapy note (daily note)  -CW     Mode of Treatment physical therapy;co-treatment  -CW     Row Name 23          General Information    Patient Profile Reviewed yes  -CW     Existing Precautions/Restrictions fall  -CW     Row Name 23          Cognition    Orientation Status (Cognition) oriented to;person  -CW     Row Name 23          Safety Issues, Functional Mobility    Safety Issues Affecting Function (Mobility) insight into deficits/self-awareness;awareness of need for assistance;judgment;problem-solving;impulsivity;safety precautions follow-through/compliance;sequencing abilities  -CW     Impairments Affecting Function (Mobility) balance;coordination;endurance/activity tolerance;grasp;motor control;postural/trunk control;strength;range of motion (ROM);cognition  -CW           User Key  (r) = Recorded By, (t) = Taken By, (c) = Cosigned By    Initials Name Provider Type    Tamica Eddy PT Physical Therapist               Mobility     Row Name 23          Bed Mobility    Bed Mobility supine-sit;sit-supine  -CW     Supine-Sit [place] (Bed Mobility) moderate assist (50% patient effort);verbal cues;1 person assist  -CW     Sit-Supine [place] (Bed Mobility) standby  assist;contact guard;verbal cues  -CW     Assistive Device (Bed Mobility) head of bed elevated;bed rails  -CW     Row Name 04/04/23 0905          Bed-Chair Transfer    Bed-Chair [place] (Transfers) maximum assist (25% patient effort);2 person assist;verbal cues  -CW     Assistive Device (Bed-Chair Transfers) --  hha x2  -CW     Comment, (Bed-Chair Transfer) x2 trials during session, to and from chair  -CW     Row Name 04/04/23 0905          Sit-Stand Transfer    Sit-Stand [place] (Transfers) moderate assist (50% patient effort);2 person assist;verbal cues  -CW     Assistive Device (Sit-Stand Transfers) other (see comments)  hha x2  -CW     Row Name 04/04/23 0905          Gait/Stairs (Locomotion)    [place] Level (Gait) unable to assess  -CW           User Key  (r) = Recorded By, (t) = Taken By, (c) = Cosigned By    Initials Name Provider Type    Tamica Eddy PT Physical Therapist               Obj/Interventions     Row Name 04/04/23 0907          Motor Skills    Therapeutic Exercise other (see comments)  mini R laq x10 reps, heel slides on R x10 reps  -CW     Row Name 04/04/23 0907          Balance    Static Sitting Balance standby assist;contact guard  -CW     Dynamic Sitting Balance standby assist  -CW     Position, Sitting Balance sitting edge of bed;unsupported  -CW     Static Standing Balance maximum assist;2-person assist  -CW     Dynamic Standing Balance maximum assist;2-person assist  -CW     Comment, Balance heavy R lateral lean in standing  -CW           User Key  (r) = Recorded By, (t) = Taken By, (c) = Cosigned By    Initials Name Provider Type    Tamica Eddy PT Physical Therapist               Goals/Plan    No documentation.                Clinical Impression     Row Name 04/04/23 0908          Pain    Pretreatment Pain Rating 0/10 - no pain  -CW     Pre/Posttreatment Pain Comment reports R shoulder pain with AROM, does not rate  -CW     Pain Intervention(s)  Repositioned;Rest;Ambulation/increased activity  -CW     Row Name 04/04/23 0908          Plan of Care Review    Plan of Care Reviewed With patient  -CW     Outcome Evaluation Pt seen for PT this AM. Pt continues to demo improved sitting balance. Noted some improvement in RUE and RLE movement spontaneously - pt with difficulty understanding how to complete exercises with cues. Transfers and standing remain max A x2 with R lateral lean. PT will continue to progress as able.  -CW     Row Name 04/04/23 0908          Vital Signs    O2 Delivery Pre Treatment room air  -CW     Row Name 04/04/23 0908          Positioning and Restraints    Pre-Treatment Position in bed  -CW     Post Treatment Position bed  -CW     In Bed notified nsg;call light within reach;encouraged to call for assist;exit alarm on;fowlers;RUE elevated  -CW           User Key  (r) = Recorded By, (t) = Taken By, (c) = Cosigned By    Initials Name Provider Type    Tamica Eddy PT Physical Therapist               Outcome Measures     Row Name 04/04/23 0906          How much help from another person do you currently need...    Turning from your back to your side while in flat bed without using bedrails? 3  -CW     Moving from lying on back to sitting on the side of a flat bed without bedrails? 3  -CW     Moving to and from a bed to a chair (including a wheelchair)? 2  -CW     Standing up from a chair using your arms (e.g., wheelchair, bedside chair)? 2  -CW     Climbing 3-5 steps with a railing? 1  -CW     To walk in hospital room? 1  -CW     AM-PAC 6 Clicks Score (PT) 12  -CW     Highest level of mobility 4 --> Transferred to chair/commode  -CW     Row Name 04/04/23 0906          Functional Assessment    Outcome Measure Options AM-PAC 6 Clicks Basic Mobility (PT)  -CW           User Key  (r) = Recorded By, (t) = Taken By, (c) = Cosigned By    Initials Name Provider Type    Tamica Eddy PT Physical Therapist                              Physical Therapy Education     Title: PT OT SLP Therapies (In Progress)     Topic: Physical Therapy (In Progress)     Point: Mobility training (In Progress)     Learning Progress Summary           Patient Acceptance, E, NR by CW at 4/4/2023 0906    Acceptance, E, NR by CW at 4/3/2023 1531    Acceptance, E,TB, VU,NR by CAMERON at 4/1/2023 1530    Acceptance, E, NR by CW at 3/31/2023 0925    Acceptance, E, NR by CW at 3/30/2023 1209    Acceptance, E, NR,NL by CW at 3/29/2023 1336    Acceptance, E, NR by ZB at 3/28/2023 1317    Acceptance, E, NR by CW at 3/27/2023 1401    Acceptance, E,D, NR,VU by JM at 3/24/2023 1631    Comment: seemed to comprehend commands this session    Acceptance, E,D, NR by ZACH at 3/23/2023 1655    Acceptance, E,D, NR by ZACH at 3/22/2023 1545    Acceptance, E,TB, VU,NR by CB at 3/21/2023 1544    Acceptance, E,TB,H, VU by MS at 3/16/2023 1801    Acceptance, E,D, DU,NR by MO at 3/16/2023 1451    Acceptance, E, DU,NR by MO at 3/15/2023 1200    Acceptance, E, NR by LM at 3/15/2023 0521    Acceptance, E,D, DU,NR by MO at 3/14/2023 1458   Family Acceptance, E,TB,H, VU by MS at 3/16/2023 1801                   Point: Home exercise program (Done)     Learning Progress Summary           Patient Acceptance, E,TB, VU,NR by CAMERON at 4/1/2023 1530    Acceptance, E, NR by CW at 3/31/2023 0925    Acceptance, E, NR by CHARLY at 3/28/2023 1317    Acceptance, E, NR by CW at 3/27/2023 1401    Acceptance, E,D, NR,VU by JM at 3/24/2023 1631    Comment: seemed to comprehend commands this session    Acceptance, E,D, NR by ZACH at 3/23/2023 1655    Acceptance, E,D, NR by JM at 3/22/2023 1545    Acceptance, E,TB, VU,NR by CB at 3/21/2023 1544    Acceptance, E,TB,H, VU by MS at 3/16/2023 1801    Acceptance, E,D, DU,NR by MO at 3/16/2023 1451    Acceptance, E, NR by LM at 3/15/2023 0521    Acceptance, E,D, DU,NR by MO at 3/14/2023 1458   Family Acceptance, E,TB,H, VU by MS at 3/16/2023 1801                   Point: Body mechanics (In  Progress)     Learning Progress Summary           Patient Acceptance, E, NR by CW at 4/4/2023 0906    Acceptance, E, NR by CW at 4/3/2023 1531    Acceptance, E,TB, VU,NR by CAMERON at 4/1/2023 1530    Acceptance, E,D, NR,VU by JM at 3/24/2023 1631    Comment: seemed to comprehend commands this session    Acceptance, E,D, NR by ZACH at 3/23/2023 1655    Acceptance, E,D, NR by JM at 3/22/2023 1545    Acceptance, E,TB, VU,NR by CB at 3/21/2023 1544    Acceptance, E,TB,H, VU by MS at 3/16/2023 1801    Acceptance, E,D, DU,NR by MO at 3/16/2023 1451    Acceptance, E, DU,NR by MO at 3/15/2023 1200    Acceptance, E, NR by LM at 3/15/2023 0521    Acceptance, E,D, DU,NR by MO at 3/14/2023 1458   Family Acceptance, E,TB,H, VU by MS at 3/16/2023 1801                   Point: Precautions (In Progress)     Learning Progress Summary           Patient Acceptance, E, NR by CW at 4/4/2023 0906    Acceptance, E, NR by CW at 4/3/2023 1531    Acceptance, E,TB, VU,NR by CAMERON at 4/1/2023 1530    Acceptance, E,D, NR,VU by ZACH at 3/24/2023 1631    Comment: seemed to comprehend commands this session    Acceptance, E,D, NR by ZACH at 3/23/2023 1655    Acceptance, E,D, NR by JM at 3/22/2023 1545    Acceptance, E,TB, VU,NR by CB at 3/21/2023 1544    Acceptance, E,TB,H, VU by MS at 3/16/2023 1801    Acceptance, E,D, DU,NR by MO at 3/16/2023 1451    Acceptance, E, DU,NR by MO at 3/15/2023 1200    Acceptance, E, NR by LM at 3/15/2023 0521    Acceptance, E,D, DU,NR by MO at 3/14/2023 1458   Family Acceptance, E,TB,H, VU by MS at 3/16/2023 1801                               User Key     Initials Effective Dates Name Provider Type Discipline    JM 03/07/18 -  Tiffanie Grace, PTA Physical Therapist Assistant PT    CAMERON 05/19/21 -  Leeann Wiley, PT Physical Therapist PT    MS 06/16/21 -  Angel Luis Cr, RN Registered Nurse Nurse    CW 12/13/22 -  Tamica Willoughby, PT Physical Therapist PT    CB 10/22/21 -  Bety Benitez, PT Physical Therapist PT    LM  10/13/22 -  Silke Grace, RN Registered Nurse Nurse    MO 01/27/23 -  Lacie Tanner, PT Student PT Student PT    CHARLY 03/10/23 -  Nestor Rae, PT Student PT Student PT              PT Recommendation and Plan     Plan of Care Reviewed With: patient  Outcome Evaluation: Pt seen for PT this AM. Pt continues to demo improved sitting balance. Noted some improvement in RUE and RLE movement spontaneously - pt with difficulty understanding how to complete exercises with cues. Transfers and standing remain max A x2 with R lateral lean. PT will continue to progress as able.     Time Calculation:    PT Charges     Row Name 04/04/23 0904             Time Calculation    Start Time 0827  -CW      Stop Time 0855  -CW      Time Calculation (min) 28 min  -CW      PT Received On 04/04/23  -CW      PT - Next Appointment 04/05/23  -CW            User Key  (r) = Recorded By, (t) = Taken By, (c) = Cosigned By    Initials Name Provider Type    CW Tamica Willoughby, PT Physical Therapist              Therapy Charges for Today     Code Description Service Date Service Provider Modifiers Qty    20928607583 HC PT THERAPEUTIC ACT EA 15 MIN 4/3/2023 Tamica Willoughby, PT GP 1    50285061381 HC PT THER PROC EA 15 MIN 4/3/2023 Tamica Willoughby, PT GP 1    82618702118 HC PT THER SUPP EA 15 MIN 4/3/2023 Tamica Willoughby, PT GP 1    20119936722 HC PT THERAPEUTIC ACT EA 15 MIN 4/4/2023 Tamica Willoughby, PT GP 1    21606346086 HC PT THER PROC EA 15 MIN 4/4/2023 Tamica Willoughby, PT GP 1          PT G-Codes  Outcome Measure Options: AM-PAC 6 Clicks Basic Mobility (PT)  AM-PAC 6 Clicks Score (PT): 12  AM-PAC 6 Clicks Score (OT): 10  Modified [place] Scale: 4 - Moderately severe disability.  Unable to walk without assistance, and unable to attend to own bodily needs without assistance.  PT Discharge Summary  Anticipated Discharge Disposition (PT): inpatient rehabilitation facility, skilled nursing facility    Tamica Willoughby PT  4/4/2023

## 2023-04-04 NOTE — PROGRESS NOTES
"Nutrition Services    Patient Name:  Manuel Durant  YOB: 1950  MRN: [id number]  Admit Date:  3/12/2023    FOLLOW UP - CLINICAL NUTRITION    Assessment Date:  04/04/23     Comments: Patient meets ASPEN/AND criteria for nutrition dx of severe malnutrition of acute disease based on wt loss and energy intake    Encounter Information         Reason for Encounter Follow up     Current Issues S/w patient's RN who reports he has been eating well the days she has had him. She reports if somebody feeds him, he will eat well. Per wt hx, patient has lost 13# (8.5%) since admission 23 days ago. Per EMR, patient has been getting assistance with meals but was not being totally fed consistently. See MSA below. CCP has been working on how to get him back to [place] with family. RD to order vanilla carnation instant breakfast q daily to help meet nutrition needs. Will continue to follow      Current Nutrition Orders & Evaluation of Intake       Oral Nutrition     Current PO Diet Diet: Regular/House Diet; No Mixed Consistencies; Texture: Soft to Chew (NDD 3); Soft to Chew: Chopped Meat; Fluid Consistency: Nectar Thick   Supplement n/a   PO Evaluation     % PO Intake 75% breakfast     # of Days Evaluated     Factors Affecting Intake  swallow impairment   --  Anthropometrics          Height    Weight Height: 167.6 cm (66\")  Weight: 63.9 kg (140 lb 14 oz) (04/03/23 0624)    BMI kg/m2 Body mass index is 22.74 kg/m².    Weight trend Loss, Amount/Timeframe:  13# (8.5%) .  Documented weights    03/12/23 2147 03/13/23 0436 03/14/23 0539 03/15/23 0503   Weight: 69.7 kg (153 lb 10.6 oz) 69.7 kg (153 lb 10.6 oz) 71.2 kg (156 lb 15.5 oz) 71.3 kg (157 lb 3 oz)    03/16/23 0348 03/17/23 0400 03/18/23 0517 03/19/23 0426   Weight: 78.4 kg (172 lb 13.5 oz) 78.5 kg (173 lb 1 oz) 75.1 kg (165 lb 9.1 oz) 75.1 kg (165 lb 9.1 oz)    03/20/23 0600 03/21/23 0504 03/22/23 0505 03/23/23 0427   Weight: 71.8 kg (158 lb 4.6 oz) 69.2 kg (152 lb " 8.9 oz) 72.1 kg (158 lb 15.2 oz) 72.5 kg (159 lb 13.3 oz)    03/24/23 0509 03/25/23 0540 03/26/23 0647 03/27/23 0429   Weight: 70.9 kg (156 lb 4.9 oz) 71.5 kg (157 lb 10.1 oz) 71.3 kg (157 lb 3 oz) 71.8 kg (158 lb 4.6 oz)    03/30/23 0501 03/31/23 0500 04/02/23 0529 04/03/23 0624   Weight: 70.6 kg (155 lb 10.3 oz) 70.2 kg (154 lb 12.2 oz) 69.8 kg (153 lb 14.1 oz) 63.9 kg (140 lb 14 oz)          Malnutrition Severity Assessment      Patient meets criteria for : Severe Malnutrition (Patient meets ASPEN/AND criteria for nutrition dx of severe malnutrition of acute disease based on wt loss and energy intake)  Malnutrition Type (last 8 hours)     Malnutrition Severity Assessment     Row Name 04/04/23 1544       Malnutrition Severity Assessment    Malnutrition Type Acute Disease or Injury - Related Malnutrition    Row Name 04/04/23 1544       Insufficient Energy Intake     Insufficient Energy Intake Findings Severe  patient needs to be totally fed and not just assisted with feeding    Insufficient Energy Intake  < or equal to 50% of est. energy requirement for > or equal to 5d)    Row Name 04/04/23 1544       Unintentional Weight Loss     Unintentional Weight Loss Findings Severe  13# (8.5%) x 23 days    Unintentional Weight Loss  Weight loss greater than 5% in one month    Row Name 04/04/23 1544       Criteria Met (Must meet criteria for severity in at least 2 of these categories: M Wasting, Fat Loss, Fluid, Secondary Signs, Wt. Status, Intake)    Patient meets criteria for  Severe Malnutrition  Patient meets ASPEN/AND criteria for nutrition dx of severe malnutrition of acute disease based on wt loss and energy intake                   Labs        Pertinent Labs Reviewed, listed below     Results from last 7 days   Lab Units 03/30/23  0610 03/29/23  0745   SODIUM mmol/L 142 139   POTASSIUM mmol/L 4.1 4.2   CHLORIDE mmol/L 110* 106   CO2 mmol/L 24.5 23.2   BUN mg/dL 21 20   CREATININE mg/dL 0.77 0.73*   CALCIUM mg/dL 8.7  8.9   GLUCOSE mg/dL 89 91     Results from last 7 days   Lab Units 03/30/23  0610 03/29/23  0745   MAGNESIUM mg/dL 2.0 2.1   PHOSPHORUS mg/dL 3.2 3.4   HEMOGLOBIN g/dL 13.1 13.5   HEMATOCRIT % 39.3 39.9   WBC 10*3/mm3 8.31 7.52     Results from last 7 days   Lab Units 03/30/23  0610 03/29/23  0745   PLATELETS 10*3/mm3 323 326     No results found for: COVID19  Lab Results   Component Value Date    HGBA1C 5.60 03/15/2023          Medications            Scheduled Medications amLODIPine, 10 mg, Oral, QAM AC  apixaban, 5 mg, Oral, Q12H  baclofen, 2.5 mg, Oral, Q12H  carvedilol, 12.5 mg, Oral, BID With Meals  hydrALAZINE, 10 mg, Oral, Q8H  senna-docusate sodium, 2 tablet, Oral, BID  sodium chloride, 10 mL, Intravenous, Q12H  valsartan, 160 mg, Oral, QAM AC        Infusions      PRN Medications •  acetaminophen  •  senna-docusate sodium **AND** polyethylene glycol **AND** bisacodyl **AND** bisacodyl  •  Calcium Gluconate-NaCl **AND** calcium gluconate IVPB **AND** Calcium  •  labetalol  •  loperamide  •  magnesium sulfate **OR** magnesium sulfate **OR** magnesium sulfate  •  potassium chloride **OR** potassium chloride **OR** potassium chloride  •  potassium phosphate infusion greater than 15 mMoles **OR** potassium phosphate infusion greater than 15 mMoles **OR** potassium phosphate **OR** sodium phosphate IVPB **OR** sodium phosphate IVPB  •  [COMPLETED] Insert Peripheral IV **AND** sodium chloride  •  sodium chloride  •  sodium chloride     Physical Findings          Physical Appearance disoriented, overweight, somnolent   Oral/Mouth Cavity teeth missing   Edema  no edema   Gastrointestinal normoactive, last bowel movement:4/4   Skin  skin intact   Tubes/Drains none   NFPE Not applicable at this time   --  NUTRITION INTERVENTION / PLAN OF CARE  Intervention Goal         Intervention Goal(s) Maintain intake and Maintain weight     Nutrition Intervention         RD Action Follow Tx Progress and Care plan reviewed      Nutrition Prescription         Diet Prescription     Supplement Prescription Other:  carnation instant breakfast+honey thick milk q daily    EN/PN Prescription    New Prescription Ordered? Yes   --  Monitor/Evaluation        Monitor Per protocol   Discharge Needs Other: go to [place] to be with family    Education Will instruct as appropriate   --    RD to follow up per protocol.    Electronically signed by:  Richelle Milligan RD  04/04/23 15:39 EDT

## 2023-04-04 NOTE — PLAN OF CARE
Goal Outcome Evaluation:  Plan of Care Reviewed With: patient        Progress: improving  Outcome Evaluation: Pt agreeable to OT this AM. He sits EOB with ModA, able to maintain sitting balance with SBA during dynamic reaching task using LUE with increased trunk control noted. Cues for visual scanning to R peripheral field to identify objects. Pt perseverating on specific phrases/words t/o session, cues to redirect and attend to commands. He TFs to bedside chair and back to EOB with MaxAx2, R lateral lean and dec coordination of RLE. Some improvement noted, continue to address cognition in order to improve carryover. OT will continue to address fxl deficits.

## 2023-04-04 NOTE — PLAN OF CARE
Goal Outcome Evaluation:  Plan of Care Reviewed With: patient           Outcome Evaluation: Pt seen for PT this AM. Pt continues to demo improved sitting balance. Noted some improvement in RUE and RLE movement spontaneously - pt with difficulty understanding how to complete exercises with cues. Transfers and standing remain max A x2 with R lateral lean. PT will continue to progress as able.

## 2023-04-04 NOTE — THERAPY TREATMENT NOTE
Patient Name: Manuel Durant  : 1950    MRN: [id number]                              Today's Date: 2023       Admit Date: 3/12/2023    Visit Dx:     ICD-10-CM ICD-9-CM   1. Intracranial hemorrhage  I62.9 432.9   2. Altered mental status, unspecified altered mental status type  R41.82 780.97     Patient Active Problem List   Diagnosis   • Intracranial hemorrhage   • Hypertensive emergency   • Acute DVT (deep venous thrombosis)   • HTN (hypertension)   • Hypokalemia     History reviewed. No pertinent past medical history.  History reviewed. No pertinent surgical history.   General Information     Row Name 23 1053          OT Time and Intention    Document Type therapy note (daily note)  -     Mode of Treatment co-treatment;occupational therapy  -     Row Name 23 1053          General Information    Patient Profile Reviewed yes  -     Existing Precautions/Restrictions fall  -     Barriers to Rehab cognitive status  -     Row Name 23          Cognition    Orientation Status (Cognition) oriented to;person  -     Row Name 23          Safety Issues, Functional Mobility    Safety Issues Affecting Function (Mobility) ability to follow commands;awareness of need for assistance;impulsivity;judgment;positioning of assistive device;problem-solving;safety precaution awareness;safety precautions follow-through/compliance;sequencing abilities;insight into deficits/self-awareness  -     Impairments Affecting Function (Mobility) balance;coordination;endurance/activity tolerance;grasp;motor control;postural/trunk control;strength;range of motion (ROM);cognition  -     Cognitive Impairments, Mobility Safety/Performance awareness, need for assistance;insight into deficits/self-awareness;impulsivity;judgment;problem-solving/reasoning;safety precaution awareness;safety precaution follow-through;sequencing abilities  -           User Key  (r) = Recorded By, (t) = Taken By,  (c) = Cosigned By    Initials Name Provider Type     Evelia Kenney OT Occupational Therapist                 Mobility/ADL's     Row Name 04/04/23 1053          Bed Mobility    Bed Mobility supine-sit;sit-supine  -     Supine-Sit [place] (Bed Mobility) moderate assist (50% patient effort);verbal cues;1 person assist  -     Sit-Supine [place] (Bed Mobility) standby assist;contact guard;verbal cues  -     Bed Mobility, Safety Issues cognitive deficits limit understanding  -     Assistive Device (Bed Mobility) head of bed elevated;bed rails  -     Row Name 04/04/23 1053          Transfers    Transfers sit-stand transfer  -     Row Name 04/04/23 1053          Bed-Chair Transfer    Bed-Chair [place] (Transfers) maximum assist (25% patient effort);2 person assist;verbal cues  -     Comment, (Bed-Chair Transfer) HHA x2, cues for sequencing. R knee closely guarded to prevent buckling.  -[facility] Name 04/04/23 1053          Sit-Stand Transfer    Sit-Stand [place] (Transfers) moderate assist (50% patient effort);2 person assist;verbal cues  -     Comment, (Sit-Stand Transfer) HHA. Continues to demo R lateral lean in standing with cues to correct to midline  -           User Key  (r) = Recorded By, (t) = Taken By, (c) = Cosigned By    Initials Name Provider Type     Evelia Kenney OT Occupational Therapist               Obj/Interventions     Row Name 04/04/23 1056          Vision Assessment/Intervention    Visual Processing Deficit karen-inattention/neglect, right  -     Vision Assessment Comment multiple cues for visual scanning to R. Able to identify color of objects in R peripheral visual field with multiple cues  -     Row Name 04/04/23 1056          Range of Motion Comprehensive    Comment, General Range of Motion little carryover of education on self ROM of RUE. However able to complete once set-up with L holding R. Demo's slight improvement in AROM of RUE.  -     Row Name  04/04/23 1056          Strength Comprehensive (MMT)    Comment, General Manual Muscle Testing (MMT) Assessment RUE 2-/5 grossly  -[facility] Name 04/04/23 1056          Shoulder (Therapeutic Exercise)    Shoulder AAROM (Therapeutic Exercise) left assist right;flexion;extension;aDduction;aBduction  -     Shoulder PROM (Therapeutic Exercise) right;flexion;extension;external rotation;internal rotation  -[facility] Name 04/04/23 1056          Elbow/Forearm (Therapeutic Exercise)    Elbow/Forearm AAROM (Therapeutic Exercise) left assist right;flexion;extension  -     Elbow/Forearm PROM (Therapeutic Exercise) right;extension;flexion;supination;pronation  -[facility] Name 04/04/23 1056          Wrist (Therapeutic Exercise)    Wrist PROM (Therapeutic Exercise) right;flexion;extension  -[facility] Name 04/04/23 1056          Hand (Therapeutic Exercise)    Hand PROM (Therapeutic Exercise) right;finger extension;30 second hold  -[facility] Name 04/04/23 1056          Balance    Static Sitting Balance standby assist;contact guard  -     Dynamic Sitting Balance standby assist  -     Position, Sitting Balance sitting edge of bed  -     Static Standing Balance maximum assist;2-person assist;moderate assist  -     Dynamic Standing Balance maximum assist;2-person assist  -     Position/Device Used, Standing Balance supported  -     Balance Interventions dynamic reaching;weight shifting activity  -     Comment, Balance R lateral leaning, cues to correct to midline  -           User Key  (r) = Recorded By, (t) = Taken By, (c) = Cosigned By    Initials Name Provider Type     Evelia Kenney OT Occupational Therapist               Goals/Plan    No documentation.                Clinical Impression     [facility] Name 04/04/23 1102          Pain Assessment    Pretreatment Pain Rating 0/10 - no pain  -     Posttreatment Pain Rating 0/10 - no pain  -[facility] Name 04/04/23 1102          Plan of Care Review    Plan of Care  Reviewed With patient  -JW     Outcome Evaluation Pt agreeable to OT this AM. He sits EOB with ModA, able to maintain sitting balance with SBA during dynamic reaching task using LUE with increased trunk control noted. Cues for visual scanning to R peripheral field to identify objects. Pt perseverating on specific phrases/words t/o session, cues to redirect and attend to commands. He TFs to bedside chair and back to EOB with MaxAx2, R lateral lean and dec coordination of RLE. Some improvement noted, continue to address cognition in order to improve carryover. OT will continue to address fxl deficits.  -     Row Name 04/04/23 1102          Therapy Assessment/Plan (OT)    Rehab Potential (OT) good, to achieve stated therapy goals  -     Criteria for Skilled Therapeutic Interventions Met (OT) yes;skilled treatment is necessary  -     Therapy Frequency (OT) 5 times/wk  -     Row Name 04/04/23 1102          Therapy Plan Review/Discharge Plan (OT)    Anticipated Discharge Disposition (OT) inpatient rehabilitation facility  -     Row Name 04/04/23 1102          Positioning and Restraints    Pre-Treatment Position in bed  -JW     Post Treatment Position bed  -JW     In Bed notified nsg;fowlers;call light within reach;encouraged to call for assist;exit alarm on;RUE elevated  -           User Key  (r) = Recorded By, (t) = Taken By, (c) = Cosigned By    Initials Name Provider Type    Evelia Faulkner, MARK Occupational Therapist               Outcome Measures     Row Name 04/04/23 1108          How much help from another is currently needed...    Putting on and taking off regular lower body clothing? 2  -JW     Bathing (including washing, rinsing, and drying) 2  -JW     Toileting (which includes using toilet bed pan or urinal) 2  -JW     Putting on and taking off regular upper body clothing 2  -JW     Taking care of personal grooming (such as brushing teeth) 2  -JW     Eating meals 2  -JW     AM-PAC 6 Clicks Score (OT)  12  -     Row Name 04/04/23 0906 04/04/23 0812       How much help from another person do you currently need...    Turning from your back to your side while in flat bed without using bedrails? 3  -CW 2  -TF    Moving from lying on back to sitting on the side of a flat bed without bedrails? 3  -CW 2  -TF    Moving to and from a bed to a chair (including a wheelchair)? 2  -CW 1  -TF    Standing up from a chair using your arms (e.g., wheelchair, bedside chair)? 2  -CW 1  -TF    Climbing 3-5 steps with a railing? 1  -CW 1  -TF    To walk in hospital room? 1  -CW 1  -TF    AM-PAC 6 Clicks Score (PT) 12  -CW 8  -TF    Highest level of mobility 4 --> Transferred to chair/commode  -CW 3 --> Sat at edge of bed  -TF    Row Name 04/04/23 1108          Modified Yamilex Scale    Modified Yamilex Scale 4 - Moderately severe disability.  Unable to walk without assistance, and unable to attend to own bodily needs without assistance.  -     Row Name 04/04/23 0906          Functional Assessment    Outcome Measure Options AM-PAC 6 Clicks Basic Mobility (PT)  -CW           User Key  (r) = Recorded By, (t) = Taken By, (c) = Cosigned By    Initials Name Provider Type    Miriam Snow RN Registered Nurse    Tamica Eddy, PT Physical Therapist    Evelia Faulkner, OT Occupational Therapist                Occupational Therapy Education     Title: PT OT SLP Therapies (In Progress)     Topic: Occupational Therapy (Done)     Point: ADL training (Done)     Description:   Instruct learner(s) on proper safety adaptation and remediation techniques during self care or transfers.   Instruct in proper use of assistive devices.              Learning Progress Summary           Patient Acceptance, E,TB,H, VU by MS at 3/16/2023 1801    Acceptance, E, NR by LM at 3/15/2023 0521   Family Acceptance, E,TB,H, VU by MS at 3/16/2023 1801                   Point: Home exercise program (Done)     Description:   Instruct learner(s) on appropriate  technique for monitoring, assisting and/or progressing therapeutic exercises/activities.              Learning Progress Summary           Patient Acceptance, E,TB,H, VU by MS at 3/16/2023 1801    Acceptance, E, NR by LM at 3/15/2023 0521   Family Acceptance, E,TB,H, VU by MS at 3/16/2023 1801                   Point: Precautions (Done)     Description:   Instruct learner(s) on prescribed precautions during self-care and functional transfers.              Learning Progress Summary           Patient Acceptance, E,TB,H, VU by MS at 3/16/2023 1801    Acceptance, E, NR by LM at 3/15/2023 0521   Family Acceptance, E,TB,H, VU by MS at 3/16/2023 1801                   Point: Body mechanics (Done)     Description:   Instruct learner(s) on proper positioning and spine alignment during self-care, functional mobility activities and/or exercises.              Learning Progress Summary           Patient Acceptance, E,TB,H, VU by MS at 3/16/2023 1801    Acceptance, E, NR by LM at 3/15/2023 0521   Family Acceptance, E,TB,H, VU by MS at 3/16/2023 1801                               User Key     Initials Effective Dates Name Provider Type Discipline    MS 06/16/21 -  Angel Luis Cr, RN Registered Nurse Nurse     10/13/22 -  Silke Grace, RN Registered Nurse Nurse              OT Recommendation and Plan  Therapy Frequency (OT): 5 times/wk  Plan of Care Review  Plan of Care Reviewed With: patient  Progress: improving  Outcome Evaluation: Pt agreeable to OT this AM. He sits EOB with ModA, able to maintain sitting balance with SBA during dynamic reaching task using LUE with increased trunk control noted. Cues for visual scanning to R peripheral field to identify objects. Pt perseverating on specific phrases/words t/o session, cues to redirect and attend to commands. He TFs to bedside chair and back to EOB with MaxAx2, R lateral lean and dec coordination of RLE. Some improvement noted, continue to address cognition in order to improve  carryover. OT will continue to address fxl deficits.     Time Calculation:    Time Calculation- OT     Row Name 04/04/23 1108             Time Calculation- OT    OT Start Time 0826  -JW      OT Stop Time 0855  -JW      OT Time Calculation (min) 29 min  -JW      Total Timed Code Minutes- OT 29 minute(s)  -JW      OT Received On 04/04/23  -JW      OT - Next Appointment 04/05/23  -         Timed Charges    30841 -  OT Neuromuscular Reeducation Minutes 29  -JW         Total Minutes    Timed Charges Total Minutes 29  -JW       Total Minutes 29  -JW            User Key  (r) = Recorded By, (t) = Taken By, (c) = Cosigned By    Initials Name Provider Type    Eevlia Faulkner OT Occupational Therapist              Therapy Charges for Today     Code Description Service Date Service Provider Modifiers Qty    [id number] HC OT NEUROMUSC RE EDUCATION EA 15 MIN 4/4/2023 Evelia Kenney OT GO 2               Evelia Kenney OT  4/4/2023

## 2023-04-04 NOTE — PROGRESS NOTES
".DAILY PROGRESS NOTE  [facility]    Patient Identification:  Name: Manuel Durant  Age: 73 y.o.  Sex: male  :  1950  MRN: [id number]         Primary Care Physician: Provider, No Known    Subjective:  Interval History:He is weak.    Objective:    Scheduled Meds:amLODIPine, 10 mg, Oral, QAM AC  apixaban, 5 mg, Oral, Q12H  baclofen, 2.5 mg, Oral, Q12H  carvedilol, 12.5 mg, Oral, BID With Meals  hydrALAZINE, 10 mg, Oral, Q8H  senna-docusate sodium, 2 tablet, Oral, BID  sodium chloride, 10 mL, Intravenous, Q12H  valsartan, 160 mg, Oral, QAM AC      Continuous Infusions:     Vital signs in last 24 hours:  Temp:  [97.4 °F (36.3 °C)-98 °F (36.7 °C)] 98 °F (36.7 °C)  Heart Rate:  [49-62] 56  Resp:  [16-17] 17  BP: (107-139)/(64-88) 118/66    Intake/Output:    Intake/Output Summary (Last 24 hours) at 2023 1647  Last data filed at 2023 1230  Gross per 24 hour   Intake 480 ml   Output --   Net 480 ml       Exam:  /66   Pulse 56   Temp 98 °F (36.7 °C) (Oral)   Resp 17   Ht 167.6 cm (66\")   Wt 63.9 kg (140 lb 14 oz)   SpO2 92%   BMI 22.74 kg/m²     General Appearance:    Alert, cooperative, no distress   Head:    Normocephalic, without obvious abnormality, atraumatic   Eyes:       Throat:   Lips, tongue, gums normal   Neck:   Supple, symmetrical, trachea midline, no JVD   Lungs:     Clear to auscultation bilaterally, respirations unlabored   Chest Wall:    No tenderness or deformity    Heart:    Regular rate and rhythm, S1 and S2 normal, no murmur,no  Rub or gallop   Abdomen:     Soft, nontender, bowel sounds active, no masses, no organomegaly    Extremities:   Extremities normal, atraumatic, no cyanosis or edema   Pulses:      Skin:   Skin is warm and dry,  no rashes or palpable lesions   Neurologic:   He is weak.      Lab Results (last 72 hours)     Procedure Component Value Units Date/Time    Basic Metabolic Panel [[id number]]  (Abnormal) Collected: 23 0610    Specimen: " Blood Updated: 03/30/23 0702     Glucose 89 mg/dL      BUN 21 mg/dL      Creatinine 0.77 mg/dL      Sodium 142 mmol/L      Potassium 4.1 mmol/L      Chloride 110 mmol/L      CO2 24.5 mmol/L      Calcium 8.7 mg/dL      BUN/Creatinine Ratio 27.3     Anion Gap 7.5 mmol/L      eGFR 95.7 mL/min/1.73     Narrative:      GFR Normal >60  Chronic Kidney Disease <60  Kidney Failure <15    The GFR formula is only valid for adults with stable renal function between ages 18 and 70.    Phosphorus [[id number]]  (Normal) Collected: 03/30/23 0610    Specimen: Blood Updated: 03/30/23 0702     Phosphorus 3.2 mg/dL     Magnesium [[id number]]  (Normal) Collected: 03/30/23 0610    Specimen: Blood Updated: 03/30/23 0701     Magnesium 2.0 mg/dL     CBC (No Diff) [[id number]]  (Normal) Collected: 03/30/23 0610    Specimen: Blood Updated: 03/30/23 0641     WBC 8.31 10*3/mm3      RBC 4.31 10*6/mm3      Hemoglobin 13.1 g/dL      Hematocrit 39.3 %      MCV 91.2 fL      MCH 30.4 pg      MCHC 33.3 g/dL      RDW 13.0 %      RDW-SD 43.1 fl      MPV 10.8 fL      Platelets 323 10*3/mm3     Basic Metabolic Panel [[id number]]  (Abnormal) Collected: 03/29/23 0745    Specimen: Blood Updated: 03/29/23 0828     Glucose 91 mg/dL      BUN 20 mg/dL      Creatinine 0.73 mg/dL      Sodium 139 mmol/L      Potassium 4.2 mmol/L      Comment: Slight hemolysis detected by analyzer. Results may be affected.        Chloride 106 mmol/L      CO2 23.2 mmol/L      Calcium 8.9 mg/dL      BUN/Creatinine Ratio 27.4     Anion Gap 9.8 mmol/L      eGFR 97.3 mL/min/1.73     Narrative:      GFR Normal >60  Chronic Kidney Disease <60  Kidney Failure <15    The GFR formula is only valid for adults with stable renal function between ages 18 and 70.    Phosphorus [[id number]]  (Normal) Collected: 03/29/23 0745    Specimen: Blood Updated: 03/29/23 0828     Phosphorus 3.4 mg/dL     Magnesium [[id number]]  (Normal) Collected: 03/29/23 0745    Specimen: Blood Updated: 03/29/23 0814      Magnesium 2.1 mg/dL     CBC (No Diff) [[id number]]  (Normal) Collected: 03/29/23 0745    Specimen: Blood Updated: 03/29/23 0813     WBC 7.52 10*3/mm3      RBC 4.45 10*6/mm3      Hemoglobin 13.5 g/dL      Hematocrit 39.9 %      MCV 89.7 fL      MCH 30.3 pg      MCHC 33.8 g/dL      RDW 13.0 %      RDW-SD 42.0 fl      MPV 10.6 fL      Platelets 326 10*3/mm3         Data Review:  Results from last 7 days   Lab Units 03/30/23  0610 03/29/23  0745   SODIUM mmol/L 142 139   POTASSIUM mmol/L 4.1 4.2   CHLORIDE mmol/L 110* 106   CO2 mmol/L 24.5 23.2   BUN mg/dL 21 20   CREATININE mg/dL 0.77 0.73*   GLUCOSE mg/dL 89 91   CALCIUM mg/dL 8.7 8.9     Results from last 7 days   Lab Units 03/30/23  0610 03/29/23 0745   WBC 10*3/mm3 8.31 7.52   HEMOGLOBIN g/dL 13.1 13.5   HEMATOCRIT % 39.3 39.9   PLATELETS 10*3/mm3 323 326             Lab Results   Lab Value Date/Time    TROPONINT 9 03/12/2023 1907               Invalid input(s): PROT, LABALBU          No results found for: POCGLU        History reviewed. No pertinent past medical history.    Assessment:  Active Hospital Problems    Diagnosis  POA   • **Intracranial hemorrhage [I62.9]  Yes   • Acute DVT (deep venous thrombosis) [I82.409]  Unknown   • HTN (hypertension) [I10]  Unknown   • Hypokalemia [E87.6]  Unknown   • Hypertensive emergency [I16.1]  Unknown      Resolved Hospital Problems   No resolved problems to display.       Plan:  Continue current RX. DC planning. Continue with therapy.  Discussed with nurse and case management.  He claims he has a green card.    Cedric Johnston MD  4/4/2023  16:47 EDT

## 2023-04-05 LAB
ANION GAP SERPL CALCULATED.3IONS-SCNC: 10.8 MMOL/L (ref 5–15)
BASOPHILS # BLD AUTO: 0.05 10*3/MM3 (ref 0–0.2)
BASOPHILS NFR BLD AUTO: 0.7 % (ref 0–1.5)
BUN SERPL-MCNC: 20 MG/DL (ref 8–23)
BUN/CREAT SERPL: 28.2 (ref 7–25)
CALCIUM SPEC-SCNC: 8.9 MG/DL (ref 8.6–10.5)
CHLORIDE SERPL-SCNC: 109 MMOL/L (ref 98–107)
CO2 SERPL-SCNC: 21.2 MMOL/L (ref 22–29)
CREAT SERPL-MCNC: 0.71 MG/DL (ref 0.76–1.27)
DEPRECATED RDW RBC AUTO: 44.3 FL (ref 37–54)
EGFRCR SERPLBLD CKD-EPI 2021: 98.1 ML/MIN/1.73
EOSINOPHIL # BLD AUTO: 0.31 10*3/MM3 (ref 0–0.4)
EOSINOPHIL NFR BLD AUTO: 4.3 % (ref 0.3–6.2)
ERYTHROCYTE [DISTWIDTH] IN BLOOD BY AUTOMATED COUNT: 13.2 % (ref 12.3–15.4)
GLUCOSE SERPL-MCNC: 86 MG/DL (ref 65–99)
HCT VFR BLD AUTO: 41.3 % (ref 37.5–51)
HGB BLD-MCNC: 13.8 G/DL (ref 13–17.7)
IMM GRANULOCYTES # BLD AUTO: 0.02 10*3/MM3 (ref 0–0.05)
IMM GRANULOCYTES NFR BLD AUTO: 0.3 % (ref 0–0.5)
LYMPHOCYTES # BLD AUTO: 1.69 10*3/MM3 (ref 0.7–3.1)
LYMPHOCYTES NFR BLD AUTO: 23.4 % (ref 19.6–45.3)
MCH RBC QN AUTO: 30.5 PG (ref 26.6–33)
MCHC RBC AUTO-ENTMCNC: 33.4 G/DL (ref 31.5–35.7)
MCV RBC AUTO: 91.2 FL (ref 79–97)
MONOCYTES # BLD AUTO: 0.68 10*3/MM3 (ref 0.1–0.9)
MONOCYTES NFR BLD AUTO: 9.4 % (ref 5–12)
NEUTROPHILS NFR BLD AUTO: 4.48 10*3/MM3 (ref 1.7–7)
NEUTROPHILS NFR BLD AUTO: 61.9 % (ref 42.7–76)
NRBC BLD AUTO-RTO: 0 /100 WBC (ref 0–0.2)
PLATELET # BLD AUTO: 261 10*3/MM3 (ref 140–450)
PMV BLD AUTO: 11.1 FL (ref 6–12)
POTASSIUM SERPL-SCNC: 4.1 MMOL/L (ref 3.5–5.2)
RBC # BLD AUTO: 4.53 10*6/MM3 (ref 4.14–5.8)
SODIUM SERPL-SCNC: 141 MMOL/L (ref 136–145)
WBC NRBC COR # BLD: 7.23 10*3/MM3 (ref 3.4–10.8)

## 2023-04-05 PROCEDURE — 85025 COMPLETE CBC W/AUTO DIFF WBC: CPT | Performed by: HOSPITALIST

## 2023-04-05 PROCEDURE — 97112 NEUROMUSCULAR REEDUCATION: CPT

## 2023-04-05 PROCEDURE — 97530 THERAPEUTIC ACTIVITIES: CPT

## 2023-04-05 PROCEDURE — 80048 BASIC METABOLIC PNL TOTAL CA: CPT | Performed by: HOSPITALIST

## 2023-04-05 RX ADMIN — APIXABAN 5 MG: 5 TABLET, FILM COATED ORAL at 09:26

## 2023-04-05 RX ADMIN — BACLOFEN 2.5 MG: 10 TABLET ORAL at 09:26

## 2023-04-05 RX ADMIN — CARVEDILOL 12.5 MG: 12.5 TABLET, FILM COATED ORAL at 20:57

## 2023-04-05 RX ADMIN — HYDRALAZINE HYDROCHLORIDE 10 MG: 10 TABLET, FILM COATED ORAL at 21:01

## 2023-04-05 RX ADMIN — DOCUSATE SODIUM 50MG AND SENNOSIDES 8.6MG 2 TABLET: 8.6; 5 TABLET, FILM COATED ORAL at 20:57

## 2023-04-05 RX ADMIN — Medication 10 ML: at 09:27

## 2023-04-05 RX ADMIN — Medication 10 ML: at 20:57

## 2023-04-05 RX ADMIN — VALSARTAN 160 MG: 160 TABLET, FILM COATED ORAL at 09:26

## 2023-04-05 RX ADMIN — BACLOFEN 2.5 MG: 10 TABLET ORAL at 20:57

## 2023-04-05 RX ADMIN — APIXABAN 5 MG: 5 TABLET, FILM COATED ORAL at 20:56

## 2023-04-05 RX ADMIN — HYDRALAZINE HYDROCHLORIDE 10 MG: 10 TABLET, FILM COATED ORAL at 14:35

## 2023-04-05 RX ADMIN — DOCUSATE SODIUM 50MG AND SENNOSIDES 8.6MG 2 TABLET: 8.6; 5 TABLET, FILM COATED ORAL at 09:26

## 2023-04-05 RX ADMIN — AMLODIPINE BESYLATE 10 MG: 10 TABLET ORAL at 09:26

## 2023-04-05 RX ADMIN — CARVEDILOL 12.5 MG: 12.5 TABLET, FILM COATED ORAL at 09:26

## 2023-04-05 NOTE — THERAPY TREATMENT NOTE
Patient Name: Manuel Durant  : 1950    MRN: [id number]                              Today's Date: 2023       Admit Date: 3/12/2023    Visit Dx:     ICD-10-CM ICD-9-CM   1. Intracranial hemorrhage  I62.9 432.9   2. Altered mental status, unspecified altered mental status type  R41.82 780.97     Patient Active Problem List   Diagnosis   • Intracranial hemorrhage   • Hypertensive emergency   • Acute DVT (deep venous thrombosis)   • HTN (hypertension)   • Hypokalemia     History reviewed. No pertinent past medical history.  History reviewed. No pertinent surgical history.   General Information     Row Name 23 1456          OT Time and Intention    Document Type therapy note (daily note)  -     Mode of Treatment co-treatment;occupational therapy  -     Row Name 23 1456          General Information    Patient Profile Reviewed yes  -     Existing Precautions/Restrictions fall  -     Barriers to Rehab cognitive status  -     Row Name 23 1456          Cognition    Orientation Status (Cognition) oriented to;person;place  -     Row Name 23 1456          Safety Issues, Functional Mobility    Impairments Affecting Function (Mobility) balance;coordination;endurance/activity tolerance;grasp;motor control;postural/trunk control;strength;range of motion (ROM);cognition;visual/perceptual  -           User Key  (r) = Recorded By, (t) = Taken By, (c) = Cosigned By    Initials Name Provider Type     Evelia Kenney OT Occupational Therapist                 Mobility/ADL's     Row Name 23 1458          Bed Mobility    Bed Mobility supine-sit;sit-supine  -     Supine-Sit [place] (Bed Mobility) minimum assist (75% patient effort);moderate assist (50% patient effort);1 person assist;verbal cues;nonverbal cues (demo/gesture)  -     Sit-Supine [place] (Bed Mobility) minimum assist (75% patient effort);contact guard;verbal cues;nonverbal cues (demo/gesture)  -      Comment, (Bed Mobility) x2 trails for bed mobility. Multiple cues for sequencing and strategies to increase independence. Little carryover of education  -     Row Name 04/05/23 1458          Bed-Chair Transfer    Bed-Chair [place] (Transfers) maximum assist (25% patient effort);2 person assist;verbal cues  -     Comment, (Bed-Chair Transfer) HHA x2 from EOB>chair>EOB  -[facility] Name 04/05/23 1458          Sit-Stand Transfer    Sit-Stand [place] (Transfers) minimum assist (75% patient effort);moderate assist (50% patient effort);verbal cues  -     Comment, (Sit-Stand Transfer) HHA  -           User Key  (r) = Recorded By, (t) = Taken By, (c) = Cosigned By    Initials Name Provider Type    Evelia Faulkner OT Occupational Therapist               Obj/Interventions     [facility] Name 04/05/23 1501          Sensory Assessment (Somatosensory)    Sensory Assessment reports impaired sensation in R digits, difficult to assess d/t command following  -[facility] Name 04/05/23 1501          Vision Assessment/Intervention    Visual Processing Deficit karen-inattention/neglect, right  -     Vision Assessment Comment increased visual scanning towards R today but does require multiple cues to attend to this side. Able to scan for and identify objects/targets on R. Possible homonymous hemianopsia R but difficult to assess d/t cognition  -[facility] Name 04/05/23 1501          Balance    Static Standing Balance moderate assist;maximum assist;2-person assist;verbal cues;non-verbal cues (demo/gesture)  -     Position/Device Used, Standing Balance supported  -     Balance Interventions standing;static;moderate challenge;dynamic reaching  -     Comment, Balance Focus of session today on midline awareness in static standing. R knee blocked d/t buckling. Continues to demo R lateral leaning req's mod-maxA x2 to correct. Multiple verbal and visual cues for midline using mirror for feedback but continues to demo difficulty  attending to tasks and following commands.  -     Row Name 04/05/23 1501          Neuromuscular Re-education    Interventions (Neuromuscular Re-education) weight shifting  -           User Key  (r) = Recorded By, (t) = Taken By, (c) = Cosigned By    Initials Name Provider Type    Evelia Faulkner OT Occupational Therapist               Goals/Plan    No documentation.                Clinical Impression     Row Name 04/05/23 1507          Pain Assessment    Pretreatment Pain Rating 0/10 - no pain  -     Posttreatment Pain Rating 0/10 - no pain  -     Row Name 04/05/23 1507          Plan of Care Review    Plan of Care Reviewed With patient  -OCTAVIO     Progress no change  -     Outcome Evaluation Pt pleasant and agreeable to OT/PT cotreatment to maximize pt participation in functional activity.  used during session. Focus of session today on midline awareness in static standing. R knee blocked d/t buckling. Continues to demo R lateral leaning req's mod-maxA x2 to correct. Multiple verbal and visual cues for midline using mirror with line at midline for feedback but continues to demo difficulty attending to task. Limited by visual perceptual impairment (possible R homonymous hemianopsia?) and R inattention. Dyn reaching and weight shifting in standing. Pt perseverating on multiple words and ideas t/o session, difficulty redirecting. OT will cont to follow to address deficits.  -     Row Name 04/05/23 1501          Therapy Assessment/Plan (OT)    Rehab Potential (OT) good, to achieve stated therapy goals  -     Criteria for Skilled Therapeutic Interventions Met (OT) yes;skilled treatment is necessary  -     Therapy Frequency (OT) 5 times/wk  -     Row Name 04/05/23 1508          Therapy Plan Review/Discharge Plan (OT)    Anticipated Discharge Disposition (OT) inpatient rehabilitation facility  -     Row Name 04/05/23 1500          Positioning and Restraints    Pre-Treatment Position in bed  -      Post Treatment Position bed  -JW     In Bed notified nsg;fowlers;call light within reach;encouraged to call for assist;exit alarm on  -JW           User Key  (r) = Recorded By, (t) = Taken By, (c) = Cosigned By    Initials Name Provider Type    Evelia Faulkner OT Occupational Therapist               Outcome Measures     Row Name 04/05/23 1043 04/05/23 0949       How much help from another person do you currently need...    Turning from your back to your side while in flat bed without using bedrails? 3  -CW 1  -KP    Moving from lying on back to sitting on the side of a flat bed without bedrails? 3  -CW 1  -KP    Moving to and from a bed to a chair (including a wheelchair)? 2  -CW 1  -KP    Standing up from a chair using your arms (e.g., wheelchair, bedside chair)? 2  -CW 1  -KP    Climbing 3-5 steps with a railing? 1  -CW 1  -KP    To walk in hospital room? 1  -CW 1  -KP    AM-PAC 6 Clicks Score (PT) 12  -CW 6  -KP    Highest level of mobility 4 --> Transferred to chair/commode  -CW 2 --> Bed activities/dependent transfer  -KP    Row Name 04/05/23 1043          Functional Assessment    Outcome Measure Options AM-PAC 6 Clicks Basic Mobility (PT)  -CW           User Key  (r) = Recorded By, (t) = Taken By, (c) = Cosigned By    Initials Name Provider Type    Tamica Eddy, PT Physical Therapist    Johanny Guaman, RN Registered Nurse                Occupational Therapy Education     Title: PT OT SLP Therapies (In Progress)     Topic: Occupational Therapy (Done)     Point: ADL training (Done)     Description:   Instruct learner(s) on proper safety adaptation and remediation techniques during self care or transfers.   Instruct in proper use of assistive devices.              Learning Progress Summary           Patient Acceptance, E,TB,H, VU by MS at 3/16/2023 1801    Acceptance, E, NR by LM at 3/15/2023 0521   Family Acceptance, E,TB,H, VU by MS at 3/16/2023 1801                   Point: Home exercise program  (Done)     Description:   Instruct learner(s) on appropriate technique for monitoring, assisting and/or progressing therapeutic exercises/activities.              Learning Progress Summary           Patient Acceptance, E,TB,H, VU by MS at 3/16/2023 1801    Acceptance, E, NR by LM at 3/15/2023 0521   Family Acceptance, E,TB,H, VU by MS at 3/16/2023 1801                   Point: Precautions (Done)     Description:   Instruct learner(s) on prescribed precautions during self-care and functional transfers.              Learning Progress Summary           Patient Acceptance, E,TB,H, VU by MS at 3/16/2023 1801    Acceptance, E, NR by LM at 3/15/2023 0521   Family Acceptance, E,TB,H, VU by MS at 3/16/2023 1801                   Point: Body mechanics (Done)     Description:   Instruct learner(s) on proper positioning and spine alignment during self-care, functional mobility activities and/or exercises.              Learning Progress Summary           Patient Acceptance, E,TB,H, VU by MS at 3/16/2023 1801    Acceptance, E, NR by LM at 3/15/2023 0521   Family Acceptance, E,TB,H, VU by MS at 3/16/2023 1801                               User Key     Initials Effective Dates Name Provider Type Discipline    MS 06/16/21 -  Angel Luis Cr, RN Registered Nurse Nurse     10/13/22 -  Silke Grace, RN Registered Nurse Nurse              OT Recommendation and Plan  Therapy Frequency (OT): 5 times/wk  Plan of Care Review  Plan of Care Reviewed With: patient  Progress: no change  Outcome Evaluation: Pt pleasant and agreeable to OT/PT cotreatment to maximize pt participation in functional activity.  used during session. Focus of session today on midline awareness in static standing. R knee blocked d/t buckling. Continues to demo R lateral leaning req's mod-maxA x2 to correct. Multiple verbal and visual cues for midline using mirror with line at midline for feedback but continues to demo difficulty attending to task. Limited  by visual perceptual impairment (possible R homonymous hemianopsia?) and R inattention. Dyn reaching and weight shifting in standing. Pt perseverating on multiple words and ideas t/o session, difficulty redirecting. OT will cont to follow to address deficits.     Time Calculation:    Time Calculation- OT     Row Name 04/05/23 1513             Time Calculation- OT    OT Start Time 0932  -JW      OT Stop Time 1014  -JW      OT Time Calculation (min) 42 min  -      Total Timed Code Minutes- OT 42 minute(s)  -JW      OT Received On 04/05/23  -      OT - Next Appointment 04/06/23  -         Timed Charges    [id number] -  OT Neuromuscular Reeducation Minutes 42  -         Total Minutes    Timed Charges Total Minutes 42  -       Total Minutes 42  -JW            User Key  (r) = Recorded By, (t) = Taken By, (c) = Cosigned By    Initials Name Provider Type    Evelia Faulkner OT Occupational Therapist              Therapy Charges for Today     Code Description Service Date Service Provider Modifiers Qty    46125828557 HC OT NEUROMUSC RE EDUCATION EA 15 MIN 4/4/2023 Evelia Kenney OT GO 2    94110172634 HC OT NEUROMUSC RE EDUCATION EA 15 MIN 4/5/2023 Evelia Kenney OT GO 3               Evelia Kenney OT  4/5/2023

## 2023-04-05 NOTE — CASE MANAGEMENT/SOCIAL WORK
Continued Stay Note  [facility]     Patient Name: Manuel Durant  MRN: [id number]  Today's Date: 4/5/2023    Admit Date: 3/12/2023    Plan: Pending   Discharge Plan     Row Name 04/05/23 1312       Plan    Plan Comments Spoke by phone with pt's friend Bridgett Shook.  She believes she has located pt's Green Card.  She will bring it to the hospital this evening.  CCP will meet with her at that time.  [facility]  If pt has an up to date Green Card he might be eligible for Medicaid.  Will continue to search for any avenues of assistance for this gentleman.  Faye DELGADILLO-STANLEY               Discharge Codes    No documentation.               Expected Discharge Date and Time     Expected Discharge Date Expected Discharge Time    Apr 9, 2023             Faye Moy RN

## 2023-04-05 NOTE — PROGRESS NOTES
[facility]    Note CCP continues to work with family for a plan to get patient back home to [place]. Granddaughter unable to care for patient as previously thought. Follow peripherally for progress with therapies.    Joyce Keane RN  Rehab Admissions Coordinator  [phone]

## 2023-04-05 NOTE — THERAPY TREATMENT NOTE
Patient Name: Manuel Durant  : 1950    MRN: [id number]                              Today's Date: 2023       Admit Date: 3/12/2023    Visit Dx:     ICD-10-CM ICD-9-CM   1. Intracranial hemorrhage  I62.9 432.9   2. Altered mental status, unspecified altered mental status type  R41.82 780.97     Patient Active Problem List   Diagnosis   • Intracranial hemorrhage   • Hypertensive emergency   • Acute DVT (deep venous thrombosis)   • HTN (hypertension)   • Hypokalemia     History reviewed. No pertinent past medical history.  History reviewed. No pertinent surgical history.   General Information     Row Name 23 1034          Physical Therapy Time and Intention    Document Type therapy note (daily note)  -CW     Mode of Treatment physical therapy;co-treatment  -CW     Row Name 23 103          General Information    Patient Profile Reviewed yes  -CW     Existing Precautions/Restrictions fall  -CW     Barriers to Rehab cognitive status;language barrier  -CW     Row Name 23 103          Cognition    Orientation Status (Cognition) oriented to;person  -CW     Row Name 23 1034          Safety Issues, Functional Mobility    Safety Issues Affecting Function (Mobility) insight into deficits/self-awareness;awareness of need for assistance;ability to follow commands;safety precautions follow-through/compliance;judgment;impulsivity;problem-solving  -CW     Impairments Affecting Function (Mobility) balance;coordination;endurance/activity tolerance;grasp;motor control;postural/trunk control;strength;range of motion (ROM);cognition  -CW           User Key  (r) = Recorded By, (t) = Taken By, (c) = Cosigned By    Initials Name Provider Type    CW Tamica Willoughby PT Physical Therapist               Mobility     Row Name 23 1037          Bed Mobility    Bed Mobility supine-sit;sit-supine  -CW     Supine-Sit [place] (Bed Mobility) minimum assist (75% patient effort);moderate assist (50%  patient effort);1 person assist;verbal cues  -CW     Sit-Supine [place] (Bed Mobility) minimum assist (75% patient effort);contact guard;verbal cues  -CW     Assistive Device (Bed Mobility) head of bed elevated;bed rails  -CW     Comment, (Bed Mobility) x2 trials in and out of bed. Continue to cue to utilize L side to assist R side. Little carryover  -CW     Row Name 04/05/23 1037          Bed-Chair Transfer    Bed-Chair [place] (Transfers) maximum assist (25% patient effort);2 person assist;verbal cues  -CW     Comment, (Bed-Chair Transfer) HHA x2. x2 transfers  -CW     Row Name 04/05/23 1037          Sit-Stand Transfer    Sit-Stand [place] (Transfers) minimum assist (75% patient effort);moderate assist (50% patient effort);verbal cues  -CW     Comment, (Sit-Stand Transfer) HHA  -CW           User Key  (r) = Recorded By, (t) = Taken By, (c) = Cosigned By    Initials Name Provider Type    CW Tamica Willoughby, PT Physical Therapist               Obj/Interventions     Row Name 04/05/23 1039          Balance    Balance Interventions sitting;standing;dynamic reaching  -CW     Comment, Balance Worked on standing weight shifting and reaching with LUE to object to standing. R knee blocked due to buckling. Cues for midline orientation. mod to max A to maintain standing balance. Attempted to use mirror for visual feedback - limited success- unclear if related to confusion or visual deficit  -CW           User Key  (r) = Recorded By, (t) = Taken By, (c) = Cosigned By    Initials Name Provider Type    CW Tamica Willoughby, PT Physical Therapist               Goals/Plan    No documentation.                Clinical Impression     Row Name 04/05/23 1040          Pain    Pretreatment Pain Rating 0/10 - no pain  -CW     Row Name 04/05/23 1040          Plan of Care Review    Plan of Care Reviewed With patient  -CW     Outcome Evaluation Pt participated with PT/OT this date. Continue to work on cues to use L side to  assist with mobilizing R side for bed mobility - little carryover noted. He transfers bed<>chair with max A x2. Worked on standing balance today - completed weight shifting and standing reaching tasks. Requires R knee block and up to max A due to strong posterior lean to maintain standing. Trialed a mirror for visual feedback on R lean with little success. Continue to be limited by confusion vs aphasia vs language barrier.  utilized as appropriate.  -CW     Row Name 04/05/23 1040          Vital Signs    O2 Delivery Pre Treatment room air  -CW     O2 Delivery Intra Treatment room air  -CW     O2 Delivery Post Treatment room air  -CW     Row Name 04/05/23 1040          Positioning and Restraints    Pre-Treatment Position in bed  -CW     Post Treatment Position bed  -CW     In Bed notified nsg;call light within reach;encouraged to call for assist;exit alarm on;fowlers  -CW           User Key  (r) = Recorded By, (t) = Taken By, (c) = Cosigned By    Initials Name Provider Type    Tamica Eddy, PT Physical Therapist               Outcome Measures     Row Name 04/05/23 1043 04/05/23 0949       How much help from another person do you currently need...    Turning from your back to your side while in flat bed without using bedrails? 3  -CW 1  -KP    Moving from lying on back to sitting on the side of a flat bed without bedrails? 3  -CW 1  -KP    Moving to and from a bed to a chair (including a wheelchair)? 2  -CW 1  -KP    Standing up from a chair using your arms (e.g., wheelchair, bedside chair)? 2  -CW 1  -KP    Climbing 3-5 steps with a railing? 1  -CW 1  -KP    To walk in hospital room? 1  -CW 1  -KP    AM-PAC 6 Clicks Score (PT) 12  -CW 6  -KP    Highest level of mobility 4 --> Transferred to chair/commode  -CW 2 --> Bed activities/dependent transfer  -KP    Row Name 04/05/23 1043          Functional Assessment    Outcome Measure Options AM-PAC 6 Clicks Basic Mobility (PT)  -CW           User Key  (r)  = Recorded By, (t) = Taken By, (c) = Cosigned By    Initials Name Provider Type    CW Tamica Willoughby, JOSE ELIAS Physical Therapist    Johanny Guaman RN Registered Nurse                             Physical Therapy Education     Title: PT OT SLP Therapies (In Progress)     Topic: Physical Therapy (In Progress)     Point: Mobility training (In Progress)     Learning Progress Summary           Patient Acceptance, E, NR by CW at 4/5/2023 1043    Acceptance, E, NR by CW at 4/4/2023 0906    Acceptance, E, NR by CW at 4/3/2023 1531    Acceptance, E,TB, VU,NR by CAMERON at 4/1/2023 1530    Acceptance, E, NR by CW at 3/31/2023 0925    Acceptance, E, NR by CW at 3/30/2023 1209    Acceptance, E, NR,NL by CW at 3/29/2023 1336    Acceptance, E, NR by ZB at 3/28/2023 1317    Acceptance, E, NR by CW at 3/27/2023 1401    Acceptance, E,D, NR,VU by JM at 3/24/2023 1631    Comment: seemed to comprehend commands this session    Acceptance, E,D, NR by JM at 3/23/2023 1655    Acceptance, E,D, NR by JM at 3/22/2023 1545    Acceptance, E,TB, VU,NR by CB at 3/21/2023 1544    Acceptance, E,TB,H, VU by MS at 3/16/2023 1801    Acceptance, E,D, DU,NR by MO at 3/16/2023 1451    Acceptance, E, DU,NR by MO at 3/15/2023 1200    Acceptance, E, NR by LM at 3/15/2023 0521    Acceptance, E,D, DU,NR by MO at 3/14/2023 1458   Family Acceptance, E,TB,H, VU by MS at 3/16/2023 1801                   Point: Home exercise program (Done)     Learning Progress Summary           Patient Acceptance, E,TB, VU,NR by CAMERON at 4/1/2023 1530    Acceptance, E, NR by CW at 3/31/2023 0925    Acceptance, E, NR by ZB at 3/28/2023 1317    Acceptance, E, NR by CW at 3/27/2023 1401    Acceptance, E,D, NR,VU by ZACH at 3/24/2023 1631    Comment: seemed to comprehend commands this session    Acceptance, E,D, NR by ZACH at 3/23/2023 1655    Acceptance, E,D, NR by ZACH at 3/22/2023 1545    Acceptance, E,TB, VU,NR by CB at 3/21/2023 1544    Acceptance, E,TB,H, VU by MS at 3/16/2023 1801     Acceptance, E,D, DU,NR by MO at 3/16/2023 1451    Acceptance, E, NR by LM at 3/15/2023 0521    Acceptance, E,D, DU,NR by MO at 3/14/2023 1458   Family Acceptance, E,TB,H, VU by MS at 3/16/2023 1801                   Point: Body mechanics (In Progress)     Learning Progress Summary           Patient Acceptance, E, NR by CW at 4/4/2023 0906    Acceptance, E, NR by CW at 4/3/2023 1531    Acceptance, E,TB, VU,NR by CAMERON at 4/1/2023 1530    Acceptance, E,D, NR,VU by JM at 3/24/2023 1631    Comment: seemed to comprehend commands this session    Acceptance, E,D, NR by ZACH at 3/23/2023 1655    Acceptance, E,D, NR by JM at 3/22/2023 1545    Acceptance, E,TB, VU,NR by CB at 3/21/2023 1544    Acceptance, E,TB,H, VU by MS at 3/16/2023 1801    Acceptance, E,D, DU,NR by MO at 3/16/2023 1451    Acceptance, E, DU,NR by MO at 3/15/2023 1200    Acceptance, E, NR by LM at 3/15/2023 0521    Acceptance, E,D, DU,NR by MO at 3/14/2023 1458   Family Acceptance, E,TB,H, VU by MS at 3/16/2023 1801                   Point: Precautions (In Progress)     Learning Progress Summary           Patient Acceptance, E, NR by CW at 4/4/2023 0906    Acceptance, E, NR by CW at 4/3/2023 1531    Acceptance, E,TB, VU,NR by CAMERON at 4/1/2023 1530    Acceptance, E,D, NR,VU by JM at 3/24/2023 1631    Comment: seemed to comprehend commands this session    Acceptance, E,D, NR by JM at 3/23/2023 1655    Acceptance, E,D, NR by JM at 3/22/2023 1545    Acceptance, E,TB, VU,NR by CB at 3/21/2023 1544    Acceptance, E,TB,H, VU by MS at 3/16/2023 1801    Acceptance, E,D, DU,NR by MO at 3/16/2023 1451    Acceptance, E, DU,NR by MO at 3/15/2023 1200    Acceptance, E, NR by LM at 3/15/2023 0521    Acceptance, E,D, DU,NR by MO at 3/14/2023 1458   Family Acceptance, E,TB,H, VU by MS at 3/16/2023 1801                               User Key     Initials Effective Dates Name Provider Type Discipline     03/07/18 -  Tiffanie Grace PTA Physical Therapist Assistant PT    CAMERON  05/19/21 -  Leeann Wiley, PT Physical Therapist PT    MS 06/16/21 -  Angel Luis Cr, RN Registered Nurse Nurse    CW 12/13/22 -  Tamica Willoughby, PT Physical Therapist PT    CB 10/22/21 -  Bety Benitez, PT Physical Therapist PT    LM 10/13/22 -  Silke Grace, RN Registered Nurse Nurse    MO 01/27/23 -  Lacie Tanner, PT Student PT Student PT    ZB 03/10/23 -  Nestor Rae, PT Student PT Student PT              PT Recommendation and Plan     Plan of Care Reviewed With: patient  Outcome Evaluation: Pt participated with PT/OT this date. Continue to work on cues to use L side to assist with mobilizing R side for bed mobility - little carryover noted. He transfers bed<>chair with max A x2. Worked on standing balance today - completed weight shifting and standing reaching tasks. Requires R knee block and up to max A due to strong posterior lean to maintain standing. Trialed a mirror for visual feedback on R lean with little success. Continue to be limited by confusion vs aphasia vs language barrier.  utilized as appropriate.     Time Calculation:    PT Charges     Row Name 04/05/23 1032             Time Calculation    Start Time 0935  -CW      Stop Time 1014  -CW      Time Calculation (min) 39 min  -CW      PT Received On 04/05/23  -CW      PT - Next Appointment 04/06/23  -            User Key  (r) = Recorded By, (t) = Taken By, (c) = Cosigned By    Initials Name Provider Type    CW Tamica Willoughby, PT Physical Therapist              Therapy Charges for Today     Code Description Service Date Service Provider Modifiers Qty    73504318730 HC PT THERAPEUTIC ACT EA 15 MIN 4/4/2023 Tamica Willoughby, PT GP 1    37248202600 HC PT THER PROC EA 15 MIN 4/4/2023 Tamica Willoughby, PT GP 1    15200728246 HC PT THERAPEUTIC ACT EA 15 MIN 4/5/2023 Tamica Willoughby, PT GP 1    85434084654 HC PT NEUROMUSC RE EDUCATION EA 15 MIN 4/5/2023 Tamica Willoughby, PT GP 2          PT G-Codes  Outcome Measure  Options: AM-PAC 6 Clicks Basic Mobility (PT)  AM-PAC 6 Clicks Score (PT): 12  AM-PAC 6 Clicks Score (OT): 12  Modified Yamilex Scale: 4 - Moderately severe disability.  Unable to walk without assistance, and unable to attend to own bodily needs without assistance.  PT Discharge Summary  Anticipated Discharge Disposition (PT): inpatient rehabilitation facility, skilled nursing facility    Tamica Willoughby, PT  4/5/2023

## 2023-04-05 NOTE — PROGRESS NOTES
".DAILY PROGRESS NOTE  [facility]    Patient Identification:  Name: Manuel Durant  Age: 73 y.o.  Sex: male  :  1950  MRN: [id number]         Primary Care Physician: Provider, No Known    Subjective:  Interval History:He is weak.    Objective:    Scheduled Meds:amLODIPine, 10 mg, Oral, QAM AC  apixaban, 5 mg, Oral, Q12H  baclofen, 2.5 mg, Oral, Q12H  carvedilol, 12.5 mg, Oral, BID With Meals  hydrALAZINE, 10 mg, Oral, Q8H  senna-docusate sodium, 2 tablet, Oral, BID  sodium chloride, 10 mL, Intravenous, Q12H  valsartan, 160 mg, Oral, QAM AC      Continuous Infusions:     Vital signs in last 24 hours:  Temp:  [97.9 °F (36.6 °C)-98.9 °F (37.2 °C)] 98 °F (36.7 °C)  Heart Rate:  [54-71] 61  Resp:  [16-17] 16  BP: ()/(51-75) 97/58    Intake/Output:    Intake/Output Summary (Last 24 hours) at 2023 1410  Last data filed at 2023 1320  Gross per 24 hour   Intake 600 ml   Output --   Net 600 ml       Exam:  BP 97/58 (BP Location: Left arm, Patient Position: Lying)   Pulse 61   Temp 98 °F (36.7 °C) (Oral)   Resp 16   Ht 167.6 cm (66\")   Wt 63.9 kg (140 lb 14 oz)   SpO2 96%   BMI 22.74 kg/m²     General Appearance:    Alert, cooperative, no distress   Head:    Normocephalic, without obvious abnormality, atraumatic   Eyes:       Throat:   Lips, tongue, gums normal   Neck:   Supple, symmetrical, trachea midline, no JVD   Lungs:     Clear to auscultation bilaterally, respirations unlabored   Chest Wall:    No tenderness or deformity    Heart:    Regular rate and rhythm, S1 and S2 normal, no murmur,no  Rub or gallop   Abdomen:     Soft, nontender, bowel sounds active, no masses, no organomegaly    Extremities:   Extremities normal, atraumatic, no cyanosis or edema   Pulses:      Skin:   Skin is warm and dry,  no rashes or palpable lesions   Neurologic:   He is weak.      Lab Results (last 72 hours)     Procedure Component Value Units Date/Time    Basic Metabolic Panel [[id number]]  " (Abnormal) Collected: 03/30/23 0610    Specimen: Blood Updated: 03/30/23 0702     Glucose 89 mg/dL      BUN 21 mg/dL      Creatinine 0.77 mg/dL      Sodium 142 mmol/L      Potassium 4.1 mmol/L      Chloride 110 mmol/L      CO2 24.5 mmol/L      Calcium 8.7 mg/dL      BUN/Creatinine Ratio 27.3     Anion Gap 7.5 mmol/L      eGFR 95.7 mL/min/1.73     Narrative:      GFR Normal >60  Chronic Kidney Disease <60  Kidney Failure <15    The GFR formula is only valid for adults with stable renal function between ages 18 and 70.    Phosphorus [[id number]]  (Normal) Collected: 03/30/23 0610    Specimen: Blood Updated: 03/30/23 0702     Phosphorus 3.2 mg/dL     Magnesium [[id number]]  (Normal) Collected: 03/30/23 0610    Specimen: Blood Updated: 03/30/23 0701     Magnesium 2.0 mg/dL     CBC (No Diff) [[id number]]  (Normal) Collected: 03/30/23 0610    Specimen: Blood Updated: 03/30/23 0641     WBC 8.31 10*3/mm3      RBC 4.31 10*6/mm3      Hemoglobin 13.1 g/dL      Hematocrit 39.3 %      MCV 91.2 fL      MCH 30.4 pg      MCHC 33.3 g/dL      RDW 13.0 %      RDW-SD 43.1 fl      MPV 10.8 fL      Platelets 323 10*3/mm3     Basic Metabolic Panel [[id number]]  (Abnormal) Collected: 03/29/23 0745    Specimen: Blood Updated: 03/29/23 0828     Glucose 91 mg/dL      BUN 20 mg/dL      Creatinine 0.73 mg/dL      Sodium 139 mmol/L      Potassium 4.2 mmol/L      Comment: Slight hemolysis detected by analyzer. Results may be affected.        Chloride 106 mmol/L      CO2 23.2 mmol/L      Calcium 8.9 mg/dL      BUN/Creatinine Ratio 27.4     Anion Gap 9.8 mmol/L      eGFR 97.3 mL/min/1.73     Narrative:      GFR Normal >60  Chronic Kidney Disease <60  Kidney Failure <15    The GFR formula is only valid for adults with stable renal function between ages 18 and 70.    Phosphorus [[id number]]  (Normal) Collected: 03/29/23 0745    Specimen: Blood Updated: 03/29/23 0828     Phosphorus 3.4 mg/dL     Magnesium [[id number]]  (Normal) Collected: 03/29/23 0777     Specimen: Blood Updated: 03/29/23 0828     Magnesium 2.1 mg/dL     CBC (No Diff) [[id number]]  (Normal) Collected: 03/29/23 0745    Specimen: Blood Updated: 03/29/23 0813     WBC 7.52 10*3/mm3      RBC 4.45 10*6/mm3      Hemoglobin 13.5 g/dL      Hematocrit 39.9 %      MCV 89.7 fL      MCH 30.3 pg      MCHC 33.8 g/dL      RDW 13.0 %      RDW-SD 42.0 fl      MPV 10.6 fL      Platelets 326 10*3/mm3         Data Review:  Results from last 7 days   Lab Units 04/05/23  0538 03/30/23  0610   SODIUM mmol/L 141 142   POTASSIUM mmol/L 4.1 4.1   CHLORIDE mmol/L 109* 110*   CO2 mmol/L 21.2* 24.5   BUN mg/dL 20 21   CREATININE mg/dL 0.71* 0.77   GLUCOSE mg/dL 86 89   CALCIUM mg/dL 8.9 8.7     Results from last 7 days   Lab Units 04/05/23  0538 03/30/23  0610   WBC 10*3/mm3 7.23 8.31   HEMOGLOBIN g/dL 13.8 13.1   HEMATOCRIT % 41.3 39.3   PLATELETS 10*3/mm3 261 323             Lab Results   Lab Value Date/Time    TROPONINT 9 03/12/2023 1907               Invalid input(s): PROT, LABALBU          No results found for: POCGLU        History reviewed. No pertinent past medical history.    Assessment:  Active Hospital Problems    Diagnosis  POA   • **Intracranial hemorrhage [I62.9]  Yes   • Acute DVT (deep venous thrombosis) [I82.409]  Unknown   • HTN (hypertension) [I10]  Unknown   • Hypokalemia [E87.6]  Unknown   • Hypertensive emergency [I16.1]  Unknown      Resolved Hospital Problems   No resolved problems to display.       Plan:  Continue current RX. DC planning. Continue with therapy.  Discussed with nurse and case management.  He claims he has a green card.    Cedric Johnston MD  4/5/2023  14:10 EDT

## 2023-04-05 NOTE — PLAN OF CARE
Goal Outcome Evaluation:  Plan of Care Reviewed With: patient        Progress: no change  Outcome Evaluation: Pt pleasant and agreeable to OT/PT cotreatment to maximize pt participation in functional activity.  used during session. Focus of session today on midline awareness in static standing. R knee blocked d/t buckling. Continues to demo R lateral leaning req's mod-maxA x2 to correct. Multiple verbal and visual cues for midline using mirror with line at midline for feedback but continues to demo difficulty attending to task. Limited by visual perceptual impairment (possible R homonymous hemianopsia?) and R inattention. Dyn reaching and weight shifting in standing. Pt perseverating on multiple words and ideas t/o session, difficulty redirecting. OT will cont to follow to address deficits.

## 2023-04-05 NOTE — PLAN OF CARE
Goal Outcome Evaluation:  Plan of Care Reviewed With: patient        Progress: no change    Problem: Fall Injury Risk  Goal: Absence of Fall and Fall-Related Injury  4/5/2023 1027 by Johanny Buchanan RN  Outcome: Ongoing, Progressing  4/5/2023 1027 by Johanny Buchanan RN  Outcome: Ongoing, Progressing     Problem: Skin Injury Risk Increased  Goal: Skin Health and Integrity  4/5/2023 1027 by Johanny Buchanan RN  Outcome: Ongoing, Progressing  4/5/2023 1027 by Johanny Buchanan RN  Outcome: Ongoing, Progressing     Problem: Adult Inpatient Plan of Care  Goal: Plan of Care Review  4/5/2023 1027 by Johanny Buchanan RN  Outcome: Ongoing, Progressing  4/5/2023 1027 by Johanny Buchanan RN  Outcome: Ongoing, Progressing  Flowsheets (Taken 4/5/2023 1027)  Progress: no change  Plan of Care Reviewed With: patient  Goal: Patient-Specific Goal (Individualized)  4/5/2023 1027 by Johanny Buchanan RN  Outcome: Ongoing, Progressing  4/5/2023 1027 by Johanny Buchanan RN  Outcome: Ongoing, Progressing  Goal: Absence of Hospital-Acquired Illness or Injury  4/5/2023 1027 by Johanny Buchanan RN  Outcome: Ongoing, Progressing  4/5/2023 1027 by Johanny Buchanan RN  Outcome: Ongoing, Progressing  Intervention: Prevent and Manage VTE (Venous Thromboembolism) Risk  Recent Flowsheet Documentation  Taken 4/5/2023 0949 by Johanny Buchanan RN  Activity Management: activity encouraged  Goal: Optimal Comfort and Wellbeing  4/5/2023 1027 by Johanny Buchanan RN  Outcome: Ongoing, Progressing  4/5/2023 1027 by Johanny Buchanan RN  Outcome: Ongoing, Progressing  Goal: Readiness for Transition of Care  4/5/2023 1027 by Johanny Buchanan RN  Outcome: Ongoing, Progressing  4/5/2023 1027 by Johanny Buchanan RN  Outcome: Ongoing, Progressing     Problem: Adjustment to Illness (Stroke, Ischemic/Transient Ischemic Attack)  Goal: Optimal Coping  4/5/2023 1027 by Johanny Buchanan RN  Outcome: Ongoing, Progressing  4/5/2023 1027 by Johanny Buchanan RN  Outcome:  Ongoing, Progressing     Problem: Bowel Elimination Impaired (Stroke, Ischemic/Transient Ischemic Attack)  Goal: Effective Bowel Elimination  4/5/2023 1027 by Johanny Buchanan RN  Outcome: Ongoing, Progressing  4/5/2023 1027 by Johanny Buchanan RN  Outcome: Ongoing, Progressing     Problem: Cerebral Tissue Perfusion (Stroke, Ischemic/Transient Ischemic Attack)  Goal: Optimal Cerebral Tissue Perfusion  4/5/2023 1027 by Johanny Buchanan RN  Outcome: Ongoing, Progressing  4/5/2023 1027 by Johanny Buchanan RN  Outcome: Ongoing, Progressing     Problem: Cognitive Impairment (Stroke, Ischemic/Transient Ischemic Attack)  Goal: Optimal Cognitive Function  4/5/2023 1027 by Johanny Buchanan RN  Outcome: Ongoing, Progressing  4/5/2023 1027 by Johanny Buchanan RN  Outcome: Ongoing, Progressing     Problem: Communication Impairment (Stroke, Ischemic/Transient Ischemic Attack)  Goal: Improved Communication Skills  4/5/2023 1027 by Johanny Buchanan RN  Outcome: Ongoing, Progressing  4/5/2023 1027 by Johanny Buchanan RN  Outcome: Ongoing, Progressing     Problem: Functional Ability Impaired (Stroke, Ischemic/Transient Ischemic Attack)  Goal: Optimal Functional Ability  4/5/2023 1027 by Johanny Buchanan RN  Outcome: Ongoing, Progressing  4/5/2023 1027 by Johanny Buchanan RN  Outcome: Ongoing, Progressing  Intervention: Optimize Functional Ability  Recent Flowsheet Documentation  Taken 4/5/2023 0949 by Johanny Buchanan RN  Activity Management: activity encouraged     Problem: Respiratory Compromise (Stroke, Ischemic/Transient Ischemic Attack)  Goal: Effective Oxygenation and Ventilation  4/5/2023 1027 by Johanny Buchanan RN  Outcome: Ongoing, Progressing  4/5/2023 1027 by Johanny Buchanan RN  Outcome: Ongoing, Progressing     Problem: Sensorimotor Impairment (Stroke, Ischemic/Transient Ischemic Attack)  Goal: Improved Sensorimotor Function  4/5/2023 1027 by Johanny Buchanan RN  Outcome: Ongoing, Progressing  4/5/2023 1027 by Chanel  STANLEY Orlando  Outcome: Ongoing, Progressing     Problem: Swallowing Impairment (Stroke, Ischemic/Transient Ischemic Attack)  Goal: Optimal Eating and Swallowing without Aspiration  4/5/2023 1027 by Johanny Buchanan RN  Outcome: Ongoing, Progressing  4/5/2023 1027 by Johanny Buchanan RN  Outcome: Ongoing, Progressing     Problem: Urinary Elimination Impaired (Stroke, Ischemic/Transient Ischemic Attack)  Goal: Effective Urinary Elimination  4/5/2023 1027 by Johanny Buchanan RN  Outcome: Ongoing, Progressing  4/5/2023 1027 by Johanny Buchanan RN  Outcome: Ongoing, Progressing     Problem: Adjustment to Illness (Stroke, Hemorrhagic)  Goal: Optimal Coping  4/5/2023 1027 by Johanny Buchanan RN  Outcome: Ongoing, Progressing  4/5/2023 1027 by Johanny Buchanan RN  Outcome: Ongoing, Progressing     Problem: Bowel Elimination Impaired (Stroke, Hemorrhagic)  Goal: Effective Bowel Elimination  4/5/2023 1027 by Johanny Buchanan RN  Outcome: Ongoing, Progressing  4/5/2023 1027 by Johanny Buchanan RN  Outcome: Ongoing, Progressing     Problem: Cerebral Tissue Perfusion (Stroke, Hemorrhagic)  Goal: Optimal Cerebral Tissue Perfusion  4/5/2023 1027 by Johanny Buchanan RN  Outcome: Ongoing, Progressing  4/5/2023 1027 by Johanny Buchanan RN  Outcome: Ongoing, Progressing     Problem: Cognitive Impairment (Stroke, Hemorrhagic)  Goal: Optimal Cognitive Function  4/5/2023 1027 by Johanny Buchanan RN  Outcome: Ongoing, Progressing  4/5/2023 1027 by Johanny Buchanan RN  Outcome: Ongoing, Progressing     Problem: Communication Impairment (Stroke, Hemorrhagic)  Goal: Effective Communication Skills  4/5/2023 1027 by Johanny Buchanan RN  Outcome: Ongoing, Progressing  4/5/2023 1027 by Johanny Buchanan RN  Outcome: Ongoing, Progressing     Problem: Functional Ability Impaired (Stroke, Hemorrhagic)  Goal: Optimal Functional Ability  4/5/2023 1027 by Johanny Buchanan RN  Outcome: Ongoing, Progressing  4/5/2023 1027 by Johanny Buchanan RN  Outcome:  Ongoing, Progressing  Intervention: Optimize Functional Ability  Recent Flowsheet Documentation  Taken 4/5/2023 0949 by Johanny Buchanan RN  Activity Management: activity encouraged     Problem: Pain (Stroke, Hemorrhagic)  Goal: Acceptable Pain Control  4/5/2023 1027 by Johanny Buchanan RN  Outcome: Ongoing, Progressing  4/5/2023 1027 by Johnany Buchanan RN  Outcome: Ongoing, Progressing     Problem: Respiratory Compromise (Stroke, Hemorrhagic)  Goal: Effective Oxygenation and Ventilation  4/5/2023 1027 by Johanny Buchanan RN  Outcome: Ongoing, Progressing  4/5/2023 1027 by Johanny Buchanan RN  Outcome: Ongoing, Progressing     Problem: Sensorimotor Impairment (Stroke, Hemorrhagic)  Goal: Improved Sensorimotor Function  4/5/2023 1027 by Johanny Buchanan RN  Outcome: Ongoing, Progressing  4/5/2023 1027 by Johanny Buchanan RN  Outcome: Ongoing, Progressing     Problem: Swallowing Impairment (Stroke, Hemorrhagic)  Goal: Optimal Eating and Swallowing Without Aspiration  4/5/2023 1027 by Johanny Buchanan RN  Outcome: Ongoing, Progressing  4/5/2023 1027 by Johanny Buchanan RN  Outcome: Ongoing, Progressing     Problem: Urinary Elimination Impaired (Stroke, Hemorrhagic)  Goal: Effective Urinary Elimination  4/5/2023 1027 by Johanny Buchanan RN  Outcome: Ongoing, Progressing  4/5/2023 1027 by Johanny Buchanan RN  Outcome: Ongoing, Progressing

## 2023-04-05 NOTE — PLAN OF CARE
Goal Outcome Evaluation:  Plan of Care Reviewed With: patient           Outcome Evaluation: Pt participated with PT/OT this date. Continue to work on cues to use L side to assist with mobilizing R side for bed mobility - little carryover noted. He transfers bed<>chair with max A x2. Worked on standing balance today - completed weight shifting and standing reaching tasks. Requires R knee block and up to max A due to strong posterior lean to maintain standing. Trialed a mirror for visual feedback on R lean with little success. Continue to be limited by confusion vs aphasia vs language barrier.  utilized as appropriate. Also note some R side neglect/inattention with tasks. PT inquired about visual deficit but difficult to obtain a clear answer from the pt. Will continue to follow and progress as able.

## 2023-04-06 PROCEDURE — 97112 NEUROMUSCULAR REEDUCATION: CPT

## 2023-04-06 PROCEDURE — 97530 THERAPEUTIC ACTIVITIES: CPT

## 2023-04-06 RX ADMIN — Medication 10 ML: at 21:00

## 2023-04-06 RX ADMIN — Medication 10 ML: at 08:28

## 2023-04-06 RX ADMIN — CARVEDILOL 12.5 MG: 12.5 TABLET, FILM COATED ORAL at 18:50

## 2023-04-06 RX ADMIN — APIXABAN 5 MG: 5 TABLET, FILM COATED ORAL at 21:00

## 2023-04-06 RX ADMIN — DOCUSATE SODIUM 50MG AND SENNOSIDES 8.6MG 2 TABLET: 8.6; 5 TABLET, FILM COATED ORAL at 08:28

## 2023-04-06 RX ADMIN — CARVEDILOL 12.5 MG: 12.5 TABLET, FILM COATED ORAL at 08:28

## 2023-04-06 RX ADMIN — HYDRALAZINE HYDROCHLORIDE 10 MG: 10 TABLET, FILM COATED ORAL at 14:00

## 2023-04-06 RX ADMIN — HYDRALAZINE HYDROCHLORIDE 10 MG: 10 TABLET, FILM COATED ORAL at 05:12

## 2023-04-06 RX ADMIN — APIXABAN 5 MG: 5 TABLET, FILM COATED ORAL at 08:28

## 2023-04-06 RX ADMIN — HYDRALAZINE HYDROCHLORIDE 10 MG: 10 TABLET, FILM COATED ORAL at 21:00

## 2023-04-06 RX ADMIN — BACLOFEN 2.5 MG: 10 TABLET ORAL at 08:28

## 2023-04-06 RX ADMIN — BACLOFEN 2.5 MG: 10 TABLET ORAL at 21:00

## 2023-04-06 RX ADMIN — VALSARTAN 160 MG: 160 TABLET, FILM COATED ORAL at 08:28

## 2023-04-06 RX ADMIN — AMLODIPINE BESYLATE 10 MG: 10 TABLET ORAL at 08:27

## 2023-04-06 NOTE — PROGRESS NOTES
[facility]    Continuing to follow peripherally. Noted patient may have Green Card, if active, patient maybe eligible for emergent Medicaid. Will continue to follow for disposition.    Meche Peoples RN  Acute Rehab Admission Nurse

## 2023-04-06 NOTE — PLAN OF CARE
Goal Outcome Evaluation:  Plan of Care Reviewed With: patient        Progress: no change  Outcome Evaluation: No changes from nursing standpoint.        Problem: Fall Injury Risk  Goal: Absence of Fall and Fall-Related Injury  Outcome: Ongoing, Progressing     Problem: Skin Injury Risk Increased  Goal: Skin Health and Integrity  Outcome: Ongoing, Progressing     Problem: Adult Inpatient Plan of Care  Goal: Plan of Care Review  Outcome: Ongoing, Progressing  Flowsheets (Taken 4/6/2023 2904)  Progress: no change  Plan of Care Reviewed With: patient  Outcome Evaluation: No changes from nursing standpoint.  Goal: Patient-Specific Goal (Individualized)  Outcome: Ongoing, Progressing  Goal: Absence of Hospital-Acquired Illness or Injury  Outcome: Ongoing, Progressing  Goal: Optimal Comfort and Wellbeing  Outcome: Ongoing, Progressing  Goal: Readiness for Transition of Care  Outcome: Ongoing, Progressing     Problem: Adjustment to Illness (Stroke, Ischemic/Transient Ischemic Attack)  Goal: Optimal Coping  Outcome: Ongoing, Progressing     Problem: Bowel Elimination Impaired (Stroke, Ischemic/Transient Ischemic Attack)  Goal: Effective Bowel Elimination  Outcome: Ongoing, Progressing     Problem: Cerebral Tissue Perfusion (Stroke, Ischemic/Transient Ischemic Attack)  Goal: Optimal Cerebral Tissue Perfusion  Outcome: Ongoing, Progressing     Problem: Cognitive Impairment (Stroke, Ischemic/Transient Ischemic Attack)  Goal: Optimal Cognitive Function  Outcome: Ongoing, Progressing     Problem: Communication Impairment (Stroke, Ischemic/Transient Ischemic Attack)  Goal: Improved Communication Skills  Outcome: Ongoing, Progressing     Problem: Functional Ability Impaired (Stroke, Ischemic/Transient Ischemic Attack)  Goal: Optimal Functional Ability  Outcome: Ongoing, Progressing     Problem: Respiratory Compromise (Stroke, Ischemic/Transient Ischemic Attack)  Goal: Effective Oxygenation and Ventilation  Outcome: Ongoing,  Progressing     Problem: Sensorimotor Impairment (Stroke, Ischemic/Transient Ischemic Attack)  Goal: Improved Sensorimotor Function  Outcome: Ongoing, Progressing     Problem: Swallowing Impairment (Stroke, Ischemic/Transient Ischemic Attack)  Goal: Optimal Eating and Swallowing without Aspiration  Outcome: Ongoing, Progressing     Problem: Urinary Elimination Impaired (Stroke, Ischemic/Transient Ischemic Attack)  Goal: Effective Urinary Elimination  Outcome: Ongoing, Progressing     Problem: Adjustment to Illness (Stroke, Hemorrhagic)  Goal: Optimal Coping  Outcome: Ongoing, Progressing     Problem: Bowel Elimination Impaired (Stroke, Hemorrhagic)  Goal: Effective Bowel Elimination  Outcome: Ongoing, Progressing     Problem: Cerebral Tissue Perfusion (Stroke, Hemorrhagic)  Goal: Optimal Cerebral Tissue Perfusion  Outcome: Ongoing, Progressing     Problem: Cognitive Impairment (Stroke, Hemorrhagic)  Goal: Optimal Cognitive Function  Outcome: Ongoing, Progressing     Problem: Communication Impairment (Stroke, Hemorrhagic)  Goal: Effective Communication Skills  Outcome: Ongoing, Progressing     Problem: Functional Ability Impaired (Stroke, Hemorrhagic)  Goal: Optimal Functional Ability  Outcome: Ongoing, Progressing     Problem: Pain (Stroke, Hemorrhagic)  Goal: Acceptable Pain Control  Outcome: Ongoing, Progressing     Problem: Respiratory Compromise (Stroke, Hemorrhagic)  Goal: Effective Oxygenation and Ventilation  Outcome: Ongoing, Progressing     Problem: Sensorimotor Impairment (Stroke, Hemorrhagic)  Goal: Improved Sensorimotor Function  Outcome: Ongoing, Progressing     Problem: Swallowing Impairment (Stroke, Hemorrhagic)  Goal: Optimal Eating and Swallowing Without Aspiration  Outcome: Ongoing, Progressing     Problem: Urinary Elimination Impaired (Stroke, Hemorrhagic)  Goal: Effective Urinary Elimination  Outcome: Ongoing, Progressing

## 2023-04-06 NOTE — PROGRESS NOTES
".DAILY PROGRESS NOTE  [facility]    Patient Identification:  Name: Manuel Durant  Age: 73 y.o.  Sex: male  :  1950  MRN: [id number]         Primary Care Physician: Provider, No Known    Subjective:  Interval History:He is weak.    Objective:    Scheduled Meds:amLODIPine, 10 mg, Oral, QAM AC  apixaban, 5 mg, Oral, Q12H  baclofen, 2.5 mg, Oral, Q12H  carvedilol, 12.5 mg, Oral, BID With Meals  hydrALAZINE, 10 mg, Oral, Q8H  senna-docusate sodium, 2 tablet, Oral, BID  sodium chloride, 10 mL, Intravenous, Q12H  valsartan, 160 mg, Oral, QAM AC      Continuous Infusions:     Vital signs in last 24 hours:  Temp:  [98 °F (36.7 °C)-98.2 °F (36.8 °C)] 98.2 °F (36.8 °C)  Heart Rate:  [59-76] 76  Resp:  [16] 16  BP: ()/(58-83) 143/80    Intake/Output:    Intake/Output Summary (Last 24 hours) at 2023 1305  Last data filed at 2023 2100  Gross per 24 hour   Intake 240 ml   Output --   Net 240 ml       Exam:  /80 (BP Location: Left arm, Patient Position: Lying)   Pulse 76   Temp 98.2 °F (36.8 °C) (Oral)   Resp 16   Ht 167.6 cm (66\")   Wt 63.9 kg (140 lb 14 oz)   SpO2 93%   BMI 22.74 kg/m²     General Appearance:    Alert, cooperative, no distress   Head:    Normocephalic, without obvious abnormality, atraumatic   Eyes:       Throat:   Lips, tongue, gums normal   Neck:   Supple, symmetrical, trachea midline, no JVD   Lungs:     Clear to auscultation bilaterally, respirations unlabored   Chest Wall:    No tenderness or deformity    Heart:    Regular rate and rhythm, S1 and S2 normal, no murmur,no  Rub or gallop   Abdomen:     Soft, nontender, bowel sounds active, no masses, no organomegaly    Extremities:   Extremities normal, atraumatic, no cyanosis or edema   Pulses:      Skin:   Skin is warm and dry,  no rashes or palpable lesions   Neurologic:   He is weak.      Lab Results (last 72 hours)     Procedure Component Value Units Date/Time    Basic Metabolic Panel [[id number]]  " (Abnormal) Collected: 03/30/23 0610    Specimen: Blood Updated: 03/30/23 0702     Glucose 89 mg/dL      BUN 21 mg/dL      Creatinine 0.77 mg/dL      Sodium 142 mmol/L      Potassium 4.1 mmol/L      Chloride 110 mmol/L      CO2 24.5 mmol/L      Calcium 8.7 mg/dL      BUN/Creatinine Ratio 27.3     Anion Gap 7.5 mmol/L      eGFR 95.7 mL/min/1.73     Narrative:      GFR Normal >60  Chronic Kidney Disease <60  Kidney Failure <15    The GFR formula is only valid for adults with stable renal function between ages 18 and 70.    Phosphorus [[id number]]  (Normal) Collected: 03/30/23 0610    Specimen: Blood Updated: 03/30/23 0702     Phosphorus 3.2 mg/dL     Magnesium [[id number]]  (Normal) Collected: 03/30/23 0610    Specimen: Blood Updated: 03/30/23 0701     Magnesium 2.0 mg/dL     CBC (No Diff) [[id number]]  (Normal) Collected: 03/30/23 0610    Specimen: Blood Updated: 03/30/23 0641     WBC 8.31 10*3/mm3      RBC 4.31 10*6/mm3      Hemoglobin 13.1 g/dL      Hematocrit 39.3 %      MCV 91.2 fL      MCH 30.4 pg      MCHC 33.3 g/dL      RDW 13.0 %      RDW-SD 43.1 fl      MPV 10.8 fL      Platelets 323 10*3/mm3     Basic Metabolic Panel [[id number]]  (Abnormal) Collected: 03/29/23 0745    Specimen: Blood Updated: 03/29/23 0828     Glucose 91 mg/dL      BUN 20 mg/dL      Creatinine 0.73 mg/dL      Sodium 139 mmol/L      Potassium 4.2 mmol/L      Comment: Slight hemolysis detected by analyzer. Results may be affected.        Chloride 106 mmol/L      CO2 23.2 mmol/L      Calcium 8.9 mg/dL      BUN/Creatinine Ratio 27.4     Anion Gap 9.8 mmol/L      eGFR 97.3 mL/min/1.73     Narrative:      GFR Normal >60  Chronic Kidney Disease <60  Kidney Failure <15    The GFR formula is only valid for adults with stable renal function between ages 18 and 70.    Phosphorus [[id number]]  (Normal) Collected: 03/29/23 0745    Specimen: Blood Updated: 03/29/23 0828     Phosphorus 3.4 mg/dL     Magnesium [[id number]]  (Normal) Collected: 03/29/23 0741     Specimen: Blood Updated: 03/29/23 0828     Magnesium 2.1 mg/dL     CBC (No Diff) [[id number]]  (Normal) Collected: 03/29/23 0745    Specimen: Blood Updated: 03/29/23 0813     WBC 7.52 10*3/mm3      RBC 4.45 10*6/mm3      Hemoglobin 13.5 g/dL      Hematocrit 39.9 %      MCV 89.7 fL      MCH 30.3 pg      MCHC 33.8 g/dL      RDW 13.0 %      RDW-SD 42.0 fl      MPV 10.6 fL      Platelets 326 10*3/mm3         Data Review:  Results from last 7 days   Lab Units 04/05/23  0538   SODIUM mmol/L 141   POTASSIUM mmol/L 4.1   CHLORIDE mmol/L 109*   CO2 mmol/L 21.2*   BUN mg/dL 20   CREATININE mg/dL 0.71*   GLUCOSE mg/dL 86   CALCIUM mg/dL 8.9     Results from last 7 days   Lab Units 04/05/23  0538   WBC 10*3/mm3 7.23   HEMOGLOBIN g/dL 13.8   HEMATOCRIT % 41.3   PLATELETS 10*3/mm3 261             Lab Results   Lab Value Date/Time    TROPONINT 9 03/12/2023 1907               Invalid input(s): PROT, LABALBU          No results found for: POCGLU        History reviewed. No pertinent past medical history.    Assessment:  Active Hospital Problems    Diagnosis  POA   • **Intracranial hemorrhage [I62.9]  Yes   • Acute DVT (deep venous thrombosis) [I82.409]  Unknown   • HTN (hypertension) [I10]  Unknown   • Hypokalemia [E87.6]  Unknown   • Hypertensive emergency [I16.1]  Unknown      Resolved Hospital Problems   No resolved problems to display.       Plan:  Continue current RX. DC planning. Continue with therapy.  Discussed with nurse and case management.  He claims he has a green card.  Case management got documents.    Cedric Johnston MD  4/6/2023  13:05 EDT

## 2023-04-06 NOTE — PLAN OF CARE
Goal Outcome Evaluation:      VSS. No significant changes tonight.              Problem: Fall Injury Risk  Goal: Absence of Fall and Fall-Related Injury  Outcome: Ongoing, Progressing  Intervention: Identify and Manage Contributors  Recent Flowsheet Documentation  Taken 4/5/2023 1945 by Meg Ybarra RN  Medication Review/Management: medications reviewed  Intervention: Promote Injury-Free Environment  Recent Flowsheet Documentation  Taken 4/6/2023 0428 by Meg Ybarra RN  Safety Promotion/Fall Prevention: safety round/check completed  Taken 4/6/2023 0216 by Meg Ybarra RN  Safety Promotion/Fall Prevention: safety round/check completed  Taken 4/6/2023 0000 by Meg Ybarra RN  Safety Promotion/Fall Prevention: safety round/check completed  Taken 4/5/2023 2230 by Meg Ybarra RN  Safety Promotion/Fall Prevention: safety round/check completed  Taken 4/5/2023 2025 by Meg Ybarra RN  Safety Promotion/Fall Prevention: safety round/check completed  Taken 4/5/2023 1945 by Meg Ybarra RN  Safety Promotion/Fall Prevention:   activity supervised   assistive device/personal items within reach   clutter free environment maintained   fall prevention program maintained   nonskid shoes/slippers when out of bed   safety round/check completed     Problem: Skin Injury Risk Increased  Goal: Skin Health and Integrity  Outcome: Ongoing, Progressing  Intervention: Optimize Skin Protection  Recent Flowsheet Documentation  Taken 4/6/2023 0428 by Meg Ybarra RN  Head of Bed (HOB) Positioning: HOB at 20-30 degrees  Taken 4/6/2023 0216 by Meg Ybarra RN  Head of Bed (HOB) Positioning: HOB at 20-30 degrees  Taken 4/6/2023 0000 by Meg Ybarra RN  Head of Bed (HOB) Positioning: HOB at 20 degrees  Taken 4/5/2023 2230 by Meg Ybarra RN  Head of Bed (HOB) Positioning: HOB at 20-30 degrees  Taken 4/5/2023 2025 by Meg Ybarra RN  Head of Bed (HOB) Positioning: HOB at 30-45 degrees  Taken 4/5/2023  1945 by Meg Ybarra RN  Pressure Reduction Techniques:   weight shift assistance provided   frequent weight shift encouraged  Head of Bed (HOB) Positioning: HOB at 30-45 degrees  Pressure Reduction Devices:   alternating pressure pump (ADD)   positioning supports utilized     Problem: Adult Inpatient Plan of Care  Goal: Plan of Care Review  Outcome: Ongoing, Progressing  Goal: Patient-Specific Goal (Individualized)  Outcome: Ongoing, Progressing  Goal: Absence of Hospital-Acquired Illness or Injury  Outcome: Ongoing, Progressing  Intervention: Identify and Manage Fall Risk  Recent Flowsheet Documentation  Taken 4/6/2023 0428 by Meg Ybarra RN  Safety Promotion/Fall Prevention: safety round/check completed  Taken 4/6/2023 0216 by Meg Ybarra RN  Safety Promotion/Fall Prevention: safety round/check completed  Taken 4/6/2023 0000 by Meg Ybarra RN  Safety Promotion/Fall Prevention: safety round/check completed  Taken 4/5/2023 2230 by Meg Ybarra RN  Safety Promotion/Fall Prevention: safety round/check completed  Taken 4/5/2023 2025 by Meg Ybarra RN  Safety Promotion/Fall Prevention: safety round/check completed  Taken 4/5/2023 1945 by Meg Ybarra RN  Safety Promotion/Fall Prevention:   activity supervised   assistive device/personal items within reach   clutter free environment maintained   fall prevention program maintained   nonskid shoes/slippers when out of bed   safety round/check completed  Intervention: Prevent Skin Injury  Recent Flowsheet Documentation  Taken 4/6/2023 0428 by Meg Ybarra RN  Body Position:   tilted   left  Taken 4/6/2023 0216 by Meg Ybarra RN  Body Position: supine, legs elevated  Taken 4/6/2023 0000 by Meg Ybarra RN  Body Position:   tilted   right  Taken 4/5/2023 2230 by Meg Ybarra RN  Body Position:   tilted   left  Taken 4/5/2023 2025 by Meg Ybarra RN  Body Position: sitting up in bed  Taken 4/5/2023 1945 by Meg Ybarra  RN  Body Position: sitting up in bed  Intervention: Prevent and Manage VTE (Venous Thromboembolism) Risk  Recent Flowsheet Documentation  Taken 4/5/2023 1945 by Meg Ybarra RN  VTE Prevention/Management:   bilateral   sequential compression devices on  Goal: Optimal Comfort and Wellbeing  Outcome: Ongoing, Progressing  Goal: Readiness for Transition of Care  Outcome: Ongoing, Progressing     Problem: Adjustment to Illness (Stroke, Ischemic/Transient Ischemic Attack)  Goal: Optimal Coping  Outcome: Ongoing, Progressing     Problem: Bowel Elimination Impaired (Stroke, Ischemic/Transient Ischemic Attack)  Goal: Effective Bowel Elimination  Outcome: Ongoing, Progressing     Problem: Cerebral Tissue Perfusion (Stroke, Ischemic/Transient Ischemic Attack)  Goal: Optimal Cerebral Tissue Perfusion  Outcome: Ongoing, Progressing     Problem: Cognitive Impairment (Stroke, Ischemic/Transient Ischemic Attack)  Goal: Optimal Cognitive Function  Outcome: Ongoing, Progressing     Problem: Communication Impairment (Stroke, Ischemic/Transient Ischemic Attack)  Goal: Improved Communication Skills  Outcome: Ongoing, Progressing     Problem: Functional Ability Impaired (Stroke, Ischemic/Transient Ischemic Attack)  Goal: Optimal Functional Ability  Outcome: Ongoing, Progressing     Problem: Respiratory Compromise (Stroke, Ischemic/Transient Ischemic Attack)  Goal: Effective Oxygenation and Ventilation  Outcome: Ongoing, Progressing  Intervention: Optimize Oxygenation and Ventilation  Recent Flowsheet Documentation  Taken 4/6/2023 0428 by Meg Ybarra RN  Head of Bed (HOB) Positioning: HOB at 20-30 degrees  Taken 4/6/2023 0216 by Meg Ybarra RN  Head of Bed (HOB) Positioning: HOB at 20-30 degrees  Taken 4/6/2023 0000 by Meg Ybarra RN  Head of Bed (HOB) Positioning: HOB at 20 degrees  Taken 4/5/2023 2230 by Meg Ybarra RN  Head of Bed (HOB) Positioning: HOB at 20-30 degrees  Taken 4/5/2023 2025 by Meg Ybarra  RN  Head of Bed (HOB) Positioning: HOB at 30-45 degrees  Taken 4/5/2023 1945 by Meg Ybarra RN  Head of Bed (HOB) Positioning: HOB at 30-45 degrees     Problem: Sensorimotor Impairment (Stroke, Ischemic/Transient Ischemic Attack)  Goal: Improved Sensorimotor Function  Outcome: Ongoing, Progressing  Intervention: Optimize Range of Motion, Motor Control and Function  Recent Flowsheet Documentation  Taken 4/6/2023 0428 by Meg Ybarra RN  Positioning/Transfer Devices:   pillows   in use  Taken 4/6/2023 0000 by Meg Ybarra RN  Positioning/Transfer Devices:   pillows   in use  Taken 4/5/2023 2230 by Meg Ybarra RN  Positioning/Transfer Devices:   pillows   in use  Taken 4/5/2023 2025 by Meg Ybarra RN  Positioning/Transfer Devices:   pillows   in use  Taken 4/5/2023 1945 by Meg Ybarra RN  Positioning/Transfer Devices:   pillows   in use  Intervention: Optimize Sensory and Perceptual Ability  Recent Flowsheet Documentation  Taken 4/5/2023 1945 by Meg Ybarra RN  Pressure Reduction Techniques:   weight shift assistance provided   frequent weight shift encouraged  Pressure Reduction Devices:   alternating pressure pump (ADD)   positioning supports utilized     Problem: Swallowing Impairment (Stroke, Ischemic/Transient Ischemic Attack)  Goal: Optimal Eating and Swallowing without Aspiration  Outcome: Ongoing, Progressing  Intervention: Optimize Eating and Swallowing  Recent Flowsheet Documentation  Taken 4/5/2023 1945 by Meg Ybarra RN  Aspiration Precautions:   liquids thickened   food consistency adjusted     Problem: Urinary Elimination Impaired (Stroke, Ischemic/Transient Ischemic Attack)  Goal: Effective Urinary Elimination  Outcome: Ongoing, Progressing     Problem: Adjustment to Illness (Stroke, Hemorrhagic)  Goal: Optimal Coping  Outcome: Ongoing, Progressing     Problem: Bowel Elimination Impaired (Stroke, Hemorrhagic)  Goal: Effective Bowel Elimination  Outcome: Ongoing,  Progressing     Problem: Cerebral Tissue Perfusion (Stroke, Hemorrhagic)  Goal: Optimal Cerebral Tissue Perfusion  Outcome: Ongoing, Progressing     Problem: Cognitive Impairment (Stroke, Hemorrhagic)  Goal: Optimal Cognitive Function  Outcome: Ongoing, Progressing     Problem: Communication Impairment (Stroke, Hemorrhagic)  Goal: Effective Communication Skills  Outcome: Ongoing, Progressing     Problem: Functional Ability Impaired (Stroke, Hemorrhagic)  Goal: Optimal Functional Ability  Outcome: Ongoing, Progressing     Problem: Pain (Stroke, Hemorrhagic)  Goal: Acceptable Pain Control  Outcome: Ongoing, Progressing     Problem: Respiratory Compromise (Stroke, Hemorrhagic)  Goal: Effective Oxygenation and Ventilation  Outcome: Ongoing, Progressing  Intervention: Optimize Oxygenation and Ventilation  Recent Flowsheet Documentation  Taken 4/6/2023 0428 by Meg Ybarra RN  Head of Bed (HOB) Positioning: HOB at 20-30 degrees  Taken 4/6/2023 0216 by Meg Ybarra RN  Head of Bed (HOB) Positioning: HOB at 20-30 degrees  Taken 4/6/2023 0000 by Meg Ybarra RN  Head of Bed (HOB) Positioning: HOB at 20 degrees  Taken 4/5/2023 2230 by Meg Ybarra RN  Head of Bed (HOB) Positioning: HOB at 20-30 degrees  Taken 4/5/2023 2025 by Meg Ybarra RN  Head of Bed (HOB) Positioning: HOB at 30-45 degrees  Taken 4/5/2023 1945 by Meg Ybarra RN  Head of Bed (HOB) Positioning: HOB at 30-45 degrees     Problem: Sensorimotor Impairment (Stroke, Hemorrhagic)  Goal: Improved Sensorimotor Function  Outcome: Ongoing, Progressing  Intervention: Optimize Range of Motion, Motor Control and Function  Recent Flowsheet Documentation  Taken 4/6/2023 0428 by Meg Ybarra RN  Positioning/Transfer Devices:   pillows   in use  Taken 4/6/2023 0000 by Meg Ybarra RN  Positioning/Transfer Devices:   pillows   in use  Taken 4/5/2023 2230 by Meg Ybarra RN  Positioning/Transfer Devices:   pillows   in use  Taken 4/5/2023  2025 by Meg Ybarra RN  Positioning/Transfer Devices:   pillows   in use  Taken 4/5/2023 1945 by Meg Ybarra RN  Positioning/Transfer Devices:   pillows   in use  Intervention: Optimize Sensory and Perceptual Ability  Recent Flowsheet Documentation  Taken 4/5/2023 1945 by Meg Ybarra RN  Pressure Reduction Techniques:   weight shift assistance provided   frequent weight shift encouraged  Pressure Reduction Devices:   alternating pressure pump (ADD)   positioning supports utilized     Problem: Swallowing Impairment (Stroke, Hemorrhagic)  Goal: Optimal Eating and Swallowing Without Aspiration  Outcome: Ongoing, Progressing  Intervention: Optimize Eating and Swallowing  Recent Flowsheet Documentation  Taken 4/5/2023 1945 by Meg Ybarra RN  Aspiration Precautions:   liquids thickened   food consistency adjusted     Problem: Urinary Elimination Impaired (Stroke, Hemorrhagic)  Goal: Effective Urinary Elimination  Outcome: Ongoing, Progressing

## 2023-04-06 NOTE — THERAPY TREATMENT NOTE
Patient Name: Manuel Durant  : 1950    MRN: [id number]                              Today's Date: 2023       Admit Date: 3/12/2023    Visit Dx:     ICD-10-CM ICD-9-CM   1. Intracranial hemorrhage  I62.9 432.9   2. Altered mental status, unspecified altered mental status type  R41.82 780.97     Patient Active Problem List   Diagnosis   • Intracranial hemorrhage   • Hypertensive emergency   • Acute DVT (deep venous thrombosis)   • HTN (hypertension)   • Hypokalemia     History reviewed. No pertinent past medical history.  History reviewed. No pertinent surgical history.   General Information     Row Name 23          Physical Therapy Time and Intention    Document Type therapy note (daily note)  -CW     Mode of Treatment physical therapy;individual therapy  -CW     Row Name 23          General Information    Patient Profile Reviewed yes  -CW     Existing Precautions/Restrictions fall  -CW     Barriers to Rehab cognitive status;language barrier  -CW     Row Name 23          Cognition    Orientation Status (Cognition) oriented to;person;place  -CW     Row Name 23          Safety Issues, Functional Mobility    Safety Issues Affecting Function (Mobility) insight into deficits/self-awareness;awareness of need for assistance;safety precautions follow-through/compliance;problem-solving;impulsivity  -CW     Impairments Affecting Function (Mobility) balance;coordination;endurance/activity tolerance;grasp;motor control;postural/trunk control;strength;range of motion (ROM);cognition;visual/perceptual  -CW           User Key  (r) = Recorded By, (t) = Taken By, (c) = Cosigned By    Initials Name Provider Type    CW Tamica Willoughby PT Physical Therapist               Mobility     Row Name 23          Bed Mobility    Bed Mobility supine-sit;sit-supine  -CW     Supine-Sit [place] (Bed Mobility) minimum assist (75% patient effort);verbal cues;nonverbal cues  (demo/gesture);moderate assist (50% patient effort)  -CW     Sit-Supine [place] (Bed Mobility) standby assist;verbal cues  -CW     Assistive Device (Bed Mobility) bed rails  -CW     Comment, (Bed Mobility) assist at trunk for sup>sit; extra time required and use of momentum  -CW     Row Name 04/06/23 1330          Sit-Stand Transfer    Sit-Stand [place] (Transfers) minimum assist (75% patient effort);verbal cues;1 person assist  -CW     Comment, (Sit-Stand Transfer) R knee blocked  -CW     Row Name 04/06/23 1330          Gait/Stairs (Locomotion)    [place] Level (Gait) unable to assess  -CW           User Key  (r) = Recorded By, (t) = Taken By, (c) = Cosigned By    Initials Name Provider Type    Tamica Eddy PT Physical Therapist               Obj/Interventions     Row Name 04/06/23 1332          Motor Skills    Therapeutic Exercise other (see comments)  R UE exercises in all planes - assist from PT and encouraged to use L UE to assist, R rosette [place] with assist  -CW     Row Name 04/06/23 1332          Balance    Dynamic Standing Balance maximum assist;2-person assist  -CW           User Key  (r) = Recorded By, (t) = Taken By, (c) = Cosigned By    Initials Name Provider Type    Tamica Eddy PT Physical Therapist               Goals/Plan    No documentation.                Clinical Impression     Row Name 04/06/23 1332          Pain    Pretreatment Pain Rating 0/10 - no pain  -CW     Pre/Posttreatment Pain Comment pain to R shoulder with ROM activities but does not rate  -CW     Pain Intervention(s) Repositioned;Rest;Ambulation/increased activity  -CW     Row Name 04/06/23 1332          Plan of Care Review    Plan of Care Reviewed With patient  -CW     Outcome Evaluation Pt participated with PT this morning. He completed supine>sit with min/mod A, sits with sba and stands with min A. He does require max A x2 at times to maintain standing balance. Difficulty following commands for  weight shift task and to attempt steps. Focused a lot of session of exercises to R UE and LE. Note some improved strength and ROM in R side today. Will continue to follow and progress as able.  -CW     Row Name 04/06/23 1332          Vital Signs    O2 Delivery Pre Treatment room air  -CW     Row Name 04/06/23 1332          Positioning and Restraints    Pre-Treatment Position in bed  -CW     Post Treatment Position bed  -CW     In Bed notified nsg;call light within reach;encouraged to call for assist;exit alarm on;fowlers  -           User Key  (r) = Recorded By, (t) = Taken By, (c) = Cosigned By    Initials Name Provider Type    Tamica Eddy PT Physical Therapist               Outcome Measures     Row Name 04/06/23 1335          How much help from another person do you currently need...    Turning from your back to your side while in flat bed without using bedrails? 3  -CW     Moving from lying on back to sitting on the side of a flat bed without bedrails? 3  -CW     Moving to and from a bed to a chair (including a wheelchair)? 2  -CW     Standing up from a chair using your arms (e.g., wheelchair, bedside chair)? 2  -CW     Climbing 3-5 steps with a railing? 1  -CW     To walk in hospital room? 1  -CW     AM-PAC 6 Clicks Score (PT) 12  -CW     Highest level of mobility 4 --> Transferred to chair/commode  -CW     Row Name 04/06/23 1335          Functional Assessment    Outcome Measure Options AM-PAC 6 Clicks Basic Mobility (PT)  -CW           User Key  (r) = Recorded By, (t) = Taken By, (c) = Cosigned By    Initials Name Provider Type    Tamica Eddy PT Physical Therapist                             Physical Therapy Education     Title: PT OT SLP Therapies (In Progress)     Topic: Physical Therapy (In Progress)     Point: Mobility training (In Progress)     Learning Progress Summary           Patient Acceptance, E, NR by CW at 4/6/2023 1335    Acceptance, E, NR by CW at 4/5/2023 1043     Acceptance, E, NR by CW at 4/4/2023 0906    Acceptance, E, NR by CW at 4/3/2023 1531    Acceptance, E,TB, VU,NR by CAMERON at 4/1/2023 1530    Acceptance, E, NR by CW at 3/31/2023 0925    Acceptance, E, NR by CW at 3/30/2023 1209    Acceptance, E, NR,NL by CW at 3/29/2023 1336    Acceptance, E, NR by ZB at 3/28/2023 1317    Acceptance, E, NR by CW at 3/27/2023 1401    Acceptance, E,D, NR,VU by JM at 3/24/2023 1631    Comment: seemed to comprehend commands this session    Acceptance, E,D, NR by ZACH at 3/23/2023 1655    Acceptance, E,D, NR by ZACH at 3/22/2023 1545    Acceptance, E,TB, VU,NR by CB at 3/21/2023 1544    Acceptance, E,TB,H, VU by MS at 3/16/2023 1801    Acceptance, E,D, DU,NR by MO at 3/16/2023 1451    Acceptance, E, DU,NR by MO at 3/15/2023 1200    Acceptance, E, NR by LM at 3/15/2023 0521    Acceptance, E,D, DU,NR by MO at 3/14/2023 1458   Family Acceptance, E,TB,H, VU by MS at 3/16/2023 1801                   Point: Home exercise program (In Progress)     Learning Progress Summary           Patient Acceptance, E, NR by CW at 4/6/2023 1335    Acceptance, E,TB, VU,NR by CAMERON at 4/1/2023 1530    Acceptance, E, NR by CW at 3/31/2023 0925    Acceptance, E, NR by ZB at 3/28/2023 1317    Acceptance, E, NR by CW at 3/27/2023 1401    Acceptance, E,D, NR,VU by JM at 3/24/2023 1631    Comment: seemed to comprehend commands this session    Acceptance, E,D, NR by ZACH at 3/23/2023 1655    Acceptance, E,D, NR by JM at 3/22/2023 1545    Acceptance, E,TB, VU,NR by CB at 3/21/2023 1544    Acceptance, E,TB,H, VU by MS at 3/16/2023 1801    Acceptance, E,D, DU,NR by MO at 3/16/2023 1451    Acceptance, E, NR by LM at 3/15/2023 0521    Acceptance, E,D, DU,NR by MO at 3/14/2023 1458   Family Acceptance, E,TB,H, VU by MS at 3/16/2023 1801                   Point: Body mechanics (In Progress)     Learning Progress Summary           Patient Acceptance, E, NR by CW at 4/4/2023 0906    Acceptance, E, NR by CW at 4/3/2023 1531    Acceptance,  E,TB, VU,NR by CAMERON at 4/1/2023 1530    Acceptance, E,D, NR,VU by ZACH at 3/24/2023 1631    Comment: seemed to comprehend commands this session    Acceptance, E,D, NR by ZACH at 3/23/2023 1655    Acceptance, E,D, NR by ZACH at 3/22/2023 1545    Acceptance, E,TB, VU,NR by CB at 3/21/2023 1544    Acceptance, E,TB,H, VU by MS at 3/16/2023 1801    Acceptance, E,D, DU,NR by MO at 3/16/2023 1451    Acceptance, E, DU,NR by MO at 3/15/2023 1200    Acceptance, E, NR by LM at 3/15/2023 0521    Acceptance, E,D, DU,NR by MO at 3/14/2023 1458   Family Acceptance, E,TB,H, VU by MS at 3/16/2023 1801                   Point: Precautions (In Progress)     Learning Progress Summary           Patient Acceptance, E, NR by CW at 4/4/2023 0906    Acceptance, E, NR by CW at 4/3/2023 1531    Acceptance, E,TB, VU,NR by CAMERON at 4/1/2023 1530    Acceptance, E,D, NR,VU by ZACH at 3/24/2023 1631    Comment: seemed to comprehend commands this session    Acceptance, E,D, NR by ZACH at 3/23/2023 1655    Acceptance, E,D, NR by ZACH at 3/22/2023 1545    Acceptance, E,TB, VU,NR by CB at 3/21/2023 1544    Acceptance, E,TB,H, VU by MS at 3/16/2023 1801    Acceptance, E,D, DU,NR by MO at 3/16/2023 1451    Acceptance, E, DU,NR by MO at 3/15/2023 1200    Acceptance, E, NR by LM at 3/15/2023 0521    Acceptance, E,D, DU,NR by MO at 3/14/2023 1458   Family Acceptance, E,TB,H, VU by MS at 3/16/2023 1801                               User Key     Initials Effective Dates Name Provider Type Discipline    ZACH 03/07/18 -  Grace, Tiffanie, PTA Physical Therapist Assistant PT    CAMERON 05/19/21 -  Leeann Wiley, PT Physical Therapist PT    MS 06/16/21 -  Angel Luis Cr, RN Registered Nurse Nurse    CW 12/13/22 -  Tamica Willoughby, PT Physical Therapist PT    CB 10/22/21 -  Bety Benitez, PT Physical Therapist PT    LM 10/13/22 -  Silke Grace, RN Registered Nurse Nurse    MO 01/27/23 -  Lacie Tanner, PT Student PT Student PT    ZB 03/10/23 -  Nestor Rae, PT Student PT  Student PT              PT Recommendation and Plan     Plan of Care Reviewed With: patient  Outcome Evaluation: Pt participated with PT this morning. He completed supine>sit with min/mod A, sits with sba and stands with min A. He does require max A x2 at times to maintain standing balance. Difficulty following commands for weight shift task and to attempt steps. Focused a lot of session of exercises to R UE and LE. Note some improved strength and ROM in R side today. Will continue to follow and progress as able.     Time Calculation:    PT Charges     Row Name 04/06/23 1329             Time Calculation    Start Time 1140  -CW      Stop Time 1206  -CW      Time Calculation (min) 26 min  -CW      PT Received On 04/06/23  -CW      PT - Next Appointment 04/07/23  -CW            User Key  (r) = Recorded By, (t) = Taken By, (c) = Cosigned By    Initials Name Provider Type    Tamica Eddy, PT Physical Therapist              Therapy Charges for Today     Code Description Service Date Service Provider Modifiers Qty    21893515737 HC PT THERAPEUTIC ACT EA 15 MIN 4/5/2023 Tamica Willoughby, PT GP 1    61210071620 HC PT NEUROMUSC RE EDUCATION EA 15 MIN 4/5/2023 Tamica Willoughby, PT GP 2    34756233309 HC PT THERAPEUTIC ACT EA 15 MIN 4/6/2023 Tamica Willoughby, PT GP 1    14023198198 HC PT NEUROMUSC RE EDUCATION EA 15 MIN 4/6/2023 Tamica Willoughby, PT GP 1          PT G-Codes  Outcome Measure Options: AM-PAC 6 Clicks Basic Mobility (PT)  AM-PAC 6 Clicks Score (PT): 12  AM-PAC 6 Clicks Score (OT): 12  Modified [place] Scale: 4 - Moderately severe disability.  Unable to walk without assistance, and unable to attend to own bodily needs without assistance.  PT Discharge Summary  Anticipated Discharge Disposition (PT): inpatient rehabilitation facility, skilled nursing facility    Tamica Willoughby PT  4/6/2023

## 2023-04-06 NOTE — PLAN OF CARE
Goal Outcome Evaluation:  Plan of Care Reviewed With: patient           Outcome Evaluation: Pt participated with PT this morning. He completed supine>sit with min/mod A, sits with sba and stands with min A. He does require max A x2 at times to maintain standing balance. Difficulty following commands for weight shift task and to attempt steps. Focused a lot of session of exercises to R UE and LE. Note some improved strength and ROM in R side today. Will continue to follow and progress as able.

## 2023-04-06 NOTE — CASE MANAGEMENT/SOCIAL WORK
Continued Stay Note  [facility]     Patient Name: Manuel Durant  MRN: [id number]  Today's Date: 4/6/2023    Admit Date: 3/12/2023    Plan: Pending   Discharge Plan     Row Name 04/06/23 1152       Plan    Plan Comments Copy of pt's documents received from pt's friend Bridgett Shook.  Call placed to [facility] and 's office.  Faxed to [facility].  VM's left for calls back.  Faye DELGADILLO-STANLEY               Discharge Codes    No documentation.               Expected Discharge Date and Time     Expected Discharge Date Expected Discharge Time    Apr 9, 2023             Faye Moy RN

## 2023-04-07 PROCEDURE — 97112 NEUROMUSCULAR REEDUCATION: CPT

## 2023-04-07 PROCEDURE — 97116 GAIT TRAINING THERAPY: CPT

## 2023-04-07 PROCEDURE — 97530 THERAPEUTIC ACTIVITIES: CPT

## 2023-04-07 RX ADMIN — Medication 10 ML: at 21:26

## 2023-04-07 RX ADMIN — BACLOFEN 2.5 MG: 10 TABLET ORAL at 21:24

## 2023-04-07 RX ADMIN — BACLOFEN 2.5 MG: 10 TABLET ORAL at 08:48

## 2023-04-07 RX ADMIN — HYDRALAZINE HYDROCHLORIDE 10 MG: 10 TABLET, FILM COATED ORAL at 05:06

## 2023-04-07 RX ADMIN — Medication 10 ML: at 08:48

## 2023-04-07 RX ADMIN — HYDRALAZINE HYDROCHLORIDE 10 MG: 10 TABLET, FILM COATED ORAL at 21:25

## 2023-04-07 RX ADMIN — DOCUSATE SODIUM 50MG AND SENNOSIDES 8.6MG 2 TABLET: 8.6; 5 TABLET, FILM COATED ORAL at 08:48

## 2023-04-07 RX ADMIN — DOCUSATE SODIUM 50MG AND SENNOSIDES 8.6MG 2 TABLET: 8.6; 5 TABLET, FILM COATED ORAL at 21:24

## 2023-04-07 RX ADMIN — APIXABAN 5 MG: 5 TABLET, FILM COATED ORAL at 21:24

## 2023-04-07 RX ADMIN — APIXABAN 5 MG: 5 TABLET, FILM COATED ORAL at 08:48

## 2023-04-07 RX ADMIN — VALSARTAN 160 MG: 160 TABLET, FILM COATED ORAL at 08:48

## 2023-04-07 RX ADMIN — CARVEDILOL 12.5 MG: 12.5 TABLET, FILM COATED ORAL at 17:22

## 2023-04-07 RX ADMIN — AMLODIPINE BESYLATE 10 MG: 10 TABLET ORAL at 08:48

## 2023-04-07 RX ADMIN — CARVEDILOL 12.5 MG: 12.5 TABLET, FILM COATED ORAL at 08:48

## 2023-04-07 NOTE — PROGRESS NOTES
".DAILY PROGRESS NOTE  [facility]    Patient Identification:  Name: Manuel Durant  Age: 73 y.o.  Sex: male  :  1950  MRN: [id number]         Primary Care Physician: Provider, No Known    Subjective:  Interval History:He is weak.    Objective:    Scheduled Meds:amLODIPine, 10 mg, Oral, QAM AC  apixaban, 5 mg, Oral, Q12H  baclofen, 2.5 mg, Oral, Q12H  carvedilol, 12.5 mg, Oral, BID With Meals  hydrALAZINE, 10 mg, Oral, Q8H  senna-docusate sodium, 2 tablet, Oral, BID  sodium chloride, 10 mL, Intravenous, Q12H  valsartan, 160 mg, Oral, QAM AC      Continuous Infusions:     Vital signs in last 24 hours:  Temp:  [97.6 °F (36.4 °C)-98.8 °F (37.1 °C)] 97.6 °F (36.4 °C)  Heart Rate:  [54-65] 57  Resp:  [16-18] 18  BP: (112-139)/(72-78) 125/74    Intake/Output:    Intake/Output Summary (Last 24 hours) at 2023 1334  Last data filed at 2023 0503  Gross per 24 hour   Intake 240 ml   Output --   Net 240 ml       Exam:  /74 (BP Location: Right arm, Patient Position: Lying)   Pulse 57   Temp 97.6 °F (36.4 °C) (Oral)   Resp 18   Ht 167.6 cm (66\")   Wt 60.4 kg (133 lb 2.5 oz)   SpO2 95%   BMI 21.49 kg/m²     General Appearance:    Alert, cooperative, no distress   Head:    Normocephalic, without obvious abnormality, atraumatic   Eyes:       Throat:   Lips, tongue, gums normal   Neck:   Supple, symmetrical, trachea midline, no JVD   Lungs:     Clear to auscultation bilaterally, respirations unlabored   Chest Wall:    No tenderness or deformity    Heart:    Regular rate and rhythm, S1 and S2 normal, no murmur,no  Rub or gallop   Abdomen:     Soft, nontender, bowel sounds active, no masses, no organomegaly    Extremities:   Extremities normal, atraumatic, no cyanosis or edema   Pulses:      Skin:   Skin is warm and dry,  no rashes or palpable lesions   Neurologic:   He is weak.      Lab Results (last 72 hours)     Procedure Component Value Units Date/Time    Basic Metabolic Panel " [[id number]]  (Abnormal) Collected: 03/30/23 0610    Specimen: Blood Updated: 03/30/23 0702     Glucose 89 mg/dL      BUN 21 mg/dL      Creatinine 0.77 mg/dL      Sodium 142 mmol/L      Potassium 4.1 mmol/L      Chloride 110 mmol/L      CO2 24.5 mmol/L      Calcium 8.7 mg/dL      BUN/Creatinine Ratio 27.3     Anion Gap 7.5 mmol/L      eGFR 95.7 mL/min/1.73     Narrative:      GFR Normal >60  Chronic Kidney Disease <60  Kidney Failure <15    The GFR formula is only valid for adults with stable renal function between ages 18 and 70.    Phosphorus [[id number]]  (Normal) Collected: 03/30/23 0610    Specimen: Blood Updated: 03/30/23 0702     Phosphorus 3.2 mg/dL     Magnesium [[id number]]  (Normal) Collected: 03/30/23 0610    Specimen: Blood Updated: 03/30/23 0701     Magnesium 2.0 mg/dL     CBC (No Diff) [[id number]]  (Normal) Collected: 03/30/23 0610    Specimen: Blood Updated: 03/30/23 0641     WBC 8.31 10*3/mm3      RBC 4.31 10*6/mm3      Hemoglobin 13.1 g/dL      Hematocrit 39.3 %      MCV 91.2 fL      MCH 30.4 pg      MCHC 33.3 g/dL      RDW 13.0 %      RDW-SD 43.1 fl      MPV 10.8 fL      Platelets 323 10*3/mm3     Basic Metabolic Panel [[id number]]  (Abnormal) Collected: 03/29/23 0745    Specimen: Blood Updated: 03/29/23 0828     Glucose 91 mg/dL      BUN 20 mg/dL      Creatinine 0.73 mg/dL      Sodium 139 mmol/L      Potassium 4.2 mmol/L      Comment: Slight hemolysis detected by analyzer. Results may be affected.        Chloride 106 mmol/L      CO2 23.2 mmol/L      Calcium 8.9 mg/dL      BUN/Creatinine Ratio 27.4     Anion Gap 9.8 mmol/L      eGFR 97.3 mL/min/1.73     Narrative:      GFR Normal >60  Chronic Kidney Disease <60  Kidney Failure <15    The GFR formula is only valid for adults with stable renal function between ages 18 and 70.    Phosphorus [[id number]]  (Normal) Collected: 03/29/23 0745    Specimen: Blood Updated: 03/29/23 0828     Phosphorus 3.4 mg/dL     Magnesium [[id number]]  (Normal) Collected:  03/29/23 0745    Specimen: Blood Updated: 03/29/23 0828     Magnesium 2.1 mg/dL     CBC (No Diff) [[id number]]  (Normal) Collected: 03/29/23 0745    Specimen: Blood Updated: 03/29/23 0813     WBC 7.52 10*3/mm3      RBC 4.45 10*6/mm3      Hemoglobin 13.5 g/dL      Hematocrit 39.9 %      MCV 89.7 fL      MCH 30.3 pg      MCHC 33.8 g/dL      RDW 13.0 %      RDW-SD 42.0 fl      MPV 10.6 fL      Platelets 326 10*3/mm3         Data Review:  Results from last 7 days   Lab Units 04/05/23  0538   SODIUM mmol/L 141   POTASSIUM mmol/L 4.1   CHLORIDE mmol/L 109*   CO2 mmol/L 21.2*   BUN mg/dL 20   CREATININE mg/dL 0.71*   GLUCOSE mg/dL 86   CALCIUM mg/dL 8.9     Results from last 7 days   Lab Units 04/05/23  0538   WBC 10*3/mm3 7.23   HEMOGLOBIN g/dL 13.8   HEMATOCRIT % 41.3   PLATELETS 10*3/mm3 261             Lab Results   Lab Value Date/Time    TROPONINT 9 03/12/2023 1907               Invalid input(s): PROT, LABALBU          No results found for: POCGLU        History reviewed. No pertinent past medical history.    Assessment:  Active Hospital Problems    Diagnosis  POA   • **Intracranial hemorrhage [I62.9]  Yes   • Acute DVT (deep venous thrombosis) [I82.409]  Unknown   • HTN (hypertension) [I10]  Unknown   • Hypokalemia [E87.6]  Unknown   • Hypertensive emergency [I16.1]  Unknown      Resolved Hospital Problems   No resolved problems to display.       Plan:  Continue current RX. DC planning. Continue with therapy.  Discussed with nurse and case management.  He claims he has a green card.  Case management got documents.  They found insurance card in his wallet.  Should be eligible for social security and medicare.  Needs current residence card.  DC planning. Maybe acute rehab.    Cedric Johnston MD  4/7/2023  13:34 EDT

## 2023-04-07 NOTE — PLAN OF CARE
Goal Outcome Evaluation:  Plan of Care Reviewed With: patient        Progress: no change  Outcome Evaluation: No changes from nursing standpoint.        Problem: Fall Injury Risk  Goal: Absence of Fall and Fall-Related Injury  Outcome: Ongoing, Progressing     Problem: Skin Injury Risk Increased  Goal: Skin Health and Integrity  Outcome: Ongoing, Progressing     Problem: Adult Inpatient Plan of Care  Goal: Plan of Care Review  Outcome: Ongoing, Progressing  Flowsheets (Taken 4/7/2023 4784)  Progress: no change  Plan of Care Reviewed With: patient  Goal: Patient-Specific Goal (Individualized)  Outcome: Ongoing, Progressing  Goal: Absence of Hospital-Acquired Illness or Injury  Outcome: Ongoing, Progressing  Goal: Optimal Comfort and Wellbeing  Outcome: Ongoing, Progressing  Goal: Readiness for Transition of Care  Outcome: Ongoing, Progressing     Problem: Adjustment to Illness (Stroke, Ischemic/Transient Ischemic Attack)  Goal: Optimal Coping  Outcome: Ongoing, Progressing     Problem: Bowel Elimination Impaired (Stroke, Ischemic/Transient Ischemic Attack)  Goal: Effective Bowel Elimination  Outcome: Ongoing, Progressing     Problem: Cerebral Tissue Perfusion (Stroke, Ischemic/Transient Ischemic Attack)  Goal: Optimal Cerebral Tissue Perfusion  Outcome: Ongoing, Progressing     Problem: Cognitive Impairment (Stroke, Ischemic/Transient Ischemic Attack)  Goal: Optimal Cognitive Function  Outcome: Ongoing, Progressing     Problem: Communication Impairment (Stroke, Ischemic/Transient Ischemic Attack)  Goal: Improved Communication Skills  Outcome: Ongoing, Progressing     Problem: Functional Ability Impaired (Stroke, Ischemic/Transient Ischemic Attack)  Goal: Optimal Functional Ability  Outcome: Ongoing, Progressing     Problem: Respiratory Compromise (Stroke, Ischemic/Transient Ischemic Attack)  Goal: Effective Oxygenation and Ventilation  Outcome: Ongoing, Progressing     Problem: Sensorimotor Impairment (Stroke,  Ischemic/Transient Ischemic Attack)  Goal: Improved Sensorimotor Function  Outcome: Ongoing, Progressing     Problem: Swallowing Impairment (Stroke, Ischemic/Transient Ischemic Attack)  Goal: Optimal Eating and Swallowing without Aspiration  Outcome: Ongoing, Progressing     Problem: Urinary Elimination Impaired (Stroke, Ischemic/Transient Ischemic Attack)  Goal: Effective Urinary Elimination  Outcome: Ongoing, Progressing     Problem: Adjustment to Illness (Stroke, Hemorrhagic)  Goal: Optimal Coping  Outcome: Ongoing, Progressing     Problem: Bowel Elimination Impaired (Stroke, Hemorrhagic)  Goal: Effective Bowel Elimination  Outcome: Ongoing, Progressing     Problem: Cerebral Tissue Perfusion (Stroke, Hemorrhagic)  Goal: Optimal Cerebral Tissue Perfusion  Outcome: Ongoing, Progressing     Problem: Cognitive Impairment (Stroke, Hemorrhagic)  Goal: Optimal Cognitive Function  Outcome: Ongoing, Progressing     Problem: Communication Impairment (Stroke, Hemorrhagic)  Goal: Effective Communication Skills  Outcome: Ongoing, Progressing     Problem: Functional Ability Impaired (Stroke, Hemorrhagic)  Goal: Optimal Functional Ability  Outcome: Ongoing, Progressing     Problem: Pain (Stroke, Hemorrhagic)  Goal: Acceptable Pain Control  Outcome: Ongoing, Progressing     Problem: Respiratory Compromise (Stroke, Hemorrhagic)  Goal: Effective Oxygenation and Ventilation  Outcome: Ongoing, Progressing     Problem: Sensorimotor Impairment (Stroke, Hemorrhagic)  Goal: Improved Sensorimotor Function  Outcome: Ongoing, Progressing     Problem: Swallowing Impairment (Stroke, Hemorrhagic)  Goal: Optimal Eating and Swallowing Without Aspiration  Outcome: Ongoing, Progressing     Problem: Urinary Elimination Impaired (Stroke, Hemorrhagic)  Goal: Effective Urinary Elimination  Outcome: Ongoing, Progressing

## 2023-04-07 NOTE — THERAPY TREATMENT NOTE
Patient Name: Manuel Durant  : 1950    MRN: [id number]                              Today's Date: 2023       Admit Date: 3/12/2023    Visit Dx:     ICD-10-CM ICD-9-CM   1. Intracranial hemorrhage  I62.9 432.9   2. Altered mental status, unspecified altered mental status type  R41.82 780.97     Patient Active Problem List   Diagnosis   • Intracranial hemorrhage   • Hypertensive emergency   • Acute DVT (deep venous thrombosis)   • HTN (hypertension)   • Hypokalemia     History reviewed. No pertinent past medical history.  History reviewed. No pertinent surgical history.   General Information     Row Name 23 1256          OT Time and Intention    Document Type therapy note (daily note)  -     Mode of Treatment occupational therapy  -     Row Name 23 1256          General Information    Patient Profile Reviewed yes  -     Existing Precautions/Restrictions fall  -     Barriers to Rehab cognitive status  -     Row Name 23 1256          Cognition    Orientation Status (Cognition) oriented to;person  -     Row Name 23 1256          Safety Issues, Functional Mobility    Impairments Affecting Function (Mobility) balance;coordination;endurance/activity tolerance;grasp;motor control;postural/trunk control;strength;range of motion (ROM);cognition;visual/perceptual  -     Cognitive Impairments, Mobility Safety/Performance attention;impulsivity;insight into deficits/self-awareness;judgment;problem-solving/reasoning;safety precaution awareness;safety precaution follow-through;sequencing abilities  -           User Key  (r) = Recorded By, (t) = Taken By, (c) = Cosigned By    Initials Name Provider Type    Evelia Faulkner OT Occupational Therapist                 Mobility/ADL's     Row Name 23 1257          Bed Mobility    Bed Mobility supine-sit;sit-supine  -     Supine-Sit [place] (Bed Mobility) minimum assist (75% patient effort);1 person assist;verbal  cues;nonverbal cues (demo/gesture)  -     Sit-Supine [place] (Bed Mobility) standby assist;verbal cues  -     Assistive Device (Bed Mobility) bed rails;head of bed elevated  -     Row Name 04/07/23 1257          Bed-Chair Transfer    Bed-Chair [place] (Transfers) maximum assist (25% patient effort);1 person assist;verbal cues  -     Comment, (Bed-Chair Transfer) HHA for stand pivot TF 2x. Multiple cues  -     Row Name 04/07/23 1257          Sit-Stand Transfer    Sit-Stand [place] (Transfers) minimum assist (75% patient effort);moderate assist (50% patient effort)  -     Comment, (Sit-Stand Transfer) STS with HHA x5 today  -     Row Name 04/07/23 1257          Functional Mobility    Functional Mobility- Ind. Level maximum assist (25% patient effort);2 person assist required  -     Functional Mobility- Comment MaxAx2 and cues for sequencing and positioning to trial functional ambulation in preparation for ADLs. Followed closely with chair for safety. RLE wrapped in dorisflexion prior to trial for increased independence  -     Row Name 04/07/23 1257          Lower Body Dressing Assessment/Training    [place] Level (Lower Body Dressing) lower body dressing skills;dependent (less than 25% patient effort)  -     Position (Lower Body Dressing) edge of bed sitting  -           User Key  (r) = Recorded By, (t) = Taken By, (c) = Cosigned By    Initials Name Provider Type    Evelia Faulkner OT Occupational Therapist               Obj/Interventions     Row Name 04/07/23 1556          Balance    Dynamic Standing Balance maximum assist;2-person assist  -     Position/Device Used, Standing Balance supported  -     Comment, Balance MaxAx2 for short fxl ambulation with R knee blocked. R lateral lean, multiple LOBs  -           User Key  (r) = Recorded By, (t) = Taken By, (c) = Cosigned By    Initials Name Provider Type    Evelia Faulkner OT Occupational Therapist                Goals/Plan    No documentation.                Clinical Impression     Row Name 04/07/23 1558          Pain Assessment    Pretreatment Pain Rating 0/10 - no pain  -     Posttreatment Pain Rating 0/10 - no pain  -     Row Name 04/07/23 1558          Plan of Care Review    Plan of Care Reviewed With patient  -     Outcome Evaluation Pt agreeable to OOB activity. He sits EOB with Romeo, MaxAx1 for stand pivot TFs to chair. He participates in short fxl ambulation with R knee blocked and wrapped in dorsiflexion for increased independence in prep for functional mobility for ADLs. MaxAx2 for safety. Max A for LB ADLs. AAROM of RUE in all planes. Pt with pain in R shoulder with flexion and ER. Mild subluxation noted. Left in supine with RUE supported.  -[facility] Name 04/07/23 1558          Therapy Assessment/Plan (OT)    Rehab Potential (OT) good, to achieve stated therapy goals  -     Criteria for Skilled Therapeutic Interventions Met (OT) yes;skilled treatment is necessary  -     Therapy Frequency (OT) 5 times/wk  -[facility] Name 04/07/23 1558          Therapy Plan Review/Discharge Plan (OT)    Anticipated Discharge Disposition (OT) inpatient rehabilitation facility  -[facility] 04/07/23 1558          Positioning and Restraints    Pre-Treatment Position in bed  -     Post Treatment Position bed  -JW     In Bed fowlers;call light within reach;encouraged to call for assist;exit alarm on;patient within staff view;notified [facility]           User Key  (r) = Recorded By, (t) = Taken By, (c) = Cosigned By    Initials Name Provider Type    Evelia Faulkner OT Occupational Therapist               Outcome Measures     Row Name 04/07/23 1215          How much help from another person do you currently need...    Turning from your back to your side while in flat bed without using bedrails? 3  -CW     Moving from lying on back to sitting on the side of a flat bed without bedrails? 3  -CW     Moving to and from a bed  to a chair (including a wheelchair)? 2  -CW     Standing up from a chair using your arms (e.g., wheelchair, bedside chair)? 2  -CW     Climbing 3-5 steps with a railing? 1  -CW     To walk in hospital room? 2  -CW     AM-PAC 6 Clicks Score (PT) 13  -CW     Highest level of mobility 4 --> Transferred to chair/commode  -CW     Row Name 04/07/23 1215          Functional Assessment    Outcome Measure Options AM-PAC 6 Clicks Basic Mobility (PT)  -CW           User Key  (r) = Recorded By, (t) = Taken By, (c) = Cosigned By    Initials Name Provider Type    Tamica Eddy PT Physical Therapist                Occupational Therapy Education     Title: PT OT SLP Therapies (In Progress)     Topic: Occupational Therapy (Done)     Point: ADL training (Done)     Description:   Instruct learner(s) on proper safety adaptation and remediation techniques during self care or transfers.   Instruct in proper use of assistive devices.              Learning Progress Summary           Patient Acceptance, E,TB,H, VU by MS at 3/16/2023 1801    Acceptance, E, NR by LM at 3/15/2023 0521   Family Acceptance, E,TB,H, VU by MS at 3/16/2023 1801                   Point: Home exercise program (Done)     Description:   Instruct learner(s) on appropriate technique for monitoring, assisting and/or progressing therapeutic exercises/activities.              Learning Progress Summary           Patient Acceptance, E,TB,H, VU by MS at 3/16/2023 1801    Acceptance, E, NR by LM at 3/15/2023 0521   Family Acceptance, E,TB,H, VU by MS at 3/16/2023 1801                   Point: Precautions (Done)     Description:   Instruct learner(s) on prescribed precautions during self-care and functional transfers.              Learning Progress Summary           Patient Acceptance, E,TB,H, VU by MS at 3/16/2023 1801    Acceptance, E, NR by LM at 3/15/2023 0521   Family Acceptance, E,TB,H, VU by MS at 3/16/2023 1801                   Point: Body mechanics (Done)      Description:   Instruct learner(s) on proper positioning and spine alignment during self-care, functional mobility activities and/or exercises.              Learning Progress Summary           Patient Acceptance, E,TB,H, VU by MS at 3/16/2023 1801    Acceptance, E, NR by  at 3/15/2023 0521   Family Acceptance, E,TB,H, VU by MS at 3/16/2023 1801                               User Key     Initials Effective Dates Name Provider Type Discipline    MS 06/16/21 -  Angel Luis Cr, RN Registered Nurse Nurse     10/13/22 -  Silke Grace, RN Registered Nurse Nurse              OT Recommendation and Plan  Therapy Frequency (OT): 5 times/wk  Plan of Care Review  Plan of Care Reviewed With: patient  Progress: no change  Outcome Evaluation: Pt agreeable to OOB activity. He sits EOB with Romeo, MaxAx1 for stand pivot TFs to chair. He participates in short fxl ambulation with R knee blocked and wrapped in dorsiflexion for increased independence in prep for functional mobility for ADLs. MaxAx2 for safety. Max A for LB ADLs. AAROM of RUE in all planes. Pt with pain in R shoulder with flexion and ER. Mild subluxation noted. Left in supine with RUE supported.     Time Calculation:    Time Calculation- OT     Row Name 04/07/23 1602             Time Calculation- OT    OT Start Time 1057  -      OT Stop Time 1140  -      OT Time Calculation (min) 43 min  -      Total Timed Code Minutes- OT 43 minute(s)  -      OT Received On 04/07/23  -      OT - Next Appointment 04/10/23  -         Timed Charges    10758 -  OT Neuromuscular Reeducation Minutes 33  -JW      81403 - OT Therapeutic Activity Minutes 10  -         Total Minutes    Timed Charges Total Minutes 43  -       Total Minutes 43  -            User Key  (r) = Recorded By, (t) = Taken By, (c) = Cosigned By    Initials Name Provider Type    Evelia Faulkner OT Occupational Therapist              Therapy Charges for Today     Code Description Service Date Service  Provider Modifiers Qty    [id number]  OT NEUROMUSC RE EDUCATION EA 15 MIN 4/7/2023 Evelia Kenney OT GO 2    [id number] HC OT THERAPEUTIC ACT EA 15 MIN 4/7/2023 Evelia Kenney OT GO 1               Evelia Kenney OT  4/7/2023

## 2023-04-07 NOTE — THERAPY TREATMENT NOTE
Patient Name: Manuel Durant  : 1950    MRN: [id number]                              Today's Date: 2023       Admit Date: 3/12/2023    Visit Dx:     ICD-10-CM ICD-9-CM   1. Intracranial hemorrhage  I62.9 432.9   2. Altered mental status, unspecified altered mental status type  R41.82 780.97     Patient Active Problem List   Diagnosis   • Intracranial hemorrhage   • Hypertensive emergency   • Acute DVT (deep venous thrombosis)   • HTN (hypertension)   • Hypokalemia     History reviewed. No pertinent past medical history.  History reviewed. No pertinent surgical history.   General Information     Row Name 23 1154          Physical Therapy Time and Intention    Document Type therapy note (daily note)  -CW     Mode of Treatment physical therapy;individual therapy  -CW     Row Name 23 1154          General Information    Patient Profile Reviewed yes  -CW     Existing Precautions/Restrictions fall  -CW     Row Name 23 1154          Cognition    Orientation Status (Cognition) oriented to;person  -CW     Row Name 23 1154          Safety Issues, Functional Mobility    Safety Issues Affecting Function (Mobility) insight into deficits/self-awareness;awareness of need for assistance;judgment;problem-solving;impulsivity;safety precautions follow-through/compliance  -CW     Impairments Affecting Function (Mobility) balance;coordination;endurance/activity tolerance;grasp;motor control;postural/trunk control;strength;range of motion (ROM);cognition;visual/perceptual  -CW           User Key  (r) = Recorded By, (t) = Taken By, (c) = Cosigned By    Initials Name Provider Type    CW Tamica Willoughby PT Physical Therapist               Mobility     Row Name 23 1155          Bed Mobility    Bed Mobility supine-sit;sit-supine  -CW     Supine-Sit [place] (Bed Mobility) minimum assist (75% patient effort);1 person assist;verbal cues;nonverbal cues (demo/gesture)  -CW     Sit-Supine  [place] (Bed Mobility) standby assist;verbal cues  -CW     Assistive Device (Bed Mobility) bed rails;head of bed elevated  -CW     Comment, (Bed Mobility) assist at RLE  -     Row Name 04/07/23 1155          Bed-Chair Transfer    Bed-Chair [place] (Transfers) maximum assist (25% patient effort);1 person assist;verbal cues  -CW     Assistive Device (Bed-Chair Transfers) other (see comments)  HHA  -CW     Comment, (Bed-Chair Transfer) x2 trials bed<>chair  -CW     Row Name 04/07/23 1155          Sit-Stand Transfer    Sit-Stand [place] (Transfers) minimum assist (75% patient effort);moderate assist (50% patient effort)  -CW     Assistive Device (Sit-Stand Transfers) other (see comments)  HHA  -CW     Comment, (Sit-Stand Transfer) x 4-5 STS during session  -     Row Name 04/07/23 1155          Gait/Stairs (Locomotion)    [place] Level (Gait) maximum assist (25% patient effort);2 person assist;verbal cues;nonverbal cues (demo/gesture)  -CW     Assistive Device (Gait) other (see comments)  R HHA, L UE on railing in hallway  -CW     Distance in Feet (Gait) 7' x4 trials with chair follow; in hallway with use of L rail  -CW     Deviations/Abnormal Patterns (Gait) sandhya decreased;gait speed decreased;stride length decreased;ataxic  -CW     Bilateral Gait Deviations weight shift ability decreased  -CW     Right Sided Gait Deviations foot drop/toe drag  -CW     Comment, (Gait/Stairs) DF assist wrap in place, 3rd person following with chair for safety. R LE assisted by PT in stance and swing  -CW           User Key  (r) = Recorded By, (t) = Taken By, (c) = Cosigned By    Initials Name Provider Type    CW Tamica Willoughby PT Physical Therapist               Obj/Interventions     Row Name 04/07/23 1209 04/07/23 1205       Balance    Dynamic Standing Balance -- maximum assist;2-person assist  -CW    Comment, Balance max A x2 to ambulate, several losses of balance. R knee blocked  -CW --          User Key   (r) = Recorded By, (t) = Taken By, (c) = Cosigned By    Initials Name Provider Type    Tamica Eddy PT Physical Therapist               Goals/Plan    No documentation.                Clinical Impression     Row Name 04/07/23 1210          Pain    Pretreatment Pain Rating 0/10 - no pain  -CW     Posttreatment Pain Rating 0/10 - no pain  -CW     Pain Intervention(s) Repositioned;Rest;Ambulation/increased activity  -CW     Row Name 04/07/23 1210          Plan of Care Review    Plan of Care Reviewed With patient  -CW     Outcome Evaluation Pt participated with PT this AM. Trialled ambulation today in hallway with L side rail and DF assist wrap in place. Pt requires max A x2 to ambulate, total assist at RLE to progress forward and to block in stance. 3rd person following with chair for safety. Able to do 7' x4 trials. Will continue to follow and progress as able.  -CW     Row Name 04/07/23 1210          Vital Signs    O2 Delivery Pre Treatment room air  -CW     Row Name 04/07/23 1210          Positioning and Restraints    Pre-Treatment Position in bed  -CW     Post Treatment Position bed  -CW     In Bed notified nsg;call light within reach;encouraged to call for assist;exit alarm on;fowlers  -CW           User Key  (r) = Recorded By, (t) = Taken By, (c) = Cosigned By    Initials Name Provider Type    Tamica Eddy PT Physical Therapist               Outcome Measures     Row Name 04/07/23 1215          How much help from another person do you currently need...    Turning from your back to your side while in flat bed without using bedrails? 3  -CW     Moving from lying on back to sitting on the side of a flat bed without bedrails? 3  -CW     Moving to and from a bed to a chair (including a wheelchair)? 2  -CW     Standing up from a chair using your arms (e.g., wheelchair, bedside chair)? 2  -CW     Climbing 3-5 steps with a railing? 1  -CW     To walk in hospital room? 2  -CW     AM-PAC 6 Clicks Score  (PT) 13  -CW     Highest level of mobility 4 --> Transferred to chair/commode  -CW     Row Name 04/07/23 1215          Functional Assessment    Outcome Measure Options AM-PAC 6 Clicks Basic Mobility (PT)  -CW           User Key  (r) = Recorded By, (t) = Taken By, (c) = Cosigned By    Initials Name Provider Type    CW Tamica Willoughby, PT Physical Therapist                             Physical Therapy Education     Title: PT OT SLP Therapies (In Progress)     Topic: Physical Therapy (In Progress)     Point: Mobility training (In Progress)     Learning Progress Summary           Patient Acceptance, E, NR by CW at 4/7/2023 1215    Acceptance, E, NR by CW at 4/6/2023 1335    Acceptance, E, NR by CW at 4/5/2023 1043    Acceptance, E, NR by CW at 4/4/2023 0906    Acceptance, E, NR by CW at 4/3/2023 1531    Acceptance, E,TB, VU,NR by CAMERON at 4/1/2023 1530    Acceptance, E, NR by CW at 3/31/2023 0925    Acceptance, E, NR by CW at 3/30/2023 1209    Acceptance, E, NR,NL by CW at 3/29/2023 1336    Acceptance, E, NR by ZB at 3/28/2023 1317    Acceptance, E, NR by CW at 3/27/2023 1401    Acceptance, E,D, NR,VU by JM at 3/24/2023 1631    Comment: seemed to comprehend commands this session    Acceptance, E,D, NR by JM at 3/23/2023 1655    Acceptance, E,D, NR by JM at 3/22/2023 1545    Acceptance, E,TB, VU,NR by CB at 3/21/2023 1544    Acceptance, E,TB,H, VU by MS at 3/16/2023 1801    Acceptance, E,D, DU,NR by MO at 3/16/2023 1451    Acceptance, E, DU,NR by MO at 3/15/2023 1200    Acceptance, E, NR by LM at 3/15/2023 0521    Acceptance, E,D, DU,NR by MO at 3/14/2023 1458   Family Acceptance, E,TB,H, VU by MS at 3/16/2023 1801                   Point: Home exercise program (In Progress)     Learning Progress Summary           Patient Acceptance, E, NR by CW at 4/7/2023 1215    Acceptance, E, NR by CW at 4/6/2023 1335    Acceptance, E,TB, VU,NR by CAMERON at 4/1/2023 1530    Acceptance, E, NR by CW at 3/31/2023 0925    Acceptance, E, NR by  CHARLY at 3/28/2023 1317    Acceptance, E, NR by CW at 3/27/2023 1401    Acceptance, E,D, NR,VU by JM at 3/24/2023 1631    Comment: seemed to comprehend commands this session    Acceptance, E,D, NR by ZACH at 3/23/2023 1655    Acceptance, E,D, NR by ZACH at 3/22/2023 1545    Acceptance, E,TB, VU,NR by CB at 3/21/2023 1544    Acceptance, E,TB,H, VU by MS at 3/16/2023 1801    Acceptance, E,D, DU,NR by MO at 3/16/2023 1451    Acceptance, E, NR by LM at 3/15/2023 0521    Acceptance, E,D, DU,NR by MO at 3/14/2023 1458   Family Acceptance, E,TB,H, VU by MS at 3/16/2023 1801                   Point: Body mechanics (In Progress)     Learning Progress Summary           Patient Acceptance, E, NR by CW at 4/7/2023 1215    Acceptance, E, NR by CW at 4/4/2023 0906    Acceptance, E, NR by CW at 4/3/2023 1531    Acceptance, E,TB, VU,NR by CAMERON at 4/1/2023 1530    Acceptance, E,D, NR,VU by JM at 3/24/2023 1631    Comment: seemed to comprehend commands this session    Acceptance, E,D, NR by ZACH at 3/23/2023 1655    Acceptance, E,D, NR by ZACH at 3/22/2023 1545    Acceptance, E,TB, VU,NR by CB at 3/21/2023 1544    Acceptance, E,TB,H, VU by MS at 3/16/2023 1801    Acceptance, E,D, DU,NR by MO at 3/16/2023 1451    Acceptance, E, DU,NR by MO at 3/15/2023 1200    Acceptance, E, NR by LM at 3/15/2023 0521    Acceptance, E,D, DU,NR by MO at 3/14/2023 1458   Family Acceptance, E,TB,H, VU by MS at 3/16/2023 1801                   Point: Precautions (In Progress)     Learning Progress Summary           Patient Acceptance, E, NR by CW at 4/7/2023 1215    Acceptance, E, NR by CW at 4/4/2023 0906    Acceptance, E, NR by CW at 4/3/2023 1531    Acceptance, E,TB, VU,NR by CAMERON at 4/1/2023 1530    Acceptance, E,D, NR,VU by ZACH at 3/24/2023 1631    Comment: seemed to comprehend commands this session    Acceptance, E,D, NR by ZACH at 3/23/2023 1655    Acceptance, E,D, NR by ZACH at 3/22/2023 1545    Acceptance, E,TB, VU,NR by TITA at 3/21/2023 1544    Acceptance, E,TB,H,  VU by MS at 3/16/2023 1801    Acceptance, E,D, DU,NR by MO at 3/16/2023 1451    Acceptance, E, DU,NR by MO at 3/15/2023 1200    Acceptance, E, NR by LM at 3/15/2023 0521    Acceptance, E,D, DU,NR by MO at 3/14/2023 1458   Family Acceptance, E,TB,H, VU by MS at 3/16/2023 1801                               User Key     Initials Effective Dates Name Provider Type Discipline    JM 03/07/18 -  Tiffanie Grace, PTA Physical Therapist Assistant PT    CAMERON 05/19/21 -  Leeann Wiley, PT Physical Therapist PT    MS 06/16/21 -  Angel Luis Cr, RN Registered Nurse Nurse    CW 12/13/22 -  Tamica Willoughby, PT Physical Therapist PT    CB 10/22/21 -  Bety Benitez, PT Physical Therapist PT    LM 10/13/22 -  Silke Grace, RN Registered Nurse Nurse    MO 01/27/23 -  Lacie Tanner, PT Student PT Student PT    ZB 03/10/23 -  Nestor Rae, PT Student PT Student PT              PT Recommendation and Plan     Plan of Care Reviewed With: patient  Outcome Evaluation: Pt participated with PT this AM. Trialled ambulation today in hallway with L side rail and DF assist wrap in place. Pt requires max A x2 to ambulate, total assist at RLE to progress forward and to block in stance. 3rd person following with chair for safety. Able to do 7' x4 trials. Will continue to follow and progress as able.     Time Calculation:    PT Charges     Row Name 04/07/23 1153             Time Calculation    Start Time 1057  -CW      Stop Time 1140  -CW      Time Calculation (min) 43 min  -CW      PT Received On 04/07/23  -CW      PT - Next Appointment 04/08/23  -CW            User Key  (r) = Recorded By, (t) = Taken By, (c) = Cosigned By    Initials Name Provider Type    CW Tamica Willoughby, PT Physical Therapist              Therapy Charges for Today     Code Description Service Date Service Provider Modifiers Qty    36054491529 HC PT THERAPEUTIC ACT EA 15 MIN 4/6/2023 Tamica Willoughby, PT GP 1    24561155218 HC PT NEUROMUSC RE EDUCATION EA 15 MIN  4/6/2023 Tamica Willoughby, PT GP 1    73043672160 HC GAIT TRAINING EA 15 MIN 4/7/2023 Tamica Willoughby, PT GP 2    24348701662 HC PT NEUROMUSC RE EDUCATION EA 15 MIN 4/7/2023 Tamica Willoughby, PT GP 1    71145322919 HC PT THER SUPP EA 15 MIN 4/7/2023 Tamica Willoughby, PT GP 3          PT G-Codes  Outcome Measure Options: AM-PAC 6 Clicks Basic Mobility (PT)  AM-PAC 6 Clicks Score (PT): 13  AM-PAC 6 Clicks Score (OT): 12  Modified Yamilex Scale: 4 - Moderately severe disability.  Unable to walk without assistance, and unable to attend to own bodily needs without assistance.  PT Discharge Summary  Anticipated Discharge Disposition (PT): inpatient rehabilitation facility, skilled nursing facility    Tamica Willoughby, PT  4/7/2023

## 2023-04-07 NOTE — PLAN OF CARE
Goal Outcome Evaluation:  Plan of Care Reviewed With: patient           Outcome Evaluation: Pt participated with PT this AM. Trialled ambulation today in hallway with L side rail and DF assist wrap in place. Pt requires max A x2 to ambulate, total assist at RLE to progress forward and to block in stance. 3rd person following with chair for safety. Able to do 7' x4 trials. Will continue to follow and progress as able.

## 2023-04-07 NOTE — CASE MANAGEMENT/SOCIAL WORK
Continued Stay Note  [facility]     Patient Name: Manuel Durant  MRN: [id number]  Today's Date: 4/7/2023    Admit Date: 3/12/2023    Plan: pending   Discharge Plan     Row Name 04/07/23 1403       Plan    Plan pending    Plan Comments Additional documents located in pt's belongings which are secured by Security.  Copies made and forwarded to finance.  Pt does have a limited benefit medical insurance plan.  Inpatient, RX and SNF benefits verified.  Limited benefit amount will present challenges to placement.  [facility] continues to work to secure family assistance and needed post acute services.  In contact with son Ariel in [place] as well as granddaughter Natalie in [place].  [employer] continues to review for possible eligibility for Medicaid.   Noted [facility] assessment as well as progress made with therapies. Faye ARMSTRONG               Discharge Codes    No documentation.               Expected Discharge Date and Time     Expected Discharge Date Expected Discharge Time    Apr 10, 2023             Faye Myo RN

## 2023-04-07 NOTE — PLAN OF CARE
Goal Outcome Evaluation:  Plan of Care Reviewed With: patient        Progress: no change  Outcome Evaluation: Pt agreeable to OOB activity. He sits EOB with Romeo, MaxAx1 for stand pivot TFs to chair. He participates in short fxl ambulation with R knee blocked and wrapped in dorsiflexion for increased independence in prep for functional mobility for ADLs. MaxAx2 for safety. Max A for LB ADLs. AAROM of RUE in all planes. Pt with pain in R shoulder with flexion and ER. Mild subluxation noted. Left in supine with RUE supported.

## 2023-04-07 NOTE — PROGRESS NOTES
[facility] Acute Inpt Rehab    Discussed with , pt does not meet criteria for acute inpt rehab at this time.  Pt requires max of 2 for standing balance and unable to attempt steps.  Will continue to monitor progress with therapies.     Meche Peoples RN  Acute Rehab Admission Nurse

## 2023-04-07 NOTE — PLAN OF CARE
Goal Outcome Evaluation:      VSS. No significant changes overnight. Patient slept in between care.              Problem: Fall Injury Risk  Goal: Absence of Fall and Fall-Related Injury  Outcome: Ongoing, Progressing  Intervention: Identify and Manage Contributors  Recent Flowsheet Documentation  Taken 4/6/2023 1949 by Meg Ybarra RN  Medication Review/Management: medications reviewed  Intervention: Promote Injury-Free Environment  Recent Flowsheet Documentation  Taken 4/7/2023 0222 by Meg Ybarra RN  Safety Promotion/Fall Prevention: safety round/check completed  Taken 4/7/2023 0000 by Meg Ybarra RN  Safety Promotion/Fall Prevention: safety round/check completed  Taken 4/6/2023 2230 by Meg Ybarra RN  Safety Promotion/Fall Prevention: safety round/check completed  Taken 4/6/2023 1949 by Meg Ybarra RN  Safety Promotion/Fall Prevention:   activity supervised   assistive device/personal items within reach   clutter free environment maintained   fall prevention program maintained   nonskid shoes/slippers when out of bed   safety round/check completed     Problem: Skin Injury Risk Increased  Goal: Skin Health and Integrity  Outcome: Ongoing, Progressing  Intervention: Optimize Skin Protection  Recent Flowsheet Documentation  Taken 4/7/2023 0222 by Meg Ybarra RN  Head of Bed (HOB) Positioning: HOB at 15 degrees  Taken 4/7/2023 0000 by Meg Ybarra RN  Head of Bed (HOB) Positioning: HOB at 20-30 degrees  Taken 4/6/2023 2230 by Meg Ybarra RN  Head of Bed (HOB) Positioning: HOB at 20-30 degrees  Taken 4/6/2023 1949 by Meg Ybarra RN  Head of Bed (HOB) Positioning: HOB at 30-45 degrees  Pressure Reduction Devices:   alternating pressure pump (ADD)   positioning supports utilized     Problem: Adult Inpatient Plan of Care  Goal: Plan of Care Review  Outcome: Ongoing, Progressing  Goal: Patient-Specific Goal (Individualized)  Outcome: Ongoing, Progressing  Goal: Absence of  Hospital-Acquired Illness or Injury  Outcome: Ongoing, Progressing  Intervention: Identify and Manage Fall Risk  Recent Flowsheet Documentation  Taken 4/7/2023 0222 by Meg Ybarra RN  Safety Promotion/Fall Prevention: safety round/check completed  Taken 4/7/2023 0000 by Meg Ybarra RN  Safety Promotion/Fall Prevention: safety round/check completed  Taken 4/6/2023 2230 by Meg Ybarra RN  Safety Promotion/Fall Prevention: safety round/check completed  Taken 4/6/2023 1949 by Meg Ybarra RN  Safety Promotion/Fall Prevention:   activity supervised   assistive device/personal items within reach   clutter free environment maintained   fall prevention program maintained   nonskid shoes/slippers when out of bed   safety round/check completed  Intervention: Prevent Skin Injury  Recent Flowsheet Documentation  Taken 4/7/2023 0222 by Meg Ybarra RN  Body Position: supine, legs elevated  Taken 4/7/2023 0000 by Meg Ybarra RN  Body Position:   tilted   right  Taken 4/6/2023 2230 by Meg Ybarra RN  Body Position:   tilted   left  Taken 4/6/2023 1949 by Meg Ybarra RN  Body Position: sitting up in bed  Intervention: Prevent and Manage VTE (Venous Thromboembolism) Risk  Recent Flowsheet Documentation  Taken 4/6/2023 1949 by Meg Ybarra RN  VTE Prevention/Management:   bilateral   sequential compression devices on  Goal: Optimal Comfort and Wellbeing  Outcome: Ongoing, Progressing  Goal: Readiness for Transition of Care  Outcome: Ongoing, Progressing     Problem: Adjustment to Illness (Stroke, Ischemic/Transient Ischemic Attack)  Goal: Optimal Coping  Outcome: Ongoing, Progressing     Problem: Bowel Elimination Impaired (Stroke, Ischemic/Transient Ischemic Attack)  Goal: Effective Bowel Elimination  Outcome: Ongoing, Progressing     Problem: Cerebral Tissue Perfusion (Stroke, Ischemic/Transient Ischemic Attack)  Goal: Optimal Cerebral Tissue Perfusion  Outcome: Ongoing, Progressing      Problem: Cognitive Impairment (Stroke, Ischemic/Transient Ischemic Attack)  Goal: Optimal Cognitive Function  Outcome: Ongoing, Progressing     Problem: Communication Impairment (Stroke, Ischemic/Transient Ischemic Attack)  Goal: Improved Communication Skills  Outcome: Ongoing, Progressing     Problem: Functional Ability Impaired (Stroke, Ischemic/Transient Ischemic Attack)  Goal: Optimal Functional Ability  Outcome: Ongoing, Progressing     Problem: Respiratory Compromise (Stroke, Ischemic/Transient Ischemic Attack)  Goal: Effective Oxygenation and Ventilation  Outcome: Ongoing, Progressing  Intervention: Optimize Oxygenation and Ventilation  Recent Flowsheet Documentation  Taken 4/7/2023 0222 by Meg Ybarra RN  Head of Bed (HOB) Positioning: HOB at 15 degrees  Taken 4/7/2023 0000 by Meg Ybarra RN  Head of Bed (HOB) Positioning: HOB at 20-30 degrees  Taken 4/6/2023 2230 by Meg Ybarra RN  Head of Bed (HOB) Positioning: HOB at 20-30 degrees  Taken 4/6/2023 1949 by Meg Ybarra RN  Head of Bed (HOB) Positioning: HOB at 30-45 degrees     Problem: Sensorimotor Impairment (Stroke, Ischemic/Transient Ischemic Attack)  Goal: Improved Sensorimotor Function  Outcome: Ongoing, Progressing  Intervention: Optimize Range of Motion, Motor Control and Function  Recent Flowsheet Documentation  Taken 4/7/2023 0222 by Meg Ybarra RN  Positioning/Transfer Devices:   pillows   in use  Taken 4/7/2023 0000 by Meg Ybarra RN  Positioning/Transfer Devices:   pillows   in use  Taken 4/6/2023 2230 by Meg Ybarra RN  Positioning/Transfer Devices:   pillows   in use  Taken 4/6/2023 1949 by Meg Ybarra RN  Positioning/Transfer Devices:   pillows   in use  Intervention: Optimize Sensory and Perceptual Ability  Recent Flowsheet Documentation  Taken 4/6/2023 1949 by Meg Ybarra RN  Pressure Reduction Devices:   alternating pressure pump (ADD)   positioning supports utilized     Problem: Swallowing  Impairment (Stroke, Ischemic/Transient Ischemic Attack)  Goal: Optimal Eating and Swallowing without Aspiration  Outcome: Ongoing, Progressing     Problem: Urinary Elimination Impaired (Stroke, Ischemic/Transient Ischemic Attack)  Goal: Effective Urinary Elimination  Outcome: Ongoing, Progressing     Problem: Adjustment to Illness (Stroke, Hemorrhagic)  Goal: Optimal Coping  Outcome: Ongoing, Progressing     Problem: Bowel Elimination Impaired (Stroke, Hemorrhagic)  Goal: Effective Bowel Elimination  Outcome: Ongoing, Progressing     Problem: Cerebral Tissue Perfusion (Stroke, Hemorrhagic)  Goal: Optimal Cerebral Tissue Perfusion  Outcome: Ongoing, Progressing     Problem: Cognitive Impairment (Stroke, Hemorrhagic)  Goal: Optimal Cognitive Function  Outcome: Ongoing, Progressing     Problem: Communication Impairment (Stroke, Hemorrhagic)  Goal: Effective Communication Skills  Outcome: Ongoing, Progressing     Problem: Functional Ability Impaired (Stroke, Hemorrhagic)  Goal: Optimal Functional Ability  Outcome: Ongoing, Progressing     Problem: Pain (Stroke, Hemorrhagic)  Goal: Acceptable Pain Control  Outcome: Ongoing, Progressing     Problem: Respiratory Compromise (Stroke, Hemorrhagic)  Goal: Effective Oxygenation and Ventilation  Outcome: Ongoing, Progressing  Intervention: Optimize Oxygenation and Ventilation  Recent Flowsheet Documentation  Taken 4/7/2023 0222 by Meg Ybarra RN  Head of Bed (HOB) Positioning: HOB at 15 degrees  Taken 4/7/2023 0000 by Meg Ybarra RN  Head of Bed (HOB) Positioning: HOB at 20-30 degrees  Taken 4/6/2023 2230 by Meg Ybarra RN  Head of Bed (HOB) Positioning: HOB at 20-30 degrees  Taken 4/6/2023 1949 by Meg Ybarra RN  Head of Bed (HOB) Positioning: HOB at 30-45 degrees     Problem: Sensorimotor Impairment (Stroke, Hemorrhagic)  Goal: Improved Sensorimotor Function  Outcome: Ongoing, Progressing  Intervention: Optimize Range of Motion, Motor Control and  Function  Recent Flowsheet Documentation  Taken 4/7/2023 0222 by Meg Ybarra RN  Positioning/Transfer Devices:   pillows   in use  Taken 4/7/2023 0000 by Meg Ybarra RN  Positioning/Transfer Devices:   pillows   in use  Taken 4/6/2023 2230 by Meg Ybarra RN  Positioning/Transfer Devices:   pillows   in use  Taken 4/6/2023 1949 by Meg Ybarra RN  Positioning/Transfer Devices:   pillows   in use  Intervention: Optimize Sensory and Perceptual Ability  Recent Flowsheet Documentation  Taken 4/6/2023 1949 by Meg Ybarra RN  Pressure Reduction Devices:   alternating pressure pump (ADD)   positioning supports utilized     Problem: Swallowing Impairment (Stroke, Hemorrhagic)  Goal: Optimal Eating and Swallowing Without Aspiration  Outcome: Ongoing, Progressing     Problem: Urinary Elimination Impaired (Stroke, Hemorrhagic)  Goal: Effective Urinary Elimination  Outcome: Ongoing, Progressing

## 2023-04-08 PROCEDURE — 97530 THERAPEUTIC ACTIVITIES: CPT

## 2023-04-08 RX ORDER — CARVEDILOL 6.25 MG/1
6.25 TABLET ORAL 2 TIMES DAILY WITH MEALS
Status: DISCONTINUED | OUTPATIENT
Start: 2023-04-08 | End: 2023-04-27

## 2023-04-08 RX ADMIN — APIXABAN 5 MG: 5 TABLET, FILM COATED ORAL at 21:09

## 2023-04-08 RX ADMIN — DOCUSATE SODIUM 50MG AND SENNOSIDES 8.6MG 2 TABLET: 8.6; 5 TABLET, FILM COATED ORAL at 10:21

## 2023-04-08 RX ADMIN — Medication 10 ML: at 10:22

## 2023-04-08 RX ADMIN — VALSARTAN 160 MG: 160 TABLET, FILM COATED ORAL at 10:22

## 2023-04-08 RX ADMIN — HYDRALAZINE HYDROCHLORIDE 10 MG: 10 TABLET, FILM COATED ORAL at 05:41

## 2023-04-08 RX ADMIN — BACLOFEN 2.5 MG: 10 TABLET ORAL at 10:21

## 2023-04-08 RX ADMIN — DOCUSATE SODIUM 50MG AND SENNOSIDES 8.6MG 2 TABLET: 8.6; 5 TABLET, FILM COATED ORAL at 21:09

## 2023-04-08 RX ADMIN — AMLODIPINE BESYLATE 10 MG: 10 TABLET ORAL at 10:22

## 2023-04-08 RX ADMIN — APIXABAN 5 MG: 5 TABLET, FILM COATED ORAL at 10:22

## 2023-04-08 RX ADMIN — HYDRALAZINE HYDROCHLORIDE 10 MG: 10 TABLET, FILM COATED ORAL at 14:04

## 2023-04-08 RX ADMIN — CARVEDILOL 6.25 MG: 12.5 TABLET, FILM COATED ORAL at 18:52

## 2023-04-08 RX ADMIN — BACLOFEN 2.5 MG: 10 TABLET ORAL at 21:08

## 2023-04-08 RX ADMIN — HYDRALAZINE HYDROCHLORIDE 10 MG: 10 TABLET, FILM COATED ORAL at 21:09

## 2023-04-08 RX ADMIN — CARVEDILOL 12.5 MG: 12.5 TABLET, FILM COATED ORAL at 10:21

## 2023-04-08 RX ADMIN — Medication 10 ML: at 21:12

## 2023-04-08 NOTE — PLAN OF CARE
Goal Outcome Evaluation:  Plan of Care Reviewed With: patient           Outcome Evaluation: Pt received in bed upon arrival and agreeable to PT.  utilized during therapy session although pt able to understand some english. Pt requred mod A for trunk support to reach sitting EOB as pt demo's minimal control over R UE/LE. Pt initially requied min A for sitting balance secondary to a R trunk lean but able to self-correct with use of L UE. Pt stood requiring min A x 2 and transferred to bedside chair c HHA requiring mod A x 2. PT blocked R knee during transfer. PT assisted R UE/ LE through PROM at end of session. Pt UIC with all needs in reach. Pt will continue to benefit from skilled PT to address strength and functional mobility.

## 2023-04-08 NOTE — PROGRESS NOTES
[facility] Acute [facility]pt [facility]    Continuing to monitor progress with therapies to determine most appropriate level of rehab for pt at time of DC.    Meche Peoples RN  Acute Rehab Admission Nurse

## 2023-04-08 NOTE — PLAN OF CARE
Goal Outcome Evaluation:               Up to chair with assist x2.  Participating well in care and transfers.  No complaints of pain.  Coreg decreased due to bradycardia.

## 2023-04-08 NOTE — PROGRESS NOTES
".DAILY PROGRESS NOTE  [facility]    Patient Identification:  Name: Manuel Durant  Age: 73 y.o.  Sex: male  :  1950  MRN: [id number]         Primary Care Physician: Provider, No Known    Subjective:  Interval History:He is weak.    Objective:    Scheduled Meds:amLODIPine, 10 mg, Oral, QAM AC  apixaban, 5 mg, Oral, Q12H  baclofen, 2.5 mg, Oral, Q12H  carvedilol, 12.5 mg, Oral, BID With Meals  hydrALAZINE, 10 mg, Oral, Q8H  senna-docusate sodium, 2 tablet, Oral, BID  sodium chloride, 10 mL, Intravenous, Q12H  valsartan, 160 mg, Oral, QAM AC      Continuous Infusions:     Vital signs in last 24 hours:  Temp:  [97.7 °F (36.5 °C)-98.8 °F (37.1 °C)] 97.9 °F (36.6 °C)  Heart Rate:  [55-67] 57  Resp:  [16-18] 16  BP: (113-126)/(62-76) 114/67    Intake/Output:    Intake/Output Summary (Last 24 hours) at 2023 1529  Last data filed at 2023 0444  Gross per 24 hour   Intake 60 ml   Output --   Net 60 ml       Exam:  /67 (BP Location: Left arm, Patient Position: Sitting)   Pulse 57   Temp 97.9 °F (36.6 °C) (Oral)   Resp 16   Ht 167.6 cm (66\")   Wt 69.5 kg (153 lb 3.5 oz)   SpO2 95%   BMI 24.73 kg/m²     General Appearance:    Alert, cooperative, no distress   Head:    Normocephalic, without obvious abnormality, atraumatic   Eyes:       Throat:   Lips, tongue, gums normal   Neck:   Supple, symmetrical, trachea midline, no JVD   Lungs:     Clear to auscultation bilaterally, respirations unlabored   Chest Wall:    No tenderness or deformity    Heart:    Regular rate and rhythm, S1 and S2 normal, no murmur,no  Rub or gallop   Abdomen:     Soft, nontender, bowel sounds active, no masses, no organomegaly    Extremities:   Extremities normal, atraumatic, no cyanosis or edema   Pulses:      Skin:   Skin is warm and dry,  no rashes or palpable lesions   Neurologic:   He is weak.      Lab Results (last 72 hours)     Procedure Component Value Units Date/Time    Basic Metabolic Panel " [[id number]]  (Abnormal) Collected: 03/30/23 0610    Specimen: Blood Updated: 03/30/23 0702     Glucose 89 mg/dL      BUN 21 mg/dL      Creatinine 0.77 mg/dL      Sodium 142 mmol/L      Potassium 4.1 mmol/L      Chloride 110 mmol/L      CO2 24.5 mmol/L      Calcium 8.7 mg/dL      BUN/Creatinine Ratio 27.3     Anion Gap 7.5 mmol/L      eGFR 95.7 mL/min/1.73     Narrative:      GFR Normal >60  Chronic Kidney Disease <60  Kidney Failure <15    The GFR formula is only valid for adults with stable renal function between ages 18 and 70.    Phosphorus [[id number]]  (Normal) Collected: 03/30/23 0610    Specimen: Blood Updated: 03/30/23 0702     Phosphorus 3.2 mg/dL     Magnesium [[id number]]  (Normal) Collected: 03/30/23 0610    Specimen: Blood Updated: 03/30/23 0701     Magnesium 2.0 mg/dL     CBC (No Diff) [[id number]]  (Normal) Collected: 03/30/23 0610    Specimen: Blood Updated: 03/30/23 0641     WBC 8.31 10*3/mm3      RBC 4.31 10*6/mm3      Hemoglobin 13.1 g/dL      Hematocrit 39.3 %      MCV 91.2 fL      MCH 30.4 pg      MCHC 33.3 g/dL      RDW 13.0 %      RDW-SD 43.1 fl      MPV 10.8 fL      Platelets 323 10*3/mm3     Basic Metabolic Panel [[id number]]  (Abnormal) Collected: 03/29/23 0745    Specimen: Blood Updated: 03/29/23 0828     Glucose 91 mg/dL      BUN 20 mg/dL      Creatinine 0.73 mg/dL      Sodium 139 mmol/L      Potassium 4.2 mmol/L      Comment: Slight hemolysis detected by analyzer. Results may be affected.        Chloride 106 mmol/L      CO2 23.2 mmol/L      Calcium 8.9 mg/dL      BUN/Creatinine Ratio 27.4     Anion Gap 9.8 mmol/L      eGFR 97.3 mL/min/1.73     Narrative:      GFR Normal >60  Chronic Kidney Disease <60  Kidney Failure <15    The GFR formula is only valid for adults with stable renal function between ages 18 and 70.    Phosphorus [[id number]]  (Normal) Collected: 03/29/23 0745    Specimen: Blood Updated: 03/29/23 0828     Phosphorus 3.4 mg/dL     Magnesium [[id number]]  (Normal) Collected:  03/29/23 0745    Specimen: Blood Updated: 03/29/23 0828     Magnesium 2.1 mg/dL     CBC (No Diff) [[id number]]  (Normal) Collected: 03/29/23 0745    Specimen: Blood Updated: 03/29/23 0813     WBC 7.52 10*3/mm3      RBC 4.45 10*6/mm3      Hemoglobin 13.5 g/dL      Hematocrit 39.9 %      MCV 89.7 fL      MCH 30.3 pg      MCHC 33.8 g/dL      RDW 13.0 %      RDW-SD 42.0 fl      MPV 10.6 fL      Platelets 326 10*3/mm3         Data Review:  Results from last 7 days   Lab Units 04/05/23  0538   SODIUM mmol/L 141   POTASSIUM mmol/L 4.1   CHLORIDE mmol/L 109*   CO2 mmol/L 21.2*   BUN mg/dL 20   CREATININE mg/dL 0.71*   GLUCOSE mg/dL 86   CALCIUM mg/dL 8.9     Results from last 7 days   Lab Units 04/05/23  0538   WBC 10*3/mm3 7.23   HEMOGLOBIN g/dL 13.8   HEMATOCRIT % 41.3   PLATELETS 10*3/mm3 261             Lab Results   Lab Value Date/Time    TROPONINT 9 03/12/2023 1907               Invalid input(s): PROT, LABALBU          No results found for: POCGLU        History reviewed. No pertinent past medical history.    Assessment:  Active Hospital Problems    Diagnosis  POA   • **Intracranial hemorrhage [I62.9]  Yes   • Acute DVT (deep venous thrombosis) [I82.409]  Unknown   • HTN (hypertension) [I10]  Unknown   • Hypokalemia [E87.6]  Unknown   • Hypertensive emergency [I16.1]  Unknown      Resolved Hospital Problems   No resolved problems to display.       Plan:  Continue current RX. DC planning. Continue with therapy.  Discussed with nurse and case management.  He claims he has a green card.  Case management got documents.  They found insurance card in his wallet.  Should be eligible for social security and medicare.  Needs current residence card.  DC planning. Maybe acute rehab.  Maybe next week.    Cedric Johnston MD  4/8/2023  15:29 EDT

## 2023-04-08 NOTE — THERAPY TREATMENT NOTE
Patient Name: Manuel Durant  : 1950    MRN: [id number]                              Today's Date: 2023       Admit Date: 3/12/2023    Visit Dx:     ICD-10-CM ICD-9-CM   1. Intracranial hemorrhage  I62.9 432.9   2. Altered mental status, unspecified altered mental status type  R41.82 780.97     Patient Active Problem List   Diagnosis   • Intracranial hemorrhage   • Hypertensive emergency   • Acute DVT (deep venous thrombosis)   • HTN (hypertension)   • Hypokalemia     History reviewed. No pertinent past medical history.  History reviewed. No pertinent surgical history.   General Information     Row Name 23          Physical Therapy Time and Intention    Document Type therapy note (daily note)  -CS     Mode of Treatment individual therapy;physical therapy  -CS     Row Name 23          General Information    Patient Profile Reviewed yes  -CS     Existing Precautions/Restrictions fall  -CS     Row Name 23          Cognition    Orientation Status (Cognition) oriented to;person  -CS     Row Name 23          Safety Issues, Functional Mobility    Safety Issues Affecting Function (Mobility) insight into deficits/self-awareness;problem-solving;safety precaution awareness;safety precautions follow-through/compliance;awareness of need for assistance;judgment  -CS     Impairments Affecting Function (Mobility) balance;coordination;endurance/activity tolerance;grasp;motor control;postural/trunk control;strength;range of motion (ROM);cognition;visual/perceptual  -CS           User Key  (r) = Recorded By, (t) = Taken By, (c) = Cosigned By    Initials Name Provider Type    CS Adelia Montalvo PT Physical Therapist               Mobility     Row Name 23          Bed Mobility    Bed Mobility supine-sit  -CS     Supine-Sit [place] (Bed Mobility) moderate assist (50% patient effort);verbal cues;nonverbal cues (demo/gesture)  -CS     Assistive Device (Bed Mobility)  bed rails;head of bed elevated  -     Comment, (Bed Mobility) assistance with trunk support as pt as minimal control over R UE/LE; UIC at end of session  -     Row Name 04/08/23 0921          Bed-Chair Transfer    Bed-Chair [place] (Transfers) moderate assist (50% patient effort);2 person assist;verbal cues  -     Assistive Device (Bed-Chair Transfers) other (see comments)  HHA  -     Comment, (Bed-Chair Transfer) stand pivot technique; R knee blocked during transfer  -     Row Name 04/08/23 0921          Sit-Stand Transfer    Sit-Stand [place] (Transfers) minimum assist (75% patient effort);2 person assist;verbal cues  -CS     Assistive Device (Sit-Stand Transfers) other (see comments)  HHA  -           User Key  (r) = Recorded By, (t) = Taken By, (c) = Cosigned By    Initials Name Provider Type    CS Adelia Montalvo, PT Physical Therapist               Obj/Interventions     Row Name 04/08/23 0923          Motor Skills    Therapeutic Exercise other (see comments)  10 reps R LE PROM knee extension & ankle DF + R UE shoulder flexion  -     Row Name 04/08/23 0923          Balance    Balance Assessment sitting static balance;sitting dynamic balance;standing static balance;standing dynamic balance  -     Static Sitting Balance standby assist  -     Dynamic Sitting Balance contact guard  -CS     Position, Sitting Balance supported;sitting edge of bed  -     Static Standing Balance minimal assist;2-person assist  -CS     Dynamic Standing Balance moderate assist;2-person assist  -CS     Position/Device Used, Standing Balance supported  -     Comment, Balance initially required min A while sitting EOB 2:2 R trunk lean but able to self-correct with assist of L UE  -CS           User Key  (r) = Recorded By, (t) = Taken By, (c) = Cosigned By    Initials Name Provider Type    Adelia Hawley, PT Physical Therapist               Goals/Plan    No documentation.                Clinical Impression      Row Name 04/08/23 0924          Pain    Pain Intervention(s) Rest;Repositioned  -CS     Row Name 04/08/23 0924          Pain Scale: FACES Pre/Post-Treatment    Pain: FACES Scale, Pretreatment 0-->no hurt  -CS     Posttreatment Pain Rating 2-->hurts little bit  -CS     Pain Location - Side/Orientation Bilateral  -CS     Pain Location - foot  -CS     Pre/Posttreatment Pain Comment pt reports B feet pain per  but unable to verbalize rating  -CS     Row Name 04/08/23 0924          Plan of Care Review    Plan of Care Reviewed With patient  -CS     Outcome Evaluation Pt received in bed upon arrival and agreeable to PT.  utilized during therapy session although pt able to understand some english. Pt requred mod A for trunk support to reach sitting EOB as pt demo's minimal control over R UE/LE. Pt initially requied min A for sitting balance secondary to a R trunk lean but able to self-correct with use of L UE. Pt stood requiring min A x 2 and transferred to bedside chair c HHA requiring mod A x 2. PT blocked R knee during transfer. PT assisted R UE/ LE through PROM at end of session. Pt UIC with all needs in reach. Pt will continue to benefit from skilled PT to address strength and functional mobility.  -CS     Row Name 04/08/23 0924          Therapy Assessment/Plan (PT)    Criteria for Skilled Interventions Met (PT) yes;meets criteria  -CS     Therapy Frequency (PT) 6 times/wk  -CS     Row Name 04/08/23 0924          Positioning and Restraints    Pre-Treatment Position in bed  -CS     Post Treatment Position chair  -CS     In Chair reclined;call light within reach;encouraged to call for assist;exit alarm on;RUE elevated  -CS           User Key  (r) = Recorded By, (t) = Taken By, (c) = Cosigned By    Initials Name Provider Type    CS Adelia Montalvo, PT Physical Therapist               Outcome Measures     Row Name 04/08/23 0928          How much help from another person do you currently need...     Turning from your back to your side while in flat bed without using bedrails? 3  -CS     Moving from lying on back to sitting on the side of a flat bed without bedrails? 3  -CS     Moving to and from a bed to a chair (including a wheelchair)? 2  -CS     Standing up from a chair using your arms (e.g., wheelchair, bedside chair)? 2  -CS     Climbing 3-5 steps with a railing? 1  -CS     To walk in hospital room? 2  -CS     AM-PAC 6 Clicks Score (PT) 13  -CS     Highest level of mobility 4 --> Transferred to chair/commode  -CS     Row Name 04/08/23 0928          Functional Assessment    Outcome Measure Options AM-PAC 6 Clicks Basic Mobility (PT)  -CS           User Key  (r) = Recorded By, (t) = Taken By, (c) = Cosigned By    Initials Name Provider Type    Adelia Hawley, PT Physical Therapist                             Physical Therapy Education     Title: PT OT SLP Therapies (In Progress)     Topic: Physical Therapy (In Progress)     Point: Mobility training (In Progress)     Learning Progress Summary           Patient Acceptance, E,TB, NR by CS at 4/8/2023 0928    Acceptance, E, NR by CW at 4/7/2023 1215    Acceptance, E, NR by CW at 4/6/2023 1335    Acceptance, E, NR by CW at 4/5/2023 1043    Acceptance, E, NR by CW at 4/4/2023 0906    Acceptance, E, NR by CW at 4/3/2023 1531    Acceptance, E,TB, VU,NR by CAMERON at 4/1/2023 1530    Acceptance, E, NR by CW at 3/31/2023 0925    Acceptance, E, NR by CW at 3/30/2023 1209    Acceptance, E, NR,NL by CW at 3/29/2023 1336    Acceptance, E, NR by ZB at 3/28/2023 1317    Acceptance, E, NR by CW at 3/27/2023 1401    Acceptance, E,D, NR,VU by ZACH at 3/24/2023 1631    Comment: seemed to comprehend commands this session    Acceptance, E,D, NR by ZACH at 3/23/2023 1655    Acceptance, E,D, NR by ZACH at 3/22/2023 1545    Acceptance, E,TB, VU,NR by CB at 3/21/2023 1544    Acceptance, E,TB,H, VU by MS at 3/16/2023 1801    Acceptance, E,D, DU,NR by MO at 3/16/2023 1451    Acceptance, E,  DU,NR by MO at 3/15/2023 1200    Acceptance, E, NR by LM at 3/15/2023 0521    Acceptance, E,D, DU,NR by MO at 3/14/2023 1458   Family Acceptance, E,TB,H, VU by MS at 3/16/2023 1801                   Point: Home exercise program (In Progress)     Learning Progress Summary           Patient Acceptance, E,TB, NR by CS at 4/8/2023 0928    Acceptance, E, NR by CW at 4/7/2023 1215    Acceptance, E, NR by CW at 4/6/2023 1335    Acceptance, E,TB, VU,NR by CAMERON at 4/1/2023 1530    Acceptance, E, NR by CW at 3/31/2023 0925    Acceptance, E, NR by CHARLY at 3/28/2023 1317    Acceptance, E, NR by CW at 3/27/2023 1401    Acceptance, E,D, NR,VU by JM at 3/24/2023 1631    Comment: seemed to comprehend commands this session    Acceptance, E,D, NR by ZACH at 3/23/2023 1655    Acceptance, E,D, NR by JM at 3/22/2023 1545    Acceptance, E,TB, VU,NR by CB at 3/21/2023 1544    Acceptance, E,TB,H, VU by MS at 3/16/2023 1801    Acceptance, E,D, DU,NR by MO at 3/16/2023 1451    Acceptance, E, NR by LM at 3/15/2023 0521    Acceptance, E,D, DU,NR by MO at 3/14/2023 1458   Family Acceptance, E,TB,H, VU by MS at 3/16/2023 1801                   Point: Body mechanics (In Progress)     Learning Progress Summary           Patient Acceptance, E,TB, NR by CS at 4/8/2023 0928    Acceptance, E, NR by CW at 4/7/2023 1215    Acceptance, E, NR by CW at 4/4/2023 0906    Acceptance, E, NR by CW at 4/3/2023 1531    Acceptance, E,TB, VU,NR by CAMERON at 4/1/2023 1530    Acceptance, E,D, NR,VU by ZACH at 3/24/2023 1631    Comment: seemed to comprehend commands this session    Acceptance, E,D, NR by JM at 3/23/2023 1655    Acceptance, E,D, NR by JM at 3/22/2023 1545    Acceptance, E,TB, VU,NR by CB at 3/21/2023 1544    Acceptance, E,TB,H, VU by MS at 3/16/2023 1801    Acceptance, E,D, DU,NR by MO at 3/16/2023 1451    Acceptance, E, DU,NR by MO at 3/15/2023 1200    Acceptance, E, NR by LM at 3/15/2023 0521    Acceptance, E,D, DU,NR by MO at 3/14/2023 1458   Family Acceptance,  E,TB,H, VU by MS at 3/16/2023 1801                   Point: Precautions (In Progress)     Learning Progress Summary           Patient Acceptance, E,TB, NR by CS at 4/8/2023 0928    Acceptance, E, NR by CW at 4/7/2023 1215    Acceptance, E, NR by CW at 4/4/2023 0906    Acceptance, E, NR by CW at 4/3/2023 1531    Acceptance, E,TB, VU,NR by CAMERON at 4/1/2023 1530    Acceptance, E,D, NR,VU by JM at 3/24/2023 1631    Comment: seemed to comprehend commands this session    Acceptance, E,D, NR by JM at 3/23/2023 1655    Acceptance, E,D, NR by JM at 3/22/2023 1545    Acceptance, E,TB, VU,NR by CB at 3/21/2023 1544    Acceptance, E,TB,H, VU by MS at 3/16/2023 1801    Acceptance, E,D, DU,NR by MO at 3/16/2023 1451    Acceptance, E, DU,NR by MO at 3/15/2023 1200    Acceptance, E, NR by LM at 3/15/2023 0521    Acceptance, E,D, DU,NR by MO at 3/14/2023 1458   Family Acceptance, E,TB,H, VU by MS at 3/16/2023 1801                               User Key     Initials Effective Dates Name Provider Type Discipline    JM 03/07/18 -  Tiffanie Grace, PTA Physical Therapist Assistant PT    CAMERON 05/19/21 -  Leeann Wiley, PT Physical Therapist PT    MS 06/16/21 -  Angel Luis Cr, RN Registered Nurse Nurse    CW 12/13/22 -  Tamica Willoughby, PT Physical Therapist PT    CB 10/22/21 -  Bety Benitez, PT Physical Therapist PT    CS 09/22/22 -  Adelia Montalvo, PT Physical Therapist PT    LM 10/13/22 -  Silek Grace, RN Registered Nurse Nurse    MO 01/27/23 -  Lacie Tanner, PT Student PT Student PT    ZB 03/10/23 -  Nestor Rae, PT Student PT Student PT              PT Recommendation and Plan     Plan of Care Reviewed With: patient  Outcome Evaluation: Pt received in bed upon arrival and agreeable to PT.  utilized during therapy session although pt able to understand some english. Pt requred mod A for trunk support to reach sitting EOB as pt demo's minimal control over R UE/LE. Pt initially requied min A for sitting  balance secondary to a R trunk lean but able to self-correct with use of L UE. Pt stood requiring min A x 2 and transferred to bedside chair c HHA requiring mod A x 2. PT blocked R knee during transfer. PT assisted R UE/ LE through PROM at end of session. Pt UIC with all needs in reach. Pt will continue to benefit from skilled PT to address strength and functional mobility.     Time Calculation:    PT Charges     Row Name 04/08/23 0928             Time Calculation    Start Time 0831  -CS      Stop Time 0848  -CS      Time Calculation (min) 17 min  -CS      PT Received On 04/08/23  -CS      PT - Next Appointment 04/10/23  -CS         Time Calculation- PT    Total Timed Code Minutes- PT 16 minute(s)  -CS         Timed Charges    58207 - PT Therapeutic Activity Minutes 16  -CS         Total Minutes    Timed Charges Total Minutes 16  -CS       Total Minutes 16  -CS            User Key  (r) = Recorded By, (t) = Taken By, (c) = Cosigned By    Initials Name Provider Type    CS Adelia Montalvo, PT Physical Therapist              Therapy Charges for Today     Code Description Service Date Service Provider Modifiers Qty    [id number]  PT THERAPEUTIC ACT EA 15 MIN 4/8/2023 Adelia Montalvo, PT GP 1          PT G-Codes  Outcome Measure Options: AM-PAC 6 Clicks Basic Mobility (PT)  AM-PAC 6 Clicks Score (PT): 13  AM-PAC 6 Clicks Score (OT): 12  Modified [place] Scale: 4 - Moderately severe disability.  Unable to walk without assistance, and unable to attend to own bodily needs without assistance.  PT Discharge Summary  Anticipated Discharge Disposition (PT): inpatient rehabilitation facility, skilled nursing facility    Adelia Montalvo PT  4/8/2023

## 2023-04-08 NOTE — PLAN OF CARE
Problem: Fall Injury Risk  Goal: Absence of Fall and Fall-Related Injury  Outcome: Ongoing, Progressing  Intervention: Identify and Manage Contributors  Recent Flowsheet Documentation  Taken 4/7/2023 2200 by Elyssa Thorne RN  Medication Review/Management: medications reviewed  Intervention: Promote Injury-Free Environment  Recent Flowsheet Documentation  Taken 4/7/2023 2200 by Elyssa Thorne RN  Safety Promotion/Fall Prevention: safety round/check completed  Taken 4/7/2023 2000 by Elyssa Thorne RN  Safety Promotion/Fall Prevention: safety round/check completed     Problem: Skin Injury Risk Increased  Goal: Skin Health and Integrity  Outcome: Ongoing, Progressing  Intervention: Optimize Skin Protection  Recent Flowsheet Documentation  Taken 4/7/2023 2200 by Elyssa Thorne RN  Head of Bed (HOB) Positioning: HOB elevated  Taken 4/7/2023 2125 by Elyssa Thorne RN  Pressure Reduction Techniques: weight shift assistance provided  Taken 4/7/2023 2000 by Elyssa Thorne RN  Head of Bed (HOB) Positioning: HOB elevated     Problem: Adult Inpatient Plan of Care  Goal: Plan of Care Review  Outcome: Ongoing, Progressing  Flowsheets (Taken 4/7/2023 2347)  Outcome Evaluation: Pt awake and alert.   no distress, VSS.   no changes  Goal: Patient-Specific Goal (Individualized)  Outcome: Ongoing, Progressing  Goal: Absence of Hospital-Acquired Illness or Injury  Outcome: Ongoing, Progressing  Intervention: Identify and Manage Fall Risk  Recent Flowsheet Documentation  Taken 4/7/2023 2200 by Elyssa Thorne RN  Safety Promotion/Fall Prevention: safety round/check completed  Taken 4/7/2023 2000 by Elyssa Thorne RN  Safety Promotion/Fall Prevention: safety round/check completed  Intervention: Prevent Skin Injury  Recent Flowsheet Documentation  Taken 4/7/2023 2200 by Elyssa Thorne RN  Body Position: supine  Taken 4/7/2023 2000 by Elyssa Thorne RN  Body Position:   position changed  independently   tilted  Intervention: Prevent and Manage VTE (Venous Thromboembolism) Risk  Recent Flowsheet Documentation  Taken 4/7/2023 2200 by Elyssa Thorne RN  Activity Management: activity encouraged  Intervention: Prevent Infection  Recent Flowsheet Documentation  Taken 4/7/2023 2200 by Elyssa Thorne RN  Infection Prevention:   rest/sleep promoted   single patient room provided  Taken 4/7/2023 2000 by Elyssa Thorne RN  Infection Prevention:   rest/sleep promoted   single patient room provided  Goal: Optimal Comfort and Wellbeing  Outcome: Ongoing, Progressing  Intervention: Provide Person-Centered Care  Recent Flowsheet Documentation  Taken 4/7/2023 2125 by Elyssa Thorne RN  Trust Relationship/Rapport: care explained  Goal: Readiness for Transition of Care  Outcome: Ongoing, Progressing     Problem: Adjustment to Illness (Stroke, Ischemic/Transient Ischemic Attack)  Goal: Optimal Coping  Outcome: Ongoing, Progressing  Intervention: Support Psychosocial Response to Stroke  Recent Flowsheet Documentation  Taken 4/7/2023 2125 by Elyssa Thorne RN  Family/Support System Care: presence promoted     Problem: Bowel Elimination Impaired (Stroke, Ischemic/Transient Ischemic Attack)  Goal: Effective Bowel Elimination  Outcome: Ongoing, Progressing     Problem: Cerebral Tissue Perfusion (Stroke, Ischemic/Transient Ischemic Attack)  Goal: Optimal Cerebral Tissue Perfusion  Outcome: Ongoing, Progressing     Problem: Cognitive Impairment (Stroke, Ischemic/Transient Ischemic Attack)  Goal: Optimal Cognitive Function  Outcome: Ongoing, Progressing     Problem: Communication Impairment (Stroke, Ischemic/Transient Ischemic Attack)  Goal: Improved Communication Skills  Outcome: Ongoing, Progressing     Problem: Functional Ability Impaired (Stroke, Ischemic/Transient Ischemic Attack)  Goal: Optimal Functional Ability  Outcome: Ongoing, Progressing  Intervention: Optimize Functional Ability  Recent  Flowsheet Documentation  Taken 4/7/2023 2200 by Elyssa Thorne RN  Activity Management: activity encouraged     Problem: Respiratory Compromise (Stroke, Ischemic/Transient Ischemic Attack)  Goal: Effective Oxygenation and Ventilation  Outcome: Ongoing, Progressing  Intervention: Optimize Oxygenation and Ventilation  Recent Flowsheet Documentation  Taken 4/7/2023 2200 by Elyssa Thorne RN  Head of Bed (HOB) Positioning: HOB elevated  Taken 4/7/2023 2000 by Elyssa Thorne RN  Head of Bed (HOB) Positioning: HOB elevated     Problem: Sensorimotor Impairment (Stroke, Ischemic/Transient Ischemic Attack)  Goal: Improved Sensorimotor Function  Outcome: Ongoing, Progressing  Intervention: Optimize Sensory and Perceptual Ability  Recent Flowsheet Documentation  Taken 4/7/2023 2125 by Elyssa Thorne RN  Pressure Reduction Techniques: weight shift assistance provided     Problem: Swallowing Impairment (Stroke, Ischemic/Transient Ischemic Attack)  Goal: Optimal Eating and Swallowing without Aspiration  Outcome: Ongoing, Progressing     Problem: Urinary Elimination Impaired (Stroke, Ischemic/Transient Ischemic Attack)  Goal: Effective Urinary Elimination  Outcome: Ongoing, Progressing     Problem: Adjustment to Illness (Stroke, Hemorrhagic)  Goal: Optimal Coping  Outcome: Ongoing, Progressing  Intervention: Support Psychosocial Response to Stroke  Recent Flowsheet Documentation  Taken 4/7/2023 2125 by Elyssa Thorne RN  Family/Support System Care: presence promoted     Problem: Bowel Elimination Impaired (Stroke, Hemorrhagic)  Goal: Effective Bowel Elimination  Outcome: Ongoing, Progressing     Problem: Cerebral Tissue Perfusion (Stroke, Hemorrhagic)  Goal: Optimal Cerebral Tissue Perfusion  Outcome: Ongoing, Progressing     Problem: Cognitive Impairment (Stroke, Hemorrhagic)  Goal: Optimal Cognitive Function  Outcome: Ongoing, Progressing     Problem: Communication Impairment (Stroke, Hemorrhagic)  Goal:  Effective Communication Skills  Outcome: Ongoing, Progressing     Problem: Functional Ability Impaired (Stroke, Hemorrhagic)  Goal: Optimal Functional Ability  Outcome: Ongoing, Progressing  Intervention: Optimize Functional Ability  Recent Flowsheet Documentation  Taken 4/7/2023 2200 by Elyssa Thorne RN  Activity Management: activity encouraged     Problem: Pain (Stroke, Hemorrhagic)  Goal: Acceptable Pain Control  Outcome: Ongoing, Progressing     Problem: Respiratory Compromise (Stroke, Hemorrhagic)  Goal: Effective Oxygenation and Ventilation  Outcome: Ongoing, Progressing  Intervention: Optimize Oxygenation and Ventilation  Recent Flowsheet Documentation  Taken 4/7/2023 2200 by Elyssa Thorne RN  Head of Bed (HOB) Positioning: HOB elevated  Taken 4/7/2023 2000 by Elyssa Thorne RN  Head of Bed (HOB) Positioning: HOB elevated     Problem: Sensorimotor Impairment (Stroke, Hemorrhagic)  Goal: Improved Sensorimotor Function  Outcome: Ongoing, Progressing  Intervention: Optimize Sensory and Perceptual Ability  Recent Flowsheet Documentation  Taken 4/7/2023 2125 by Elyssa Thorne RN  Pressure Reduction Techniques: weight shift assistance provided     Problem: Swallowing Impairment (Stroke, Hemorrhagic)  Goal: Optimal Eating and Swallowing Without Aspiration  Outcome: Ongoing, Progressing     Problem: Urinary Elimination Impaired (Stroke, Hemorrhagic)  Goal: Effective Urinary Elimination  Outcome: Ongoing, Progressing   Goal Outcome Evaluation:  Plan of Care Reviewed With: patient        Progress: improving  Outcome Evaluation: Pt awake and alert.   no distress, VSS.   no changes

## 2023-04-09 PROCEDURE — 97112 NEUROMUSCULAR REEDUCATION: CPT

## 2023-04-09 RX ADMIN — VALSARTAN 160 MG: 160 TABLET, FILM COATED ORAL at 08:35

## 2023-04-09 RX ADMIN — Medication 10 ML: at 08:36

## 2023-04-09 RX ADMIN — DOCUSATE SODIUM 50MG AND SENNOSIDES 8.6MG 2 TABLET: 8.6; 5 TABLET, FILM COATED ORAL at 21:17

## 2023-04-09 RX ADMIN — HYDRALAZINE HYDROCHLORIDE 10 MG: 10 TABLET, FILM COATED ORAL at 14:07

## 2023-04-09 RX ADMIN — DOCUSATE SODIUM 50MG AND SENNOSIDES 8.6MG 2 TABLET: 8.6; 5 TABLET, FILM COATED ORAL at 08:35

## 2023-04-09 RX ADMIN — BACLOFEN 2.5 MG: 10 TABLET ORAL at 08:35

## 2023-04-09 RX ADMIN — APIXABAN 5 MG: 5 TABLET, FILM COATED ORAL at 21:17

## 2023-04-09 RX ADMIN — Medication 10 ML: at 21:17

## 2023-04-09 RX ADMIN — BACLOFEN 2.5 MG: 10 TABLET ORAL at 21:17

## 2023-04-09 RX ADMIN — CARVEDILOL 6.25 MG: 12.5 TABLET, FILM COATED ORAL at 08:35

## 2023-04-09 RX ADMIN — AMLODIPINE BESYLATE 10 MG: 10 TABLET ORAL at 08:35

## 2023-04-09 RX ADMIN — HYDRALAZINE HYDROCHLORIDE 10 MG: 10 TABLET, FILM COATED ORAL at 21:17

## 2023-04-09 RX ADMIN — CARVEDILOL 6.25 MG: 12.5 TABLET, FILM COATED ORAL at 18:23

## 2023-04-09 RX ADMIN — APIXABAN 5 MG: 5 TABLET, FILM COATED ORAL at 08:35

## 2023-04-09 RX ADMIN — HYDRALAZINE HYDROCHLORIDE 10 MG: 10 TABLET, FILM COATED ORAL at 05:18

## 2023-04-09 NOTE — PLAN OF CARE
Goal Outcome Evaluation:  Plan of Care Reviewed With: patient        Progress: no change  Outcome Evaluation: Pt seen for OT tx session in PM, pt agreeable. Pt coming to EOB with mod A x1, able to maintain static sitting with mostly CGA with use of bed rail support with LUE, min A throughout dynamic sitting and functional reaching with LUE in all planes to improve overall trunk control required for ADLs and improved support with func transfers. PROM completed to RUE at EOB as well as WBing. AROM to LUE. Pt will continue to benefit from skilled OT to address noted funcitonal deficits, plans acute rehab at d/c.

## 2023-04-09 NOTE — PLAN OF CARE
Problem: Fall Injury Risk  Goal: Absence of Fall and Fall-Related Injury  Outcome: Ongoing, Progressing  Intervention: Promote Injury-Free Environment  Recent Flowsheet Documentation  Taken 4/8/2023 2200 by Elyssa Thorne RN  Safety Promotion/Fall Prevention: safety round/check completed  Taken 4/8/2023 2000 by Elyssa Thorne RN  Safety Promotion/Fall Prevention: safety round/check completed     Problem: Skin Injury Risk Increased  Goal: Skin Health and Integrity  Outcome: Ongoing, Progressing  Intervention: Optimize Skin Protection  Recent Flowsheet Documentation  Taken 4/8/2023 2000 by Elyssa Thorne RN  Head of Bed (HOB) Positioning: HOB elevated  Taken 4/8/2023 1913 by Elyssa Thorne RN  Pressure Reduction Techniques: weight shift assistance provided  Pressure Reduction Devices: alternating pressure pump (ADD)  Skin Protection: incontinence pads utilized     Problem: Adult Inpatient Plan of Care  Goal: Plan of Care Review  Outcome: Ongoing, Progressing  Flowsheets (Taken 4/8/2023 2357)  Progress: no change  Goal: Patient-Specific Goal (Individualized)  Outcome: Ongoing, Progressing  Goal: Absence of Hospital-Acquired Illness or Injury  Outcome: Ongoing, Progressing  Intervention: Identify and Manage Fall Risk  Recent Flowsheet Documentation  Taken 4/8/2023 2200 by Elyssa Thorne RN  Safety Promotion/Fall Prevention: safety round/check completed  Taken 4/8/2023 2000 by Elyssa Thorne RN  Safety Promotion/Fall Prevention: safety round/check completed  Intervention: Prevent Skin Injury  Recent Flowsheet Documentation  Taken 4/8/2023 2000 by Elyssa Thorne RN  Body Position: 30 degrees  Taken 4/8/2023 1913 by Elyssa Thorne RN  Skin Protection: incontinence pads utilized  Intervention: Prevent Infection  Recent Flowsheet Documentation  Taken 4/8/2023 2200 by Elyssa Thorne RN  Infection Prevention: rest/sleep promoted  Taken 4/8/2023 2000 by Elyssa Thorne  RN  Infection Prevention: rest/sleep promoted  Goal: Optimal Comfort and Wellbeing  Outcome: Ongoing, Progressing  Goal: Readiness for Transition of Care  Outcome: Ongoing, Progressing     Problem: Adjustment to Illness (Stroke, Ischemic/Transient Ischemic Attack)  Goal: Optimal Coping  Outcome: Ongoing, Progressing     Problem: Bowel Elimination Impaired (Stroke, Ischemic/Transient Ischemic Attack)  Goal: Effective Bowel Elimination  Outcome: Ongoing, Progressing     Problem: Cerebral Tissue Perfusion (Stroke, Ischemic/Transient Ischemic Attack)  Goal: Optimal Cerebral Tissue Perfusion  Outcome: Ongoing, Progressing     Problem: Cognitive Impairment (Stroke, Ischemic/Transient Ischemic Attack)  Goal: Optimal Cognitive Function  Outcome: Ongoing, Progressing     Problem: Communication Impairment (Stroke, Ischemic/Transient Ischemic Attack)  Goal: Improved Communication Skills  Outcome: Ongoing, Progressing     Problem: Functional Ability Impaired (Stroke, Ischemic/Transient Ischemic Attack)  Goal: Optimal Functional Ability  Outcome: Ongoing, Progressing     Problem: Respiratory Compromise (Stroke, Ischemic/Transient Ischemic Attack)  Goal: Effective Oxygenation and Ventilation  Outcome: Ongoing, Progressing  Intervention: Optimize Oxygenation and Ventilation  Recent Flowsheet Documentation  Taken 4/8/2023 2000 by Elyssa Thorne, RN  Head of Bed (HOB) Positioning: HOB elevated     Problem: Sensorimotor Impairment (Stroke, Ischemic/Transient Ischemic Attack)  Goal: Improved Sensorimotor Function  Outcome: Ongoing, Progressing  Intervention: Optimize Sensory and Perceptual Ability  Recent Flowsheet Documentation  Taken 4/8/2023 1913 by Elyssa Thorne RN  Pressure Reduction Techniques: weight shift assistance provided  Pressure Reduction Devices: alternating pressure pump (ADD)     Problem: Swallowing Impairment (Stroke, Ischemic/Transient Ischemic Attack)  Goal: Optimal Eating and Swallowing without  Aspiration  Outcome: Ongoing, Progressing     Problem: Urinary Elimination Impaired (Stroke, Ischemic/Transient Ischemic Attack)  Goal: Effective Urinary Elimination  Outcome: Ongoing, Progressing     Problem: Adjustment to Illness (Stroke, Hemorrhagic)  Goal: Optimal Coping  Outcome: Ongoing, Progressing     Problem: Bowel Elimination Impaired (Stroke, Hemorrhagic)  Goal: Effective Bowel Elimination  Outcome: Ongoing, Progressing     Problem: Cerebral Tissue Perfusion (Stroke, Hemorrhagic)  Goal: Optimal Cerebral Tissue Perfusion  Outcome: Ongoing, Progressing     Problem: Cognitive Impairment (Stroke, Hemorrhagic)  Goal: Optimal Cognitive Function  Outcome: Ongoing, Progressing     Problem: Communication Impairment (Stroke, Hemorrhagic)  Goal: Effective Communication Skills  Outcome: Ongoing, Progressing     Problem: Functional Ability Impaired (Stroke, Hemorrhagic)  Goal: Optimal Functional Ability  Outcome: Ongoing, Progressing     Problem: Pain (Stroke, Hemorrhagic)  Goal: Acceptable Pain Control  Outcome: Ongoing, Progressing     Problem: Respiratory Compromise (Stroke, Hemorrhagic)  Goal: Effective Oxygenation and Ventilation  Outcome: Ongoing, Progressing  Intervention: Optimize Oxygenation and Ventilation  Recent Flowsheet Documentation  Taken 4/8/2023 2000 by Elyssa Thorne RN  Head of Bed (HOB) Positioning: HOB elevated     Problem: Sensorimotor Impairment (Stroke, Hemorrhagic)  Goal: Improved Sensorimotor Function  Outcome: Ongoing, Progressing  Intervention: Optimize Sensory and Perceptual Ability  Recent Flowsheet Documentation  Taken 4/8/2023 1913 by Elyssa Thorne RN  Pressure Reduction Techniques: weight shift assistance provided  Pressure Reduction Devices: alternating pressure pump (ADD)     Problem: Swallowing Impairment (Stroke, Hemorrhagic)  Goal: Optimal Eating and Swallowing Without Aspiration  Outcome: Ongoing, Progressing     Problem: Urinary Elimination Impaired (Stroke,  Hemorrhagic)  Goal: Effective Urinary Elimination  Outcome: Ongoing, Progressing   Goal Outcome Evaluation:  Plan of Care Reviewed With: patient        Progress: no change  Outcome Evaluation: Pt awake and alert.   no distress, VSS.   no changes

## 2023-04-09 NOTE — THERAPY TREATMENT NOTE
Patient Name: Manuel Durant  : 1950    MRN: [id number]                              Today's Date: 2023       Admit Date: 3/12/2023    Visit Dx:     ICD-10-CM ICD-9-CM   1. Intracranial hemorrhage  I62.9 432.9   2. Altered mental status, unspecified altered mental status type  R41.82 780.97     Patient Active Problem List   Diagnosis   • Intracranial hemorrhage   • Hypertensive emergency   • Acute DVT (deep venous thrombosis)   • HTN (hypertension)   • Hypokalemia     History reviewed. No pertinent past medical history.  History reviewed. No pertinent surgical history.   General Information     Row Name 23 1437          OT Time and Intention    Document Type therapy note (daily note)  -     Mode of Treatment occupational therapy;individual therapy  -     Row Name 23 143          General Information    Patient Profile Reviewed yes  -     Existing Precautions/Restrictions fall  -     Row Name 23 143          Cognition    Orientation Status (Cognition) oriented to;person  -     Row Name 23 143          Safety Issues, Functional Mobility    Impairments Affecting Function (Mobility) balance;coordination;endurance/activity tolerance;grasp;motor control;postural/trunk control;strength;range of motion (ROM);cognition;visual/perceptual  -           User Key  (r) = Recorded By, (t) = Taken By, (c) = Cosigned By    Initials Name Provider Type    MW Karishma Spence OT Occupational Therapist                 Mobility/ADL's     Row Name 23 1438          Bed Mobility    Bed Mobility supine-sit;sit-supine  -     Scooting/Bridging [place] (Bed Mobility) standby assist  -     Supine-Sit [place] (Bed Mobility) moderate assist (50% patient effort);verbal cues;nonverbal cues (demo/gesture)  -     Sit-Supine [place] (Bed Mobility) minimum assist (75% patient effort)  -MW     Bed Mobility, Safety Issues cognitive deficits limit understanding  -MW      Assistive Device (Bed Mobility) bed rails;head of bed elevated  -     Comment, (Bed Mobility) pt able to scoot with LUE and LLE upon returning to supine following EOB activity  -     Row Name 04/09/23 1438          Transfers    Comment, (Transfers) NT focusing on dynamic sitting, trunk control and BUE PROM/AROM this date  -           User Key  (r) = Recorded By, (t) = Taken By, (c) = Cosigned By    Initials Name Provider Type     Karishma Spence OT Occupational Therapist               Obj/Interventions     [facility] Name 04/09/23 1439          Shoulder (Therapeutic Exercise)    Shoulder (Therapeutic Exercise) AROM (active range of motion);PROM (passive range of motion)  -     Shoulder AROM (Therapeutic Exercise) left;flexion;extension;horizontal aBduction/aDduction;scapular elevation;15 repititions;sitting  -     Shoulder PROM (Therapeutic Exercise) right;aBduction;aDduction;horizontal aBduction/aDduction;scapular elevation;sitting;10 repetitions  -[facility] Name 04/09/23 1439          Elbow/Forearm (Therapeutic Exercise)    Elbow/Forearm (Therapeutic Exercise) AROM (active range of motion);PROM (passive range of motion)  -     Elbow/Forearm AROM (Therapeutic Exercise) right;extension;flexion;10 repetitions;sitting  -     Elbow/Forearm PROM (Therapeutic Exercise) right;extension;flexion;supination;pronation;10 repetitions;sitting  -[facility] Name 04/09/23 1439          Motor Skills    Motor Skills motor control/coordination interventions  -     Motor Control/Coordination Interventions fine motor manipulation/dexterity activities;functional task specific training;therapeutic exercise/ROM;neuro-muscular re-education;motor pattern re-training  -     Therapeutic Exercise shoulder;elbow/forearm  -[facility] Name 04/09/23 1439          Balance    Balance Assessment sitting static balance;sitting dynamic balance  -     Static Sitting Balance contact guard  -     Dynamic Sitting Balance minimal assist   -MW     Position, Sitting Balance sitting edge of bed;supported  -     Row Name 04/09/23 1439          Neuromuscular Re-education    Equipment Used (Neuromuscular Re-education) dynamic reaching with LUE across midline and outside base of support, facilitating forward and lateral trunk flexion; WB'ing to RUE  -           User Key  (r) = Recorded By, (t) = Taken By, (c) = Cosigned By    Initials Name Provider Type    Karishma Williamson OT Occupational Therapist               Goals/Plan    No documentation.                Clinical Impression     Row Name 04/09/23 1441          Pain Assessment    Pretreatment Pain Rating 0/10 - no pain  -MW     Posttreatment Pain Rating 0/10 - no pain  -MW     Row Name 04/09/23 1441          Plan of Care Review    Plan of Care Reviewed With patient  -MW     Progress no change  -MW     Outcome Evaluation Pt seen for OT tx session in PM, pt agreeable. Pt coming to EOB with mod A x1, able to maintain static sitting with mostly CGA with use of bed rail support with LUE, min A throughout dynamic sitting and functional reaching with LUE in all planes to improve overall trunk control required for ADLs and improved support with func transfers. PROM completed to RUE at EOB as well as WBing. AROM to LUE. Pt will continue to benefit from skilled OT to address noted funcitonal deficits, plans acute rehab at d/c.  -     Row Name 04/09/23 1441          Therapy Plan Review/Discharge Plan (OT)    Anticipated Discharge Disposition (OT) inpatient rehabilitation facility  -     Row Name 04/09/23 1441          Vital Signs    O2 Delivery Pre Treatment room air  -MW     Pre Patient Position Supine  -MW     Intra Patient Position Sitting  -MW     Post Patient Position Supine  -     Row Name 04/09/23 1441          Positioning and Restraints    Pre-Treatment Position in bed  -MW     Post Treatment Position bed  -MW     In Bed notified nsg;fowlers;call light within reach;encouraged to call for  assist;exit alarm on;SCD pump applied;side rails up x3;patient within staff view  -           User Key  (r) = Recorded By, (t) = Taken By, (c) = Cosigned By    Initials Name Provider Type    Karishma Williamson OT Occupational Therapist               Outcome Measures     Row Name 04/09/23 1443          How much help from another is currently needed...    Putting on and taking off regular lower body clothing? 2  -MW     Bathing (including washing, rinsing, and drying) 2  -MW     Toileting (which includes using toilet bed pan or urinal) 2  -MW     Putting on and taking off regular upper body clothing 2  -MW     Taking care of personal grooming (such as brushing teeth) 2  -MW     Eating meals 2  -MW     AM-PAC 6 Clicks Score (OT) 12  -MW     Row Name 04/09/23 0835          How much help from another person do you currently need...    Turning from your back to your side while in flat bed without using bedrails? 3  -TF     Moving from lying on back to sitting on the side of a flat bed without bedrails? 3  -TF     Moving to and from a bed to a chair (including a wheelchair)? 2  -TF     Standing up from a chair using your arms (e.g., wheelchair, bedside chair)? 1  -TF     Climbing 3-5 steps with a railing? 1  -TF     To walk in hospital room? 1  -TF     AM-PAC 6 Clicks Score (PT) 11  -TF     Highest level of mobility 4 --> Transferred to chair/commode  -TF     Row Name 04/09/23 1443          Functional Assessment    Outcome Measure Options AM-PAC 6 Clicks Daily Activity (OT)  -           User Key  (r) = Recorded By, (t) = Taken By, (c) = Cosigned By    Initials Name Provider Type    Miriam Snow RN Registered Nurse    Karishma Williamson OT Occupational Therapist                Occupational Therapy Education     Title: PT OT SLP Therapies (In Progress)     Topic: Occupational Therapy (Done)     Point: ADL training (Done)     Description:   Instruct learner(s) on proper safety adaptation and remediation  techniques during self care or transfers.   Instruct in proper use of assistive devices.              Learning Progress Summary           Patient Acceptance, E,TB,H, VU by MS at 3/16/2023 1801    Acceptance, E, NR by LM at 3/15/2023 0521   Family Acceptance, E,TB,H, VU by MS at 3/16/2023 1801                   Point: Home exercise program (Done)     Description:   Instruct learner(s) on appropriate technique for monitoring, assisting and/or progressing therapeutic exercises/activities.              Learning Progress Summary           Patient Acceptance, E,TB,H, VU by MS at 3/16/2023 1801    Acceptance, E, NR by LM at 3/15/2023 0521   Family Acceptance, E,TB,H, VU by MS at 3/16/2023 1801                   Point: Precautions (Done)     Description:   Instruct learner(s) on prescribed precautions during self-care and functional transfers.              Learning Progress Summary           Patient Acceptance, E,TB,H, VU by MS at 3/16/2023 1801    Acceptance, E, NR by LM at 3/15/2023 0521   Family Acceptance, E,TB,H, VU by MS at 3/16/2023 1801                   Point: Body mechanics (Done)     Description:   Instruct learner(s) on proper positioning and spine alignment during self-care, functional mobility activities and/or exercises.              Learning Progress Summary           Patient Acceptance, E,TB,H, VU by MS at 3/16/2023 1801    Acceptance, E, NR by LM at 3/15/2023 0521   Family Acceptance, E,TB,H, VU by MS at 3/16/2023 1801                               User Key     Initials Effective Dates Name Provider Type Discipline    MS 06/16/21 -  Angel Luis Cr, RN Registered Nurse Nurse     10/13/22 -  Silke Grace, RN Registered Nurse Nurse              OT Recommendation and Plan     Plan of Care Review  Plan of Care Reviewed With: patient  Progress: no change  Outcome Evaluation: Pt seen for OT tx session in PM, pt agreeable. Pt coming to EOB with mod A x1, able to maintain static sitting with mostly CGA with use  of bed rail support with LUE, min A throughout dynamic sitting and functional reaching with LUE in all planes to improve overall trunk control required for ADLs and improved support with func transfers. PROM completed to RUE at EOB as well as WBing. AROM to LUE. Pt will continue to benefit from skilled OT to address noted funcitonal deficits, plans acute rehab at d/c.     Time Calculation:    Time Calculation- OT     Row Name 04/09/23 1443             Time Calculation- OT    OT Start Time 1342  -MW      OT Stop Time 1401  -MW      OT Time Calculation (min) 19 min  -MW      Total Timed Code Minutes- OT 19 minute(s)  -MW      OT Received On 04/09/23  -MW      OT - Next Appointment 04/10/23  -MW         Timed Charges    30385 - OT Therapeutic Exercise Minutes 9  -MW      46201 -  OT Neuromuscular Reeducation Minutes 10  -MW         Total Minutes    Timed Charges Total Minutes 19  -MW       Total Minutes 19  -MW            User Key  (r) = Recorded By, (t) = Taken By, (c) = Cosigned By    Initials Name Provider Type    Karishma Williamson OT Occupational Therapist              Therapy Charges for Today     Code Description Service Date Service Provider Modifiers Qty    83388266567 HC OT NEUROMUSC RE EDUCATION EA 15 MIN 4/9/2023 Karishma Spence OT GO 1               Karishma Spence OT  4/9/2023

## 2023-04-09 NOTE — PROGRESS NOTES
".DAILY PROGRESS NOTE  [facility]    Patient Identification:  Name: Manuel Durant  Age: 73 y.o.  Sex: male  :  1950  MRN: [id number]         Primary Care Physician: Provider, No Known    Subjective:  Interval History:He is weak.    Objective:    Scheduled Meds:amLODIPine, 10 mg, Oral, QAM AC  apixaban, 5 mg, Oral, Q12H  baclofen, 2.5 mg, Oral, Q12H  carvedilol, 6.25 mg, Oral, BID With Meals  hydrALAZINE, 10 mg, Oral, Q8H  senna-docusate sodium, 2 tablet, Oral, BID  sodium chloride, 10 mL, Intravenous, Q12H  valsartan, 160 mg, Oral, QAM AC      Continuous Infusions:     Vital signs in last 24 hours:  Temp:  [97.9 °F (36.6 °C)-98.3 °F (36.8 °C)] 98.2 °F (36.8 °C)  Heart Rate:  [51-65] 65  Resp:  [16-18] 18  BP: (113-136)/(67-77) 134/73    Intake/Output:    Intake/Output Summary (Last 24 hours) at 2023 1229  Last data filed at 2023 0835  Gross per 24 hour   Intake 150 ml   Output --   Net 150 ml       Exam:  /73   Pulse 65   Temp 98.2 °F (36.8 °C) (Oral)   Resp 18   Ht 167.6 cm (66\")   Wt 67.3 kg (148 lb 5.9 oz)   SpO2 95%   BMI 23.95 kg/m²     General Appearance:    Alert, cooperative, no distress   Head:    Normocephalic, without obvious abnormality, atraumatic   Eyes:       Throat:   Lips, tongue, gums normal   Neck:   Supple, symmetrical, trachea midline, no JVD   Lungs:     Clear to auscultation bilaterally, respirations unlabored   Chest Wall:    No tenderness or deformity    Heart:    Regular rate and rhythm, S1 and S2 normal, no murmur,no  Rub or gallop   Abdomen:     Soft, nontender, bowel sounds active, no masses, no organomegaly    Extremities:   Extremities normal, atraumatic, no cyanosis or edema   Pulses:      Skin:   Skin is warm and dry,  no rashes or palpable lesions   Neurologic:   He is weak.      Lab Results (last 72 hours)     Procedure Component Value Units Date/Time    Basic Metabolic Panel [[id number]]  (Abnormal) Collected: 23 0610    " Specimen: Blood Updated: 03/30/23 0702     Glucose 89 mg/dL      BUN 21 mg/dL      Creatinine 0.77 mg/dL      Sodium 142 mmol/L      Potassium 4.1 mmol/L      Chloride 110 mmol/L      CO2 24.5 mmol/L      Calcium 8.7 mg/dL      BUN/Creatinine Ratio 27.3     Anion Gap 7.5 mmol/L      eGFR 95.7 mL/min/1.73     Narrative:      GFR Normal >60  Chronic Kidney Disease <60  Kidney Failure <15    The GFR formula is only valid for adults with stable renal function between ages 18 and 70.    Phosphorus [[id number]]  (Normal) Collected: 03/30/23 0610    Specimen: Blood Updated: 03/30/23 0702     Phosphorus 3.2 mg/dL     Magnesium [[id number]]  (Normal) Collected: 03/30/23 0610    Specimen: Blood Updated: 03/30/23 0701     Magnesium 2.0 mg/dL     CBC (No Diff) [[id number]]  (Normal) Collected: 03/30/23 0610    Specimen: Blood Updated: 03/30/23 0641     WBC 8.31 10*3/mm3      RBC 4.31 10*6/mm3      Hemoglobin 13.1 g/dL      Hematocrit 39.3 %      MCV 91.2 fL      MCH 30.4 pg      MCHC 33.3 g/dL      RDW 13.0 %      RDW-SD 43.1 fl      MPV 10.8 fL      Platelets 323 10*3/mm3     Basic Metabolic Panel [[id number]]  (Abnormal) Collected: 03/29/23 0745    Specimen: Blood Updated: 03/29/23 0828     Glucose 91 mg/dL      BUN 20 mg/dL      Creatinine 0.73 mg/dL      Sodium 139 mmol/L      Potassium 4.2 mmol/L      Comment: Slight hemolysis detected by analyzer. Results may be affected.        Chloride 106 mmol/L      CO2 23.2 mmol/L      Calcium 8.9 mg/dL      BUN/Creatinine Ratio 27.4     Anion Gap 9.8 mmol/L      eGFR 97.3 mL/min/1.73     Narrative:      GFR Normal >60  Chronic Kidney Disease <60  Kidney Failure <15    The GFR formula is only valid for adults with stable renal function between ages 18 and 70.    Phosphorus [[id number]]  (Normal) Collected: 03/29/23 0745    Specimen: Blood Updated: 03/29/23 0828     Phosphorus 3.4 mg/dL     Magnesium [[id number]]  (Normal) Collected: 03/29/23 0745    Specimen: Blood Updated: 03/29/23 0828      Magnesium 2.1 mg/dL     CBC (No Diff) [[id number]]  (Normal) Collected: 03/29/23 0745    Specimen: Blood Updated: 03/29/23 0813     WBC 7.52 10*3/mm3      RBC 4.45 10*6/mm3      Hemoglobin 13.5 g/dL      Hematocrit 39.9 %      MCV 89.7 fL      MCH 30.3 pg      MCHC 33.8 g/dL      RDW 13.0 %      RDW-SD 42.0 fl      MPV 10.6 fL      Platelets 326 10*3/mm3         Data Review:  Results from last 7 days   Lab Units 04/05/23  0538   SODIUM mmol/L 141   POTASSIUM mmol/L 4.1   CHLORIDE mmol/L 109*   CO2 mmol/L 21.2*   BUN mg/dL 20   CREATININE mg/dL 0.71*   GLUCOSE mg/dL 86   CALCIUM mg/dL 8.9     Results from last 7 days   Lab Units 04/05/23  0538   WBC 10*3/mm3 7.23   HEMOGLOBIN g/dL 13.8   HEMATOCRIT % 41.3   PLATELETS 10*3/mm3 261             Lab Results   Lab Value Date/Time    TROPONINT 9 03/12/2023 1907               Invalid input(s): PROT, LABALBU          No results found for: POCGLU        History reviewed. No pertinent past medical history.    Assessment:  Active Hospital Problems    Diagnosis  POA   • **Intracranial hemorrhage [I62.9]  Yes   • Acute DVT (deep venous thrombosis) [I82.409]  Unknown   • HTN (hypertension) [I10]  Unknown   • Hypokalemia [E87.6]  Unknown   • Hypertensive emergency [I16.1]  Unknown      Resolved Hospital Problems   No resolved problems to display.       Plan:  Continue current RX. DC planning. Continue with therapy.  Discussed with nurse and case management.  He claims he has a green card.  Case management got documents.  They found insurance card in his wallet.  Should be eligible for social security and medicare.  Needs current residence card.  DC planning. Maybe acute rehab.  Maybe next week.  Stay tuned.    Cedric Johnston MD  4/9/2023  12:29 EDT

## 2023-04-09 NOTE — PROGRESS NOTES
[facility] Acute Inpt Rehab    Noted improvement with physical therapy.  Pt would need to be able to participate in 3 hours of therapy per day.  Will continue to monitor progress.    Thank you,  Meche Peoples RN  Acute Rehab Admission Nurse

## 2023-04-09 NOTE — PLAN OF CARE
Goal Outcome Evaluation:              Outcome Evaluation: Alert and oriented x1. Fed meals and tolerated without complaints. Incontinent of bladder x1 this shift. No complaints. Awaiting d/c plan.

## 2023-04-10 PROBLEM — E87.6 HYPOKALEMIA: Status: RESOLVED | Noted: 2023-03-17 | Resolved: 2023-04-10

## 2023-04-10 PROBLEM — E43 SEVERE MALNUTRITION: Status: ACTIVE | Noted: 2023-04-10

## 2023-04-10 PROCEDURE — 97112 NEUROMUSCULAR REEDUCATION: CPT

## 2023-04-10 PROCEDURE — 97530 THERAPEUTIC ACTIVITIES: CPT

## 2023-04-10 RX ADMIN — HYDRALAZINE HYDROCHLORIDE 10 MG: 10 TABLET, FILM COATED ORAL at 20:36

## 2023-04-10 RX ADMIN — APIXABAN 5 MG: 5 TABLET, FILM COATED ORAL at 20:35

## 2023-04-10 RX ADMIN — DOCUSATE SODIUM 50MG AND SENNOSIDES 8.6MG 2 TABLET: 8.6; 5 TABLET, FILM COATED ORAL at 08:28

## 2023-04-10 RX ADMIN — BACLOFEN 2.5 MG: 10 TABLET ORAL at 08:29

## 2023-04-10 RX ADMIN — DOCUSATE SODIUM 50MG AND SENNOSIDES 8.6MG 2 TABLET: 8.6; 5 TABLET, FILM COATED ORAL at 20:35

## 2023-04-10 RX ADMIN — HYDRALAZINE HYDROCHLORIDE 10 MG: 10 TABLET, FILM COATED ORAL at 13:58

## 2023-04-10 RX ADMIN — Medication 10 ML: at 08:29

## 2023-04-10 RX ADMIN — APIXABAN 5 MG: 5 TABLET, FILM COATED ORAL at 08:29

## 2023-04-10 RX ADMIN — AMLODIPINE BESYLATE 10 MG: 10 TABLET ORAL at 08:28

## 2023-04-10 RX ADMIN — VALSARTAN 160 MG: 160 TABLET, FILM COATED ORAL at 08:28

## 2023-04-10 RX ADMIN — Medication 10 ML: at 20:36

## 2023-04-10 RX ADMIN — BACLOFEN 2.5 MG: 10 TABLET ORAL at 20:35

## 2023-04-10 RX ADMIN — HYDRALAZINE HYDROCHLORIDE 10 MG: 10 TABLET, FILM COATED ORAL at 06:34

## 2023-04-10 RX ADMIN — CARVEDILOL 6.25 MG: 12.5 TABLET, FILM COATED ORAL at 08:28

## 2023-04-10 RX ADMIN — CARVEDILOL 6.25 MG: 12.5 TABLET, FILM COATED ORAL at 17:42

## 2023-04-10 NOTE — PLAN OF CARE
Goal Outcome Evaluation:  Plan of Care Reviewed With: patient           Outcome Evaluation: Pt participated with PT this PM. Session focused on transfer training and standing balance tasks with R LE blocked due to buckling. Improved strength in RLE during seated exercises noted today. Will continue to follow and progress as able.

## 2023-04-10 NOTE — PLAN OF CARE
Goal Outcome Evaluation:              Outcome Evaluation: Alert and oriented x1. Refused bathing and changing of brief today. Toelrating diet. No complaints.

## 2023-04-10 NOTE — THERAPY TREATMENT NOTE
Patient Name: Manuel Durant  : 1950    MRN: [id number]                              Today's Date: 4/10/2023       Admit Date: 3/12/2023    Visit Dx:     ICD-10-CM ICD-9-CM   1. Intracranial hemorrhage  I62.9 432.9   2. Altered mental status, unspecified altered mental status type  R41.82 780.97     Patient Active Problem List   Diagnosis   • Intracranial hemorrhage   • Hypertensive emergency   • Acute DVT (deep venous thrombosis)   • HTN (hypertension)   • Severe Malnutrition (HCC)     Past Medical History:   Diagnosis Date   • Hypokalemia 3/17/2023     History reviewed. No pertinent surgical history.   General Information     Row Name 04/10/23 1403          Physical Therapy Time and Intention    Document Type therapy note (daily note)  -CW     Mode of Treatment individual therapy;physical therapy  -CW     Row Name 04/10/23 1403          General Information    Patient Profile Reviewed yes  -CW     Existing Precautions/Restrictions fall  -CW     Row Name 04/10/23 1403          Cognition    Orientation Status (Cognition) oriented to;person  -CW     Row Name 04/10/23 1403          Safety Issues, Functional Mobility    Safety Issues Affecting Function (Mobility) insight into deficits/self-awareness;awareness of need for assistance;impulsivity;judgment;problem-solving  -CW     Impairments Affecting Function (Mobility) balance;coordination;endurance/activity tolerance;grasp;motor control;postural/trunk control;strength;range of motion (ROM);cognition;visual/perceptual  -CW           User Key  (r) = Recorded By, (t) = Taken By, (c) = Cosigned By    Initials Name Provider Type    CW Tamica Willoughby PT Physical Therapist               Mobility     Row Name 04/10/23 1400          Bed Mobility    Bed Mobility supine-sit;sit-supine  -CW     Supine-Sit [place] (Bed Mobility) verbal cues;nonverbal cues (demo/gesture);minimum assist (75% patient effort)  -CW     Sit-Supine [place] (Bed Mobility) minimum  assist (75% patient effort);verbal cues  -CW     Assistive Device (Bed Mobility) bed rails;head of bed elevated  -CW     Row Name 04/10/23 1405          Bed-Chair Transfer    Bed-Chair [place] (Transfers) maximum assist (25% patient effort);1 person assist;verbal cues  -CW     Assistive Device (Bed-Chair Transfers) --  HHA on L  -CW     Comment, (Bed-Chair Transfer) x2 stand pivot transfers, R knee blocked.  -CW     Row Name 04/10/23 1405          Sit-Stand Transfer    Sit-Stand [place] (Transfers) minimum assist (75% patient effort);moderate assist (50% patient effort);1 person assist;verbal cues  -CW     Assistive Device (Sit-Stand Transfers) other (see comments)  -CW     Comment, (Sit-Stand Transfer) STS mmultiple trials, static standing working on weight shifting onto R LE with R knee blocked.  -CW           User Key  (r) = Recorded By, (t) = Taken By, (c) = Cosigned By    Initials Name Provider Type    Tamica Eddy PT Physical Therapist               Obj/Interventions     Row Name 04/10/23 1408          Balance    Comment, Balance Standing balance tasks focusing on weight shifting to RLE, R knee blocked. Up to mod/max A at times  -CW           User Key  (r) = Recorded By, (t) = Taken By, (c) = Cosigned By    Initials Name Provider Type    Tamica Eddy PT Physical Therapist               Goals/Plan    No documentation.                Clinical Impression     Row Name 04/10/23 1411          Pain    Pretreatment Pain Rating 0/10 - no pain  -CW     Posttreatment Pain Rating 0/10 - no pain  -CW     Row Name 04/10/23 1411          Plan of Care Review    Plan of Care Reviewed With patient  -CW     Outcome Evaluation Pt participated with PT this PM. Session focused on transfer training and standing balance tasks with R LE blocked due to buckling. Improved strength in RLE during seated exercises noted today. Will continue to follow and progress as able.  -     Row Name 04/10/23 1415           Positioning and Restraints    Pre-Treatment Position in bed  -CW     Post Treatment Position bed  -CW     In Bed notified nsg;call light within reach;encouraged to call for assist;exit alarm on;fowlers;SCD pump applied  -CW           User Key  (r) = Recorded By, (t) = Taken By, (c) = Cosigned By    Initials Name Provider Type    Tamica Eddy, JOSE ELIAS Physical Therapist               Outcome Measures     Row Name 04/10/23 1414 04/10/23 0828       How much help from another person do you currently need...    Turning from your back to your side while in flat bed without using bedrails? 3  -CW 2  -TF    Moving from lying on back to sitting on the side of a flat bed without bedrails? 3  -CW 2  -TF    Moving to and from a bed to a chair (including a wheelchair)? 2  -CW 2  -TF    Standing up from a chair using your arms (e.g., wheelchair, bedside chair)? 2  -CW 2  -TF    Climbing 3-5 steps with a railing? 1  -CW 1  -TF    To walk in hospital room? 2  -CW 1  -TF    AM-PAC 6 Clicks Score (PT) 13  -CW 10  -TF    Highest level of mobility 4 --> Transferred to chair/commode  -CW 4 --> Transferred to chair/commode  -TF    Row Name 04/10/23 1414          Functional Assessment    Outcome Measure Options AM-PAC 6 Clicks Basic Mobility (PT)  -CW           User Key  (r) = Recorded By, (t) = Taken By, (c) = Cosigned By    Initials Name Provider Type    TF Miriam Smiley RN Registered Nurse    Tamica Eddy, JOSE ELIAS Physical Therapist                             Physical Therapy Education     Title: PT OT SLP Therapies (In Progress)     Topic: Physical Therapy (In Progress)     Point: Mobility training (In Progress)     Learning Progress Summary           Patient Acceptance, E, NR by CW at 4/10/2023 1414    Acceptance, E,TB, NR by  at 4/8/2023 0928    Acceptance, E, NR by CW at 4/7/2023 1215    Acceptance, E, NR by CW at 4/6/2023 1335    Acceptance, E, NR by CW at 4/5/2023 1043    Acceptance, E, NR by CW at 4/4/2023 0906     Acceptance, E, NR by CW at 4/3/2023 1531    Acceptance, E,TB, VU,NR by CAMERON at 4/1/2023 1530    Acceptance, E, NR by CW at 3/31/2023 0925    Acceptance, E, NR by CW at 3/30/2023 1209    Acceptance, E, NR,NL by CW at 3/29/2023 1336    Acceptance, E, NR by ZB at 3/28/2023 1317    Acceptance, E, NR by CW at 3/27/2023 1401    Acceptance, E,D, NR,VU by JM at 3/24/2023 1631    Comment: seemed to comprehend commands this session    Acceptance, E,D, NR by ZACH at 3/23/2023 1655    Acceptance, E,D, NR by JM at 3/22/2023 1545    Acceptance, E,TB, VU,NR by CB at 3/21/2023 1544    Acceptance, E,TB,H, VU by MS at 3/16/2023 1801    Acceptance, E,D, DU,NR by MO at 3/16/2023 1451    Acceptance, E, DU,NR by MO at 3/15/2023 1200    Acceptance, E, NR by LM at 3/15/2023 0521    Acceptance, E,D, DU,NR by MO at 3/14/2023 1458   Family Acceptance, E,TB,H, VU by MS at 3/16/2023 1801                   Point: Home exercise program (In Progress)     Learning Progress Summary           Patient Acceptance, E,TB, NR by CS at 4/8/2023 0928    Acceptance, E, NR by CW at 4/7/2023 1215    Acceptance, E, NR by CW at 4/6/2023 1335    Acceptance, E,TB, VU,NR by CAMERON at 4/1/2023 1530    Acceptance, E, NR by CW at 3/31/2023 0925    Acceptance, E, NR by CHARLY at 3/28/2023 1317    Acceptance, E, NR by CW at 3/27/2023 1401    Acceptance, E,D, NR,VU by JM at 3/24/2023 1631    Comment: seemed to comprehend commands this session    Acceptance, E,D, NR by JM at 3/23/2023 1655    Acceptance, E,D, NR by JM at 3/22/2023 1545    Acceptance, E,TB, VU,NR by CB at 3/21/2023 1544    Acceptance, E,TB,H, VU by MS at 3/16/2023 1801    Acceptance, E,D, DU,NR by MO at 3/16/2023 1451    Acceptance, E, NR by LM at 3/15/2023 0521    Acceptance, E,D, DU,NR by MO at 3/14/2023 1458   Family Acceptance, E,TB,H, VU by MS at 3/16/2023 1801                   Point: Body mechanics (In Progress)     Learning Progress Summary           Patient Acceptance, E, NR by CW at 4/10/2023 1417     Acceptance, E,TB, NR by CS at 4/8/2023 0928    Acceptance, E, NR by CW at 4/7/2023 1215    Acceptance, E, NR by CW at 4/4/2023 0906    Acceptance, E, NR by CW at 4/3/2023 1531    Acceptance, E,TB, VU,NR by CAMERON at 4/1/2023 1530    Acceptance, E,D, NR,VU by JM at 3/24/2023 1631    Comment: seemed to comprehend commands this session    Acceptance, E,D, NR by JM at 3/23/2023 1655    Acceptance, E,D, NR by JM at 3/22/2023 1545    Acceptance, E,TB, VU,NR by CB at 3/21/2023 1544    Acceptance, E,TB,H, VU by MS at 3/16/2023 1801    Acceptance, E,D, DU,NR by MO at 3/16/2023 1451    Acceptance, E, DU,NR by MO at 3/15/2023 1200    Acceptance, E, NR by LM at 3/15/2023 0521    Acceptance, E,D, DU,NR by MO at 3/14/2023 1458   Family Acceptance, E,TB,H, VU by MS at 3/16/2023 1801                   Point: Precautions (In Progress)     Learning Progress Summary           Patient Acceptance, E,TB, NR by CS at 4/8/2023 0928    Acceptance, E, NR by CW at 4/7/2023 1215    Acceptance, E, NR by CW at 4/4/2023 0906    Acceptance, E, NR by CW at 4/3/2023 1531    Acceptance, E,TB, VU,NR by CAMERON at 4/1/2023 1530    Acceptance, E,D, NR,VU by JM at 3/24/2023 1631    Comment: seemed to comprehend commands this session    Acceptance, E,D, NR by ZACH at 3/23/2023 1655    Acceptance, E,D, NR by JM at 3/22/2023 1545    Acceptance, E,TB, VU,NR by CB at 3/21/2023 1544    Acceptance, E,TB,H, VU by MS at 3/16/2023 1801    Acceptance, E,D, DU,NR by MO at 3/16/2023 1451    Acceptance, E, DU,NR by MO at 3/15/2023 1200    Acceptance, E, NR by LM at 3/15/2023 0521    Acceptance, E,D, DU,NR by MO at 3/14/2023 1458   Family Acceptance, E,TB,H, VU by MS at 3/16/2023 1801                               User Key     Initials Effective Dates Name Provider Type Discipline     03/07/18 -  Tiffanie Grace PTA Physical Therapist Assistant PT    CAMERON 05/19/21 -  Leeann Wiley, PT Physical Therapist PT    MS 06/16/21 -  Angel Luis Cr, RN Registered Nurse Nurse     CW 12/13/22 -  Tamica Willoughby, PT Physical Therapist PT    CB 10/22/21 -  Bety Benitez, PT Physical Therapist PT    CS 09/22/22 -  Adelia Montalvo, PT Physical Therapist PT    LM 10/13/22 -  Silke Grace, RN Registered Nurse Nurse    MO 01/27/23 -  Lacie Tanner, PT Student PT Student PT    ZB 03/10/23 -  Nestor Rae, PT Student PT Student PT              PT Recommendation and Plan     Plan of Care Reviewed With: patient  Outcome Evaluation: Pt participated with PT this PM. Session focused on transfer training and standing balance tasks with R LE blocked due to buckling. Improved strength in RLE during seated exercises noted today. Will continue to follow and progress as able.     Time Calculation:    PT Charges     Row Name 04/10/23 1402             Time Calculation    Start Time 1318  -CW      Stop Time 1350  -CW      Time Calculation (min) 32 min  -CW      PT Received On 04/10/23  -CW      PT - Next Appointment 04/11/23  -CW            User Key  (r) = Recorded By, (t) = Taken By, (c) = Cosigned By    Initials Name Provider Type    CW Tamica Willoughby, PT Physical Therapist              Therapy Charges for Today     Code Description Service Date Service Provider Modifiers Qty    17453411268 HC PT NEUROMUSC RE EDUCATION EA 15 MIN 4/10/2023 Tamica Willoughby, PT GP 1    14306698703 HC PT THERAPEUTIC ACT EA 15 MIN 4/10/2023 Tamica Willoughby, PT GP 1    38402849652 HC PT THER SUPP EA 15 MIN 4/10/2023 Tamica Willoughby, PT GP 2          PT G-Codes  Outcome Measure Options: AM-PAC 6 Clicks Basic Mobility (PT)  AM-PAC 6 Clicks Score (PT): 13  AM-PAC 6 Clicks Score (OT): 12  Modified [place] Scale: 4 - Moderately severe disability.  Unable to walk without assistance, and unable to attend to own bodily needs without assistance.  PT Discharge Summary  Anticipated Discharge Disposition (PT): inpatient rehabilitation facility, skilled nursing facility    Tamica Willoughby PT  4/10/2023

## 2023-04-10 NOTE — PROGRESS NOTES
"Nutrition Services    Patient Name:  Manuel Durant  YOB: 1950  MRN: [id number]  Admit Date:  3/12/2023    FOLLOW UP - CLINICAL NUTRITION    Assessment Date:  04/10/23     Encounter Information         Reason for Encounter Follow up     Current Issues Patient eating %. Patient's insurance card was found and now rehab placement is pending. Will continue to follow     Current Nutrition Orders & Evaluation of Intake       Oral Nutrition     Current PO Diet Diet: Regular/House Diet; No Mixed Consistencies, Feeding Assistance - Nursing; Texture: Soft to Chew (NDD 3); Soft to Chew: Chopped Meat; Fluid Consistency: Nectar Thick   Supplement n/a   PO Evaluation     % PO Intake % x 7 meals     # of Days Evaluated     Factors Affecting Intake  No factors at this time   --  Anthropometrics          Height    Weight Height: 167.6 cm (66\")  Weight: 67.3 kg (148 lb 5.9 oz) (04/10/23 0041)    BMI kg/m2 Body mass index is 23.95 kg/m².    Weight trend Loss, Amount/Timeframe:  13# (8.5%) .  Documented weights    03/14/23 0539 03/15/23 0503 03/16/23 0348 03/17/23 0400   Weight: 71.2 kg (156 lb 15.5 oz) 71.3 kg (157 lb 3 oz) 78.4 kg (172 lb 13.5 oz) 78.5 kg (173 lb 1 oz)    03/18/23 0517 03/19/23 0426 03/20/23 0600 03/21/23 0504   Weight: 75.1 kg (165 lb 9.1 oz) 75.1 kg (165 lb 9.1 oz) 71.8 kg (158 lb 4.6 oz) 69.2 kg (152 lb 8.9 oz)    03/22/23 0505 03/23/23 0427 03/24/23 0509 03/25/23 0540   Weight: 72.1 kg (158 lb 15.2 oz) 72.5 kg (159 lb 13.3 oz) 70.9 kg (156 lb 4.9 oz) 71.5 kg (157 lb 10.1 oz)    03/26/23 0647 03/27/23 0429 03/30/23 0501 03/31/23 0500   Weight: 71.3 kg (157 lb 3 oz) 71.8 kg (158 lb 4.6 oz) 70.6 kg (155 lb 10.3 oz) 70.2 kg (154 lb 12.2 oz)    04/02/23 0529 04/03/23 0624 04/05/23 0418 04/07/23 0503   Weight: 69.8 kg (153 lb 14.1 oz) 63.9 kg (140 lb 14 oz) 63.9 kg (140 lb 14 oz) 60.4 kg (133 lb 2.5 oz)    04/08/23 0545 04/09/23 0517 04/10/23 0041   Weight: 69.5 kg (153 lb 3.5 oz) 67.3 kg " (148 lb 5.9 oz) 67.3 kg (148 lb 5.9 oz)          Labs        Pertinent Labs Reviewed, listed below     Results from last 7 days   Lab Units 04/05/23  0538   SODIUM mmol/L 141   POTASSIUM mmol/L 4.1   CHLORIDE mmol/L 109*   CO2 mmol/L 21.2*   BUN mg/dL 20   CREATININE mg/dL 0.71*   CALCIUM mg/dL 8.9   GLUCOSE mg/dL 86     Results from last 7 days   Lab Units 04/05/23  0538   HEMOGLOBIN g/dL 13.8   HEMATOCRIT % 41.3   WBC 10*3/mm3 7.23     Results from last 7 days   Lab Units 04/05/23  0538   PLATELETS 10*3/mm3 261     No results found for: COVID19  Lab Results   Component Value Date    HGBA1C 5.60 03/15/2023          Medications            Scheduled Medications amLODIPine, 10 mg, Oral, QAM AC  apixaban, 5 mg, Oral, Q12H  baclofen, 2.5 mg, Oral, Q12H  carvedilol, 6.25 mg, Oral, BID With Meals  hydrALAZINE, 10 mg, Oral, Q8H  senna-docusate sodium, 2 tablet, Oral, BID  sodium chloride, 10 mL, Intravenous, Q12H  valsartan, 160 mg, Oral, QAM AC        Infusions      PRN Medications •  acetaminophen  •  senna-docusate sodium **AND** polyethylene glycol **AND** bisacodyl **AND** bisacodyl  •  Calcium Gluconate-NaCl **AND** calcium gluconate IVPB **AND** Calcium  •  labetalol  •  loperamide  •  magnesium sulfate **OR** magnesium sulfate **OR** magnesium sulfate  •  potassium chloride **OR** potassium chloride **OR** potassium chloride  •  potassium phosphate infusion greater than 15 mMoles **OR** potassium phosphate infusion greater than 15 mMoles **OR** potassium phosphate **OR** sodium phosphate IVPB **OR** sodium phosphate IVPB  •  [COMPLETED] Insert Peripheral IV **AND** sodium chloride  •  sodium chloride  •  sodium chloride     Physical Findings          Physical Appearance alert, disoriented   Oral/Mouth Cavity teeth missing   Edema  no edema   Gastrointestinal normoactive, last bowel movement:4/9   Skin  skin intact   Tubes/Drains none   NFPE MSA documented on 4/4    --  NUTRITION INTERVENTION / PLAN OF  CARE  Intervention Goal         Intervention Goal(s) Maintain intake and Maintain weight     Nutrition Intervention         RD Action Follow Tx Progress and Care plan reviewed     Nutrition Prescription         Diet Prescription     Supplement Prescription Mighty Shake, BID    EN/PN Prescription    New Prescription Ordered? Yes   --  Monitor/Evaluation        Monitor Per protocol   Discharge Needs Other: plans for rehab    Education Will instruct as appropriate   --    RD to follow up per protocol.    Electronically signed by:  Richelle Milligan RD  04/10/23 16:15 EDT

## 2023-04-10 NOTE — PLAN OF CARE
Problem: Fall Injury Risk  Goal: Absence of Fall and Fall-Related Injury  Outcome: Ongoing, Progressing  Intervention: Identify and Manage Contributors  Recent Flowsheet Documentation  Taken 4/9/2023 2000 by Michelle Parks RN  Medication Review/Management: medications reviewed  Intervention: Promote Injury-Free Environment  Recent Flowsheet Documentation  Taken 4/9/2023 2000 by Michelle Parks RN  Safety Promotion/Fall Prevention: safety round/check completed     Problem: Skin Injury Risk Increased  Goal: Skin Health and Integrity  Outcome: Ongoing, Progressing  Intervention: Optimize Skin Protection  Recent Flowsheet Documentation  Taken 4/9/2023 2000 by Michelle Parks RN  Pressure Reduction Techniques: frequent weight shift encouraged  Head of Bed (HOB) Positioning: HOB at 20 degrees  Pressure Reduction Devices: alternating pressure pump (ADD)  Skin Protection: adhesive use limited     Problem: Adult Inpatient Plan of Care  Goal: Plan of Care Review  Outcome: Ongoing, Progressing  Flowsheets (Taken 4/9/2023 2205)  Plan of Care Reviewed With: patient  Goal: Patient-Specific Goal (Individualized)  Outcome: Ongoing, Progressing  Goal: Absence of Hospital-Acquired Illness or Injury  Outcome: Ongoing, Progressing  Intervention: Identify and Manage Fall Risk  Recent Flowsheet Documentation  Taken 4/9/2023 2000 by Michelle Parks RN  Safety Promotion/Fall Prevention: safety round/check completed  Intervention: Prevent Skin Injury  Recent Flowsheet Documentation  Taken 4/9/2023 2000 by Michelle Parks RN  Body Position: position changed independently  Skin Protection: adhesive use limited  Intervention: Prevent and Manage VTE (Venous Thromboembolism) Risk  Recent Flowsheet Documentation  Taken 4/9/2023 2000 by Michelle Parks RN  VTE Prevention/Management:   bilateral   sequential compression devices on  Range of Motion: ROM (range of motion) performed  Intervention: Prevent Infection  Recent Flowsheet  Documentation  Taken 4/9/2023 2000 by Michelle Parks RN  Infection Prevention: single patient room provided  Goal: Optimal Comfort and Wellbeing  Outcome: Ongoing, Progressing  Intervention: Provide Person-Centered Care  Recent Flowsheet Documentation  Taken 4/9/2023 2000 by Michelle Parks RN  Trust Relationship/Rapport:   choices provided   care explained  Goal: Readiness for Transition of Care  Outcome: Ongoing, Progressing     Problem: Adjustment to Illness (Stroke, Ischemic/Transient Ischemic Attack)  Goal: Optimal Coping  Outcome: Ongoing, Progressing  Intervention: Support Psychosocial Response to Stroke  Recent Flowsheet Documentation  Taken 4/9/2023 2000 by Michelle Parks RN  Supportive Measures: active listening utilized  Family/Support System Care: self-care encouraged     Problem: Bowel Elimination Impaired (Stroke, Ischemic/Transient Ischemic Attack)  Goal: Effective Bowel Elimination  Outcome: Ongoing, Progressing     Problem: Cerebral Tissue Perfusion (Stroke, Ischemic/Transient Ischemic Attack)  Goal: Optimal Cerebral Tissue Perfusion  Outcome: Ongoing, Progressing  Intervention: Protect and Optimize Cerebral Perfusion  Recent Flowsheet Documentation  Taken 4/9/2023 2000 by Michelle Parks RN  Sensory Stimulation Regulation: care clustered  Cerebral Perfusion Promotion: blood pressure monitored     Problem: Cognitive Impairment (Stroke, Ischemic/Transient Ischemic Attack)  Goal: Optimal Cognitive Function  Outcome: Ongoing, Progressing  Intervention: Optimize Cognitive Function  Recent Flowsheet Documentation  Taken 4/9/2023 2000 by Michelle Parks RN  Sensory Stimulation Regulation: care clustered  Reorientation Measures: clock in view     Problem: Communication Impairment (Stroke, Ischemic/Transient Ischemic Attack)  Goal: Improved Communication Skills  Outcome: Ongoing, Progressing  Intervention: Optimize Communication Skills  Recent Flowsheet Documentation  Taken 4/9/2023 2000 by Charly  STANLEY Martinez  Communication Enhancement Strategies: extra time allowed for response     Problem: Functional Ability Impaired (Stroke, Ischemic/Transient Ischemic Attack)  Goal: Optimal Functional Ability  Outcome: Ongoing, Progressing     Problem: Respiratory Compromise (Stroke, Ischemic/Transient Ischemic Attack)  Goal: Effective Oxygenation and Ventilation  Outcome: Ongoing, Progressing  Intervention: Optimize Oxygenation and Ventilation  Recent Flowsheet Documentation  Taken 4/9/2023 2000 by Michelle Parks RN  Head of Bed (HOB) Positioning: HOB at 20 degrees     Problem: Sensorimotor Impairment (Stroke, Ischemic/Transient Ischemic Attack)  Goal: Improved Sensorimotor Function  Outcome: Ongoing, Progressing  Intervention: Optimize Range of Motion, Motor Control and Function  Recent Flowsheet Documentation  Taken 4/9/2023 2000 by Michelle Parks RN  Spasticity Management: positioned with supportive device  Positioning/Transfer Devices:   pillows   in use  Range of Motion: ROM (range of motion) performed  Intervention: Optimize Sensory and Perceptual Ability  Recent Flowsheet Documentation  Taken 4/9/2023 2000 by Michelle Parks RN  Pressure Reduction Techniques: frequent weight shift encouraged  Sensation Impairment Protection: cues provided for safety  Pressure Reduction Devices: alternating pressure pump (ADD)     Problem: Swallowing Impairment (Stroke, Ischemic/Transient Ischemic Attack)  Goal: Optimal Eating and Swallowing without Aspiration  Outcome: Ongoing, Progressing     Problem: Urinary Elimination Impaired (Stroke, Ischemic/Transient Ischemic Attack)  Goal: Effective Urinary Elimination  Outcome: Ongoing, Progressing     Problem: Adjustment to Illness (Stroke, Hemorrhagic)  Goal: Optimal Coping  Outcome: Ongoing, Progressing  Intervention: Support Psychosocial Response to Stroke  Recent Flowsheet Documentation  Taken 4/9/2023 2000 by Michelle Parks RN  Supportive Measures: active listening  utilized  Family/Support System Care: self-care encouraged     Problem: Bowel Elimination Impaired (Stroke, Hemorrhagic)  Goal: Effective Bowel Elimination  Outcome: Ongoing, Progressing     Problem: Cerebral Tissue Perfusion (Stroke, Hemorrhagic)  Goal: Optimal Cerebral Tissue Perfusion  Outcome: Ongoing, Progressing  Intervention: Protect and Optimize Cerebral Perfusion  Recent Flowsheet Documentation  Taken 4/9/2023 2000 by Michelle Parks RN  Sensory Stimulation Regulation: care clustered  Cerebral Perfusion Promotion: blood pressure monitored     Problem: Cognitive Impairment (Stroke, Hemorrhagic)  Goal: Optimal Cognitive Function  Outcome: Ongoing, Progressing  Intervention: Optimize Cognitive Function  Recent Flowsheet Documentation  Taken 4/9/2023 2000 by Michelle Parks RN  Sensory Stimulation Regulation: care clustered  Reorientation Measures: clock in view     Problem: Communication Impairment (Stroke, Hemorrhagic)  Goal: Effective Communication Skills  Outcome: Ongoing, Progressing  Intervention: Optimize Communication Skills  Recent Flowsheet Documentation  Taken 4/9/2023 2000 by Michelle Parks RN  Communication Enhancement Strategies: extra time allowed for response     Problem: Functional Ability Impaired (Stroke, Hemorrhagic)  Goal: Optimal Functional Ability  Outcome: Ongoing, Progressing     Problem: Pain (Stroke, Hemorrhagic)  Goal: Acceptable Pain Control  Outcome: Ongoing, Progressing     Problem: Respiratory Compromise (Stroke, Hemorrhagic)  Goal: Effective Oxygenation and Ventilation  Outcome: Ongoing, Progressing  Intervention: Optimize Oxygenation and Ventilation  Recent Flowsheet Documentation  Taken 4/9/2023 2000 by Michelle Parks RN  Head of Bed (HOB) Positioning: HOB at 20 degrees     Problem: Sensorimotor Impairment (Stroke, Hemorrhagic)  Goal: Improved Sensorimotor Function  Outcome: Ongoing, Progressing  Intervention: Optimize Range of Motion, Motor Control and Function  Recent  Flowsheet Documentation  Taken 4/9/2023 2000 by Michelle Parks RN  Spasticity Management: positioned with supportive device  Positioning/Transfer Devices:   pillows   in use  Range of Motion: ROM (range of motion) performed  Intervention: Optimize Sensory and Perceptual Ability  Recent Flowsheet Documentation  Taken 4/9/2023 2000 by Michelle Parks, RN  Pressure Reduction Techniques: frequent weight shift encouraged  Sensation Impairment Protection: cues provided for safety  Pressure Reduction Devices: alternating pressure pump (ADD)     Problem: Swallowing Impairment (Stroke, Hemorrhagic)  Goal: Optimal Eating and Swallowing Without Aspiration  Outcome: Ongoing, Progressing     Problem: Urinary Elimination Impaired (Stroke, Hemorrhagic)  Goal: Effective Urinary Elimination  Outcome: Ongoing, Progressing   Goal Outcome Evaluation:  Plan of Care Reviewed With: patient

## 2023-04-10 NOTE — PROGRESS NOTES
Name: Manuel Durant ADMIT: 3/12/2023   : 1950  PCP: Provider, No Known    MRN: [id number] LOS: 29 days   AGE/SEX: 73 y.o. male  ROOM: [facility]     Subjective   Subjective   Asleep when seen this a.m., did not wake to voice. No new issues per nursing. Appears comfortable. Chart reviewed    Review of Systems   Unable to perform ROS: Other        Objective   Objective   Vital Signs  Temp:  [97.8 °F (36.6 °C)-98.6 °F (37 °C)] 97.8 °F (36.6 °C)  Heart Rate:  [53-67] 60  Resp:  [16-18] 16  BP: (120-136)/(67-80) 134/69  SpO2:  [95 %-98 %] 97 %  on  Flow (L/min):  [1] 1;   Device (Oxygen Therapy): nasal cannula  Body mass index is 23.95 kg/m².  Physical Exam  Vitals and nursing note reviewed.   Constitutional:       General: He is sleeping. He is not in acute distress.  HENT:      Head: Normocephalic.      Mouth/Throat:      Lips: Pink.   Eyes:      General: Lids are normal.   Cardiovascular:      Rate and Rhythm: Normal rate.   Pulmonary:      Effort: Pulmonary effort is normal. No respiratory distress.   Abdominal:      Palpations: Abdomen is soft.   Musculoskeletal:      Cervical back: Neck supple.      Right lower leg: No edema.      Left lower leg: No edema.   Skin:     General: Skin is warm and dry.   Neurological:      Comments: Sleeping       Results Review     I reviewed the patient's new clinical results.  Results from last 7 days   Lab Units 23  0538   WBC 10*3/mm3 7.23   HEMOGLOBIN g/dL 13.8   PLATELETS 10*3/mm3 261     Results from last 7 days   Lab Units 23  0538   SODIUM mmol/L 141   POTASSIUM mmol/L 4.1   CHLORIDE mmol/L 109*   CO2 mmol/L 21.2*   BUN mg/dL 20   CREATININE mg/dL 0.71*   GLUCOSE mg/dL 86   EGFR mL/min/1.73 98.1       Results from last 7 days   Lab Units 23  0538   CALCIUM mg/dL 8.9       No results found for: HGBA1C, POCGLU    No radiology results for the last day  I have personally reviewed all medications:  Scheduled Medications  amLODIPine, 10 mg, Oral, QAM  AC  apixaban, 5 mg, Oral, Q12H  baclofen, 2.5 mg, Oral, Q12H  carvedilol, 6.25 mg, Oral, BID With Meals  hydrALAZINE, 10 mg, Oral, Q8H  senna-docusate sodium, 2 tablet, Oral, BID  sodium chloride, 10 mL, Intravenous, Q12H  valsartan, 160 mg, Oral, QAM AC    Infusions   Diet  Diet: Regular/House Diet; No Mixed Consistencies, Feeding Assistance - Nursing; Texture: Soft to Chew (NDD 3); Soft to Chew: Chopped Meat; Fluid Consistency: Nectar Thick    I have personally reviewed:  [x]  Laboratory   [x]  Microbiology   [x]  Radiology   [x]  EKG/Telemetry  [x]  Cardiology/Vascular   [x]  Pathology    [x]  Records       Assessment/Plan     Active Hospital Problems    Diagnosis  POA   • **Intracranial hemorrhage [I62.9]  Yes   • Acute DVT (deep venous thrombosis) [I82.409]  Unknown   • HTN (hypertension) [I10]  Unknown   • Hypertensive emergency [I16.1]  Unknown      Resolved Hospital Problems    Diagnosis Date Resolved POA   • Hypokalemia [E87.6] 04/10/2023 Unknown       73 y.o. male admitted with Intracranial hemorrhage.    ICH:  -Continue OT/PT/ST. F/U [facility] outpatient w/MRI brain. Baclofen for spasticity. Tolerating modified diet    DVT:  -Cleared to start Eliquis per [facility]    HTN:  -BP acceptable. Continue amlodipine, valsartan, carvedilol, hydralazine    Follow up labs in a.m.      · Eliquis (home med) for DVT prophylaxis.  · Full code.  · Discussed with nursing staff.  · Anticipate discharge to acute rehab once arrangements have been made.      SLICK Triana  [facility]  04/10/23  12:47 EDT

## 2023-04-10 NOTE — CASE MANAGEMENT/SOCIAL WORK
Continued Stay Note  [facility]     Patient Name: Manuel Durant  MRN: [id number]  Today's Date: 4/10/2023    Admit Date: 3/12/2023    Plan: Acute Rehab vs  home with family   Discharge Plan     Row Name 04/10/23 1646       Plan    Plan Acute Rehab vs  home with family    Plan Comments Spoke with [facility].  Ongoing Medicaid application filed.  Await determination.  [facility] following.  CCP continues to work toward appropriate services at discharge.  Faye ARMSTRONG               Discharge Codes    No documentation.               Expected Discharge Date and Time     Expected Discharge Date Expected Discharge Time    Apr 14, 2023             Faye Moy RN

## 2023-04-11 PROBLEM — G81.90 HEMIPLEGIA: Status: ACTIVE | Noted: 2023-04-11

## 2023-04-11 PROBLEM — M25.511 RIGHT SHOULDER PAIN: Status: ACTIVE | Noted: 2023-04-11

## 2023-04-11 LAB
ANION GAP SERPL CALCULATED.3IONS-SCNC: 7 MMOL/L (ref 5–15)
BUN SERPL-MCNC: 13 MG/DL (ref 8–23)
BUN/CREAT SERPL: 20.3 (ref 7–25)
CALCIUM SPEC-SCNC: 8.4 MG/DL (ref 8.6–10.5)
CHLORIDE SERPL-SCNC: 107 MMOL/L (ref 98–107)
CO2 SERPL-SCNC: 25 MMOL/L (ref 22–29)
CREAT SERPL-MCNC: 0.64 MG/DL (ref 0.76–1.27)
DEPRECATED RDW RBC AUTO: 42.8 FL (ref 37–54)
EGFRCR SERPLBLD CKD-EPI 2021: 101.2 ML/MIN/1.73
ERYTHROCYTE [DISTWIDTH] IN BLOOD BY AUTOMATED COUNT: 13 % (ref 12.3–15.4)
GLUCOSE SERPL-MCNC: 86 MG/DL (ref 65–99)
HCT VFR BLD AUTO: 39.3 % (ref 37.5–51)
HGB BLD-MCNC: 13.8 G/DL (ref 13–17.7)
MCH RBC QN AUTO: 31.9 PG (ref 26.6–33)
MCHC RBC AUTO-ENTMCNC: 35.1 G/DL (ref 31.5–35.7)
MCV RBC AUTO: 91 FL (ref 79–97)
PLATELET # BLD AUTO: 168 10*3/MM3 (ref 140–450)
PMV BLD AUTO: 10.9 FL (ref 6–12)
POTASSIUM SERPL-SCNC: 3.9 MMOL/L (ref 3.5–5.2)
RBC # BLD AUTO: 4.32 10*6/MM3 (ref 4.14–5.8)
SODIUM SERPL-SCNC: 139 MMOL/L (ref 136–145)
WBC NRBC COR # BLD: 6.74 10*3/MM3 (ref 3.4–10.8)

## 2023-04-11 PROCEDURE — 97530 THERAPEUTIC ACTIVITIES: CPT

## 2023-04-11 PROCEDURE — 85027 COMPLETE CBC AUTOMATED: CPT | Performed by: NURSE PRACTITIONER

## 2023-04-11 PROCEDURE — 97112 NEUROMUSCULAR REEDUCATION: CPT

## 2023-04-11 PROCEDURE — 80048 BASIC METABOLIC PNL TOTAL CA: CPT | Performed by: NURSE PRACTITIONER

## 2023-04-11 PROCEDURE — 92507 TX SP LANG VOICE COMM INDIV: CPT

## 2023-04-11 PROCEDURE — 92526 ORAL FUNCTION THERAPY: CPT

## 2023-04-11 RX ORDER — BACLOFEN 10 MG/1
2.5 TABLET ORAL EVERY 8 HOURS SCHEDULED
Status: DISCONTINUED | OUTPATIENT
Start: 2023-04-11 | End: 2023-05-08 | Stop reason: HOSPADM

## 2023-04-11 RX ORDER — LIDOCAINE 50 MG/G
1 PATCH TOPICAL
Status: DISCONTINUED | OUTPATIENT
Start: 2023-04-11 | End: 2023-05-08 | Stop reason: HOSPADM

## 2023-04-11 RX ADMIN — BACLOFEN 2.5 MG: 10 TABLET ORAL at 23:10

## 2023-04-11 RX ADMIN — VALSARTAN 160 MG: 160 TABLET, FILM COATED ORAL at 08:51

## 2023-04-11 RX ADMIN — BACLOFEN 2.5 MG: 10 TABLET ORAL at 08:51

## 2023-04-11 RX ADMIN — APIXABAN 5 MG: 5 TABLET, FILM COATED ORAL at 08:51

## 2023-04-11 RX ADMIN — LIDOCAINE 1 PATCH: 700 PATCH TOPICAL at 16:03

## 2023-04-11 RX ADMIN — HYDRALAZINE HYDROCHLORIDE 10 MG: 10 TABLET, FILM COATED ORAL at 22:00

## 2023-04-11 RX ADMIN — APIXABAN 5 MG: 5 TABLET, FILM COATED ORAL at 22:00

## 2023-04-11 RX ADMIN — DOCUSATE SODIUM 50MG AND SENNOSIDES 8.6MG 2 TABLET: 8.6; 5 TABLET, FILM COATED ORAL at 08:51

## 2023-04-11 RX ADMIN — Medication 10 ML: at 08:57

## 2023-04-11 RX ADMIN — BACLOFEN 2.5 MG: 10 TABLET ORAL at 16:03

## 2023-04-11 RX ADMIN — AMLODIPINE BESYLATE 10 MG: 10 TABLET ORAL at 08:51

## 2023-04-11 RX ADMIN — HYDRALAZINE HYDROCHLORIDE 10 MG: 10 TABLET, FILM COATED ORAL at 05:33

## 2023-04-11 RX ADMIN — Medication 10 ML: at 22:00

## 2023-04-11 RX ADMIN — CARVEDILOL 6.25 MG: 12.5 TABLET, FILM COATED ORAL at 08:51

## 2023-04-11 RX ADMIN — HYDRALAZINE HYDROCHLORIDE 10 MG: 10 TABLET, FILM COATED ORAL at 16:03

## 2023-04-11 RX ADMIN — DOCUSATE SODIUM 50MG AND SENNOSIDES 8.6MG 2 TABLET: 8.6; 5 TABLET, FILM COATED ORAL at 21:59

## 2023-04-11 NOTE — PLAN OF CARE
Goal Outcome Evaluation:  Plan of Care Reviewed With: patient           Outcome Evaluation: Pt participated with PT this AM. Noted improvement with transfers today, able to complete with mod A x1, less assist to block RLE required. He completed standing balance tasks with min to mod A for balance - more assist required as he fatigues. DC arrangements remain pending. Pt left in chair at end of session, notfied RN Luana that pt will require assist x2 to return to bed and is safest to transfer towards his L (strong) side, will require the chair to be repositioned for return to bed - RN verbalized understanding.

## 2023-04-11 NOTE — PLAN OF CARE
Goal Outcome Evaluation:  Plan of Care Reviewed With: patient        Progress: no change  Outcome Evaluation: Speech language therapy, dysphagia therapy completed. Video  utilized for all therapy.     Dysphagia therapy included verbal review of VFSS results 3.31, rationale for thickened liquids as pt asks “Why does it taste different?” re: liquids. Pt initially verbally amenable to utilize NTL with swallow exercises, expectorates NTL trial towards SLP and outwards onto blanket. Unable to explain rationale for doing so. Effortful swallows complete x10 without PO. Attempted supraglottic x5, CTAR with towel roll for resistance x10 repetitive, however pt with difficulty in following commands sequencing or following 1 step commands consistently. Poor attention to follow through of initiated step. Effortful pitch glides x5 with 5 second hold.     Speech/language therapy completed. Pt oriented to SARAH, not month (states May), place (field of 2 incorrect), rationale for hospitalization (states here for kidney transplant). Naming 2/5, 40% independently, increases to 4/5, 80% field of 2 cues. Does not increase with semantic cues. Following 1 step directions: 80%, naming body parts: 100%. Automatics: CLARY 100%; SARAH 100%; counting to 14 100%. Speech imprecise, multiple requests for repetition to increase articulatory precision are successful in only 1/4 , 25% opps.     SLP to continue to follow for dysphagia and speech/language therapy. Ongoing therapy required at next level of care.

## 2023-04-11 NOTE — PLAN OF CARE
Goal Outcome Evaluation:  Plan of Care Reviewed With: patient        Progress: no change  Outcome Evaluation: vss, no complaints of pain. AxOx1. incont of bladder. turned when pt would allow. pt rested well during shift. labs in am. will continue to monitor.

## 2023-04-11 NOTE — THERAPY TREATMENT NOTE
Acute Care - Speech Language Pathology Treatment Note  [facility]     Patient Name: Manuel Durant  : 1950  MRN: [id number]  Today's Date: 2023               Admit Date: 3/12/2023     Visit Dx:    ICD-10-CM ICD-9-CM   1. Intracranial hemorrhage  I62.9 432.9   2. Altered mental status, unspecified altered mental status type  R41.82 780.97     Patient Active Problem List   Diagnosis   • Intracranial hemorrhage   • Hypertensive emergency   • Acute DVT (deep venous thrombosis)   • HTN (hypertension)   • Severe Malnutrition (HCC)   • Hemiplegia   • Right shoulder pain     Past Medical History:   Diagnosis Date   • Hypokalemia 3/17/2023     History reviewed. No pertinent surgical history.    SLP Recommendation and Plan  SLP to continue to follow for dysphagia and speech/language therapy. Ongoing therapy required at next level of care.       SLP EVALUATION (last 72 hours)     SLP SLC Evaluation     Row Name 23 1400       Communication Assessment/Intervention    Document Type therapy note (daily note)  -AB    Subjective Information no complaints  -AB    Patient Observations alert;cooperative;agree to therapy  -AB    Patient/Family/Caregiver Comments/Observations seen upright in [address], cooperative throughout  -AB    Patient Effort good  -AB    Symptoms Noted During/After Treatment none  -AB          User Key  (r) = Recorded By, (t) = Taken By, (c) = Cosigned By    Initials Name Effective Dates    AB Nasrin Simms, MS CCC-SLP 22 -                    EDUCATION  The patient has been educated in the following areas:     Communication Impairment Dysphagia (Swallowing Impairment) Modified Diet Instruction.           SLP GOALS     Row Name 23 1400       (LTG) Patient will demonstrate functional swallow for    Diet Texture (Demonstrate functional swallow) soft to chew (chopped) textures  -AB    Liquid viscosity (Demonstrate functional swallow) nectar/ mildly thick liquids  -AB     Plain Dealing (Demonstrate functional swallow) with minimal cues (75-90% accuracy)  -AB    Time Frame (Demonstrate functional swallow) by discharge  -AB       (LTG) Swallow    (LTG) Swallow Pt will participate in MBSS to assess pahrygneal stage of swallow.  -AB    Plain Dealing (Swallow Long Term Goal) independently (over 90% accuracy)  -AB    Time Frame (Swallow Long Term Goal) by discharge  -AB    Progress/Outcomes (Swallow Long Term Goal) goal met  -AB       (STG) Pharyngeal Strengthening Exercise Goal 1 (SLP)    Activity (Pharyngeal Strengthening Goal 1, SLP) increase superior movement of the hyolaryngeal complex  -AB    Increase Superior Movement of the Hyolaryngeal Complex supraglottic swallow;Mendelsohn;hard effortful swallow;effortful pitch glide (falsetto + pharyngeal squeeze);chin tuck against resistance (CTAR)  -AB    Plain Dealing/Accuracy (Pharyngeal Strengthening Goal 1, SLP) independently (over 90% accuracy)  -AB    Progress/Outcomes (Pharyngeal Strengthening Goal 1, SLP) new goal  -AB    Comment (Pharyngeal Strengthening Goal 1, SLP) Dysphagia therapy included verbal review of VFSS results 3.31, rationale for thickened liquids as pt asks “Why does it taste different?” re: liquids. Pt initially verbally amenable to utilize NTL with swallow exercises, expectorates NTL trial towards SLP and outwards onto blanket. Unable to explain rationale for doing so. Effortful swallows complete x10 without PO. Attempted supraglottic x5, CTAR with towel roll for resistance x10 repetitive, however pt with difficulty in following commands sequencing or following 1 step commands consistently. Poor attention to follow through of initiated step. Effortful pitch glides x5 with 5 second hold.  -AB       Patient will demonstrate functional language skills for return to discharge environment     Plain Dealing with minimal cues  -AB    Time frame by discharge  -AB    Progress/Outcomes new goal  -AB    Comments Speech/language  therapy completed. Pt oriented to SARAH, not month (states May), place (field of 2 incorrect), rationale for hospitalization (states here for kidney transplant). Naming 2/5, 40% independently, increases to 4/5, 80% field of 2 cues. Does not increase with semantic cues. Following 1 step directions: 80%, naming body parts: 100%. Automatics: CLARY 100%; SARAH 100%; counting to 14 100%. Speech imprecise, multiple requests for repetition to increase articulatory precision are successful in only 1/4 , 25% opps.  -AB       Comprehend Questions Goal 1 (SLP)    Improve Ability to Comprehend Questions Goal 1 (SLP) simple yes/no questions;complex yes/no questions;80%;with minimal cues (75-90%)  -AB    Time Frame (Comprehend Questions Goal 1, SLP) short term goal (STG)  -AB    Progress/Outcomes (Comprehend Questions Goal 1, SLP) goal ongoing  -AB       Follow Directions Goal 2 (SLP)    Improve Ability to Follow Directions Goal 1 (SLP) 1 step direction without objects  -AB    Time Frame (Follow Directions Goal 1, SLP) short term goal (STG)  -AB    Progress/Outcomes (Follow Directions Goal 1, SLP) goal ongoing  -AB    Comment (Follow Directions Goal 1, SLP) 80%  -AB       Word Retrieval Skills Goal 1 (SLP)    Improve Word Retrieval Skills By Goal 1 (SLP) confrontational naming task;80%;with minimal cues (75-90%)  -AB    Time Frame (Word Retrieval Goal 1, SLP) short term goal (STG)  -AB    Progress/Outcomes (Word Retrieval Goal 1, SLP) goal ongoing  -AB    Comment (Word Retrieval Goal 1, SLP) Naming 2/5, 40% independently, increases to 4/5, 80% field of 2 cues.; naming body parts: 100%  -AB       Word Retrieval Skills Goal 2 (SLP)    Improve Word Retrieval Skills By Goal 2 (SLP) completing automatic speech task, sing “Happy Birthday”;completing automatic speech task, Pledge of Allegiance;completing automatic speech task, months;completing automatic speech task, days of the week;completing automatic speech task, alphabet;completing  automatic speech task, counting;100%;independently (over 90% accuracy)  -AB    Time Frame (Word Retrieval Goal 2, SLP) short term goal (STG)  -AB    Progress/Outcomes (Word Retrieval Goal 2, SLP) goal ongoing  -AB    Comment (Word Retrieval Goal 2, SLP) Automatics: CLARY 100%; SARAH 100%; counting to 14 100%.  -AB       Articulation Goal 1 (SLP)    Improve Articulation Goal 1 (SLP) of specific sounds in phrases;by over-articulating at phrase level;80%;with minimal cues (75-90%)  -AB    Time Frame (Articulation Goal 1, SLP) short term goal (STG)  -AB    Progress/Outcomes (Articulation Goal 1, SLP) goal ongoing  -AB    Comment (Articulation Goal 1, SLP) Speech imprecise, multiple requests for repetition to increase articulatory precision are successful in only 1/4 , 25% opps  -AB          User Key  (r) = Recorded By, (t) = Taken By, (c) = Cosigned By    Initials Name Provider Type    Nasrin Forde, MS CCC-SLP Speech and Language Pathologist                        Time Calculation:      Time Calculation- SLP     Row Name 04/11/23 1455             Time Calculation- SLP    SLP Received On 04/11/23  -AB            User Key  (r) = Recorded By, (t) = Taken By, (c) = Cosigned By    Initials Name Provider Type    Nasrin Forde, MS CCC-SLP Speech and Language Pathologist                Therapy Charges for Today     Code Description Service Date Service Provider Modifiers Qty    95970773917 HC ST TREATMENT SPEECH 2 4/11/2023 Nasrin Simms MS CCC-SLP GN 1    [id number] HC ST TREATMENT SWALLOW 3 4/11/2023 Nasrin Simms MS CCC-SLP GN 1                     Nasrin Simms MS CCC-ROSALIO  4/11/2023

## 2023-04-11 NOTE — PROGRESS NOTES
[facility]  Continuing to follow for progress with therpay and also for dc plan in getting back to [place] after dc.     Jazzmine Salas RN  Acute Rehab Admission Nurse

## 2023-04-11 NOTE — PROGRESS NOTES
Name: Manuel Durant ADMIT: 3/12/2023   : 1950  PCP: Provider, No Known    MRN: [id number] LOS: 30 days   AGE/SEX: 73 y.o. male  ROOM: [facility]     Subjective   Subjective   Working with OT/PT. C/O rt shoulder pain which is a frequent complaint per therapists. Ate/slept well. No other complaints voiced    Review of Systems   Unable to perform ROS: Other        Objective   Objective   Vital Signs  Temp:  [97.1 °F (36.2 °C)-98.4 °F (36.9 °C)] 98.2 °F (36.8 °C)  Heart Rate:  [52-68] 68  Resp:  [16-20] 18  BP: (108-149)/(59-78) 108/59  SpO2:  [95 %-99 %] 96 %  on  Flow (L/min):  [1] 1;   Device (Oxygen Therapy): room air  Body mass index is 24.41 kg/m².  Physical Exam  Vitals and nursing note reviewed.   Constitutional:       General: He is not in acute distress.     Appearance: He is ill-appearing. He is not toxic-appearing.   HENT:      Head: Normocephalic.      Mouth/Throat:      Mouth: Mucous membranes are moist.   Eyes:      Conjunctiva/sclera: Conjunctivae normal.   Cardiovascular:      Rate and Rhythm: Normal rate and regular rhythm.   Pulmonary:      Effort: Pulmonary effort is normal. No respiratory distress.      Breath sounds: No wheezing or rales.   Abdominal:      General: Bowel sounds are normal.      Palpations: Abdomen is soft.   Musculoskeletal:      Cervical back: Neck supple.      Right lower leg: No edema.      Left lower leg: No edema.      Comments: Mild spasticity RUE   Skin:     General: Skin is warm and dry.   Neurological:      Mental Status: He is alert. Mental status is at baseline.      Motor: Weakness present.      Comments: Rt side weakness   Psychiatric:         Mood and Affect: Mood normal.         Behavior: Behavior normal.       Results Review     I reviewed the patient's new clinical results.  Results from last 7 days   Lab Units 23  0729 23  0538   WBC 10*3/mm3 6.74 7.23   HEMOGLOBIN g/dL 13.8 13.8   PLATELETS 10*3/mm3 168 261     Results from last 7 days    Lab Units 04/11/23  0729 04/05/23  0538   SODIUM mmol/L 139 141   POTASSIUM mmol/L 3.9 4.1   CHLORIDE mmol/L 107 109*   CO2 mmol/L 25.0 21.2*   BUN mg/dL 13 20   CREATININE mg/dL 0.64* 0.71*   GLUCOSE mg/dL 86 86   EGFR mL/min/1.73 101.2 98.1       Results from last 7 days   Lab Units 04/11/23  0729 04/05/23  0538   CALCIUM mg/dL 8.4* 8.9       No results found for: HGBA1C, POCGLU    No radiology results for the last day  I have personally reviewed all medications:  Scheduled Medications  amLODIPine, 10 mg, Oral, QAM AC  apixaban, 5 mg, Oral, Q12H  baclofen, 2.5 mg, Oral, Q8H  carvedilol, 6.25 mg, Oral, BID With Meals  hydrALAZINE, 10 mg, Oral, Q8H  lidocaine, 1 patch, Transdermal, Q24H  senna-docusate sodium, 2 tablet, Oral, BID  sodium chloride, 10 mL, Intravenous, Q12H  valsartan, 160 mg, Oral, QAM AC    Infusions   Diet  Diet: Regular/House Diet; No Mixed Consistencies, Feeding Assistance - Nursing; Texture: Soft to Chew (NDD 3); Soft to Chew: Chopped Meat; Fluid Consistency: Nectar Thick    I have personally reviewed:  [x]  Laboratory   [x]  Microbiology   [x]  Radiology   [x]  EKG/Telemetry  [x]  Cardiology/Vascular   [x]  Pathology    [x]  Records       Assessment/Plan     Active Hospital Problems    Diagnosis  POA   • **Intracranial hemorrhage [I62.9]  Yes   • Hemiplegia [G81.90]  Yes   • Right shoulder pain [M25.511]  Yes   • Severe Malnutrition (HCC) [E43]  Yes   • Acute DVT (deep venous thrombosis) [I82.409]  Yes   • HTN (hypertension) [I10]  Yes   • Hypertensive emergency [I16.1]  Yes      Resolved Hospital Problems    Diagnosis Date Resolved POA   • Hypokalemia [E87.6] 04/10/2023 Unknown       73 y.o. male admitted with Intracranial hemorrhage.    ICH/rt hemiplegia:  -Continue OT/PT/ST. F/U [facility] outpatient w/MRI brain. Baclofen for spasticity; increase to TID. Tolerating modified diet. AIR following for possible admission    Rt shoulder pain:  -Likely due to subluxation. Add lidoderm patch. Encourage  support of ALIZA, may benefit from sling     DVT:  -Cleared to start Eliquis per [facility]     HTN:  -BP acceptable. Continue amlodipine, valsartan, carvedilol, hydralazine    Labs stable     · Eliquis (home med) for DVT prophylaxis.  · Full code.  · Discussed with patient.  · Anticipate discharge to acute rehab once arrangements have been made.      SLICK Triana  [facility]  04/11/23  12:58 EDT

## 2023-04-11 NOTE — PLAN OF CARE
Goal Outcome Evaluation:  Plan of Care Reviewed With: patient           Outcome Evaluation: Pt seen for OT treatment this date. Pt agreeable to participate and made improvements this date. Performed bed mobility with min assist and cues. Performed transfer to bsc with mod assist x1, one for assist for safety. Transfered back to bed with mod assist x1. Pt performed grooming, oral care, with set up and visual demo, using left hand. Pt performed standing balance task with L ue reaching. Required less assist for balance this date. Pt cont to demo poor functional performance and mobility and will cont to benefit from acute OT. Recommending acute rehab.OT wore appropriate PPE during session and performed hand hygiene before/after session.

## 2023-04-11 NOTE — THERAPY TREATMENT NOTE
Patient Name: Manuel Durant  : 1950    MRN: [id number]                              Today's Date: 2023       Admit Date: 3/12/2023    Visit Dx:     ICD-10-CM ICD-9-CM   1. Intracranial hemorrhage  I62.9 432.9   2. Altered mental status, unspecified altered mental status type  R41.82 780.97     Patient Active Problem List   Diagnosis   • Intracranial hemorrhage   • Hypertensive emergency   • Acute DVT (deep venous thrombosis)   • HTN (hypertension)   • Severe Malnutrition (HCC)     Past Medical History:   Diagnosis Date   • Hypokalemia 3/17/2023     History reviewed. No pertinent surgical history.   General Information     Row Name 23 104          Physical Therapy Time and Intention    Document Type therapy note (daily note)  -CW     Mode of Treatment physical therapy;co-treatment  -CW     Row Name 23 104          General Information    Patient Profile Reviewed yes  -CW     Existing Precautions/Restrictions fall  -CW     Row Name 23 104          Cognition    Orientation Status (Cognition) oriented to;person  -CW     Row Name 23 104          Safety Issues, Functional Mobility    Safety Issues Affecting Function (Mobility) insight into deficits/self-awareness;awareness of need for assistance;judgment;problem-solving;safety precautions follow-through/compliance;safety precaution awareness;impulsivity  -CW     Impairments Affecting Function (Mobility) balance;coordination;endurance/activity tolerance;grasp;motor control;postural/trunk control;strength;range of motion (ROM);cognition;visual/perceptual  -CW           User Key  (r) = Recorded By, (t) = Taken By, (c) = Cosigned By    Initials Name Provider Type    CW Tamica Willoughby PT Physical Therapist               Mobility     Row Name 23 1047          Bed Mobility    Bed Mobility supine-sit  -CW     Supine-Sit [place] (Bed Mobility) minimum assist (75% patient effort);1 person assist;verbal cues;nonverbal cues  (demo/gesture)  -CW     Assistive Device (Bed Mobility) bed rails;head of bed elevated  -CW     Comment, (Bed Mobility) cues for supine>sit, left in chair at end of session  -CW     Row Name 04/11/23 1047          Bed-Chair Transfer    Bed-Chair [place] (Transfers) moderate assist (50% patient effort);verbal cues;nonverbal cues (demo/gesture)  -CW     Comment, (Bed-Chair Transfer) R knee blocked, stand pivot towards L side. Less assist to move RLE backwards today  -CW     [facility] Name 04/11/23 1047          Sit-Stand Transfer    Sit-Stand [place] (Transfers) moderate assist (50% patient effort);verbal cues;nonverbal cues (demo/gesture)  -CW     Assistive Device (Sit-Stand Transfers) other (see comments)  trialed quad cane on L  -CW     Comment, (Sit-Stand Transfer) x4 total STS, R knee  blocked  -CW           User Key  (r) = Recorded By, (t) = Taken By, (c) = Cosigned By    Initials Name Provider Type    Tamica Eddy PT Physical Therapist               Obj/Interventions     [facility] Name 04/11/23 1154          Balance    Balance Interventions standing;dynamic reaching  -     Comment, Balance dynamic reaching task with LUE to object in standing, R knee blocked. min to mod A x1 to maintain static standing. Cues to keep R LE planted on ground  -CW           User Key  (r) = Recorded By, (t) = Taken By, (c) = Cosigned By    Initials Name Provider Type    Tamica Eddy PT Physical Therapist               Goals/Plan    No documentation.                Clinical Impression     [facility] Name 04/11/23 1155          Pain    Pretreatment Pain Rating 0/10 - no pain  -CW     Pre/Posttreatment Pain Comment reports pain to R shoulder with ROM but does not rate  -CW     Pain Intervention(s) Repositioned;Rest  -CW     [facility] Name 04/11/23 1153          Plan of Care Review    Plan of Care Reviewed With patient  -CW     Outcome Evaluation Pt participated with PT this AM. Noted improvement with transfers today, able to  complete with mod A x1, less assist to block RLE required. He completed standing balance tasks with min to mod A for balance - more assist required as he fatigues. DC arrangements remain pending. Pt left in chair at end of session, notfied RN Luana that pt will require assist x2 to return to bed and is safest to transfer towards his L (strong) side, will require the chair to be repositioned for return to bed - RN verbalized understanding.  -CW     Row Name 04/11/23 1155          Vital Signs    O2 Delivery Pre Treatment room air  -CW     O2 Delivery Intra Treatment room air  -CW     Row Name 04/11/23 1155          Positioning and Restraints    Pre-Treatment Position in bed  -CW     Post Treatment Position chair  -CW     In Chair reclined;call light within reach;encouraged to call for assist;exit alarm on;notified nsg;legs elevated  -CW           User Key  (r) = Recorded By, (t) = Taken By, (c) = Cosigned By    Initials Name Provider Type    CW Tamica Willoughby, JOSE ELIAS Physical Therapist               Outcome Measures     Row Name 04/11/23 1157 04/11/23 0844       How much help from another person do you currently need...    Turning from your back to your side while in flat bed without using bedrails? 3  -CW 3  -ES    Moving from lying on back to sitting on the side of a flat bed without bedrails? 3  -CW 3  -ES    Moving to and from a bed to a chair (including a wheelchair)? 2  -CW 2  -ES    Standing up from a chair using your arms (e.g., wheelchair, bedside chair)? 3  -CW 2  -ES    Climbing 3-5 steps with a railing? 1  -CW 1  -ES    To walk in hospital room? 2  -CW 2  -ES    AM-PAC 6 Clicks Score (PT) 14  -CW 13  -ES    Highest level of mobility 4 --> Transferred to chair/commode  -CW 4 --> Transferred to chair/commode  -ES    Row Name 04/11/23 1157          Functional Assessment    Outcome Measure Options AM-PAC 6 Clicks Basic Mobility (PT)  -CW           User Key  (r) = Recorded By, (t) = Taken By, (c) = Cosigned By     Initials Name Provider Type    Tamica Eddy, JOSE ELIAS Physical Therapist    Delaney Gong RN Registered Nurse                             Physical Therapy Education     Title: PT OT SLP Therapies (In Progress)     Topic: Physical Therapy (In Progress)     Point: Mobility training (In Progress)     Learning Progress Summary           Patient Acceptance, E, NR by CW at 4/11/2023 1158    Acceptance, E, NR by CW at 4/10/2023 1414    Acceptance, E,TB, NR by CS at 4/8/2023 0928    Acceptance, E, NR by CW at 4/7/2023 1215    Acceptance, E, NR by CW at 4/6/2023 1335    Acceptance, E, NR by CW at 4/5/2023 1043    Acceptance, E, NR by CW at 4/4/2023 0906    Acceptance, E, NR by CW at 4/3/2023 1531    Acceptance, E,TB, VU,NR by CAMERON at 4/1/2023 1530    Acceptance, E, NR by CW at 3/31/2023 0925    Acceptance, E, NR by CW at 3/30/2023 1209    Acceptance, E, NR,NL by CW at 3/29/2023 1336    Acceptance, E, NR by ZB at 3/28/2023 1317    Acceptance, E, NR by CW at 3/27/2023 1401    Acceptance, E,D, NR,VU by JM at 3/24/2023 1631    Comment: seemed to comprehend commands this session    Acceptance, E,D, NR by JM at 3/23/2023 1655    Acceptance, E,D, NR by JM at 3/22/2023 1545    Acceptance, E,TB, VU,NR by CB at 3/21/2023 1544    Acceptance, E,TB,H, VU by MS at 3/16/2023 1801    Acceptance, E,D, DU,NR by MO at 3/16/2023 1451    Acceptance, E, DU,NR by MO at 3/15/2023 1200    Acceptance, E, NR by LM at 3/15/2023 0521    Acceptance, E,D, DU,NR by MO at 3/14/2023 1458   Family Acceptance, E,TB,H, VU by MS at 3/16/2023 1801                   Point: Home exercise program (In Progress)     Learning Progress Summary           Patient Acceptance, E,TB, NR by CS at 4/8/2023 0928    Acceptance, E, NR by CW at 4/7/2023 1215    Acceptance, E, NR by CW at 4/6/2023 1335    Acceptance, E,TB, VU,NR by CAMERON at 4/1/2023 1530    Acceptance, E, NR by CW at 3/31/2023 0925    Acceptance, E, NR by ZB at 3/28/2023 1317    Acceptance, E, NR by CW at  3/27/2023 1401    Acceptance, E,D, NR,VU by JM at 3/24/2023 1631    Comment: seemed to comprehend commands this session    Acceptance, E,D, NR by ZACH at 3/23/2023 1655    Acceptance, E,D, NR by JM at 3/22/2023 1545    Acceptance, E,TB, VU,NR by CB at 3/21/2023 1544    Acceptance, E,TB,H, VU by MS at 3/16/2023 1801    Acceptance, E,D, DU,NR by MO at 3/16/2023 1451    Acceptance, E, NR by LM at 3/15/2023 0521    Acceptance, E,D, DU,NR by MO at 3/14/2023 1458   Family Acceptance, E,TB,H, VU by MS at 3/16/2023 1801                   Point: Body mechanics (In Progress)     Learning Progress Summary           Patient Acceptance, E, NR by CW at 4/11/2023 1158    Acceptance, E, NR by CW at 4/10/2023 1414    Acceptance, E,TB, NR by CS at 4/8/2023 0928    Acceptance, E, NR by CW at 4/7/2023 1215    Acceptance, E, NR by CW at 4/4/2023 0906    Acceptance, E, NR by CW at 4/3/2023 1531    Acceptance, E,TB, VU,NR by CAMERON at 4/1/2023 1530    Acceptance, E,D, NR,VU by JM at 3/24/2023 1631    Comment: seemed to comprehend commands this session    Acceptance, E,D, NR by ZACH at 3/23/2023 1655    Acceptance, E,D, NR by JM at 3/22/2023 1545    Acceptance, E,TB, VU,NR by CB at 3/21/2023 1544    Acceptance, E,TB,H, VU by MS at 3/16/2023 1801    Acceptance, E,D, DU,NR by MO at 3/16/2023 1451    Acceptance, E, DU,NR by MO at 3/15/2023 1200    Acceptance, E, NR by LM at 3/15/2023 0521    Acceptance, E,D, DU,NR by MO at 3/14/2023 1458   Family Acceptance, E,TB,H, VU by MS at 3/16/2023 1801                   Point: Precautions (In Progress)     Learning Progress Summary           Patient Acceptance, E,TB, NR by CS at 4/8/2023 0928    Acceptance, E, NR by CW at 4/7/2023 1215    Acceptance, E, NR by CW at 4/4/2023 0906    Acceptance, E, NR by CW at 4/3/2023 1531    Acceptance, E,TB, VU,NR by CAMERON at 4/1/2023 1530    Acceptance, E,D, NR,VU by JM at 3/24/2023 1631    Comment: seemed to comprehend commands this session    Acceptance, E,D, NR by ZACH at  3/23/2023 1655    Acceptance, E,D, NR by JM at 3/22/2023 1545    Acceptance, E,TB, VU,NR by CB at 3/21/2023 1544    Acceptance, E,TB,H, VU by MS at 3/16/2023 1801    Acceptance, E,D, DU,NR by MO at 3/16/2023 1451    Acceptance, E, DU,NR by MO at 3/15/2023 1200    Acceptance, E, NR by LM at 3/15/2023 0521    Acceptance, E,D, DU,NR by MO at 3/14/2023 1458   Family Acceptance, E,TB,H, VU by MS at 3/16/2023 1801                               User Key     Initials Effective Dates Name Provider Type Discipline    JM 03/07/18 -  Tiffanie Grace, PTA Physical Therapist Assistant PT    CAMERON 05/19/21 -  Leeann Wiley, PT Physical Therapist PT    MS 06/16/21 -  Angel Luis Cr, RN Registered Nurse Nurse    CW 12/13/22 -  Tamica Willoughby, PT Physical Therapist PT    CB 10/22/21 -  Bety Benitez, PT Physical Therapist PT    CS 09/22/22 -  Adelia Montalvo, PT Physical Therapist PT    LM 10/13/22 -  Silke Grace, RN Registered Nurse Nurse    MO 01/27/23 -  Lacie Tanner, PT Student PT Student PT    ZB 03/10/23 -  Nestor Rae, PT Student PT Student PT              PT Recommendation and Plan     Plan of Care Reviewed With: patient  Outcome Evaluation: Pt participated with PT this AM. Noted improvement with transfers today, able to complete with mod A x1, less assist to block RLE required. He completed standing balance tasks with min to mod A for balance - more assist required as he fatigues. DC arrangements remain pending. Pt left in chair at end of session, notfied RN Luana that pt will require assist x2 to return to bed and is safest to transfer towards his L (strong) side, will require the chair to be repositioned for return to bed - RN verbalized understanding.     Time Calculation:    PT Charges     Row Name 04/11/23 1158             Time Calculation    Start Time 0916  -CW      Stop Time 0949  -CW      Time Calculation (min) 33 min  -CW            User Key  (r) = Recorded By, (t) = Taken By, (c) = Cosigned By     Initials Name Provider Type    CW Tamica Willoughby, PT Physical Therapist              Therapy Charges for Today     Code Description Service Date Service Provider Modifiers Qty    19559031936 HC PT NEUROMUSC RE EDUCATION EA 15 MIN 4/10/2023 Tamica Willoughby, PT GP 1    17827850723 HC PT THERAPEUTIC ACT EA 15 MIN 4/10/2023 Tamica Willoughby, PT GP 1    43122214842 HC PT THER SUPP EA 15 MIN 4/10/2023 Tamica Willoughby, PT GP 2    25180352582 HC PT NEUROMUSC RE EDUCATION EA 15 MIN 4/11/2023 Tamica Willoughby, PT GP 1    22218043247 HC PT THERAPEUTIC ACT EA 15 MIN 4/11/2023 Tamica Willoughyb, PT GP 1          PT G-Codes  Outcome Measure Options: AM-PAC 6 Clicks Basic Mobility (PT)  AM-PAC 6 Clicks Score (PT): 14  AM-PAC 6 Clicks Score (OT): 12  Modified [place] Scale: 4 - Moderately severe disability.  Unable to walk without assistance, and unable to attend to own bodily needs without assistance.  PT Discharge Summary  Anticipated Discharge Disposition (PT): inpatient rehabilitation facility, skilled nursing facility    Tamica Willoughby PT  4/11/2023

## 2023-04-11 NOTE — THERAPY TREATMENT NOTE
Patient Name: Manuel Durant  : 1950    MRN: [id number]                              Today's Date: 2023       Admit Date: 3/12/2023    Visit Dx:     ICD-10-CM ICD-9-CM   1. Intracranial hemorrhage  I62.9 432.9   2. Altered mental status, unspecified altered mental status type  R41.82 780.97     Patient Active Problem List   Diagnosis   • Intracranial hemorrhage   • Hypertensive emergency   • Acute DVT (deep venous thrombosis)   • HTN (hypertension)   • Severe Malnutrition (HCC)     Past Medical History:   Diagnosis Date   • Hypokalemia 3/17/2023     History reviewed. No pertinent surgical history.   General Information     Row Name 23 1212          OT Time and Intention    Document Type therapy note (daily note)  -KB     Mode of Treatment occupational therapy  -KB     Row Name 23 1212          General Information    Patient Profile Reviewed yes  -KB     Existing Precautions/Restrictions fall  -KB     Row Name 23 121          Cognition    Orientation Status (Cognition) oriented to;person  -KB     Row Name 23 121          Safety Issues, Functional Mobility    Impairments Affecting Function (Mobility) balance;coordination;endurance/activity tolerance;grasp;motor control;postural/trunk control;strength;range of motion (ROM);cognition;visual/perceptual  -KB     Cognitive Impairments, Mobility Safety/Performance attention;awareness, need for assistance;impulsivity;insight into deficits/self-awareness;judgment;problem-solving/reasoning;safety precaution follow-through;safety precaution awareness  -KB     Comment, Safety Issues/Impairments (Mobility) confused, difficulty communicating both expressive/receptive, non slip socks, gait belt  -KB           User Key  (r) = Recorded By, (t) = Taken By, (c) = Cosigned By    Initials Name Provider Type    KB Dayna Stephenson OT Occupational Therapist                 Mobility/ADL's     Row Name 23 1213          Bed Mobility    Bed  Mobility supine-sit  -     Supine-Sit [place] (Bed Mobility) minimum assist (75% patient effort);1 person assist;verbal cues;nonverbal cues (demo/gesture)  -     Bed Mobility, Safety Issues cognitive deficits limit understanding  -     Assistive Device (Bed Mobility) bed rails;head of bed elevated  -     Row Name 04/11/23 1213          Bed-Chair Transfer    Bed-Chair [place] (Transfers) moderate assist (50% patient effort);verbal cues;nonverbal cues (demo/gesture)  -     Row Name 04/11/23 1213          Sit-Stand Transfer    Sit-Stand [place] (Transfers) moderate assist (50% patient effort);verbal cues;nonverbal cues (demo/gesture)  -     Row Name 04/11/23 1213          Activities of Daily Living    BADL Assessment/Intervention upper body dressing;grooming  -     Row Name 04/11/23 1213          Grooming Assessment/Training    [place] Level (Grooming) oral care regimen;nonverbal cues (demo/gesture);minimum assist (75% patient effort)  using left hand  -[facility] Name 04/11/23 1213          Upper Body Dressing Assessment/Training    [place] Level (Upper Body Dressing) doff;don;moderate assist (50% patient effort)  difficulty threading right UE in gown, cues and hand over hand to doff  -           User Key  (r) = Recorded By, (t) = Taken By, (c) = Cosigned By    Initials Name Provider Type     Dayna Stephenson OT Occupational Therapist               Obj/Interventions     Row Name 04/11/23 1215          Shoulder (Therapeutic Exercise)    Shoulder AROM (Therapeutic Exercise) right;flexion;aBduction;15 repititions  -     Shoulder AAROM (Therapeutic Exercise) right;flexion;extension;aBduction;10 repetitions  -     Row Name 04/11/23 1215          Elbow/Forearm (Therapeutic Exercise)    Elbow/Forearm (Therapeutic Exercise) AROM (active range of motion);AAROM (active assistive range of motion)  -     Elbow/Forearm AROM (Therapeutic Exercise) right;flexion;extension;15  repititions  -     Elbow/Forearm AAROM (Therapeutic Exercise) right;bilateral;flexion;extension;10 repetitions  -     Row Name 04/11/23 1215          Hand (Therapeutic Exercise)    Hand PROM (Therapeutic Exercise) right;30 second hold  -     Row Name 04/11/23 1215          Motor Skills    Motor Skills motor control/coordination interventions  -     Muscle Tone right;hypertonia;flaccidity  -     Therapeutic Exercise shoulder;elbow/forearm;hand  -     Row Name 04/11/23 1215          Balance    Balance Assessment sitting static balance;sitting dynamic balance;standing static balance;standing dynamic balance  -     Static Sitting Balance contact guard  -     Dynamic Sitting Balance minimal assist  -KB     Static Standing Balance minimal assist  -     Dynamic Standing Balance moderate assist  -           User Key  (r) = Recorded By, (t) = Taken By, (c) = Cosigned By    Initials Name Provider Type    Dayna Randolph OT Occupational Therapist               Goals/Plan    No documentation.                Clinical Impression     Row Name 04/11/23 1218          Pain Assessment    Pretreatment Pain Rating 0/10 - no pain  -     Posttreatment Pain Rating 0/10 - no pain  -     Row Name 04/11/23 1218          Plan of Care Review    Plan of Care Reviewed With patient  -     Outcome Evaluation Pt seen for OT treatment this date. Pt agreeable to participate and made improvements this date. Performed bed mobility with min assist and cues. Performed transfer to bsc with mod assist x1, one for assist for safety. Transfered back to bed with mod assist x1. Pt performed grooming, oral care, with set up and visual demo, using left hand. Pt performed standing balance task with L ue reaching. Required less assist for balance this date. Pt cont to demo poor functional performance and mobility and will cont to benefit from acute OT. Recommending acute rehab.  -     Row Name 04/11/23 1218          Therapy  Assessment/Plan (OT)    Rehab Potential (OT) good, to achieve stated therapy goals  -     Criteria for Skilled Therapeutic Interventions Met (OT) yes;skilled treatment is necessary;meets criteria  -     Row Name 04/11/23 1218          Therapy Plan Review/Discharge Plan (OT)    Anticipated Discharge Disposition (OT) inpatient rehabilitation facility  -     Row Name 04/11/23 1218          Positioning and Restraints    Pre-Treatment Position in bed  -KB     Post Treatment Position chair  -KB     In Chair notified nsg;reclined;sitting;call light within reach;encouraged to call for assist;exit alarm on  -KB           User Key  (r) = Recorded By, (t) = Taken By, (c) = Cosigned By    Initials Name Provider Type    KB Dayna Stephenson, MARK Occupational Therapist               Outcome Measures     Row Name 04/11/23 1225          How much help from another is currently needed...    Putting on and taking off regular lower body clothing? 2  -KB     Bathing (including washing, rinsing, and drying) 2  -KB     Toileting (which includes using toilet bed pan or urinal) 2  -KB     Putting on and taking off regular upper body clothing 3  -KB     Taking care of personal grooming (such as brushing teeth) 3  -KB     Eating meals 3  -KB     AM-PAC 6 Clicks Score (OT) 15  -KB     Row Name 04/11/23 1157 04/11/23 0844       How much help from another person do you currently need...    Turning from your back to your side while in flat bed without using bedrails? 3  -CW 3  -ES    Moving from lying on back to sitting on the side of a flat bed without bedrails? 3  -CW 3  -ES    Moving to and from a bed to a chair (including a wheelchair)? 2  -CW 2  -ES    Standing up from a chair using your arms (e.g., wheelchair, bedside chair)? 3  -CW 2  -ES    Climbing 3-5 steps with a railing? 1  -CW 1  -ES    To walk in hospital room? 2  -CW 2  -ES    AM-PAC 6 Clicks Score (PT) 14  -CW 13  -ES    Highest level of mobility 4 --> Transferred to  chair/commode  -CW 4 --> Transferred to chair/commode  -ES    Row Name 04/11/23 1157          Functional Assessment    Outcome Measure Options AM-PAC 6 Clicks Basic Mobility (PT)  -CW           User Key  (r) = Recorded By, (t) = Taken By, (c) = Cosigned By    Initials Name Provider Type    CW Tamica Willoughby, PT Physical Therapist    Delaney Gong, RN Registered Nurse    Dayna Randolph OT Occupational Therapist                Occupational Therapy Education     Title: PT OT SLP Therapies (In Progress)     Topic: Occupational Therapy (Done)     Point: ADL training (Done)     Description:   Instruct learner(s) on proper safety adaptation and remediation techniques during self care or transfers.   Instruct in proper use of assistive devices.              Learning Progress Summary           Patient Acceptance, E,TB,H, VU by MS at 3/16/2023 1801    Acceptance, E, NR by LM at 3/15/2023 0521   Family Acceptance, E,TB,H, VU by MS at 3/16/2023 1801                   Point: Home exercise program (Done)     Description:   Instruct learner(s) on appropriate technique for monitoring, assisting and/or progressing therapeutic exercises/activities.              Learning Progress Summary           Patient Acceptance, E,TB,H, VU by MS at 3/16/2023 1801    Acceptance, E, NR by LM at 3/15/2023 0521   Family Acceptance, E,TB,H, VU by MS at 3/16/2023 1801                   Point: Precautions (Done)     Description:   Instruct learner(s) on prescribed precautions during self-care and functional transfers.              Learning Progress Summary           Patient Acceptance, E,TB,H, VU by MS at 3/16/2023 1801    Acceptance, E, NR by LM at 3/15/2023 0521   Family Acceptance, E,TB,H, VU by MS at 3/16/2023 1801                   Point: Body mechanics (Done)     Description:   Instruct learner(s) on proper positioning and spine alignment during self-care, functional mobility activities and/or exercises.              Learning  Progress Summary           Patient Acceptance, E,TB,H, VU by MS at 3/16/2023 1801    Acceptance, E, NR by LM at 3/15/2023 0521   Family Acceptance, E,TB,H, VU by MS at 3/16/2023 1801                               User Key     Initials Effective Dates Name Provider Type Discipline    MS 06/16/21 -  Angel Luis Cr, RN Registered Nurse Nurse     10/13/22 -  Silke Grace, RN Registered Nurse Nurse              OT Recommendation and Plan  Therapy Frequency (OT): 5 times/wk  Plan of Care Review  Plan of Care Reviewed With: patient  Outcome Evaluation: Pt seen for OT treatment this date. Pt agreeable to participate and made improvements this date. Performed bed mobility with min assist and cues. Performed transfer to bsc with mod assist x1, one for assist for safety. Transfered back to bed with mod assist x1. Pt performed grooming, oral care, with set up and visual demo, using left hand. Pt performed standing balance task with L ue reaching. Required less assist for balance this date. Pt cont to demo poor functional performance and mobility and will cont to benefit from acute OT. Recommending acute rehab.     Time Calculation:    Time Calculation- OT     Row Name 04/11/23 1226             Time Calculation- OT    OT Start Time 0916  -KB      OT Stop Time 0949  -KB      OT Time Calculation (min) 33 min  -KB      OT Received On 04/11/23  -KB      OT - Next Appointment 04/12/23  -KB      OT Goal Re-Cert Due Date 04/12/23  -KB         Timed Charges    40032 - OT Therapeutic Activity Minutes 33  -KB         Total Minutes    Timed Charges Total Minutes 33  -KB       Total Minutes 33  -KB            User Key  (r) = Recorded By, (t) = Taken By, (c) = Cosigned By    Initials Name Provider Type    KB Dayna Stephenson OT Occupational Therapist              Therapy Charges for Today     Code Description Service Date Service Provider Modifiers Qty    42297289708 HC OT THERAPEUTIC ACT EA 15 MIN 4/11/2023 Dayna Stephenson OT GO 2                Dayna Stephenson, OT  4/11/2023

## 2023-04-12 PROCEDURE — 97530 THERAPEUTIC ACTIVITIES: CPT

## 2023-04-12 PROCEDURE — 97112 NEUROMUSCULAR REEDUCATION: CPT

## 2023-04-12 PROCEDURE — 97116 GAIT TRAINING THERAPY: CPT

## 2023-04-12 RX ADMIN — HYDRALAZINE HYDROCHLORIDE 10 MG: 10 TABLET, FILM COATED ORAL at 15:39

## 2023-04-12 RX ADMIN — LIDOCAINE 1 PATCH: 700 PATCH TOPICAL at 10:03

## 2023-04-12 RX ADMIN — HYDRALAZINE HYDROCHLORIDE 10 MG: 10 TABLET, FILM COATED ORAL at 05:22

## 2023-04-12 RX ADMIN — APIXABAN 5 MG: 5 TABLET, FILM COATED ORAL at 10:03

## 2023-04-12 RX ADMIN — DOCUSATE SODIUM 50MG AND SENNOSIDES 8.6MG 2 TABLET: 8.6; 5 TABLET, FILM COATED ORAL at 10:03

## 2023-04-12 RX ADMIN — CARVEDILOL 6.25 MG: 12.5 TABLET, FILM COATED ORAL at 10:03

## 2023-04-12 RX ADMIN — BACLOFEN 2.5 MG: 10 TABLET ORAL at 05:21

## 2023-04-12 RX ADMIN — VALSARTAN 160 MG: 160 TABLET, FILM COATED ORAL at 10:03

## 2023-04-12 RX ADMIN — HYDRALAZINE HYDROCHLORIDE 10 MG: 10 TABLET, FILM COATED ORAL at 21:00

## 2023-04-12 RX ADMIN — Medication 10 ML: at 21:00

## 2023-04-12 RX ADMIN — BACLOFEN 2.5 MG: 10 TABLET ORAL at 15:39

## 2023-04-12 RX ADMIN — APIXABAN 5 MG: 5 TABLET, FILM COATED ORAL at 21:00

## 2023-04-12 RX ADMIN — AMLODIPINE BESYLATE 10 MG: 10 TABLET ORAL at 10:03

## 2023-04-12 RX ADMIN — BACLOFEN 2.5 MG: 10 TABLET ORAL at 21:00

## 2023-04-12 RX ADMIN — Medication 10 ML: at 10:03

## 2023-04-12 NOTE — PLAN OF CARE
Goal Outcome Evaluation:  Plan of Care Reviewed With: patient        Progress: improving  Outcome Evaluation: vss, no complaints of pain. pt rested well during. pt remains on room air. pt continues to be incont of bowel and bladder. labs in am. will continue to monitor.

## 2023-04-12 NOTE — PLAN OF CARE
Goal Outcome Evaluation:  Plan of Care Reviewed With: patient           Outcome Evaluation: Pt seen for OT this date with PT to maximize functional mobility. Pt making steady gains with movement, transfers, but cont to demo impulsivity and difficulty following directions. Plan to cont acute OT to address above. Will benefit from acute rehab OT wore appropriate PPE during session and performed hand hygiene before/after session.

## 2023-04-12 NOTE — PLAN OF CARE
Problem: Fall Injury Risk  Goal: Absence of Fall and Fall-Related Injury  Outcome: Ongoing, Progressing     Problem: Skin Injury Risk Increased  Goal: Skin Health and Integrity  Outcome: Ongoing, Progressing     Problem: Adult Inpatient Plan of Care  Goal: Plan of Care Review  Outcome: Ongoing, Progressing  Flowsheets (Taken 4/12/2023 1009)  Progress: no change  Plan of Care Reviewed With: patient  Goal: Patient-Specific Goal (Individualized)  Outcome: Ongoing, Progressing  Goal: Absence of Hospital-Acquired Illness or Injury  Outcome: Ongoing, Progressing  Goal: Optimal Comfort and Wellbeing  Outcome: Ongoing, Progressing  Goal: Readiness for Transition of Care  Outcome: Ongoing, Progressing     Problem: Adjustment to Illness (Stroke, Ischemic/Transient Ischemic Attack)  Goal: Optimal Coping  Outcome: Ongoing, Progressing     Problem: Bowel Elimination Impaired (Stroke, Ischemic/Transient Ischemic Attack)  Goal: Effective Bowel Elimination  Outcome: Ongoing, Progressing     Problem: Cerebral Tissue Perfusion (Stroke, Ischemic/Transient Ischemic Attack)  Goal: Optimal Cerebral Tissue Perfusion  Outcome: Ongoing, Progressing     Problem: Cognitive Impairment (Stroke, Ischemic/Transient Ischemic Attack)  Goal: Optimal Cognitive Function  Outcome: Ongoing, Progressing     Problem: Communication Impairment (Stroke, Ischemic/Transient Ischemic Attack)  Goal: Improved Communication Skills  Outcome: Ongoing, Progressing     Problem: Functional Ability Impaired (Stroke, Ischemic/Transient Ischemic Attack)  Goal: Optimal Functional Ability  Outcome: Ongoing, Progressing     Problem: Respiratory Compromise (Stroke, Ischemic/Transient Ischemic Attack)  Goal: Effective Oxygenation and Ventilation  Outcome: Ongoing, Progressing     Problem: Sensorimotor Impairment (Stroke, Ischemic/Transient Ischemic Attack)  Goal: Improved Sensorimotor Function  Outcome: Ongoing, Progressing     Problem: Swallowing Impairment (Stroke,  Ischemic/Transient Ischemic Attack)  Goal: Optimal Eating and Swallowing without Aspiration  Outcome: Ongoing, Progressing     Problem: Urinary Elimination Impaired (Stroke, Ischemic/Transient Ischemic Attack)  Goal: Effective Urinary Elimination  Outcome: Ongoing, Progressing     Problem: Adjustment to Illness (Stroke, Hemorrhagic)  Goal: Optimal Coping  Outcome: Ongoing, Progressing     Problem: Bowel Elimination Impaired (Stroke, Hemorrhagic)  Goal: Effective Bowel Elimination  Outcome: Ongoing, Progressing     Problem: Cerebral Tissue Perfusion (Stroke, Hemorrhagic)  Goal: Optimal Cerebral Tissue Perfusion  Outcome: Ongoing, Progressing     Problem: Cognitive Impairment (Stroke, Hemorrhagic)  Goal: Optimal Cognitive Function  Outcome: Ongoing, Progressing     Problem: Communication Impairment (Stroke, Hemorrhagic)  Goal: Effective Communication Skills  Outcome: Ongoing, Progressing     Problem: Functional Ability Impaired (Stroke, Hemorrhagic)  Goal: Optimal Functional Ability  Outcome: Ongoing, Progressing     Problem: Pain (Stroke, Hemorrhagic)  Goal: Acceptable Pain Control  Outcome: Ongoing, Progressing     Problem: Respiratory Compromise (Stroke, Hemorrhagic)  Goal: Effective Oxygenation and Ventilation  Outcome: Ongoing, Progressing     Problem: Sensorimotor Impairment (Stroke, Hemorrhagic)  Goal: Improved Sensorimotor Function  Outcome: Ongoing, Progressing     Problem: Swallowing Impairment (Stroke, Hemorrhagic)  Goal: Optimal Eating and Swallowing Without Aspiration  Outcome: Ongoing, Progressing     Problem: Urinary Elimination Impaired (Stroke, Hemorrhagic)  Goal: Effective Urinary Elimination  Outcome: Ongoing, Progressing   Goal Outcome Evaluation:  Plan of Care Reviewed With: patient        Progress: no change  Outcome Evaluation: No changes from nursing standpoint.

## 2023-04-12 NOTE — PROGRESS NOTES
Name: Manuel Durant ADMIT: 3/12/2023   : 1950  PCP: Provider, No Known    MRN: [id number] LOS: 31 days   AGE/SEX: 73 y.o. male  ROOM: [facility]     Subjective   Subjective   Up in chair. No new issues. Improving w/therapy    Review of Systems   Unable to perform ROS: Other        Objective   Objective   Vital Signs  Temp:  [97.7 °F (36.5 °C)-98.4 °F (36.9 °C)] 97.7 °F (36.5 °C)  Heart Rate:  [71-74] 72  Resp:  [18] 18  BP: (105-146)/(71-89) 146/78  SpO2:  [97 %-100 %] 99 %  on   ;   Device (Oxygen Therapy): room air  Body mass index is 24.45 kg/m².  Physical Exam  Vitals and nursing note reviewed.   Constitutional:       General: He is not in acute distress.     Appearance: He is ill-appearing. He is not toxic-appearing.   HENT:      Head: Normocephalic.      Mouth/Throat:      Mouth: Mucous membranes are moist.   Eyes:      Conjunctiva/sclera: Conjunctivae normal.   Cardiovascular:      Rate and Rhythm: Normal rate and regular rhythm.   Pulmonary:      Effort: Pulmonary effort is normal. No respiratory distress.      Breath sounds: No wheezing or rales.   Abdominal:      General: Bowel sounds are normal.      Palpations: Abdomen is soft.   Musculoskeletal:      Cervical back: Neck supple.      Right lower leg: No edema.      Left lower leg: No edema.   Skin:     General: Skin is warm and dry.   Neurological:      Mental Status: He is alert.      Motor: Weakness present.   Psychiatric:         Mood and Affect: Mood normal.         Behavior: Behavior normal.       Results Review     I reviewed the patient's new clinical results.  Results from last 7 days   Lab Units 23  0729   WBC 10*3/mm3 6.74   HEMOGLOBIN g/dL 13.8   PLATELETS 10*3/mm3 168     Results from last 7 days   Lab Units 23  0729   SODIUM mmol/L 139   POTASSIUM mmol/L 3.9   CHLORIDE mmol/L 107   CO2 mmol/L 25.0   BUN mg/dL 13   CREATININE mg/dL 0.64*   GLUCOSE mg/dL 86   EGFR mL/min/1.73 101.2       Results from last 7 days   Lab  Units 04/11/23  0729   CALCIUM mg/dL 8.4*       No results found for: HGBA1C, POCGLU    No radiology results for the last day  I have personally reviewed all medications:  Scheduled Medications  amLODIPine, 10 mg, Oral, QAM AC  apixaban, 5 mg, Oral, Q12H  baclofen, 2.5 mg, Oral, Q8H  carvedilol, 6.25 mg, Oral, BID With Meals  hydrALAZINE, 10 mg, Oral, Q8H  lidocaine, 1 patch, Transdermal, Q24H  senna-docusate sodium, 2 tablet, Oral, BID  sodium chloride, 10 mL, Intravenous, Q12H  valsartan, 160 mg, Oral, QAM AC    Infusions   Diet  Diet: Regular/House Diet; No Mixed Consistencies, Feeding Assistance - Nursing; Texture: Soft to Chew (NDD 3); Soft to Chew: Chopped Meat; Fluid Consistency: Nectar Thick    I have personally reviewed:  [x]  Laboratory   [x]  Microbiology   [x]  Radiology   [x]  EKG/Telemetry  [x]  Cardiology/Vascular   [x]  Pathology    [x]  Records       Assessment/Plan     Active Hospital Problems    Diagnosis  POA   • **Intracranial hemorrhage [I62.9]  Yes   • Hemiplegia [G81.90]  Yes   • Right shoulder pain [M25.511]  Yes   • Severe Malnutrition (HCC) [E43]  Yes   • Acute DVT (deep venous thrombosis) [I82.409]  Yes   • HTN (hypertension) [I10]  Yes   • Hypertensive emergency [I16.1]  Yes      Resolved Hospital Problems    Diagnosis Date Resolved POA   • Hypokalemia [E87.6] 04/10/2023 Unknown       73 y.o. male admitted with Intracranial hemorrhage.    ICH/rt hemiplegia:  -Continue OT/PT/ST. F/U [facility] outpatient w/MRI brain. Baclofen for spasticity; increased to TID, seems to be tolerating. Tolerating modified diet. AIR following for possible admission vs SNF vs back to [place] w/family. See update CCP note 4/12     Rt shoulder pain:  -Likely due to subluxation. Continue lidoderm patch. Encourage support of RUE, may benefit from sling, will order     DVT:  -Cleared to start Eliquis per [facility]     HTN:  -BP acceptable. Continue amlodipine, valsartan, carvedilol, hydralazine     · Eliquis (home med) for DVT  prophylaxis.  · Full code.  · Discussed with patient and CCP.  · Anticipate discharge TBD       SLICK Triana  [facility]  04/12/23  12:00 EDT

## 2023-04-12 NOTE — PLAN OF CARE
Goal Outcome Evaluation:  Plan of Care Reviewed With: patient           Outcome Evaluation: Pt participated with PT this AM. Able to progress gait training today with chair follow for safety. Continues to have the most difficulty transferring to R side and is impulsive at times. DC plans ongoing.

## 2023-04-12 NOTE — PROGRESS NOTES
[facility] Acute Rehab  Discussed with Dr. Magaña. Currently, pt is too low level for Acute Rehab. He also does not have an acceptable dc plan- would need a point person for teaching and to assume care at dc. Currently, there is no plan in place.   Would recommend subacute rehab at this point.   Called Faye Moy to inform.     Jazzmine Salas RN  Acute Rehab Admission Nurse

## 2023-04-12 NOTE — PROGRESS NOTES
[facility] Acute Rehab  Per , pt does not have any Acute Rehab benefits with his insurance plan.     Will follow for progress with therapy and will also need a dc plan from family for anticipated 24/7 asst needed after dc    Jazzmine Salas RN  Acute Rehab Admission Nurse

## 2023-04-12 NOTE — THERAPY PROGRESS REPORT/RE-CERT
Patient Name: Manuel Durant  : 1950    MRN: [id number]                              Today's Date: 2023       Admit Date: 3/12/2023    Visit Dx:     ICD-10-CM ICD-9-CM   1. Intracranial hemorrhage  I62.9 432.9   2. Altered mental status, unspecified altered mental status type  R41.82 780.97     Patient Active Problem List   Diagnosis   • Intracranial hemorrhage   • Hypertensive emergency   • Acute DVT (deep venous thrombosis)   • HTN (hypertension)   • Severe Malnutrition (HCC)   • Hemiplegia   • Right shoulder pain     Past Medical History:   Diagnosis Date   • Hypokalemia 3/17/2023     History reviewed. No pertinent surgical history.   General Information     Row Name 23 1153          OT Time and Intention    Document Type therapy note (daily note);progress note/recertification  -     Mode of Treatment occupational therapy;co-treatment  -     Row Name 23 115          General Information    Patient Profile Reviewed yes  -KB     Row Name 23 1153          Cognition    Orientation Status (Cognition) oriented to;person  -KB     Row Name 23 1155          Safety Issues, Functional Mobility    Impairments Affecting Function (Mobility) balance;coordination;endurance/activity tolerance;grasp;motor control;postural/trunk control;strength;range of motion (ROM);cognition;visual/perceptual  -KB     Cognitive Impairments, Mobility Safety/Performance attention;awareness, need for assistance;impulsivity;insight into deficits/self-awareness;judgment;problem-solving/reasoning  -KB           User Key  (r) = Recorded By, (t) = Taken By, (c) = Cosigned By    Initials Name Provider Type    Dayna Randolph OT Occupational Therapist                 Mobility/ADL's     Row Name 23 1154          Bed Mobility    Bed Mobility supine-sit  -KB     Supine-Sit [place] (Bed Mobility) minimum assist (75% patient effort);1 person assist;verbal cues;nonverbal cues (demo/gesture)  -KB     Bed  Mobility, Safety Issues cognitive deficits limit understanding  -     Assistive Device (Bed Mobility) bed rails;head of bed elevated  -     Row Name 04/12/23 1154          Transfers    Transfers toilet transfer  -     Row Name 04/12/23 1154          Bed-Chair Transfer    Bed-Chair [place] (Transfers) moderate assist (50% patient effort);verbal cues;nonverbal cues (demo/gesture);maximum assist (25% patient effort)  -     Row Name 04/12/23 1154          Toilet Transfer    Type (Toilet Transfer) stand pivot/stand step  -     [place] Level (Toilet Transfer) moderate assist (50% patient effort);maximum assist (25% patient effort);2 person assist;1 person assist  -     Assistive Device (Toilet Transfer) commode, 3-in-1  -KB     Comment, (Toilet Transfer) min x1, mod x1 for bsc to left, max x2 to right  -     Row Name 04/12/23 1154          Functional Mobility    Functional Mobility- Comment functional mobility max a x2  -KB           User Key  (r) = Recorded By, (t) = Taken By, (c) = Cosigned By    Initials Name Provider Type     Dayna Stephenson OT Occupational Therapist               Obj/Interventions     Row Name 04/12/23 1202          Shoulder (Therapeutic Exercise)    Shoulder AAROM (Therapeutic Exercise) right;flexion;extension  -     Shoulder PROM (Therapeutic Exercise) right;flexion;extension;aBduction;5 repetitions  -[facility] Name 04/12/23 1202          Elbow/Forearm (Therapeutic Exercise)    Elbow/Forearm PROM (Therapeutic Exercise) right;flexion;extension;5 repetitions  -[facility] Name 04/12/23 1202          Wrist (Therapeutic Exercise)    Wrist (Therapeutic Exercise) PROM (passive range of motion)  -     Wrist PROM (Therapeutic Exercise) right;flexion;extension;10 second hold  -     Row Name 04/12/23 1202          Hand (Therapeutic Exercise)    Hand (Therapeutic Exercise) PROM (passive range of motion)  -     Hand PROM (Therapeutic Exercise) right;finger flexion;finger  extension  -KB     Row Name 04/12/23 1202          Motor Skills    Functional Endurance fair-  -KB     Muscle Tone right;flaccidity  -KB     Therapeutic Exercise shoulder;elbow/forearm;hand;wrist  -KB     Row Name 04/12/23 1202          Balance    Balance Assessment sitting static balance;sitting dynamic balance;standing static balance;standing dynamic balance  -KB     Static Sitting Balance contact guard  -KB     Dynamic Sitting Balance minimal assist  -KB     Static Standing Balance minimal assist  -KB     Dynamic Standing Balance moderate assist  -KB     Row Name 04/12/23 1202          Neuromuscular Re-education    Equipment Used (Neuromuscular Re-education) rom to R UE in all planes. noted stiffness in wrist and hand  -KB           User Key  (r) = Recorded By, (t) = Taken By, (c) = Cosigned By    Initials Name Provider Type    KB Dayna Stephenson, OT Occupational Therapist               Goals/Plan     Row Name 04/12/23 1209          Bed Mobility Goal 1 (OT)    Activity/Assistive Device (Bed Mobility Goal 1, OT) bed mobility activities, all  -KB     [place] Level/Cues Needed (Bed Mobility Goal 1, OT) minimum assist (75% or more patient effort)  -KB     Time Frame (Bed Mobility Goal 1, OT) 2 weeks  -KB     Strategies/Barriers (Bed Mobility Goal 1, OT) limited by cognition, ability to follow commands  -KB     Progress/Outcomes (Bed Mobility Goal 1, OT) continuing progress toward goal  -KB     Row Name 04/12/23 1209          Transfer Goal 1 (OT)    Activity/Assistive Device (Transfer Goal 1, OT) transfers, all  -KB     [place] Level/Cues Needed (Transfer Goal 1, OT) minimum assist (75% or more patient effort)  -KB     Time Frame (Transfer Goal 1, OT) 2 weeks  -KB     Progress/Outcome (Transfer Goal 1, OT) continuing progress toward goal  -KB     Row Name 04/12/23 1209          Dressing Goal 1 (OT)    Activity/Device (Dressing Goal 1, OT) dressing skills, all  -KB     [place]/Cues Needed (Dressing Goal  1, OT) minimum assist (75% or more patient effort)  -KB     Time Frame (Dressing Goal 1, OT) short term goal (STG);2 weeks  -KB     Progress/Outcome (Dressing Goal 1, OT) continuing progress toward goal  -KB     Row Name 04/12/23 1209          Toileting Goal 1 (OT)    Activity/Device (Toileting Goal 1, OT) toileting skills, all  -KB     [place] Level/Cues Needed (Toileting Goal 1, OT) minimum assist (75% or more patient effort)  -KB     Time Frame (Toileting Goal 1, OT) short term goal (STG);2 weeks  -KB     Progress/Outcome (Toileting Goal 1, OT) continuing progress toward goal  -KB     Row Name 04/12/23 1209          Grooming Goal 1 (OT)    Activity/Device (Grooming Goal 1, OT) grooming skills, all  -KB     [place] (Grooming Goal 1, OT) supervision required  -KB     Time Frame (Grooming Goal 1, OT) short term goal (STG);2 weeks  -KB     Progress/Outcome (Grooming Goal 1, OT) continuing progress toward goal  -KB     Row Name 04/12/23 1209          Self-Feeding Goal 1 (OT)    Activity/Device (Self-Feeding Goal 1, OT) self-feeding skills, all  -KB     [place] Level/Cues Needed (Self-Feeding Goal 1, OT) supervision required  -KB     Time Frame (Self-Feeding Goal 1, OT) short term goal (STG);2 weeks  -KB     Progress/Outcomes (Self-Feeding Goal 1, OT) continuing progress toward goal  -KB     Row Name 04/12/23 1209          Therapy Assessment/Plan (OT)    Planned Therapy Interventions (OT) BADL retraining;neuromuscular control/coordination retraining;functional balance retraining;occupation/activity based interventions;passive ROM/stretching;patient/caregiver education/training;transfer/mobility retraining;ROM/therapeutic exercise  -KB           User Key  (r) = Recorded By, (t) = Taken By, (c) = Cosigned By    Initials Name Provider Type    Dayna Randolph OT Occupational Therapist               Clinical Impression     Row Name 04/12/23 1207          Pain Assessment    Pretreatment Pain Rating 0/10 -  no pain  -KB     Posttreatment Pain Rating 0/10 - no pain  -KB     Row Name 04/12/23 1207          Plan of Care Review    Plan of Care Reviewed With patient  -KB     Outcome Evaluation Pt seen for OT this date with PT to maximize functional mobility. Pt making steady gains with movement, transfers, but cont to demo impulsivity and difficulty following directions. Plan to cont acute OT to address above. Will benefit from acute rehab  -KB     Row Name 04/12/23 1207          Therapy Assessment/Plan (OT)    Rehab Potential (OT) good, to achieve stated therapy goals  -KB     Criteria for Skilled Therapeutic Interventions Met (OT) yes;meets criteria;skilled treatment is necessary  -KB     Therapy Frequency (OT) 5 times/wk  -KB     Row Name 04/12/23 1207          Therapy Plan Review/Discharge Plan (OT)    Anticipated Discharge Disposition (OT) inpatient rehabilitation facility  -     Row Name 04/12/23 1207          Positioning and Restraints    Pre-Treatment Position in bed  -KB     Post Treatment Position chair  -KB     In Chair reclined;notified nsg;sitting;call light within reach;encouraged to call for assist;exit alarm on  -KB           User Key  (r) = Recorded By, (t) = Taken By, (c) = Cosigned By    Initials Name Provider Type    KB Dayna Stephenson, OT Occupational Therapist               Outcome Measures     Row Name 04/12/23 1212          How much help from another is currently needed...    Putting on and taking off regular lower body clothing? 2  -KB     Bathing (including washing, rinsing, and drying) 2  -KB     Toileting (which includes using toilet bed pan or urinal) 2  -KB     Putting on and taking off regular upper body clothing 3  -KB     Taking care of personal grooming (such as brushing teeth) 3  -KB     Eating meals 3  -KB     AM-PAC 6 Clicks Score (OT) 15  -KB     Row Name 04/12/23 1201 04/12/23 1010       How much help from another person do you currently need...    Turning from your back to your side  while in flat bed without using bedrails? 3  -CW 3  -KP    Moving from lying on back to sitting on the side of a flat bed without bedrails? 3  -CW 3  -KP    Moving to and from a bed to a chair (including a wheelchair)? 2  -CW 2  -KP    Standing up from a chair using your arms (e.g., wheelchair, bedside chair)? 3  -CW 3  -KP    Climbing 3-5 steps with a railing? 1  -CW 1  -KP    To walk in hospital room? 2  -CW 2  -KP    AM-PAC 6 Clicks Score (PT) 14  -CW 14  -KP    Highest level of mobility 4 --> Transferred to chair/commode  -CW 4 --> Transferred to chair/commode  -KP    Row Name 04/12/23 1212 04/12/23 1201       Functional Assessment    Outcome Measure Options AM-PAC 6 Clicks Daily Activity (OT)  -KB AM-PAC 6 Clicks Basic Mobility (PT)  -CW          User Key  (r) = Recorded By, (t) = Taken By, (c) = Cosigned By    Initials Name Provider Type    Tamica Eddy, PT Physical Therapist    Johanyn Guaman, RN Registered Nurse    Dayna Randolph, OT Occupational Therapist                Occupational Therapy Education     Title: PT OT SLP Therapies (In Progress)     Topic: Occupational Therapy (Done)     Point: ADL training (Done)     Description:   Instruct learner(s) on proper safety adaptation and remediation techniques during self care or transfers.   Instruct in proper use of assistive devices.              Learning Progress Summary           Patient Acceptance, E,TB,H, VU by MS at 3/16/2023 1801    Acceptance, E, NR by LM at 3/15/2023 0521   Family Acceptance, E,TB,H, VU by MS at 3/16/2023 1801                   Point: Home exercise program (Done)     Description:   Instruct learner(s) on appropriate technique for monitoring, assisting and/or progressing therapeutic exercises/activities.              Learning Progress Summary           Patient Acceptance, E,TB,H, VU by MS at 3/16/2023 1801    Acceptance, E, NR by LM at 3/15/2023 0521   Family Acceptance, E,TB,H, VU by MS at 3/16/2023 1801                    Point: Precautions (Done)     Description:   Instruct learner(s) on prescribed precautions during self-care and functional transfers.              Learning Progress Summary           Patient Acceptance, E,TB,H, VU by MS at 3/16/2023 1801    Acceptance, E, NR by LM at 3/15/2023 0521   Family Acceptance, E,TB,H, VU by MS at 3/16/2023 1801                   Point: Body mechanics (Done)     Description:   Instruct learner(s) on proper positioning and spine alignment during self-care, functional mobility activities and/or exercises.              Learning Progress Summary           Patient Acceptance, E,TB,H, VU by MS at 3/16/2023 1801    Acceptance, E, NR by LM at 3/15/2023 0521   Family Acceptance, E,TB,H, VU by MS at 3/16/2023 1801                               User Key     Initials Effective Dates Name Provider Type Discipline    MS 06/16/21 -  Angel Luis Cr, RN Registered Nurse Nurse     10/13/22 -  Silke Grace, RN Registered Nurse Nurse              OT Recommendation and Plan  Planned Therapy Interventions (OT): BADL retraining, neuromuscular control/coordination retraining, functional balance retraining, occupation/activity based interventions, passive ROM/stretching, patient/caregiver education/training, transfer/mobility retraining, ROM/therapeutic exercise  Therapy Frequency (OT): 5 times/wk  Plan of Care Review  Plan of Care Reviewed With: patient  Outcome Evaluation: Pt seen for OT this date with PT to maximize functional mobility. Pt making steady gains with movement, transfers, but cont to demo impulsivity and difficulty following directions. Plan to cont acute OT to address above. Will benefit from acute rehab     Time Calculation:    Time Calculation- OT     Row Name 04/12/23 1213             Time Calculation- OT    OT Start Time 1008  -KB      OT Stop Time 1035  -KB      OT Time Calculation (min) 27 min  -KB      Total Timed Code Minutes- OT 27 minute(s)  -KB      OT Received On 04/12/23  -KB       OT - Next Appointment 04/13/23  -KB      OT Goal Re-Cert Due Date 04/26/23  -KB         Timed Charges    10838 - OT Therapeutic Activity Minutes 27  -KB         Total Minutes    Timed Charges Total Minutes 27  -KB       Total Minutes 27  -KB            User Key  (r) = Recorded By, (t) = Taken By, (c) = Cosigned By    Initials Name Provider Type    Dayna Randolph OT Occupational Therapist              Therapy Charges for Today     Code Description Service Date Service Provider Modifiers Qty    26482401591 HC OT THERAPEUTIC ACT EA 15 MIN 4/11/2023 Dayna Stephenson OT GO 2    [id number] HC OT THERAPEUTIC ACT EA 15 MIN 4/12/2023 Dayna Stephenson OT GO 2               Dayna Stephenson OT  4/12/2023

## 2023-04-12 NOTE — THERAPY TREATMENT NOTE
Patient Name: Manuel Durant  : 1950    MRN: [id number]                              Today's Date: 2023       Admit Date: 3/12/2023    Visit Dx:     ICD-10-CM ICD-9-CM   1. Intracranial hemorrhage  I62.9 432.9   2. Altered mental status, unspecified altered mental status type  R41.82 780.97     Patient Active Problem List   Diagnosis   • Intracranial hemorrhage   • Hypertensive emergency   • Acute DVT (deep venous thrombosis)   • HTN (hypertension)   • Severe Malnutrition (HCC)   • Hemiplegia   • Right shoulder pain     Past Medical History:   Diagnosis Date   • Hypokalemia 3/17/2023     History reviewed. No pertinent surgical history.   General Information     Row Name 23 1130          Physical Therapy Time and Intention    Document Type therapy note (daily note)  -CW     Mode of Treatment physical therapy;individual therapy  -CW     Row Name 23 1130          General Information    Patient Profile Reviewed yes  -CW     Existing Precautions/Restrictions fall  -CW     Row Name 23 1130          Cognition    Orientation Status (Cognition) oriented to;person  -CW     Row Name 23 1130          Safety Issues, Functional Mobility    Safety Issues Affecting Function (Mobility) insight into deficits/self-awareness;awareness of need for assistance;judgment;problem-solving;impulsivity;safety precautions follow-through/compliance  -CW     Impairments Affecting Function (Mobility) balance;coordination;endurance/activity tolerance;grasp;motor control;postural/trunk control;strength;range of motion (ROM);cognition;visual/perceptual  -CW           User Key  (r) = Recorded By, (t) = Taken By, (c) = Cosigned By    Initials Name Provider Type    CW Tamica Willoughby PT Physical Therapist               Mobility     Row Name 23 1130          Bed Mobility    Bed Mobility supine-sit  -CW     Supine-Sit [place] (Bed Mobility) minimum assist (75% patient effort);1 person assist;verbal  cues;nonverbal cues (demo/gesture)  -CW     Assistive Device (Bed Mobility) bed rails;head of bed elevated  -CW     Row Name 04/12/23 1130          Bed-Chair Transfer    Bed-Chair [place] (Transfers) moderate assist (50% patient effort);verbal cues;nonverbal cues (demo/gesture);maximum assist (25% patient effort)  -CW     Comment, (Bed-Chair Transfer) x3 transfers total, max A to transfer towards R side. Does best with transfer towards L. R LE inverts at times  -CW     Row Name 04/12/23 1130          Gait/Stairs (Locomotion)    [place] Level (Gait) maximum assist (25% patient effort);minimum assist (75% patient effort);2 person assist;verbal cues  -CW     Distance in Feet (Gait) 12' x3 trials in hallway with L rail  -CW     Deviations/Abnormal Patterns (Gait) sandhya decreased;gait speed decreased;stride length decreased;ataxic  -CW     Bilateral Gait Deviations weight shift ability decreased  -CW     Comment, (Gait/Stairs) DF assist wrap in palce, 3rd person following with chair. Improved ability to progress R LE today, assist to clear toes provided by PT.  -CW           User Key  (r) = Recorded By, (t) = Taken By, (c) = Cosigned By    Initials Name Provider Type    Tamica Eddy PT Physical Therapist               Obj/Interventions     Row Name 04/12/23 1144          Balance    Dynamic Standing Balance minimal assist;moderate assist  -CW     Comment, Balance min/mod A x1 to maintain balance with gait, 2nd person assisting with R LE progression, 3rd person with chair follow  -CW           User Key  (r) = Recorded By, (t) = Taken By, (c) = Cosigned By    Initials Name Provider Type    Tamica Eddy PT Physical Therapist               Goals/Plan    No documentation.                Clinical Impression     Row Name 04/12/23 1158          Pain    Pretreatment Pain Rating 0/10 - no pain  -CW     Posttreatment Pain Rating 0/10 - no pain  -CW     Pain Intervention(s) Repositioned;Rest  -CW     Row  Name 04/12/23 1158          Plan of Care Review    Plan of Care Reviewed With patient  -CW     Outcome Evaluation Pt participated with PT this AM. Able to progress gait training today with chair follow for safety. Continues to have the most difficulty transferring to R side and is impulsive at times. DC plans ongoing.  -CW     Row Name 04/12/23 1158          Vital Signs    O2 Delivery Pre Treatment room air  -CW     Row Name 04/12/23 1158          Positioning and Restraints    Pre-Treatment Position in bed  -CW     Post Treatment Position chair  -CW     In Chair reclined;call light within reach;encouraged to call for assist;exit alarm on;notified nsg;legs elevated  -CW           User Key  (r) = Recorded By, (t) = Taken By, (c) = Cosigned By    Initials Name Provider Type    Tamica Eddy PT Physical Therapist               Outcome Measures     Row Name 04/12/23 1201 04/12/23 1010       How much help from another person do you currently need...    Turning from your back to your side while in flat bed without using bedrails? 3  -CW 3  -KP    Moving from lying on back to sitting on the side of a flat bed without bedrails? 3  -CW 3  -KP    Moving to and from a bed to a chair (including a wheelchair)? 2  -CW 2  -KP    Standing up from a chair using your arms (e.g., wheelchair, bedside chair)? 3  -CW 3  -KP    Climbing 3-5 steps with a railing? 1  -CW 1  -KP    To walk in hospital room? 2  -CW 2  -KP    AM-PAC 6 Clicks Score (PT) 14  -CW 14  -KP    Highest level of mobility 4 --> Transferred to chair/commode  -CW 4 --> Transferred to chair/commode  -KP    Row Name 04/12/23 1201          Functional Assessment    Outcome Measure Options AM-PAC 6 Clicks Basic Mobility (PT)  -CW           User Key  (r) = Recorded By, (t) = Taken By, (c) = Cosigned By    Initials Name Provider Type    Tamica Eddy PT Physical Therapist    Johanny Guaman, RN Registered Nurse                             Physical Therapy  Education     Title: PT OT SLP Therapies (In Progress)     Topic: Physical Therapy (In Progress)     Point: Mobility training (In Progress)     Learning Progress Summary           Patient Acceptance, E, NR by CW at 4/12/2023 1201    Acceptance, E, NR by CW at 4/11/2023 1158    Acceptance, E, NR by CW at 4/10/2023 1414    Acceptance, E,TB, NR by CS at 4/8/2023 0928    Acceptance, E, NR by CW at 4/7/2023 1215    Acceptance, E, NR by CW at 4/6/2023 1335    Acceptance, E, NR by CW at 4/5/2023 1043    Acceptance, E, NR by CW at 4/4/2023 0906    Acceptance, E, NR by CW at 4/3/2023 1531    Acceptance, E,TB, VU,NR by CAMERON at 4/1/2023 1530    Acceptance, E, NR by CW at 3/31/2023 0925    Acceptance, E, NR by CW at 3/30/2023 1209    Acceptance, E, NR,NL by CW at 3/29/2023 1336    Acceptance, E, NR by ZB at 3/28/2023 1317    Acceptance, E, NR by CW at 3/27/2023 1401    Acceptance, E,D, NR,VU by JM at 3/24/2023 1631    Comment: seemed to comprehend commands this session    Acceptance, E,D, NR by JM at 3/23/2023 1655    Acceptance, E,D, NR by JM at 3/22/2023 1545    Acceptance, E,TB, VU,NR by CB at 3/21/2023 1544    Acceptance, E,TB,H, VU by MS at 3/16/2023 1801    Acceptance, E,D, DU,NR by MO at 3/16/2023 1451    Acceptance, E, DU,NR by MO at 3/15/2023 1200    Acceptance, E, NR by LM at 3/15/2023 0521    Acceptance, E,D, DU,NR by MO at 3/14/2023 1458   Family Acceptance, E,TB,H, VU by MS at 3/16/2023 1801                   Point: Home exercise program (In Progress)     Learning Progress Summary           Patient Acceptance, E,TB, NR by CS at 4/8/2023 0928    Acceptance, E, NR by CW at 4/7/2023 1215    Acceptance, E, NR by CW at 4/6/2023 1335    Acceptance, E,TB, VU,NR by CAMERON at 4/1/2023 1530    Acceptance, E, NR by CW at 3/31/2023 0925    Acceptance, E, NR by CHARLY at 3/28/2023 1317    Acceptance, E, NR by CW at 3/27/2023 1401    Acceptance, E,D, NR,VU by ZACH at 3/24/2023 1631    Comment: seemed to comprehend commands this session     Acceptance, E,D, NR by JM at 3/23/2023 1655    Acceptance, E,D, NR by JM at 3/22/2023 1545    Acceptance, E,TB, VU,NR by CB at 3/21/2023 1544    Acceptance, E,TB,H, VU by MS at 3/16/2023 1801    Acceptance, E,D, DU,NR by MO at 3/16/2023 1451    Acceptance, E, NR by LM at 3/15/2023 0521    Acceptance, E,D, DU,NR by MO at 3/14/2023 1458   Family Acceptance, E,TB,H, VU by MS at 3/16/2023 1801                   Point: Body mechanics (In Progress)     Learning Progress Summary           Patient Acceptance, E, NR by CW at 4/11/2023 1158    Acceptance, E, NR by CW at 4/10/2023 1414    Acceptance, E,TB, NR by CS at 4/8/2023 0928    Acceptance, E, NR by CW at 4/7/2023 1215    Acceptance, E, NR by CW at 4/4/2023 0906    Acceptance, E, NR by CW at 4/3/2023 1531    Acceptance, E,TB, VU,NR by CAMERON at 4/1/2023 1530    Acceptance, E,D, NR,VU by JM at 3/24/2023 1631    Comment: seemed to comprehend commands this session    Acceptance, E,D, NR by ZACH at 3/23/2023 1655    Acceptance, E,D, NR by ZACH at 3/22/2023 1545    Acceptance, E,TB, VU,NR by CB at 3/21/2023 1544    Acceptance, E,TB,H, VU by MS at 3/16/2023 1801    Acceptance, E,D, DU,NR by MO at 3/16/2023 1451    Acceptance, E, DU,NR by MO at 3/15/2023 1200    Acceptance, E, NR by LM at 3/15/2023 0521    Acceptance, E,D, DU,NR by MO at 3/14/2023 1458   Family Acceptance, E,TB,H, VU by MS at 3/16/2023 1801                   Point: Precautions (In Progress)     Learning Progress Summary           Patient Acceptance, E,TB, NR by CS at 4/8/2023 0928    Acceptance, E, NR by CW at 4/7/2023 1215    Acceptance, E, NR by CW at 4/4/2023 0906    Acceptance, E, NR by CW at 4/3/2023 1531    Acceptance, E,TB, VU,NR by CAMERON at 4/1/2023 1530    Acceptance, E,D, NR,VU by JM at 3/24/2023 1631    Comment: seemed to comprehend commands this session    Acceptance, E,D, NR by ZACH at 3/23/2023 1655    Acceptance, E,D, NR by ZACH at 3/22/2023 1545    Acceptance, E,TB, VU,NR by CB at 3/21/2023 1544    Acceptance,  E,TB,H, VU by MS at 3/16/2023 1801    Acceptance, E,D, DU,NR by MO at 3/16/2023 1451    Acceptance, E, DU,NR by MO at 3/15/2023 1200    Acceptance, E, NR by LM at 3/15/2023 0521    Acceptance, E,D, DU,NR by MO at 3/14/2023 1458   Family Acceptance, E,TB,H, VU by MS at 3/16/2023 1801                               User Key     Initials Effective Dates Name Provider Type Discipline    JM 03/07/18 -  Tiffanie Grace, PTA Physical Therapist Assistant PT    CAMERON 05/19/21 -  Leeann Wiley, PT Physical Therapist PT    MS 06/16/21 -  Angel Luis Cr, RN Registered Nurse Nurse    CW 12/13/22 -  Tamica Willoughby, PT Physical Therapist PT    CB 10/22/21 -  Bety Benitez, PT Physical Therapist PT    CS 09/22/22 -  Adelia Montalvo, PT Physical Therapist PT    LM 10/13/22 -  Silke Grace, RN Registered Nurse Nurse    MO 01/27/23 -  Lacie Tanner, PT Student PT Student PT    ZB 03/10/23 -  Nestor Rae, PT Student PT Student PT              PT Recommendation and Plan     Plan of Care Reviewed With: patient  Outcome Evaluation: Pt participated with PT this AM. Able to progress gait training today with chair follow for safety. Continues to have the most difficulty transferring to R side and is impulsive at times. DC plans ongoing.     Time Calculation:    PT Charges     Row Name 04/12/23 1128             Time Calculation    Start Time 1009  -CW      Stop Time 1035  -CW      Time Calculation (min) 26 min  -CW      PT Received On 04/12/23  -CW      PT - Next Appointment 04/13/23  -CW      PT Goal Re-Cert Due Date 04/26/23  -CW            User Key  (r) = Recorded By, (t) = Taken By, (c) = Cosigned By    Initials Name Provider Type    CW Tamica Willoughby, PT Physical Therapist              Therapy Charges for Today     Code Description Service Date Service Provider Modifiers Qty    83262504714 HC PT NEUROMUSC RE EDUCATION EA 15 MIN 4/11/2023 Tamica Willoughby, PT GP 1    90876914845 HC PT THERAPEUTIC ACT EA 15 MIN 4/11/2023  Tamica Willoughby, PT GP 1    70316830823 HC PT THER SUPP EA 15 MIN 4/12/2023 Tamica Willoughby, PT GP 1    66678540147 HC GAIT TRAINING EA 15 MIN 4/12/2023 Tamica Willoughby, PT GP 1    43057799140 HC PT NEUROMUSC RE EDUCATION EA 15 MIN 4/12/2023 Tamica Willoughby, PT GP 1          PT G-Codes  Outcome Measure Options: AM-PAC 6 Clicks Basic Mobility (PT)  AM-PAC 6 Clicks Score (PT): 14  AM-PAC 6 Clicks Score (OT): 15  Modified Newcomb Scale: 4 - Moderately severe disability.  Unable to walk without assistance, and unable to attend to own bodily needs without assistance.  PT Discharge Summary  Anticipated Discharge Disposition (PT): inpatient rehabilitation facility, skilled nursing facility    Tamica Willoughby, JOSE ELIAS  4/12/2023

## 2023-04-12 NOTE — CASE MANAGEMENT/SOCIAL WORK
Continued Stay Note  [facility]     Patient Name: Manuel Durant  MRN: [id number]  Today's Date: 4/12/2023    Admit Date: 3/12/2023    Plan: Home to [place] vs SNF if Medicaid can be secured   Discharge Plan     Row Name 04/12/23 1606       Plan    Plan Home to [place] vs SNF if Medicaid can be secured    Plan Comments Spoke again with [facility] for [facility].  At this time Dr. Magaña declines to admit patient to acute rehab as he is not walking and also there is not a clear plan for his care at the time of his discharge from [facility].  Will continue to work toward securing post-acute care appropriate for pt's needs.  Faye ARMSTRONG               Discharge Codes    No documentation.               Expected Discharge Date and Time     Expected Discharge Date Expected Discharge Time    Apr 14, 2023             Faye Moy RN

## 2023-04-12 NOTE — CASE MANAGEMENT/SOCIAL WORK
Continued Stay Note  [facility]     Patient Name: Manuel Durant  MRN: [id number]  Today's Date: 2023    Admit Date: 3/12/2023    Plan: Acute Rehab vs home to [place] vs SNF   Discharge Plan     Row Name 23 1006       Plan    Plan Acute Rehab vs home to [place] vs SNF    Plan Comments Long conversation with [facility] for [facility].  Requesting details about what is needed for pt to qualify for admission to [facility].  Pt's limited benefit plan insurance will pay $100/day for SNF but [facility] is unable to find a SNF who will accept pt with that amount of coverage.  His family has agreed to take care of him in [place] but he will need to be able to tolerate a long flight either to [place] and then on to [place] or to [place] and then a drive to [place].  Pt's granddaughter Natalie in [place] has not agreed to care for him but has agreed to facilitate his airplane ride.  Now that a [place] passport, an international 's license, a Social Security Card and an  green card have been found, the assumption is that he would be allowed to travel by commercial flight.  Presumably he will be able to cross the border with this documentation.  As has been previously stated, he should be eligible for Medicare and Social Security.  Family is currently not willing/able to be here to assist pt with this application process.  [facility] looking into possibilities.  [facility] has provided [employer] ( contractor) with the documents needed to apply for ongoing Medicaid but the decision may take up to a month to be made.  Pt's son/wife in [place] will not permit communication with a nephew in [place].  [facility] continues to monitor progress and await either patient's cognitive abilities to improve so that decisions can be directed by himself, or improvement in his mobility to allow for commercial transport assuming cooperation by his family.  If Medicaid is granted, will search for an accepting SNF locally.  Faye ARMSTRONG               Discharge Codes     No documentation.               Expected Discharge Date and Time     Expected Discharge Date Expected Discharge Time    Apr 14, 2023             Faye Moy RN

## 2023-04-13 ENCOUNTER — APPOINTMENT (OUTPATIENT)
Dept: GENERAL RADIOLOGY | Facility: HOSPITAL | Age: 73
DRG: 64 | End: 2023-04-13
Payer: MEDICAID

## 2023-04-13 PROCEDURE — 97110 THERAPEUTIC EXERCISES: CPT

## 2023-04-13 PROCEDURE — 73030 X-RAY EXAM OF SHOULDER: CPT

## 2023-04-13 PROCEDURE — 97530 THERAPEUTIC ACTIVITIES: CPT

## 2023-04-13 RX ADMIN — HYDRALAZINE HYDROCHLORIDE 10 MG: 10 TABLET, FILM COATED ORAL at 21:00

## 2023-04-13 RX ADMIN — BACLOFEN 2.5 MG: 10 TABLET ORAL at 21:00

## 2023-04-13 RX ADMIN — VALSARTAN 160 MG: 160 TABLET, FILM COATED ORAL at 09:33

## 2023-04-13 RX ADMIN — DOCUSATE SODIUM 50MG AND SENNOSIDES 8.6MG 2 TABLET: 8.6; 5 TABLET, FILM COATED ORAL at 20:55

## 2023-04-13 RX ADMIN — AMLODIPINE BESYLATE 10 MG: 10 TABLET ORAL at 09:33

## 2023-04-13 RX ADMIN — HYDRALAZINE HYDROCHLORIDE 10 MG: 10 TABLET, FILM COATED ORAL at 05:23

## 2023-04-13 RX ADMIN — HYDRALAZINE HYDROCHLORIDE 10 MG: 10 TABLET, FILM COATED ORAL at 14:53

## 2023-04-13 RX ADMIN — LIDOCAINE 1 PATCH: 700 PATCH TOPICAL at 09:33

## 2023-04-13 RX ADMIN — BACLOFEN 2.5 MG: 10 TABLET ORAL at 05:23

## 2023-04-13 RX ADMIN — BACLOFEN 2.5 MG: 10 TABLET ORAL at 14:53

## 2023-04-13 RX ADMIN — ACETAMINOPHEN 650 MG: 325 TABLET, FILM COATED ORAL at 05:23

## 2023-04-13 RX ADMIN — APIXABAN 5 MG: 5 TABLET, FILM COATED ORAL at 20:56

## 2023-04-13 RX ADMIN — Medication 10 ML: at 20:55

## 2023-04-13 RX ADMIN — APIXABAN 5 MG: 5 TABLET, FILM COATED ORAL at 09:33

## 2023-04-13 RX ADMIN — CARVEDILOL 6.25 MG: 12.5 TABLET, FILM COATED ORAL at 09:33

## 2023-04-13 NOTE — PLAN OF CARE
Goal Outcome Evaluation:  Plan of Care Reviewed With: patient           Outcome Evaluation: Pt participated with PT session this AM. Focus on seated exercises for strengthening. Pt does require assist to reach end range with knee extension, hip flexion. PT notes some active R ankle dorsiflexion today and active movement of R fingers. Sling donned to RUE at arrival - for comfort due to RUE weakness, okay to remove in bed if arm is supported. Will continue to follow and progress as able.

## 2023-04-13 NOTE — PLAN OF CARE
Goal Outcome Evaluation:  Plan of Care Reviewed With: patient        Progress: improving  Outcome Evaluation: vss, no complaints of pain. pt had large bm during shift. partial bath given to pt. pt rested well during shift. labs in am. will continue to monitor.

## 2023-04-13 NOTE — THERAPY TREATMENT NOTE
Patient Name: Manuel Durant  : 1950    MRN: [id number]                              Today's Date: 2023       Admit Date: 3/12/2023    Visit Dx:     ICD-10-CM ICD-9-CM   1. Intracranial hemorrhage  I62.9 432.9   2. Altered mental status, unspecified altered mental status type  R41.82 780.97     Patient Active Problem List   Diagnosis   • Intracranial hemorrhage   • Hypertensive emergency   • Acute DVT (deep venous thrombosis)   • HTN (hypertension)   • Severe Malnutrition (HCC)   • Hemiplegia   • Right shoulder pain     Past Medical History:   Diagnosis Date   • Hypokalemia 3/17/2023     History reviewed. No pertinent surgical history.   General Information     Row Name 23 1334          Physical Therapy Time and Intention    Document Type therapy note (daily note)  -CW     Mode of Treatment physical therapy;individual therapy  -CW     Row Name 23 1333          General Information    Patient Profile Reviewed yes  -CW     Existing Precautions/Restrictions fall  -CW     Row Name 23 1334          Cognition    Orientation Status (Cognition) oriented to;person  -CW     Row Name 23 1332          Safety Issues, Functional Mobility    Impairments Affecting Function (Mobility) balance;coordination;endurance/activity tolerance;grasp;motor control;postural/trunk control;strength;range of motion (ROM);cognition;visual/perceptual  -CW           User Key  (r) = Recorded By, (t) = Taken By, (c) = Cosigned By    Initials Name Provider Type    CW Tamica Willoughby PT Physical Therapist               Mobility     Row Name 23 1335          Bed Mobility    Bed Mobility supine-sit;sit-supine  -CW     Supine-Sit [place] (Bed Mobility) minimum assist (75% patient effort);1 person assist;verbal cues;nonverbal cues (demo/gesture)  -CW     Sit-Supine [place] (Bed Mobility) minimum assist (75% patient effort);verbal cues;nonverbal cues (demo/gesture)  -CW     Assistive Device (Bed  Mobility) head of bed elevated;bed rails  -CW     Comment, (Bed Mobility) x2 trials, pt utilizes momentum vs assisting RLE with LLE  -CW           User Key  (r) = Recorded By, (t) = Taken By, (c) = Cosigned By    Initials Name Provider Type    Tamica Eddy PT Physical Therapist               Obj/Interventions     Row Name 04/13/23 1336          Motor Skills    Therapeutic Exercise other (see comments)  Seated exercises: R laq, marches, ankle pumps 2x15 reps - some assist from end range required. Noted some R digit movement today.  -CW     Row Name 04/13/23 1336          Balance    Static Sitting Balance standby assist  -CW     Dynamic Sitting Balance minimal assist  -CW     Position, Sitting Balance sitting edge of bed  -CW     Comment, Balance posterior lean  with exercises  -CW           User Key  (r) = Recorded By, (t) = Taken By, (c) = Cosigned By    Initials Name Provider Type    Tamica Eddy PT Physical Therapist               Goals/Plan    No documentation.                Clinical Impression     Row Name 04/13/23 1338          Pain    Pretreatment Pain Rating 0/10 - no pain  -CW     Posttreatment Pain Rating 0/10 - no pain  -CW     Pre/Posttreatment Pain Comment sling to RUE at arrival  -CW     Row Name 04/13/23 1338          Plan of Care Review    Plan of Care Reviewed With patient  -CW     Outcome Evaluation Pt participated with PT session this AM. Focus on seated exercises for strengthening. Pt does require assist to reach end range with knee extension, hip flexion. PT notes some active R ankle dorsiflexion today and active movement of R fingers. Sling donned to RUE at arrival - for comfort due to RUE weakness, okay to remove in bed if arm is supported. Will continue to follow and progress as able.  -CW     Row Name 04/13/23 1338          Vital Signs    O2 Delivery Pre Treatment room air  -CW     Row Name 04/13/23 1338          Positioning and Restraints    Pre-Treatment Position in bed   -CW     Post Treatment Position bed  -CW     In Bed notified nsg;call light within reach;encouraged to call for assist;exit alarm on;fowlers;side rails up x3  -CW           User Key  (r) = Recorded By, (t) = Taken By, (c) = Cosigned By    Initials Name Provider Type    Tamica Eddy, JOSE ELIAS Physical Therapist               Outcome Measures     Row Name 04/13/23 1341 04/13/23 0818       How much help from another person do you currently need...    Turning from your back to your side while in flat bed without using bedrails? 3  -CW 3  -MS    Moving from lying on back to sitting on the side of a flat bed without bedrails? 3  -CW 3  -MS    Moving to and from a bed to a chair (including a wheelchair)? 2  -CW 2  -MS    Standing up from a chair using your arms (e.g., wheelchair, bedside chair)? 3  -CW 3  -MS    Climbing 3-5 steps with a railing? 1  -CW 1  -MS    To walk in hospital room? 2  -CW 2  -MS    AM-PAC 6 Clicks Score (PT) 14  -CW 14  -MS    Highest level of mobility 4 --> Transferred to chair/commode  -CW 4 --> Transferred to chair/commode  -MS    Row Name 04/13/23 1341          Functional Assessment    Outcome Measure Options AM-PAC 6 Clicks Basic Mobility (PT)  -CW           User Key  (r) = Recorded By, (t) = Taken By, (c) = Cosigned By    Initials Name Provider Type    Angel Luis Ross, RN Registered Nurse    Tamica Eddy, JOSE ELIAS Physical Therapist                             Physical Therapy Education     Title: PT OT SLP Therapies (In Progress)     Topic: Physical Therapy (In Progress)     Point: Mobility training (In Progress)     Learning Progress Summary           Patient Acceptance, E, NR by CW at 4/13/2023 1341    Acceptance, E, NR by CW at 4/12/2023 1201    Acceptance, E, NR by CW at 4/11/2023 1158    Acceptance, E, NR by CW at 4/10/2023 1414    Acceptance, E,TB, NR by CS at 4/8/2023 0928    Acceptance, E, NR by CW at 4/7/2023 1215    Acceptance, E, NR by CW at 4/6/2023 1335    Acceptance, E, NR  by CW at 4/5/2023 1043    Acceptance, E, NR by CW at 4/4/2023 0906    Acceptance, E, NR by CW at 4/3/2023 1531    Acceptance, E,TB, VU,NR by CAMERON at 4/1/2023 1530    Acceptance, E, NR by CW at 3/31/2023 0925    Acceptance, E, NR by CW at 3/30/2023 1209    Acceptance, E, NR,NL by CW at 3/29/2023 1336    Acceptance, E, NR by ZB at 3/28/2023 1317    Acceptance, E, NR by CW at 3/27/2023 1401    Acceptance, E,D, NR,VU by JM at 3/24/2023 1631    Comment: seemed to comprehend commands this session    Acceptance, E,D, NR by JM at 3/23/2023 1655    Acceptance, E,D, NR by JM at 3/22/2023 1545    Acceptance, E,TB, VU,NR by CB at 3/21/2023 1544    Acceptance, E,TB,H, VU by MS at 3/16/2023 1801    Acceptance, E,D, DU,NR by MO at 3/16/2023 1451    Acceptance, E, DU,NR by MO at 3/15/2023 1200    Acceptance, E, NR by LM at 3/15/2023 0521    Acceptance, E,D, DU,NR by MO at 3/14/2023 1458   Family Acceptance, E,TB,H, VU by MS at 3/16/2023 1801                   Point: Home exercise program (In Progress)     Learning Progress Summary           Patient Acceptance, E, NR by CW at 4/13/2023 1341    Acceptance, E,TB, NR by CS at 4/8/2023 0928    Acceptance, E, NR by CW at 4/7/2023 1215    Acceptance, E, NR by CW at 4/6/2023 1335    Acceptance, E,TB, VU,NR by CAMERON at 4/1/2023 1530    Acceptance, E, NR by CW at 3/31/2023 0925    Acceptance, E, NR by ZB at 3/28/2023 1317    Acceptance, E, NR by CW at 3/27/2023 1401    Acceptance, E,D, NR,VU by JM at 3/24/2023 1631    Comment: seemed to comprehend commands this session    Acceptance, E,D, NR by JM at 3/23/2023 1655    Acceptance, E,D, NR by JM at 3/22/2023 1545    Acceptance, E,TB, VU,NR by CB at 3/21/2023 1544    Acceptance, E,TB,H, VU by MS at 3/16/2023 1801    Acceptance, E,D, DU,NR by MO at 3/16/2023 1451    Acceptance, E, NR by LM at 3/15/2023 0521    Acceptance, E,D, DU,NR by MO at 3/14/2023 1458   Family Acceptance, E,TB,H, VU by MS at 3/16/2023 1801                   Point: Body mechanics  (In Progress)     Learning Progress Summary           Patient Acceptance, E, NR by CW at 4/13/2023 1341    Acceptance, E, NR by CW at 4/11/2023 1158    Acceptance, E, NR by CW at 4/10/2023 1414    Acceptance, E,TB, NR by CS at 4/8/2023 0928    Acceptance, E, NR by CW at 4/7/2023 1215    Acceptance, E, NR by CW at 4/4/2023 0906    Acceptance, E, NR by CW at 4/3/2023 1531    Acceptance, E,TB, VU,NR by CAMERON at 4/1/2023 1530    Acceptance, E,D, NR,VU by JM at 3/24/2023 1631    Comment: seemed to comprehend commands this session    Acceptance, E,D, NR by ZACH at 3/23/2023 1655    Acceptance, E,D, NR by JM at 3/22/2023 1545    Acceptance, E,TB, VU,NR by CB at 3/21/2023 1544    Acceptance, E,TB,H, VU by MS at 3/16/2023 1801    Acceptance, E,D, DU,NR by MO at 3/16/2023 1451    Acceptance, E, DU,NR by MO at 3/15/2023 1200    Acceptance, E, NR by LM at 3/15/2023 0521    Acceptance, E,D, DU,NR by MO at 3/14/2023 1458   Family Acceptance, E,TB,H, VU by MS at 3/16/2023 1801                   Point: Precautions (In Progress)     Learning Progress Summary           Patient Acceptance, E, NR by CW at 4/13/2023 1341    Acceptance, E,TB, NR by CS at 4/8/2023 0928    Acceptance, E, NR by CW at 4/7/2023 1215    Acceptance, E, NR by CW at 4/4/2023 0906    Acceptance, E, NR by CW at 4/3/2023 1531    Acceptance, E,TB, VU,NR by CAMERON at 4/1/2023 1530    Acceptance, E,D, NR,VU by JM at 3/24/2023 1631    Comment: seemed to comprehend commands this session    Acceptance, E,D, NR by JM at 3/23/2023 1655    Acceptance, E,D, NR by JM at 3/22/2023 1545    Acceptance, E,TB, VU,NR by CB at 3/21/2023 1544    Acceptance, E,TB,H, VU by MS at 3/16/2023 1801    Acceptance, E,D, DU,NR by MO at 3/16/2023 1451    Acceptance, E, DU,NR by MO at 3/15/2023 1200    Acceptance, E, NR by LM at 3/15/2023 0521    Acceptance, E,D, DU,NR by MO at 3/14/2023 1458   Family Acceptance, E,TB,H, VU by MS at 3/16/2023 1801                               User Key     Initials  Effective Dates Name Provider Type Discipline    JM 03/07/18 -  Tiffanie Grace, PTA Physical Therapist Assistant PT    CAMERON 05/19/21 -  Leeann Wiley, PT Physical Therapist PT    MS 06/16/21 -  Angel Luis Cr, RN Registered Nurse Nurse    CW 12/13/22 -  Tamica Willoughby, PT Physical Therapist PT    CB 10/22/21 -  Bety Benitez, PT Physical Therapist PT    CS 09/22/22 -  Adelia Montalvo, PT Physical Therapist PT    LM 10/13/22 -  Silke Grace, RN Registered Nurse Nurse    MO 01/27/23 -  Lacie Tanner, PT Student PT Student PT    ZB 03/10/23 -  Nestor Rae, PT Student PT Student PT              PT Recommendation and Plan     Plan of Care Reviewed With: patient  Outcome Evaluation: Pt participated with PT session this AM. Focus on seated exercises for strengthening. Pt does require assist to reach end range with knee extension, hip flexion. PT notes some active R ankle dorsiflexion today and active movement of R fingers. Sling donned to RUE at arrival - for comfort due to RUE weakness, okay to remove in bed if arm is supported. Will continue to follow and progress as able.     Time Calculation:    PT Charges     Row Name 04/13/23 1332             Time Calculation    Start Time 1100  -CW      Stop Time 1127  -CW      Time Calculation (min) 27 min  -CW      PT Received On 04/13/23  -CW      PT - Next Appointment 04/14/23  -CW            User Key  (r) = Recorded By, (t) = Taken By, (c) = Cosigned By    Initials Name Provider Type    CW Tamica Willoughby, PT Physical Therapist              Therapy Charges for Today     Code Description Service Date Service Provider Modifiers Qty    45656468950 HC PT THER SUPP EA 15 MIN 4/12/2023 Tamica Willoughby, PT GP 1    87908143556 HC GAIT TRAINING EA 15 MIN 4/12/2023 Tamica Willoughby, PT GP 1    66452265163 HC PT NEUROMUSC RE EDUCATION EA 15 MIN 4/12/2023 Tamica Willoughby, PT GP 1    03595251769 HC PT THERAPEUTIC ACT EA 15 MIN 4/13/2023 Tamica Willoughby, PT GP 1     [id number]  PT THER PROC EA 15 MIN 4/13/2023 Tamica Willoughby, PT GP 1          PT G-Codes  Outcome Measure Options: AM-PAC 6 Clicks Basic Mobility (PT)  AM-PAC 6 Clicks Score (PT): 14  AM-PAC 6 Clicks Score (OT): 15  Modified [place] Scale: 4 - Moderately severe disability.  Unable to walk without assistance, and unable to attend to own bodily needs without assistance.  PT Discharge Summary  Anticipated Discharge Disposition (PT): inpatient rehabilitation facility, skilled nursing facility    Tamiac Willoughby PT  4/13/2023

## 2023-04-13 NOTE — PROGRESS NOTES
Name: Manuel Durant ADMIT: 3/12/2023   : 1950  PCP: Provider, No Known    MRN: [id number] LOS: 32 days   AGE/SEX: 73 y.o. male  ROOM: [facility]     Subjective   Subjective   No new events overnight. +BM. Using sling during day to support rt shoulder. Continues to c/o pain    Review of Systems   Unable to perform ROS: Other        Objective   Objective   Vital Signs  Temp:  [97.6 °F (36.4 °C)-97.9 °F (36.6 °C)] 97.9 °F (36.6 °C)  Heart Rate:  [49-81] 62  Resp:  [16-18] 16  BP: (118-152)/(66-93) 152/72  SpO2:  [97 %] 97 %  on   ;   Device (Oxygen Therapy): room air  Body mass index is 24.23 kg/m².  Physical Exam  Vitals and nursing note reviewed.   Constitutional:       General: He is not in acute distress.  HENT:      Head: Normocephalic.      Mouth/Throat:      Mouth: Mucous membranes are moist.   Eyes:      Conjunctiva/sclera: Conjunctivae normal.   Cardiovascular:      Rate and Rhythm: Normal rate and regular rhythm.   Pulmonary:      Effort: Pulmonary effort is normal. No respiratory distress.      Breath sounds: No wheezing or rales.   Abdominal:      General: Bowel sounds are normal.      Palpations: Abdomen is soft.   Musculoskeletal:      Cervical back: Neck supple.      Right lower leg: No edema.      Left lower leg: No edema.   Skin:     General: Skin is warm and dry.   Neurological:      Mental Status: He is alert.      Motor: Weakness present.   Psychiatric:         Mood and Affect: Mood normal.         Behavior: Behavior normal.       Results Review     I reviewed the patient's new clinical results.  Results from last 7 days   Lab Units 23  0729   WBC 10*3/mm3 6.74   HEMOGLOBIN g/dL 13.8   PLATELETS 10*3/mm3 168     Results from last 7 days   Lab Units 23  0729   SODIUM mmol/L 139   POTASSIUM mmol/L 3.9   CHLORIDE mmol/L 107   CO2 mmol/L 25.0   BUN mg/dL 13   CREATININE mg/dL 0.64*   GLUCOSE mg/dL 86   EGFR mL/min/1.73 101.2       Results from last 7 days   Lab Units  04/11/23  0729   CALCIUM mg/dL 8.4*       No results found for: HGBA1C, POCGLU    No radiology results for the last day  I have personally reviewed all medications:  Scheduled Medications  amLODIPine, 10 mg, Oral, QAM AC  apixaban, 5 mg, Oral, Q12H  baclofen, 2.5 mg, Oral, Q8H  carvedilol, 6.25 mg, Oral, BID With Meals  hydrALAZINE, 10 mg, Oral, Q8H  lidocaine, 1 patch, Transdermal, Q24H  senna-docusate sodium, 2 tablet, Oral, BID  sodium chloride, 10 mL, Intravenous, Q12H  valsartan, 160 mg, Oral, QAM AC    Infusions   Diet  Diet: Regular/House Diet; No Mixed Consistencies, Feeding Assistance - Nursing; Texture: Soft to Chew (NDD 3); Soft to Chew: Chopped Meat; Fluid Consistency: Nectar Thick    I have personally reviewed:  [x]  Laboratory   [x]  Microbiology   [x]  Radiology   [x]  EKG/Telemetry  [x]  Cardiology/Vascular   [x]  Pathology    [x]  Records       Assessment/Plan     Active Hospital Problems    Diagnosis  POA   • **Intracranial hemorrhage [I62.9]  Yes   • Hemiplegia [G81.90]  Yes   • Right shoulder pain [M25.511]  Yes   • Severe Malnutrition (HCC) [E43]  Yes   • Acute DVT (deep venous thrombosis) [I82.409]  Yes   • HTN (hypertension) [I10]  Yes   • Hypertensive emergency [I16.1]  Yes      Resolved Hospital Problems    Diagnosis Date Resolved POA   • Hypokalemia [E87.6] 04/10/2023 Unknown       73 y.o. male admitted with Intracranial hemorrhage.    ICH/rt hemiplegia:  -Continue OT/PT/ST. F/U [facility] outpatient w/MRI brain. Baclofen for spasticity; increased to TID, seems to be tolerating. Tolerating modified diet. [facility] has denied admission due to unknown disposition at discharge. CCP following; likely SNF vs back to [place] w/family     Rt shoulder pain:  -Likely due to subluxation. Continue lidoderm patch. Encourage support of RUE, use sling when up. Check rt shoulder xray     DVT:  -Cleared to start Eliquis per [facility]     HTN:  -BP acceptable. Continue amlodipine, valsartan, carvedilol,  hydralazine       · Eliquis (home med) for DVT prophylaxis.  · Full code.  · Discussed with patient and nursing staff.  · Anticipate discharge SNF vs home with family ([place]) once arrangements have been made.      SLICK Triana  [facility]  04/13/23  11:34 EDT

## 2023-04-13 NOTE — PLAN OF CARE
Problem: Fall Injury Risk  Goal: Absence of Fall and Fall-Related Injury  Outcome: Ongoing, Progressing  Intervention: Identify and Manage Contributors  Recent Flowsheet Documentation  Taken 4/13/2023 1816 by Angel Luis Cr RN  Medication Review/Management: medications reviewed  Taken 4/13/2023 1608 by Angel Luis Cr RN  Medication Review/Management: medications reviewed  Taken 4/13/2023 1428 by Angel Luis Cr RN  Medication Review/Management: medications reviewed  Taken 4/13/2023 1224 by Angel Luis Cr RN  Medication Review/Management: medications reviewed  Taken 4/13/2023 1019 by Angel Luis Cr RN  Medication Review/Management: medications reviewed  Taken 4/13/2023 0818 by Angel Luis Cr RN  Medication Review/Management: medications reviewed  Intervention: Promote Injury-Free Environment  Recent Flowsheet Documentation  Taken 4/13/2023 1816 by Angel Luis Cr RN  Safety Promotion/Fall Prevention:   activity supervised   assistive device/personal items within reach   clutter free environment maintained   gait belt   lighting adjusted   fall prevention program maintained   elopement precautions   mobility aid in reach   muscle strengthening facilitated   nonskid shoes/slippers when out of bed   room organization consistent   safety round/check completed   toileting scheduled  Taken 4/13/2023 1608 by Angel Luis Cr RN  Safety Promotion/Fall Prevention:   activity supervised   assistive device/personal items within reach   elopement precautions   clutter free environment maintained   gait belt   fall prevention program maintained   lighting adjusted   mobility aid in reach   muscle strengthening facilitated   nonskid shoes/slippers when out of bed   room organization consistent   safety round/check completed   toileting scheduled  Taken 4/13/2023 1428 by Angel Luis Cr RN  Safety Promotion/Fall Prevention:   activity supervised   assistive device/personal items within reach   clutter free environment  maintained   elopement precautions   fall prevention program maintained   gait belt   lighting adjusted   mobility aid in reach   nonskid shoes/slippers when out of bed   muscle strengthening facilitated   room organization consistent   safety round/check completed   toileting scheduled  Taken 4/13/2023 1224 by Angel Luis Cr RN  Safety Promotion/Fall Prevention:   activity supervised   assistive device/personal items within reach   clutter free environment maintained   elopement precautions   fall prevention program maintained   gait belt   lighting adjusted   mobility aid in reach   muscle strengthening facilitated   nonskid shoes/slippers when out of bed   room organization consistent   safety round/check completed   toileting scheduled  Taken 4/13/2023 1019 by Angel Luis Cr RN  Safety Promotion/Fall Prevention:   activity supervised   assistive device/personal items within reach   clutter free environment maintained   elopement precautions   fall prevention program maintained   gait belt   lighting adjusted   mobility aid in reach   muscle strengthening facilitated   nonskid shoes/slippers when out of bed   room organization consistent   safety round/check completed   toileting scheduled  Taken 4/13/2023 0818 by Angel Luis Cr RN  Safety Promotion/Fall Prevention:   activity supervised   assistive device/personal items within reach   clutter free environment maintained   elopement precautions   fall prevention program maintained   gait belt   lighting adjusted   mobility aid in reach   muscle strengthening facilitated   nonskid shoes/slippers when out of bed   room organization consistent   safety round/check completed   toileting scheduled     Problem: Skin Injury Risk Increased  Goal: Skin Health and Integrity  Outcome: Ongoing, Progressing  Intervention: Optimize Skin Protection  Recent Flowsheet Documentation  Taken 4/13/2023 1816 by Angel Luis Cr RN  Head of Bed (HOB) Positioning: HOB at 30  degrees  Taken 4/13/2023 1608 by Angel Luis Cr RN  Head of Bed (HOB) Positioning: HOB at 30 degrees  Taken 4/13/2023 1428 by Angel Luis Cr RN  Head of Bed (HOB) Positioning: HOB at 20-30 degrees  Taken 4/13/2023 1224 by Angel Luis Cr RN  Head of Bed (HOB) Positioning: HOB at 30 degrees  Taken 4/13/2023 1019 by Angel Luis Cr RN  Head of Bed (HOB) Positioning: HOB at 30 degrees  Taken 4/13/2023 0818 by Angel Luis Cr RN  Head of Bed (HOB) Positioning: HOB at 30-45 degrees  Pressure Reduction Devices: alternating pressure pump (ADD)     Problem: Adult Inpatient Plan of Care  Goal: Plan of Care Review  Outcome: Ongoing, Progressing  Goal: Patient-Specific Goal (Individualized)  Outcome: Ongoing, Progressing  Goal: Absence of Hospital-Acquired Illness or Injury  Outcome: Ongoing, Progressing  Intervention: Identify and Manage Fall Risk  Recent Flowsheet Documentation  Taken 4/13/2023 1816 by Angel Luis Cr RN  Safety Promotion/Fall Prevention:   activity supervised   assistive device/personal items within reach   clutter free environment maintained   gait belt   lighting adjusted   fall prevention program maintained   elopement precautions   mobility aid in reach   muscle strengthening facilitated   nonskid shoes/slippers when out of bed   room organization consistent   safety round/check completed   toileting scheduled  Taken 4/13/2023 1608 by Angel Luis Cr RN  Safety Promotion/Fall Prevention:   activity supervised   assistive device/personal items within reach   elopement precautions   clutter free environment maintained   gait belt   fall prevention program maintained   lighting adjusted   mobility aid in reach   muscle strengthening facilitated   nonskid shoes/slippers when out of bed   room organization consistent   safety round/check completed   toileting scheduled  Taken 4/13/2023 1428 by Angel Luis Cr RN  Safety Promotion/Fall Prevention:   activity supervised   assistive device/personal items  within reach   clutter free environment maintained   elopement precautions   fall prevention program maintained   gait belt   lighting adjusted   mobility aid in reach   nonskid shoes/slippers when out of bed   muscle strengthening facilitated   room organization consistent   safety round/check completed   toileting scheduled  Taken 4/13/2023 1224 by Angel Luis Cr RN  Safety Promotion/Fall Prevention:   activity supervised   assistive device/personal items within reach   clutter free environment maintained   elopement precautions   fall prevention program maintained   gait belt   lighting adjusted   mobility aid in reach   muscle strengthening facilitated   nonskid shoes/slippers when out of bed   room organization consistent   safety round/check completed   toileting scheduled  Taken 4/13/2023 1019 by Angel Luis Cr RN  Safety Promotion/Fall Prevention:   activity supervised   assistive device/personal items within reach   clutter free environment maintained   elopement precautions   fall prevention program maintained   gait belt   lighting adjusted   mobility aid in reach   muscle strengthening facilitated   nonskid shoes/slippers when out of bed   room organization consistent   safety round/check completed   toileting scheduled  Taken 4/13/2023 0818 by Angel Luis Cr RN  Safety Promotion/Fall Prevention:   activity supervised   assistive device/personal items within reach   clutter free environment maintained   elopement precautions   fall prevention program maintained   gait belt   lighting adjusted   mobility aid in reach   muscle strengthening facilitated   nonskid shoes/slippers when out of bed   room organization consistent   safety round/check completed   toileting scheduled  Intervention: Prevent Skin Injury  Recent Flowsheet Documentation  Taken 4/13/2023 1816 by Angel Luis Cr RN  Body Position: supine  Taken 4/13/2023 1608 by Angel Luis Cr RN  Body Position: sitting up in bed  Taken 4/13/2023  1428 by Angel Luis Cr RN  Body Position: supine  Taken 4/13/2023 1224 by Angel Luis Cr RN  Body Position: sitting up in bed  Taken 4/13/2023 1019 by Angel Luis Cr RN  Body Position: supine  Taken 4/13/2023 0818 by Angel Luis Cr RN  Body Position: sitting up in bed  Intervention: Prevent and Manage VTE (Venous Thromboembolism) Risk  Recent Flowsheet Documentation  Taken 4/13/2023 1816 by Angel Luis Cr RN  Activity Management: activity encouraged  VTE Prevention/Management:   left   sequential compression devices on  Taken 4/13/2023 1608 by Angel Luis Cr RN  Activity Management: activity encouraged  VTE Prevention/Management:   left   sequential compression devices on  Taken 4/13/2023 1428 by Angel Luis Cr RN  Activity Management: activity encouraged  VTE Prevention/Management:   left   sequential compression devices on  Taken 4/13/2023 1224 by Angel Luis Cr RN  Activity Management: activity encouraged  VTE Prevention/Management:   left   sequential compression devices on  Taken 4/13/2023 1019 by Angel Luis Cr RN  Activity Management: activity encouraged  VTE Prevention/Management:   left   sequential compression devices on  Taken 4/13/2023 0818 by Angel Luis Cr RN  Activity Management: activity encouraged  VTE Prevention/Management:   left   sequential compression devices on  Intervention: Prevent Infection  Recent Flowsheet Documentation  Taken 4/13/2023 1816 by Angel Luis Cr RN  Infection Prevention:   cohorting utilized   environmental surveillance performed   equipment surfaces disinfected   hand hygiene promoted   personal protective equipment utilized   rest/sleep promoted   single patient room provided   visitors restricted/screened  Taken 4/13/2023 1608 by Angel Luis Cr RN  Infection Prevention:   cohorting utilized   equipment surfaces disinfected   environmental surveillance performed   hand hygiene promoted   personal protective equipment utilized   rest/sleep promoted   single  patient room provided   visitors restricted/screened  Taken 4/13/2023 1428 by Angel Luis Cr RN  Infection Prevention:   cohorting utilized   environmental surveillance performed   equipment surfaces disinfected   hand hygiene promoted   personal protective equipment utilized   rest/sleep promoted   single patient room provided   visitors restricted/screened  Taken 4/13/2023 1224 by Angel Luis Cr RN  Infection Prevention:   cohorting utilized   equipment surfaces disinfected   environmental surveillance performed   personal protective equipment utilized   hand hygiene promoted   rest/sleep promoted   single patient room provided   visitors restricted/screened  Taken 4/13/2023 1019 by Angel Luis Cr RN  Infection Prevention:   cohorting utilized   environmental surveillance performed   equipment surfaces disinfected   hand hygiene promoted   personal protective equipment utilized   rest/sleep promoted   single patient room provided   visitors restricted/screened  Taken 4/13/2023 0818 by Angel Luis Cr RN  Infection Prevention:   cohorting utilized   environmental surveillance performed   equipment surfaces disinfected   hand hygiene promoted   personal protective equipment utilized   rest/sleep promoted   single patient room provided   visitors restricted/screened  Goal: Optimal Comfort and Wellbeing  Outcome: Ongoing, Progressing  Goal: Readiness for Transition of Care  Outcome: Ongoing, Progressing     Problem: Adjustment to Illness (Stroke, Ischemic/Transient Ischemic Attack)  Goal: Optimal Coping  Outcome: Ongoing, Progressing     Problem: Bowel Elimination Impaired (Stroke, Ischemic/Transient Ischemic Attack)  Goal: Effective Bowel Elimination  Outcome: Ongoing, Progressing     Problem: Cerebral Tissue Perfusion (Stroke, Ischemic/Transient Ischemic Attack)  Goal: Optimal Cerebral Tissue Perfusion  Outcome: Ongoing, Progressing     Problem: Cognitive Impairment (Stroke, Ischemic/Transient Ischemic  Attack)  Goal: Optimal Cognitive Function  Outcome: Ongoing, Progressing     Problem: Communication Impairment (Stroke, Ischemic/Transient Ischemic Attack)  Goal: Improved Communication Skills  Outcome: Ongoing, Progressing     Problem: Functional Ability Impaired (Stroke, Ischemic/Transient Ischemic Attack)  Goal: Optimal Functional Ability  Outcome: Ongoing, Progressing  Intervention: Optimize Functional Ability  Recent Flowsheet Documentation  Taken 4/13/2023 1816 by Angel Luis Cr RN  Activity Management: activity encouraged  Taken 4/13/2023 1608 by Angel Luis Cr RN  Activity Management: activity encouraged  Taken 4/13/2023 1428 by Angel Luis Cr RN  Activity Management: activity encouraged  Taken 4/13/2023 1224 by Angel Luis Cr RN  Activity Management: activity encouraged  Taken 4/13/2023 1019 by Angel Luis Cr RN  Activity Management: activity encouraged  Taken 4/13/2023 0818 by Angel Luis Cr RN  Activity Management: activity encouraged     Problem: Respiratory Compromise (Stroke, Ischemic/Transient Ischemic Attack)  Goal: Effective Oxygenation and Ventilation  Outcome: Ongoing, Progressing  Intervention: Optimize Oxygenation and Ventilation  Recent Flowsheet Documentation  Taken 4/13/2023 1816 by Angel Luis Cr RN  Head of Bed (HOB) Positioning: HOB at 30 degrees  Taken 4/13/2023 1608 by Angel Luis Cr RN  Head of Bed (HOB) Positioning: HOB at 30 degrees  Taken 4/13/2023 1428 by Angel Luis Cr RN  Head of Bed (HOB) Positioning: HOB at 20-30 degrees  Taken 4/13/2023 1224 by Angel Luis Cr RN  Head of Bed (HOB) Positioning: HOB at 30 degrees  Taken 4/13/2023 1019 by Angel Luis Cr RN  Head of Bed (HOB) Positioning: HOB at 30 degrees  Taken 4/13/2023 0818 by Angel Luis Cr RN  Head of Bed (HOB) Positioning: HOB at 30-45 degrees     Problem: Sensorimotor Impairment (Stroke, Ischemic/Transient Ischemic Attack)  Goal: Improved Sensorimotor Function  Outcome: Ongoing, Progressing  Intervention:  Optimize Range of Motion, Motor Control and Function  Recent Flowsheet Documentation  Taken 4/13/2023 1816 by Angel Luis Cr RN  Positioning/Transfer Devices:   pillows   in use  Taken 4/13/2023 1608 by Angel Luis Cr RN  Positioning/Transfer Devices:   pillows   in use  Taken 4/13/2023 1428 by Angel Luis Cr RN  Positioning/Transfer Devices:   pillows   in use  Taken 4/13/2023 1224 by Angel Luis Cr RN  Positioning/Transfer Devices:   pillows   in use  Taken 4/13/2023 1019 by Angel Luis Cr RN  Positioning/Transfer Devices:   pillows   in use  Taken 4/13/2023 0818 by Angel Luis Cr RN  Positioning/Transfer Devices:   pillows   in use  Intervention: Optimize Sensory and Perceptual Ability  Recent Flowsheet Documentation  Taken 4/13/2023 0818 by Angel Luis Cr RN  Pressure Reduction Devices: alternating pressure pump (ADD)     Problem: Swallowing Impairment (Stroke, Ischemic/Transient Ischemic Attack)  Goal: Optimal Eating and Swallowing without Aspiration  Outcome: Ongoing, Progressing     Problem: Urinary Elimination Impaired (Stroke, Ischemic/Transient Ischemic Attack)  Goal: Effective Urinary Elimination  Outcome: Ongoing, Progressing     Problem: Adjustment to Illness (Stroke, Hemorrhagic)  Goal: Optimal Coping  Outcome: Ongoing, Progressing     Problem: Bowel Elimination Impaired (Stroke, Hemorrhagic)  Goal: Effective Bowel Elimination  Outcome: Ongoing, Progressing     Problem: Cerebral Tissue Perfusion (Stroke, Hemorrhagic)  Goal: Optimal Cerebral Tissue Perfusion  Outcome: Ongoing, Progressing     Problem: Cognitive Impairment (Stroke, Hemorrhagic)  Goal: Optimal Cognitive Function  Outcome: Ongoing, Progressing     Problem: Communication Impairment (Stroke, Hemorrhagic)  Goal: Effective Communication Skills  Outcome: Ongoing, Progressing     Problem: Functional Ability Impaired (Stroke, Hemorrhagic)  Goal: Optimal Functional Ability  Outcome: Ongoing, Progressing  Intervention: Optimize Functional  Ability  Recent Flowsheet Documentation  Taken 4/13/2023 1816 by Angel Luis Cr RN  Activity Management: activity encouraged  Taken 4/13/2023 1608 by Angel Luis Cr RN  Activity Management: activity encouraged  Taken 4/13/2023 1428 by Angel Luis Cr RN  Activity Management: activity encouraged  Taken 4/13/2023 1224 by Angel Luis Cr RN  Activity Management: activity encouraged  Taken 4/13/2023 1019 by Angel Luis Cr RN  Activity Management: activity encouraged  Taken 4/13/2023 0818 by Angel Luis Cr RN  Activity Management: activity encouraged     Problem: Pain (Stroke, Hemorrhagic)  Goal: Acceptable Pain Control  Outcome: Ongoing, Progressing     Problem: Respiratory Compromise (Stroke, Hemorrhagic)  Goal: Effective Oxygenation and Ventilation  Outcome: Ongoing, Progressing  Intervention: Optimize Oxygenation and Ventilation  Recent Flowsheet Documentation  Taken 4/13/2023 1816 by Angel Luis Cr RN  Head of Bed (HOB) Positioning: HOB at 30 degrees  Taken 4/13/2023 1608 by Angel Luis Cr RN  Head of Bed (HOB) Positioning: HOB at 30 degrees  Taken 4/13/2023 1428 by Angel Luis Cr RN  Head of Bed (HOB) Positioning: HOB at 20-30 degrees  Taken 4/13/2023 1224 by Angel Luis Cr RN  Head of Bed (HOB) Positioning: HOB at 30 degrees  Taken 4/13/2023 1019 by Angel Luis Cr RN  Head of Bed (HOB) Positioning: HOB at 30 degrees  Taken 4/13/2023 0818 by Angel Luis Cr RN  Head of Bed (HOB) Positioning: HOB at 30-45 degrees     Problem: Sensorimotor Impairment (Stroke, Hemorrhagic)  Goal: Improved Sensorimotor Function  Outcome: Ongoing, Progressing  Intervention: Optimize Range of Motion, Motor Control and Function  Recent Flowsheet Documentation  Taken 4/13/2023 1816 by Angel Luis Cr RN  Positioning/Transfer Devices:   pillows   in use  Taken 4/13/2023 1608 by Angel Luis Cr RN  Positioning/Transfer Devices:   pillows   in use  Taken 4/13/2023 1428 by Angel Luis Cr RN  Positioning/Transfer Devices:   pillows    in use  Taken 4/13/2023 1224 by Angel Luis Cr RN  Positioning/Transfer Devices:   pillows   in use  Taken 4/13/2023 1019 by Angel Luis Cr RN  Positioning/Transfer Devices:   pillows   in use  Taken 4/13/2023 0818 by Angel Luis Cr RN  Positioning/Transfer Devices:   pillows   in use  Intervention: Optimize Sensory and Perceptual Ability  Recent Flowsheet Documentation  Taken 4/13/2023 0818 by Angel Luis Cr RN  Pressure Reduction Devices: alternating pressure pump (ADD)     Problem: Swallowing Impairment (Stroke, Hemorrhagic)  Goal: Optimal Eating and Swallowing Without Aspiration  Outcome: Ongoing, Progressing     Problem: Urinary Elimination Impaired (Stroke, Hemorrhagic)  Goal: Effective Urinary Elimination  Outcome: Ongoing, Progressing   Goal Outcome Evaluation:

## 2023-04-14 PROCEDURE — 97535 SELF CARE MNGMENT TRAINING: CPT

## 2023-04-14 PROCEDURE — 97530 THERAPEUTIC ACTIVITIES: CPT

## 2023-04-14 RX ADMIN — APIXABAN 5 MG: 5 TABLET, FILM COATED ORAL at 08:31

## 2023-04-14 RX ADMIN — VALSARTAN 160 MG: 160 TABLET, FILM COATED ORAL at 08:31

## 2023-04-14 RX ADMIN — HYDRALAZINE HYDROCHLORIDE 10 MG: 10 TABLET, FILM COATED ORAL at 22:11

## 2023-04-14 RX ADMIN — AMLODIPINE BESYLATE 10 MG: 10 TABLET ORAL at 08:31

## 2023-04-14 RX ADMIN — HYDRALAZINE HYDROCHLORIDE 10 MG: 10 TABLET, FILM COATED ORAL at 05:29

## 2023-04-14 RX ADMIN — Medication 10 ML: at 08:32

## 2023-04-14 RX ADMIN — BACLOFEN 2.5 MG: 10 TABLET ORAL at 05:29

## 2023-04-14 RX ADMIN — BACLOFEN 2.5 MG: 10 TABLET ORAL at 22:11

## 2023-04-14 RX ADMIN — CARVEDILOL 6.25 MG: 12.5 TABLET, FILM COATED ORAL at 08:31

## 2023-04-14 RX ADMIN — APIXABAN 5 MG: 5 TABLET, FILM COATED ORAL at 20:26

## 2023-04-14 RX ADMIN — Medication 10 ML: at 20:23

## 2023-04-14 RX ADMIN — LIDOCAINE 1 PATCH: 700 PATCH TOPICAL at 08:31

## 2023-04-14 RX ADMIN — BACLOFEN 2.5 MG: 10 TABLET ORAL at 14:45

## 2023-04-14 RX ADMIN — DOCUSATE SODIUM 50MG AND SENNOSIDES 8.6MG 2 TABLET: 8.6; 5 TABLET, FILM COATED ORAL at 20:26

## 2023-04-14 RX ADMIN — DOCUSATE SODIUM 50MG AND SENNOSIDES 8.6MG 2 TABLET: 8.6; 5 TABLET, FILM COATED ORAL at 08:31

## 2023-04-14 NOTE — PROGRESS NOTES
Name: Manuel Durant ADMIT: 3/12/2023   : 1950  PCP: Provider, No Known    MRN: [id number] LOS: 33 days   AGE/SEX: 73 y.o. male  ROOM: [facility]     Subjective   Subjective   Asleep in bed. No new issues overnight    Review of Systems   Unable to perform ROS: Other        Objective   Objective   Vital Signs  Temp:  [97.5 °F (36.4 °C)-98.3 °F (36.8 °C)] 97.5 °F (36.4 °C)  Heart Rate:  [54-78] 70  Resp:  [16] 16  BP: (127-139)/(65-77) 136/74  SpO2:  [94 %-96 %] 94 %  on   ;   Device (Oxygen Therapy): room air  Body mass index is 23.88 kg/m².  Physical Exam  Vitals and nursing note reviewed.   Constitutional:       General: He is sleeping. He is not in acute distress.  HENT:      Head: Normocephalic.      Mouth/Throat:      Lips: Pink.   Eyes:      General: Lids are normal.   Cardiovascular:      Rate and Rhythm: Normal rate.   Pulmonary:      Effort: Pulmonary effort is normal. No respiratory distress.   Abdominal:      Palpations: Abdomen is soft.   Musculoskeletal:      Cervical back: Neck supple.      Right lower leg: No edema.      Left lower leg: No edema.   Skin:     General: Skin is dry.   Neurological:      Motor: Weakness present.       Results Review     I reviewed the patient's new clinical results.  Results from last 7 days   Lab Units 23  0729   WBC 10*3/mm3 6.74   HEMOGLOBIN g/dL 13.8   PLATELETS 10*3/mm3 168     Results from last 7 days   Lab Units 23  0729   SODIUM mmol/L 139   POTASSIUM mmol/L 3.9   CHLORIDE mmol/L 107   CO2 mmol/L 25.0   BUN mg/dL 13   CREATININE mg/dL 0.64*   GLUCOSE mg/dL 86   EGFR mL/min/1.73 101.2       Results from last 7 days   Lab Units 23  0729   CALCIUM mg/dL 8.4*       No results found for: HGBA1C, POCGLU    XR Shoulder 2+ View Right    Result Date: 2023   As described.  This report was finalized on 2023 1:30 PM by Dr. Hema Ramírez M.D.      I have personally reviewed all medications:  Scheduled Medications  amLODIPine, 10 mg,  Oral, QAM AC  apixaban, 5 mg, Oral, Q12H  baclofen, 2.5 mg, Oral, Q8H  carvedilol, 6.25 mg, Oral, BID With Meals  hydrALAZINE, 10 mg, Oral, Q8H  lidocaine, 1 patch, Transdermal, Q24H  senna-docusate sodium, 2 tablet, Oral, BID  sodium chloride, 10 mL, Intravenous, Q12H  valsartan, 160 mg, Oral, QAM AC    Infusions   Diet  Diet: Regular/House Diet; No Mixed Consistencies, Feeding Assistance - Nursing; Texture: Soft to Chew (NDD 3); Soft to Chew: Chopped Meat; Fluid Consistency: Nectar Thick    I have personally reviewed:  [x]  Laboratory   [x]  Microbiology   [x]  Radiology   [x]  EKG/Telemetry  [x]  Cardiology/Vascular   [x]  Pathology    [x]  Records       Assessment/Plan     Active Hospital Problems    Diagnosis  POA   • **Intracranial hemorrhage [I62.9]  Yes   • Hemiplegia [G81.90]  Yes   • Right shoulder pain [M25.511]  Yes   • Severe Malnutrition (HCC) [E43]  Yes   • Acute DVT (deep venous thrombosis) [I82.409]  Yes   • HTN (hypertension) [I10]  Yes   • Hypertensive emergency [I16.1]  Yes      Resolved Hospital Problems    Diagnosis Date Resolved POA   • Hypokalemia [E87.6] 04/10/2023 Unknown       73 y.o. male admitted with Intracranial hemorrhage.    ICH/rt hemiplegia:  -Continue OT/PT/ST. F/U [facility] outpatient w/MRI brain. Baclofen for spasticity; increased to TID, seems to be tolerating. Tolerating modified diet. [facility] has denied admission due to unknown disposition at discharge. CCP following; likely SNF vs back to [place] w/family     Rt shoulder pain:  -Likely due to subluxation; xray negative for acute finding. Continue lidoderm patch. Encourage support of RUE, use sling when up     DVT:  -Cleared to start Eliquis per [facility]     HTN:  -BP acceptable. Continue amlodipine, valsartan, carvedilol, hydralazine        • Eliquis (home med) for DVT prophylaxis.  • Full code.  • Discussed with patient and nursing staff.  • Anticipate discharge SNF vs home with family ([place]) once arrangements have been made (see CCP  note 4/14/).     SLICK Triana  [facility]  04/14/23  12:52 EDT

## 2023-04-14 NOTE — PLAN OF CARE
Goal Outcome Evaluation:      VSS. No significant events overnight. Patient slept in between care.              Problem: Fall Injury Risk  Goal: Absence of Fall and Fall-Related Injury  Outcome: Ongoing, Progressing  Intervention: Identify and Manage Contributors  Recent Flowsheet Documentation  Taken 4/13/2023 1947 by Meg Ybarra RN  Medication Review/Management: medications reviewed  Intervention: Promote Injury-Free Environment  Recent Flowsheet Documentation  Taken 4/14/2023 0400 by Meg Ybarra RN  Safety Promotion/Fall Prevention: safety round/check completed  Taken 4/14/2023 0233 by Meg Ybarra RN  Safety Promotion/Fall Prevention: safety round/check completed  Taken 4/14/2023 0000 by Meg Ybarra RN  Safety Promotion/Fall Prevention: safety round/check completed  Taken 4/13/2023 2228 by Meg Ybarra RN  Safety Promotion/Fall Prevention: safety round/check completed  Taken 4/13/2023 1947 by Meg Ybarra RN  Safety Promotion/Fall Prevention:   activity supervised   assistive device/personal items within reach   clutter free environment maintained   fall prevention program maintained   nonskid shoes/slippers when out of bed   safety round/check completed     Problem: Skin Injury Risk Increased  Goal: Skin Health and Integrity  Outcome: Ongoing, Progressing  Intervention: Optimize Skin Protection  Recent Flowsheet Documentation  Taken 4/14/2023 0400 by Meg Ybarra RN  Head of Bed (HOB) Positioning: HOB at 15 degrees  Taken 4/14/2023 0233 by Meg Ybarra RN  Head of Bed (HOB) Positioning: HOB at 15 degrees  Taken 4/14/2023 0000 by Meg Ybarra RN  Head of Bed (HOB) Positioning: HOB at 15 degrees  Taken 4/13/2023 2228 by Meg Ybarra RN  Head of Bed (HOB) Positioning: HOB at 15 degrees  Taken 4/13/2023 1947 by Meg Ybarra RN  Head of Bed (HOB) Positioning: HOB at 30-45 degrees  Pressure Reduction Devices:   alternating pressure pump (ADD)   positioning supports  utilized     Problem: Adult Inpatient Plan of Care  Goal: Plan of Care Review  Outcome: Ongoing, Progressing  Goal: Patient-Specific Goal (Individualized)  Outcome: Ongoing, Progressing  Goal: Absence of Hospital-Acquired Illness or Injury  Outcome: Ongoing, Progressing  Intervention: Identify and Manage Fall Risk  Recent Flowsheet Documentation  Taken 4/14/2023 0400 by Meg Ybarra RN  Safety Promotion/Fall Prevention: safety round/check completed  Taken 4/14/2023 0233 by Meg Ybarra RN  Safety Promotion/Fall Prevention: safety round/check completed  Taken 4/14/2023 0000 by Meg Ybarra RN  Safety Promotion/Fall Prevention: safety round/check completed  Taken 4/13/2023 2228 by Meg Ybarra RN  Safety Promotion/Fall Prevention: safety round/check completed  Taken 4/13/2023 1947 by Meg Ybarra RN  Safety Promotion/Fall Prevention:   activity supervised   assistive device/personal items within reach   clutter free environment maintained   fall prevention program maintained   nonskid shoes/slippers when out of bed   safety round/check completed  Intervention: Prevent Skin Injury  Recent Flowsheet Documentation  Taken 4/14/2023 0400 by Meg Ybarra RN  Body Position: supine, legs elevated  Taken 4/14/2023 0233 by Meg Ybarra RN  Body Position: weight shifting  Taken 4/14/2023 0000 by Meg Ybarra RN  Body Position: weight shifting  Taken 4/13/2023 2228 by Meg Ybarra RN  Body Position: supine, legs elevated  Taken 4/13/2023 1947 by Meg Ybarra RN  Body Position: supine, legs elevated  Intervention: Prevent and Manage VTE (Venous Thromboembolism) Risk  Recent Flowsheet Documentation  Taken 4/13/2023 1947 by Meg Ybarra RN  VTE Prevention/Management:   left   sequential compression devices on  Goal: Optimal Comfort and Wellbeing  Outcome: Ongoing, Progressing  Goal: Readiness for Transition of Care  Outcome: Ongoing, Progressing     Problem: Adjustment to Illness (Stroke,  Ischemic/Transient Ischemic Attack)  Goal: Optimal Coping  Outcome: Ongoing, Progressing     Problem: Bowel Elimination Impaired (Stroke, Ischemic/Transient Ischemic Attack)  Goal: Effective Bowel Elimination  Outcome: Ongoing, Progressing     Problem: Cerebral Tissue Perfusion (Stroke, Ischemic/Transient Ischemic Attack)  Goal: Optimal Cerebral Tissue Perfusion  Outcome: Ongoing, Progressing     Problem: Cognitive Impairment (Stroke, Ischemic/Transient Ischemic Attack)  Goal: Optimal Cognitive Function  Outcome: Ongoing, Progressing     Problem: Communication Impairment (Stroke, Ischemic/Transient Ischemic Attack)  Goal: Improved Communication Skills  Outcome: Ongoing, Progressing     Problem: Functional Ability Impaired (Stroke, Ischemic/Transient Ischemic Attack)  Goal: Optimal Functional Ability  Outcome: Ongoing, Progressing     Problem: Respiratory Compromise (Stroke, Ischemic/Transient Ischemic Attack)  Goal: Effective Oxygenation and Ventilation  Outcome: Ongoing, Progressing  Intervention: Optimize Oxygenation and Ventilation  Recent Flowsheet Documentation  Taken 4/14/2023 0400 by Meg Ybarra RN  Head of Bed (HOB) Positioning: HOB at 15 degrees  Taken 4/14/2023 0233 by Meg Ybarra RN  Head of Bed (HOB) Positioning: HOB at 15 degrees  Taken 4/14/2023 0000 by Meg Ybarra RN  Head of Bed (HOB) Positioning: HOB at 15 degrees  Taken 4/13/2023 2228 by Meg Ybarra RN  Head of Bed (HOB) Positioning: HOB at 15 degrees  Taken 4/13/2023 1947 by Meg Ybarra RN  Head of Bed (HOB) Positioning: HOB at 30-45 degrees     Problem: Sensorimotor Impairment (Stroke, Ischemic/Transient Ischemic Attack)  Goal: Improved Sensorimotor Function  Outcome: Ongoing, Progressing  Intervention: Optimize Range of Motion, Motor Control and Function  Recent Flowsheet Documentation  Taken 4/14/2023 0400 by Meg Ybarra RN  Positioning/Transfer Devices:   pillows   in use  Taken 4/14/2023 0000 by Meg Ybarra  RN  Positioning/Transfer Devices:   pillows   in use  Taken 4/13/2023 2228 by Meg Ybarra RN  Positioning/Transfer Devices:   pillows   in use  Taken 4/13/2023 1947 by Meg Ybarra RN  Positioning/Transfer Devices:   pillows   in use  Intervention: Optimize Sensory and Perceptual Ability  Recent Flowsheet Documentation  Taken 4/13/2023 1947 by Meg Ybarra RN  Pressure Reduction Devices:   alternating pressure pump (ADD)   positioning supports utilized     Problem: Swallowing Impairment (Stroke, Ischemic/Transient Ischemic Attack)  Goal: Optimal Eating and Swallowing without Aspiration  Outcome: Ongoing, Progressing     Problem: Urinary Elimination Impaired (Stroke, Ischemic/Transient Ischemic Attack)  Goal: Effective Urinary Elimination  Outcome: Ongoing, Progressing     Problem: Adjustment to Illness (Stroke, Hemorrhagic)  Goal: Optimal Coping  Outcome: Ongoing, Progressing     Problem: Bowel Elimination Impaired (Stroke, Hemorrhagic)  Goal: Effective Bowel Elimination  Outcome: Ongoing, Progressing     Problem: Cerebral Tissue Perfusion (Stroke, Hemorrhagic)  Goal: Optimal Cerebral Tissue Perfusion  Outcome: Ongoing, Progressing     Problem: Cognitive Impairment (Stroke, Hemorrhagic)  Goal: Optimal Cognitive Function  Outcome: Ongoing, Progressing     Problem: Communication Impairment (Stroke, Hemorrhagic)  Goal: Effective Communication Skills  Outcome: Ongoing, Progressing     Problem: Functional Ability Impaired (Stroke, Hemorrhagic)  Goal: Optimal Functional Ability  Outcome: Ongoing, Progressing     Problem: Pain (Stroke, Hemorrhagic)  Goal: Acceptable Pain Control  Outcome: Ongoing, Progressing     Problem: Respiratory Compromise (Stroke, Hemorrhagic)  Goal: Effective Oxygenation and Ventilation  Outcome: Ongoing, Progressing  Intervention: Optimize Oxygenation and Ventilation  Recent Flowsheet Documentation  Taken 4/14/2023 0400 by Meg Ybarra RN  Head of Bed (HOB) Positioning: HOB at 15  degrees  Taken 4/14/2023 0233 by Meg Ybarra RN  Head of Bed (HOB) Positioning: HOB at 15 degrees  Taken 4/14/2023 0000 by Meg Ybarra RN  Head of Bed (HOB) Positioning: HOB at 15 degrees  Taken 4/13/2023 2228 by Meg Ybarra RN  Head of Bed (HOB) Positioning: HOB at 15 degrees  Taken 4/13/2023 1947 by Meg Ybarra RN  Head of Bed (HOB) Positioning: HOB at 30-45 degrees     Problem: Sensorimotor Impairment (Stroke, Hemorrhagic)  Goal: Improved Sensorimotor Function  Outcome: Ongoing, Progressing  Intervention: Optimize Range of Motion, Motor Control and Function  Recent Flowsheet Documentation  Taken 4/14/2023 0400 by Meg Ybarra RN  Positioning/Transfer Devices:   pillows   in use  Taken 4/14/2023 0000 by Meg Ybarra RN  Positioning/Transfer Devices:   pillows   in use  Taken 4/13/2023 2228 by Meg Ybarra RN  Positioning/Transfer Devices:   pillows   in use  Taken 4/13/2023 1947 by Meg Ybarra RN  Positioning/Transfer Devices:   pillows   in use  Intervention: Optimize Sensory and Perceptual Ability  Recent Flowsheet Documentation  Taken 4/13/2023 1947 by Meg Ybarra RN  Pressure Reduction Devices:   alternating pressure pump (ADD)   positioning supports utilized     Problem: Swallowing Impairment (Stroke, Hemorrhagic)  Goal: Optimal Eating and Swallowing Without Aspiration  Outcome: Ongoing, Progressing     Problem: Urinary Elimination Impaired (Stroke, Hemorrhagic)  Goal: Effective Urinary Elimination  Outcome: Ongoing, Progressing

## 2023-04-14 NOTE — PLAN OF CARE
Goal Outcome Evaluation:              Outcome Evaluation: Alert and oriented to person. No complaints. Right sided weakness noted, but able to move right arm at shoulder and bend right knee. Tolerating diet. Awaiting discharge plan.

## 2023-04-14 NOTE — PLAN OF CARE
Goal Outcome Evaluation:  Plan of Care Reviewed With: patient        Progress: improving  Outcome Evaluation: Pt participated in PT/OT co-tx session this AM. Pt came to EOB primarily w/ sba but req Amish to transition to fully upright position. Seated EOB he demos midline awareness and is able to maintain seated balance w/o support. He demo's active mvmt in his R fingers as well as R akle DF. STS from EOB w/ modA followed by amb towards bathroom door w/ mod-maxA for LE management and advancement + Amish for support via hha. Pt demos improvement w/ further trials and actively advances R LE w/ only assist to clear toes and prevent knee buckle. The pt could tolerate and would benefit greatly from longer therapy sessions and integration of addtl equipment not available in this setting. PT will continue to monitor and progress pt as able.

## 2023-04-14 NOTE — THERAPY TREATMENT NOTE
Patient Name: Manuel Durant  : 1950    MRN: [id number]                              Today's Date: 2023       Admit Date: 3/12/2023    Visit Dx:     ICD-10-CM ICD-9-CM   1. Intracranial hemorrhage  I62.9 432.9   2. Altered mental status, unspecified altered mental status type  R41.82 780.97     Patient Active Problem List   Diagnosis   • Intracranial hemorrhage   • Hypertensive emergency   • Acute DVT (deep venous thrombosis)   • HTN (hypertension)   • Severe Malnutrition (HCC)   • Hemiplegia   • Right shoulder pain     Past Medical History:   Diagnosis Date   • Hypokalemia 3/17/2023     History reviewed. No pertinent surgical history.   General Information     Row Name 23 1358          OT Time and Intention    Document Type therapy note (daily note)  -     Mode of Treatment co-treatment;occupational therapy;physical therapy  -     Row Name 23 1358          General Information    Patient Profile Reviewed yes  -     Existing Precautions/Restrictions fall  -     Row Name 23 1358          Cognition    Orientation Status (Cognition) oriented to;person  -     Row Name 23 1358          Safety Issues, Functional Mobility    Impairments Affecting Function (Mobility) balance;coordination;endurance/activity tolerance;grasp;motor control;postural/trunk control;strength;range of motion (ROM);cognition;visual/perceptual  -     Cognitive Impairments, Mobility Safety/Performance attention;awareness, need for assistance;impulsivity;insight into deficits/self-awareness;judgment;problem-solving/reasoning;safety precaution awareness;sequencing abilities  -           User Key  (r) = Recorded By, (t) = Taken By, (c) = Cosigned By    Initials Name Provider Type     Evelia Kenney OT Occupational Therapist                 Mobility/ADL's     Row Name 23 1359          Bed Mobility    Bed Mobility supine-sit;sit-supine  -     Supine-Sit [place] (Bed Mobility) minimum assist  (75% patient effort);1 person assist;verbal cues;nonverbal cues (demo/gesture)  -     Sit-Supine [place] (Bed Mobility) verbal cues;nonverbal cues (demo/gesture);standby assist  -     Assistive Device (Bed Mobility) head of bed elevated;bed rails  -     Row Name 04/14/23 1359          Sit-Stand Transfer    Sit-Stand [place] (Transfers) moderate assist (50% patient effort);verbal cues;nonverbal cues (demo/gesture)  -     Comment, (Sit-Stand Transfer) HHA, assist for R knee extension  -     Row Name 04/14/23 1359          Functional Mobility    Functional Mobility- Comment able to take steps to the bathroom door with MaxAx2, assist to keep R knee from buckling and followed with chair for safety  -     Row Name 04/14/23 1359          Activities of Daily Living    BADL Assessment/Intervention grooming  -     Row Name 04/14/23 1359          Grooming Assessment/Training    [place] Level (Grooming) oral care regimen;verbal cues;nonverbal cues (demo/gesture)  -     Assistive Devices (Grooming) hand over hand  -     Position (Grooming) edge of bed sitting  -     Comment, (Grooming) MaxA for [place] on R. SBA when using LUE  -           User Key  (r) = Recorded By, (t) = Taken By, (c) = Cosigned By    Initials Name Provider Type    Evelia Faulkner OT Occupational Therapist               Obj/Interventions     Row Name 04/14/23 1402          Balance    Static Sitting Balance standby assist  -     Dynamic Sitting Balance standby assist;contact guard  -     Position, Sitting Balance sitting edge of bed  -     Dynamic Standing Balance maximum assist;2-person assist  -     Position/Device Used, Standing Balance supported  -     Balance Interventions occupation based/functional task  -           User Key  (r) = Recorded By, (t) = Taken By, (c) = Cosigned By    Initials Name Provider Type    Evelia Faulkner OT Occupational Therapist               Goals/Plan    No documentation.                 Clinical Impression     Row Name 04/14/23 1403          Pain Assessment    Pretreatment Pain Rating 0/10 - no pain  -     Posttreatment Pain Rating 0/10 - no pain  -     Row Name 04/14/23 1403          Plan of Care Review    Plan of Care Reviewed With patient  -OCTAVIO     Progress improving  -     Outcome Evaluation Pt participates in OT/PT co-treatment session this AM. He sits EOB with Romeo for RLE mgt and tactile cues. Demo's improved sitting balance and midline awareness at EOB. Completes oral care task with MaxA, hand over hand on R, cues to utilize available active movement to complete task. Eventually uses LUE for thoroughness of oral hygiene with SBA. Performs STS and short fxl ambulation to bathroom door with MaxAx2, assist to advance RLE and maintain from buckling. Pt will benefit from further OT intervention in rehab setting to address fxl deficits.  -     Row Name 04/14/23 1403          Therapy Assessment/Plan (OT)    Rehab Potential (OT) good, to achieve stated therapy goals  -     Criteria for Skilled Therapeutic Interventions Met (OT) yes;meets criteria;skilled treatment is necessary  -     Therapy Frequency (OT) 5 times/wk  -     Row Name 04/14/23 1403          Therapy Plan Review/Discharge Plan (OT)    Anticipated Discharge Disposition (OT) inpatient rehabilitation facility  -     Row Name 04/14/23 1403          Positioning and Restraints    Pre-Treatment Position in bed  -     Post Treatment Position bed  -JW     In Bed notified nsg;fowlers;call light within reach;encouraged to call for assist;exit alarm on;RUE elevated  -           User Key  (r) = Recorded By, (t) = Taken By, (c) = Cosigned By    Initials Name Provider Type    Evelia Faulkner, MARK Occupational Therapist               Outcome Measures     Row Name 04/14/23 1236 04/14/23 0831       How much help from another person do you currently need...    Turning from your back to your side while in flat bed without using  bedrails? 3  -CW (r) ZB (t) CW (c) 3  -TF    Moving from lying on back to sitting on the side of a flat bed without bedrails? 3  -CW (r) ZB (t) CW (c) 3  -TF    Moving to and from a bed to a chair (including a wheelchair)? 2  -CW (r) ZB (t) CW (c) 2  -TF    Standing up from a chair using your arms (e.g., wheelchair, bedside chair)? 3  -CW (r) ZB (t) CW (c) 3  -TF    Climbing 3-5 steps with a railing? 1  -CW (r) ZB (t) CW (c) 1  -TF    To walk in hospital room? 2  -CW (r) ZB (t) CW (c) 1  -TF    AM-PAC 6 Clicks Score (PT) 14  -CW (r) ZB (t) 13  -TF    Highest level of mobility 4 --> Transferred to chair/commode  -CW (r) ZB (t) 4 --> Transferred to chair/commode  -TF    Row Name 04/14/23 1236          Functional Assessment    Outcome Measure Options AM-PAC 6 Clicks Basic Mobility (PT)  -CW (r) ZB (t) CW (c)           User Key  (r) = Recorded By, (t) = Taken By, (c) = Cosigned By    Initials Name Provider Type    TF Miriam Smiley RN Registered Nurse    Tamica Eddy, PT Physical Therapist    Nestor Howard, PT Student PT Student                Occupational Therapy Education     Title: PT OT SLP Therapies (Done)     Topic: Occupational Therapy (Done)     Point: ADL training (Done)     Description:   Instruct learner(s) on proper safety adaptation and remediation techniques during self care or transfers.   Instruct in proper use of assistive devices.              Learning Progress Summary           Patient Acceptance, E,TB,H, VU,NR by MS at 4/13/2023 1817    Acceptance, E,TB,H, VU by MS at 3/16/2023 1801    Acceptance, E, NR by LM at 3/15/2023 0521   Family Acceptance, E,TB,H, VU,NR by MS at 4/13/2023 1817    Acceptance, E,TB,H, VU by MS at 3/16/2023 1801                   Point: Home exercise program (Done)     Description:   Instruct learner(s) on appropriate technique for monitoring, assisting and/or progressing therapeutic exercises/activities.              Learning Progress Summary           Patient  Acceptance, E,TB,H, VU,NR by MS at 4/13/2023 1817    Acceptance, E,TB,H, VU by MS at 3/16/2023 1801    Acceptance, E, NR by LM at 3/15/2023 0521   Family Acceptance, E,TB,H, VU,NR by MS at 4/13/2023 1817    Acceptance, E,TB,H, VU by MS at 3/16/2023 1801                   Point: Precautions (Done)     Description:   Instruct learner(s) on prescribed precautions during self-care and functional transfers.              Learning Progress Summary           Patient Acceptance, E,TB,H, VU,NR by MS at 4/13/2023 1817    Acceptance, E,TB,H, VU by MS at 3/16/2023 1801    Acceptance, E, NR by LM at 3/15/2023 0521   Family Acceptance, E,TB,H, VU,NR by MS at 4/13/2023 1817    Acceptance, E,TB,H, VU by MS at 3/16/2023 1801                   Point: Body mechanics (Done)     Description:   Instruct learner(s) on proper positioning and spine alignment during self-care, functional mobility activities and/or exercises.              Learning Progress Summary           Patient Acceptance, E,TB,H, VU,NR by MS at 4/13/2023 1817    Acceptance, E,TB,H, VU by MS at 3/16/2023 1801    Acceptance, E, NR by LM at 3/15/2023 0521   Family Acceptance, E,TB,H, VU,NR by MS at 4/13/2023 1817    Acceptance, E,TB,H, VU by MS at 3/16/2023 1801                               User Key     Initials Effective Dates Name Provider Type Discipline    MS 06/16/21 -  Angel Luis Cr, RN Registered Nurse Nurse     10/13/22 -  Silke Grace, RN Registered Nurse Nurse              OT Recommendation and Plan  Therapy Frequency (OT): 5 times/wk  Plan of Care Review  Plan of Care Reviewed With: patient  Progress: improving  Outcome Evaluation: Pt participates in OT/PT co-treatment session this AM. He sits EOB with Romeo for RLE mgt and tactile cues. Demo's improved sitting balance and midline awareness at EOB. Completes oral care task with MaxA, hand over hand on R, cues to utilize available active movement to complete task. Eventually uses LUE for thoroughness of oral  hygiene with SBA. Performs STS and short fxl ambulation to bathroom door with MaxAx2, assist to advance RLE and maintain from buckling. Pt will benefit from further OT intervention in rehab setting to address fxl deficits.     Time Calculation:    Time Calculation- OT     Row Name 04/14/23 1409             Time Calculation- OT    OT Start Time 1105  -JW      OT Stop Time 1135  -JW      OT Time Calculation (min) 30 min  -JW      Total Timed Code Minutes- OT 30 minute(s)  -JW      OT Received On 04/14/23  -JW      OT - Next Appointment 04/17/23  -         Timed Charges    11848 - OT Therapeutic Activity Minutes 10  -JW      63286 - OT Self Care/Mgmt Minutes 20  -JW         Total Minutes    Timed Charges Total Minutes 30  -JW       Total Minutes 30  -JW            User Key  (r) = Recorded By, (t) = Taken By, (c) = Cosigned By    Initials Name Provider Type    Evelia Faulkner OT Occupational Therapist              Therapy Charges for Today     Code Description Service Date Service Provider Modifiers Qty    [id number]  OT THERAPEUTIC ACT EA 15 MIN 4/14/2023 Evelia Kenney OT GO 1    [id number]  OT SELF CARE/MGMT/TRAIN EA 15 MIN 4/14/2023 Evelia Kenney OT GO 1               Evelia Kenney OT  4/14/2023

## 2023-04-14 NOTE — CASE MANAGEMENT/SOCIAL WORK
Continued Stay Note  [facility]     Patient Name: Manuel Durant  MRN: [id number]  Today's Date: 4/14/2023    Admit Date: 3/12/2023    Plan: Home to [place] vs SNF if Medicaid can be secured   Discharge Plan     Row Name 04/14/23 0930       Plan    Plan Home to [place] vs SNF if Medicaid can be secured    Plan Comments Spoke with Clarence with [facility].  No word yet on eligibilty for Medicaid.  Continue to wait for determination.  CCP continues to await improvement in pt's mobility/ability to travel long distances by ground or commercial flight. Continue to be in contact with granddaughter in [place] and son in [place].  Granddaughter in [place] is unwilling/unable to care for patient in her home in his current condition.  Family in [place] will care for pt if he arrives there but they are unable to come to the [place].  Await return of cognitive ability for participation in discharge planning.  If Medicaid is granted, SNF referrals locally will be sent.  [facility] has declined.  In order to ascertain whether or not pt is eligible for, and to apply for, Medicare/Social Security, patient will need to be cognitively able to assist with the process.  Family unable to assist.  Faye ARMSTRONG               Discharge Codes    No documentation.               Expected Discharge Date and Time     Expected Discharge Date Expected Discharge Time    Apr 21, 2023             Faye Moy RN

## 2023-04-14 NOTE — THERAPY TREATMENT NOTE
Patient Name: Manuel Durant  : 1950    MRN: [id number]                              Today's Date: 2023       Admit Date: 3/12/2023    Visit Dx:     ICD-10-CM ICD-9-CM   1. Intracranial hemorrhage  I62.9 432.9   2. Altered mental status, unspecified altered mental status type  R41.82 780.97     Patient Active Problem List   Diagnosis   • Intracranial hemorrhage   • Hypertensive emergency   • Acute DVT (deep venous thrombosis)   • HTN (hypertension)   • Severe Malnutrition (HCC)   • Hemiplegia   • Right shoulder pain     Past Medical History:   Diagnosis Date   • Hypokalemia 3/17/2023     History reviewed. No pertinent surgical history.   General Information     Row Name 23 1219          Physical Therapy Time and Intention    Document Type therapy note (daily note)  -CW (r) ZB (t) CW (c)     Mode of Treatment physical therapy;co-treatment;occupational therapy  -CW (r) ZB (t) CW (c)     Row Name 23 1219          General Information    Patient Profile Reviewed yes  -CW (r) ZB (t) CW (c)     Existing Precautions/Restrictions fall  -CW (r) ZB (t) CW (c)     Row Name 23 1219          Cognition    Orientation Status (Cognition) oriented to;person  -CW (r) ZB (t) CW (c)     Row Name 23 1219          Safety Issues, Functional Mobility    Impairments Affecting Function (Mobility) balance;coordination;endurance/activity tolerance;grasp;motor control;postural/trunk control;strength;range of motion (ROM);cognition;visual/perceptual  -CW (r) ZB (t) CW (c)           User Key  (r) = Recorded By, (t) = Taken By, (c) = Cosigned By    Initials Name Provider Type    CW Tamica Willoughby, PT Physical Therapist    ZB Nestor Rae, PT Student PT Student               Mobility     Row Name 23 1220          Bed Mobility    Bed Mobility supine-sit;sit-supine  -CW (r) ZB (t) CW (c)     Supine-Sit Gos[place] (Bed Mobility) minimum assist (75% patient effort);1 person assist;verbal cues;nonverbal  cues (demo/gesture)  -CW (r) ZB (t) CW (c)     Sit-Supine Acra (Bed Mobility) verbal cues;nonverbal cues (demo/gesture);standby assist  -CW (r) ZB (t) CW (c)     Assistive Device (Bed Mobility) head of bed elevated;bed rails  -CW (r) ZB (t) CW (c)     Row Name 04/14/23 1220          Bed-Chair Transfer    Bed-Chair Acra (Transfers) moderate assist (50% patient effort);verbal cues;nonverbal cues (demo/gesture);maximum assist (25% patient effort)  -CW (r) ZB (t) CW (c)     Assistive Device (Bed-Chair Transfers) other (see comments)  hha  -CW (r) ZB (t) CW (c)     Row Name 04/14/23 1220          Sit-Stand Transfer    Sit-Stand Acra (Transfers) moderate assist (50% patient effort);verbal cues;nonverbal cues (demo/gesture)  -CW (r) ZB (t) CW (c)     Assistive Device (Sit-Stand Transfers) other (see comments)  hha  -CW (r) ZB (t) CW (c)     Comment, (Sit-Stand Transfer) x3 total; 1x EOB, 2x from chair following amb  -CW (r) ZB (t) CW (c)     Row Name 04/14/23 1220          Gait/Stairs (Locomotion)    Acra Level (Gait) maximum assist (25% patient effort);minimum assist (75% patient effort);2 person assist;verbal cues  -CW (r) ZB (t) CW (c)     Assistive Device (Gait) other (see comments)  L hha  -CW (r) ZB (t) CW (c)     Distance in Feet (Gait) 4', 4', and 6'  -CW (r) ZB (t) CW (c)     Deviations/Abnormal Patterns (Gait) sandhya decreased;gait speed decreased;stride length decreased;ataxic  -CW (r) ZB (t) CW (c)     Bilateral Gait Deviations weight shift ability decreased  -CW (r) ZB (t) CW (c)     Right Sided Gait Deviations foot drop/toe drag  -CW (r) ZB (t) CW (c)     Comment, (Gait/Stairs) DF assist wrap placed; 3rd person chair follow; pt demo ability to self propel R LE w/ assist to clear toes  -CW (r) ZB (t) CW (c)           User Key  (r) = Recorded By, (t) = Taken By, (c) = Cosigned By    Initials Name Provider Type    CW Tamica Willoughby, PT Physical Therapist    Nestor Howard,  PT Student PT Student               Obj/Interventions     Row Name 04/14/23 1225          Balance    Balance Assessment sitting static balance;sitting dynamic balance;standing static balance;standing dynamic balance  -CW (r) ZB (t) CW (c)     Static Sitting Balance standby assist  -CW (r) ZB (t) CW (c)     Dynamic Sitting Balance contact guard  -CW (r) ZB (t) CW (c)     Position, Sitting Balance unsupported;sitting edge of bed  -CW (r) ZB (t) CW (c)     Static Standing Balance minimal assist  -CW (r) ZB (t) CW (c)     Dynamic Standing Balance moderate assist  -CW (r) ZB (t) CW (c)     Position/Device Used, Standing Balance supported;other (see comments)  hha  -CW (r) ZB (t) CW (c)     Balance Interventions sitting;standing;sit to stand;supported;static;dynamic;occupation based/functional task  -CW (r) ZB (t) CW (c)           User Key  (r) = Recorded By, (t) = Taken By, (c) = Cosigned By    Initials Name Provider Type    CW Tamica Willoughby, PT Physical Therapist    Nestor Howard, PT Student PT Student               Goals/Plan    No documentation.                Clinical Impression     Row Name 04/14/23 1227          Pain    Pretreatment Pain Rating 0/10 - no pain  -CW (r) ZB (t) CW (c)     Posttreatment Pain Rating 0/10 - no pain  -CW (r) ZB (t) CW (c)     Pain Intervention(s) Repositioned;Ambulation/increased activity  -CW (r) ZB (t) CW (c)     Row Name 04/14/23 1227          Plan of Care Review    Plan of Care Reviewed With patient  -CW (r) ZB (t) CW (c)     Progress improving  -CW (r) ZB (t) CW (c)     Outcome Evaluation Pt participated in PT/OT co-tx session this AM. Pt came to EOB primarily w/ sba but req Amish to transition to fully upright position. Seated EOB he demos midline awareness and is able to maintain seated balance w/o support. He demo's active mvmt in his R fingers as well as R akle DF. STS from EOB w/ modA followed by amb towards bathroom door w/ mod-maxA for LE management and advancement +  Amish for support via [facility]. Pt demos improvement w/ further trials and actively advances R LE w/ only assist to clear toes and prevent knee buckle. The pt could tolerate and would benefit greatly from longer therapy sessions and integration of addtl equipment not available in this setting. PT will continue to monitor and progress pt as able.  -CW (r) ZB (t) CW (c)     Row Name 04/14/23 1227          Positioning and Restraints    Pre-Treatment Position in bed  -CW (r) ZB (t) CW (c)     Post Treatment Position bed  -CW (r) ZB (t) CW (c)     In Bed notified nsg;call light within reach;encouraged to call for assist;exit alarm on  -CW (r) ZB (t) CW (c)           User Key  (r) = Recorded By, (t) = Taken By, (c) = Cosigned By    Initials Name Provider Type    CW Tamica Willoughby, PT Physical Therapist    Nestor Howard, PT Student PT Student               Outcome Measures     Row Name 04/14/23 1236 04/14/23 0831       How much help from another person do you currently need...    Turning from your back to your side while in flat bed without using bedrails? 3  -CW (r) ZB (t) CW (c) 3  -TF    Moving from lying on back to sitting on the side of a flat bed without bedrails? 3  -CW (r) ZB (t) CW (c) 3  -TF    Moving to and from a bed to a chair (including a wheelchair)? 2  -CW (r) ZB (t) CW (c) 2  -TF    Standing up from a chair using your arms (e.g., wheelchair, bedside chair)? 3  -CW (r) ZB (t) CW (c) 3  -TF    Climbing 3-5 steps with a railing? 1  -CW (r) ZB (t) CW (c) 1  -TF    To walk in hospital room? 2  -CW (r) ZB (t) CW (c) 1  -TF    AM-PAC 6 Clicks Score (PT) 14  -CW (r) ZB (t) 13  -TF    Highest level of mobility 4 --> Transferred to chair/commode  -CW (r) ZB (t) 4 --> Transferred to chair/commode  -TF    Row Name 04/14/23 1236          Functional Assessment    Outcome Measure Options AM-PAC 6 Clicks Basic Mobility (PT)  -CW (r) ZB (t) CW (c)           User Key  (r) = Recorded By, (t) = Taken By, (c) = Cosigned By     Initials Name Provider Type    TF Miriam Smiley, RN Registered Nurse    Tamica Eddy, PT Physical Therapist    Nestor Howard, PT Student PT Student                             Physical Therapy Education     Title: PT OT SLP Therapies (Done)     Topic: Physical Therapy (Done)     Point: Mobility training (Done)     Learning Progress Summary           Patient Acceptance, E,TB,H, VU,NR by MS at 4/13/2023 1817    Acceptance, E, NR by CW at 4/13/2023 1341    Acceptance, E, NR by CW at 4/12/2023 1201    Acceptance, E, NR by CW at 4/11/2023 1158    Acceptance, E, NR by CW at 4/10/2023 1414    Acceptance, E,TB, NR by CS at 4/8/2023 0928    Acceptance, E, NR by CW at 4/7/2023 1215    Acceptance, E, NR by CW at 4/6/2023 1335    Acceptance, E, NR by CW at 4/5/2023 1043    Acceptance, E, NR by CW at 4/4/2023 0906    Acceptance, E, NR by CW at 4/3/2023 1531    Acceptance, E,TB, VU,NR by CAMERON at 4/1/2023 1530    Acceptance, E, NR by CW at 3/31/2023 0925    Acceptance, E, NR by CW at 3/30/2023 1209    Acceptance, E, NR,NL by CW at 3/29/2023 1336    Acceptance, E, NR by CHARLY at 3/28/2023 1317    Acceptance, E, NR by CW at 3/27/2023 1401    Acceptance, E,D, NR,VU by ZACH at 3/24/2023 1631    Comment: seemed to comprehend commands this session    Acceptance, E,D, NR by JM at 3/23/2023 1655    Acceptance, E,D, NR by JM at 3/22/2023 1545    Acceptance, E,TB, VU,NR by CB at 3/21/2023 1544    Acceptance, E,TB,H, VU by MS at 3/16/2023 1801    Acceptance, E,D, DU,NR by MO at 3/16/2023 1451    Acceptance, E, DU,NR by MO at 3/15/2023 1200    Acceptance, E, NR by LM at 3/15/2023 0521    Acceptance, E,D, DU,NR by MO at 3/14/2023 1458   Family Acceptance, E,TB,H, VU,NR by MS at 4/13/2023 1817    Acceptance, E,TB,H, VU by MS at 3/16/2023 1801                   Point: Home exercise program (Done)     Learning Progress Summary           Patient Acceptance, E,TB,H, VU,NR by MS at 4/13/2023 1817    Acceptance, E, NR by CW at 4/13/2023 1341     Acceptance, E,TB, NR by CS at 4/8/2023 0928    Acceptance, E, NR by CW at 4/7/2023 1215    Acceptance, E, NR by CW at 4/6/2023 1335    Acceptance, E,TB, VU,NR by CAMERON at 4/1/2023 1530    Acceptance, E, NR by CW at 3/31/2023 0925    Acceptance, E, NR by CHARLY at 3/28/2023 1317    Acceptance, E, NR by CW at 3/27/2023 1401    Acceptance, E,D, NR,VU by JM at 3/24/2023 1631    Comment: seemed to comprehend commands this session    Acceptance, E,D, NR by ZACH at 3/23/2023 1655    Acceptance, E,D, NR by JM at 3/22/2023 1545    Acceptance, E,TB, VU,NR by CB at 3/21/2023 1544    Acceptance, E,TB,H, VU by MS at 3/16/2023 1801    Acceptance, E,D, DU,NR by MO at 3/16/2023 1451    Acceptance, E, NR by LM at 3/15/2023 0521    Acceptance, E,D, DU,NR by MO at 3/14/2023 1458   Family Acceptance, E,TB,H, VU,NR by MS at 4/13/2023 1817    Acceptance, E,TB,H, VU by MS at 3/16/2023 1801                   Point: Body mechanics (Done)     Learning Progress Summary           Patient Acceptance, E,TB,H, VU,NR by MS at 4/13/2023 1817    Acceptance, E, NR by CW at 4/13/2023 1341    Acceptance, E, NR by CW at 4/11/2023 1158    Acceptance, E, NR by CW at 4/10/2023 1414    Acceptance, E,TB, NR by CS at 4/8/2023 0928    Acceptance, E, NR by CW at 4/7/2023 1215    Acceptance, E, NR by CW at 4/4/2023 0906    Acceptance, E, NR by CW at 4/3/2023 1531    Acceptance, E,TB, VU,NR by CAMERON at 4/1/2023 1530    Acceptance, E,D, NR,VU by JM at 3/24/2023 1631    Comment: seemed to comprehend commands this session    Acceptance, E,D, NR by ZACH at 3/23/2023 1655    Acceptance, E,D, NR by JM at 3/22/2023 1545    Acceptance, E,TB, VU,NR by CB at 3/21/2023 1544    Acceptance, E,TB,H, VU by MS at 3/16/2023 1801    Acceptance, E,D, DU,NR by MO at 3/16/2023 1451    Acceptance, E, DU,NR by MO at 3/15/2023 1200    Acceptance, E, NR by LM at 3/15/2023 0521    Acceptance, E,D, DU,NR by MO at 3/14/2023 1458   Family Acceptance, E,TB,H, VU,NR by MS at 4/13/2023 1817    Acceptance,  E,TB,H, VU by MS at 3/16/2023 1801                   Point: Precautions (Done)     Learning Progress Summary           Patient Acceptance, E,TB,H, VU,NR by MS at 4/13/2023 1817    Acceptance, E, NR by CW at 4/13/2023 1341    Acceptance, E,TB, NR by CS at 4/8/2023 0928    Acceptance, E, NR by CW at 4/7/2023 1215    Acceptance, E, NR by CW at 4/4/2023 0906    Acceptance, E, NR by CW at 4/3/2023 1531    Acceptance, E,TB, VU,NR by CAMERON at 4/1/2023 1530    Acceptance, E,D, NR,VU by JM at 3/24/2023 1631    Comment: seemed to comprehend commands this session    Acceptance, E,D, NR by JM at 3/23/2023 1655    Acceptance, E,D, NR by JM at 3/22/2023 1545    Acceptance, E,TB, VU,NR by CB at 3/21/2023 1544    Acceptance, E,TB,H, VU by MS at 3/16/2023 1801    Acceptance, E,D, DU,NR by MO at 3/16/2023 1451    Acceptance, E, DU,NR by MO at 3/15/2023 1200    Acceptance, E, NR by LM at 3/15/2023 0521    Acceptance, E,D, DU,NR by MO at 3/14/2023 1458   Family Acceptance, E,TB,H, VU,NR by MS at 4/13/2023 1817    Acceptance, E,TB,H, VU by MS at 3/16/2023 1801                               User Key     Initials Effective Dates Name Provider Type Discipline     03/07/18 -  Tiffanie Grace, PTA Physical Therapist Assistant PT    CAMERON 05/19/21 -  Leeann Wiley, PT Physical Therapist PT    MS 06/16/21 -  Angel Luis Cr, RN Registered Nurse Nurse    CW 12/13/22 -  Tamica Willoughby, PT Physical Therapist PT    CB 10/22/21 -  Bety Benitez, PT Physical Therapist PT    CS 09/22/22 -  Adelia Montalvo, PT Physical Therapist PT    LM 10/13/22 -  Silke Grace, RN Registered Nurse Nurse    MO 01/27/23 -  Lacie Tanner, PT Student PT Student PT    ZB 03/10/23 -  Nestor Rae, PT Student PT Student PT              PT Recommendation and Plan     Plan of Care Reviewed With: patient  Progress: improving  Outcome Evaluation: Pt participated in PT/OT co-tx session this AM. Pt came to EOB primarily w/ sba but req Amish to transition to fully  upright position. Seated EOB he demos midline awareness and is able to maintain seated balance w/o support. He demo's active mvmt in his R fingers as well as R akle DF. STS from EOB w/ modA followed by amb towards bathroom door w/ mod-maxA for LE management and advancement + Amish for support via hha. Pt demos improvement w/ further trials and actively advances R LE w/ only assist to clear toes and prevent knee buckle. The pt could tolerate and would benefit greatly from longer therapy sessions and integration of addtl equipment not available in this setting. PT will continue to monitor and progress pt as able.     Time Calculation:    PT Charges     Row Name 04/14/23 1237             Time Calculation    Start Time 1106  -CW (r) ZB (t) CW (c)      Stop Time 1131  -CW (r) ZB (t) CW (c)      Time Calculation (min) 25 min  -CW (r) ZB (t)      PT Received On 04/14/23  -CW (r) ZB (t) CW (c)      PT - Next Appointment 04/15/23  -CW (r) ZB (t) CW (c)         Time Calculation- PT    Total Timed Code Minutes- PT 25 minute(s)  -CW (r) ZB (t) CW (c)         Timed Charges    52558 - PT Therapeutic Activity Minutes 25  -CW (r) ZB (t) CW (c)         Total Minutes    Timed Charges Total Minutes 25  -CW (r) ZB (t)       Total Minutes 25  -CW (r) ZB (t)            User Key  (r) = Recorded By, (t) = Taken By, (c) = Cosigned By    Initials Name Provider Type    CW Tamica Willoughby, PT Physical Therapist    ZB Nestor Rae, PT Student PT Student              Therapy Charges for Today     Code Description Service Date Service Provider Modifiers Qty    33078062707 HC PT THERAPEUTIC ACT EA 15 MIN 4/14/2023 Nestor Rae, PT Student GP 2    45554945708 HC PT THER SUPP EA 15 MIN 4/14/2023 Nestor Rae, PT Student GP 1          PT G-Codes  Outcome Measure Options: AM-PAC 6 Clicks Basic Mobility (PT)  AM-PAC 6 Clicks Score (PT): 14  AM-PAC 6 Clicks Score (OT): 15  Modified Yamilex Scale: 4 - Moderately severe disability.  Unable to walk  without assistance, and unable to attend to own bodily needs without assistance.       Nestor Rae, PT Student  4/14/2023

## 2023-04-14 NOTE — PLAN OF CARE
Goal Outcome Evaluation:  Plan of Care Reviewed With: patient        Progress: improving  Outcome Evaluation: Pt participates in OT/PT co-treatment session this AM. He sits EOB with Romeo for RLE mgt and tactile cues. Demo's improved sitting balance and midline awareness at EOB. Completes oral care task with MaxA, hand over hand on R, cues to utilize available active movement to complete task. Eventually uses LUE for thoroughness of oral hygiene with SBA. Performs STS and short fxl ambulation to bathroom door with MaxAx2, assist to advance RLE and maintain from buckling. Pt will benefit from further OT intervention in rehab setting to address fxl deficits.

## 2023-04-14 NOTE — CASE MANAGEMENT/SOCIAL WORK
Continued Stay Note  [facility]     Patient Name: Manuel Durant  MRN: [id number]  Today's Date: 4/14/2023    Admit Date: 3/12/2023    Plan: Home to [place] vs SNF if Medicaid can be secured   Discharge Plan     Row Name 04/14/23 1337       Plan    Plan Home to [place] vs SNF if Medicaid can be secured    Plan Comments Spoke at length with pt's granddaughter Natalie by phone.  Discussed the found documents and the possibility of her assisting pt to apply for Medicare or Social Security.  She states that she is not interested in doing that as the plan is for him to return to [place] as soon as possible.  She is not interested either with him coming to [place] even if he has benefits for rehab.  Requested family provide a target date that they will be able to setup pt's transport to [place]. She states that her plan continues to be to come to [place] to facilitate her grandfather boarding the flight to [place].  She states that they will be able to set up the transpotation.  Re-advised that pt will need someone to travel with him to [place] to assist him with his needs.  She states that either she or an uncle will be able to do that.  Discussed the possibility of the family in [place] assisting.  She states that she will discuss all with her father and grandmother.  She will call [facility] back on Monday with additional planning details.  Faye ARMSTRONG               Discharge Codes    No documentation.               Expected Discharge Date and Time     Expected Discharge Date Expected Discharge Time    Apr 21, 2023             Faye Moy RN

## 2023-04-15 RX ORDER — POLYETHYLENE GLYCOL 3350 17 G/17G
17 POWDER, FOR SOLUTION ORAL DAILY PRN
Status: DISCONTINUED | OUTPATIENT
Start: 2023-04-15 | End: 2023-05-08 | Stop reason: HOSPADM

## 2023-04-15 RX ORDER — AMOXICILLIN 250 MG
2 CAPSULE ORAL NIGHTLY PRN
Status: DISCONTINUED | OUTPATIENT
Start: 2023-04-15 | End: 2023-05-08 | Stop reason: HOSPADM

## 2023-04-15 RX ORDER — BISACODYL 5 MG/1
5 TABLET, DELAYED RELEASE ORAL DAILY PRN
Status: DISCONTINUED | OUTPATIENT
Start: 2023-04-15 | End: 2023-05-08 | Stop reason: HOSPADM

## 2023-04-15 RX ORDER — BISACODYL 10 MG
10 SUPPOSITORY, RECTAL RECTAL DAILY PRN
Status: DISCONTINUED | OUTPATIENT
Start: 2023-04-15 | End: 2023-05-08 | Stop reason: HOSPADM

## 2023-04-15 RX ADMIN — BACLOFEN 2.5 MG: 10 TABLET ORAL at 15:49

## 2023-04-15 RX ADMIN — Medication 10 ML: at 08:57

## 2023-04-15 RX ADMIN — Medication 10 ML: at 20:38

## 2023-04-15 RX ADMIN — HYDRALAZINE HYDROCHLORIDE 10 MG: 10 TABLET, FILM COATED ORAL at 22:34

## 2023-04-15 RX ADMIN — ACETAMINOPHEN 650 MG: 325 TABLET, FILM COATED ORAL at 15:50

## 2023-04-15 RX ADMIN — CARVEDILOL 6.25 MG: 12.5 TABLET, FILM COATED ORAL at 08:57

## 2023-04-15 RX ADMIN — BACLOFEN 2.5 MG: 10 TABLET ORAL at 05:57

## 2023-04-15 RX ADMIN — HYDRALAZINE HYDROCHLORIDE 10 MG: 10 TABLET, FILM COATED ORAL at 15:51

## 2023-04-15 RX ADMIN — VALSARTAN 160 MG: 160 TABLET, FILM COATED ORAL at 08:56

## 2023-04-15 RX ADMIN — LIDOCAINE 1 PATCH: 700 PATCH TOPICAL at 08:57

## 2023-04-15 RX ADMIN — BACLOFEN 2.5 MG: 10 TABLET ORAL at 22:34

## 2023-04-15 RX ADMIN — APIXABAN 5 MG: 5 TABLET, FILM COATED ORAL at 20:38

## 2023-04-15 RX ADMIN — AMLODIPINE BESYLATE 10 MG: 10 TABLET ORAL at 08:58

## 2023-04-15 RX ADMIN — HYDRALAZINE HYDROCHLORIDE 10 MG: 10 TABLET, FILM COATED ORAL at 05:57

## 2023-04-15 RX ADMIN — APIXABAN 5 MG: 5 TABLET, FILM COATED ORAL at 08:56

## 2023-04-15 NOTE — PROGRESS NOTES
Name: Manuel Durant ADMIT: 3/12/2023   : 1950  PCP: Provider, No Known    MRN: [id number] LOS: 34 days   AGE/SEX: 73 y.o. male  ROOM: [facility]/     Subjective   Subjective   No issues overnight.  Still requiring 2 person assist to get up out of bed.  Was able to interview him both in English and with a little Sinhala that I knew was able to assess.  Denies any chest pain, shortness of breath, abdominal pain, nausea, or dizziness.  Right-sided upper extremity weakness remains.  Does report some pain and stiffness to the right shoulder area.      Review of Systems   Respiratory: Negative for cough and shortness of breath.    Cardiovascular: Negative for chest pain and leg swelling.   Gastrointestinal: Negative for abdominal pain, nausea and vomiting.   Musculoskeletal: Positive for arthralgias and gait problem.        Objective   Objective   Vital Signs  Temp:  [97.5 °F (36.4 °C)-99.2 °F (37.3 °C)] 97.5 °F (36.4 °C)  Heart Rate:  [62-75] 62  Resp:  [16-18] 16  BP: (102-120)/(63-70) 102/70  SpO2:  [95 %-96 %] 96 %  on   ;   Device (Oxygen Therapy): room air  Body mass index is 23.88 kg/m².     Physical Exam  Vitals and nursing note reviewed.   Constitutional:       General: He is sleeping. He is not in acute distress.  HENT:      Head: Normocephalic.      Mouth/Throat:      Lips: Pink.   Eyes:      General: Lids are normal.   Cardiovascular:      Rate and Rhythm: Normal rate and regular rhythm.      Pulses: Normal pulses.      Heart sounds: Normal heart sounds. No murmur heard.  Pulmonary:      Effort: Pulmonary effort is normal. No respiratory distress.      Breath sounds: Normal breath sounds.   Abdominal:      Palpations: Abdomen is soft.   Musculoskeletal:      Cervical back: Neck supple.      Right lower leg: No edema.      Left lower leg: No edema.      Comments: Mild right upper extremity swelling to hand.   Skin:     General: Skin is dry.   Neurological:      Motor: Weakness present.      Comments:  At times disoriented.  Right-sided upper extremity paresis.   Psychiatric:         Mood and Affect: Mood normal.         Behavior: Behavior normal.         Thought Content: Thought content normal.         Judgment: Judgment normal.       Results Review     I reviewed the patient's new clinical results.  Results from last 7 days   Lab Units 04/11/23  0729   WBC 10*3/mm3 6.74   HEMOGLOBIN g/dL 13.8   PLATELETS 10*3/mm3 168     Results from last 7 days   Lab Units 04/11/23  0729   SODIUM mmol/L 139   POTASSIUM mmol/L 3.9   CHLORIDE mmol/L 107   CO2 mmol/L 25.0   BUN mg/dL 13   CREATININE mg/dL 0.64*   GLUCOSE mg/dL 86   EGFR mL/min/1.73 101.2       Results from last 7 days   Lab Units 04/11/23  0729   CALCIUM mg/dL 8.4*       No results found for: HGBA1C, POCGLU    No radiology results for the last day  I have personally reviewed all medications:  Scheduled Medications  amLODIPine, 10 mg, Oral, QAM AC  apixaban, 5 mg, Oral, Q12H  baclofen, 2.5 mg, Oral, Q8H  carvedilol, 6.25 mg, Oral, BID With Meals  hydrALAZINE, 10 mg, Oral, Q8H  lidocaine, 1 patch, Transdermal, Q24H  senna-docusate sodium, 2 tablet, Oral, BID  sodium chloride, 10 mL, Intravenous, Q12H  valsartan, 160 mg, Oral, QAM AC    Infusions   Diet  Diet: Regular/House Diet; No Mixed Consistencies, Feeding Assistance - Nursing; Texture: Soft to Chew (NDD 3); Soft to Chew: Chopped Meat; Fluid Consistency: Nectar Thick    I have personally reviewed:  []  Laboratory   []  Microbiology   [x]  Radiology   [x]  EKG/Telemetry  [x]  Cardiology/Vascular   []  Pathology    []  Records      Tele SR     Assessment/Plan     Active Hospital Problems    Diagnosis  POA   • **Intracranial hemorrhage [I62.9]  Yes   • Hemiplegia [G81.90]  Yes   • Right shoulder pain [M25.511]  Yes   • Severe Malnutrition (HCC) [E43]  Yes   • Acute DVT (deep venous thrombosis) [I82.409]  Yes   • HTN (hypertension) [I10]  Yes   • Hypertensive emergency [I16.1]  Yes      Resolved Hospital Problems     Diagnosis Date Resolved POA   • Hypokalemia [E87.6] 04/10/2023 Unknown       73 y.o. male admitted with Intracranial hemorrhage.    ICH/rt hemiplegia:  -Continue OT/PT/ST. F/U [facility] outpatient w/MRI brain. Baclofen for spasticity; increased to TID, seems to be tolerating. Tolerating modified diet. [facility] has denied admission due to unknown disposition at discharge. CCP following; likely SNF vs back to [place] w/family.  Continue mobilization with staff and therapy.     Rt shoulder pain:  -Likely due to subluxation; xray negative for acute finding. Continue lidoderm patch. Encourage support of RUE, use sling when up.  May use Tylenol to assist with pain control    DVT:  -Cleared to start Eliquis per [facility].  Eliquis started 3/24/2023.  Is tolerating well     HTN:  -BP acceptable. Continue amlodipine, valsartan, carvedilol, hydralazine        • Eliquis (home med) for DVT prophylaxis.  • Full code.  • Discussed with patient and nursing staff.  • Anticipate discharge SNF vs home with family ([place]) once arrangements have been made (see CCP note 4/14/23).  Awaiting family's input on Monday, 4/17/2023     SLICK Dyer  [facility]  04/15/23  14:35 EDT

## 2023-04-15 NOTE — PLAN OF CARE
Problem: Fall Injury Risk  Goal: Absence of Fall and Fall-Related Injury  Outcome: Ongoing, Progressing  Intervention: Identify and Manage Contributors  Recent Flowsheet Documentation  Taken 4/15/2023 0000 by Michelle Parks RN  Medication Review/Management: medications reviewed  Taken 4/14/2023 2200 by Michelle Parks RN  Medication Review/Management: medications reviewed  Taken 4/14/2023 2000 by Michelle Parks RN  Medication Review/Management: medications reviewed  Intervention: Promote Injury-Free Environment  Recent Flowsheet Documentation  Taken 4/15/2023 0000 by Michelle Parks RN  Safety Promotion/Fall Prevention: safety round/check completed  Taken 4/14/2023 2200 by Michelle Parks RN  Safety Promotion/Fall Prevention: safety round/check completed  Taken 4/14/2023 2000 by Michelle Parks RN  Safety Promotion/Fall Prevention: safety round/check completed     Problem: Skin Injury Risk Increased  Goal: Skin Health and Integrity  Outcome: Ongoing, Progressing  Intervention: Optimize Skin Protection  Recent Flowsheet Documentation  Taken 4/15/2023 0000 by Michelle Parks RN  Head of Bed (HOB) Positioning: HOB at 20 degrees  Taken 4/14/2023 2200 by Michelle Parks RN  Head of Bed (HOB) Positioning: HOB at 20 degrees  Taken 4/14/2023 2000 by Michelle Parks RN  Pressure Reduction Techniques: frequent weight shift encouraged  Head of Bed (HOB) Positioning: HOB at 20 degrees  Pressure Reduction Devices: alternating pressure pump (ADD)  Skin Protection: adhesive use limited     Problem: Adult Inpatient Plan of Care  Goal: Plan of Care Review  Outcome: Ongoing, Progressing  Flowsheets (Taken 4/15/2023 0102)  Plan of Care Reviewed With: patient  Goal: Patient-Specific Goal (Individualized)  Outcome: Ongoing, Progressing  Goal: Absence of Hospital-Acquired Illness or Injury  Outcome: Ongoing, Progressing  Intervention: Identify and Manage Fall Risk  Recent Flowsheet Documentation  Taken 4/15/2023 0000 by Charly  STANLEY Martinez  Safety Promotion/Fall Prevention: safety round/check completed  Taken 4/14/2023 2200 by Michelle Parks RN  Safety Promotion/Fall Prevention: safety round/check completed  Taken 4/14/2023 2000 by Michelle Parks RN  Safety Promotion/Fall Prevention: safety round/check completed  Intervention: Prevent Skin Injury  Recent Flowsheet Documentation  Taken 4/15/2023 0000 by Michelle Parks RN  Body Position: position changed independently  Taken 4/14/2023 2200 by Michelle Parks RN  Body Position: position changed independently  Taken 4/14/2023 2000 by Michelle Parks RN  Body Position: position changed independently  Skin Protection: adhesive use limited  Intervention: Prevent and Manage VTE (Venous Thromboembolism) Risk  Recent Flowsheet Documentation  Taken 4/14/2023 2000 by Michelle Parks RN  VTE Prevention/Management:   bilateral   sequential compression devices on  Range of Motion: ROM (range of motion) performed  Intervention: Prevent Infection  Recent Flowsheet Documentation  Taken 4/14/2023 2000 by Michelle Parks RN  Infection Prevention: single patient room provided  Goal: Optimal Comfort and Wellbeing  Outcome: Ongoing, Progressing  Intervention: Provide Person-Centered Care  Recent Flowsheet Documentation  Taken 4/14/2023 2000 by Michelle Parks RN  Trust Relationship/Rapport:   choices provided   care explained  Goal: Readiness for Transition of Care  Outcome: Ongoing, Progressing     Problem: Adjustment to Illness (Stroke, Ischemic/Transient Ischemic Attack)  Goal: Optimal Coping  Outcome: Ongoing, Progressing  Intervention: Support Psychosocial Response to Stroke  Recent Flowsheet Documentation  Taken 4/14/2023 2000 by Michelle Parks RN  Supportive Measures: active listening utilized  Family/Support System Care: self-care encouraged     Problem: Bowel Elimination Impaired (Stroke, Ischemic/Transient Ischemic Attack)  Goal: Effective Bowel Elimination  Outcome: Ongoing, Progressing      Problem: Cerebral Tissue Perfusion (Stroke, Ischemic/Transient Ischemic Attack)  Goal: Optimal Cerebral Tissue Perfusion  Outcome: Ongoing, Progressing  Intervention: Protect and Optimize Cerebral Perfusion  Recent Flowsheet Documentation  Taken 4/14/2023 2000 by Michelle Parks RN  Sensory Stimulation Regulation:   care clustered   lighting decreased  Cerebral Perfusion Promotion: blood pressure monitored     Problem: Cognitive Impairment (Stroke, Ischemic/Transient Ischemic Attack)  Goal: Optimal Cognitive Function  Outcome: Ongoing, Progressing  Intervention: Optimize Cognitive Function  Recent Flowsheet Documentation  Taken 4/14/2023 2000 by Michelle Parks RN  Sensory Stimulation Regulation:   care clustered   lighting decreased  Reorientation Measures: clock in view     Problem: Communication Impairment (Stroke, Ischemic/Transient Ischemic Attack)  Goal: Improved Communication Skills  Outcome: Ongoing, Progressing  Intervention: Optimize Communication Skills  Recent Flowsheet Documentation  Taken 4/14/2023 2000 by Michelle Parks RN  Communication Enhancement Strategies: extra time allowed for response     Problem: Functional Ability Impaired (Stroke, Ischemic/Transient Ischemic Attack)  Goal: Optimal Functional Ability  Outcome: Ongoing, Progressing     Problem: Respiratory Compromise (Stroke, Ischemic/Transient Ischemic Attack)  Goal: Effective Oxygenation and Ventilation  Outcome: Ongoing, Progressing  Intervention: Optimize Oxygenation and Ventilation  Recent Flowsheet Documentation  Taken 4/15/2023 0000 by Michelle Parks RN  Head of Bed (HOB) Positioning: HOB at 20 degrees  Taken 4/14/2023 2200 by Michelle Parks RN  Head of Bed (HOB) Positioning: HOB at 20 degrees  Taken 4/14/2023 2000 by Michelle Parks RN  Head of Bed (HOB) Positioning: HOB at 20 degrees     Problem: Sensorimotor Impairment (Stroke, Ischemic/Transient Ischemic Attack)  Goal: Improved Sensorimotor Function  Outcome: Ongoing,  Progressing  Intervention: Optimize Range of Motion, Motor Control and Function  Recent Flowsheet Documentation  Taken 4/15/2023 0000 by Michelle Parks RN  Positioning/Transfer Devices:   pillows   in use  Taken 4/14/2023 2200 by Michelle Parks RN  Positioning/Transfer Devices:   pillows   in use  Taken 4/14/2023 2000 by Michelle Parks RN  Spasticity Management: positioned with supportive device  Positioning/Transfer Devices:   pillows   in use  Range of Motion: ROM (range of motion) performed  Intervention: Optimize Sensory and Perceptual Ability  Recent Flowsheet Documentation  Taken 4/14/2023 2000 by Michelle Parks RN  Pressure Reduction Techniques: frequent weight shift encouraged  Sensation Impairment Protection: cues provided for safety  Pressure Reduction Devices: alternating pressure pump (ADD)     Problem: Swallowing Impairment (Stroke, Ischemic/Transient Ischemic Attack)  Goal: Optimal Eating and Swallowing without Aspiration  Outcome: Ongoing, Progressing     Problem: Urinary Elimination Impaired (Stroke, Ischemic/Transient Ischemic Attack)  Goal: Effective Urinary Elimination  Outcome: Ongoing, Progressing     Problem: Adjustment to Illness (Stroke, Hemorrhagic)  Goal: Optimal Coping  Outcome: Ongoing, Progressing  Intervention: Support Psychosocial Response to Stroke  Recent Flowsheet Documentation  Taken 4/14/2023 2000 by Michelle Parks RN  Supportive Measures: active listening utilized  Family/Support System Care: self-care encouraged     Problem: Bowel Elimination Impaired (Stroke, Hemorrhagic)  Goal: Effective Bowel Elimination  Outcome: Ongoing, Progressing     Problem: Cerebral Tissue Perfusion (Stroke, Hemorrhagic)  Goal: Optimal Cerebral Tissue Perfusion  Outcome: Ongoing, Progressing  Intervention: Protect and Optimize Cerebral Perfusion  Recent Flowsheet Documentation  Taken 4/14/2023 2000 by Michelle Parks RN  Sensory Stimulation Regulation:   care clustered   lighting  decreased  Cerebral Perfusion Promotion: blood pressure monitored     Problem: Cognitive Impairment (Stroke, Hemorrhagic)  Goal: Optimal Cognitive Function  Outcome: Ongoing, Progressing  Intervention: Optimize Cognitive Function  Recent Flowsheet Documentation  Taken 4/14/2023 2000 by Michelle Parks RN  Sensory Stimulation Regulation:   care clustered   lighting decreased  Reorientation Measures: clock in view     Problem: Communication Impairment (Stroke, Hemorrhagic)  Goal: Effective Communication Skills  Outcome: Ongoing, Progressing  Intervention: Optimize Communication Skills  Recent Flowsheet Documentation  Taken 4/14/2023 2000 by Michelle Parks RN  Communication Enhancement Strategies: extra time allowed for response     Problem: Functional Ability Impaired (Stroke, Hemorrhagic)  Goal: Optimal Functional Ability  Outcome: Ongoing, Progressing     Problem: Pain (Stroke, Hemorrhagic)  Goal: Acceptable Pain Control  Outcome: Ongoing, Progressing     Problem: Respiratory Compromise (Stroke, Hemorrhagic)  Goal: Effective Oxygenation and Ventilation  Outcome: Ongoing, Progressing  Intervention: Optimize Oxygenation and Ventilation  Recent Flowsheet Documentation  Taken 4/15/2023 0000 by Michelle Parks RN  Head of Bed (HOB) Positioning: HOB at 20 degrees  Taken 4/14/2023 2200 by Michelle Parks RN  Head of Bed (HOB) Positioning: HOB at 20 degrees  Taken 4/14/2023 2000 by Michelle Parks RN  Head of Bed (HOB) Positioning: HOB at 20 degrees     Problem: Sensorimotor Impairment (Stroke, Hemorrhagic)  Goal: Improved Sensorimotor Function  Outcome: Ongoing, Progressing  Intervention: Optimize Range of Motion, Motor Control and Function  Recent Flowsheet Documentation  Taken 4/15/2023 0000 by Michelle Parks RN  Positioning/Transfer Devices:   pillows   in use  Taken 4/14/2023 2200 by Michelle Parks RN  Positioning/Transfer Devices:   pillows   in use  Taken 4/14/2023 2000 by Michelle Parks RN  Spasticity  Management: positioned with supportive device  Positioning/Transfer Devices:   pillows   in use  Range of Motion: ROM (range of motion) performed  Intervention: Optimize Sensory and Perceptual Ability  Recent Flowsheet Documentation  Taken 4/14/2023 2000 by Michelle Parks RN  Pressure Reduction Techniques: frequent weight shift encouraged  Sensation Impairment Protection: cues provided for safety  Pressure Reduction Devices: alternating pressure pump (ADD)     Problem: Swallowing Impairment (Stroke, Hemorrhagic)  Goal: Optimal Eating and Swallowing Without Aspiration  Outcome: Ongoing, Progressing     Problem: Urinary Elimination Impaired (Stroke, Hemorrhagic)  Goal: Effective Urinary Elimination  Outcome: Ongoing, Progressing   Goal Outcome Evaluation:  Plan of Care Reviewed With: patient

## 2023-04-15 NOTE — PLAN OF CARE
Goal Outcome Evaluation:  Plan of Care Reviewed With: patient           Outcome Evaluation: Pt alert and oriented to person, VSS, pain treated with PRN tylenol and lidocaine patch. Weakness noted to patient's right side, able to slightly move arm at the shoulder and bed right knee. Pt is a x2 assist with walker and gait belt to stand/pivot to chair. Tolerating diet well. Awaiting discharge plan. Will continue to monitor.    Problem: Fall Injury Risk  Goal: Absence of Fall and Fall-Related Injury  Outcome: Ongoing, Progressing  Intervention: Identify and Manage Contributors  Recent Flowsheet Documentation  Taken 4/15/2023 1421 by Carolyn Gallegos, RN  Medication Review/Management: medications reviewed  Taken 4/15/2023 1230 by Carolyn Gallegos RN  Medication Review/Management: medications reviewed  Taken 4/15/2023 1005 by Carolyn Gallegos, RN  Medication Review/Management: medications reviewed  Taken 4/15/2023 0855 by Carolyn Gallegos RN  Medication Review/Management: medications reviewed  Intervention: Promote Injury-Free Environment  Recent Flowsheet Documentation  Taken 4/15/2023 1421 by Carolyn Gallegos RN  Safety Promotion/Fall Prevention:   activity supervised   nonskid shoes/slippers when out of bed   fall prevention program maintained   clutter free environment maintained   safety round/check completed  Taken 4/15/2023 1230 by Carolyn Gallegos RN  Safety Promotion/Fall Prevention:   safety round/check completed   room organization consistent   nonskid shoes/slippers when out of bed   fall prevention program maintained   clutter free environment maintained   assistive device/personal items within reach  Taken 4/15/2023 1005 by Carolyn Gallegos, RN  Safety Promotion/Fall Prevention:   safety round/check completed   room organization consistent   nonskid shoes/slippers when out of bed   fall prevention program maintained   clutter free environment maintained   assistive device/personal items within  reach  Taken 4/15/2023 0855 by Carolyn Gallegos, RN  Safety Promotion/Fall Prevention:   safety round/check completed   room organization consistent   nonskid shoes/slippers when out of bed   fall prevention program maintained   clutter free environment maintained   assistive device/personal items within reach

## 2023-04-16 PROCEDURE — 97530 THERAPEUTIC ACTIVITIES: CPT

## 2023-04-16 RX ADMIN — BACLOFEN 2.5 MG: 10 TABLET ORAL at 21:40

## 2023-04-16 RX ADMIN — AMLODIPINE BESYLATE 10 MG: 10 TABLET ORAL at 08:30

## 2023-04-16 RX ADMIN — BACLOFEN 2.5 MG: 10 TABLET ORAL at 06:14

## 2023-04-16 RX ADMIN — Medication 10 ML: at 21:40

## 2023-04-16 RX ADMIN — APIXABAN 5 MG: 5 TABLET, FILM COATED ORAL at 08:30

## 2023-04-16 RX ADMIN — Medication 10 ML: at 08:34

## 2023-04-16 RX ADMIN — HYDRALAZINE HYDROCHLORIDE 10 MG: 10 TABLET, FILM COATED ORAL at 21:40

## 2023-04-16 RX ADMIN — HYDRALAZINE HYDROCHLORIDE 10 MG: 10 TABLET, FILM COATED ORAL at 15:02

## 2023-04-16 RX ADMIN — APIXABAN 5 MG: 5 TABLET, FILM COATED ORAL at 21:40

## 2023-04-16 RX ADMIN — HYDRALAZINE HYDROCHLORIDE 10 MG: 10 TABLET, FILM COATED ORAL at 06:14

## 2023-04-16 RX ADMIN — BACLOFEN 2.5 MG: 10 TABLET ORAL at 15:02

## 2023-04-16 RX ADMIN — LIDOCAINE 1 PATCH: 700 PATCH TOPICAL at 08:30

## 2023-04-16 RX ADMIN — VALSARTAN 160 MG: 160 TABLET, FILM COATED ORAL at 08:30

## 2023-04-16 RX ADMIN — CARVEDILOL 6.25 MG: 12.5 TABLET, FILM COATED ORAL at 17:04

## 2023-04-16 RX ADMIN — CARVEDILOL 6.25 MG: 12.5 TABLET, FILM COATED ORAL at 08:32

## 2023-04-16 NOTE — PROGRESS NOTES
Name: Manuel Durant ADMIT: 3/12/2023   : 1950  PCP: Provider, No Known    MRN: [id number] LOS: 35 days   AGE/SEX: 73 y.o. male  ROOM: P576/1     Subjective   Subjective    Denies any new issues or concerns. Is asking about disposition.  He feels he is too weak to go on airplane back to [place] even with help of family.  Still requiring 2 person assist to get up out of bed.  Was able to interview him both in English and medical Lithuanian.   Denies any chest pain, shortness of breath, abdominal pain, nausea, or dizziness.  Right-sided upper extremity weakness remains.  He reports pain in right shoulder better today.      Review of Systems   Respiratory: Negative for cough and shortness of breath.    Cardiovascular: Negative for chest pain and leg swelling.   Gastrointestinal: Negative for abdominal pain, nausea and vomiting.   Musculoskeletal: Positive for arthralgias and gait problem.        Objective   Objective   Vital Signs  Temp:  [97.5 °F (36.4 °C)-98.9 °F (37.2 °C)] 97.5 °F (36.4 °C)  Heart Rate:  [60-70] 68  Resp:  [16-18] 18  BP: (102-127)/(58-75) 107/65  SpO2:  [91 %-96 %] 91 %  on   ;   Device (Oxygen Therapy): room air  Body mass index is 24.41 kg/m².     Physical Exam  Vitals and nursing note reviewed.   Constitutional:       General: He is sleeping. He is not in acute distress.  HENT:      Head: Normocephalic.      Mouth/Throat:      Lips: Pink.   Eyes:      General: Lids are normal.   Cardiovascular:      Rate and Rhythm: Normal rate and regular rhythm.      Pulses: Normal pulses.      Heart sounds: Normal heart sounds. No murmur heard.  Pulmonary:      Effort: Pulmonary effort is normal. No respiratory distress.      Breath sounds: Normal breath sounds.   Abdominal:      Palpations: Abdomen is soft.   Musculoskeletal:      Cervical back: Neck supple.      Right lower leg: No edema.      Left lower leg: No edema.      Comments: Mild right upper extremity swelling to hand.   Skin:      General: Skin is dry.   Neurological:      Motor: Weakness present.      Comments: At times disoriented.  Right-sided upper extremity paresis.   Psychiatric:         Mood and Affect: Mood normal.         Behavior: Behavior normal.         Thought Content: Thought content normal.         Judgment: Judgment normal.       Results Review     I reviewed the patient's new clinical results.  Results from last 7 days   Lab Units 04/11/23  0729   WBC 10*3/mm3 6.74   HEMOGLOBIN g/dL 13.8   PLATELETS 10*3/mm3 168     Results from last 7 days   Lab Units 04/11/23  0729   SODIUM mmol/L 139   POTASSIUM mmol/L 3.9   CHLORIDE mmol/L 107   CO2 mmol/L 25.0   BUN mg/dL 13   CREATININE mg/dL 0.64*   GLUCOSE mg/dL 86   EGFR mL/min/1.73 101.2       Results from last 7 days   Lab Units 04/11/23  0729   CALCIUM mg/dL 8.4*       No results found for: HGBA1C, POCGLU    No radiology results for the last day  I have personally reviewed all medications:  Scheduled Medications  amLODIPine, 10 mg, Oral, QAM AC  apixaban, 5 mg, Oral, Q12H  baclofen, 2.5 mg, Oral, Q8H  carvedilol, 6.25 mg, Oral, BID With Meals  hydrALAZINE, 10 mg, Oral, Q8H  lidocaine, 1 patch, Transdermal, Q24H  sodium chloride, 10 mL, Intravenous, Q12H  valsartan, 160 mg, Oral, QAM AC    Infusions   Diet  Diet: Regular/House Diet; No Mixed Consistencies, Feeding Assistance - Nursing; Texture: Soft to Chew (NDD 3); Soft to Chew: Chopped Meat; Fluid Consistency: Nectar Thick    I have personally reviewed:  []  Laboratory   []  Microbiology   [x]  Radiology   [x]  EKG/Telemetry  [x]  Cardiology/Vascular   []  Pathology    []  Records      Tele SR      Interpretation Summary       •  Acute right lower extremity deep vein thrombosis noted in the soleal.  •  All other veins appeared normal bilaterally.         Assessment/Plan     Active Hospital Problems    Diagnosis  POA   • **Intracranial hemorrhage [I62.9]  Yes   • Hemiplegia [G81.90]  Yes   • Right shoulder pain [M25.511]  Yes   •  Severe Malnutrition (HCC) [E43]  Yes   • Acute DVT (deep venous thrombosis) [I82.409]  Yes   • HTN (hypertension) [I10]  Yes   • Hypertensive emergency [I16.1]  Yes      Resolved Hospital Problems    Diagnosis Date Resolved POA   • Hypokalemia [E87.6] 04/10/2023 Unknown       73 y.o. male admitted with Intracranial hemorrhage.    ICH/rt hemiplegia:  -Continue OT/PT/ST. F/U [facility] outpatient w/MRI brain. Baclofen for spasticity; increased to TID, seems to be tolerating. Tolerating modified diet. [facility] has denied admission due to unknown disposition at discharge. CCP following; likely SNF vs back to [place] w/family.  Continue mobilization with staff and therapy.  He verbalized that he does not think he is strong enough to make the trip back to [place] on a plane even with family assistance and I would agree based on Pt note of 2 person assist.       Rt shoulder pain:  -Likely due to subluxation; xray negative for acute finding. Continue lidoderm patch. Encourage support of RUE, use sling when up.  May use Tylenol to assist with pain control    Acute Right lower extremity DVT (soleal):  - venous Duplex lower extremity right soleal DVT noted on 3/17/23   -Cleared to start Eliquis per [facility].  Eliquis started 3/24/2023.  Is tolerating well     HTN:  -BP acceptable. Continue amlodipine, valsartan, carvedilol, hydralazine      Last lab Draw 4/11/23.  Recheck CBC and BMP in am.           • Eliquis (home med) for DVT prophylaxis.  • Full code.  • Discussed with patient and nursing staff.  • Anticipate discharge SNF vs home with family ([place]) once arrangements have been made (see CCP note 4/14/23).  Awaiting family's input on Monday, 4/17/2023     SLICK Dyer  [facility]  04/16/23  12:15 EDT

## 2023-04-16 NOTE — THERAPY TREATMENT NOTE
Patient Name: Manuel Durant  : 1950    MRN: [id number]                              Today's Date: 2023       Admit Date: 3/12/2023    Visit Dx:     ICD-10-CM ICD-9-CM   1. Intracranial hemorrhage  I62.9 432.9   2. Altered mental status, unspecified altered mental status type  R41.82 780.97     Patient Active Problem List   Diagnosis   • Intracranial hemorrhage   • Hypertensive emergency   • Acute DVT (deep venous thrombosis)   • HTN (hypertension)   • Severe Malnutrition (HCC)   • Hemiplegia   • Right shoulder pain     Past Medical History:   Diagnosis Date   • Hypokalemia 3/17/2023     History reviewed. No pertinent surgical history.   General Information     Row Name 23 1146          Physical Therapy Time and Intention    Document Type therapy note (daily note)  -DJ     Mode of Treatment individual therapy;physical therapy  -DJ     Row Name 23 1146          General Information    Patient Profile Reviewed yes  -DJ     Existing Precautions/Restrictions fall  -DJ     Row Name 23 1146          Safety Issues, Functional Mobility    Comment, Safety Issues/Impairments (Mobility) gt belt, nonskid socks  -DJ           User Key  (r) = Recorded By, (t) = Taken By, (c) = Cosigned By    Initials Name Provider Type    Jacqueline Agudelo, JOSE ELIAS Physical Therapist               Mobility     Row Name 23 1148          Bed Mobility    Bed Mobility supine-sit  -DJ     Supine-Sit Kitt[place] (Bed Mobility) contact guard;verbal cues;minimum assist (75% patient effort)  -DJ     Sit-Supine [place] (Bed Mobility) not tested  -DJ     Comment, (Bed Mobility) difficulty with R UE use while rolling  -DJ     Row Name 23 1148          Transfers    Comment, (Transfers) sit/stand from EOB  -DJ     Row Name 23 1148          Bed-Chair Transfer    Bed-Chair Kittrell (Transfers) moderate assist (50% patient effort);2 person assist;verbal cues  -DJ     Comment, (Bed-Chair Transfer) MAGUE Chow   -DJ     Row Name 04/16/23 1148          Sit-Stand Transfer    Sit-Stand Travis (Transfers) minimum assist (75% patient effort);2 person assist;verbal cues  -DJ     Row Name 04/16/23 1148          Gait/Stairs (Locomotion)    Travis Level (Gait) moderate assist (50% patient effort);maximum assist (25% patient effort);2 person assist;verbal cues  -DJ     Distance in Feet (Gait) 3-4' from bed to chair  -DJ     Deviations/Abnormal Patterns (Gait) festinating/shuffling;stride length decreased  -DJ     Bilateral Gait Deviations weight shift ability decreased;forward flexed posture  -DJ     Travis Level (Stairs) unable to assess  -DJ     Comment, (Gait/Stairs) Pt amb 3-4' from bed to chair with mod A Of 2; pt has great difficulty with R LE WB and R LE armond; very unsteady and at times impulsive  -DJ           User Key  (r) = Recorded By, (t) = Taken By, (c) = Cosigned By    Initials Name Provider Type    Jacqueline Agudelo PT Physical Therapist               Obj/Interventions     Row Name 04/16/23 1152          Motor Skills    Therapeutic Exercise other (see comments)  R LAQ with assist; L LE seated ther ex  -DJ     Row Name 04/16/23 1152          Balance    Static Standing Balance 2-person assist;verbal cues;minimal assist  -DJ     Dynamic Standing Balance moderate assist;maximum assist;2-person assist;verbal cues  -DJ     Position/Device Used, Standing Balance supported  -DJ     Balance Interventions sitting;standing;sit to stand;supported;weight shifting activity  -DJ           User Key  (r) = Recorded By, (t) = Taken By, (c) = Cosigned By    Initials Name Provider Type    Jacqueline Agudelo, JOSE ELIAS Physical Therapist               Goals/Plan    No documentation.                Clinical Impression     Row Name 04/16/23 1153          Pain    Pretreatment Pain Rating 0/10 - no pain  -DJ     Pre/Posttreatment Pain Comment Pt generally without c/os  -DJ     Pain Intervention(s) Repositioned;Rest  -DJ     Row Name  04/16/23 1153          Plan of Care Review    Plan of Care Reviewed With patient  -DJ     Progress no change  -DJ     Outcome Evaluation Pt awake and resting in bed in NAD, pleasant and cooperative. Pt declines use of R UE sling when getting up. Pt req CGA - min A to sit EOB, he has difficulty using R UE when rolling. Pt performed LE ther ex at EOB with assist for R LE. Pt stood from EOB with min A Of 2. Pt amb 3-4' from bed to chair with mod A Of 2; pt has great difficulty with R LE WB and R LE armond; very unsteady and at times impulsive. Pt placed in chair with R UE supported with pillow. Pt progressing slowly - R side deficits primarly limitation with movement. Cont PT to address functional deficits and prepare for d/c to rehab  -DJ     Row Name 04/16/23 1153          Vital Signs    Pre Patient Position Supine  -DJ     Intra Patient Position Standing  -DJ     Post Patient Position Sitting  -DJ     Row Name 04/16/23 1153          Positioning and Restraints    Pre-Treatment Position in bed  -DJ     Post Treatment Position chair  -DJ     In Chair notified nsg;reclined;call light within reach;encouraged to call for assist;exit alarm on;RUE elevated;waffle cushion;with nsg  -DJ           User Key  (r) = Recorded By, (t) = Taken By, (c) = Cosigned By    Initials Name Provider Type    Jacqueline Agudelo, PT Physical Therapist               Outcome Measures     Row Name 04/16/23 1157 04/16/23 0800       How much help from another person do you currently need...    Turning from your back to your side while in flat bed without using bedrails? 3  -DJ 3  -BT    Moving from lying on back to sitting on the side of a flat bed without bedrails? 3  -DJ 3  -BT    Moving to and from a bed to a chair (including a wheelchair)? 2  -DJ 2  -BT    Standing up from a chair using your arms (e.g., wheelchair, bedside chair)? 3  -DJ 3  -BT    Climbing 3-5 steps with a railing? 1  -DJ 1  -BT    To walk in hospital room? 2  -DJ 3  -BT    AM-PAC  6 Clicks Score (PT) 14  -DJ 15  -BT    Highest level of mobility 4 --> Transferred to chair/commode  -DJ 4 --> Transferred to chair/commode  -BT    Row Name 04/16/23 1157          Functional Assessment    Outcome Measure Options AM-PAC 6 Clicks Basic Mobility (PT)  -DJ           User Key  (r) = Recorded By, (t) = Taken By, (c) = Cosigned By    Initials Name Provider Type    Jacqueline Agudelo, PT Physical Therapist    Luana Zeng, RN Registered Nurse                             Physical Therapy Education     Title: PT OT SLP Therapies (Done)     Topic: Physical Therapy (Done)     Point: Mobility training (Done)     Learning Progress Summary           Patient Acceptance, E, VU,NR by DJ at 4/16/2023 1158    Acceptance, E,TB,H, VU,NR by MS at 4/13/2023 1817    Acceptance, E, NR by CW at 4/13/2023 1341    Acceptance, E, NR by CW at 4/12/2023 1201    Acceptance, E, NR by CW at 4/11/2023 1158    Acceptance, E, NR by CW at 4/10/2023 1414    Acceptance, E,TB, NR by CS at 4/8/2023 0928    Acceptance, E, NR by CW at 4/7/2023 1215    Acceptance, E, NR by CW at 4/6/2023 1335    Acceptance, E, NR by CW at 4/5/2023 1043    Acceptance, E, NR by CW at 4/4/2023 0906    Acceptance, E, NR by CW at 4/3/2023 1531    Acceptance, E,TB, VU,NR by CAMERON at 4/1/2023 1530    Acceptance, E, NR by CW at 3/31/2023 0925    Acceptance, E, NR by CW at 3/30/2023 1209    Acceptance, E, NR,NL by CW at 3/29/2023 1336    Acceptance, E, NR by ZB at 3/28/2023 1317    Acceptance, E, NR by CW at 3/27/2023 1401    Acceptance, E,D, NR,VU by JM at 3/24/2023 1631    Comment: seemed to comprehend commands this session    Acceptance, E,D, NR by ZACH at 3/23/2023 1655    Acceptance, E,D, NR by ZACH at 3/22/2023 1545    Acceptance, E,TB, VU,NR by CB at 3/21/2023 1544    Acceptance, E,TB,H, VU by MS at 3/16/2023 1801    Acceptance, E,D, DU,NR by MO at 3/16/2023 1451    Acceptance, E, DU,NR by MO at 3/15/2023 1200    Acceptance, E, NR by LM at 3/15/2023 0521     Acceptance, E,D, DU,NR by MO at 3/14/2023 1458   Family Acceptance, E,TB,H, VU,NR by MS at 4/13/2023 1817    Acceptance, E,TB,H, VU by MS at 3/16/2023 1801                   Point: Home exercise program (Done)     Learning Progress Summary           Patient Acceptance, E, VU,NR by DJ at 4/16/2023 1158    Acceptance, E,TB,H, VU,NR by MS at 4/13/2023 1817    Acceptance, E, NR by CW at 4/13/2023 1341    Acceptance, E,TB, NR by CS at 4/8/2023 0928    Acceptance, E, NR by CW at 4/7/2023 1215    Acceptance, E, NR by CW at 4/6/2023 1335    Acceptance, E,TB, VU,NR by CAMERON at 4/1/2023 1530    Acceptance, E, NR by CW at 3/31/2023 0925    Acceptance, E, NR by ZWALTER at 3/28/2023 1317    Acceptance, E, NR by CW at 3/27/2023 1401    Acceptance, E,D, NR,VU by JM at 3/24/2023 1631    Comment: seemed to comprehend commands this session    Acceptance, E,D, NR by ZACH at 3/23/2023 1655    Acceptance, E,D, NR by JM at 3/22/2023 1545    Acceptance, E,TB, VU,NR by CB at 3/21/2023 1544    Acceptance, E,TB,H, VU by MS at 3/16/2023 1801    Acceptance, E,D, DU,NR by MO at 3/16/2023 1451    Acceptance, E, NR by LM at 3/15/2023 0521    Acceptance, E,D, DU,NR by MO at 3/14/2023 1458   Family Acceptance, E,TB,H, VU,NR by MS at 4/13/2023 1817    Acceptance, E,TB,H, VU by MS at 3/16/2023 1801                   Point: Body mechanics (Done)     Learning Progress Summary           Patient Acceptance, E, VU,NR by DJ at 4/16/2023 1158    Acceptance, E,TB,H, VU,NR by MS at 4/13/2023 1817    Acceptance, E, NR by CW at 4/13/2023 1341    Acceptance, E, NR by CW at 4/11/2023 1158    Acceptance, E, NR by CW at 4/10/2023 1414    Acceptance, E,TB, NR by CS at 4/8/2023 0928    Acceptance, E, NR by CW at 4/7/2023 1215    Acceptance, E, NR by CW at 4/4/2023 0906    Acceptance, E, NR by CW at 4/3/2023 1531    Acceptance, E,TB, VU,NR by CAMERON at 4/1/2023 1530    Acceptance, E,D, NR,VU by ZACH at 3/24/2023 1631    Comment: seemed to comprehend commands this session     Acceptance, E,D, NR by JM at 3/23/2023 1655    Acceptance, E,D, NR by JM at 3/22/2023 1545    Acceptance, E,TB, VU,NR by CB at 3/21/2023 1544    Acceptance, E,TB,H, VU by MS at 3/16/2023 1801    Acceptance, E,D, DU,NR by MO at 3/16/2023 1451    Acceptance, E, DU,NR by MO at 3/15/2023 1200    Acceptance, E, NR by LM at 3/15/2023 0521    Acceptance, E,D, DU,NR by MO at 3/14/2023 1458   Family Acceptance, E,TB,H, VU,NR by MS at 4/13/2023 1817    Acceptance, E,TB,H, VU by MS at 3/16/2023 1801                   Point: Precautions (Done)     Learning Progress Summary           Patient Acceptance, E, VU,NR by DJ at 4/16/2023 1158    Acceptance, E,TB,H, VU,NR by MS at 4/13/2023 1817    Acceptance, E, NR by CW at 4/13/2023 1341    Acceptance, E,TB, NR by CS at 4/8/2023 0928    Acceptance, E, NR by CW at 4/7/2023 1215    Acceptance, E, NR by CW at 4/4/2023 0906    Acceptance, E, NR by CW at 4/3/2023 1531    Acceptance, E,TB, VU,NR by CAMERON at 4/1/2023 1530    Acceptance, E,D, NR,VU by JM at 3/24/2023 1631    Comment: seemed to comprehend commands this session    Acceptance, E,D, NR by JM at 3/23/2023 1655    Acceptance, E,D, NR by JM at 3/22/2023 1545    Acceptance, E,TB, VU,NR by CB at 3/21/2023 1544    Acceptance, E,TB,H, VU by MS at 3/16/2023 1801    Acceptance, E,D, DU,NR by MO at 3/16/2023 1451    Acceptance, E, DU,NR by MO at 3/15/2023 1200    Acceptance, E, NR by LM at 3/15/2023 0521    Acceptance, E,D, DU,NR by MO at 3/14/2023 1458   Family Acceptance, E,TB,H, VU,NR by MS at 4/13/2023 1817    Acceptance, E,TB,H, VU by MS at 3/16/2023 1801                               User Key     Initials Effective Dates Name Provider Type Discipline     03/07/18 -  Tiffanie Grace, PTA Physical Therapist Assistant PT    CAMERON 05/19/21 -  Leeann Wiley, PT Physical Therapist PT    DJ 10/25/19 -  Jacqueline Chamberlain, PT Physical Therapist PT    MS 06/16/21 -  Angel Luis Cr, RN Registered Nurse Nurse     12/13/22 -  Case  Tam[place], PT Physical Therapist PT    CB 10/22/21 -  Bety Benitez, PT Physical Therapist PT    CS 09/22/22 -  Adelia Montalvo, PT Physical Therapist PT    LM 10/13/22 -  Silke Grace, RN Registered Nurse Nurse    MO 01/27/23 -  Lacie Tanner, PT Student PT Student PT    ZB 03/10/23 -  Nestor Rae, PT Student PT Student PT              PT Recommendation and Plan     Plan of Care Reviewed With: patient  Progress: no change  Outcome Evaluation: Pt awake and resting in bed in NAD, pleasant and cooperative. Pt declines use of R UE sling when getting up. Pt req CGA - min A to sit EOB, he has difficulty using R UE when rolling. Pt performed LE ther ex at EOB with assist for R LE. Pt stood from EOB with min A Of 2. Pt amb 3-4' from bed to chair with mod A Of 2; pt has great difficulty with R LE WB and R LE armond; very unsteady and at times impulsive. Pt placed in chair with R UE supported with pillow. Pt progressing slowly - R side deficits primarly limitation with movement. Cont PT to address functional deficits and prepare for d/c to rehab     Time Calculation:    PT Charges     Row Name 04/16/23 1158             Time Calculation    Start Time 1007  -DJ      Stop Time 1022  -DJ      Time Calculation (min) 15 min  -DJ      PT Non-Billable Time (min) 10 min  -DJ      PT Received On 04/16/23  -DJ      PT - Next Appointment 04/17/23  -DJ            User Key  (r) = Recorded By, (t) = Taken By, (c) = Cosigned By    Initials Name Provider Type    Jacqueline Agudelo, PT Physical Therapist              Therapy Charges for Today     Code Description Service Date Service Provider Modifiers Qty    47019972180 HC PT THERAPEUTIC ACT EA 15 MIN 4/16/2023 Jacqueline Chamberlain, PT GP 1    14915293563 HC PT THER SUPP EA 15 MIN 4/16/2023 Jacqueline Chamberlain, PT GP 1          PT G-Codes  Outcome Measure Options: AM-PAC 6 Clicks Basic Mobility (PT)  AM-PAC 6 Clicks Score (PT): 14  AM-PAC 6 Clicks Score (OT): 15  Modified [place] Scale: 4 - Moderately  severe disability.  Unable to walk without assistance, and unable to attend to own bodily needs without assistance.       Jacqueline Chamberlain, PT  4/16/2023

## 2023-04-16 NOTE — PLAN OF CARE
Problem: Fall Injury Risk  Goal: Absence of Fall and Fall-Related Injury  Outcome: Ongoing, Progressing  Intervention: Identify and Manage Contributors  Recent Flowsheet Documentation  Taken 4/16/2023 1400 by Luana Thao RN  Medication Review/Management: medications reviewed  Taken 4/16/2023 1200 by Luana Thao RN  Medication Review/Management: medications reviewed  Taken 4/16/2023 1000 by Luana Thao RN  Medication Review/Management: medications reviewed  Taken 4/16/2023 0800 by Luana Thao RN  Medication Review/Management: medications reviewed  Intervention: Promote Injury-Free Environment  Recent Flowsheet Documentation  Taken 4/16/2023 1400 by Luana Thao RN  Safety Promotion/Fall Prevention:   safety round/check completed   room organization consistent   nonskid shoes/slippers when out of bed   lighting adjusted   fall prevention program maintained   clutter free environment maintained   assistive device/personal items within reach   activity supervised  Taken 4/16/2023 1200 by Luana Thao RN  Safety Promotion/Fall Prevention:   safety round/check completed   room organization consistent   nonskid shoes/slippers when out of bed   lighting adjusted   fall prevention program maintained   clutter free environment maintained   assistive device/personal items within reach   activity supervised  Taken 4/16/2023 1000 by Luana Thao RN  Safety Promotion/Fall Prevention:   safety round/check completed   room organization consistent   nonskid shoes/slippers when out of bed   lighting adjusted   fall prevention program maintained   clutter free environment maintained   assistive device/personal items within reach   activity supervised  Taken 4/16/2023 0800 by Luana Thao RN  Safety Promotion/Fall Prevention:   safety round/check completed   room organization consistent   nonskid shoes/slippers when out of bed   lighting adjusted   fall prevention program maintained    clutter free environment maintained   assistive device/personal items within reach   activity supervised   Goal Outcome Evaluation:  Plan of Care Reviewed With: patient

## 2023-04-16 NOTE — PLAN OF CARE
Goal Outcome Evaluation:  Plan of Care Reviewed With: patient        Progress: no change  Outcome Evaluation: Pt awake and resting in bed in NAD, pleasant and cooperative. Pt declines use of R UE sling when getting up. Pt req CGA - min A to sit EOB, he has difficulty using R UE when rolling. Pt performed LE ther ex at EOB with assist for R LE. Pt stood from EOB with min A Of 2. Pt amb 3-4' from bed to chair with mod A Of 2; pt has great difficulty with R LE WB and R LE armond; very unsteady and at times impulsive. Pt placed in chair with R UE supported with pillow. Pt progressing slowly - R side deficits primarly limitation with movement. Cont PT to address functional deficits and prepare for d/c to rehab

## 2023-04-16 NOTE — PLAN OF CARE
Problem: Fall Injury Risk  Goal: Absence of Fall and Fall-Related Injury  Outcome: Ongoing, Progressing  Intervention: Identify and Manage Contributors  Recent Flowsheet Documentation  Taken 4/16/2023 0600 by Michelle Parks RN  Medication Review/Management: medications reviewed  Taken 4/16/2023 0400 by Michelle Parks RN  Medication Review/Management: medications reviewed  Taken 4/16/2023 0200 by Michelle Parks RN  Medication Review/Management: medications reviewed  Taken 4/16/2023 0000 by Michelle Parks RN  Medication Review/Management: medications reviewed  Taken 4/15/2023 2200 by Michelle Parks RN  Medication Review/Management: medications reviewed  Taken 4/15/2023 1926 by Michelle Parks RN  Medication Review/Management: medications reviewed  Intervention: Promote Injury-Free Environment  Recent Flowsheet Documentation  Taken 4/16/2023 0600 by Michelle Parks RN  Safety Promotion/Fall Prevention: safety round/check completed  Taken 4/16/2023 0400 by Micehlle Parks RN  Safety Promotion/Fall Prevention: safety round/check completed  Taken 4/16/2023 0200 by Michelle Parks RN  Safety Promotion/Fall Prevention: safety round/check completed  Taken 4/16/2023 0000 by Michelle Parks RN  Safety Promotion/Fall Prevention: safety round/check completed  Taken 4/15/2023 2200 by Michelle Parks RN  Safety Promotion/Fall Prevention: safety round/check completed  Taken 4/15/2023 1926 by Michelle Parks RN  Safety Promotion/Fall Prevention: safety round/check completed     Problem: Skin Injury Risk Increased  Goal: Skin Health and Integrity  Outcome: Ongoing, Progressing  Intervention: Optimize Skin Protection  Recent Flowsheet Documentation  Taken 4/16/2023 0600 by Michelle Parks RN  Head of Bed (HOB) Positioning: HOB at 15 degrees  Taken 4/16/2023 0400 by Michelle Parks RN  Head of Bed (HOB) Positioning: HOB at 20 degrees  Taken 4/16/2023 0200 by Michelle Parks RN  Head of Bed (HOB) Positioning: HOB at 20  degrees  Taken 4/16/2023 0000 by Michelle Parks RN  Head of Bed (HOB) Positioning: HOB at 20 degrees  Taken 4/15/2023 2200 by Michelle Parks RN  Head of Bed (HOB) Positioning: HOB at 20 degrees  Taken 4/15/2023 1926 by Michelle Parks RN  Pressure Reduction Techniques: frequent weight shift encouraged  Head of Bed (HOB) Positioning: HOB at 20 degrees  Pressure Reduction Devices: alternating pressure pump (ADD)  Skin Protection: adhesive use limited     Problem: Adult Inpatient Plan of Care  Goal: Plan of Care Review  Outcome: Ongoing, Progressing  Flowsheets (Taken 4/16/2023 0612)  Plan of Care Reviewed With: patient  Goal: Patient-Specific Goal (Individualized)  Outcome: Ongoing, Progressing  Goal: Absence of Hospital-Acquired Illness or Injury  Outcome: Ongoing, Progressing  Intervention: Identify and Manage Fall Risk  Recent Flowsheet Documentation  Taken 4/16/2023 0600 by Michelle Parks RN  Safety Promotion/Fall Prevention: safety round/check completed  Taken 4/16/2023 0400 by Michelle Parks RN  Safety Promotion/Fall Prevention: safety round/check completed  Taken 4/16/2023 0200 by Michelle Parks RN  Safety Promotion/Fall Prevention: safety round/check completed  Taken 4/16/2023 0000 by Michelle Parks RN  Safety Promotion/Fall Prevention: safety round/check completed  Taken 4/15/2023 2200 by Michelle Parks RN  Safety Promotion/Fall Prevention: safety round/check completed  Taken 4/15/2023 1926 by Michelle Parks RN  Safety Promotion/Fall Prevention: safety round/check completed  Intervention: Prevent Skin Injury  Recent Flowsheet Documentation  Taken 4/16/2023 0600 by Michelle Parks RN  Body Position: position changed independently  Taken 4/16/2023 0400 by Michelle Parks RN  Body Position: position changed independently  Taken 4/16/2023 0200 by Michelle Parks RN  Body Position: position changed independently  Taken 4/16/2023 0000 by Michelle Parks RN  Body Position: position changed  independently  Taken 4/15/2023 2200 by Michelle Parks RN  Body Position: position changed independently  Taken 4/15/2023 1926 by Michelle Parks RN  Body Position: position changed independently  Skin Protection: adhesive use limited  Intervention: Prevent and Manage VTE (Venous Thromboembolism) Risk  Recent Flowsheet Documentation  Taken 4/15/2023 1926 by Michelle Parks RN  VTE Prevention/Management:   bilateral   sequential compression devices on  Range of Motion: ROM (range of motion) performed  Intervention: Prevent Infection  Recent Flowsheet Documentation  Taken 4/15/2023 1926 by Michelle Parks RN  Infection Prevention: single patient room provided  Goal: Optimal Comfort and Wellbeing  Outcome: Ongoing, Progressing  Intervention: Provide Person-Centered Care  Recent Flowsheet Documentation  Taken 4/15/2023 1926 by Michelle Parks RN  Trust Relationship/Rapport:   choices provided   care explained  Goal: Readiness for Transition of Care  Outcome: Ongoing, Progressing     Problem: Adjustment to Illness (Stroke, Ischemic/Transient Ischemic Attack)  Goal: Optimal Coping  Outcome: Ongoing, Progressing  Intervention: Support Psychosocial Response to Stroke  Recent Flowsheet Documentation  Taken 4/15/2023 1926 by Michelle Parks RN  Family/Support System Care: self-care encouraged     Problem: Bowel Elimination Impaired (Stroke, Ischemic/Transient Ischemic Attack)  Goal: Effective Bowel Elimination  Outcome: Ongoing, Progressing     Problem: Cerebral Tissue Perfusion (Stroke, Ischemic/Transient Ischemic Attack)  Goal: Optimal Cerebral Tissue Perfusion  Outcome: Ongoing, Progressing  Intervention: Protect and Optimize Cerebral Perfusion  Recent Flowsheet Documentation  Taken 4/15/2023 1926 by Michelle Parks RN  Sensory Stimulation Regulation: care clustered  Cerebral Perfusion Promotion: blood pressure monitored     Problem: Cognitive Impairment (Stroke, Ischemic/Transient Ischemic Attack)  Goal: Optimal Cognitive  Function  Outcome: Ongoing, Progressing  Intervention: Optimize Cognitive Function  Recent Flowsheet Documentation  Taken 4/15/2023 1926 by Michelle Parks RN  Sensory Stimulation Regulation: care clustered  Reorientation Measures: clock in view     Problem: Communication Impairment (Stroke, Ischemic/Transient Ischemic Attack)  Goal: Improved Communication Skills  Outcome: Ongoing, Progressing  Intervention: Optimize Communication Skills  Recent Flowsheet Documentation  Taken 4/15/2023 1926 by Michelle Parks RN  Communication Enhancement Strategies: extra time allowed for response     Problem: Functional Ability Impaired (Stroke, Ischemic/Transient Ischemic Attack)  Goal: Optimal Functional Ability  Outcome: Ongoing, Progressing     Problem: Respiratory Compromise (Stroke, Ischemic/Transient Ischemic Attack)  Goal: Effective Oxygenation and Ventilation  Outcome: Ongoing, Progressing  Intervention: Optimize Oxygenation and Ventilation  Recent Flowsheet Documentation  Taken 4/16/2023 0600 by Michelle Parks RN  Head of Bed (HOB) Positioning: HOB at 15 degrees  Taken 4/16/2023 0400 by Michelle Parks RN  Head of Bed (HOB) Positioning: HOB at 20 degrees  Taken 4/16/2023 0200 by Michelle Parks RN  Head of Bed (HOB) Positioning: HOB at 20 degrees  Taken 4/16/2023 0000 by Mihcelle Parks RN  Head of Bed (HOB) Positioning: HOB at 20 degrees  Taken 4/15/2023 2200 by Michelle Parks RN  Head of Bed (HOB) Positioning: HOB at 20 degrees  Taken 4/15/2023 1926 by Michelle Parks RN  Head of Bed (HOB) Positioning: HOB at 20 degrees     Problem: Sensorimotor Impairment (Stroke, Ischemic/Transient Ischemic Attack)  Goal: Improved Sensorimotor Function  Outcome: Ongoing, Progressing  Intervention: Optimize Range of Motion, Motor Control and Function  Recent Flowsheet Documentation  Taken 4/16/2023 0600 by Michelle Parks RN  Positioning/Transfer Devices:   pillows   in use  Taken 4/16/2023 0400 by Michelle Parks  RN  Positioning/Transfer Devices:   pillows   in use  Taken 4/16/2023 0200 by Michelle Parks RN  Positioning/Transfer Devices:   pillows   in use  Taken 4/16/2023 0000 by Michelle Parks RN  Positioning/Transfer Devices:   pillows   in use  Taken 4/15/2023 2200 by Michelle Parks RN  Positioning/Transfer Devices:   pillows   in use  Taken 4/15/2023 1926 by Michelle Parks RN  Spasticity Management: positioned with supportive device  Positioning/Transfer Devices:   pillows   in use  Range of Motion: ROM (range of motion) performed  Intervention: Optimize Sensory and Perceptual Ability  Recent Flowsheet Documentation  Taken 4/15/2023 1926 by Michelle Parks RN  Pressure Reduction Techniques: frequent weight shift encouraged  Sensation Impairment Protection: cues provided for safety  Pressure Reduction Devices: alternating pressure pump (ADD)     Problem: Swallowing Impairment (Stroke, Ischemic/Transient Ischemic Attack)  Goal: Optimal Eating and Swallowing without Aspiration  Outcome: Ongoing, Progressing     Problem: Urinary Elimination Impaired (Stroke, Ischemic/Transient Ischemic Attack)  Goal: Effective Urinary Elimination  Outcome: Ongoing, Progressing     Problem: Adjustment to Illness (Stroke, Hemorrhagic)  Goal: Optimal Coping  Outcome: Ongoing, Progressing  Intervention: Support Psychosocial Response to Stroke  Recent Flowsheet Documentation  Taken 4/15/2023 1926 by Michelle Parks RN  Family/Support System Care: self-care encouraged     Problem: Bowel Elimination Impaired (Stroke, Hemorrhagic)  Goal: Effective Bowel Elimination  Outcome: Ongoing, Progressing     Problem: Cerebral Tissue Perfusion (Stroke, Hemorrhagic)  Goal: Optimal Cerebral Tissue Perfusion  Outcome: Ongoing, Progressing  Intervention: Protect and Optimize Cerebral Perfusion  Recent Flowsheet Documentation  Taken 4/15/2023 1926 by Michelle Parks RN  Sensory Stimulation Regulation: care clustered  Cerebral Perfusion Promotion: blood  pressure monitored     Problem: Cognitive Impairment (Stroke, Hemorrhagic)  Goal: Optimal Cognitive Function  Outcome: Ongoing, Progressing  Intervention: Optimize Cognitive Function  Recent Flowsheet Documentation  Taken 4/15/2023 1926 by Michelle Parks RN  Sensory Stimulation Regulation: care clustered  Reorientation Measures: clock in view     Problem: Communication Impairment (Stroke, Hemorrhagic)  Goal: Effective Communication Skills  Outcome: Ongoing, Progressing  Intervention: Optimize Communication Skills  Recent Flowsheet Documentation  Taken 4/15/2023 1926 by Michelle Parks RN  Communication Enhancement Strategies: extra time allowed for response     Problem: Functional Ability Impaired (Stroke, Hemorrhagic)  Goal: Optimal Functional Ability  Outcome: Ongoing, Progressing     Problem: Pain (Stroke, Hemorrhagic)  Goal: Acceptable Pain Control  Outcome: Ongoing, Progressing     Problem: Respiratory Compromise (Stroke, Hemorrhagic)  Goal: Effective Oxygenation and Ventilation  Outcome: Ongoing, Progressing  Intervention: Optimize Oxygenation and Ventilation  Recent Flowsheet Documentation  Taken 4/16/2023 0600 by Michelle Parks RN  Head of Bed (HOB) Positioning: HOB at 15 degrees  Taken 4/16/2023 0400 by Michelle Parks RN  Head of Bed (HOB) Positioning: HOB at 20 degrees  Taken 4/16/2023 0200 by Michelle Parks RN  Head of Bed (HOB) Positioning: HOB at 20 degrees  Taken 4/16/2023 0000 by Michelle Parks RN  Head of Bed (HOB) Positioning: HOB at 20 degrees  Taken 4/15/2023 2200 by Michelle Parks RN  Head of Bed (HOB) Positioning: HOB at 20 degrees  Taken 4/15/2023 1926 by Michelle Parks RN  Head of Bed (HOB) Positioning: HOB at 20 degrees     Problem: Sensorimotor Impairment (Stroke, Hemorrhagic)  Goal: Improved Sensorimotor Function  Outcome: Ongoing, Progressing  Intervention: Optimize Range of Motion, Motor Control and Function  Recent Flowsheet Documentation  Taken 4/16/2023 0600 by Michelle Parks  RN  Positioning/Transfer Devices:   pillows   in use  Taken 4/16/2023 0400 by Michelle Parks RN  Positioning/Transfer Devices:   pillows   in use  Taken 4/16/2023 0200 by Michelle Parks RN  Positioning/Transfer Devices:   pillows   in use  Taken 4/16/2023 0000 by Michelle Parks RN  Positioning/Transfer Devices:   pillows   in use  Taken 4/15/2023 2200 by Michelle Parks RN  Positioning/Transfer Devices:   pillows   in use  Taken 4/15/2023 1926 by Michelle Parks RN  Spasticity Management: positioned with supportive device  Positioning/Transfer Devices:   pillows   in use  Range of Motion: ROM (range of motion) performed  Intervention: Optimize Sensory and Perceptual Ability  Recent Flowsheet Documentation  Taken 4/15/2023 1926 by Michelle Parks RN  Pressure Reduction Techniques: frequent weight shift encouraged  Sensation Impairment Protection: cues provided for safety  Pressure Reduction Devices: alternating pressure pump (ADD)     Problem: Swallowing Impairment (Stroke, Hemorrhagic)  Goal: Optimal Eating and Swallowing Without Aspiration  Outcome: Ongoing, Progressing     Problem: Urinary Elimination Impaired (Stroke, Hemorrhagic)  Goal: Effective Urinary Elimination  Outcome: Ongoing, Progressing   Goal Outcome Evaluation:  Plan of Care Reviewed With: patient

## 2023-04-17 LAB
ANION GAP SERPL CALCULATED.3IONS-SCNC: 9.7 MMOL/L (ref 5–15)
BUN SERPL-MCNC: 11 MG/DL (ref 8–23)
BUN/CREAT SERPL: 14.9 (ref 7–25)
CALCIUM SPEC-SCNC: 9.1 MG/DL (ref 8.6–10.5)
CHLORIDE SERPL-SCNC: 106 MMOL/L (ref 98–107)
CO2 SERPL-SCNC: 24.3 MMOL/L (ref 22–29)
CREAT SERPL-MCNC: 0.74 MG/DL (ref 0.76–1.27)
DEPRECATED RDW RBC AUTO: 42.4 FL (ref 37–54)
EGFRCR SERPLBLD CKD-EPI 2021: 96.9 ML/MIN/1.73
ERYTHROCYTE [DISTWIDTH] IN BLOOD BY AUTOMATED COUNT: 12.9 % (ref 12.3–15.4)
GLUCOSE SERPL-MCNC: 86 MG/DL (ref 65–99)
HCT VFR BLD AUTO: 39.5 % (ref 37.5–51)
HGB BLD-MCNC: 13.4 G/DL (ref 13–17.7)
MCH RBC QN AUTO: 30.6 PG (ref 26.6–33)
MCHC RBC AUTO-ENTMCNC: 33.9 G/DL (ref 31.5–35.7)
MCV RBC AUTO: 90.2 FL (ref 79–97)
PLATELET # BLD AUTO: 160 10*3/MM3 (ref 140–450)
PMV BLD AUTO: 11 FL (ref 6–12)
POTASSIUM SERPL-SCNC: 3.9 MMOL/L (ref 3.5–5.2)
RBC # BLD AUTO: 4.38 10*6/MM3 (ref 4.14–5.8)
SODIUM SERPL-SCNC: 140 MMOL/L (ref 136–145)
WBC NRBC COR # BLD: 6.71 10*3/MM3 (ref 3.4–10.8)

## 2023-04-17 PROCEDURE — 85027 COMPLETE CBC AUTOMATED: CPT | Performed by: NURSE PRACTITIONER

## 2023-04-17 PROCEDURE — 80048 BASIC METABOLIC PNL TOTAL CA: CPT | Performed by: NURSE PRACTITIONER

## 2023-04-17 RX ADMIN — LIDOCAINE 1 PATCH: 700 PATCH TOPICAL at 08:52

## 2023-04-17 RX ADMIN — AMLODIPINE BESYLATE 10 MG: 10 TABLET ORAL at 08:52

## 2023-04-17 RX ADMIN — BACLOFEN 2.5 MG: 10 TABLET ORAL at 21:18

## 2023-04-17 RX ADMIN — HYDRALAZINE HYDROCHLORIDE 10 MG: 10 TABLET, FILM COATED ORAL at 05:57

## 2023-04-17 RX ADMIN — CARVEDILOL 6.25 MG: 12.5 TABLET, FILM COATED ORAL at 17:14

## 2023-04-17 RX ADMIN — VALSARTAN 160 MG: 160 TABLET, FILM COATED ORAL at 08:52

## 2023-04-17 RX ADMIN — BACLOFEN 2.5 MG: 10 TABLET ORAL at 14:05

## 2023-04-17 RX ADMIN — Medication 10 ML: at 21:17

## 2023-04-17 RX ADMIN — BACLOFEN 2.5 MG: 10 TABLET ORAL at 05:57

## 2023-04-17 RX ADMIN — APIXABAN 5 MG: 5 TABLET, FILM COATED ORAL at 08:52

## 2023-04-17 RX ADMIN — CARVEDILOL 6.25 MG: 12.5 TABLET, FILM COATED ORAL at 08:52

## 2023-04-17 RX ADMIN — Medication 10 ML: at 08:53

## 2023-04-17 RX ADMIN — APIXABAN 5 MG: 5 TABLET, FILM COATED ORAL at 21:18

## 2023-04-17 RX ADMIN — HYDRALAZINE HYDROCHLORIDE 10 MG: 10 TABLET, FILM COATED ORAL at 14:05

## 2023-04-17 RX ADMIN — HYDRALAZINE HYDROCHLORIDE 10 MG: 10 TABLET, FILM COATED ORAL at 21:18

## 2023-04-17 NOTE — PLAN OF CARE
Problem: Fall Injury Risk  Goal: Absence of Fall and Fall-Related Injury  Outcome: Ongoing, Progressing  Intervention: Identify and Manage Contributors  Recent Flowsheet Documentation  Taken 4/17/2023 0200 by Michelle Parks RN  Medication Review/Management: medications reviewed  Taken 4/17/2023 0000 by Michelle Parks RN  Medication Review/Management: medications reviewed  Taken 4/16/2023 2200 by Michelle Parks RN  Medication Review/Management: medications reviewed  Taken 4/16/2023 1955 by Michelle Parks RN  Medication Review/Management: medications reviewed  Intervention: Promote Injury-Free Environment  Recent Flowsheet Documentation  Taken 4/17/2023 0200 by Michelle Parks RN  Safety Promotion/Fall Prevention: safety round/check completed  Taken 4/17/2023 0000 by Michelle Parks RN  Safety Promotion/Fall Prevention: safety round/check completed  Taken 4/16/2023 2200 by Michelle Parks RN  Safety Promotion/Fall Prevention: safety round/check completed  Taken 4/16/2023 1955 by Michelle Parks RN  Safety Promotion/Fall Prevention: safety round/check completed     Problem: Skin Injury Risk Increased  Goal: Skin Health and Integrity  Outcome: Ongoing, Progressing  Intervention: Optimize Skin Protection  Recent Flowsheet Documentation  Taken 4/17/2023 0200 by Michelle Parks RN  Head of Bed (HOB) Positioning: HOB at 20 degrees  Taken 4/17/2023 0000 by Michelle Parks RN  Head of Bed (HOB) Positioning: HOB at 20 degrees  Taken 4/16/2023 2200 by Michelle Parks RN  Head of Bed (HOB) Positioning: HOB at 20 degrees  Taken 4/16/2023 1955 by Michelle Parks RN  Pressure Reduction Techniques: frequent weight shift encouraged  Head of Bed (HOB) Positioning: HOB at 20 degrees  Pressure Reduction Devices: alternating pressure pump (ADD)  Skin Protection: adhesive use limited     Problem: Adult Inpatient Plan of Care  Goal: Plan of Care Review  Outcome: Ongoing, Progressing  Flowsheets (Taken 4/17/2023 0329)  Plan of Care  Reviewed With: patient  Goal: Patient-Specific Goal (Individualized)  Outcome: Ongoing, Progressing  Goal: Absence of Hospital-Acquired Illness or Injury  Outcome: Ongoing, Progressing  Intervention: Identify and Manage Fall Risk  Recent Flowsheet Documentation  Taken 4/17/2023 0200 by Michelle Parks RN  Safety Promotion/Fall Prevention: safety round/check completed  Taken 4/17/2023 0000 by Michelle Parks RN  Safety Promotion/Fall Prevention: safety round/check completed  Taken 4/16/2023 2200 by Michelle Parks RN  Safety Promotion/Fall Prevention: safety round/check completed  Taken 4/16/2023 1955 by Michelle Parks RN  Safety Promotion/Fall Prevention: safety round/check completed  Intervention: Prevent Skin Injury  Recent Flowsheet Documentation  Taken 4/17/2023 0200 by Michelle Parks RN  Body Position: position changed independently  Taken 4/17/2023 0000 by Michelle Parks RN  Body Position: position changed independently  Taken 4/16/2023 2200 by Michelle Parks RN  Body Position: position changed independently  Taken 4/16/2023 1955 by Michelle Parks RN  Body Position: position changed independently  Skin Protection: adhesive use limited  Intervention: Prevent and Manage VTE (Venous Thromboembolism) Risk  Recent Flowsheet Documentation  Taken 4/16/2023 1955 by Michelle Parks RN  VTE Prevention/Management:   bilateral   sequential compression devices on  Range of Motion: ROM (range of motion) performed  Intervention: Prevent Infection  Recent Flowsheet Documentation  Taken 4/16/2023 1955 by Michelle Parks RN  Infection Prevention: single patient room provided  Goal: Optimal Comfort and Wellbeing  Outcome: Ongoing, Progressing  Intervention: Provide Person-Centered Care  Recent Flowsheet Documentation  Taken 4/16/2023 1955 by Michelle Parks RN  Trust Relationship/Rapport:   care explained   choices provided  Goal: Readiness for Transition of Care  Outcome: Ongoing, Progressing     Problem: Adjustment to  Illness (Stroke, Ischemic/Transient Ischemic Attack)  Goal: Optimal Coping  Outcome: Ongoing, Progressing  Intervention: Support Psychosocial Response to Stroke  Recent Flowsheet Documentation  Taken 4/16/2023 1955 by Michelle Parks RN  Supportive Measures: active listening utilized  Family/Support System Care: self-care encouraged     Problem: Bowel Elimination Impaired (Stroke, Ischemic/Transient Ischemic Attack)  Goal: Effective Bowel Elimination  Outcome: Ongoing, Progressing     Problem: Cerebral Tissue Perfusion (Stroke, Ischemic/Transient Ischemic Attack)  Goal: Optimal Cerebral Tissue Perfusion  Outcome: Ongoing, Progressing  Intervention: Protect and Optimize Cerebral Perfusion  Recent Flowsheet Documentation  Taken 4/16/2023 1955 by Michelle Parks RN  Sensory Stimulation Regulation: care clustered  Cerebral Perfusion Promotion: blood pressure monitored     Problem: Cognitive Impairment (Stroke, Ischemic/Transient Ischemic Attack)  Goal: Optimal Cognitive Function  Outcome: Ongoing, Progressing  Intervention: Optimize Cognitive Function  Recent Flowsheet Documentation  Taken 4/16/2023 1955 by Michelle Parks RN  Sensory Stimulation Regulation: care clustered  Reorientation Measures: clock in view     Problem: Communication Impairment (Stroke, Ischemic/Transient Ischemic Attack)  Goal: Improved Communication Skills  Outcome: Ongoing, Progressing  Intervention: Optimize Communication Skills  Recent Flowsheet Documentation  Taken 4/16/2023 1955 by Michelle Parks RN  Communication Enhancement Strategies: extra time allowed for response     Problem: Functional Ability Impaired (Stroke, Ischemic/Transient Ischemic Attack)  Goal: Optimal Functional Ability  Outcome: Ongoing, Progressing     Problem: Respiratory Compromise (Stroke, Ischemic/Transient Ischemic Attack)  Goal: Effective Oxygenation and Ventilation  Outcome: Ongoing, Progressing  Intervention: Optimize Oxygenation and Ventilation  Recent Flowsheet  Documentation  Taken 4/17/2023 0200 by Michelle Parks RN  Head of Bed (HOB) Positioning: HOB at 20 degrees  Taken 4/17/2023 0000 by Michelle Parks RN  Head of Bed (HOB) Positioning: HOB at 20 degrees  Taken 4/16/2023 2200 by Michelle Parks RN  Head of Bed (HOB) Positioning: HOB at 20 degrees  Taken 4/16/2023 1955 by Michelle Parks RN  Head of Bed (HOB) Positioning: HOB at 20 degrees     Problem: Sensorimotor Impairment (Stroke, Ischemic/Transient Ischemic Attack)  Goal: Improved Sensorimotor Function  Outcome: Ongoing, Progressing  Intervention: Optimize Range of Motion, Motor Control and Function  Recent Flowsheet Documentation  Taken 4/17/2023 0200 by Michelle Parks RN  Positioning/Transfer Devices:   pillows   in use  Taken 4/17/2023 0000 by Michelle Parks RN  Positioning/Transfer Devices:   pillows   in use  Taken 4/16/2023 2200 by Michelle Parks RN  Positioning/Transfer Devices:   pillows   in use  Taken 4/16/2023 1955 by Michelle Parks RN  Positioning/Transfer Devices:   pillows   in use  Range of Motion: ROM (range of motion) performed  Intervention: Optimize Sensory and Perceptual Ability  Recent Flowsheet Documentation  Taken 4/16/2023 1955 by Michelle Parks RN  Pressure Reduction Techniques: frequent weight shift encouraged  Sensation Impairment Protection: cues provided for safety  Pressure Reduction Devices: alternating pressure pump (ADD)     Problem: Swallowing Impairment (Stroke, Ischemic/Transient Ischemic Attack)  Goal: Optimal Eating and Swallowing without Aspiration  Outcome: Ongoing, Progressing     Problem: Urinary Elimination Impaired (Stroke, Ischemic/Transient Ischemic Attack)  Goal: Effective Urinary Elimination  Outcome: Ongoing, Progressing     Problem: Adjustment to Illness (Stroke, Hemorrhagic)  Goal: Optimal Coping  Outcome: Ongoing, Progressing  Intervention: Support Psychosocial Response to Stroke  Recent Flowsheet Documentation  Taken 4/16/2023 1955 by Michelle Parks  RN  Supportive Measures: active listening utilized  Family/Support System Care: self-care encouraged     Problem: Bowel Elimination Impaired (Stroke, Hemorrhagic)  Goal: Effective Bowel Elimination  Outcome: Ongoing, Progressing     Problem: Cerebral Tissue Perfusion (Stroke, Hemorrhagic)  Goal: Optimal Cerebral Tissue Perfusion  Outcome: Ongoing, Progressing  Intervention: Protect and Optimize Cerebral Perfusion  Recent Flowsheet Documentation  Taken 4/16/2023 1955 by Michelle Parks RN  Sensory Stimulation Regulation: care clustered  Cerebral Perfusion Promotion: blood pressure monitored     Problem: Cognitive Impairment (Stroke, Hemorrhagic)  Goal: Optimal Cognitive Function  Outcome: Ongoing, Progressing  Intervention: Optimize Cognitive Function  Recent Flowsheet Documentation  Taken 4/16/2023 1955 by Michelle Parks RN  Sensory Stimulation Regulation: care clustered  Reorientation Measures: clock in view     Problem: Communication Impairment (Stroke, Hemorrhagic)  Goal: Effective Communication Skills  Outcome: Ongoing, Progressing  Intervention: Optimize Communication Skills  Recent Flowsheet Documentation  Taken 4/16/2023 1955 by Michelle Parks RN  Communication Enhancement Strategies: extra time allowed for response     Problem: Functional Ability Impaired (Stroke, Hemorrhagic)  Goal: Optimal Functional Ability  Outcome: Ongoing, Progressing     Problem: Pain (Stroke, Hemorrhagic)  Goal: Acceptable Pain Control  Outcome: Ongoing, Progressing     Problem: Respiratory Compromise (Stroke, Hemorrhagic)  Goal: Effective Oxygenation and Ventilation  Outcome: Ongoing, Progressing  Intervention: Optimize Oxygenation and Ventilation  Recent Flowsheet Documentation  Taken 4/17/2023 0200 by Michelle Parks RN  Head of Bed (HOB) Positioning: HOB at 20 degrees  Taken 4/17/2023 0000 by Mcihelle Parks RN  Head of Bed ([facility]) Positioning: HOB at 20 degrees  Taken 4/16/2023 2200 by Michelle Parks RN  Head of Bed ([facility])  Positioning: HOB at 20 degrees  Taken 4/16/2023 1955 by Michelle Parks RN  Head of Bed (HOB) Positioning: HOB at 20 degrees     Problem: Sensorimotor Impairment (Stroke, Hemorrhagic)  Goal: Improved Sensorimotor Function  Outcome: Ongoing, Progressing  Intervention: Optimize Range of Motion, Motor Control and Function  Recent Flowsheet Documentation  Taken 4/17/2023 0200 by Michelle Parks RN  Positioning/Transfer Devices:   pillows   in use  Taken 4/17/2023 0000 by Michelle Parks RN  Positioning/Transfer Devices:   pillows   in use  Taken 4/16/2023 2200 by Michelle Parks RN  Positioning/Transfer Devices:   pillows   in use  Taken 4/16/2023 1955 by Michelle Parks RN  Positioning/Transfer Devices:   pillows   in use  Range of Motion: ROM (range of motion) performed  Intervention: Optimize Sensory and Perceptual Ability  Recent Flowsheet Documentation  Taken 4/16/2023 1955 by Michelle Parks RN  Pressure Reduction Techniques: frequent weight shift encouraged  Sensation Impairment Protection: cues provided for safety  Pressure Reduction Devices: alternating pressure pump (ADD)     Problem: Swallowing Impairment (Stroke, Hemorrhagic)  Goal: Optimal Eating and Swallowing Without Aspiration  Outcome: Ongoing, Progressing     Problem: Urinary Elimination Impaired (Stroke, Hemorrhagic)  Goal: Effective Urinary Elimination  Outcome: Ongoing, Progressing   Goal Outcome Evaluation:  Plan of Care Reviewed With: patient

## 2023-04-17 NOTE — PLAN OF CARE
Goal Outcome Evaluation:              Outcome Evaluation: Remains pleasantly confused. Oriented x1. Follows commands. Improved movement of right side this shift. Tolerating diet. No complaints. Awaiting d/c plans

## 2023-04-17 NOTE — PROGRESS NOTES
"Nutrition Services    Patient Name:  Manuel Durant  YOB: 1950  MRN: [id number]  Admit Date:  3/12/2023    FOLLOW UP - CLINICAL NUTRITION    Assessment Date:  04/17/23     Encounter Information         Reason for Encounter Follow up     Current Issues Patient eating 50% of most meals per EMR. RD observed tray being delivered that did not have ONS printed on ticket. RD personally placed ONS on patient's tray before being delivered. CCP still working on SNF placement vs trip back to [place] but patient has reported he feels too weak to travel. RD to re-order ONS and continue to follow      Current Nutrition Orders & Evaluation of Intake       Oral Nutrition     Current PO Diet Diet: Regular/House Diet; No Mixed Consistencies, Feeding Assistance - Nursing; Texture: Soft to Chew (NDD 3); Soft to Chew: Chopped Meat; Fluid Consistency: Nectar Thick   Supplement Mighty Shake, BID   PO Evaluation     % PO Intake 50%    # of Days Evaluated     Factors Affecting Intake  No factors at this time   --  Anthropometrics          Height    Weight Height: 167.6 cm (66\")  Weight: 61.6 kg (135 lb 12.9 oz) (04/17/23 0017)    BMI kg/m2 Body mass index is 21.92 kg/m².    Weight trend Loss, Amount/Timeframe:  13# (8.5%) .  Documented weights    03/22/23 0505 03/23/23 0427 03/24/23 0509 03/25/23 0540   Weight: 72.1 kg (158 lb 15.2 oz) 72.5 kg (159 lb 13.3 oz) 70.9 kg (156 lb 4.9 oz) 71.5 kg (157 lb 10.1 oz)    03/26/23 0647 03/27/23 0429 03/30/23 0501 03/31/23 0500   Weight: 71.3 kg (157 lb 3 oz) 71.8 kg (158 lb 4.6 oz) 70.6 kg (155 lb 10.3 oz) 70.2 kg (154 lb 12.2 oz)    04/02/23 0529 04/03/23 0624 04/05/23 0418 04/07/23 0503   Weight: 69.8 kg (153 lb 14.1 oz) 63.9 kg (140 lb 14 oz) 63.9 kg (140 lb 14 oz) 60.4 kg (133 lb 2.5 oz)    04/08/23 0545 04/09/23 0517 04/10/23 0041 04/11/23 0600   Weight: 69.5 kg (153 lb 3.5 oz) 67.3 kg (148 lb 5.9 oz) 67.3 kg (148 lb 5.9 oz) 68.6 kg (151 lb 3.8 oz)    04/12/23 0530 04/13/23 0517 " 04/14/23 0530 04/15/23 0014   Weight: 68.7 kg (151 lb 7.3 oz) 68.1 kg (150 lb 2.1 oz) 67.1 kg (147 lb 14.9 oz) 67.1 kg (147 lb 14.9 oz)    04/16/23 0251 04/16/23 0449 04/17/23 0017   Weight: 67.1 kg (147 lb 14.9 oz) 68.6 kg (151 lb 3.8 oz) 61.6 kg (135 lb 12.9 oz)          Labs        Pertinent Labs Reviewed, listed below     Results from last 7 days   Lab Units 04/17/23  0550 04/11/23  0729   SODIUM mmol/L 140 139   POTASSIUM mmol/L 3.9 3.9   CHLORIDE mmol/L 106 107   CO2 mmol/L 24.3 25.0   BUN mg/dL 11 13   CREATININE mg/dL 0.74* 0.64*   CALCIUM mg/dL 9.1 8.4*   GLUCOSE mg/dL 86 86     Results from last 7 days   Lab Units 04/17/23  0550   HEMOGLOBIN g/dL 13.4   HEMATOCRIT % 39.5   WBC 10*3/mm3 6.71     Results from last 7 days   Lab Units 04/17/23  0550 04/11/23  0729   PLATELETS 10*3/mm3 160 168     No results found for: COVID19  Lab Results   Component Value Date    HGBA1C 5.60 03/15/2023          Medications            Scheduled Medications amLODIPine, 10 mg, Oral, QAM AC  apixaban, 5 mg, Oral, Q12H  baclofen, 2.5 mg, Oral, Q8H  carvedilol, 6.25 mg, Oral, BID With Meals  hydrALAZINE, 10 mg, Oral, Q8H  lidocaine, 1 patch, Transdermal, Q24H  sodium chloride, 10 mL, Intravenous, Q12H  valsartan, 160 mg, Oral, QAM AC        Infusions      PRN Medications •  acetaminophen  •  senna-docusate sodium **AND** polyethylene glycol **AND** bisacodyl **AND** bisacodyl  •  Calcium Gluconate-NaCl **AND** calcium gluconate IVPB **AND** Calcium  •  labetalol  •  loperamide  •  magnesium sulfate **OR** magnesium sulfate **OR** magnesium sulfate  •  potassium chloride **OR** potassium chloride **OR** potassium chloride  •  potassium phosphate infusion greater than 15 mMoles **OR** potassium phosphate infusion greater than 15 mMoles **OR** potassium phosphate **OR** sodium phosphate IVPB **OR** sodium phosphate IVPB  •  [COMPLETED] Insert Peripheral IV **AND** sodium chloride  •  sodium chloride  •  sodium chloride     Physical  Findings          Physical Appearance disoriented, somnolent   Oral/Mouth Cavity teeth missing   Edema  no edema   Gastrointestinal normoactive, last bowel movement:4/15   Skin  skin intact   Tubes/Drains none   NFPE MSA documented on 4/4    --  NUTRITION INTERVENTION / PLAN OF CARE  Intervention Goal         Intervention Goal(s) Maintain intake and Maintain weight     Nutrition Intervention         RD Action Follow Tx Progress and Care plan reviewed     Nutrition Prescription         Diet Prescription     Supplement Prescription Mighty Shake, BID    EN/PN Prescription    New Prescription Ordered? Continue same per protocol   --  Monitor/Evaluation        Monitor Per protocol   Discharge Needs Other: plans for rehab vs going home to [place] with family    Education Will instruct as appropriate   --    RD to follow up per protocol.    Electronically signed by:  Richelle Milligan RD  04/17/23 13:50 EDT

## 2023-04-17 NOTE — PROGRESS NOTES
"    Name: Manuel Durant ADMIT: 3/12/2023   : 1950  PCP: Provider, No Known    MRN: [id number] LOS: 36 days   AGE/SEX: 73 y.o. male  ROOM: [facility]/     Subjective   Subjective   Resting in bed. No family at bedside. He denies any pain although at one point tells me his right leg hurts but then retracts and says \"I am doing okay. Nothing is bothering me.\" Spoke with him using phone  as well as without given he does speak some English. He is eating/drinking. No nausea or vomiting. Nursing reports some tightness in his right elbow and she performed passive ROM.     Objective   Objective   Vital Signs  Temp:  [97.4 °F (36.3 °C)-98.5 °F (36.9 °C)] 97.8 °F (36.6 °C)  Heart Rate:  [57-69] 69  Resp:  [16-18] 16  BP: (120-138)/(65-83) 138/83  SpO2:  [91 %-96 %] 92 %  on   ;   Device (Oxygen Therapy): room air  Body mass index is 21.92 kg/m².     Physical Exam  Vitals and nursing note reviewed.   Constitutional:       General: He is awake and alert. He is not in acute distress.  Cardiovascular:      Rate and Rhythm: Normal rate and regular rhythm.      Pulses: Normal pulses.      Heart sounds: Normal heart sounds. No murmur heard.  Pulmonary:      Effort: Pulmonary effort is normal. No respiratory distress.      Breath sounds: Normal breath sounds.   Abdominal:      Palpations: Abdomen is soft and non-tender.  Musculoskeletal:      Cervical back: Neck supple.      Right lower leg: No edema.      Left lower leg: No edema.      Comments: Mild right upper extremity swelling to hand.   Skin:     General: Skin is dry.   Neurological:      Motor: Weakness present.      Comments: At times disoriented.  Right-sided upper extremity paresis.   Psychiatric:         Mood and Affect: Mood normal.         Behavior: Behavior normal.         Thought Content: Thought content normal.         Judgment: Judgment normal    Results Review:       I reviewed the patient's new clinical results.  Results from last 7 days   Lab Units " 04/17/23  0550 04/11/23  0729   WBC 10*3/mm3 6.71 6.74   HEMOGLOBIN g/dL 13.4 13.8   PLATELETS 10*3/mm3 160 168     Results from last 7 days   Lab Units 04/17/23  0550 04/11/23  0729   SODIUM mmol/L 140 139   POTASSIUM mmol/L 3.9 3.9   CHLORIDE mmol/L 106 107   CO2 mmol/L 24.3 25.0   BUN mg/dL 11 13   CREATININE mg/dL 0.74* 0.64*   GLUCOSE mg/dL 86 86   Estimated Creatinine Clearance: 79.8 mL/min (A) (by C-G formula based on SCr of 0.74 mg/dL (L)).    Results from last 7 days   Lab Units 04/17/23  0550 04/11/23  0729   CALCIUM mg/dL 9.1 8.4*       No results found for: HGBA1C, POCGLU    amLODIPine, 10 mg, Oral, QAM AC  apixaban, 5 mg, Oral, Q12H  baclofen, 2.5 mg, Oral, Q8H  carvedilol, 6.25 mg, Oral, BID With Meals  hydrALAZINE, 10 mg, Oral, Q8H  lidocaine, 1 patch, Transdermal, Q24H  sodium chloride, 10 mL, Intravenous, Q12H  valsartan, 160 mg, Oral, QAM AC       Diet: Regular/House Diet; No Mixed Consistencies, Feeding Assistance - Nursing; Texture: Soft to Chew (NDD 3); Soft to Chew: Chopped Meat; Fluid Consistency: Nectar Thick       Assessment/Plan     Active Hospital Problems    Diagnosis  POA   • **Intracranial hemorrhage [I62.9]  Yes   • Hemiplegia [G81.90]  Yes   • Right shoulder pain [M25.511]  Yes   • Severe Malnutrition (HCC) [E43]  Yes   • Acute DVT (deep venous thrombosis) [I82.409]  Yes   • HTN (hypertension) [I10]  Yes   • Hypertensive emergency [I16.1]  Yes      Resolved Hospital Problems    Diagnosis Date Resolved POA   • Hypokalemia [E87.6] 04/10/2023 Unknown     Mr. Durant is a 73 year old male who initially presented to the hospital 3/12/23 for intracranial bleed. He was found unresponsive by friends and initially taken to [facility] where he was found to have poorly controlled BP and right sided hemiplegia. He was transferred here for closer monitoring in the ICU and neurosurgical consultation. He was cleared to move out of the ICU on 3/16/23.     • Intracranial hemorrhage: CTH 3/17/23  stable left basal ganglia/internal capsule hemorrhage with left lateral ventricular hemorrhage. On Lovenox for DVT and repeat CTH 3/18 also stable. ANDRADE followed and signed off 3/23 with plans for repeat MRI brain/MRA head in 1 month. Goal BP < 150 systolic. Remains on baclofen for spasticity.      • HTN emergency: On amlodipine 10 mg, valsartan 160 mg, Coreg 6.25 mg BID and hydralazine 10 mg TID. BP stable.     • Acute DVT: Found 3/17/23 with acute RLE DVT in soleal. ANDRADE okayed for blood thinners. Now on Eliquis.     • Hypokalemia: Resolved.     Discussed with patient (via ) and nursing staff.      Medically stable. Awaiting plans for transportation to [place] to live with son who is there. Granddaughter in [place] cannot take care of him. There is a nephew in [place] that is not to be contacted.      • Eliquis for DVT prophylaxis.  • Full code.  • Anticipate discharge as above.    SLICK Arroyo  [facility]  04/17/23  09:43 EDT

## 2023-04-18 PROCEDURE — 97535 SELF CARE MNGMENT TRAINING: CPT

## 2023-04-18 PROCEDURE — 97110 THERAPEUTIC EXERCISES: CPT

## 2023-04-18 PROCEDURE — 97530 THERAPEUTIC ACTIVITIES: CPT

## 2023-04-18 RX ADMIN — CARVEDILOL 6.25 MG: 12.5 TABLET, FILM COATED ORAL at 20:05

## 2023-04-18 RX ADMIN — VALSARTAN 160 MG: 160 TABLET, FILM COATED ORAL at 09:38

## 2023-04-18 RX ADMIN — AMLODIPINE BESYLATE 10 MG: 10 TABLET ORAL at 09:38

## 2023-04-18 RX ADMIN — BACLOFEN 2.5 MG: 10 TABLET ORAL at 15:03

## 2023-04-18 RX ADMIN — LIDOCAINE 1 PATCH: 700 PATCH TOPICAL at 09:38

## 2023-04-18 RX ADMIN — Medication 10 ML: at 09:38

## 2023-04-18 RX ADMIN — APIXABAN 5 MG: 5 TABLET, FILM COATED ORAL at 21:11

## 2023-04-18 RX ADMIN — APIXABAN 5 MG: 5 TABLET, FILM COATED ORAL at 09:38

## 2023-04-18 RX ADMIN — BACLOFEN 2.5 MG: 10 TABLET ORAL at 21:11

## 2023-04-18 RX ADMIN — HYDRALAZINE HYDROCHLORIDE 10 MG: 10 TABLET, FILM COATED ORAL at 21:11

## 2023-04-18 RX ADMIN — Medication 10 ML: at 20:25

## 2023-04-18 RX ADMIN — BACLOFEN 2.5 MG: 10 TABLET ORAL at 06:11

## 2023-04-18 RX ADMIN — HYDRALAZINE HYDROCHLORIDE 10 MG: 10 TABLET, FILM COATED ORAL at 15:03

## 2023-04-18 RX ADMIN — HYDRALAZINE HYDROCHLORIDE 10 MG: 10 TABLET, FILM COATED ORAL at 06:11

## 2023-04-18 NOTE — THERAPY TREATMENT NOTE
Patient Name: Manuel Durant  : 1950    MRN: [id number]                              Today's Date: 2023       Admit Date: 3/12/2023    Visit Dx:     ICD-10-CM ICD-9-CM   1. Intracranial hemorrhage  I62.9 432.9   2. Altered mental status, unspecified altered mental status type  R41.82 780.97     Patient Active Problem List   Diagnosis   • Intracranial hemorrhage   • Hypertensive emergency   • Acute DVT (deep venous thrombosis)   • HTN (hypertension)   • Severe Malnutrition (HCC)   • Hemiplegia   • Right shoulder pain     Past Medical History:   Diagnosis Date   • Hypokalemia 3/17/2023     History reviewed. No pertinent surgical history.   General Information     Row Name 23 1540          OT Time and Intention    Document Type therapy note (daily note)  -RB     Mode of Treatment co-treatment;occupational therapy;physical therapy  -RB     Row Name 23 1548          General Information    Patient Profile Reviewed yes  -RB     Existing Precautions/Restrictions fall  -RB     Row Name 23 1549          Cognition    Orientation Status (Cognition) oriented to;person;other (see comments)  pleasant and cooperative  -RB     Row Name 23 154          Safety Issues, Functional Mobility    Comment, Safety Issues/Impairments (Mobility) The pt required significant assist with breaking down the steps of tasks, visually and physically focusing on steps of tasks and attending to what he was doing.  -RB           User Key  (r) = Recorded By, (t) = Taken By, (c) = Cosigned By    Initials Name Provider Type    RB Ana Lui OT Occupational Therapist                 Mobility/ADL's     Row Name 23 1548          Bed Mobility    Bed Mobility supine-sit  -RB     Supine-Sit [place] (Bed Mobility) verbal cues;standby assist  -RB     Assistive Device (Bed Mobility) bed rails  -RB     Row Name 23 1549          Transfers    Transfers sit-stand transfer;stand-sit transfer;bed-chair  transfer  -RB     Row Name 04/18/23 1541          Bed-Chair Transfer    Bed-Chair [place] (Transfers) maximum assist (25% patient effort);2 person assist;verbal cues;nonverbal cues (demo/gesture)  -RB     Assistive Device (Bed-Chair Transfers) --  a  -RB     Comment, (Bed-Chair Transfer) R knee buckling. third therapist providing hands on support to RLE  -RB     Row Name 04/18/23 1541          Sit-Stand Transfer    Sit-Stand [place] (Transfers) minimum assist (75% patient effort);moderate assist (50% patient effort);2 person assist;verbal cues;nonverbal cues (demo/gesture)  -RB     Assistive Device (Sit-Stand Transfers) --  hha  -RB     Row Name 04/18/23 1541          Stand-Sit Transfer    Stand-Sit [place] (Transfers) minimum assist (75% patient effort);moderate assist (50% patient effort);2 person assist;verbal cues;nonverbal cues (demo/gesture)  -RB     Comment, (Stand-Sit Transfer) hha  -RB     Row Name 04/18/23 1541          Activities of Daily Living    BADL Assessment/Intervention grooming  -     Row Name 04/18/23 1541          Grooming Assessment/Training    Comment, (Grooming) max cues to focus on the task at [place]. he was able to brush his teeth and cross midline to wash his R hand from a seated position at sinkside  -RB           User Key  (r) = Recorded By, (t) = Taken By, (c) = Cosigned By    Initials Name Provider Type    Ana Banks OT Occupational Therapist               Obj/Interventions     Row Name 04/18/23 1543          Vision Assessment/Intervention    Vision Assessment Comment cues to visually scan his environment at [facility] for grooming ADLs  -     Row Name 04/18/23 1543          Motor Skills    Therapeutic Exercise --  OT provided stretching of his RUE, encouraged weight bearing onto the RUE with sinkside grooming  -RB           User Key  (r) = Recorded By, (t) = Taken By, (c) = Cosigned By    Initials Name Provider Type    Ana Banks OT  Occupational Therapist               Goals/Plan    No documentation.                Clinical Impression     Row Name 04/18/23 1544          Pain Assessment    Pretreatment Pain Rating 0/10 - no pain  -RB     Posttreatment Pain Rating 0/10 - no pain  -RB     Row Name 04/18/23 1544          Plan of Care Review    Plan of Care Reviewed With patient  -RB     Progress improving  -RB     Row Name 04/18/23 1544          Therapy Assessment/Plan (OT)    Rehab Potential (OT) good, to achieve stated therapy goals  -RB     Criteria for Skilled Therapeutic Interventions Met (OT) yes;skilled treatment is necessary  -RB     Therapy Frequency (OT) 5 times/wk  -RB     Row Name 04/18/23 1544          Therapy Plan Review/Discharge Plan (OT)    Anticipated Discharge Disposition (OT) inpatient rehabilitation facility  -RB     Row Name 04/18/23 1544          Vital Signs    O2 Delivery Pre Treatment room air  -RB     O2 Delivery Intra Treatment room air  -RB     O2 Delivery Post Treatment room air  -RB     Pre Patient Position Supine  -RB     Intra Patient Position Standing  -RB     Post Patient Position Sitting  -RB     Row Name 04/18/23 1544          Positioning and Restraints    Pre-Treatment Position in bed  -RB     Post Treatment Position chair  -RB     In Chair notified nsg;reclined;sitting;call light within reach;encouraged to call for assist;exit alarm on;legs elevated  -RB           User Key  (r) = Recorded By, (t) = Taken By, (c) = Cosigned By    Initials Name Provider Type    RB Ana Lui, OT Occupational Therapist               Outcome Measures     Row Name 04/18/23 0950 04/18/23 0752       How much help from another person do you currently need...    Turning from your back to your side while in flat bed without using bedrails? 3  -CW 3  -TF    Moving from lying on back to sitting on the side of a flat bed without bedrails? 3  -CW 3  -TF    Moving to and from a bed to a chair (including a wheelchair)? 2  -CW 2  -TF     Standing up from a chair using your arms (e.g., wheelchair, bedside chair)? 2  -CW 1  -TF    Climbing 3-5 steps with a railing? 1  -CW 1  -TF    To walk in hospital room? 2  -CW 1  -TF    AM-PAC 6 Clicks Score (PT) 13  -CW 11  -TF    Highest level of mobility 4 --> Transferred to chair/commode  -CW 4 --> Transferred to chair/commode  -TF    Row Name 04/18/23 0950          Functional Assessment    Outcome Measure Options AM-PAC 6 Clicks Basic Mobility (PT)  -CW           User Key  (r) = Recorded By, (t) = Taken By, (c) = Cosigned By    Initials Name Provider Type    Miriam Snow RN Registered Nurse    Tamica Eddy PT Physical Therapist                Occupational Therapy Education     Title: PT OT SLP Therapies (In Progress)     Topic: Occupational Therapy (Done)     Point: ADL training (Done)     Description:   Instruct learner(s) on proper safety adaptation and remediation techniques during self care or transfers.   Instruct in proper use of assistive devices.              Learning Progress Summary           Patient Acceptance, E,TB,H, VU,NR by MS at 4/13/2023 1817    Acceptance, E,TB,H, VU by MS at 3/16/2023 1801    Acceptance, E, NR by LM at 3/15/2023 0521   Family Acceptance, E,TB,H, VU,NR by MS at 4/13/2023 1817    Acceptance, E,TB,H, VU by MS at 3/16/2023 1801                   Point: Home exercise program (Done)     Description:   Instruct learner(s) on appropriate technique for monitoring, assisting and/or progressing therapeutic exercises/activities.              Learning Progress Summary           Patient Acceptance, E,TB,H, VU,NR by MS at 4/13/2023 1817    Acceptance, E,TB,H, VU by MS at 3/16/2023 1801    Acceptance, E, NR by LM at 3/15/2023 0521   Family Acceptance, E,TB,H, VU,NR by MS at 4/13/2023 1817    Acceptance, E,TB,H, VU by MS at 3/16/2023 1801                   Point: Precautions (Done)     Description:   Instruct learner(s) on prescribed precautions during self-care and  functional transfers.              Learning Progress Summary           Patient Acceptance, E,TB,H, VU,NR by MS at 4/13/2023 1817    Acceptance, E,TB,H, VU by MS at 3/16/2023 1801    Acceptance, E, NR by LM at 3/15/2023 0521   Family Acceptance, E,TB,H, VU,NR by MS at 4/13/2023 1817    Acceptance, E,TB,H, VU by MS at 3/16/2023 1801                   Point: Body mechanics (Done)     Description:   Instruct learner(s) on proper positioning and spine alignment during self-care, functional mobility activities and/or exercises.              Learning Progress Summary           Patient Acceptance, E,TB,H, VU,NR by MS at 4/13/2023 1817    Acceptance, E,TB,H, VU by MS at 3/16/2023 1801    Acceptance, E, NR by LM at 3/15/2023 0521   Family Acceptance, E,TB,H, VU,NR by MS at 4/13/2023 1817    Acceptance, E,TB,H, VU by MS at 3/16/2023 1801                               User Key     Initials Effective Dates Name Provider Type Discipline    MS 06/16/21 -  Angel Luis Cr, RN Registered Nurse Nurse     10/13/22 -  Silke Grace, RN Registered Nurse Nurse              OT Recommendation and Plan  Planned Therapy Interventions (OT): transfer/mobility retraining, strengthening exercise, ROM/therapeutic exercise, activity tolerance training, adaptive equipment training, BADL retraining, functional balance retraining, occupation/activity based interventions, patient/caregiver education/training, neuromuscular control/coordination retraining, cognitive/visual perception retraining, edema control/reduction, passive ROM/stretching  Therapy Frequency (OT): 5 times/wk  Plan of Care Review  Plan of Care Reviewed With: patient  Progress: improving     Time Calculation:    Time Calculation- OT     Row Name 04/18/23 1540             Time Calculation- OT    OT Start Time 0844  -RB      OT Stop Time 0911  -RB      OT Time Calculation (min) 27 min  -RB      Total Timed Code Minutes- OT 27 minute(s)  -RB      OT Received On 04/18/23  -RB      OT -  Next Appointment 04/19/23  -RB         Timed Charges    28032 - OT Self Care/Mgmt Minutes 27  -RB         Total Minutes    Timed Charges Total Minutes 27  -RB       Total Minutes 27  -RB            User Key  (r) = Recorded By, (t) = Taken By, (c) = Cosigned By    Initials Name Provider Type    Ana Banks OT Occupational Therapist              Therapy Charges for Today     Code Description Service Date Service Provider Modifiers Qty    10419765791 HC OT SELF CARE/MGMT/TRAIN EA 15 MIN 4/18/2023 Ana Lui OT GO 2    46252582238 HC OT SELF CARE/MGMT/TRAIN EA 15 MIN 4/18/2023 Ana Lui OT  2               Ana Lui OT  4/18/2023

## 2023-04-18 NOTE — PROGRESS NOTES
Name: Manuel Durant ADMIT: 3/12/2023   : 1950  PCP: Provider, No Known    MRN: [id number] LOS: 37 days   AGE/SEX: 73 y.o. male  ROOM: [facility]     Subjective   Subjective   Resting in chair. Nurse at bedside. He denies any complaints at this time. Breathing well and moving his arm/leg on command. No acute issues overnight.     Objective   Objective   Vital Signs  Temp:  [98.5 °F (36.9 °C)-98.8 °F (37.1 °C)] 98.5 °F (36.9 °C)  Heart Rate:  [57-63] 59  Resp:  [16] 16  BP: (116-147)/(65-96) 119/65  SpO2:  [92 %-97 %] 97 %  on   ;   Device (Oxygen Therapy): room air  Body mass index is 21.88 kg/m².     Physical Exam  Vitals and nursing note reviewed.   Constitutional:       General: He is awake and alert. He is not in acute distress.  Cardiovascular:      Rate and Rhythm: Normal rate and regular rhythm.      Pulses: Normal pulses.      Heart sounds: Normal heart sounds. No murmur heard.  Pulmonary:      Effort: Pulmonary effort is normal. No respiratory distress.      Breath sounds: Normal breath sounds.   Abdominal:      Palpations: Abdomen is soft and non-tender.  Musculoskeletal:      Cervical back: Neck supple.      Right lower leg: No edema.      Left lower leg: No edema.      Comments: Mild right upper extremity swelling to hand.   Skin:     General: Skin is dry.   Neurological:      Motor: Weakness present.      Comments: At times disoriented.  Right-sided upper extremity paresis- slowing improving as he has mobility in raising it as well as his right leg.   Psychiatric:         Mood and Affect: Mood normal.         Behavior: Behavior normal.         Thought Content: Thought content normal.         Judgment: Judgment normal     Results Review:       I reviewed the patient's new clinical results.  Results from last 7 days   Lab Units 23  0550   WBC 10*3/mm3 6.71   HEMOGLOBIN g/dL 13.4   PLATELETS 10*3/mm3 160     Results from last 7 days   Lab Units 23  0550   SODIUM mmol/L 140   POTASSIUM  mmol/L 3.9   CHLORIDE mmol/L 106   CO2 mmol/L 24.3   BUN mg/dL 11   CREATININE mg/dL 0.74*   GLUCOSE mg/dL 86   Estimated Creatinine Clearance: 79.6 mL/min (A) (by C-G formula based on SCr of 0.74 mg/dL (L)).    Results from last 7 days   Lab Units 04/17/23  0550   CALCIUM mg/dL 9.1       No results found for: HGBA1C, POCGLU    amLODIPine, 10 mg, Oral, QAM AC  apixaban, 5 mg, Oral, Q12H  baclofen, 2.5 mg, Oral, Q8H  carvedilol, 6.25 mg, Oral, BID With Meals  hydrALAZINE, 10 mg, Oral, Q8H  lidocaine, 1 patch, Transdermal, Q24H  sodium chloride, 10 mL, Intravenous, Q12H  valsartan, 160 mg, Oral, QAM AC       Diet: Regular/House Diet; No Mixed Consistencies, Feeding Assistance - Nursing; Texture: Soft to Chew (NDD 3); Soft to Chew: Chopped Meat; Fluid Consistency: Nectar Thick       Assessment/Plan     Active Hospital Problems    Diagnosis  POA   • **Intracranial hemorrhage [I62.9]  Yes   • Hemiplegia [G81.90]  Yes   • Right shoulder pain [M25.511]  Yes   • Severe Malnutrition (HCC) [E43]  Yes   • Acute DVT (deep venous thrombosis) [I82.409]  Yes   • HTN (hypertension) [I10]  Yes   • Hypertensive emergency [I16.1]  Yes      Resolved Hospital Problems    Diagnosis Date Resolved POA   • Hypokalemia [E87.6] 04/10/2023 Unknown     Mr. Durant is a 73 year old male who initially presented to the hospital 3/12/23 for intracranial bleed. He was found unresponsive by friends and initially taken to [facility] where he was found to have poorly controlled BP and right sided hemiplegia. He was transferred here for closer monitoring in the ICU and neurosurgical consultation. He was cleared to move out of the ICU on 3/16/23.     • Intracranial hemorrhage: CTH 3/17/23 stable left basal ganglia/internal capsule hemorrhage with left lateral ventricular hemorrhage. On Lovenox for DVT and repeat CTH 3/18 also stable. [facility]RADE followed and signed off 3/23 with plans for repeat MRI brain/MRA head in 1 month. Goal BP < 150 systolic. Remains  on baclofen for spasticity.      • HTN emergency: On amlodipine 10 mg, valsartan 160 mg, Coreg 6.25 mg BID and hydralazine 10 mg TID. BP stable.     • Acute DVT: Found 3/17/23 with acute RLE DVT in soleal. [facility]RADE okayed for blood thinners. Now on Eliquis.     • Hypokalemia: Resolved.     Discussed with patient and nursing staff.      Medically stable. Awaiting plans for transportation to [place] to live with son who is there. Granddaughter in [place] cannot take care of him. There is a nephew in [place] that is not to be contacted. Await CCP discussion.     • Eliquis for DVT prophylaxis.  • Full code.  • Anticipate discharge as above.    SLICK Arroyo  [facility]  04/18/23  09:40 EDT

## 2023-04-18 NOTE — THERAPY TREATMENT NOTE
Patient Name: Manuel Durant  : 1950    MRN: [id number]                              Today's Date: 2023       Admit Date: 3/12/2023    Visit Dx:     ICD-10-CM ICD-9-CM   1. Intracranial hemorrhage  I62.9 432.9   2. Altered mental status, unspecified altered mental status type  R41.82 780.97     Patient Active Problem List   Diagnosis   • Intracranial hemorrhage   • Hypertensive emergency   • Acute DVT (deep venous thrombosis)   • HTN (hypertension)   • Severe Malnutrition (HCC)   • Hemiplegia   • Right shoulder pain     Past Medical History:   Diagnosis Date   • Hypokalemia 3/17/2023     History reviewed. No pertinent surgical history.   General Information     Row Name 23          Physical Therapy Time and Intention    Document Type therapy note (daily note)  -CW     Mode of Treatment physical therapy;co-treatment  -CW     Row Name 23          General Information    Existing Precautions/Restrictions fall  -CW     Row Name 23          Cognition    Orientation Status (Cognition) oriented to;person  -CW     Row Name 23          Safety Issues, Functional Mobility    Safety Issues Affecting Function (Mobility) insight into deficits/self-awareness;impulsivity;awareness of need for assistance;judgment;problem-solving;safety precautions follow-through/compliance;sequencing abilities  -CW     Impairments Affecting Function (Mobility) balance;coordination;endurance/activity tolerance;grasp;motor control;postural/trunk control;strength;range of motion (ROM);cognition;visual/perceptual  -CW           User Key  (r) = Recorded By, (t) = Taken By, (c) = Cosigned By    Initials Name Provider Type    CW Tamica Willoughby PT Physical Therapist               Mobility     Row Name 23          Bed Mobility    Bed Mobility supine-sit  -CW     Supine-Sit [place] (Bed Mobility) standby assist;verbal cues  -CW     Assistive Device (Bed Mobility) head of bed  elevated;bed rails  -CW     Row Name 04/18/23 0927          Bed-Chair Transfer    Bed-Chair [place] (Transfers) maximum assist (25% patient effort);2 person assist;verbal cues;nonverbal cues (demo/gesture)  -CW     Comment, (Bed-Chair Transfer) Significant R knee buckling today  -CW     Row Name 04/18/23 0927          Sit-Stand Transfer    Sit-Stand [place] (Transfers) minimum assist (75% patient effort);2 person assist;verbal cues;nonverbal cues (demo/gesture);moderate assist (50% patient effort)  -CW     Assistive Device (Sit-Stand Transfers) other (see comments);walker, karen  trialed karen walker on L today  -     Row Name 04/18/23 0927          Gait/Stairs (Locomotion)    [place] Level (Gait) maximum assist (25% patient effort);2 person assist;verbal cues  -CW     Assistive Device (Gait) walker, karen  -CW     Distance in Feet (Gait) 3' to chair  -CW     Deviations/Abnormal Patterns (Gait) festinating/shuffling;stride length decreased;sandhya decreased;gait speed decreased  -CW     Bilateral Gait Deviations forward flexed posture  -CW     Right Sided Gait Deviations foot drop/toe drag;knee buckling, right side  -CW           User Key  (r) = Recorded By, (t) = Taken By, (c) = Cosigned By    Initials Name Provider Type    Tamica Eddy PT Physical Therapist               Obj/Interventions     Row Name 04/18/23 0947          Balance    Dynamic Sitting Balance maximum assist;2-person assist  -CW           User Key  (r) = Recorded By, (t) = Taken By, (c) = Cosigned By    Initials Name Provider Type    Tamica Eddy PT Physical Therapist               Goals/Plan    No documentation.                Clinical Impression     Row Name 04/18/23 0948          Pain    Pretreatment Pain Rating 0/10 - no pain  -CW     Row Name 04/18/23 0948          Plan of Care Review    Plan of Care Reviewed With patient  -CW     Outcome Evaluation Pt participated with PT this date. Required increased assist for  transfers, continues to be impulsvie at times. Attempted to stand at sink for ADLs however pt balance poor with R knee buckling.Will continue to follow and progress as able.  -CW     Row Name 04/18/23 0948          Vital Signs    O2 Delivery Pre Treatment room air  -CW     Row Name 04/18/23 0948          Positioning and Restraints    Pre-Treatment Position in bed  -CW     Post Treatment Position chair  -CW     In Chair reclined;call light within reach;encouraged to call for assist;exit alarm on;notified nsg  with aide for  bath  -CW           User Key  (r) = Recorded By, (t) = Taken By, (c) = Cosigned By    Initials Name Provider Type    Tamica Eddy PT Physical Therapist               Outcome Measures     Row Name 04/18/23 0950          How much help from another person do you currently need...    Turning from your back to your side while in flat bed without using bedrails? 3  -CW     Moving from lying on back to sitting on the side of a flat bed without bedrails? 3  -CW     Moving to and from a bed to a chair (including a wheelchair)? 2  -CW     Standing up from a chair using your arms (e.g., wheelchair, bedside chair)? 2  -CW     Climbing 3-5 steps with a railing? 1  -CW     To walk in hospital room? 2  -CW     AM-PAC 6 Clicks Score (PT) 13  -CW     Highest level of mobility 4 --> Transferred to chair/commode  -CW     Row Name 04/18/23 0950          Functional Assessment    Outcome Measure Options AM-PAC 6 Clicks Basic Mobility (PT)  -CW           User Key  (r) = Recorded By, (t) = Taken By, (c) = Cosigned By    Initials Name Provider Type    Tamica Eddy PT Physical Therapist                             Physical Therapy Education     Title: PT OT SLP Therapies (In Progress)     Topic: Physical Therapy (In Progress)     Point: Mobility training (In Progress)     Learning Progress Summary           Patient Acceptance, E, NR by DANAY at 4/18/2023 0950    Acceptance, E, VU,NR by NICOLASA at 4/16/2023 4354     Acceptance, E,TB,H, VU,NR by MS at 4/13/2023 1817    Acceptance, E, NR by CW at 4/13/2023 1341    Acceptance, E, NR by CW at 4/12/2023 1201    Acceptance, E, NR by CW at 4/11/2023 1158    Acceptance, E, NR by CW at 4/10/2023 1414    Acceptance, E,TB, NR by CS at 4/8/2023 0928    Acceptance, E, NR by CW at 4/7/2023 1215    Acceptance, E, NR by CW at 4/6/2023 1335    Acceptance, E, NR by CW at 4/5/2023 1043    Acceptance, E, NR by CW at 4/4/2023 0906    Acceptance, E, NR by CW at 4/3/2023 1531    Acceptance, E,TB, VU,NR by CAMERON at 4/1/2023 1530    Acceptance, E, NR by CW at 3/31/2023 0925    Acceptance, E, NR by CW at 3/30/2023 1209    Acceptance, E, NR,NL by CW at 3/29/2023 1336    Acceptance, E, NR by ZB at 3/28/2023 1317    Acceptance, E, NR by CW at 3/27/2023 1401    Acceptance, E,D, NR,VU by JM at 3/24/2023 1631    Comment: seemed to comprehend commands this session    Acceptance, E,D, NR by JM at 3/23/2023 1655    Acceptance, E,D, NR by JM at 3/22/2023 1545    Acceptance, E,TB, VU,NR by CB at 3/21/2023 1544    Acceptance, E,TB,H, VU by MS at 3/16/2023 1801    Acceptance, E,D, DU,NR by MO at 3/16/2023 1451    Acceptance, E, DU,NR by MO at 3/15/2023 1200    Acceptance, E, NR by LM at 3/15/2023 0521    Acceptance, E,D, DU,NR by MO at 3/14/2023 1458   Family Acceptance, E,TB,H, VU,NR by MS at 4/13/2023 1817    Acceptance, E,TB,H, VU by MS at 3/16/2023 1801                   Point: Home exercise program (Done)     Learning Progress Summary           Patient Acceptance, E, VU,NR by DJ at 4/16/2023 1158    Acceptance, E,TB,H, VU,NR by MS at 4/13/2023 1817    Acceptance, E, NR by CW at 4/13/2023 1341    Acceptance, E,TB, NR by CS at 4/8/2023 0928    Acceptance, E, NR by CW at 4/7/2023 1215    Acceptance, E, NR by CW at 4/6/2023 1335    Acceptance, E,TB, VU,NR by CAMERON at 4/1/2023 1530    Acceptance, E, NR by CW at 3/31/2023 0925    Acceptance, E, NR by ZB at 3/28/2023 1317    Acceptance, E, NR by CW at 3/27/2023 1401     Acceptance, E,D, NR,VU by JM at 3/24/2023 1631    Comment: seemed to comprehend commands this session    Acceptance, E,D, NR by JM at 3/23/2023 1655    Acceptance, E,D, NR by JM at 3/22/2023 1545    Acceptance, E,TB, VU,NR by CB at 3/21/2023 1544    Acceptance, E,TB,H, VU by MS at 3/16/2023 1801    Acceptance, E,D, DU,NR by MO at 3/16/2023 1451    Acceptance, E, NR by LM at 3/15/2023 0521    Acceptance, E,D, DU,NR by MO at 3/14/2023 1458   Family Acceptance, E,TB,H, VU,NR by MS at 4/13/2023 1817    Acceptance, E,TB,H, VU by MS at 3/16/2023 1801                   Point: Body mechanics (Done)     Learning Progress Summary           Patient Acceptance, E, VU,NR by DJ at 4/16/2023 1158    Acceptance, E,TB,H, VU,NR by MS at 4/13/2023 1817    Acceptance, E, NR by CW at 4/13/2023 1341    Acceptance, E, NR by CW at 4/11/2023 1158    Acceptance, E, NR by CW at 4/10/2023 1414    Acceptance, E,TB, NR by CS at 4/8/2023 0928    Acceptance, E, NR by CW at 4/7/2023 1215    Acceptance, E, NR by CW at 4/4/2023 0906    Acceptance, E, NR by CW at 4/3/2023 1531    Acceptance, E,TB, VU,NR by CAMERON at 4/1/2023 1530    Acceptance, E,D, NR,VU by JM at 3/24/2023 1631    Comment: seemed to comprehend commands this session    Acceptance, E,D, NR by JM at 3/23/2023 1655    Acceptance, E,D, NR by JM at 3/22/2023 1545    Acceptance, E,TB, VU,NR by CB at 3/21/2023 1544    Acceptance, E,TB,H, VU by MS at 3/16/2023 1801    Acceptance, E,D, DU,NR by MO at 3/16/2023 1451    Acceptance, E, DU,NR by MO at 3/15/2023 1200    Acceptance, E, NR by LM at 3/15/2023 0521    Acceptance, E,D, DU,NR by MO at 3/14/2023 1458   Family Acceptance, E,TB,H, VU,NR by MS at 4/13/2023 1817    Acceptance, E,TB,H, VU by MS at 3/16/2023 1801                   Point: Precautions (Done)     Learning Progress Summary           Patient Acceptance, E, VU,NR by DJ at 4/16/2023 1158    Acceptance, E,TB,H, VU,NR by MS at 4/13/2023 1817    Acceptance, E, NR by CW at 4/13/2023 1341     Acceptance, E,TB, NR by CS at 4/8/2023 0928    Acceptance, E, NR by CW at 4/7/2023 1215    Acceptance, E, NR by CW at 4/4/2023 0906    Acceptance, E, NR by CW at 4/3/2023 1531    Acceptance, E,TB, VU,NR by CAMERON at 4/1/2023 1530    Acceptance, E,D, NR,VU by JM at 3/24/2023 1631    Comment: seemed to comprehend commands this session    Acceptance, E,D, NR by JM at 3/23/2023 1655    Acceptance, E,D, NR by JM at 3/22/2023 1545    Acceptance, E,TB, VU,NR by CB at 3/21/2023 1544    Acceptance, E,TB,H, VU by MS at 3/16/2023 1801    Acceptance, E,D, DU,NR by MO at 3/16/2023 1451    Acceptance, E, DU,NR by MO at 3/15/2023 1200    Acceptance, E, NR by LM at 3/15/2023 0521    Acceptance, E,D, DU,NR by MO at 3/14/2023 1458   Family Acceptance, E,TB,H, VU,NR by MS at 4/13/2023 1817    Acceptance, E,TB,H, VU by MS at 3/16/2023 1801                               User Key     Initials Effective Dates Name Provider Type Discipline     03/07/18 -  Tiffanie Grace, PTA Physical Therapist Assistant PT    CAMERON 05/19/21 -  Leeann Wiley, PT Physical Therapist PT    DJ 10/25/19 -  Jacqueline Chamberlain, PT Physical Therapist PT    MS 06/16/21 -  Angel Luis Cr, RN Registered Nurse Nurse    CW 12/13/22 -  Tamica Willoughby, PT Physical Therapist PT    CB 10/22/21 -  Bety Benitez, PT Physical Therapist PT    CS 09/22/22 -  Adelia Montalvo, PT Physical Therapist PT    LM 10/13/22 -  Silke Grace, RN Registered Nurse Nurse    MO 01/27/23 -  Lacie Tanner, PT Student PT Student PT    ZB 03/10/23 -  Nestor Rae, PT Student PT Student PT              PT Recommendation and Plan     Plan of Care Reviewed With: patient  Outcome Evaluation: Pt participated with PT this date. Required increased assist for transfers, continues to be impulsvie at times. Attempted to stand at sink for ADLs however pt balance poor with R knee buckling.Will continue to follow and progress as able.     Time Calculation:    PT Charges     Row Name 04/18/23 1588              Time Calculation    Start Time 0846  -CW      Stop Time 0910  -CW      Time Calculation (min) 24 min  -CW      PT Received On 04/18/23  -CW      PT - Next Appointment 04/19/23  -CW            User Key  (r) = Recorded By, (t) = Taken By, (c) = Cosigned By    Initials Name Provider Type    CW Tamica Willuoghby, PT Physical Therapist              Therapy Charges for Today     Code Description Service Date Service Provider Modifiers Qty    [id number]  PT THERAPEUTIC ACT EA 15 MIN 4/18/2023 Tamica Willoughby, PT GP 1    [id number]  PT THER PROC EA 15 MIN 4/18/2023 Tamica Willoughby, PT GP 1          PT G-Codes  Outcome Measure Options: AM-PAC 6 Clicks Basic Mobility (PT)  AM-PAC 6 Clicks Score (PT): 13  AM-PAC 6 Clicks Score (OT): 15  Modified [place] Scale: 4 - Moderately severe disability.  Unable to walk without assistance, and unable to attend to own bodily needs without assistance.  PT Discharge Summary  Anticipated Discharge Disposition (PT): inpatient rehabilitation facility, skilled nursing facility    Tamica Willoughby PT  4/18/2023

## 2023-04-18 NOTE — PLAN OF CARE
Goal Outcome Evaluation:  Plan of Care Reviewed With: patient           Outcome Evaluation: Pt participated with PT this date. Required increased assist for transfers, continues to be impulsvie at times. Attempted to stand at sink for ADLs however pt balance poor with R knee buckling.Will continue to follow and progress as able.

## 2023-04-18 NOTE — PLAN OF CARE
The pt participated this AM in therapy. He was supine in the bed and able to transition to the EOB with just SBA. RUE stretching/AROM exercises completed. He stood with Min-Mod A x2 but experienced significant R knee buckling and required 2-3 to take steps towards the bedside chair. His recliner chair was pushed to sinkside. He was unable to safely stand to complete grooming ADL's and was assisted into sitting. He used his LUE to brush his teeth and cross over midline to wash his R hand thoroughly. Max verbal/non verbal cues required consistently for the pt to focus on single step commands and sequencing as he becomes distracted very easy. The pt would greatly benefit from acute rehab at d/c.

## 2023-04-18 NOTE — PLAN OF CARE
Problem: Fall Injury Risk  Goal: Absence of Fall and Fall-Related Injury  Outcome: Ongoing, Progressing  Intervention: Identify and Manage Contributors  Recent Flowsheet Documentation  Taken 4/18/2023 0000 by Michelle Parks RN  Medication Review/Management: medications reviewed  Taken 4/17/2023 2200 by Michelle Parks RN  Medication Review/Management: medications reviewed  Taken 4/17/2023 2000 by Michelle Parks RN  Medication Review/Management: medications reviewed  Intervention: Promote Injury-Free Environment  Recent Flowsheet Documentation  Taken 4/18/2023 0000 by Michelle Parks RN  Safety Promotion/Fall Prevention: safety round/check completed  Taken 4/17/2023 2200 by Michelle Parks RN  Safety Promotion/Fall Prevention: safety round/check completed  Taken 4/17/2023 2000 by Michelle Parks RN  Safety Promotion/Fall Prevention: safety round/check completed     Problem: Skin Injury Risk Increased  Goal: Skin Health and Integrity  Outcome: Ongoing, Progressing  Intervention: Optimize Skin Protection  Recent Flowsheet Documentation  Taken 4/18/2023 0000 by Michelle Parks RN  Head of Bed (HOB) Positioning: HOB at 20 degrees  Taken 4/17/2023 2200 by Michelle Parks RN  Head of Bed (HOB) Positioning: HOB at 20 degrees  Taken 4/17/2023 2000 by Michelle Parks RN  Pressure Reduction Techniques: frequent weight shift encouraged  Head of Bed (HOB) Positioning: HOB at 20 degrees  Pressure Reduction Devices: alternating pressure pump (ADD)  Skin Protection: adhesive use limited     Problem: Adult Inpatient Plan of Care  Goal: Plan of Care Review  Outcome: Ongoing, Progressing  Flowsheets (Taken 4/18/2023 0111)  Plan of Care Reviewed With: patient  Goal: Patient-Specific Goal (Individualized)  Outcome: Ongoing, Progressing  Goal: Absence of Hospital-Acquired Illness or Injury  Outcome: Ongoing, Progressing  Intervention: Identify and Manage Fall Risk  Recent Flowsheet Documentation  Taken 4/18/2023 0000 by Charly  STANLEY Martinez  Safety Promotion/Fall Prevention: safety round/check completed  Taken 4/17/2023 2200 by Michelle Parks RN  Safety Promotion/Fall Prevention: safety round/check completed  Taken 4/17/2023 2000 by Michelle Parks RN  Safety Promotion/Fall Prevention: safety round/check completed  Intervention: Prevent Skin Injury  Recent Flowsheet Documentation  Taken 4/18/2023 0000 by Michelle Parks RN  Body Position: position changed independently  Taken 4/17/2023 2200 by Michelle Parks RN  Body Position: position changed independently  Taken 4/17/2023 2000 by Michelle Parks RN  Body Position: position changed independently  Skin Protection: adhesive use limited  Intervention: Prevent and Manage VTE (Venous Thromboembolism) Risk  Recent Flowsheet Documentation  Taken 4/17/2023 2000 by Michelle Parks RN  VTE Prevention/Management:   bilateral   sequential compression devices on  Range of Motion: active ROM (range of motion) encouraged  Intervention: Prevent Infection  Recent Flowsheet Documentation  Taken 4/17/2023 2000 by Michelle Parks RN  Infection Prevention: single patient room provided  Goal: Optimal Comfort and Wellbeing  Outcome: Ongoing, Progressing  Intervention: Provide Person-Centered Care  Recent Flowsheet Documentation  Taken 4/17/2023 2000 by Michelle Parks RN  Trust Relationship/Rapport:   choices provided   care explained  Goal: Readiness for Transition of Care  Outcome: Ongoing, Progressing     Problem: Adjustment to Illness (Stroke, Ischemic/Transient Ischemic Attack)  Goal: Optimal Coping  Outcome: Ongoing, Progressing  Intervention: Support Psychosocial Response to Stroke  Recent Flowsheet Documentation  Taken 4/17/2023 2000 by Michelle Parks RN  Family/Support System Care: self-care encouraged     Problem: Bowel Elimination Impaired (Stroke, Ischemic/Transient Ischemic Attack)  Goal: Effective Bowel Elimination  Outcome: Ongoing, Progressing     Problem: Cerebral Tissue Perfusion (Stroke,  Ischemic/Transient Ischemic Attack)  Goal: Optimal Cerebral Tissue Perfusion  Outcome: Ongoing, Progressing  Intervention: Protect and Optimize Cerebral Perfusion  Recent Flowsheet Documentation  Taken 4/18/2023 0000 by Michelle Parks RN  Sensory Stimulation Regulation: care clustered  Cerebral Perfusion Promotion: blood pressure monitored  Taken 4/17/2023 2000 by Michelle Parks RN  Sensory Stimulation Regulation: care clustered  Cerebral Perfusion Promotion: blood pressure monitored     Problem: Cognitive Impairment (Stroke, Ischemic/Transient Ischemic Attack)  Goal: Optimal Cognitive Function  Outcome: Ongoing, Progressing  Intervention: Optimize Cognitive Function  Recent Flowsheet Documentation  Taken 4/18/2023 0000 by Michelle Parks RN  Sensory Stimulation Regulation: care clustered  Reorientation Measures: clock in view  Taken 4/17/2023 2000 by Michelle Parks RN  Sensory Stimulation Regulation: care clustered  Reorientation Measures: clock in view     Problem: Communication Impairment (Stroke, Ischemic/Transient Ischemic Attack)  Goal: Improved Communication Skills  Outcome: Ongoing, Progressing  Intervention: Optimize Communication Skills  Recent Flowsheet Documentation  Taken 4/18/2023 0000 by Michelle Parks RN  Communication Enhancement Strategies: extra time allowed for response  Taken 4/17/2023 2000 by Michelle Parks RN  Communication Enhancement Strategies: extra time allowed for response     Problem: Functional Ability Impaired (Stroke, Ischemic/Transient Ischemic Attack)  Goal: Optimal Functional Ability  Outcome: Ongoing, Progressing     Problem: Respiratory Compromise (Stroke, Ischemic/Transient Ischemic Attack)  Goal: Effective Oxygenation and Ventilation  Outcome: Ongoing, Progressing  Intervention: Optimize Oxygenation and Ventilation  Recent Flowsheet Documentation  Taken 4/18/2023 0000 by Michelle aPrks RN  Head of Bed (HOB) Positioning: HOB at 20 degrees  Taken 4/17/2023 2200 by Charly  STANLEY Martinez  Head of Bed (HOB) Positioning: HOB at 20 degrees  Taken 4/17/2023 2000 by Michelle Parks RN  Head of Bed (HOB) Positioning: HOB at 20 degrees     Problem: Sensorimotor Impairment (Stroke, Ischemic/Transient Ischemic Attack)  Goal: Improved Sensorimotor Function  Outcome: Ongoing, Progressing  Intervention: Optimize Range of Motion, Motor Control and Function  Recent Flowsheet Documentation  Taken 4/18/2023 0000 by Michelle Parks RN  Spasticity Management: positioned with supportive device  Positioning/Transfer Devices:   pillows   in use  Taken 4/17/2023 2200 by Michelle Parks RN  Positioning/Transfer Devices:   pillows   in use  Taken 4/17/2023 2000 by Michelle Parks RN  Positioning/Transfer Devices:   pillows   in use  Range of Motion: active ROM (range of motion) encouraged  Intervention: Optimize Sensory and Perceptual Ability  Recent Flowsheet Documentation  Taken 4/17/2023 2000 by Michelle Parks RN  Pressure Reduction Techniques: frequent weight shift encouraged  Sensation Impairment Protection: cues provided for safety  Pressure Reduction Devices: alternating pressure pump (ADD)     Problem: Swallowing Impairment (Stroke, Ischemic/Transient Ischemic Attack)  Goal: Optimal Eating and Swallowing without Aspiration  Outcome: Ongoing, Progressing     Problem: Urinary Elimination Impaired (Stroke, Ischemic/Transient Ischemic Attack)  Goal: Effective Urinary Elimination  Outcome: Ongoing, Progressing     Problem: Adjustment to Illness (Stroke, Hemorrhagic)  Goal: Optimal Coping  Outcome: Ongoing, Progressing  Intervention: Support Psychosocial Response to Stroke  Recent Flowsheet Documentation  Taken 4/17/2023 2000 by Michelle Parks RN  Family/Support System Care: self-care encouraged     Problem: Bowel Elimination Impaired (Stroke, Hemorrhagic)  Goal: Effective Bowel Elimination  Outcome: Ongoing, Progressing     Problem: Cerebral Tissue Perfusion (Stroke, Hemorrhagic)  Goal: Optimal Cerebral  Tissue Perfusion  Outcome: Ongoing, Progressing  Intervention: Protect and Optimize Cerebral Perfusion  Recent Flowsheet Documentation  Taken 4/18/2023 0000 by Michelle Parks RN  Sensory Stimulation Regulation: care clustered  Cerebral Perfusion Promotion: blood pressure monitored  Taken 4/17/2023 2000 by Michelle Parks RN  Sensory Stimulation Regulation: care clustered  Cerebral Perfusion Promotion: blood pressure monitored     Problem: Cognitive Impairment (Stroke, Hemorrhagic)  Goal: Optimal Cognitive Function  Outcome: Ongoing, Progressing  Intervention: Optimize Cognitive Function  Recent Flowsheet Documentation  Taken 4/18/2023 0000 by Michelle Parks RN  Sensory Stimulation Regulation: care clustered  Reorientation Measures: clock in view  Taken 4/17/2023 2000 by Michelle Parks RN  Sensory Stimulation Regulation: care clustered  Reorientation Measures: clock in view     Problem: Communication Impairment (Stroke, Hemorrhagic)  Goal: Effective Communication Skills  Outcome: Ongoing, Progressing  Intervention: Optimize Communication Skills  Recent Flowsheet Documentation  Taken 4/18/2023 0000 by Michelle Parks RN  Communication Enhancement Strategies: extra time allowed for response  Taken 4/17/2023 2000 by Michelle Parks RN  Communication Enhancement Strategies: extra time allowed for response     Problem: Functional Ability Impaired (Stroke, Hemorrhagic)  Goal: Optimal Functional Ability  Outcome: Ongoing, Progressing     Problem: Pain (Stroke, Hemorrhagic)  Goal: Acceptable Pain Control  Outcome: Ongoing, Progressing     Problem: Respiratory Compromise (Stroke, Hemorrhagic)  Goal: Effective Oxygenation and Ventilation  Outcome: Ongoing, Progressing  Intervention: Optimize Oxygenation and Ventilation  Recent Flowsheet Documentation  Taken 4/18/2023 0000 by Michelle Parks RN  Head of Bed (HOB) Positioning: HOB at 20 degrees  Taken 4/17/2023 2200 by Michelle Parks RN  Head of Bed (HOB) Positioning: HOB at  20 degrees  Taken 4/17/2023 2000 by Michelle Parks RN  Head of Bed (HOB) Positioning: HOB at 20 degrees     Problem: Sensorimotor Impairment (Stroke, Hemorrhagic)  Goal: Improved Sensorimotor Function  Outcome: Ongoing, Progressing  Intervention: Optimize Range of Motion, Motor Control and Function  Recent Flowsheet Documentation  Taken 4/18/2023 0000 by Michelle Parks RN  Spasticity Management: positioned with supportive device  Positioning/Transfer Devices:   pillows   in use  Taken 4/17/2023 2200 by Michelle Parks RN  Positioning/Transfer Devices:   pillows   in use  Taken 4/17/2023 2000 by Michelle Parks RN  Positioning/Transfer Devices:   pillows   in use  Range of Motion: active ROM (range of motion) encouraged  Intervention: Optimize Sensory and Perceptual Ability  Recent Flowsheet Documentation  Taken 4/17/2023 2000 by Michelle Parks RN  Pressure Reduction Techniques: frequent weight shift encouraged  Sensation Impairment Protection: cues provided for safety  Pressure Reduction Devices: alternating pressure pump (ADD)     Problem: Swallowing Impairment (Stroke, Hemorrhagic)  Goal: Optimal Eating and Swallowing Without Aspiration  Outcome: Ongoing, Progressing     Problem: Urinary Elimination Impaired (Stroke, Hemorrhagic)  Goal: Effective Urinary Elimination  Outcome: Ongoing, Progressing   Goal Outcome Evaluation:  Plan of Care Reviewed With: patient

## 2023-04-19 PROCEDURE — 97530 THERAPEUTIC ACTIVITIES: CPT

## 2023-04-19 PROCEDURE — 97535 SELF CARE MNGMENT TRAINING: CPT

## 2023-04-19 RX ADMIN — BACLOFEN 2.5 MG: 10 TABLET ORAL at 13:38

## 2023-04-19 RX ADMIN — VALSARTAN 160 MG: 160 TABLET, FILM COATED ORAL at 08:05

## 2023-04-19 RX ADMIN — APIXABAN 5 MG: 5 TABLET, FILM COATED ORAL at 21:36

## 2023-04-19 RX ADMIN — BACLOFEN 2.5 MG: 10 TABLET ORAL at 06:36

## 2023-04-19 RX ADMIN — HYDRALAZINE HYDROCHLORIDE 10 MG: 10 TABLET, FILM COATED ORAL at 13:38

## 2023-04-19 RX ADMIN — CARVEDILOL 6.25 MG: 12.5 TABLET, FILM COATED ORAL at 08:05

## 2023-04-19 RX ADMIN — BACLOFEN 2.5 MG: 10 TABLET ORAL at 21:36

## 2023-04-19 RX ADMIN — Medication 10 ML: at 20:38

## 2023-04-19 RX ADMIN — AMLODIPINE BESYLATE 10 MG: 10 TABLET ORAL at 08:05

## 2023-04-19 RX ADMIN — APIXABAN 5 MG: 5 TABLET, FILM COATED ORAL at 08:05

## 2023-04-19 RX ADMIN — Medication 10 ML: at 08:06

## 2023-04-19 RX ADMIN — HYDRALAZINE HYDROCHLORIDE 10 MG: 10 TABLET, FILM COATED ORAL at 06:36

## 2023-04-19 RX ADMIN — LIDOCAINE 1 PATCH: 700 PATCH TOPICAL at 08:15

## 2023-04-19 RX ADMIN — HYDRALAZINE HYDROCHLORIDE 10 MG: 10 TABLET, FILM COATED ORAL at 21:36

## 2023-04-19 NOTE — PLAN OF CARE
Problem: Fall Injury Risk  Goal: Absence of Fall and Fall-Related Injury  Outcome: Ongoing, Progressing  Intervention: Identify and Manage Contributors  Recent Flowsheet Documentation  Taken 4/19/2023 0000 by Michelle Parks RN  Medication Review/Management: medications reviewed  Taken 4/18/2023 2200 by Michelle Parks RN  Medication Review/Management: medications reviewed  Taken 4/18/2023 2000 by Michelle Parks RN  Medication Review/Management: medications reviewed  Intervention: Promote Injury-Free Environment  Recent Flowsheet Documentation  Taken 4/19/2023 0000 by Michelle Parks RN  Safety Promotion/Fall Prevention: safety round/check completed  Taken 4/18/2023 2200 by Michelle Parks RN  Safety Promotion/Fall Prevention: safety round/check completed  Taken 4/18/2023 2000 by Michelle Parks RN  Safety Promotion/Fall Prevention: safety round/check completed     Problem: Skin Injury Risk Increased  Goal: Skin Health and Integrity  Outcome: Ongoing, Progressing  Intervention: Optimize Skin Protection  Recent Flowsheet Documentation  Taken 4/19/2023 0000 by Michelle Parks RN  Head of Bed (HOB) Positioning: HOB at 20 degrees  Taken 4/18/2023 2200 by Michelle Parks RN  Head of Bed (HOB) Positioning: HOB at 20 degrees  Taken 4/18/2023 2000 by Michelle Parks RN  Pressure Reduction Techniques: frequent weight shift encouraged  Head of Bed (HOB) Positioning: HOB at 20 degrees  Pressure Reduction Devices: alternating pressure pump (ADD)  Skin Protection: adhesive use limited     Problem: Adult Inpatient Plan of Care  Goal: Plan of Care Review  Outcome: Ongoing, Progressing  Flowsheets (Taken 4/19/2023 0009)  Plan of Care Reviewed With: patient  Goal: Patient-Specific Goal (Individualized)  Outcome: Ongoing, Progressing  Goal: Absence of Hospital-Acquired Illness or Injury  Outcome: Ongoing, Progressing  Intervention: Identify and Manage Fall Risk  Recent Flowsheet Documentation  Taken 4/19/2023 0000 by Charly  STANLEY Martinez  Safety Promotion/Fall Prevention: safety round/check completed  Taken 4/18/2023 2200 by Michelle Parks RN  Safety Promotion/Fall Prevention: safety round/check completed  Taken 4/18/2023 2000 by Michelle Parks RN  Safety Promotion/Fall Prevention: safety round/check completed  Intervention: Prevent Skin Injury  Recent Flowsheet Documentation  Taken 4/19/2023 0000 by Michelle Parks RN  Body Position: position changed independently  Taken 4/18/2023 2200 by Michelle Parks RN  Body Position: position changed independently  Taken 4/18/2023 2000 by Michelle Parks RN  Body Position: position changed independently  Skin Protection: adhesive use limited  Intervention: Prevent and Manage VTE (Venous Thromboembolism) Risk  Recent Flowsheet Documentation  Taken 4/18/2023 2000 by Michelle Parks RN  VTE Prevention/Management:   bilateral   sequential compression devices on  Range of Motion: active ROM (range of motion) encouraged  Intervention: Prevent Infection  Recent Flowsheet Documentation  Taken 4/18/2023 2000 by Michelle Parks RN  Infection Prevention: single patient room provided  Goal: Optimal Comfort and Wellbeing  Outcome: Ongoing, Progressing  Intervention: Provide Person-Centered Care  Recent Flowsheet Documentation  Taken 4/18/2023 2000 by Michelle Parks RN  Trust Relationship/Rapport:   care explained   choices provided  Goal: Readiness for Transition of Care  Outcome: Ongoing, Progressing     Problem: Adjustment to Illness (Stroke, Ischemic/Transient Ischemic Attack)  Goal: Optimal Coping  Outcome: Ongoing, Progressing  Intervention: Support Psychosocial Response to Stroke  Recent Flowsheet Documentation  Taken 4/18/2023 2000 by Michelle Parks RN  Supportive Measures: active listening utilized  Family/Support System Care: self-care encouraged     Problem: Bowel Elimination Impaired (Stroke, Ischemic/Transient Ischemic Attack)  Goal: Effective Bowel Elimination  Outcome: Ongoing, Progressing      Problem: Cerebral Tissue Perfusion (Stroke, Ischemic/Transient Ischemic Attack)  Goal: Optimal Cerebral Tissue Perfusion  Outcome: Ongoing, Progressing  Intervention: Protect and Optimize Cerebral Perfusion  Recent Flowsheet Documentation  Taken 4/18/2023 2000 by Michelle Parks RN  Sensory Stimulation Regulation: care clustered  Cerebral Perfusion Promotion: blood pressure monitored     Problem: Cognitive Impairment (Stroke, Ischemic/Transient Ischemic Attack)  Goal: Optimal Cognitive Function  Outcome: Ongoing, Progressing  Intervention: Optimize Cognitive Function  Recent Flowsheet Documentation  Taken 4/18/2023 2000 by Michelle Parks RN  Sensory Stimulation Regulation: care clustered  Reorientation Measures: clock in view     Problem: Communication Impairment (Stroke, Ischemic/Transient Ischemic Attack)  Goal: Improved Communication Skills  Outcome: Ongoing, Progressing  Intervention: Optimize Communication Skills  Recent Flowsheet Documentation  Taken 4/18/2023 2000 by Michelle Parks RN  Communication Enhancement Strategies: extra time allowed for response     Problem: Functional Ability Impaired (Stroke, Ischemic/Transient Ischemic Attack)  Goal: Optimal Functional Ability  Outcome: Ongoing, Progressing     Problem: Respiratory Compromise (Stroke, Ischemic/Transient Ischemic Attack)  Goal: Effective Oxygenation and Ventilation  Outcome: Ongoing, Progressing  Intervention: Optimize Oxygenation and Ventilation  Recent Flowsheet Documentation  Taken 4/19/2023 0000 by Michelle Parks RN  Head of Bed (HOB) Positioning: HOB at 20 degrees  Taken 4/18/2023 2200 by Michelle Parks RN  Head of Bed (HOB) Positioning: HOB at 20 degrees  Taken 4/18/2023 2000 by Michelle Parks RN  Head of Bed (HOB) Positioning: HOB at 20 degrees     Problem: Sensorimotor Impairment (Stroke, Ischemic/Transient Ischemic Attack)  Goal: Improved Sensorimotor Function  Outcome: Ongoing, Progressing  Intervention: Optimize Range of Motion,  Motor Control and Function  Recent Flowsheet Documentation  Taken 4/19/2023 0000 by Michelle Parks RN  Positioning/Transfer Devices:   pillows   in use  Taken 4/18/2023 2200 by Michelle Parks RN  Positioning/Transfer Devices:   pillows   in use  Taken 4/18/2023 2000 by Michelle Parks RN  Spasticity Management: positioned with supportive device  Positioning/Transfer Devices:   pillows   in use  Range of Motion: active ROM (range of motion) encouraged  Intervention: Optimize Sensory and Perceptual Ability  Recent Flowsheet Documentation  Taken 4/18/2023 2000 by Michelle Parks RN  Pressure Reduction Techniques: frequent weight shift encouraged  Sensation Impairment Protection: cues provided for safety  Pressure Reduction Devices: alternating pressure pump (ADD)     Problem: Swallowing Impairment (Stroke, Ischemic/Transient Ischemic Attack)  Goal: Optimal Eating and Swallowing without Aspiration  Outcome: Ongoing, Progressing     Problem: Urinary Elimination Impaired (Stroke, Ischemic/Transient Ischemic Attack)  Goal: Effective Urinary Elimination  Outcome: Ongoing, Progressing     Problem: Adjustment to Illness (Stroke, Hemorrhagic)  Goal: Optimal Coping  Outcome: Ongoing, Progressing  Intervention: Support Psychosocial Response to Stroke  Recent Flowsheet Documentation  Taken 4/18/2023 2000 by Michelle Parks RN  Supportive Measures: active listening utilized  Family/Support System Care: self-care encouraged     Problem: Bowel Elimination Impaired (Stroke, Hemorrhagic)  Goal: Effective Bowel Elimination  Outcome: Ongoing, Progressing     Problem: Cerebral Tissue Perfusion (Stroke, Hemorrhagic)  Goal: Optimal Cerebral Tissue Perfusion  Outcome: Ongoing, Progressing  Intervention: Protect and Optimize Cerebral Perfusion  Recent Flowsheet Documentation  Taken 4/18/2023 2000 by Michelle Parks RN  Sensory Stimulation Regulation: care clustered  Cerebral Perfusion Promotion: blood pressure monitored     Problem:  Cognitive Impairment (Stroke, Hemorrhagic)  Goal: Optimal Cognitive Function  Outcome: Ongoing, Progressing  Intervention: Optimize Cognitive Function  Recent Flowsheet Documentation  Taken 4/18/2023 2000 by Michelle Parks RN  Sensory Stimulation Regulation: care clustered  Reorientation Measures: clock in view     Problem: Communication Impairment (Stroke, Hemorrhagic)  Goal: Effective Communication Skills  Outcome: Ongoing, Progressing  Intervention: Optimize Communication Skills  Recent Flowsheet Documentation  Taken 4/18/2023 2000 by Michelle Parks RN  Communication Enhancement Strategies: extra time allowed for response     Problem: Functional Ability Impaired (Stroke, Hemorrhagic)  Goal: Optimal Functional Ability  Outcome: Ongoing, Progressing     Problem: Pain (Stroke, Hemorrhagic)  Goal: Acceptable Pain Control  Outcome: Ongoing, Progressing     Problem: Respiratory Compromise (Stroke, Hemorrhagic)  Goal: Effective Oxygenation and Ventilation  Outcome: Ongoing, Progressing  Intervention: Optimize Oxygenation and Ventilation  Recent Flowsheet Documentation  Taken 4/19/2023 0000 by Michelle aPrks RN  Head of Bed (HOB) Positioning: HOB at 20 degrees  Taken 4/18/2023 2200 by Michelle Parks RN  Head of Bed (HOB) Positioning: HOB at 20 degrees  Taken 4/18/2023 2000 by Michelle Parks RN  Head of Bed (HOB) Positioning: HOB at 20 degrees     Problem: Sensorimotor Impairment (Stroke, Hemorrhagic)  Goal: Improved Sensorimotor Function  Outcome: Ongoing, Progressing  Intervention: Optimize Range of Motion, Motor Control and Function  Recent Flowsheet Documentation  Taken 4/19/2023 0000 by Michelle Parks RN  Positioning/Transfer Devices:   pillows   in use  Taken 4/18/2023 2200 by Michelle Parks RN  Positioning/Transfer Devices:   pillows   in use  Taken 4/18/2023 2000 by Michelle Parks RN  Spasticity Management: positioned with supportive device  Positioning/Transfer Devices:   pillows   in use  Range of  Motion: active ROM (range of motion) encouraged  Intervention: Optimize Sensory and Perceptual Ability  Recent Flowsheet Documentation  Taken 4/18/2023 2000 by Michelle Parks RN  Pressure Reduction Techniques: frequent weight shift encouraged  Sensation Impairment Protection: cues provided for safety  Pressure Reduction Devices: alternating pressure pump (ADD)     Problem: Swallowing Impairment (Stroke, Hemorrhagic)  Goal: Optimal Eating and Swallowing Without Aspiration  Outcome: Ongoing, Progressing     Problem: Urinary Elimination Impaired (Stroke, Hemorrhagic)  Goal: Effective Urinary Elimination  Outcome: Ongoing, Progressing   Goal Outcome Evaluation:  Plan of Care Reviewed With: patient

## 2023-04-19 NOTE — THERAPY TREATMENT NOTE
Patient Name: Manuel Durant  : 1950    MRN: [id number]                              Today's Date: 2023       Admit Date: 3/12/2023    Visit Dx:     ICD-10-CM ICD-9-CM   1. Intracranial hemorrhage  I62.9 432.9   2. Altered mental status, unspecified altered mental status type  R41.82 780.97     Patient Active Problem List   Diagnosis   • Intracranial hemorrhage   • Hypertensive emergency   • Acute DVT (deep venous thrombosis)   • HTN (hypertension)   • Severe Malnutrition (HCC)   • Hemiplegia   • Right shoulder pain     Past Medical History:   Diagnosis Date   • Hypokalemia 3/17/2023     History reviewed. No pertinent surgical history.   General Information     Row Name 23 1120          Physical Therapy Time and Intention    Document Type therapy note (daily note) (P)   -ZB     Mode of Treatment co-treatment;occupational therapy;physical therapy (P)   -ZB     Row Name 23 112          General Information    Patient Profile Reviewed yes (P)   -ZB     Existing Precautions/Restrictions fall (P)   -ZB     Row Name 23 112          Cognition    Orientation Status (Cognition) oriented to;person;other (see comments) (P)   -ZB     Row Name 23 112          Safety Issues, Functional Mobility    Impairments Affecting Function (Mobility) balance;coordination;endurance/activity tolerance;grasp;motor control;postural/trunk control;strength;range of motion (ROM);cognition;visual/perceptual (P)   -ZB           User Key  (r) = Recorded By, (t) = Taken By, (c) = Cosigned By    Initials Name Provider Type    ZB Nestor Rae, PT Student PT Student               Mobility     Row Name 23 112          Bed Mobility    Bed Mobility supine-sit (P)   -ZB     Supine-Sit Upson (Bed Mobility) verbal cues;standby assist (P)   -ZB     Assistive Device (Bed Mobility) bed rails (P)   -ZB     Row Name 23 112          Bed-Chair Transfer    Bed-Chair Upson (Transfers) moderate assist  (50% patient effort);maximum assist (25% patient effort);2 person assist;verbal cues;nonverbal cues (demo/gesture) (P)   -ZB     Assistive Device (Bed-Chair Transfers) other (see comments) (P)   hha  -ZB     Comment, (Bed-Chair Transfer) manual contact to prevent R knee buckle and to advance R LE (P)   -ZB     Row Name 04/19/23 1120          Sit-Stand Transfer    Sit-Stand Salmon (Transfers) minimum assist (75% patient effort);2 person assist;verbal cues;nonverbal cues (demo/gesture) (P)   -ZB     Assistive Device (Sit-Stand Transfers) other (see comments) (P)   hha  -ZB     Comment, (Sit-Stand Transfer) x1 EOB; x1 chair (P)   -ZB     Row Name 04/19/23 1120          Gait/Stairs (Locomotion)    Salmon Level (Gait) moderate assist (50% patient effort);maximum assist (25% patient effort);2 person assist;verbal cues;nonverbal cues (demo/gesture) (P)   -ZB     Assistive Device (Gait) other (see comments) (P)   hha  -ZB     Distance in Feet (Gait) 4' bed>chair (P)   -ZB     Deviations/Abnormal Patterns (Gait) festinating/shuffling;stride length decreased;sandhya decreased;gait speed decreased (P)   -ZB     Bilateral Gait Deviations forward flexed posture (P)   -ZB     Right Sided Gait Deviations foot drop/toe drag;knee buckling, right side (P)   -ZB     Salmon Level (Stairs) unable to assess (P)   -ZB     Comment, (Gait/Stairs) amb 4' bed>chair; manual contact/cueing to prevent R knee buckle and advance R LE (P)   -ZB           User Key  (r) = Recorded By, (t) = Taken By, (c) = Cosigned By    Initials Name Provider Type    Nestor Howard PT Student PT Student               Obj/Interventions     Row Name 04/19/23 1124          Balance    Balance Assessment standing static balance;standing dynamic balance;sitting dynamic balance;sitting static balance (P)   -ZB     Static Sitting Balance supervision (P)   -ZB     Dynamic Sitting Balance standby assist (P)   -ZB     Position, Sitting Balance sitting edge  of bed (P)   -ZB     Dynamic Standing Balance minimal assist;2-person assist;verbal cues;non-verbal cues (demo/gesture) (P)   -ZB     Position/Device Used, Standing Balance supported;other (see comments) (P)   at sink  -ZB     Balance Interventions sitting;standing;sit to stand;supported;static;occupation based/functional task;weight shifting activity (P)   -ZB           User Key  (r) = Recorded By, (t) = Taken By, (c) = Cosigned By    Initials Name Provider Type    ZB Nestor Rae, PT Student PT Student               Goals/Plan    No documentation.                Clinical Impression     Row Name 04/19/23 1126          Pain    Pretreatment Pain Rating 0/10 - no pain (P)   -ZB     Posttreatment Pain Rating 0/10 - no pain (P)   -ZB     Pain Intervention(s) Repositioned;Ambulation/increased activity (P)   -ZB     Row Name 04/19/23 1126          Plan of Care Review    Plan of Care Reviewed With patient (P)   -ZB     Progress improving (P)   -ZB     Outcome Evaluation Pt participated w/ PT/OT this AM. Pt came to EOB w/ sba, STS from EOB w/ Amish x2, and amb 4' bed>chair w/ mod-maxA x2 + hha. Manual cueing/contacts provided to prevent R knee buckling and to assist w/ advancement of R LE. Addtl STS from chair in front of sink and maintained static standing balance w/ minAx2 while completing self oral care. PT will continue to monitor and progress as able. (P)   -ZB     Row Name 04/19/23 1126          Vital Signs    O2 Delivery Pre Treatment room air (P)   -ZB     O2 Delivery Intra Treatment room air (P)   -ZB     O2 Delivery Post Treatment room air (P)   -ZB     Pre Patient Position Supine (P)   -ZB     Intra Patient Position Standing (P)   -ZB     Post Patient Position Sitting (P)   -ZB     Row Name 04/19/23 1126          Positioning and Restraints    Pre-Treatment Position in bed (P)   -ZB     Post Treatment Position bed (P)   -ZB     In Chair notified nsg;reclined;call light within reach;encouraged to call for  assist;exit alarm on (P)   -ZB           User Key  (r) = Recorded By, (t) = Taken By, (c) = Cosigned By    Initials Name Provider Type    Nestor Howard, PT Student PT Student               Outcome Measures     Row Name 04/19/23 1129 04/19/23 0805       How much help from another person do you currently need...    Turning from your back to your side while in flat bed without using bedrails? 3 (P)   -ZB 2  -LC    Moving from lying on back to sitting on the side of a flat bed without bedrails? 3 (P)   -ZB 2  -LC    Moving to and from a bed to a chair (including a wheelchair)? 2 (P)   -ZB 1  -LC    Standing up from a chair using your arms (e.g., wheelchair, bedside chair)? 3 (P)   -ZB 1  -LC    Climbing 3-5 steps with a railing? 1 (P)   -ZB 1  -LC    To walk in hospital room? 2 (P)   -ZB 1  -LC    AM-PAC 6 Clicks Score (PT) 14 (P)   -ZB 8  -LC    Highest level of mobility 4 --> Transferred to chair/commode (P)   -ZB 3 --> Sat at edge of bed  -LC    Row Name 04/19/23 1129          Functional Assessment    Outcome Measure Options AM-PAC 6 Clicks Basic Mobility (PT) (P)   -ZB           User Key  (r) = Recorded By, (t) = Taken By, (c) = Cosigned By    Initials Name Provider Type    Idania Ovalle, RN Registered Nurse    Nestor Howard, PT Student PT Student                             Physical Therapy Education     Title: PT OT SLP Therapies (Done)     Topic: Physical Therapy (Done)     Point: Mobility training (Done)     Learning Progress Summary           Patient Acceptance, E, VU,DU by CHARLY at 4/19/2023 1130    Acceptance, E, NR by CW at 4/18/2023 0950    Acceptance, E, VU,NR by NICOLASA at 4/16/2023 1158    Acceptance, E,TB,H, VU,NR by MS at 4/13/2023 1817    Acceptance, E, NR by CW at 4/13/2023 1341    Acceptance, E, NR by CW at 4/12/2023 1201    Acceptance, E, NR by CW at 4/11/2023 1158    Acceptance, E, NR by CW at 4/10/2023 1414    Acceptance, E,TB, NR by CS at 4/8/2023 0928    Acceptance, E, NR by CW at 4/7/2023  1215    Acceptance, E, NR by CW at 4/6/2023 1335    Acceptance, E, NR by CW at 4/5/2023 1043    Acceptance, E, NR by CW at 4/4/2023 0906    Acceptance, E, NR by CW at 4/3/2023 1531    Acceptance, E,TB, VU,NR by CAMERON at 4/1/2023 1530    Acceptance, E, NR by CW at 3/31/2023 0925    Acceptance, E, NR by CW at 3/30/2023 1209    Acceptance, E, NR,NL by CW at 3/29/2023 1336    Acceptance, E, NR by ZB at 3/28/2023 1317    Acceptance, E, NR by CW at 3/27/2023 1401    Acceptance, E,D, NR,VU by JM at 3/24/2023 1631    Comment: seemed to comprehend commands this session    Acceptance, E,D, NR by JM at 3/23/2023 1655    Acceptance, E,D, NR by JM at 3/22/2023 1545    Acceptance, E,TB, VU,NR by CB at 3/21/2023 1544    Acceptance, E,TB,H, VU by MS at 3/16/2023 1801    Acceptance, E,D, DU,NR by MO at 3/16/2023 1451    Acceptance, E, DU,NR by MO at 3/15/2023 1200    Acceptance, E, NR by LM at 3/15/2023 0521    Acceptance, E,D, DU,NR by MO at 3/14/2023 1458   Family Acceptance, E,TB,H, VU,NR by MS at 4/13/2023 1817    Acceptance, E,TB,H, VU by MS at 3/16/2023 1801                   Point: Home exercise program (Done)     Learning Progress Summary           Patient Acceptance, E, VU,NR by DJ at 4/16/2023 1158    Acceptance, E,TB,H, VU,NR by MS at 4/13/2023 1817    Acceptance, E, NR by CW at 4/13/2023 1341    Acceptance, E,TB, NR by CS at 4/8/2023 0928    Acceptance, E, NR by CW at 4/7/2023 1215    Acceptance, E, NR by CW at 4/6/2023 1335    Acceptance, E,TB, VU,NR by CAMERON at 4/1/2023 1530    Acceptance, E, NR by CW at 3/31/2023 0925    Acceptance, E, NR by CHARLY at 3/28/2023 1317    Acceptance, E, NR by DANAY at 3/27/2023 1401    Acceptance, E,D, NR,VU by ZACH at 3/24/2023 1631    Comment: seemed to comprehend commands this session    Acceptance, E,D, NR by ZACH at 3/23/2023 1655    Acceptance, E,D, NR by JM at 3/22/2023 1545    Acceptance, E,TB, VU,NR by CB at 3/21/2023 1544    Acceptance, E,TB,H, VU by MS at 3/16/2023 1801    Acceptance, E,D,  DU,NR by MO at 3/16/2023 1451    Acceptance, E, NR by LM at 3/15/2023 0521    Acceptance, E,D, DU,NR by MO at 3/14/2023 1458   Family Acceptance, E,TB,H, VU,NR by MS at 4/13/2023 1817    Acceptance, E,TB,H, VU by MS at 3/16/2023 1801                   Point: Body mechanics (Done)     Learning Progress Summary           Patient Acceptance, E, VU,NR by DJ at 4/16/2023 1158    Acceptance, E,TB,H, VU,NR by MS at 4/13/2023 1817    Acceptance, E, NR by CW at 4/13/2023 1341    Acceptance, E, NR by CW at 4/11/2023 1158    Acceptance, E, NR by CW at 4/10/2023 1414    Acceptance, E,TB, NR by CS at 4/8/2023 0928    Acceptance, E, NR by CW at 4/7/2023 1215    Acceptance, E, NR by CW at 4/4/2023 0906    Acceptance, E, NR by CW at 4/3/2023 1531    Acceptance, E,TB, VU,NR by CAMERON at 4/1/2023 1530    Acceptance, E,D, NR,VU by JM at 3/24/2023 1631    Comment: seemed to comprehend commands this session    Acceptance, E,D, NR by JM at 3/23/2023 1655    Acceptance, E,D, NR by JM at 3/22/2023 1545    Acceptance, E,TB, VU,NR by CB at 3/21/2023 1544    Acceptance, E,TB,H, VU by MS at 3/16/2023 1801    Acceptance, E,D, DU,NR by MO at 3/16/2023 1451    Acceptance, E, DU,NR by MO at 3/15/2023 1200    Acceptance, E, NR by LM at 3/15/2023 0521    Acceptance, E,D, DU,NR by MO at 3/14/2023 1458   Family Acceptance, E,TB,H, VU,NR by MS at 4/13/2023 1817    Acceptance, E,TB,H, VU by MS at 3/16/2023 1801                   Point: Precautions (Done)     Learning Progress Summary           Patient Acceptance, E, VU,NR by DJ at 4/16/2023 1158    Acceptance, E,TB,H, VU,NR by MS at 4/13/2023 1817    Acceptance, E, NR by CW at 4/13/2023 1341    Acceptance, E,TB, NR by CS at 4/8/2023 0928    Acceptance, E, NR by CW at 4/7/2023 1215    Acceptance, E, NR by CW at 4/4/2023 0906    Acceptance, E, NR by CW at 4/3/2023 1531    Acceptance, E,TB, VU,NR by CAMERON at 4/1/2023 1530    Acceptance, E,D, NR,VU by JM at 3/24/2023 1631    Comment: seemed to comprehend commands  this session    Acceptance, E,D, NR by JM at 3/23/2023 1655    Acceptance, E,D, NR by JM at 3/22/2023 1545    Acceptance, E,TB, VU,NR by CB at 3/21/2023 1544    Acceptance, E,TB,H, VU by MS at 3/16/2023 1801    Acceptance, E,D, DU,NR by MO at 3/16/2023 1451    Acceptance, E, DU,NR by MO at 3/15/2023 1200    Acceptance, E, NR by LM at 3/15/2023 0521    Acceptance, E,D, DU,NR by MO at 3/14/2023 1458   Family Acceptance, E,TB,H, VU,NR by MS at 4/13/2023 1817    Acceptance, E,TB,H, VU by MS at 3/16/2023 1801                               User Key     Initials Effective Dates Name Provider Type Discipline    JM 03/07/18 -  Tiffanie Grace, PTA Physical Therapist Assistant PT    CAMERON 05/19/21 -  Leeann Wiley, PT Physical Therapist PT    DJ 10/25/19 -  Jacqueline Chamberlain, PT Physical Therapist PT    MS 06/16/21 -  Angel Luis Cr, RN Registered Nurse Nurse    CW 12/13/22 -  Tamica Willoughby, PT Physical Therapist PT    CB 10/22/21 -  Bety Benitez, PT Physical Therapist PT    CS 09/22/22 -  Adelia Montalvo, PT Physical Therapist PT    LM 10/13/22 -  Silke Grace, RN Registered Nurse Nurse    MO 01/27/23 -  Lacie Tanner, PT Student PT Student PT    ZB 03/10/23 -  Nestor Rae, PT Student PT Student PT              PT Recommendation and Plan     Plan of Care Reviewed With: (P) patient  Progress: (P) improving  Outcome Evaluation: (P) Pt participated w/ PT/OT this AM. Pt came to EOB w/ sba, STS from EOB w/ Amish x2, and amb 4' bed>chair w/ mod-maxA x2 + hha. Manual cueing/contacts provided to prevent R knee buckling and to assist w/ advancement of R LE. Addtl STS from chair in front of sink and maintained static standing balance w/ minAx2 while completing self oral care. PT will continue to monitor and progress as able.     Time Calculation:    PT Charges     Row Name 04/19/23 1131             Time Calculation    Start Time 1036 (P)   -ZB      Stop Time 1102 (P)   -ZB      Time Calculation (min) 26 min (P)   -ZB       PT Received On 04/19/23 (P)   -ZB      PT - Next Appointment 04/20/23 (P)   -ZB         Time Calculation- PT    Total Timed Code Minutes- PT 26 minute(s) (P)   -ZB         Timed Charges    01863 - PT Therapeutic Activity Minutes 26 (P)   -ZB         Total Minutes    Timed Charges Total Minutes 26 (P)   -ZB       Total Minutes 26 (P)   -ZB            User Key  (r) = Recorded By, (t) = Taken By, (c) = Cosigned By    Initials Name Provider Type    Nestor Howard, PT Student PT Student              Therapy Charges for Today     Code Description Service Date Service Provider Modifiers Qty    [id number] HC PT THERAPEUTIC ACT EA 15 MIN 4/19/2023 Nestor Rae, PT Student GP 2          PT G-Codes  Outcome Measure Options: (P) AM-PAC 6 Clicks Basic Mobility (PT)  AM-PAC 6 Clicks Score (PT): (P) 14  AM-PAC 6 Clicks Score (OT): 15  Modified Yamilex Scale: 4 - Moderately severe disability.  Unable to walk without assistance, and unable to attend to own bodily needs without assistance.       Nestor Rae PT Student  4/19/2023

## 2023-04-19 NOTE — PLAN OF CARE
Goal Outcome Evaluation:  Plan of Care Reviewed With: (P) patient        Progress: (P) improving  Outcome Evaluation: (P) Pt participated w/ PT/OT this AM. Pt came to EOB w/ sba, STS from EOB w/ Amish x2, and amb 4' bed>chair w/ mod-maxA x2 + hha. Manual cueing/contacts provided to prevent R knee buckling and to assist w/ advancement of R LE. Addtl STS from chair in front of sink and maintained static standing balance w/ minAx2 while completing self oral care. PT will continue to monitor and progress as able.

## 2023-04-19 NOTE — PROGRESS NOTES
"    Name: Manuel Durant ADMIT: 3/12/2023   : 1950  PCP: Provider, No Known    MRN: [id number] LOS: 38 days   AGE/SEX: 73 y.o. male  ROOM: [facility]     Subjective   Subjective   Resting in bed. No complaints today. States, \"I am good.\" No family present. He denies any pain and states he ate well. No trouble breathing.      Objective   Objective   Vital Signs  Temp:  [97.6 °F (36.4 °C)-98.5 °F (36.9 °C)] 97.6 °F (36.4 °C)  Heart Rate:  [53-72] 72  Resp:  [16-18] 16  BP: (113-137)/(63-85) 137/85  SpO2:  [94 %-97 %] 97 %  on   ;   Device (Oxygen Therapy): room air  Body mass index is 21.88 kg/m².     Physical Exam  Vitals and nursing note reviewed.   Constitutional:       General: He is awake and alert. He is not in acute distress.  Cardiovascular:      Rate and Rhythm: Normal rate and regular rhythm.      Pulses: Normal pulses.      Heart sounds: Normal heart sounds. No murmur heard.  Pulmonary:      Effort: Pulmonary effort is normal. No respiratory distress.      Breath sounds: Normal breath sounds.   Abdominal:      Palpations: Abdomen is soft and non-tender.  Musculoskeletal:      Cervical back: Neck supple.      Right lower leg: No edema.      Left lower leg: No edema.  Skin:     General: Skin is dry.   Neurological:      Motor: Weakness present.      Comments: At times disoriented.  Right-sided upper extremity paresis- slowing improving as he has mobility in raising it as well as his right leg.   Psychiatric:         Mood and Affect: Mood normal.         Behavior: Behavior normal.         Thought Content: Thought content normal.         Judgment: Judgment normal    Results Review:       I reviewed the patient's new clinical results.  Results from last 7 days   Lab Units 23  0550   WBC 10*3/mm3 6.71   HEMOGLOBIN g/dL 13.4   PLATELETS 10*3/mm3 160     Results from last 7 days   Lab Units 23  0550   SODIUM mmol/L 140   POTASSIUM mmol/L 3.9   CHLORIDE mmol/L 106   CO2 mmol/L 24.3   BUN mg/dL 11 "   CREATININE mg/dL 0.74*   GLUCOSE mg/dL 86   Estimated Creatinine Clearance: 79.6 mL/min (A) (by C-G formula based on SCr of 0.74 mg/dL (L)).    Results from last 7 days   Lab Units 04/17/23  0550   CALCIUM mg/dL 9.1       No results found for: HGBA1C, POCGLU    amLODIPine, 10 mg, Oral, QAM AC  apixaban, 5 mg, Oral, Q12H  baclofen, 2.5 mg, Oral, Q8H  carvedilol, 6.25 mg, Oral, BID With Meals  hydrALAZINE, 10 mg, Oral, Q8H  lidocaine, 1 patch, Transdermal, Q24H  sodium chloride, 10 mL, Intravenous, Q12H  valsartan, 160 mg, Oral, QAM AC       Diet: Regular/House Diet; No Mixed Consistencies, Feeding Assistance - Nursing; Texture: Soft to Chew (NDD 3); Soft to Chew: Chopped Meat; Fluid Consistency: Nectar Thick       Assessment/Plan     Active Hospital Problems    Diagnosis  POA   • **Intracranial hemorrhage [I62.9]  Yes   • Hemiplegia [G81.90]  Yes   • Right shoulder pain [M25.511]  Yes   • Severe Malnutrition (HCC) [E43]  Yes   • Acute DVT (deep venous thrombosis) [I82.409]  Yes   • HTN (hypertension) [I10]  Yes   • Hypertensive emergency [I16.1]  Yes      Resolved Hospital Problems    Diagnosis Date Resolved POA   • Hypokalemia [E87.6] 04/10/2023 Unknown     Mr. Durant is a 73 year old male who initially presented to the hospital 3/12/23 for intracranial bleed. He was found unresponsive by friends and initially taken to [facility] where he was found to have poorly controlled BP and right sided hemiplegia. He was transferred here for closer monitoring in the ICU and neurosurgical consultation. He was cleared to move out of the ICU on 3/16/23.     • Intracranial hemorrhage: CTH 3/17/23 stable left basal ganglia/internal capsule hemorrhage with left lateral ventricular hemorrhage. On Lovenox for DVT and repeat CTH 3/18 also stable. ANDRADE followed and signed off 3/23 with plans for repeat MRI brain/MRA head in 1 month- he has scheduled appointment with Dr. Logan on 4/25. Goal BP < 150 systolic. Remains on baclofen  for spasticity.      • HTN emergency: On amlodipine 10 mg, valsartan 160 mg, Coreg 6.25 mg BID and hydralazine 10 mg TID. BP stable.     • Acute DVT: Found 3/17/23 with acute RLE DVT in soleal. ANDRADE okayed for blood thinners. Now on Eliquis.     • Hypokalemia: Resolved.     Discussed with patient.      Medically stable. Awaiting plans for transportation to [place] to live with son who is there. Granddaughter in [place] cannot take care of him. There is a nephew in [place] that is not to be contacted. Await CCP discussion.     • Eliquis for DVT prophylaxis.  • Full code.  • Anticipate discharge as above.    SLICK Arroyo  [facility]  04/19/23  09:16 EDT

## 2023-04-19 NOTE — PLAN OF CARE
Goal Outcome Evaluation:  Plan of Care Reviewed With: patient        Progress: improving  Outcome Evaluation: Pt seen for OT/PT tx sesssion, improvements noted with upright static standing and activity tolerance. Pt coming to EOB with SBA, LBD max A. STS with min  Ax2, demo short steps to chair with mod-max A x2 with HHAx2. Pt engaging in g/h task at sinkside in static stand up to 5 mins, PT assisting at R knee, OT providing min A for static standing. Pt required max A for opening containers, min vc's for follow through of task with improved upright act tolerance. Pt will continue to benefit from skilled OT to address noted func deficits, continue to rec acute rehab.

## 2023-04-19 NOTE — PLAN OF CARE
Goal Outcome Evaluation:  Plan of Care Reviewed With: patient        Progress: no change     No new neuro deficits noted or reported

## 2023-04-19 NOTE — CASE MANAGEMENT/SOCIAL WORK
Continued Stay Note  [facility]     Patient Name: Manuel Durant  MRN: [id number]  Today's Date: 4/19/2023    Admit Date: 3/12/2023    Plan: Home to [place] vs SNF if Medicaid can be secured   Discharge Plan     Row Name 04/19/23 1332       Plan    Plan Home to [place] vs SNF if Medicaid can be secured    Patient/Family in Agreement with Plan yes    Plan Comments CCP spoke with patient's granddaughter, Natalie, regarding target date to setup transportation to [place]. Natalie states she has some flexibility in May and June but needs to know when it would be safe to travel. CCP will follow up with PT/OT regarding patient's stability to travel. Per Tina/[employer] Medicaid application still pending. Cristel JENSEN LCSW               Discharge Codes    No documentation.               Expected Discharge Date and Time     Expected Discharge Date Expected Discharge Time    Apr 24, 2023             Cristel Key

## 2023-04-19 NOTE — THERAPY TREATMENT NOTE
Patient Name: Manuel Durant  : 1950    MRN: [id number]                              Today's Date: 2023       Admit Date: 3/12/2023    Visit Dx:     ICD-10-CM ICD-9-CM   1. Intracranial hemorrhage  I62.9 432.9   2. Altered mental status, unspecified altered mental status type  R41.82 780.97     Patient Active Problem List   Diagnosis   • Intracranial hemorrhage   • Hypertensive emergency   • Acute DVT (deep venous thrombosis)   • HTN (hypertension)   • Severe Malnutrition (HCC)   • Hemiplegia   • Right shoulder pain     Past Medical History:   Diagnosis Date   • Hypokalemia 3/17/2023     History reviewed. No pertinent surgical history.   General Information     Row Name 23 1310          OT Time and Intention    Document Type therapy note (daily note)  -     Mode of Treatment co-treatment;occupational therapy;physical therapy  -     Row Name 23 1310          General Information    Patient Profile Reviewed yes  -MW     Existing Precautions/Restrictions fall  -     Row Name 23 1310          Cognition    Orientation Status (Cognition) oriented to;person;other (see comments)  following commands  -     Row Name 23 1310          Safety Issues, Functional Mobility    Impairments Affecting Function (Mobility) balance;coordination;endurance/activity tolerance;grasp;motor control;postural/trunk control;strength;range of motion (ROM);cognition;visual/perceptual  -           User Key  (r) = Recorded By, (t) = Taken By, (c) = Cosigned By    Initials Name Provider Type    MW Karishma Spence OT Occupational Therapist                 Mobility/ADL's     Row Name 23 1311          Bed Mobility    Bed Mobility supine-sit;sit-supine  -MW     Supine-Sit [place] (Bed Mobility) verbal cues;standby assist  -MW     Sit-Supine [place] (Bed Mobility) not tested  -MW     Assistive Device (Bed Mobility) bed rails  -MW     Comment, (Bed Mobility) [facility] end of session  -     Row  Name 04/19/23 1311          Transfers    Transfers sit-stand transfer;stand-sit transfer;bed-chair transfer  -     Row Name 04/19/23 1311          Bed-Chair Transfer    Bed-Chair [place] (Transfers) maximum assist (25% patient effort);2 person assist;verbal cues;nonverbal cues (demo/gesture);moderate assist (50% patient effort)  -     Assistive Device (Bed-Chair Transfers) other (see comments)  -     Comment, (Bed-Chair Transfer) HHAx2, R knee buckling with PT assisting with R knee and R hip stabilization  -     Row Name 04/19/23 1311          Sit-Stand Transfer    Sit-Stand [place] (Transfers) minimum assist (75% patient effort);2 person assist;verbal cues;nonverbal cues (demo/gesture)  -     Assistive Device (Sit-Stand Transfers) other (see comments)  HHAx2  -     Row Name 04/19/23 1311          Stand-Sit Transfer    Stand-Sit [place] (Transfers) minimum assist (75% patient effort);2 person assist  -     Row Name 04/19/23 1311          Toilet Transfer    Comment, (Toilet Transfer) simulation of BSC transfer with steps to chair  -     Row Name 04/19/23 1311          Activities of Daily Living    BADL Assessment/Intervention lower body dressing;grooming;toileting  -     Row Name 04/19/23 1311          Grooming Assessment/Training    [place] Level (Grooming) set up;grooming skills;verbal cues;contact guard assist  -     Position (Grooming) sink side;supported standing  -     Comment, (Grooming) able to complete g/h at sinkside up to ~ 5mins with min A x2 with PT support at R knee for tactile support  -     Row Name 04/19/23 1311          Lower Body Dressing Assessment/Training    [place] Level (Lower Body Dressing) lower body dressing skills;dependent (less than 25% patient effort)  -     Row Name 04/19/23 1311          Toileting Assessment/Training    [place] Level (Toileting) dependent (less than 25% patient effort)  -     Comment, (Toileting) brief [place]   -           User Key  (r) = Recorded By, (t) = Taken By, (c) = Cosigned By    Initials Name Provider Type    Karishma Williamson OT Occupational Therapist               Obj/Interventions     Row Name 04/19/23 1313          Shoulder (Therapeutic Exercise)    Shoulder PROM (Therapeutic Exercise) right;horizontal aBduction/aDduction;aBduction;aDduction;10 repetitions;sitting  -MW     Row Name 04/19/23 1313          Motor Skills    Motor Control/Coordination Interventions weight-bearing activities;therapeutic exercise/ROM  -MW     Therapeutic Exercise shoulder  -MW     Row Name 04/19/23 1313          Balance    Balance Assessment sitting static balance;sitting dynamic balance;sit to stand dynamic balance;standing static balance;standing dynamic balance  -     Static Sitting Balance standby assist  -     Dynamic Sitting Balance minimal assist  -     Position, Sitting Balance sitting edge of bed  -     Sit to Stand Dynamic Balance minimal assist;2-person assist  -MW     Static Standing Balance minimal assist;2-person assist  -MW     Dynamic Standing Balance moderate assist;maximum assist;2-person assist  -MW     Position/Device Used, Standing Balance supported;other (see comments)  HHAx2  -           User Key  (r) = Recorded By, (t) = Taken By, (c) = Cosigned By    Initials Name Provider Type    Karishma Williamson OT Occupational Therapist               Goals/Plan    No documentation.                Clinical Impression     Row Name 04/19/23 1314          Pain Assessment    Pre/Posttreatment Pain Comment facial grimacing with shoulder ROM on RUE, however no pain rating  -     Row Name 04/19/23 1314          Plan of Care Review    Plan of Care Reviewed With patient  -     Progress improving  -     Outcome Evaluation Pt seen for OT/PT tx sesssion, improvements noted with upright static standing and activity tolerance. Pt coming to EOB with SBA, LBD max A. STS with min  Ax2, demo short steps to chair  with mod-max A x2 with HHAx2. Pt engaging in g/h task at sinkside in static stand up to 5 mins, PT assisting at R knee, OT providing min A for static standing. Pt required max A for opening containers, min vc's for follow through of task with improved upright act tolerance. Pt will continue to benefit from skilled OT to address noted func deficits, continue to rec acute rehab.  -     Row Name 04/19/23 1314          Therapy Plan Review/Discharge Plan (OT)    Anticipated Discharge Disposition (OT) inpatient rehabilitation facility  -     Row Name 04/19/23 1314          Vital Signs    O2 Delivery Pre Treatment room air  -MW     Pre Patient Position Supine  -MW     Intra Patient Position Standing  -MW     Post Patient Position Sitting  -     Row Name 04/19/23 1314          Positioning and Restraints    Pre-Treatment Position in bed  -MW     Post Treatment Position chair  -MW     In Chair notified nsg;reclined;call light within reach;encouraged to call for assist;exit alarm on  -MW           User Key  (r) = Recorded By, (t) = Taken By, (c) = Cosigned By    Initials Name Provider Type    Karishma Williamson, OT Occupational Therapist               Outcome Measures     Row Name 04/19/23 1318          How much help from another is currently needed...    Putting on and taking off regular lower body clothing? 2  -MW     Bathing (including washing, rinsing, and drying) 2  -MW     Toileting (which includes using toilet bed pan or urinal) 2  -MW     Putting on and taking off regular upper body clothing 3  -MW     Taking care of personal grooming (such as brushing teeth) 3  -MW     Eating meals 3  -MW     AM-PAC 6 Clicks Score (OT) 15  -MW     Row Name 04/19/23 1129 04/19/23 0805       How much help from another person do you currently need...    Turning from your back to your side while in flat bed without using bedrails? 3 (P)   -ZB 2  -LC    Moving from lying on back to sitting on the side of a flat bed without  bedrails? 3 (P)   -ZB 2  -LC    Moving to and from a bed to a chair (including a wheelchair)? 2 (P)   -ZB 1  -LC    Standing up from a chair using your arms (e.g., wheelchair, bedside chair)? 3 (P)   -ZB 1  -LC    Climbing 3-5 steps with a railing? 1 (P)   -ZB 1  -LC    To walk in hospital room? 2 (P)   -ZB 1  -LC    AM-PAC 6 Clicks Score (PT) 14 (P)   -ZB 8  -LC    Highest level of mobility 4 --> Transferred to chair/commode (P)   -ZB 3 --> Sat at edge of bed  -LC    Row Name 04/19/23 1318 04/19/23 1129       Functional Assessment    Outcome Measure Options AM-PAC 6 Clicks Daily Activity (OT)  -MW AM-PAC 6 Clicks Basic Mobility (PT) (P)   -ZB          User Key  (r) = Recorded By, (t) = Taken By, (c) = Cosigned By    Initials Name Provider Type    Idania Ovalle, RN Registered Nurse    Karishma Williamson, OT Occupational Therapist    Nestor Howard, PT Student PT Student                Occupational Therapy Education     Title: PT OT SLP Therapies (Done)     Topic: Occupational Therapy (Done)     Point: ADL training (Done)     Description:   Instruct learner(s) on proper safety adaptation and remediation techniques during self care or transfers.   Instruct in proper use of assistive devices.              Learning Progress Summary           Patient Acceptance, E,TB,H, VU,NR by MS at 4/13/2023 1817    Acceptance, E,TB,H, VU by MS at 3/16/2023 1801    Acceptance, E, NR by  at 3/15/2023 0521   Family Acceptance, E,TB,H, VU,NR by MS at 4/13/2023 1817    Acceptance, E,TB,H, VU by MS at 3/16/2023 1801                   Point: Home exercise program (Done)     Description:   Instruct learner(s) on appropriate technique for monitoring, assisting and/or progressing therapeutic exercises/activities.              Learning Progress Summary           Patient Acceptance, E,TB,H, VU,NR by MS at 4/13/2023 1817    Acceptance, E,TB,H, VU by MS at 3/16/2023 1801    Acceptance, E, NR by LM at 3/15/2023 0521   Family Acceptance,  E,TB,H, VU,NR by MS at 4/13/2023 1817    Acceptance, E,TB,H, VU by MS at 3/16/2023 1801                   Point: Precautions (Done)     Description:   Instruct learner(s) on prescribed precautions during self-care and functional transfers.              Learning Progress Summary           Patient Acceptance, E,TB,H, VU,NR by MS at 4/13/2023 1817    Acceptance, E,TB,H, VU by MS at 3/16/2023 1801    Acceptance, E, NR by LM at 3/15/2023 0521   Family Acceptance, E,TB,H, VU,NR by MS at 4/13/2023 1817    Acceptance, E,TB,H, VU by MS at 3/16/2023 1801                   Point: Body mechanics (Done)     Description:   Instruct learner(s) on proper positioning and spine alignment during self-care, functional mobility activities and/or exercises.              Learning Progress Summary           Patient Acceptance, E,TB,H, VU,NR by MS at 4/13/2023 1817    Acceptance, E,TB,H, VU by MS at 3/16/2023 1801    Acceptance, E, NR by LM at 3/15/2023 0521   Family Acceptance, E,TB,H, VU,NR by MS at 4/13/2023 1817    Acceptance, E,TB,H, VU by MS at 3/16/2023 1801                               User Key     Initials Effective Dates Name Provider Type Discipline    MS 06/16/21 -  Angel Luis Cr, RN Registered Nurse Nurse     10/13/22 -  Silke Grace, RN Registered Nurse Nurse              OT Recommendation and Plan     Plan of Care Review  Plan of Care Reviewed With: patient  Progress: improving  Outcome Evaluation: Pt seen for OT/PT tx sesssion, improvements noted with upright static standing and activity tolerance. Pt coming to EOB with SBA, LBD max A. STS with min  Ax2, demo short steps to chair with mod-max A x2 with HHAx2. Pt engaging in g/h task at sinkside in static stand up to 5 mins, PT assisting at R knee, OT providing min A for static standing. Pt required max A for opening containers, min vc's for follow through of task with improved upright act tolerance. Pt will continue to benefit from skilled OT to address noted func  deficits, continue to rec acute rehab.     Time Calculation:    Time Calculation- OT     Row Name 04/19/23 1318             Time Calculation- OT    OT Start Time 1035  -MW      OT Stop Time 1103  -MW      OT Time Calculation (min) 28 min  -MW      Total Timed Code Minutes- OT 28 minute(s)  -MW      OT Received On 04/19/23  -MW      OT - Next Appointment 04/20/23  -MW         Timed Charges    72947 - OT Therapeutic Activity Minutes 10  -MW      82779 - OT Self Care/Mgmt Minutes 18  -MW         Total Minutes    Timed Charges Total Minutes 28  -MW       Total Minutes 28  -MW            User Key  (r) = Recorded By, (t) = Taken By, (c) = Cosigned By    Initials Name Provider Type    MW Karishma Spence OT Occupational Therapist              Therapy Charges for Today     Code Description Service Date Service Provider Modifiers Qty    05221634152 HC OT THERAPEUTIC ACT EA 15 MIN 4/19/2023 Karishma Spence OT GO 1    79060342815 HC OT SELF CARE/MGMT/TRAIN EA 15 MIN 4/19/2023 Karishma Spence OT GO 1               Karishma Spence OT  4/19/2023

## 2023-04-20 PROCEDURE — 97530 THERAPEUTIC ACTIVITIES: CPT

## 2023-04-20 PROCEDURE — 97112 NEUROMUSCULAR REEDUCATION: CPT

## 2023-04-20 PROCEDURE — 99231 SBSQ HOSP IP/OBS SF/LOW 25: CPT | Performed by: NURSE PRACTITIONER

## 2023-04-20 RX ADMIN — HYDRALAZINE HYDROCHLORIDE 10 MG: 10 TABLET, FILM COATED ORAL at 06:03

## 2023-04-20 RX ADMIN — APIXABAN 5 MG: 5 TABLET, FILM COATED ORAL at 08:36

## 2023-04-20 RX ADMIN — BACLOFEN 2.5 MG: 10 TABLET ORAL at 06:03

## 2023-04-20 RX ADMIN — CARVEDILOL 6.25 MG: 12.5 TABLET, FILM COATED ORAL at 08:36

## 2023-04-20 RX ADMIN — HYDRALAZINE HYDROCHLORIDE 10 MG: 10 TABLET, FILM COATED ORAL at 14:16

## 2023-04-20 RX ADMIN — VALSARTAN 160 MG: 160 TABLET, FILM COATED ORAL at 08:36

## 2023-04-20 RX ADMIN — AMLODIPINE BESYLATE 10 MG: 10 TABLET ORAL at 08:36

## 2023-04-20 RX ADMIN — CARVEDILOL 6.25 MG: 12.5 TABLET, FILM COATED ORAL at 17:03

## 2023-04-20 RX ADMIN — LIDOCAINE 1 PATCH: 700 PATCH TOPICAL at 08:37

## 2023-04-20 RX ADMIN — APIXABAN 5 MG: 5 TABLET, FILM COATED ORAL at 21:49

## 2023-04-20 RX ADMIN — BACLOFEN 2.5 MG: 10 TABLET ORAL at 14:16

## 2023-04-20 RX ADMIN — Medication 10 ML: at 20:20

## 2023-04-20 RX ADMIN — BACLOFEN 2.5 MG: 10 TABLET ORAL at 21:49

## 2023-04-20 RX ADMIN — HYDRALAZINE HYDROCHLORIDE 10 MG: 10 TABLET, FILM COATED ORAL at 23:11

## 2023-04-20 NOTE — THERAPY TREATMENT NOTE
Patient Name: Manuel Durant  : 1950    MRN: [id number]                              Today's Date: 2023       Admit Date: 3/12/2023    Visit Dx:     ICD-10-CM ICD-9-CM   1. Intracranial hemorrhage  I62.9 432.9   2. Altered mental status, unspecified altered mental status type  R41.82 780.97     Patient Active Problem List   Diagnosis   • Intracranial hemorrhage   • Hypertensive emergency   • Acute DVT (deep venous thrombosis)   • HTN (hypertension)   • Severe Malnutrition (HCC)   • Hemiplegia   • Right shoulder pain     Past Medical History:   Diagnosis Date   • Hypokalemia 3/17/2023     History reviewed. No pertinent surgical history.   General Information     Row Name 23 1544          OT Time and Intention    Document Type therapy note (daily note)  -     Mode of Treatment co-treatment;occupational therapy;physical therapy  -     Row Name 23 1544          General Information    Patient Profile Reviewed yes  -MW     Existing Precautions/Restrictions fall  -MW     Row Name 23 1544          Cognition    Orientation Status (Cognition) oriented to;person  -     Row Name 23 1544          Safety Issues, Functional Mobility    Impairments Affecting Function (Mobility) balance;coordination;endurance/activity tolerance;grasp;motor control;postural/trunk control;strength;range of motion (ROM);cognition;visual/perceptual  -           User Key  (r) = Recorded By, (t) = Taken By, (c) = Cosigned By    Initials Name Provider Type    MW Karishma Spence OT Occupational Therapist                 Mobility/ADL's     Row Name 23 1544          Bed Mobility    Bed Mobility supine-sit;sit-supine  -MW     Scooting/Bridging [place] (Bed Mobility) standby assist  -MW     Supine-Sit [place] (Bed Mobility) verbal cues;standby assist  -MW     Sit-Supine [place] (Bed Mobility) contact guard  -     Bed Mobility, Safety Issues cognitive deficits limit understanding  -MW      Assistive Device (Bed Mobility) bed rails  -     Comment, (Bed Mobility) increased time  -[facility] Name 04/20/23 1544          Transfers    Transfers sit-stand transfer;stand-sit transfer  -[facility] Name 04/20/23 1544          Sit-Stand Transfer    Sit-Stand [place] (Transfers) minimum assist (75% patient effort);2 person assist;verbal cues;nonverbal cues (demo/gesture)  -     Comment, (Sit-Stand Transfer) multiple STS from chair level x5, min A x2 with HHA to L side  -[facility] Name 04/20/23 1544          Activities of Daily Living    BADL Assessment/Intervention toileting  -[facility] Name 04/20/23 1544          Toileting Assessment/Training    Comment, (Toileting) brief donned  -           User Key  (r) = Recorded By, (t) = Taken By, (c) = Cosigned By    Initials Name Provider Type     Karishma Spence OT Occupational Therapist               Obj/Interventions     [facility] Name 04/20/23 1546          Shoulder (Therapeutic Exercise)    Shoulder AROM (Therapeutic Exercise) --  improved R prximally at shoulder ~40-50% end range actively with functional reaching task  -[facility] Name 04/20/23 1546          Hand (Therapeutic Exercise)    Hand (Therapeutic Exercise) AROM (active range of motion)  -     Hand AROM/AAROM (Therapeutic Exercise) right;AROM (active range of motion);finger flexion;finger extension  50% end range  -[facility] Name 04/20/23 1546          Balance    Balance Assessment sitting static balance;sitting dynamic balance;sit to stand dynamic balance;standing static balance;standing dynamic balance  -     Static Sitting Balance standby assist  -     Dynamic Sitting Balance contact guard  -     Position, Sitting Balance sitting in chair;sitting edge of bed  -     Static Standing Balance 2-person assist;minimal assist  -     Dynamic Standing Balance moderate assist;2-person assist;maximum assist  -     Position/Device Used, Standing Balance supported  -     Comment, Balance  posterior leaning noted with mod A x2 required throughout functional reaching tacsk with bilat UE, max cues for carryover of task with max visual, verbal and tacile cues  -           User Key  (r) = Recorded By, (t) = Taken By, (c) = Cosigned By    Initials Name Provider Type    Karishma Williamson OT Occupational Therapist               Goals/Plan    No documentation.                Clinical Impression     Row Name 04/20/23 1548          Pain Assessment    Pretreatment Pain Rating 0/10 - no pain  -MW     Posttreatment Pain Rating 0/10 - no pain  -MW     Row Name 04/20/23 1548          Plan of Care Review    Progress no change  -     Outcome Evaluation Pt seen for OT/PT tx session in PM, focus on activity tolerance in upright static standing for improved (I) with ADL task completion. Pt coming to EOB with SBA, STS with min A x2, x2 mobility trials to chair with mod-max Ax2 and HHA L side with PT blocking R knee with steps. Pt completed x5 STS from chair, mod A x2 for static standing this date during engagement in dynamic reaching with BUE, noted proximally RUE more active movement and able to engage extremity into func reaching task. Pt returned to supine at end of session due to BM, nurse aide present. Pt will continue to benefit from skilled OT to address noted functional deficits, rec acute rehab.  -     Row Name 04/20/23 1548          Therapy Plan Review/Discharge Plan (OT)    Anticipated Discharge Disposition (OT) inpatient rehabilitation facility  -     Row Name 04/20/23 1548          Vital Signs    O2 Delivery Pre Treatment room air  -MW     Pre Patient Position Supine  -MW     Intra Patient Position Standing  -MW     Post Patient Position Supine  -     Row Name 04/20/23 1548          Positioning and Restraints    Pre-Treatment Position in bed  -MW     Post Treatment Position bed  -MW     In Bed notified nsg;fowlers;call light within reach;encouraged to call for assist;with nsg;exit alarm on  -MW            User Key  (r) = Recorded By, (t) = Taken By, (c) = Cosigned By    Initials Name Provider Type    Karishma Williamson OT Occupational Therapist               Outcome Measures     Row Name 04/20/23 6541          How much help from another is currently needed...    Putting on and taking off regular lower body clothing? 2  -MW     Bathing (including washing, rinsing, and drying) 2  -MW     Toileting (which includes using toilet bed pan or urinal) 2  -MW     Putting on and taking off regular upper body clothing 2  -MW     Taking care of personal grooming (such as brushing teeth) 3  -MW     Eating meals 3  -MW     AM-PAC 6 Clicks Score (OT) 14  -MW     Row Name 04/20/23 0959          How much help from another person do you currently need...    Turning from your back to your side while in flat bed without using bedrails? 3  -RB     Moving from lying on back to sitting on the side of a flat bed without bedrails? 3  -RB     Moving to and from a bed to a chair (including a wheelchair)? 2  -RB     Standing up from a chair using your arms (e.g., wheelchair, bedside chair)? 3  -RB     Climbing 3-5 steps with a railing? 1  -RB     To walk in hospital room? 2  -RB     AM-PAC 6 Clicks Score (PT) 14  -RB     Highest level of mobility 4 --> Transferred to chair/commode  -RB     Row Name 04/20/23 1551          Functional Assessment    Outcome Measure Options AM-PAC 6 Clicks Daily Activity (OT)  -MW           User Key  (r) = Recorded By, (t) = Taken By, (c) = Cosigned By    Initials Name Provider Type    Orlando Multani RN Registered Nurse    Karishma Williamson OT Occupational Therapist                Occupational Therapy Education     Title: PT OT SLP Therapies (Done)     Topic: Occupational Therapy (Done)     Point: ADL training (Done)     Description:   Instruct learner(s) on proper safety adaptation and remediation techniques during self care or transfers.   Instruct in proper use of assistive devices.               Learning Progress Summary           Patient Acceptance, E,TB,H, VU,NR by MS at 4/13/2023 1817    Acceptance, E,TB,H, VU by MS at 3/16/2023 1801    Acceptance, E, NR by LM at 3/15/2023 0521   Family Acceptance, E,TB,H, VU,NR by MS at 4/13/2023 1817    Acceptance, E,TB,H, VU by MS at 3/16/2023 1801                   Point: Home exercise program (Done)     Description:   Instruct learner(s) on appropriate technique for monitoring, assisting and/or progressing therapeutic exercises/activities.              Learning Progress Summary           Patient Acceptance, E,TB,H, VU,NR by MS at 4/13/2023 1817    Acceptance, E,TB,H, VU by MS at 3/16/2023 1801    Acceptance, E, NR by LM at 3/15/2023 0521   Family Acceptance, E,TB,H, VU,NR by MS at 4/13/2023 1817    Acceptance, E,TB,H, VU by MS at 3/16/2023 1801                   Point: Precautions (Done)     Description:   Instruct learner(s) on prescribed precautions during self-care and functional transfers.              Learning Progress Summary           Patient Acceptance, E,TB,H, VU,NR by MS at 4/13/2023 1817    Acceptance, E,TB,H, VU by MS at 3/16/2023 1801    Acceptance, E, NR by LM at 3/15/2023 0521   Family Acceptance, E,TB,H, VU,NR by MS at 4/13/2023 1817    Acceptance, E,TB,H, VU by MS at 3/16/2023 1801                   Point: Body mechanics (Done)     Description:   Instruct learner(s) on proper positioning and spine alignment during self-care, functional mobility activities and/or exercises.              Learning Progress Summary           Patient Acceptance, E,TB,H, VU,NR by MS at 4/13/2023 1817    Acceptance, E,TB,H, VU by MS at 3/16/2023 1801    Acceptance, E, NR by LM at 3/15/2023 0521   Family Acceptance, E,TB,H, VU,NR by MS at 4/13/2023 1817    Acceptance, E,TB,H, VU by MS at 3/16/2023 1801                               User Key     Initials Effective Dates Name Provider Type Discipline    MS 06/16/21 -  Angel Luis Cr, RN Registered Nurse Nurse    LM  10/13/22 -  Silke Grace, RN Registered Nurse Nurse              OT Recommendation and Plan     Plan of Care Review  Plan of Care Reviewed With: patient  Progress: no change  Outcome Evaluation: Pt seen for OT/PT tx session in PM, focus on activity tolerance in upright static standing for improved (I) with ADL task completion. Pt coming to EOB with SBA, STS with min A x2, x2 mobility trials to chair with mod-max Ax2 and HHA L side with PT blocking R knee with steps. Pt completed x5 STS from chair, mod A x2 for static standing this date during engagement in dynamic reaching with BUE, noted proximally RUE more active movement and able to engage extremity into func reaching task. Pt returned to supine at end of session due to BM, nurse aide present. Pt will continue to benefit from skilled OT to address noted functional deficits, rec acute rehab.     Time Calculation:    Time Calculation- OT     Row Name 04/20/23 1551             Time Calculation- OT    OT Start Time 1344  -MW      OT Stop Time 1407  -MW      OT Time Calculation (min) 23 min  -MW      Total Timed Code Minutes- OT 23 minute(s)  -MW      OT Received On 04/20/23  -MW      OT - Next Appointment 04/21/23  -MW         Timed Charges    28136 -  OT Neuromuscular Reeducation Minutes 10  -MW      25912 - OT Therapeutic Activity Minutes 13  -MW         Total Minutes    Timed Charges Total Minutes 23  -MW       Total Minutes 23  -MW            User Key  (r) = Recorded By, (t) = Taken By, (c) = Cosigned By    Initials Name Provider Type     Karishma Spence OT Occupational Therapist              Therapy Charges for Today     Code Description Service Date Service Provider Modifiers Qty    31491364519 HC OT THERAPEUTIC ACT EA 15 MIN 4/19/2023 Karishma Spence OT GO 1    75289576782 HC OT SELF CARE/MGMT/TRAIN EA 15 MIN 4/19/2023 Karishma Spence OT GO 1    88930813648 HC OT NEUROMUSC RE EDUCATION EA 15 MIN 4/20/2023 Karishma Spence OT GO 1    34021600035  HC OT THERAPEUTIC ACT EA 15 MIN 4/20/2023 Karishma Spence OT GO 1               Karishma Spence OT  4/20/2023

## 2023-04-20 NOTE — CASE MANAGEMENT/SOCIAL WORK
Continued Stay Note  [facility]     Patient Name: Manuel Durant  MRN: [id number]  Today's Date: 4/20/2023    Admit Date: 3/12/2023    Plan: Home to [place] by air when one person assist for transfers.  Will be accompanied by one.   Discharge Plan     Row Name 04/20/23 1003       Plan    Plan Home to [place] by air when one person assist for transfers.  Will be accompanied by one.    Plan Comments CCP spoke with pt's son Ariel Durant by phone.  He states that family are making plans to fly from [place] to [place] to pick pt up and travel with him back to [place] by air.  Pt will need to be able to sit independently and transfer with assist x 1.  Communicated pt's progress with PT/OT and his son was appreciative.  [facility] will discuss with PT, OT, and MD to plan the discharge date and communicate that to Ariel so that flight can be arranged.  At this time, if therapy goals can be achieved, family agrees to discharge date of a week or week and half from now.  Faye ARMSTRONG               Discharge Codes    No documentation.               Expected Discharge Date and Time     Expected Discharge Date Expected Discharge Time    Apr 28, 2023             Faye Moy RN

## 2023-04-20 NOTE — PROGRESS NOTES
[facility] NEUROSURGERY INTRACRANIAL PROGRESS NOTE    PATIENT IDENTIFICATION:   Name:  Manuel Durant      MRN:  [id number]     73 y.o.  male               CC: ICH      Subjective   Translation jimmy utilized    Interval History: substantial progress made in mobility since last visit.  Blood pressure well controlled. Denies pain.     ROS:  HEENT: No headache  GI: No nausea, vomiting  Neuro: Right-sided weakness    Objective     Vital signs in last 24 hours:  Temp:  [97.9 °F (36.6 °C)-98.5 °F (36.9 °C)] 97.9 °F (36.6 °C)  Heart Rate:  [54-72] 72  Resp:  [18] 18  BP: (111-129)/(62-73) 129/67      Intake/Output this shift:  No intake/output data recorded.      Intake/Output last 3 shifts:  No intake/output data recorded.    LABS:  Results from last 7 days   Lab Units 04/17/23  0550   WBC 10*3/mm3 6.71   HEMOGLOBIN g/dL 13.4   HEMATOCRIT % 39.5   PLATELETS 10*3/mm3 160     Results from last 7 days   Lab Units 04/17/23  0550   SODIUM mmol/L 140   POTASSIUM mmol/L 3.9   CHLORIDE mmol/L 106   CO2 mmol/L 24.3   BUN mg/dL 11   CREATININE mg/dL 0.74*   CALCIUM mg/dL 9.1   GLUCOSE mg/dL 86       IMAGING STUDIES:  No new imaging    I personally viewed and interpreted the patient's chart.    Meds reviewed/changed: Yes  Eliquis 5 mg every 12 hours  Baclofen 2.5 mg p.o. every 8 hours      Physical Exam:    General: Awake, alert.  Speech much more fluent. ease of discussion with  jimmy. No acute distress. No obvious R side neglect.  CN III IV VI: EOMI  CN VII: Right facial weakness-mild  Motor: Normal movement left side.  No drift.  Spontaneous movement right side.  Better movement proximal than distal.  sensation: Pain sensation intact right  Station and Gait:  Per PT note 4/19-came to EOB w/ sba, STS from EOB w/ [place]ish x2, and amb 4' bed>chair w/ mod-maxA x2 + hha. Manual cueing/contacts provided to prevent R knee buckling and to assist w/ advancement of R LE. Addtl STS from chair in front of sink and maintained static  "standing balance w/ minAx2 while completing self oral care  Extremities:   Calf swelling    Assessment & Plan     ASSESSMENT:      Intracranial hemorrhage    Hypertensive emergency    Acute DVT (deep venous thrombosis)    HTN (hypertension)    Severe Malnutrition (HCC)    Hemiplegia    Right shoulder pain    Patient with history of left basal ganglia/internal capsule intracranial hemorrhage.  Last seen 3/23.  Due for outpatient follow-up with MRI/MRA.  Remains hospitalized due to social issues with regard to discharge planning.  Making progress in therapies.  No new neurologic issues.  Upon review of notes, it appears patient will be discharging back to [place] with family once he is standby assist x1.  The anticipation is sometime next week.  We will arrange imaging and to follow-up after MRI complete.    PLAN:   MRI brain +/- and MRA head    I discussed the patient's findings and my recommendations with patient, nursing staff and Dr. Logan    During patient visit, I utilized appropriate personal protective equipment gloves. Appropriate PPE was worn during the entire visit.  Hand hygiene was completed before and after.      chief complaint, exam data copied forward from previous encounters in EMR is unchanged.        LOS: 39 days       Roxanna Ramirez, APRN  4/20/2023  12:16 EDT    \"Dictated utilizing Dragon dictation\".      "

## 2023-04-20 NOTE — PLAN OF CARE
Goal Outcome Evaluation:  Plan of Care Reviewed With: patient        Progress: no change  Outcome Evaluation: Pt seen for OT/PT tx session in PM, focus on activity tolerance in upright static standing for improved (I) with ADL task completion. Pt coming to EOB with SBA, STS with min A x2, x2 mobility trials to chair with mod-max Ax2 and HHA L side with PT blocking R knee with steps. Pt completed x5 STS from chair, mod A x2 for static standing this date during engagement in dynamic reaching with MAURICIO, noted proximally RUE more active movement and able to engage extremity into func reaching task. Pt returned to supine at end of session due to BM, nurse aide present. Pt will continue to benefit from skilled OT to address noted functional deficits, rec acute rehab.

## 2023-04-20 NOTE — PLAN OF CARE
Goal Outcome Evaluation:  Plan of Care Reviewed With: (P) patient        Progress: (P) no change  Outcome Evaluation: (P) Pt seen for PT/OT co-tx session this PM. Pt came to EOB w/ sba + inc time, STS w/ [place] x2, and txfr bed<>chair w/ mod-maxA x2. Pt continues to demo R knee buckle and difficulty advancing R LE during txfrs/gait. Pt completed STS from chair x5 and req modA x2 for dynamic standing balance activity. Pt able to engage R UE in dynamic reaching activity. Pt demos activation of R ankle DF and continues to demo increased activation of R LE musculature. Pt returned to bed to end activity d/t BM, nuse aide present to intervene. PT will continue to monitor and progress as appropriate.

## 2023-04-20 NOTE — PLAN OF CARE
Problem: Fall Injury Risk  Goal: Absence of Fall and Fall-Related Injury  Outcome: Ongoing, Progressing  Intervention: Identify and Manage Contributors  Recent Flowsheet Documentation  Taken 4/20/2023 0200 by Michelle Parks RN  Medication Review/Management: medications reviewed  Taken 4/20/2023 0000 by Michelle Parks RN  Medication Review/Management: medications reviewed  Taken 4/19/2023 2200 by Michelle Parks RN  Medication Review/Management: medications reviewed  Intervention: Promote Injury-Free Environment  Recent Flowsheet Documentation  Taken 4/20/2023 0200 by Michelle Parks RN  Safety Promotion/Fall Prevention: safety round/check completed  Taken 4/20/2023 0000 by Michelle Parks RN  Safety Promotion/Fall Prevention: safety round/check completed  Taken 4/19/2023 2200 by Michelle Parks RN  Safety Promotion/Fall Prevention: safety round/check completed     Problem: Skin Injury Risk Increased  Goal: Skin Health and Integrity  Outcome: Ongoing, Progressing  Intervention: Optimize Skin Protection  Recent Flowsheet Documentation  Taken 4/20/2023 0200 by Michelle Parks RN  Head of Bed (HOB) Positioning: HOB at 20 degrees  Taken 4/20/2023 0000 by Michelle Parks RN  Head of Bed (HOB) Positioning: HOB at 20 degrees  Taken 4/19/2023 2200 by Michelle Parks RN  Head of Bed (HOB) Positioning: HOB at 20 degrees     Problem: Adult Inpatient Plan of Care  Goal: Plan of Care Review  Outcome: Ongoing, Progressing  Flowsheets (Taken 4/20/2023 0233)  Plan of Care Reviewed With: patient  Goal: Patient-Specific Goal (Individualized)  Outcome: Ongoing, Progressing  Goal: Absence of Hospital-Acquired Illness or Injury  Outcome: Ongoing, Progressing  Intervention: Identify and Manage Fall Risk  Recent Flowsheet Documentation  Taken 4/20/2023 0200 by Michelle Parks RN  Safety Promotion/Fall Prevention: safety round/check completed  Taken 4/20/2023 0000 by Michelle Parks RN  Safety Promotion/Fall Prevention: safety  round/check completed  Taken 4/19/2023 2200 by Michelle Parks RN  Safety Promotion/Fall Prevention: safety round/check completed  Intervention: Prevent Skin Injury  Recent Flowsheet Documentation  Taken 4/20/2023 0200 by Michelle Parks RN  Body Position: position changed independently  Taken 4/20/2023 0000 by Michelle Parks RN  Body Position: position changed independently  Taken 4/19/2023 2200 by Michelle Parks RN  Body Position: position changed independently  Goal: Optimal Comfort and Wellbeing  Outcome: Ongoing, Progressing  Goal: Readiness for Transition of Care  Outcome: Ongoing, Progressing     Problem: Adjustment to Illness (Stroke, Ischemic/Transient Ischemic Attack)  Goal: Optimal Coping  Outcome: Ongoing, Progressing     Problem: Bowel Elimination Impaired (Stroke, Ischemic/Transient Ischemic Attack)  Goal: Effective Bowel Elimination  Outcome: Ongoing, Progressing     Problem: Cerebral Tissue Perfusion (Stroke, Ischemic/Transient Ischemic Attack)  Goal: Optimal Cerebral Tissue Perfusion  Outcome: Ongoing, Progressing     Problem: Cognitive Impairment (Stroke, Ischemic/Transient Ischemic Attack)  Goal: Optimal Cognitive Function  Outcome: Ongoing, Progressing     Problem: Communication Impairment (Stroke, Ischemic/Transient Ischemic Attack)  Goal: Improved Communication Skills  Outcome: Ongoing, Progressing     Problem: Functional Ability Impaired (Stroke, Ischemic/Transient Ischemic Attack)  Goal: Optimal Functional Ability  Outcome: Ongoing, Progressing     Problem: Respiratory Compromise (Stroke, Ischemic/Transient Ischemic Attack)  Goal: Effective Oxygenation and Ventilation  Outcome: Ongoing, Progressing  Intervention: Optimize Oxygenation and Ventilation  Recent Flowsheet Documentation  Taken 4/20/2023 0200 by Michelle Parks RN  Head of Bed (HOB) Positioning: HOB at 20 degrees  Taken 4/20/2023 0000 by Michelle Parks RN  Head of Bed (HOB) Positioning: HOB at 20 degrees  Taken 4/19/2023 2200 by  Michelle Parks RN  Head of Bed (HOB) Positioning: HOB at 20 degrees     Problem: Sensorimotor Impairment (Stroke, Ischemic/Transient Ischemic Attack)  Goal: Improved Sensorimotor Function  Outcome: Ongoing, Progressing  Intervention: Optimize Range of Motion, Motor Control and Function  Recent Flowsheet Documentation  Taken 4/20/2023 0200 by Michelle Parks RN  Positioning/Transfer Devices:   pillows   in use  Taken 4/20/2023 0000 by Michelle Parks RN  Positioning/Transfer Devices:   pillows   in use  Taken 4/19/2023 2200 by Michelle Parks RN  Positioning/Transfer Devices:   pillows   in use     Problem: Swallowing Impairment (Stroke, Ischemic/Transient Ischemic Attack)  Goal: Optimal Eating and Swallowing without Aspiration  Outcome: Ongoing, Progressing     Problem: Urinary Elimination Impaired (Stroke, Ischemic/Transient Ischemic Attack)  Goal: Effective Urinary Elimination  Outcome: Ongoing, Progressing     Problem: Adjustment to Illness (Stroke, Hemorrhagic)  Goal: Optimal Coping  Outcome: Ongoing, Progressing     Problem: Bowel Elimination Impaired (Stroke, Hemorrhagic)  Goal: Effective Bowel Elimination  Outcome: Ongoing, Progressing     Problem: Cerebral Tissue Perfusion (Stroke, Hemorrhagic)  Goal: Optimal Cerebral Tissue Perfusion  Outcome: Ongoing, Progressing     Problem: Cognitive Impairment (Stroke, Hemorrhagic)  Goal: Optimal Cognitive Function  Outcome: Ongoing, Progressing     Problem: Communication Impairment (Stroke, Hemorrhagic)  Goal: Effective Communication Skills  Outcome: Ongoing, Progressing     Problem: Functional Ability Impaired (Stroke, Hemorrhagic)  Goal: Optimal Functional Ability  Outcome: Ongoing, Progressing     Problem: Pain (Stroke, Hemorrhagic)  Goal: Acceptable Pain Control  Outcome: Ongoing, Progressing     Problem: Respiratory Compromise (Stroke, Hemorrhagic)  Goal: Effective Oxygenation and Ventilation  Outcome: Ongoing, Progressing  Intervention: Optimize Oxygenation and  Ventilation  Recent Flowsheet Documentation  Taken 4/20/2023 0200 by Michelle Parks RN  Head of Bed (HOB) Positioning: HOB at 20 degrees  Taken 4/20/2023 0000 by Michelle Parks RN  Head of Bed (HOB) Positioning: HOB at 20 degrees  Taken 4/19/2023 2200 by Michelle Parks RN  Head of Bed (HOB) Positioning: HOB at 20 degrees     Problem: Sensorimotor Impairment (Stroke, Hemorrhagic)  Goal: Improved Sensorimotor Function  Outcome: Ongoing, Progressing  Intervention: Optimize Range of Motion, Motor Control and Function  Recent Flowsheet Documentation  Taken 4/20/2023 0200 by Michelle Parks RN  Positioning/Transfer Devices:   pillows   in use  Taken 4/20/2023 0000 by Michelle Parks RN  Positioning/Transfer Devices:   pillows   in use  Taken 4/19/2023 2200 by Michelle Parks RN  Positioning/Transfer Devices:   pillows   in use     Problem: Swallowing Impairment (Stroke, Hemorrhagic)  Goal: Optimal Eating and Swallowing Without Aspiration  Outcome: Ongoing, Progressing     Problem: Urinary Elimination Impaired (Stroke, Hemorrhagic)  Goal: Effective Urinary Elimination  Outcome: Ongoing, Progressing   Goal Outcome Evaluation:  Plan of Care Reviewed With: patient

## 2023-04-20 NOTE — PROGRESS NOTES
Name: Manuel Durant ADMIT: 3/12/2023   : 1950  PCP: Provider, No Known    MRN: [id number] LOS: 39 days   AGE/SEX: 73 y.o. male  ROOM: [facility]     Subjective   Subjective   Resting in bed. No family present. He is feeling well today. No pain. Denies any trouble breathing. Ate breakfast. He does report feeling sleepy as he did not sleep well last night.     Objective   Objective   Vital Signs  Temp:  [97.9 °F (36.6 °C)-98.5 °F (36.9 °C)] 97.9 °F (36.6 °C)  Heart Rate:  [54-73] 73  Resp:  [18] 18  BP: (119-129)/(65-73) 126/73  SpO2:  [92 %-100 %] 100 %  on   ;   Device (Oxygen Therapy): room air  Body mass index is 21.88 kg/m².     Physical Exam  Vitals and nursing note reviewed.   Constitutional:       General: He is awake and alert. He is not in acute distress.  Cardiovascular:      Rate and Rhythm: Normal rate and regular rhythm.      Pulses: Normal pulses.      Heart sounds: Normal heart sounds. No murmur heard.  Pulmonary:      Effort: Pulmonary effort is normal. No respiratory distress.      Breath sounds: Normal breath sounds.   Abdominal:      Palpations: Abdomen is soft and non-tender.  Musculoskeletal:      Cervical back: Neck supple.      Right lower leg: No edema.      Left lower leg: No edema.  Skin:     General: Skin is dry.   Neurological:      Motor: Weakness present.      Comments: At times disoriented.  Right-sided upper extremity paresis- slowing improving as he has mobility in raising it as well as his right leg.   Psychiatric:         Mood and Affect: Mood normal.         Behavior: Behavior normal.         Thought Content: Thought content normal.         Judgment: Judgment normal    Results Review:       I reviewed the patient's new clinical results.  Results from last 7 days   Lab Units 23  0550   WBC 10*3/mm3 6.71   HEMOGLOBIN g/dL 13.4   PLATELETS 10*3/mm3 160     Results from last 7 days   Lab Units 23  0550   SODIUM mmol/L 140   POTASSIUM mmol/L 3.9   CHLORIDE mmol/L  106   CO2 mmol/L 24.3   BUN mg/dL 11   CREATININE mg/dL 0.74*   GLUCOSE mg/dL 86   Estimated Creatinine Clearance: 79.6 mL/min (A) (by C-G formula based on SCr of 0.74 mg/dL (L)).    Results from last 7 days   Lab Units 04/17/23  0550   CALCIUM mg/dL 9.1       No results found for: HGBA1C, POCGLU    amLODIPine, 10 mg, Oral, QAM AC  apixaban, 5 mg, Oral, Q12H  baclofen, 2.5 mg, Oral, Q8H  carvedilol, 6.25 mg, Oral, BID With Meals  hydrALAZINE, 10 mg, Oral, Q8H  lidocaine, 1 patch, Transdermal, Q24H  sodium chloride, 10 mL, Intravenous, Q12H  valsartan, 160 mg, Oral, QAM AC       Diet: Regular/House Diet; No Mixed Consistencies, Feeding Assistance - Nursing; Texture: Soft to Chew (NDD 3); Soft to Chew: Chopped Meat; Fluid Consistency: Nectar Thick       Assessment/Plan     Active Hospital Problems    Diagnosis  POA   • **Intracranial hemorrhage [I62.9]  Yes   • Hemiplegia [G81.90]  Yes   • Right shoulder pain [M25.511]  Yes   • Severe Malnutrition (HCC) [E43]  Yes   • Acute DVT (deep venous thrombosis) [I82.409]  Yes   • HTN (hypertension) [I10]  Yes   • Hypertensive emergency [I16.1]  Yes      Resolved Hospital Problems    Diagnosis Date Resolved POA   • Hypokalemia [E87.6] 04/10/2023 Unknown     Mr. Durant is a 73 year old male who initially presented to the hospital 3/12/23 for intracranial bleed. He was found unresponsive by friends and initially taken to [facility] where he was found to have poorly controlled BP and right sided hemiplegia. He was transferred here for closer monitoring in the ICU and neurosurgical consultation. He was cleared to move out of the ICU on 3/16/23.     • Intracranial hemorrhage: CTH 3/17/23 stable left basal ganglia/internal capsule hemorrhage with left lateral ventricular hemorrhage. On Lovenox for DVT and repeat CTH 3/18 also stable. ANDRADE followed and signed off 3/23 with plans for repeat MRI brain/MRA head in 1 month- ANDRADE has seen today and ordered MRI/MRA. Goal BP < 150  systolic. Remains on baclofen for spasticity.      • HTN emergency: On amlodipine 10 mg, valsartan 160 mg, Coreg 6.25 mg BID and hydralazine 10 mg TID. BP stable.     • Acute DVT: Found 3/17/23 with acute RLE DVT in soleal. ANDRADE okayed for blood thinners. Now on Eliquis.     • Hypokalemia: Resolved.     Discussed with patient.      Medically stable. Awaiting plans for transportation to [place] to live with son who is there. Granddaughter in [place] cannot take care of him. There is a nephew in [place] that is not to be contacted. Family is setting up a time to come to [place] and get him. He will need to be assist x 1 to fly. CCP discussing with [facility] again.     • Eliquis for DVT prophylaxis.  • Full code.  • Anticipate discharge as above.    SLICK Arroyo  [facility]  04/20/23  14:17 EDT

## 2023-04-20 NOTE — THERAPY TREATMENT NOTE
Patient Name: Manuel Durant  : 1950    MRN: [id number]                              Today's Date: 2023       Admit Date: 3/12/2023    Visit Dx:     ICD-10-CM ICD-9-CM   1. Intracranial hemorrhage  I62.9 432.9   2. Altered mental status, unspecified altered mental status type  R41.82 780.97     Patient Active Problem List   Diagnosis   • Intracranial hemorrhage   • Hypertensive emergency   • Acute DVT (deep venous thrombosis)   • HTN (hypertension)   • Severe Malnutrition (HCC)   • Hemiplegia   • Right shoulder pain     Past Medical History:   Diagnosis Date   • Hypokalemia 3/17/2023     History reviewed. No pertinent surgical history.   General Information     Row Name 23 155          Physical Therapy Time and Intention    Document Type therapy note (daily note) (P)   -ZB     Mode of Treatment co-treatment;occupational therapy;physical therapy (P)   -ZB     Row Name 23 155          General Information    Patient Profile Reviewed yes (P)   -ZB     Existing Precautions/Restrictions fall (P)   -ZB     Row Name 23 155          Cognition    Orientation Status (Cognition) oriented to;person (P)   -ZB     Row Name 23 155          Safety Issues, Functional Mobility    Impairments Affecting Function (Mobility) balance;coordination;endurance/activity tolerance;grasp;motor control;postural/trunk control;strength;range of motion (ROM);cognition;visual/perceptual (P)   -ZB           User Key  (r) = Recorded By, (t) = Taken By, (c) = Cosigned By    Initials Name Provider Type    ZB Nestor Rae, PT Student PT Student               Mobility     Row Name 23 155          Bed Mobility    Bed Mobility supine-sit;sit-supine (P)   -ZB     Supine-Sit [place] (Bed Mobility) verbal cues;standby assist (P)   -ZB     Sit-Supine [place] (Bed Mobility) contact guard (P)   -ZB     Assistive Device (Bed Mobility) bed rails (P)   -ZB     Comment, (Bed Mobility) inc time (P)   -ZB      Row Name 04/20/23 1553          Bed-Chair Transfer    Bed-Chair [place] (Transfers) moderate assist (50% patient effort);maximum assist (25% patient effort);2 person assist;verbal cues;nonverbal cues (demo/gesture) (P)   -ZB     Assistive Device (Bed-Chair Transfers) other (see comments) (P)   hha  -ZB     Row Name 04/20/23 1553          Sit-Stand Transfer    Sit-Stand [place] (Transfers) minimum assist (75% patient effort);2 person assist;verbal cues;nonverbal cues (demo/gesture) (P)   -ZB     Assistive Device (Sit-Stand Transfers) other (see comments) (P)   hha  -ZB     Comment, (Sit-Stand Transfer) STS x1 EOB; x4 from chair w/ Amish x2 + hha on L (P)   -ZB     Row Name 04/20/23 1553          Gait/Stairs (Locomotion)    [place] Level (Gait) moderate assist (50% patient effort);maximum assist (25% patient effort);2 person assist;verbal cues;nonverbal cues (demo/gesture) (P)   -ZB     Assistive Device (Gait) other (see comments) (P)   hha  -ZB     Distance in Feet (Gait) 5' bed<>chair (P)   -ZB     Deviations/Abnormal Patterns (Gait) festinating/shuffling;stride length decreased;sandhya decreased;gait speed decreased (P)   -ZB     Bilateral Gait Deviations forward flexed posture (P)   -ZB     Right Sided Gait Deviations foot drop/toe drag;knee buckling, right side (P)   -ZB     [place] Level (Stairs) unable to assess (P)   -ZB     Comment, (Gait/Stairs) amb 5' bed<>chair; manual contacts/cues for R knee buckled and assist to advance R LE (P)   -ZB           User Key  (r) = Recorded By, (t) = Taken By, (c) = Cosigned By    Initials Name Provider Type    Nestor Howard PT Student PT Student               Obj/Interventions     Row Name 04/20/23 1559          Balance    Balance Assessment sitting static balance;sitting dynamic balance;standing static balance;standing dynamic balance (P)   -ZB     Static Sitting Balance standby assist (P)   -ZB     Dynamic Sitting Balance standby assist (P)   -ZB      Position, Sitting Balance unsupported;sitting edge of bed (P)   -ZB     Static Standing Balance minimal assist;2-person assist (P)   -ZB     Dynamic Standing Balance moderate assist;maximum assist;2-person assist;verbal cues;non-verbal cues (demo/gesture) (P)   -ZB     Position/Device Used, Standing Balance supported;other (see comments) (P)   hha  -ZB     Balance Interventions sitting;standing;sit to stand;supported;static;dynamic;weight shifting activity;occupation based/functional task (P)   -ZB           User Key  (r) = Recorded By, (t) = Taken By, (c) = Cosigned By    Initials Name Provider Type    ZB Nestor Rae, PT Student PT Student               Goals/Plan    No documentation.                Clinical Impression     Row Name 04/20/23 1552          Pain    Pretreatment Pain Rating 0/10 - no pain (P)   -ZB     Posttreatment Pain Rating 0/10 - no pain (P)   -ZB     Pain Intervention(s) Repositioned;Ambulation/increased activity (P)   -ZB     Row Name 04/20/23 5375          Plan of Care Review    Plan of Care Reviewed With patient (P)   -ZB     Progress no change (P)   -ZB     Outcome Evaluation Pt seen for PT/OT co-tx session this PM. Pt came to EOB w/ sba + inc time, STS w/ Amish x2, and txfr bed<>chair w/ mod-maxA x2. Pt continues to demo R knee buckle and difficulty advancing R LE during txfrs/gait. Pt completed STS from chair x5 and req modA x2 for dynamic standing balance activity. Pt able to engage R UE in dynamic reaching activity. Pt demos activation of R ankle DF and continues to demo increased activation of R LE musculature. Pt returned to bed to end activity d/t BM, nuse aide present to intervene. PT will continue to monitor and progress as appropriate. (P)   -ZB     Row Name 04/20/23 1552          Vital Signs    O2 Delivery Pre Treatment room air (P)   -ZB     O2 Delivery Intra Treatment room air (P)   -ZB     O2 Delivery Post Treatment room air (P)   -ZB     Pre Patient Position Supine (P)    -ZB     Intra Patient Position Standing (P)   -ZB     Post Patient Position Supine (P)   -ZB     Row Name 04/20/23 1557          Positioning and Restraints    Pre-Treatment Position in bed (P)   -ZB     Post Treatment Position bed (P)   -ZB     In Bed fowlers;call light within reach;encouraged to call for assist;exit alarm on;notified nsg;with other staff (P)   nsg aide  -ZB           User Key  (r) = Recorded By, (t) = Taken By, (c) = Cosigned By    Initials Name Provider Type    Nestor Howard, PT Student PT Student               Outcome Measures     Row Name 04/20/23 1602 04/20/23 0959       How much help from another person do you currently need...    Turning from your back to your side while in flat bed without using bedrails? 3 (P)   -ZB 3  -RB    Moving from lying on back to sitting on the side of a flat bed without bedrails? 3 (P)   -ZB 3  -RB    Moving to and from a bed to a chair (including a wheelchair)? 2 (P)   -ZB 2  -RB    Standing up from a chair using your arms (e.g., wheelchair, bedside chair)? 3 (P)   -ZB 3  -RB    Climbing 3-5 steps with a railing? 1 (P)   -ZB 1  -RB    To walk in hospital room? 2 (P)   -ZB 2  -RB    AM-PAC 6 Clicks Score (PT) 14 (P)   -ZB 14  -RB    Highest level of mobility 4 --> Transferred to chair/commode (P)   -ZB 4 --> Transferred to chair/commode  -RB    Row Name 04/20/23 1602 04/20/23 1551       Functional Assessment    Outcome Measure Options AM-PAC 6 Clicks Basic Mobility (PT) (P)   -ZB AM-PAC 6 Clicks Daily Activity (OT)  -MW          User Key  (r) = Recorded By, (t) = Taken By, (c) = Cosigned By    Initials Name Provider Type    Orlando Multani RN Registered Nurse    Karishma Williamson OT Occupational Therapist    Nestor Howard, PT Student PT Student                             Physical Therapy Education     Title: PT OT SLP Therapies (Done)     Topic: Physical Therapy (Done)     Point: Mobility training (Done)     Learning Progress Summary            Patient Acceptance, E, VU,DU by ZB at 4/19/2023 1130    Acceptance, E, NR by CW at 4/18/2023 0950    Acceptance, E, VU,NR by DJ at 4/16/2023 1158    Acceptance, E,TB,H, VU,NR by MS at 4/13/2023 1817    Acceptance, E, NR by CW at 4/13/2023 1341    Acceptance, E, NR by CW at 4/12/2023 1201    Acceptance, E, NR by CW at 4/11/2023 1158    Acceptance, E, NR by CW at 4/10/2023 1414    Acceptance, E,TB, NR by CS at 4/8/2023 0928    Acceptance, E, NR by CW at 4/7/2023 1215    Acceptance, E, NR by CW at 4/6/2023 1335    Acceptance, E, NR by CW at 4/5/2023 1043    Acceptance, E, NR by CW at 4/4/2023 0906    Acceptance, E, NR by CW at 4/3/2023 1531    Acceptance, E,TB, VU,NR by CAMERON at 4/1/2023 1530    Acceptance, E, NR by CW at 3/31/2023 0925    Acceptance, E, NR by CW at 3/30/2023 1209    Acceptance, E, NR,NL by CW at 3/29/2023 1336    Acceptance, E, NR by ZB at 3/28/2023 1317    Acceptance, E, NR by CW at 3/27/2023 1401    Acceptance, E,D, NR,VU by JM at 3/24/2023 1631    Comment: seemed to comprehend commands this session    Acceptance, E,D, NR by JM at 3/23/2023 1655    Acceptance, E,D, NR by JM at 3/22/2023 1545    Acceptance, E,TB, VU,NR by CB at 3/21/2023 1544    Acceptance, E,TB,H, VU by MS at 3/16/2023 1801    Acceptance, E,D, DU,NR by MO at 3/16/2023 1451    Acceptance, E, DU,NR by MO at 3/15/2023 1200    Acceptance, E, NR by LM at 3/15/2023 0521    Acceptance, E,D, DU,NR by MO at 3/14/2023 1458   Family Acceptance, E,TB,H, VU,NR by MS at 4/13/2023 1817    Acceptance, E,TB,H, VU by MS at 3/16/2023 1801                   Point: Home exercise program (Done)     Learning Progress Summary           Patient Acceptance, E, VU,NR by DJ at 4/16/2023 1158    Acceptance, E,TB,H, VU,NR by MS at 4/13/2023 1817    Acceptance, E, NR by CW at 4/13/2023 1341    Acceptance, E,TB, NR by CS at 4/8/2023 0928    Acceptance, E, NR by CW at 4/7/2023 1215    Acceptance, E, NR by CW at 4/6/2023 1335    Acceptance, E,TB, VU,NR by CAMERON at  4/1/2023 1530    Acceptance, E, NR by CW at 3/31/2023 0925    Acceptance, E, NR by ZB at 3/28/2023 1317    Acceptance, E, NR by CW at 3/27/2023 1401    Acceptance, E,D, NR,VU by JM at 3/24/2023 1631    Comment: seemed to comprehend commands this session    Acceptance, E,D, NR by JM at 3/23/2023 1655    Acceptance, E,D, NR by JM at 3/22/2023 1545    Acceptance, E,TB, VU,NR by CB at 3/21/2023 1544    Acceptance, E,TB,H, VU by MS at 3/16/2023 1801    Acceptance, E,D, DU,NR by MO at 3/16/2023 1451    Acceptance, E, NR by LM at 3/15/2023 0521    Acceptance, E,D, DU,NR by MO at 3/14/2023 1458   Family Acceptance, E,TB,H, VU,NR by MS at 4/13/2023 1817    Acceptance, E,TB,H, VU by MS at 3/16/2023 1801                   Point: Body mechanics (Done)     Learning Progress Summary           Patient Acceptance, E, VU,NR by DJ at 4/16/2023 1158    Acceptance, E,TB,H, VU,NR by MS at 4/13/2023 1817    Acceptance, E, NR by CW at 4/13/2023 1341    Acceptance, E, NR by CW at 4/11/2023 1158    Acceptance, E, NR by CW at 4/10/2023 1414    Acceptance, E,TB, NR by CS at 4/8/2023 0928    Acceptance, E, NR by CW at 4/7/2023 1215    Acceptance, E, NR by CW at 4/4/2023 0906    Acceptance, E, NR by CW at 4/3/2023 1531    Acceptance, E,TB, VU,NR by CAMERON at 4/1/2023 1530    Acceptance, E,D, NR,VU by JM at 3/24/2023 1631    Comment: seemed to comprehend commands this session    Acceptance, E,D, NR by JM at 3/23/2023 1655    Acceptance, E,D, NR by JM at 3/22/2023 1545    Acceptance, E,TB, VU,NR by CB at 3/21/2023 1544    Acceptance, E,TB,H, VU by MS at 3/16/2023 1801    Acceptance, E,D, DU,NR by MO at 3/16/2023 1451    Acceptance, E, DU,NR by MO at 3/15/2023 1200    Acceptance, E, NR by LM at 3/15/2023 0521    Acceptance, E,D, DU,NR by MO at 3/14/2023 1458   Family Acceptance, E,TB,H, VU,NR by MS at 4/13/2023 1817    Acceptance, E,TB,H, VU by MS at 3/16/2023 1801                   Point: Precautions (Done)     Learning Progress Summary            Patient Acceptance, E, VU,NR by DJ at 4/16/2023 1158    Acceptance, E,TB,H, VU,NR by MS at 4/13/2023 1817    Acceptance, E, NR by CW at 4/13/2023 1341    Acceptance, E,TB, NR by CS at 4/8/2023 0928    Acceptance, E, NR by CW at 4/7/2023 1215    Acceptance, E, NR by CW at 4/4/2023 0906    Acceptance, E, NR by CW at 4/3/2023 1531    Acceptance, E,TB, VU,NR by CAMERON at 4/1/2023 1530    Acceptance, E,D, NR,VU by JM at 3/24/2023 1631    Comment: seemed to comprehend commands this session    Acceptance, E,D, NR by JM at 3/23/2023 1655    Acceptance, E,D, NR by JM at 3/22/2023 1545    Acceptance, E,TB, VU,NR by CB at 3/21/2023 1544    Acceptance, E,TB,H, VU by MS at 3/16/2023 1801    Acceptance, E,D, DU,NR by MO at 3/16/2023 1451    Acceptance, E, DU,NR by MO at 3/15/2023 1200    Acceptance, E, NR by LM at 3/15/2023 0521    Acceptance, E,D, DU,NR by MO at 3/14/2023 1458   Family Acceptance, E,TB,H, VU,NR by MS at 4/13/2023 1817    Acceptance, E,TB,H, VU by MS at 3/16/2023 1801                               User Key     Initials Effective Dates Name Provider Type Discipline    JM 03/07/18 -  Tiffanie Grace, PTA Physical Therapist Assistant PT    CAMERON 05/19/21 -  Leeann Wiley, PT Physical Therapist PT    DJ 10/25/19 -  Jacqueline Chamberlain, PT Physical Therapist PT    MS 06/16/21 -  Angel Luis Cr, RN Registered Nurse Nurse    CW 12/13/22 -  Tamica Willoughby, PT Physical Therapist PT    CB 10/22/21 -  Bety Benitez, PT Physical Therapist PT    CS 09/22/22 -  Adelia Montalvo, PT Physical Therapist PT    LM 10/13/22 -  Silke Grace, RN Registered Nurse Nurse    MO 01/27/23 -  Lacie Tanner, PT Student PT Student PT    ZB 03/10/23 -  Netsor Rae, PT Student PT Student PT              PT Recommendation and Plan     Plan of Care Reviewed With: (P) patient  Progress: (P) no change  Outcome Evaluation: (P) Pt seen for PT/OT co-tx session this PM. Pt came to EOB w/ sba + inc time, STS w/ Amish x2, and txfr bed<>chair w/  mod-maxA x2. Pt continues to demo R knee buckle and difficulty advancing R LE during txfrs/gait. Pt completed STS from chair x5 and req modA x2 for dynamic standing balance activity. Pt able to engage R UE in dynamic reaching activity. Pt demos activation of R ankle DF and continues to demo increased activation of R LE musculature. Pt returned to bed to end activity d/t BM, nuse aide present to intervene. PT will continue to monitor and progress as appropriate.     Time Calculation:    PT Charges     Row Name 04/20/23 1602             Time Calculation    Start Time 1344 (P)   -ZB      Stop Time 1407 (P)   -ZB      Time Calculation (min) 23 min (P)   -ZB      PT Received On 04/20/23 (P)   -ZB      PT - Next Appointment 04/21/23 (P)   -ZB         Time Calculation- PT    Total Timed Code Minutes- PT 23 minute(s) (P)   -ZB         Timed Charges    33002 - PT Therapeutic Activity Minutes 23 (P)   -ZB         Total Minutes    Timed Charges Total Minutes 23 (P)   -ZB       Total Minutes 23 (P)   -ZB            User Key  (r) = Recorded By, (t) = Taken By, (c) = Cosigned By    Initials Name Provider Type    JEROMYB Nestor Rae, PT Student PT Student              Therapy Charges for Today     Code Description Service Date Service Provider Modifiers Qty    11863503586  PT THERAPEUTIC ACT EA 15 MIN 4/19/2023 Nestor Rae, PT Student GP 2    [id number]  PT THERAPEUTIC ACT EA 15 MIN 4/20/2023 Nestor Rae, PT Student GP 2    [id number]  PT THER SUPP EA 15 MIN 4/20/2023 Nestor Rae, PT Student GP 1          PT G-Codes  Outcome Measure Options: (P) AM-PAC 6 Clicks Basic Mobility (PT)  AM-PAC 6 Clicks Score (PT): (P) 14  AM-PAC 6 Clicks Score (OT): 14  Modified [place] Scale: 4 - Moderately severe disability.  Unable to walk without assistance, and unable to attend to own bodily needs without assistance.       JOSE ELIAS Vallejo  4/20/2023

## 2023-04-20 NOTE — CASE MANAGEMENT/SOCIAL WORK
Continued Stay Note  [facility]     Patient Name: Manuel Durant  MRN: [id number]  Today's Date: 4/20/2023    Admit Date: 3/12/2023    Plan: Home to [place] by air when one person assist for transfers.  Will be accompanied by one.   Discharge Plan     Row Name 04/20/23 1038       Plan    Plan Comments Spoke with Meche with [facility].  Updated her as to the current plan.  She will re-review Mr. Durant for appropriateness.  Faye ARMSTRONG    Row Name 04/20/23 1003       Plan    Plan Home to [place] by air when one person assist for transfers.  Will be accompanied by one.    Plan Comments CCP spoke with pt's son Ariel Durant by phone.  He states that family are making plans to fly from [place] to [place] to pick pt up and travel with him back to [place] by air.  Pt will need to be able to sit independently and transfer with assist x 1.  Communicated pt's progress with PT/OT and his son was appreciative.  [facility] will discuss with PT, OT, and MD to plan the discharge date and communicate that to Ariel so that flight can be arranged.  At this time, if therapy goals can be achieved, family agrees to discharge date of a week or week and half from now.  Faye ARMSTRONG               Discharge Codes    No documentation.               Expected Discharge Date and Time     Expected Discharge Date Expected Discharge Time    Apr 28, 2023             Faye Moy RN

## 2023-04-21 LAB
ANION GAP SERPL CALCULATED.3IONS-SCNC: 10.9 MMOL/L (ref 5–15)
BUN SERPL-MCNC: 16 MG/DL (ref 8–23)
BUN/CREAT SERPL: 22.5 (ref 7–25)
CALCIUM SPEC-SCNC: 9 MG/DL (ref 8.6–10.5)
CHLORIDE SERPL-SCNC: 107 MMOL/L (ref 98–107)
CO2 SERPL-SCNC: 23.1 MMOL/L (ref 22–29)
CREAT SERPL-MCNC: 0.71 MG/DL (ref 0.76–1.27)
DEPRECATED RDW RBC AUTO: 43 FL (ref 37–54)
EGFRCR SERPLBLD CKD-EPI 2021: 98.1 ML/MIN/1.73
ERYTHROCYTE [DISTWIDTH] IN BLOOD BY AUTOMATED COUNT: 13.2 % (ref 12.3–15.4)
GLUCOSE SERPL-MCNC: 84 MG/DL (ref 65–99)
HCT VFR BLD AUTO: 40.9 % (ref 37.5–51)
HGB BLD-MCNC: 13.7 G/DL (ref 13–17.7)
MCH RBC QN AUTO: 30.4 PG (ref 26.6–33)
MCHC RBC AUTO-ENTMCNC: 33.5 G/DL (ref 31.5–35.7)
MCV RBC AUTO: 90.9 FL (ref 79–97)
PLATELET # BLD AUTO: 187 10*3/MM3 (ref 140–450)
PMV BLD AUTO: 10.3 FL (ref 6–12)
POTASSIUM SERPL-SCNC: 4.1 MMOL/L (ref 3.5–5.2)
RBC # BLD AUTO: 4.5 10*6/MM3 (ref 4.14–5.8)
SODIUM SERPL-SCNC: 141 MMOL/L (ref 136–145)
WBC NRBC COR # BLD: 6.77 10*3/MM3 (ref 3.4–10.8)

## 2023-04-21 PROCEDURE — 80048 BASIC METABOLIC PNL TOTAL CA: CPT | Performed by: NURSE PRACTITIONER

## 2023-04-21 PROCEDURE — 97530 THERAPEUTIC ACTIVITIES: CPT

## 2023-04-21 PROCEDURE — 85027 COMPLETE CBC AUTOMATED: CPT | Performed by: NURSE PRACTITIONER

## 2023-04-21 RX ADMIN — HYDRALAZINE HYDROCHLORIDE 10 MG: 10 TABLET, FILM COATED ORAL at 22:02

## 2023-04-21 RX ADMIN — HYDRALAZINE HYDROCHLORIDE 10 MG: 10 TABLET, FILM COATED ORAL at 15:16

## 2023-04-21 RX ADMIN — LIDOCAINE 1 PATCH: 700 PATCH TOPICAL at 09:52

## 2023-04-21 RX ADMIN — HYDRALAZINE HYDROCHLORIDE 10 MG: 10 TABLET, FILM COATED ORAL at 06:15

## 2023-04-21 RX ADMIN — CARVEDILOL 6.25 MG: 12.5 TABLET, FILM COATED ORAL at 09:52

## 2023-04-21 RX ADMIN — AMLODIPINE BESYLATE 10 MG: 10 TABLET ORAL at 09:52

## 2023-04-21 RX ADMIN — VALSARTAN 160 MG: 160 TABLET, FILM COATED ORAL at 09:52

## 2023-04-21 RX ADMIN — APIXABAN 5 MG: 5 TABLET, FILM COATED ORAL at 22:00

## 2023-04-21 RX ADMIN — BACLOFEN 2.5 MG: 10 TABLET ORAL at 06:15

## 2023-04-21 RX ADMIN — BACLOFEN 2.5 MG: 10 TABLET ORAL at 15:16

## 2023-04-21 RX ADMIN — BACLOFEN 2.5 MG: 10 TABLET ORAL at 22:03

## 2023-04-21 RX ADMIN — Medication 10 ML: at 22:00

## 2023-04-21 RX ADMIN — APIXABAN 5 MG: 5 TABLET, FILM COATED ORAL at 09:52

## 2023-04-21 NOTE — PLAN OF CARE
Problem: Fall Injury Risk  Goal: Absence of Fall and Fall-Related Injury  Outcome: Ongoing, Progressing  Intervention: Identify and Manage Contributors  Recent Flowsheet Documentation  Taken 4/21/2023 1837 by Angel Luis Cr RN  Medication Review/Management: medications reviewed  Taken 4/21/2023 1600 by Angel Luis Cr RN  Medication Review/Management: medications reviewed  Taken 4/21/2023 1424 by Angel Luis Cr RN  Medication Review/Management: medications reviewed  Taken 4/21/2023 1257 by Angel Luis Cr RN  Medication Review/Management: medications reviewed  Taken 4/21/2023 1001 by Angel Luis rC RN  Medication Review/Management: medications reviewed  Taken 4/21/2023 0813 by Angel Luis Cr RN  Medication Review/Management: medications reviewed  Intervention: Promote Injury-Free Environment  Recent Flowsheet Documentation  Taken 4/21/2023 1837 by Angel Luis Cr RN  Safety Promotion/Fall Prevention:   activity supervised   elopement precautions   fall prevention program maintained   clutter free environment maintained   assistive device/personal items within reach   gait belt   mobility aid in reach   lighting adjusted   nonskid shoes/slippers when out of bed   muscle strengthening facilitated   room organization consistent   safety round/check completed   toileting scheduled  Taken 4/21/2023 1600 by Angel Luis Cr RN  Safety Promotion/Fall Prevention:   activity supervised   assistive device/personal items within reach   elopement precautions   fall prevention program maintained   gait belt   clutter free environment maintained   lighting adjusted   mobility aid in reach   muscle strengthening facilitated   nonskid shoes/slippers when out of bed   room organization consistent   safety round/check completed   toileting scheduled  Taken 4/21/2023 1424 by Angel Luis Cr RN  Safety Promotion/Fall Prevention:   assistive device/personal items within reach   activity supervised   elopement precautions   clutter  free environment maintained   fall prevention program maintained   gait belt   lighting adjusted   mobility aid in reach   muscle strengthening facilitated   nonskid shoes/slippers when out of bed   room organization consistent   safety round/check completed   toileting scheduled  Taken 4/21/2023 1257 by Angel Luis Cr RN  Safety Promotion/Fall Prevention:   activity supervised   clutter free environment maintained   elopement precautions   fall prevention program maintained   gait belt   nonskid shoes/slippers when out of bed   muscle strengthening facilitated   mobility aid in reach   lighting adjusted   assistive device/personal items within reach   room organization consistent   toileting scheduled   safety round/check completed  Taken 4/21/2023 1001 by Angel Luis Cr RN  Safety Promotion/Fall Prevention:   activity supervised   assistive device/personal items within reach   clutter free environment maintained   elopement precautions   fall prevention program maintained   gait belt   lighting adjusted   mobility aid in reach   nonskid shoes/slippers when out of bed   muscle strengthening facilitated   safety round/check completed   room organization consistent   toileting scheduled  Taken 4/21/2023 0813 by Angel Luis Cr RN  Safety Promotion/Fall Prevention:   activity supervised   assistive device/personal items within reach   clutter free environment maintained   elopement precautions   fall prevention program maintained   lighting adjusted   mobility aid in reach   nonskid shoes/slippers when out of bed   muscle strengthening facilitated   gait belt   safety round/check completed   room organization consistent   toileting scheduled     Problem: Skin Injury Risk Increased  Goal: Skin Health and Integrity  Outcome: Ongoing, Progressing  Intervention: Optimize Skin Protection  Recent Flowsheet Documentation  Taken 4/21/2023 1837 by Angel Luis Cr RN  Head of Bed (HOB) Positioning: HOB at 30-45 degrees  Taken  4/21/2023 1600 by Angel Luis Cr RN  Head of Bed (HOB) Positioning: HOB at 30 degrees  Taken 4/21/2023 1424 by Angel Luis Cr RN  Head of Bed (HOB) Positioning: HOB at 30 degrees  Taken 4/21/2023 1257 by Angel Luis Cr RN  Head of Bed (HOB) Positioning: HOB at 30 degrees  Taken 4/21/2023 1001 by Angel Luis Cr RN  Head of Bed (HOB) Positioning: HOB at 30-45 degrees  Taken 4/21/2023 0813 by Angel Luis Cr RN  Head of Bed (HOB) Positioning: HOB at 20-30 degrees  Pressure Reduction Devices: alternating pressure pump (ADD)     Problem: Adult Inpatient Plan of Care  Goal: Plan of Care Review  Outcome: Ongoing, Progressing  Goal: Patient-Specific Goal (Individualized)  Outcome: Ongoing, Progressing  Goal: Absence of Hospital-Acquired Illness or Injury  Outcome: Ongoing, Progressing  Intervention: Identify and Manage Fall Risk  Recent Flowsheet Documentation  Taken 4/21/2023 1837 by Angel Luis Cr RN  Safety Promotion/Fall Prevention:   activity supervised   elopement precautions   fall prevention program maintained   clutter free environment maintained   assistive device/personal items within reach   gait belt   mobility aid in reach   lighting adjusted   nonskid shoes/slippers when out of bed   muscle strengthening facilitated   room organization consistent   safety round/check completed   toileting scheduled  Taken 4/21/2023 1600 by Angel Luis Cr RN  Safety Promotion/Fall Prevention:   activity supervised   assistive device/personal items within reach   elopement precautions   fall prevention program maintained   gait belt   clutter free environment maintained   lighting adjusted   mobility aid in reach   muscle strengthening facilitated   nonskid shoes/slippers when out of bed   room organization consistent   safety round/check completed   toileting scheduled  Taken 4/21/2023 1424 by Angel Luis Cr RN  Safety Promotion/Fall Prevention:   assistive device/personal items within reach   activity supervised    elopement precautions   clutter free environment maintained   fall prevention program maintained   gait belt   lighting adjusted   mobility aid in reach   muscle strengthening facilitated   nonskid shoes/slippers when out of bed   room organization consistent   safety round/check completed   toileting scheduled  Taken 4/21/2023 1257 by Angel Luis Cr RN  Safety Promotion/Fall Prevention:   activity supervised   clutter free environment maintained   elopement precautions   fall prevention program maintained   gait belt   nonskid shoes/slippers when out of bed   muscle strengthening facilitated   mobility aid in reach   lighting adjusted   assistive device/personal items within reach   room organization consistent   toileting scheduled   safety round/check completed  Taken 4/21/2023 1001 by Angel Luis Cr RN  Safety Promotion/Fall Prevention:   activity supervised   assistive device/personal items within reach   clutter free environment maintained   elopement precautions   fall prevention program maintained   gait belt   lighting adjusted   mobility aid in reach   nonskid shoes/slippers when out of bed   muscle strengthening facilitated   safety round/check completed   room organization consistent   toileting scheduled  Taken 4/21/2023 0813 by Angel Luis Cr RN  Safety Promotion/Fall Prevention:   activity supervised   assistive device/personal items within reach   clutter free environment maintained   elopement precautions   fall prevention program maintained   lighting adjusted   mobility aid in reach   nonskid shoes/slippers when out of bed   muscle strengthening facilitated   gait belt   safety round/check completed   room organization consistent   toileting scheduled  Intervention: Prevent Skin Injury  Recent Flowsheet Documentation  Taken 4/21/2023 1837 by Angel Luis Cr RN  Body Position: position changed independently  Taken 4/21/2023 1600 by Angel Luis Cr RN  Body Position: position changed  independently  Taken 4/21/2023 1424 by Angel Luis Cr RN  Body Position: position changed independently  Taken 4/21/2023 1257 by Angel Luis Cr RN  Body Position: position changed independently  Taken 4/21/2023 1001 by Angel Luis Cr RN  Body Position: position changed independently  Taken 4/21/2023 0813 by Angel Luis Cr RN  Body Position: position changed independently  Intervention: Prevent and Manage VTE (Venous Thromboembolism) Risk  Recent Flowsheet Documentation  Taken 4/21/2023 1837 by Angel Luis Cr RN  Activity Management: activity encouraged  VTE Prevention/Management:   bilateral   sequential compression devices on  Taken 4/21/2023 1600 by Angel Luis Cr RN  Activity Management: activity encouraged  VTE Prevention/Management:   bilateral   sequential compression devices on  Taken 4/21/2023 1424 by Angel Luis Cr RN  Activity Management: activity encouraged  VTE Prevention/Management:   bilateral   sequential compression devices on  Taken 4/21/2023 1257 by Angel Luis Cr RN  Activity Management: activity encouraged  VTE Prevention/Management:   bilateral   sequential compression devices on  Taken 4/21/2023 1001 by Angel Luis Cr RN  Activity Management: activity encouraged  VTE Prevention/Management:   bilateral   sequential compression devices on  Taken 4/21/2023 0813 by Angel Luis Cr RN  Activity Management: activity encouraged  VTE Prevention/Management:   bilateral   sequential compression devices on  Intervention: Prevent Infection  Recent Flowsheet Documentation  Taken 4/21/2023 1837 by Angel Luis Cr RN  Infection Prevention:   cohorting utilized   environmental surveillance performed   equipment surfaces disinfected   hand hygiene promoted   personal protective equipment utilized   rest/sleep promoted   single patient room provided   visitors restricted/screened  Taken 4/21/2023 1600 by Angel Luis Cr RN  Infection Prevention:   cohorting utilized   environmental surveillance  performed   equipment surfaces disinfected   hand hygiene promoted   personal protective equipment utilized   rest/sleep promoted   single patient room provided   visitors restricted/screened  Taken 4/21/2023 1424 by Angel Luis Cr RN  Infection Prevention:   cohorting utilized   environmental surveillance performed   equipment surfaces disinfected   hand hygiene promoted   personal protective equipment utilized   rest/sleep promoted   single patient room provided   visitors restricted/screened  Taken 4/21/2023 1257 by Angel Luis Cr RN  Infection Prevention:   cohorting utilized   environmental surveillance performed   equipment surfaces disinfected   hand hygiene promoted   personal protective equipment utilized   rest/sleep promoted   single patient room provided   visitors restricted/screened  Taken 4/21/2023 1001 by Angel Luis Cr RN  Infection Prevention:   cohorting utilized   environmental surveillance performed   equipment surfaces disinfected   hand hygiene promoted   personal protective equipment utilized   rest/sleep promoted   single patient room provided   visitors restricted/screened  Taken 4/21/2023 0813 by Angel Luis Cr RN  Infection Prevention:   cohorting utilized   environmental surveillance performed   equipment surfaces disinfected   hand hygiene promoted   personal protective equipment utilized   rest/sleep promoted   single patient room provided   visitors restricted/screened  Goal: Optimal Comfort and Wellbeing  Outcome: Ongoing, Progressing  Goal: Readiness for Transition of Care  Outcome: Ongoing, Progressing     Problem: Adjustment to Illness (Stroke, Ischemic/Transient Ischemic Attack)  Goal: Optimal Coping  Outcome: Ongoing, Progressing     Problem: Bowel Elimination Impaired (Stroke, Ischemic/Transient Ischemic Attack)  Goal: Effective Bowel Elimination  Outcome: Ongoing, Progressing     Problem: Cerebral Tissue Perfusion (Stroke, Ischemic/Transient Ischemic Attack)  Goal: Optimal  Cerebral Tissue Perfusion  Outcome: Ongoing, Progressing     Problem: Cognitive Impairment (Stroke, Ischemic/Transient Ischemic Attack)  Goal: Optimal Cognitive Function  Outcome: Ongoing, Progressing     Problem: Communication Impairment (Stroke, Ischemic/Transient Ischemic Attack)  Goal: Improved Communication Skills  Outcome: Ongoing, Progressing     Problem: Functional Ability Impaired (Stroke, Ischemic/Transient Ischemic Attack)  Goal: Optimal Functional Ability  Outcome: Ongoing, Progressing  Intervention: Optimize Functional Ability  Recent Flowsheet Documentation  Taken 4/21/2023 1837 by Angel Luis Cr RN  Activity Management: activity encouraged  Taken 4/21/2023 1600 by Angel Luis Cr RN  Activity Management: activity encouraged  Taken 4/21/2023 1424 by Angel Luis Cr RN  Activity Management: activity encouraged  Taken 4/21/2023 1257 by Angel Luis Cr RN  Activity Management: activity encouraged  Taken 4/21/2023 1001 by Angel Luis Cr RN  Activity Management: activity encouraged  Taken 4/21/2023 0813 by Angel Luis Cr RN  Activity Management: activity encouraged     Problem: Respiratory Compromise (Stroke, Ischemic/Transient Ischemic Attack)  Goal: Effective Oxygenation and Ventilation  Outcome: Ongoing, Progressing  Intervention: Optimize Oxygenation and Ventilation  Recent Flowsheet Documentation  Taken 4/21/2023 1837 by Angel Luis Cr RN  Head of Bed (HOB) Positioning: HOB at 30-45 degrees  Taken 4/21/2023 1600 by Angel Luis Cr RN  Head of Bed (HOB) Positioning: HOB at 30 degrees  Taken 4/21/2023 1424 by Angel Luis Cr RN  Head of Bed (HOB) Positioning: HOB at 30 degrees  Taken 4/21/2023 1257 by Angel Luis Cr RN  Head of Bed (HOB) Positioning: HOB at 30 degrees  Taken 4/21/2023 1001 by Angel Luis Cr RN  Head of Bed (HOB) Positioning: HOB at 30-45 degrees  Taken 4/21/2023 0813 by Angel Luis Cr RN  Head of Bed (HOB) Positioning: HOB at 20-30 degrees     Problem: Sensorimotor Impairment  (Stroke, Ischemic/Transient Ischemic Attack)  Goal: Improved Sensorimotor Function  Outcome: Ongoing, Progressing  Intervention: Optimize Range of Motion, Motor Control and Function  Recent Flowsheet Documentation  Taken 4/21/2023 1837 by Angel Luis Cr RN  Positioning/Transfer Devices:   pillows   in use  Taken 4/21/2023 1600 by Angel Luis Cr RN  Positioning/Transfer Devices:   pillows   in use  Taken 4/21/2023 1424 by Angel Luis Cr RN  Positioning/Transfer Devices:   pillows   in use  Taken 4/21/2023 1257 by Angel Luis Cr RN  Positioning/Transfer Devices:   pillows   in use  Taken 4/21/2023 1001 by Angel Luis Cr RN  Positioning/Transfer Devices:   pillows   in use  Taken 4/21/2023 0813 by Angel Luis Cr RN  Positioning/Transfer Devices:   pillows   in use  Intervention: Optimize Sensory and Perceptual Ability  Recent Flowsheet Documentation  Taken 4/21/2023 0813 by Angel Luis Cr RN  Pressure Reduction Devices: alternating pressure pump (ADD)     Problem: Swallowing Impairment (Stroke, Ischemic/Transient Ischemic Attack)  Goal: Optimal Eating and Swallowing without Aspiration  Outcome: Ongoing, Progressing  Intervention: Optimize Eating and Swallowing  Recent Flowsheet Documentation  Taken 4/21/2023 0813 by Angel Luis Cr RN  Feeding/Eating Techniques: adaptive equipment/utensils utilized     Problem: Urinary Elimination Impaired (Stroke, Ischemic/Transient Ischemic Attack)  Goal: Effective Urinary Elimination  Outcome: Ongoing, Progressing     Problem: Adjustment to Illness (Stroke, Hemorrhagic)  Goal: Optimal Coping  Outcome: Ongoing, Progressing     Problem: Bowel Elimination Impaired (Stroke, Hemorrhagic)  Goal: Effective Bowel Elimination  Outcome: Ongoing, Progressing     Problem: Cerebral Tissue Perfusion (Stroke, Hemorrhagic)  Goal: Optimal Cerebral Tissue Perfusion  Outcome: Ongoing, Progressing     Problem: Cognitive Impairment (Stroke, Hemorrhagic)  Goal: Optimal Cognitive Function  Outcome:  Ongoing, Progressing     Problem: Communication Impairment (Stroke, Hemorrhagic)  Goal: Effective Communication Skills  Outcome: Ongoing, Progressing     Problem: Functional Ability Impaired (Stroke, Hemorrhagic)  Goal: Optimal Functional Ability  Outcome: Ongoing, Progressing  Intervention: Optimize Functional Ability  Recent Flowsheet Documentation  Taken 4/21/2023 1837 by Angel Luis Cr RN  Activity Management: activity encouraged  Taken 4/21/2023 1600 by Angel Luis Cr RN  Activity Management: activity encouraged  Taken 4/21/2023 1424 by Angel Luis Cr RN  Activity Management: activity encouraged  Taken 4/21/2023 1257 by Angel Luis Cr RN  Activity Management: activity encouraged  Taken 4/21/2023 1001 by Angel Luis Cr RN  Activity Management: activity encouraged  Taken 4/21/2023 0813 by Angel Luis Cr RN  Activity Management: activity encouraged     Problem: Pain (Stroke, Hemorrhagic)  Goal: Acceptable Pain Control  Outcome: Ongoing, Progressing     Problem: Respiratory Compromise (Stroke, Hemorrhagic)  Goal: Effective Oxygenation and Ventilation  Outcome: Ongoing, Progressing  Intervention: Optimize Oxygenation and Ventilation  Recent Flowsheet Documentation  Taken 4/21/2023 1837 by Angel Luis Cr RN  Head of Bed (HOB) Positioning: HOB at 30-45 degrees  Taken 4/21/2023 1600 by Angel Luis Cr RN  Head of Bed (HOB) Positioning: HOB at 30 degrees  Taken 4/21/2023 1424 by Angel Luis Cr RN  Head of Bed (HOB) Positioning: HOB at 30 degrees  Taken 4/21/2023 1257 by Angel Luis Cr RN  Head of Bed (HOB) Positioning: HOB at 30 degrees  Taken 4/21/2023 1001 by Angel Luis Cr RN  Head of Bed (HOB) Positioning: HOB at 30-45 degrees  Taken 4/21/2023 0813 by Angel Luis Cr RN  Head of Bed (HOB) Positioning: HOB at 20-30 degrees     Problem: Sensorimotor Impairment (Stroke, Hemorrhagic)  Goal: Improved Sensorimotor Function  Outcome: Ongoing, Progressing  Intervention: Optimize Range of Motion, Motor Control and  Function  Recent Flowsheet Documentation  Taken 4/21/2023 1837 by Angel Luis Cr RN  Positioning/Transfer Devices:   pillows   in use  Taken 4/21/2023 1600 by Angel Luis Cr RN  Positioning/Transfer Devices:   pillows   in use  Taken 4/21/2023 1424 by Angel Luis Cr RN  Positioning/Transfer Devices:   pillows   in use  Taken 4/21/2023 1257 by Angel Luis Cr RN  Positioning/Transfer Devices:   pillows   in use  Taken 4/21/2023 1001 by Angel Luis Cr RN  Positioning/Transfer Devices:   pillows   in use  Taken 4/21/2023 0813 by Angel Luis Cr RN  Positioning/Transfer Devices:   pillows   in use  Intervention: Optimize Sensory and Perceptual Ability  Recent Flowsheet Documentation  Taken 4/21/2023 0813 by Angel Luis Cr RN  Pressure Reduction Devices: alternating pressure pump (ADD)     Problem: Swallowing Impairment (Stroke, Hemorrhagic)  Goal: Optimal Eating and Swallowing Without Aspiration  Outcome: Ongoing, Progressing  Intervention: Optimize Eating and Swallowing  Recent Flowsheet Documentation  Taken 4/21/2023 0813 by Angel Luis Cr RN  Feeding/Eating Techniques: adaptive equipment/utensils utilized     Problem: Urinary Elimination Impaired (Stroke, Hemorrhagic)  Goal: Effective Urinary Elimination  Outcome: Ongoing, Progressing   Goal Outcome Evaluation:

## 2023-04-21 NOTE — THERAPY TREATMENT NOTE
Patient Name: Manuel Durant  : 1950    MRN: [id number]                              Today's Date: 2023       Admit Date: 3/12/2023    Visit Dx:     ICD-10-CM ICD-9-CM   1. Intracranial hemorrhage  I62.9 432.9   2. Altered mental status, unspecified altered mental status type  R41.82 780.97     Patient Active Problem List   Diagnosis   • Intracranial hemorrhage   • Hypertensive emergency   • Acute DVT (deep venous thrombosis)   • HTN (hypertension)   • Severe Malnutrition (HCC)   • Hemiplegia   • Right shoulder pain     Past Medical History:   Diagnosis Date   • Hypokalemia 3/17/2023     History reviewed. No pertinent surgical history.   General Information     Row Name 23 1529          Physical Therapy Time and Intention    Document Type therapy note (daily note)  -CW     Mode of Treatment physical therapy;individual therapy  -CW     Row Name 23 1529          General Information    Patient Profile Reviewed yes  -CW     Existing Precautions/Restrictions fall  -CW     Row Name 23 1529          Cognition    Orientation Status (Cognition) oriented to;person  -CW     Row Name 23 1529          Safety Issues, Functional Mobility    Safety Issues Affecting Function (Mobility) insight into deficits/self-awareness;awareness of need for assistance;safety precautions follow-through/compliance;problem-solving;judgment;impulsivity;safety precaution awareness  -CW     Impairments Affecting Function (Mobility) balance;coordination;endurance/activity tolerance;grasp;motor control;postural/trunk control;strength;range of motion (ROM);cognition;visual/perceptual  -CW           User Key  (r) = Recorded By, (t) = Taken By, (c) = Cosigned By    Initials Name Provider Type    CW Tamica Willoughby PT Physical Therapist               Mobility     Row Name 23 1530          Bed Mobility    Bed Mobility supine-sit  -CW     Supine-Sit [place] (Bed Mobility) standby assist  -CW     Assistive  Device (Bed Mobility) bed rails;head of bed elevated  -CW     Comment, (Bed Mobility) Uses momentum  -CW     Row Name 04/21/23 1530          Bed-Chair Transfer    Bed-Chair [place] (Transfers) minimum assist (75% patient effort);moderate assist (50% patient effort);2 person assist;verbal cues;nonverbal cues (demo/gesture)  -CW     Comment, (Bed-Chair Transfer) HHA, R knee blocked  -CW     Row Name 04/21/23 1530          Sit-Stand Transfer    Sit-Stand [place] (Transfers) contact guard;minimum assist (75% patient effort);1 person assist;verbal cues  -CW     Comment, (Sit-Stand Transfer) HHA  -CW     Row Name 04/21/23 1530          Gait/Stairs (Locomotion)    [place] Level (Gait) minimum assist (75% patient effort);moderate assist (50% patient effort);2 person assist;verbal cues;nonverbal cues (demo/gesture)  -CW     Assistive Device (Gait) other (see comments)  HHA x2  -CW     Distance in Feet (Gait) 5' x3 trials with seated rest breaks  -CW     Deviations/Abnormal Patterns (Gait) festinating/shuffling;stride length decreased;sandhya decreased;gait speed decreased  -CW     Bilateral Gait Deviations forward flexed posture  -CW     Right Sided Gait Deviations foot drop/toe drag;knee buckling, right side  -CW     Comment, (Gait/Stairs) Manual assist at RLE for foot clearance and knee buckling  -CW           User Key  (r) = Recorded By, (t) = Taken By, (c) = Cosigned By    Initials Name Provider Type    Tamica Eddy PT Physical Therapist               Obj/Interventions     Row Name 04/21/23 1533          Motor Skills    Therapeutic Exercise other (see comments)  R laq, marches, ankle pumps x10. Compensatory strategies noted  -CW           User Key  (r) = Recorded By, (t) = Taken By, (c) = Cosigned By    Initials Name Provider Type    Tamica Eddy PT Physical Therapist               Goals/Plan    No documentation.                Clinical Impression     Row Name 04/21/23 1534          Pain     Pretreatment Pain Rating 0/10 - no pain  -CW     Posttreatment Pain Rating 0/10 - no pain  -CW     Row Name 04/21/23 1534          Plan of Care Review    Plan of Care Reviewed With patient  -CW     Outcome Evaluation Pt participated with PT this PM. Session worked on transfers and gait training in the room. Able to ambulate 5' at a times with min/mod A x2 with L HHA. Pt requires blocking at RLE to prevent R knee buckling and assist with R foot clearance. Will continue to follow and progress as able.  -CW           User Key  (r) = Recorded By, (t) = Taken By, (c) = Cosigned By    Initials Name Provider Type    Tamica Eddy, JOSE ELIAS Physical Therapist               Outcome Measures     Row Name 04/21/23 1536 04/21/23 0813       How much help from another person do you currently need...    Turning from your back to your side while in flat bed without using bedrails? 3  -CW 3  -MS    Moving from lying on back to sitting on the side of a flat bed without bedrails? 3  -CW 3  -MS    Moving to and from a bed to a chair (including a wheelchair)? 2  -CW 2  -MS    Standing up from a chair using your arms (e.g., wheelchair, bedside chair)? 2  -CW 3  -MS    Climbing 3-5 steps with a railing? 1  -CW 1  -MS    To walk in hospital room? 2  -CW 2  -MS    AM-PAC 6 Clicks Score (PT) 13  -CW 14  -MS    Highest level of mobility 4 --> Transferred to chair/commode  -CW 4 --> Transferred to chair/commode  -MS    Row Name 04/21/23 1536          Functional Assessment    Outcome Measure Options AM-PAC 6 Clicks Basic Mobility (PT)  -CW           User Key  (r) = Recorded By, (t) = Taken By, (c) = Cosigned By    Initials Name Provider Type    Angel Luis Ross, RN Registered Nurse    Tamica Eddy, JOSE ELIAS Physical Therapist                             Physical Therapy Education     Title: PT OT SLP Therapies (In Progress)     Topic: Physical Therapy (In Progress)     Point: Mobility training (In Progress)     Learning Progress Summary            Patient Acceptance, E, NR by CW at 4/21/2023 1537    Acceptance, E, VU,DU by ZB at 4/19/2023 1130    Acceptance, E, NR by CW at 4/18/2023 0950    Acceptance, E, VU,NR by DJ at 4/16/2023 1158    Acceptance, E,TB,H, VU,NR by MS at 4/13/2023 1817    Acceptance, E, NR by CW at 4/13/2023 1341    Acceptance, E, NR by CW at 4/12/2023 1201    Acceptance, E, NR by CW at 4/11/2023 1158    Acceptance, E, NR by CW at 4/10/2023 1414    Acceptance, E,TB, NR by CS at 4/8/2023 0928    Acceptance, E, NR by CW at 4/7/2023 1215    Acceptance, E, NR by CW at 4/6/2023 1335    Acceptance, E, NR by CW at 4/5/2023 1043    Acceptance, E, NR by CW at 4/4/2023 0906    Acceptance, E, NR by CW at 4/3/2023 1531    Acceptance, E,TB, VU,NR by CAMERON at 4/1/2023 1530    Acceptance, E, NR by CW at 3/31/2023 0925    Acceptance, E, NR by CW at 3/30/2023 1209    Acceptance, E, NR,NL by CW at 3/29/2023 1336    Acceptance, E, NR by ZB at 3/28/2023 1317    Acceptance, E, NR by CW at 3/27/2023 1401    Acceptance, E,D, NR,VU by JM at 3/24/2023 1631    Comment: seemed to comprehend commands this session    Acceptance, E,D, NR by JM at 3/23/2023 1655    Acceptance, E,D, NR by JM at 3/22/2023 1545    Acceptance, E,TB, VU,NR by CB at 3/21/2023 1544    Acceptance, E,TB,H, VU by MS at 3/16/2023 1801    Acceptance, E,D, DU,NR by MO at 3/16/2023 1451    Acceptance, E, DU,NR by MO at 3/15/2023 1200    Acceptance, E, NR by LM at 3/15/2023 0521    Acceptance, E,D, DU,NR by MO at 3/14/2023 1458   Family Acceptance, E,TB,H, VU,NR by MS at 4/13/2023 1817    Acceptance, E,TB,H, VU by MS at 3/16/2023 1801                   Point: Home exercise program (Done)     Learning Progress Summary           Patient Acceptance, E, VU,NR by DJ at 4/16/2023 1158    Acceptance, E,TB,H, VU,NR by MS at 4/13/2023 1817    Acceptance, E, NR by CW at 4/13/2023 1341    Acceptance, E,TB, NR by CS at 4/8/2023 0928    Acceptance, E, NR by CW at 4/7/2023 1215    Acceptance, E, NR by CW at  4/6/2023 1335    Acceptance, E,TB, VU,NR by CAMERON at 4/1/2023 1530    Acceptance, E, NR by CW at 3/31/2023 0925    Acceptance, E, NR by CHARLY at 3/28/2023 1317    Acceptance, E, NR by CW at 3/27/2023 1401    Acceptance, E,D, NR,VU by JM at 3/24/2023 1631    Comment: seemed to comprehend commands this session    Acceptance, E,D, NR by JM at 3/23/2023 1655    Acceptance, E,D, NR by JM at 3/22/2023 1545    Acceptance, E,TB, VU,NR by CB at 3/21/2023 1544    Acceptance, E,TB,H, VU by MS at 3/16/2023 1801    Acceptance, E,D, DU,NR by MO at 3/16/2023 1451    Acceptance, E, NR by LM at 3/15/2023 0521    Acceptance, E,D, DU,NR by MO at 3/14/2023 1458   Family Acceptance, E,TB,H, VU,NR by MS at 4/13/2023 1817    Acceptance, E,TB,H, VU by MS at 3/16/2023 1801                   Point: Body mechanics (Done)     Learning Progress Summary           Patient Acceptance, E, VU,NR by DJ at 4/16/2023 1158    Acceptance, E,TB,H, VU,NR by MS at 4/13/2023 1817    Acceptance, E, NR by CW at 4/13/2023 1341    Acceptance, E, NR by CW at 4/11/2023 1158    Acceptance, E, NR by CW at 4/10/2023 1414    Acceptance, E,TB, NR by CS at 4/8/2023 0928    Acceptance, E, NR by CW at 4/7/2023 1215    Acceptance, E, NR by CW at 4/4/2023 0906    Acceptance, E, NR by CW at 4/3/2023 1531    Acceptance, E,TB, VU,NR by CAMERON at 4/1/2023 1530    Acceptance, E,D, NR,VU by JM at 3/24/2023 1631    Comment: seemed to comprehend commands this session    Acceptance, E,D, NR by JM at 3/23/2023 1655    Acceptance, E,D, NR by JM at 3/22/2023 1545    Acceptance, E,TB, VU,NR by CB at 3/21/2023 1544    Acceptance, E,TB,H, VU by MS at 3/16/2023 1801    Acceptance, E,D, DU,NR by MO at 3/16/2023 1451    Acceptance, E, DU,NR by MO at 3/15/2023 1200    Acceptance, E, NR by LM at 3/15/2023 0521    Acceptance, E,D, DU,NR by MO at 3/14/2023 1458   Family Acceptance, E,TB,H, VU,NR by MS at 4/13/2023 1817    Acceptance, E,TB,H, VU by MS at 3/16/2023 1801                   Point: Precautions  (Done)     Learning Progress Summary           Patient Acceptance, E, VU,NR by DJ at 4/16/2023 1158    Acceptance, E,TB,H, VU,NR by MS at 4/13/2023 1817    Acceptance, E, NR by CW at 4/13/2023 1341    Acceptance, E,TB, NR by CS at 4/8/2023 0928    Acceptance, E, NR by CW at 4/7/2023 1215    Acceptance, E, NR by CW at 4/4/2023 0906    Acceptance, E, NR by CW at 4/3/2023 1531    Acceptance, E,TB, VU,NR by CAMERON at 4/1/2023 1530    Acceptance, E,D, NR,VU by JM at 3/24/2023 1631    Comment: seemed to comprehend commands this session    Acceptance, E,D, NR by JM at 3/23/2023 1655    Acceptance, E,D, NR by JM at 3/22/2023 1545    Acceptance, E,TB, VU,NR by CB at 3/21/2023 1544    Acceptance, E,TB,H, VU by MS at 3/16/2023 1801    Acceptance, E,D, DU,NR by MO at 3/16/2023 1451    Acceptance, E, DU,NR by MO at 3/15/2023 1200    Acceptance, E, NR by LM at 3/15/2023 0521    Acceptance, E,D, DU,NR by MO at 3/14/2023 1458   Family Acceptance, E,TB,H, VU,NR by MS at 4/13/2023 1817    Acceptance, E,TB,H, VU by MS at 3/16/2023 1801                               User Key     Initials Effective Dates Name Provider Type Discipline    ZACH 03/07/18 -  Tiffanie Grace, PTA Physical Therapist Assistant PT    CAMERON 05/19/21 -  Leeann Wiley, PT Physical Therapist PT    DJ 10/25/19 -  Jacqueline Chamberlain, PT Physical Therapist PT    MS 06/16/21 -  Angel Luis Cr, RN Registered Nurse Nurse    CW 12/13/22 -  Tamica Willoughby, PT Physical Therapist PT    CB 10/22/21 -  Bety Benitez, PT Physical Therapist PT    CS 09/22/22 -  Adelia Montalvo, PT Physical Therapist PT    LM 10/13/22 -  Silke Grace, RN Registered Nurse Nurse    MO 01/27/23 -  Lacie Tanner, PT Student PT Student PT    CHARLY 03/10/23 -  Nestor Rae, PT Student PT Student PT              PT Recommendation and Plan     Plan of Care Reviewed With: patient  Outcome Evaluation: Pt participated with PT this PM. Session worked on transfers and gait training in the room. Able to ambulate  5' at a times with min/mod A x2 with L HHA. Pt requires blocking at RLE to prevent R knee buckling and assist with R foot clearance. Will continue to follow and progress as able.     Time Calculation:    PT Charges     Row Name 04/21/23 1527             Time Calculation    Start Time 1447  -CW      Stop Time 1503  -CW      Time Calculation (min) 16 min  -CW      PT Received On 04/21/23  -CW      PT - Next Appointment 04/24/23  -CW            User Key  (r) = Recorded By, (t) = Taken By, (c) = Cosigned By    Initials Name Provider Type    Tamica Eddy, JOSE ELIAS Physical Therapist              Therapy Charges for Today     Code Description Service Date Service Provider Modifiers Qty    [id number] HC PT THERAPEUTIC ACT EA 15 MIN 4/21/2023 Tamica Willoughby PT GP 1          PT G-Codes  Outcome Measure Options: AM-PAC 6 Clicks Basic Mobility (PT)  AM-PAC 6 Clicks Score (PT): 13  AM-PAC 6 Clicks Score (OT): 14  Modified [place] Scale: 4 - Moderately severe disability.  Unable to walk without assistance, and unable to attend to own bodily needs without assistance.  PT Discharge Summary  Anticipated Discharge Disposition (PT): inpatient rehabilitation facility, skilled nursing facility    Tamica Willoughby PT  4/21/2023

## 2023-04-21 NOTE — PLAN OF CARE
Problem: Fall Injury Risk  Goal: Absence of Fall and Fall-Related Injury  Outcome: Ongoing, Progressing  Intervention: Identify and Manage Contributors  Recent Flowsheet Documentation  Taken 4/20/2023 2000 by Michelle Parks RN  Medication Review/Management: medications reviewed  Taken 4/20/2023 1921 by Michelle Parks RN  Medication Review/Management: medications reviewed  Intervention: Promote Injury-Free Environment  Recent Flowsheet Documentation  Taken 4/20/2023 2000 by Michelle Parks RN  Safety Promotion/Fall Prevention: safety round/check completed  Taken 4/20/2023 1921 by Michelle Parks RN  Safety Promotion/Fall Prevention: safety round/check completed     Problem: Skin Injury Risk Increased  Goal: Skin Health and Integrity  Outcome: Ongoing, Progressing  Intervention: Optimize Skin Protection  Recent Flowsheet Documentation  Taken 4/20/2023 2000 by Michelle Parks RN  Head of Bed (HOB) Positioning: HOB at 20 degrees  Taken 4/20/2023 1921 by Michelle Parks RN  Pressure Reduction Techniques: frequent weight shift encouraged  Head of Bed (HOB) Positioning: HOB at 20 degrees  Pressure Reduction Devices: alternating pressure pump (ADD)  Skin Protection: adhesive use limited     Problem: Adult Inpatient Plan of Care  Goal: Plan of Care Review  Outcome: Ongoing, Progressing  Flowsheets (Taken 4/20/2023 2009)  Plan of Care Reviewed With: patient  Goal: Patient-Specific Goal (Individualized)  Outcome: Ongoing, Progressing  Goal: Absence of Hospital-Acquired Illness or Injury  Outcome: Ongoing, Progressing  Intervention: Identify and Manage Fall Risk  Recent Flowsheet Documentation  Taken 4/20/2023 2000 by Michelle Parks RN  Safety Promotion/Fall Prevention: safety round/check completed  Taken 4/20/2023 1921 by Michelle Parks RN  Safety Promotion/Fall Prevention: safety round/check completed  Intervention: Prevent Skin Injury  Recent Flowsheet Documentation  Taken 4/20/2023 2000 by Michelle Parks RN  Body  Position: position changed independently  Taken 4/20/2023 1921 by Michelle Parks RN  Body Position: position changed independently  Skin Protection: adhesive use limited  Intervention: Prevent and Manage VTE (Venous Thromboembolism) Risk  Recent Flowsheet Documentation  Taken 4/20/2023 1921 by Michelle Parks RN  VTE Prevention/Management:   bilateral   sequential compression devices on  Range of Motion: active ROM (range of motion) encouraged  Intervention: Prevent Infection  Recent Flowsheet Documentation  Taken 4/20/2023 1921 by Michelle Parks RN  Infection Prevention: single patient room provided  Goal: Optimal Comfort and Wellbeing  Outcome: Ongoing, Progressing  Intervention: Provide Person-Centered Care  Recent Flowsheet Documentation  Taken 4/20/2023 1921 by Michelle Parks RN  Trust Relationship/Rapport:   care explained   choices provided  Goal: Readiness for Transition of Care  Outcome: Ongoing, Progressing     Problem: Adjustment to Illness (Stroke, Ischemic/Transient Ischemic Attack)  Goal: Optimal Coping  Outcome: Ongoing, Progressing  Intervention: Support Psychosocial Response to Stroke  Recent Flowsheet Documentation  Taken 4/20/2023 1921 by Michelle Parks RN  Supportive Measures: active listening utilized  Family/Support System Care: self-care encouraged     Problem: Bowel Elimination Impaired (Stroke, Ischemic/Transient Ischemic Attack)  Goal: Effective Bowel Elimination  Outcome: Ongoing, Progressing     Problem: Cerebral Tissue Perfusion (Stroke, Ischemic/Transient Ischemic Attack)  Goal: Optimal Cerebral Tissue Perfusion  Outcome: Ongoing, Progressing  Intervention: Protect and Optimize Cerebral Perfusion  Recent Flowsheet Documentation  Taken 4/20/2023 1921 by Michelle Parks RN  Sensory Stimulation Regulation: care clustered  Cerebral Perfusion Promotion: blood pressure monitored     Problem: Cognitive Impairment (Stroke, Ischemic/Transient Ischemic Attack)  Goal: Optimal Cognitive  Function  Outcome: Ongoing, Progressing  Intervention: Optimize Cognitive Function  Recent Flowsheet Documentation  Taken 4/20/2023 1921 by Michelle Parks RN  Sensory Stimulation Regulation: care clustered  Reorientation Measures: clock in view     Problem: Communication Impairment (Stroke, Ischemic/Transient Ischemic Attack)  Goal: Improved Communication Skills  Outcome: Ongoing, Progressing  Intervention: Optimize Communication Skills  Recent Flowsheet Documentation  Taken 4/20/2023 1921 by iMchelle Parks RN  Communication Enhancement Strategies: extra time allowed for response     Problem: Functional Ability Impaired (Stroke, Ischemic/Transient Ischemic Attack)  Goal: Optimal Functional Ability  Outcome: Ongoing, Progressing     Problem: Respiratory Compromise (Stroke, Ischemic/Transient Ischemic Attack)  Goal: Effective Oxygenation and Ventilation  Outcome: Ongoing, Progressing  Intervention: Optimize Oxygenation and Ventilation  Recent Flowsheet Documentation  Taken 4/20/2023 2000 by Michelle Parks RN  Head of Bed (HOB) Positioning: HOB at 20 degrees  Taken 4/20/2023 1921 by Michelle Parks RN  Head of Bed (HOB) Positioning: HOB at 20 degrees     Problem: Sensorimotor Impairment (Stroke, Ischemic/Transient Ischemic Attack)  Goal: Improved Sensorimotor Function  Outcome: Ongoing, Progressing  Intervention: Optimize Range of Motion, Motor Control and Function  Recent Flowsheet Documentation  Taken 4/20/2023 2000 by Michelle Parks RN  Positioning/Transfer Devices:   pillows   in use  Taken 4/20/2023 1921 by Michelle Parks RN  Positioning/Transfer Devices:   pillows   in use  Range of Motion: active ROM (range of motion) encouraged  Intervention: Optimize Sensory and Perceptual Ability  Recent Flowsheet Documentation  Taken 4/20/2023 1921 by Michelle Parks RN  Pressure Reduction Techniques: frequent weight shift encouraged  Sensation Impairment Protection: cues provided for safety  Pressure Reduction Devices:  alternating pressure pump (ADD)     Problem: Swallowing Impairment (Stroke, Ischemic/Transient Ischemic Attack)  Goal: Optimal Eating and Swallowing without Aspiration  Outcome: Ongoing, Progressing     Problem: Urinary Elimination Impaired (Stroke, Ischemic/Transient Ischemic Attack)  Goal: Effective Urinary Elimination  Outcome: Ongoing, Progressing     Problem: Adjustment to Illness (Stroke, Hemorrhagic)  Goal: Optimal Coping  Outcome: Ongoing, Progressing  Intervention: Support Psychosocial Response to Stroke  Recent Flowsheet Documentation  Taken 4/20/2023 1921 by Michelle Parks RN  Supportive Measures: active listening utilized  Family/Support System Care: self-care encouraged     Problem: Bowel Elimination Impaired (Stroke, Hemorrhagic)  Goal: Effective Bowel Elimination  Outcome: Ongoing, Progressing     Problem: Cerebral Tissue Perfusion (Stroke, Hemorrhagic)  Goal: Optimal Cerebral Tissue Perfusion  Outcome: Ongoing, Progressing  Intervention: Protect and Optimize Cerebral Perfusion  Recent Flowsheet Documentation  Taken 4/20/2023 1921 by Michelle Parks RN  Sensory Stimulation Regulation: care clustered  Cerebral Perfusion Promotion: blood pressure monitored     Problem: Cognitive Impairment (Stroke, Hemorrhagic)  Goal: Optimal Cognitive Function  Outcome: Ongoing, Progressing  Intervention: Optimize Cognitive Function  Recent Flowsheet Documentation  Taken 4/20/2023 1921 by Michelle Parks RN  Sensory Stimulation Regulation: care clustered  Reorientation Measures: clock in view     Problem: Communication Impairment (Stroke, Hemorrhagic)  Goal: Effective Communication Skills  Outcome: Ongoing, Progressing  Intervention: Optimize Communication Skills  Recent Flowsheet Documentation  Taken 4/20/2023 1921 by Michelle Parks RN  Communication Enhancement Strategies: extra time allowed for response     Problem: Functional Ability Impaired (Stroke, Hemorrhagic)  Goal: Optimal Functional Ability  Outcome:  Ongoing, Progressing     Problem: Pain (Stroke, Hemorrhagic)  Goal: Acceptable Pain Control  Outcome: Ongoing, Progressing     Problem: Respiratory Compromise (Stroke, Hemorrhagic)  Goal: Effective Oxygenation and Ventilation  Outcome: Ongoing, Progressing  Intervention: Optimize Oxygenation and Ventilation  Recent Flowsheet Documentation  Taken 4/20/2023 2000 by Michelle Parks RN  Head of Bed (HOB) Positioning: HOB at 20 degrees  Taken 4/20/2023 1921 by Michelle Parks RN  Head of Bed (HOB) Positioning: HOB at 20 degrees     Problem: Sensorimotor Impairment (Stroke, Hemorrhagic)  Goal: Improved Sensorimotor Function  Outcome: Ongoing, Progressing  Intervention: Optimize Range of Motion, Motor Control and Function  Recent Flowsheet Documentation  Taken 4/20/2023 2000 by Michelle Parks RN  Positioning/Transfer Devices:   pillows   in use  Taken 4/20/2023 1921 by Michelle Parks RN  Positioning/Transfer Devices:   pillows   in use  Range of Motion: active ROM (range of motion) encouraged  Intervention: Optimize Sensory and Perceptual Ability  Recent Flowsheet Documentation  Taken 4/20/2023 1921 by Michelle Parks RN  Pressure Reduction Techniques: frequent weight shift encouraged  Sensation Impairment Protection: cues provided for safety  Pressure Reduction Devices: alternating pressure pump (ADD)     Problem: Swallowing Impairment (Stroke, Hemorrhagic)  Goal: Optimal Eating and Swallowing Without Aspiration  Outcome: Ongoing, Progressing     Problem: Urinary Elimination Impaired (Stroke, Hemorrhagic)  Goal: Effective Urinary Elimination  Outcome: Ongoing, Progressing   Goal Outcome Evaluation:  Plan of Care Reviewed With: patient

## 2023-04-21 NOTE — PLAN OF CARE
Goal Outcome Evaluation:  Plan of Care Reviewed With: patient           Outcome Evaluation: Pt participated with PT this PM. Session worked on transfers and gait training in the room. Able to ambulate 5' at a times with min/mod A x2 with L HHA. Pt requires blocking at RLE to prevent R knee buckling and assist with R foot clearance. Will continue to follow and progress as able.

## 2023-04-21 NOTE — PROGRESS NOTES
"    Name: Manuel Durant ADMIT: 3/12/2023   : 1950  PCP: Provider, No Known    MRN: [id number] LOS: 40 days   AGE/SEX: 73 y.o. male  ROOM: [facility]     Subjective   Subjective   Resting in bed. No family present. He states he ate breakfast. Had a good night. Denies any pain, dyspnea, nausea or vomiting. Continues to report \"everything is fine.\"     Objective   Objective   Vital Signs  Temp:  [97.6 °F (36.4 °C)-98.5 °F (36.9 °C)] 97.6 °F (36.4 °C)  Heart Rate:  [57-73] 62  Resp:  [16-18] 16  BP: (111-129)/(65-82) 126/69  SpO2:  [92 %-100 %] 96 %  on   ;   Device (Oxygen Therapy): room air  Body mass index is 21.88 kg/m².     Physical Exam  Vitals and nursing note reviewed.   Constitutional:       General: He is awake and alert. He is not in acute distress.  Cardiovascular:      Rate and Rhythm: Normal rate and regular rhythm.      Pulses: Normal pulses.      Heart sounds: Normal heart sounds. No murmur heard.  Pulmonary:      Effort: Pulmonary effort is normal. No respiratory distress.      Breath sounds: Normal breath sounds.   Abdominal:      Palpations: Abdomen is soft and non-tender.  Musculoskeletal:      Cervical back: Neck supple.      Right lower leg: No edema.      Left lower leg: No edema.  Skin:     General: Skin is dry.   Neurological:      Motor: Weakness present.      Comments: Alert and oriented to person/place/situation. Right-sided upper extremity paresis- slowing improving as he has mobility in raising it as well as his right leg.   Psychiatric:         Mood and Affect: Mood normal.         Behavior: Behavior normal.         Thought Content: Thought content normal.         Judgment: Judgment normal    Results Review:       I reviewed the patient's new clinical results.  Results from last 7 days   Lab Units 23  0753 23  0550   WBC 10*3/mm3 6.77 6.71   HEMOGLOBIN g/dL 13.7 13.4   PLATELETS 10*3/mm3 187 160     Results from last 7 days   Lab Units 23  0753 23  0550 "   SODIUM mmol/L 141 140   POTASSIUM mmol/L 4.1 3.9   CHLORIDE mmol/L 107 106   CO2 mmol/L 23.1 24.3   BUN mg/dL 16 11   CREATININE mg/dL 0.71* 0.74*   GLUCOSE mg/dL 84 86   Estimated Creatinine Clearance: 83 mL/min (A) (by C-G formula based on SCr of 0.71 mg/dL (L)).    Results from last 7 days   Lab Units 04/21/23  0753 04/17/23  0550   CALCIUM mg/dL 9.0 9.1       No results found for: HGBA1C, POCGLU    amLODIPine, 10 mg, Oral, QAM AC  apixaban, 5 mg, Oral, Q12H  baclofen, 2.5 mg, Oral, Q8H  carvedilol, 6.25 mg, Oral, BID With Meals  hydrALAZINE, 10 mg, Oral, Q8H  lidocaine, 1 patch, Transdermal, Q24H  sodium chloride, 10 mL, Intravenous, Q12H  valsartan, 160 mg, Oral, QAM AC       Diet: Regular/House Diet; No Mixed Consistencies, Feeding Assistance - Nursing; Texture: Soft to Chew (NDD 3); Soft to Chew: Chopped Meat; Fluid Consistency: Nectar Thick       Assessment/Plan     Active Hospital Problems    Diagnosis  POA   • **Intracranial hemorrhage [I62.9]  Yes   • Hemiplegia [G81.90]  Yes   • Right shoulder pain [M25.511]  Yes   • Severe Malnutrition (HCC) [E43]  Yes   • Acute DVT (deep venous thrombosis) [I82.409]  Yes   • HTN (hypertension) [I10]  Yes   • Hypertensive emergency [I16.1]  Yes      Resolved Hospital Problems    Diagnosis Date Resolved POA   • Hypokalemia [E87.6] 04/10/2023 Unknown     Mr. Durant is a 73 year old male who initially presented to the hospital 3/12/23 for intracranial bleed. He was found unresponsive by friends and initially taken to [facility] where he was found to have poorly controlled BP and right sided hemiplegia. He was transferred here for closer monitoring in the ICU and neurosurgical consultation. He was cleared to move out of the ICU on 3/16/23.     • Intracranial hemorrhage: CTH 3/17/23 stable left basal ganglia/internal capsule hemorrhage with left lateral ventricular hemorrhage. On Lovenox for DVT and repeat CTH 3/18 also stable. ANDRADE followed and signed off 3/23 with  plans for repeat MRI brain/MRA head in 1 month- ANDRADE saw again 4/20 and ordered MRI/MRA. Goal BP < 150 systolic. Remains on baclofen for spasticity.      • HTN emergency: On amlodipine 10 mg, valsartan 160 mg, Coreg 6.25 mg BID and hydralazine 10 mg TID. BP well controlled.      • Acute DVT: Found 3/17/23 with acute RLE DVT in soleal. ANDRADE okayed for blood thinners. Now on Eliquis.     • Hypokalemia: Resolved.     Discussed with patient.      Medically stable. Family is setting up a time to come to [place] and get him. He will need to be assist x 1 to fly. CCP discussing with BAR again.     • Eliquis for DVT prophylaxis.  • Full code.  • Anticipate discharge as above.    SLICK Arroyo  [facility]  04/21/23  09:31 EDT

## 2023-04-22 RX ADMIN — BACLOFEN 2.5 MG: 10 TABLET ORAL at 06:31

## 2023-04-22 RX ADMIN — APIXABAN 5 MG: 5 TABLET, FILM COATED ORAL at 21:40

## 2023-04-22 RX ADMIN — BACLOFEN 2.5 MG: 10 TABLET ORAL at 14:18

## 2023-04-22 RX ADMIN — HYDRALAZINE HYDROCHLORIDE 10 MG: 10 TABLET, FILM COATED ORAL at 06:31

## 2023-04-22 RX ADMIN — CARVEDILOL 6.25 MG: 12.5 TABLET, FILM COATED ORAL at 18:39

## 2023-04-22 RX ADMIN — AMLODIPINE BESYLATE 10 MG: 10 TABLET ORAL at 07:47

## 2023-04-22 RX ADMIN — HYDRALAZINE HYDROCHLORIDE 10 MG: 10 TABLET, FILM COATED ORAL at 21:41

## 2023-04-22 RX ADMIN — Medication 10 ML: at 08:30

## 2023-04-22 RX ADMIN — BACLOFEN 2.5 MG: 10 TABLET ORAL at 21:40

## 2023-04-22 RX ADMIN — APIXABAN 5 MG: 5 TABLET, FILM COATED ORAL at 08:30

## 2023-04-22 RX ADMIN — HYDRALAZINE HYDROCHLORIDE 10 MG: 10 TABLET, FILM COATED ORAL at 14:18

## 2023-04-22 RX ADMIN — Medication 10 ML: at 21:40

## 2023-04-22 RX ADMIN — VALSARTAN 160 MG: 160 TABLET, FILM COATED ORAL at 07:47

## 2023-04-22 RX ADMIN — LIDOCAINE 1 PATCH: 700 PATCH TOPICAL at 08:30

## 2023-04-22 NOTE — PROGRESS NOTES
Name: Manuel Durant ADMIT: 3/12/2023   : 1950  PCP: Provider, No Known    MRN: [id number] LOS: 41 days   AGE/SEX: 73 y.o. male  ROOM: [facility]     Subjective   Subjective   Resting in bed. No family present. He states he is feeling ok.  Some continued Right shoulder pain.  Denies any chest pain, dyspnea, nausea, or vomiting. Continues to report feeling good otherwise.  Says he's stronger than when I saw him last week.      Objective   Objective   Vital Signs  Temp:  [97.6 °F (36.4 °C)-98.7 °F (37.1 °C)] 98.5 °F (36.9 °C)  Heart Rate:  [57-65] 65  Resp:  [16-18] 18  BP: (106-131)/(62-71) 106/64  SpO2:  [93 %-97 %] 97 %  on   ;   Device (Oxygen Therapy): room air  Body mass index is 22.2 kg/m².     Physical Exam  Vitals and nursing note reviewed.   Constitutional:       General: He is awake and alert. He is not in acute distress. Lying in bed on RA  Cardiovascular:      Rate and Rhythm: Normal rate and regular rhythm.      Pulses: Normal pulses.      Heart sounds: Normal heart sounds. No murmur heard.  Pulmonary:      Effort: Pulmonary effort is normal. No respiratory distress.      Breath sounds: Normal breath sounds.   Abdominal:      Palpations: Abdomen is soft and non-tender. Bowel sounds active.    Musculoskeletal:      Right shoulder pain with passive ROM     Right lower leg: No edema.      Left lower leg: No edema.  Skin:     General: Skin is dry.   Neurological:      Motor: Weakness present.      Comments: Alert and oriented to person/place/situation. Right-sided upper extremity paresis- slowing improving as he has mobility in raising it as well as his right leg.   Psychiatric:         Mood and Affect: Mood normal.         Behavior: Behavior normal.         Thought Content: Thought content normal.         Judgment: Judgment normal    Results Review:       I reviewed the patient's new clinical results.  Results from last 7 days   Lab Units 23  0753 23  0550   WBC 10*3/mm3 6.77 6.71    HEMOGLOBIN g/dL 13.7 13.4   PLATELETS 10*3/mm3 187 160     Results from last 7 days   Lab Units 04/21/23  0753 04/17/23  0550   SODIUM mmol/L 141 140   POTASSIUM mmol/L 4.1 3.9   CHLORIDE mmol/L 107 106   CO2 mmol/L 23.1 24.3   BUN mg/dL 16 11   CREATININE mg/dL 0.71* 0.74*   GLUCOSE mg/dL 84 86   Estimated Creatinine Clearance: 84.2 mL/min (A) (by C-G formula based on SCr of 0.71 mg/dL (L)).    Results from last 7 days   Lab Units 04/21/23  0753 04/17/23  0550   CALCIUM mg/dL 9.0 9.1       No results found for: HGBA1C, POCGLU    amLODIPine, 10 mg, Oral, QAM AC  apixaban, 5 mg, Oral, Q12H  baclofen, 2.5 mg, Oral, Q8H  carvedilol, 6.25 mg, Oral, BID With Meals  hydrALAZINE, 10 mg, Oral, Q8H  lidocaine, 1 patch, Transdermal, Q24H  sodium chloride, 10 mL, Intravenous, Q12H  valsartan, 160 mg, Oral, QAM AC       Diet: Regular/House Diet; No Mixed Consistencies, Feeding Assistance - Nursing; Texture: Soft to Chew (NDD 3); Soft to Chew: Chopped Meat; Fluid Consistency: [place] Thick      Tele SR      Assessment/Plan     Active Hospital Problems    Diagnosis  POA   • **Intracranial hemorrhage [I62.9]  Yes   • Hemiplegia [G81.90]  Yes   • Right shoulder pain [M25.511]  Yes   • Severe Malnutrition (HCC) [E43]  Yes   • Acute DVT (deep venous thrombosis) [I82.409]  Yes   • HTN (hypertension) [I10]  Yes   • Hypertensive emergency [I16.1]  Yes      Resolved Hospital Problems    Diagnosis Date Resolved POA   • Hypokalemia [E87.6] 04/10/2023 Unknown     Mr. Durant is a 73 year old male who initially presented to the hospital 3/12/23 for intracranial bleed. He was found unresponsive by friends and initially taken to [facility] where he was found to have poorly controlled BP and right sided hemiplegia. He was transferred here for closer monitoring in the ICU and neurosurgical consultation. He was cleared to move out of the ICU on 3/16/23.     • Intracranial hemorrhage: CTH 3/17/23 stable left basal ganglia/internal capsule  hemorrhage with left lateral ventricular hemorrhage. On Lovenox for DVT and repeat CTH 3/18 also stable. ANDRADE followed and signed off 3/23 with plans for repeat MRI brain/MRA head in 1 month- ANDRADE saw again 4/20 and ordered MRI/MRA. Goal BP < 150 systolic. Remains on baclofen for spasticity.      • HTN emergency: On amlodipine 10 mg, valsartan 160 mg, Coreg 6.25 mg BID and hydralazine 10 mg TID. BP well controlled.      • Acute DVT: Found 3/17/23 with acute RLE DVT in soleal. ANDRADE okayed for blood thinners. Now on Eliquis.     • Hypokalemia: Resolved.     Discussed with patient.      Medically stable. Family is setting up a time to come to [place] and get him. He will need to be assist x 1 in order to travel on airplane.  CCP discussing with BAR again.     • Eliquis for DVT prophylaxis.  • Full code.  • Anticipate discharge as above.    SLICK Stern  [facility]  04/22/23  11:32 EDT

## 2023-04-22 NOTE — PLAN OF CARE
Goal Outcome Evaluation:              Outcome Evaluation: Alert and oriented x1. Cooperative with care. Right sided weakness and right facial droop remain. Incontinent of bladder. Tolerated diet. Awaiting d/c plan.

## 2023-04-23 RX ADMIN — BACLOFEN 2.5 MG: 10 TABLET ORAL at 05:55

## 2023-04-23 RX ADMIN — CARVEDILOL 6.25 MG: 12.5 TABLET, FILM COATED ORAL at 09:13

## 2023-04-23 RX ADMIN — APIXABAN 5 MG: 5 TABLET, FILM COATED ORAL at 09:13

## 2023-04-23 RX ADMIN — Medication 10 ML: at 09:14

## 2023-04-23 RX ADMIN — HYDRALAZINE HYDROCHLORIDE 10 MG: 10 TABLET, FILM COATED ORAL at 14:35

## 2023-04-23 RX ADMIN — LIDOCAINE 1 PATCH: 700 PATCH TOPICAL at 09:14

## 2023-04-23 RX ADMIN — VALSARTAN 160 MG: 160 TABLET, FILM COATED ORAL at 07:19

## 2023-04-23 RX ADMIN — AMLODIPINE BESYLATE 10 MG: 10 TABLET ORAL at 07:19

## 2023-04-23 RX ADMIN — HYDRALAZINE HYDROCHLORIDE 10 MG: 10 TABLET, FILM COATED ORAL at 05:55

## 2023-04-23 RX ADMIN — BACLOFEN 2.5 MG: 10 TABLET ORAL at 14:34

## 2023-04-23 RX ADMIN — BACLOFEN 2.5 MG: 10 TABLET ORAL at 21:08

## 2023-04-23 RX ADMIN — Medication 10 ML: at 21:09

## 2023-04-23 RX ADMIN — APIXABAN 5 MG: 5 TABLET, FILM COATED ORAL at 21:08

## 2023-04-23 RX ADMIN — HYDRALAZINE HYDROCHLORIDE 10 MG: 10 TABLET, FILM COATED ORAL at 21:13

## 2023-04-23 NOTE — PROGRESS NOTES
Name: Manuel Durant ADMIT: 3/12/2023   : 1950  PCP: Provider, No Known    MRN: [id number] LOS: 42 days   AGE/SEX: 73 y.o. male  ROOM: [facility]     Subjective   Subjective   Resting in bed. No family present. He states he is feeling ok.  Some continued Right shoulder pain.  Denies any chest pain, dyspnea, nausea, or vomiting. Continues to report feeling good otherwise.  Says he's stronger than when I saw him last week. Hasn't been out of bed yet today.       Objective   Objective   Vital Signs  Temp:  [98.2 °F (36.8 °C)-98.5 °F (36.9 °C)] 98.3 °F (36.8 °C)  Heart Rate:  [55-75] 60  Resp:  [16-18] 16  BP: (111-126)/(61-75) 118/67  SpO2:  [94 %-97 %] 95 %  on   ;   Device (Oxygen Therapy): room air  Body mass index is 21.81 kg/m².     Physical Exam  Vitals and nursing note reviewed.   Constitutional:       General: He is awake and alert. He is not in acute distress. Lying in bed on RA  Cardiovascular:      Rate and Rhythm: Normal rate and regular rhythm.      Pulses: Normal pulses.      Heart sounds: Normal heart sounds. No murmur heard.  Pulmonary:      Effort: Pulmonary effort is normal. No respiratory distress.      Breath sounds: Normal breath sounds.   Abdominal:      Palpations: Abdomen is soft and non-tender. Bowel sounds active.    Musculoskeletal:      Right shoulder pain with passive ROM     Right lower leg: No edema.      Left lower leg: No edema.  Skin:     General: Skin is dry.   Neurological:      Motor: Weakness present.      Comments: Alert and oriented to person/place/situation. Right-sided upper extremity paresis- slowing improving as he has mobility in raising it as well as his right leg. slow progress  Psychiatric:         Mood and Affect: Mood normal.         Behavior: Behavior normal.         Thought Content: Thought content normal.         Judgment: Judgment normal    Results Review:       I reviewed the patient's new clinical results.  Results from last 7 days   Lab Units  04/21/23  0753 04/17/23  0550   WBC 10*3/mm3 6.77 6.71   HEMOGLOBIN g/dL 13.7 13.4   PLATELETS 10*3/mm3 187 160     Results from last 7 days   Lab Units 04/21/23  0753 04/17/23  0550   SODIUM mmol/L 141 140   POTASSIUM mmol/L 4.1 3.9   CHLORIDE mmol/L 107 106   CO2 mmol/L 23.1 24.3   BUN mg/dL 16 11   CREATININE mg/dL 0.71* 0.74*   GLUCOSE mg/dL 84 86   Estimated Creatinine Clearance: 82.7 mL/min (A) (by C-G formula based on SCr of 0.71 mg/dL (L)).    Results from last 7 days   Lab Units 04/21/23  0753 04/17/23  0550   CALCIUM mg/dL 9.0 9.1       No results found for: HGBA1C, POCGLU    amLODIPine, 10 mg, Oral, QAM AC  apixaban, 5 mg, Oral, Q12H  baclofen, 2.5 mg, Oral, Q8H  carvedilol, 6.25 mg, Oral, BID With Meals  hydrALAZINE, 10 mg, Oral, Q8H  lidocaine, 1 patch, Transdermal, Q24H  sodium chloride, 10 mL, Intravenous, Q12H  valsartan, 160 mg, Oral, QAM AC       Diet: Regular/House Diet; No Mixed Consistencies, Feeding Assistance - Nursing; Texture: Soft to Chew (NDD 3); Soft to Chew: Chopped Meat; Fluid Consistency: [place] Thick      Tele SR      Assessment/Plan     Active Hospital Problems    Diagnosis  POA   • **Intracranial hemorrhage [I62.9]  Yes   • Hemiplegia [G81.90]  Yes   • Right shoulder pain [M25.511]  Yes   • Severe Malnutrition (HCC) [E43]  Yes   • Acute DVT (deep venous thrombosis) [I82.409]  Yes   • HTN (hypertension) [I10]  Yes   • Hypertensive emergency [I16.1]  Yes      Resolved Hospital Problems    Diagnosis Date Resolved POA   • Hypokalemia [E87.6] 04/10/2023 Unknown     Mr. Durant is a 73 year old male who initially presented to the hospital 3/12/23 for intracranial bleed. He was found unresponsive by friends and initially taken to [facility] where he was found to have poorly controlled BP and right sided hemiplegia. He was transferred here for closer monitoring in the ICU and neurosurgical consultation. He was cleared to move out of the ICU on 3/16/23.     • Intracranial hemorrhage:  CTH 3/17/23 stable left basal ganglia/internal capsule hemorrhage with left lateral ventricular hemorrhage. On Lovenox for DVT and repeat CTH 3/18 also stable. ANDRADE followed and signed off 3/23 with plans for repeat MRI brain/MRA head in 1 month- ANDRADE saw again 4/20 and ordered MRI/MRA. Goal BP < 150 systolic. Remains on baclofen for spasticity.      • HTN emergency: On amlodipine 10 mg, valsartan 160 mg, Coreg 6.25 mg BID and hydralazine 10 mg TID. BP well controlled.      • Acute DVT: Found 3/17/23 with acute RLE DVT in soleal. ANDRADE okayed for blood thinners. Now on Eliquis.     • Hypokalemia: Resolved.     Discussed with patient and bedside RN.  Pt is wanting to get up out of bed.  Talked with staff about assisting pt to chair BID.  Ideally up for at least 2 meals a day.      Medically stable. Family is setting up a time to come to [place] and get him. He will need to be assist x 1 in order to travel on airplane.  CCP discussing with BAR again.     • Eliquis for DVT prophylaxis.  • Full code.  • Anticipate discharge as above.    SLICK Stern  [facility]  04/23/23  10:54 EDT

## 2023-04-23 NOTE — PLAN OF CARE
Goal Outcome Evaluation:              Outcome Evaluation: Alert and oriented x1. Up to chair with assist x2. Continues with right sided weakness and right facial droop.

## 2023-04-24 ENCOUNTER — APPOINTMENT (OUTPATIENT)
Dept: CT IMAGING | Facility: HOSPITAL | Age: 73
DRG: 64 | End: 2023-04-24
Payer: MEDICAID

## 2023-04-24 LAB
ANION GAP SERPL CALCULATED.3IONS-SCNC: 9 MMOL/L (ref 5–15)
BUN SERPL-MCNC: 13 MG/DL (ref 8–23)
BUN/CREAT SERPL: 16.5 (ref 7–25)
CALCIUM SPEC-SCNC: 9.4 MG/DL (ref 8.6–10.5)
CHLORIDE SERPL-SCNC: 107 MMOL/L (ref 98–107)
CO2 SERPL-SCNC: 24 MMOL/L (ref 22–29)
CREAT SERPL-MCNC: 0.79 MG/DL (ref 0.76–1.27)
DEPRECATED RDW RBC AUTO: 41.2 FL (ref 37–54)
EGFRCR SERPLBLD CKD-EPI 2021: 95 ML/MIN/1.73
ERYTHROCYTE [DISTWIDTH] IN BLOOD BY AUTOMATED COUNT: 12.8 % (ref 12.3–15.4)
GLUCOSE SERPL-MCNC: 103 MG/DL (ref 65–99)
HCT VFR BLD AUTO: 40.5 % (ref 37.5–51)
HGB BLD-MCNC: 14.4 G/DL (ref 13–17.7)
MCH RBC QN AUTO: 31.2 PG (ref 26.6–33)
MCHC RBC AUTO-ENTMCNC: 35.6 G/DL (ref 31.5–35.7)
MCV RBC AUTO: 87.7 FL (ref 79–97)
PLATELET # BLD AUTO: 223 10*3/MM3 (ref 140–450)
PMV BLD AUTO: 9.9 FL (ref 6–12)
POTASSIUM SERPL-SCNC: 4 MMOL/L (ref 3.5–5.2)
RBC # BLD AUTO: 4.62 10*6/MM3 (ref 4.14–5.8)
SODIUM SERPL-SCNC: 140 MMOL/L (ref 136–145)
WBC NRBC COR # BLD: 7.17 10*3/MM3 (ref 3.4–10.8)

## 2023-04-24 PROCEDURE — 70496 CT ANGIOGRAPHY HEAD: CPT

## 2023-04-24 PROCEDURE — 97530 THERAPEUTIC ACTIVITIES: CPT

## 2023-04-24 PROCEDURE — 25510000001 IOPAMIDOL PER 1 ML: Performed by: HOSPITALIST

## 2023-04-24 PROCEDURE — 85027 COMPLETE CBC AUTOMATED: CPT | Performed by: INTERNAL MEDICINE

## 2023-04-24 PROCEDURE — 80048 BASIC METABOLIC PNL TOTAL CA: CPT | Performed by: INTERNAL MEDICINE

## 2023-04-24 PROCEDURE — 97535 SELF CARE MNGMENT TRAINING: CPT

## 2023-04-24 RX ADMIN — Medication 10 ML: at 10:09

## 2023-04-24 RX ADMIN — BACLOFEN 2.5 MG: 10 TABLET ORAL at 16:22

## 2023-04-24 RX ADMIN — APIXABAN 5 MG: 5 TABLET, FILM COATED ORAL at 10:09

## 2023-04-24 RX ADMIN — AMLODIPINE BESYLATE 10 MG: 10 TABLET ORAL at 06:54

## 2023-04-24 RX ADMIN — Medication 10 ML: at 21:51

## 2023-04-24 RX ADMIN — HYDRALAZINE HYDROCHLORIDE 10 MG: 10 TABLET, FILM COATED ORAL at 05:47

## 2023-04-24 RX ADMIN — HYDRALAZINE HYDROCHLORIDE 10 MG: 10 TABLET, FILM COATED ORAL at 21:51

## 2023-04-24 RX ADMIN — LIDOCAINE 1 PATCH: 700 PATCH TOPICAL at 10:09

## 2023-04-24 RX ADMIN — APIXABAN 5 MG: 5 TABLET, FILM COATED ORAL at 21:51

## 2023-04-24 RX ADMIN — IOPAMIDOL 95 ML: 755 INJECTION, SOLUTION INTRAVENOUS at 19:46

## 2023-04-24 RX ADMIN — VALSARTAN 160 MG: 160 TABLET, FILM COATED ORAL at 06:54

## 2023-04-24 RX ADMIN — HYDRALAZINE HYDROCHLORIDE 10 MG: 10 TABLET, FILM COATED ORAL at 16:22

## 2023-04-24 RX ADMIN — BACLOFEN 2.5 MG: 10 TABLET ORAL at 21:51

## 2023-04-24 RX ADMIN — BACLOFEN 2.5 MG: 10 TABLET ORAL at 05:48

## 2023-04-24 RX ADMIN — CARVEDILOL 6.25 MG: 12.5 TABLET, FILM COATED ORAL at 10:09

## 2023-04-24 NOTE — PROGRESS NOTES
"Nutrition Services    Patient Name:  Manuel Durant  YOB: 1950  MRN: [id number]  Admit Date:  3/12/2023    FOLLOW UP - CLINICAL NUTRITION    Assessment Date:  04/24/23     Encounter Information         Reason for Encounter Follow up     Current Issues S/w RN aide who reports he has been eating well. 75% po intake for breakfast this morning per EMR. Patient has been reported to be getting stronger and when he is able to tolerate assistance x1, he will d/c and go to [place] with family. Will continue to follow     Current Nutrition Orders & Evaluation of Intake       Oral Nutrition     Current PO Diet Diet: Regular/House Diet; No Mixed Consistencies, Feeding Assistance - Nursing; Texture: Soft to Chew (NDD 3); Soft to Chew: Chopped Meat; Fluid Consistency: Nectar Thick   Supplement Mighty Shake, BID   PO Evaluation     % PO Intake 50-75%     # of Days Evaluated     Factors Affecting Intake  No factors at this time   --  Anthropometrics          Height    Weight Height: 167.6 cm (66\")  Weight: 62.4 kg (137 lb 9.1 oz) (04/24/23 0300)    BMI kg/m2 Body mass index is 22.2 kg/m².    Weight trend Loss, Amount/Timeframe:  13# (8.5%) .  Documented weights    03/30/23 0501 03/31/23 0500 04/02/23 0529 04/03/23 0624   Weight: 70.6 kg (155 lb 10.3 oz) 70.2 kg (154 lb 12.2 oz) 69.8 kg (153 lb 14.1 oz) 63.9 kg (140 lb 14 oz)    04/05/23 0418 04/07/23 0503 04/08/23 0545 04/09/23 0517   Weight: 63.9 kg (140 lb 14 oz) 60.4 kg (133 lb 2.5 oz) 69.5 kg (153 lb 3.5 oz) 67.3 kg (148 lb 5.9 oz)    04/10/23 0041 04/11/23 0600 04/12/23 0530 04/13/23 0517   Weight: 67.3 kg (148 lb 5.9 oz) 68.6 kg (151 lb 3.8 oz) 68.7 kg (151 lb 7.3 oz) 68.1 kg (150 lb 2.1 oz)    04/14/23 0530 04/15/23 0014 04/16/23 0251 04/16/23 0449   Weight: 67.1 kg (147 lb 14.9 oz) 67.1 kg (147 lb 14.9 oz) 67.1 kg (147 lb 14.9 oz) 68.6 kg (151 lb 3.8 oz)    04/17/23 0017 04/18/23 0100 04/19/23 0002 04/20/23 0002   Weight: 61.6 kg (135 lb 12.9 oz) 61.5 kg " (135 lb 9.3 oz) 61.5 kg (135 lb 9.3 oz) 61.5 kg (135 lb 9.3 oz)    04/21/23 0032 04/22/23 0631 04/23/23 0509 04/24/23 0300   Weight: 61.5 kg (135 lb 9.3 oz) 62.4 kg (137 lb 9.1 oz) 61.3 kg (135 lb 2.3 oz) 62.4 kg (137 lb 9.1 oz)          Labs        Pertinent Labs Reviewed, listed below     Results from last 7 days   Lab Units 04/24/23  0813 04/21/23  0753   SODIUM mmol/L 140 141   POTASSIUM mmol/L 4.0 4.1   CHLORIDE mmol/L 107 107   CO2 mmol/L 24.0 23.1   BUN mg/dL 13 16   CREATININE mg/dL 0.79 0.71*   CALCIUM mg/dL 9.4 9.0   GLUCOSE mg/dL 103* 84     Results from last 7 days   Lab Units 04/24/23  0813   HEMOGLOBIN g/dL 14.4   HEMATOCRIT % 40.5   WBC 10*3/mm3 7.17     Results from last 7 days   Lab Units 04/24/23  0813 04/21/23  0753   PLATELETS 10*3/mm3 223 187     No results found for: COVID19  Lab Results   Component Value Date    HGBA1C 5.60 03/15/2023          Medications            Scheduled Medications amLODIPine, 10 mg, Oral, QAM AC  apixaban, 5 mg, Oral, Q12H  baclofen, 2.5 mg, Oral, Q8H  carvedilol, 6.25 mg, Oral, BID With Meals  hydrALAZINE, 10 mg, Oral, Q8H  lidocaine, 1 patch, Transdermal, Q24H  sodium chloride, 10 mL, Intravenous, Q12H  valsartan, 160 mg, Oral, QAM AC        Infusions      PRN Medications •  acetaminophen  •  senna-docusate sodium **AND** polyethylene glycol **AND** bisacodyl **AND** bisacodyl  •  Calcium Gluconate-NaCl **AND** calcium gluconate IVPB **AND** Calcium  •  labetalol  •  loperamide  •  magnesium sulfate **OR** magnesium sulfate **OR** magnesium sulfate  •  potassium chloride **OR** potassium chloride **OR** potassium chloride  •  potassium phosphate infusion greater than 15 mMoles **OR** potassium phosphate infusion greater than 15 mMoles **OR** potassium phosphate **OR** sodium phosphate IVPB **OR** sodium phosphate IVPB  •  [COMPLETED] Insert Peripheral IV **AND** sodium chloride  •  sodium chloride  •  sodium chloride     Physical Findings          Physical Appearance  alert, somnolent   Oral/Mouth Cavity teeth missing   Edema  no edema   Gastrointestinal fecal incontinence, normoactive, last bowel movement:4/23   Skin  skin intact   Tubes/Drains none   NFPE MSA documented on 4/4    --  NUTRITION INTERVENTION / PLAN OF CARE  Intervention Goal         Intervention Goal(s) Maintain intake and Maintain weight     Nutrition Intervention         RD Action Follow Tx Progress and Care plan reviewed     Nutrition Prescription         Diet Prescription     Supplement Prescription Mighty Shake, BID    EN/PN Prescription    New Prescription Ordered? Continue same per protocol   --  Monitor/Evaluation        Monitor Per protocol   Discharge Needs Other: plans for rehab vs going home to [place] with family    Education Will instruct as appropriate   --    RD to follow up per protocol.    Electronically signed by:  Richelle Milligan RD  04/24/23 15:55 EDT

## 2023-04-24 NOTE — PLAN OF CARE
Goal Outcome Evaluation:  Plan of Care Reviewed With: (P) patient        Progress: (P) no change  Outcome Evaluation: (P) Pt in bed and agreeable to PT/OT co-tx session this AM. Pt came to EOB w/ cga and use of bedrails, STS from EOB [place] x2, amb 5' x2 w/ assist x3 (2x support + 1 to manage R LE). Pt amb addtl 10' from BR commode to recliner w/ assist x3. Pt demos poor carryover from previous sessions and required multiple breaks d/t decreased endurance. PT will continue to monitor and progress as able.

## 2023-04-24 NOTE — THERAPY TREATMENT NOTE
Patient Name: Manuel Durant  : 1950    MRN: [id number]                              Today's Date: 2023       Admit Date: 3/12/2023    Visit Dx:     ICD-10-CM ICD-9-CM   1. Intracranial hemorrhage  I62.9 432.9   2. Altered mental status, unspecified altered mental status type  R41.82 780.97     Patient Active Problem List   Diagnosis   • Intracranial hemorrhage   • Hypertensive emergency   • Acute DVT (deep venous thrombosis)   • HTN (hypertension)   • Severe Malnutrition (HCC)   • Hemiplegia   • Right shoulder pain     Past Medical History:   Diagnosis Date   • Hypokalemia 3/17/2023     History reviewed. No pertinent surgical history.   General Information     Row Name 23 1507          Physical Therapy Time and Intention    Document Type therapy note (daily note) (P)   -ZB     Mode of Treatment co-treatment;physical therapy (P)   -ZB     Row Name 23 1507          General Information    Patient Profile Reviewed yes (P)   -ZB     Existing Precautions/Restrictions fall (P)   -ZB     Row Name 23 1507          Cognition    Orientation Status (Cognition) oriented to;person (P)   -ZB     Row Name 23 1507          Safety Issues, Functional Mobility    Impairments Affecting Function (Mobility) balance;coordination;endurance/activity tolerance;grasp;motor control;postural/trunk control;strength;range of motion (ROM);cognition;visual/perceptual (P)   -ZB     Cognitive Impairments, Mobility Safety/Performance insight into deficits/self-awareness;impulsivity;safety precaution awareness;safety precaution follow-through (P)   -ZB           User Key  (r) = Recorded By, (t) = Taken By, (c) = Cosigned By    Initials Name Provider Type    ZB Nestor Rae, PT Student PT Student               Mobility     Row Name 23 1507          Bed Mobility    Bed Mobility supine-sit (P)   -ZB     Supine-Sit [place] (Bed Mobility) contact guard (P)   -ZB     Assistive Device (Bed Mobility) bed  rails;head of bed elevated (P)   -ZB     Comment, (Bed Mobility) increased time and cues to complete (P)   -ZB     Row Name 04/24/23 1507          Bed-Chair Transfer    Bed-Chair [place] (Transfers) 2 person assist;moderate assist (50% patient effort);verbal cues;nonverbal cues (demo/gesture) (P)   -ZB     Assistive Device (Bed-Chair Transfers) other (see comments) (P)   hha  -ZB     Row Name 04/24/23 1507          Sit-Stand Transfer    Sit-Stand [place] (Transfers) minimum assist (75% patient effort);2 person assist;verbal cues;nonverbal cues (demo/gesture) (P)   -ZB     Assistive Device (Sit-Stand Transfers) other (see comments) (P)   hha  -ZB     Comment, (Sit-Stand Transfer) post lean initially w/ standing (P)   -ZB     Row Name 04/24/23 1507          Gait/Stairs (Locomotion)    [place] Level (Gait) minimum assist (75% patient effort);moderate assist (50% patient effort);2 person assist;verbal cues;nonverbal cues (demo/gesture) (P)   + 1 person to manage/advance R LE  -ZB     Assistive Device (Gait) other (see comments) (P)   hha  -ZB     Distance in Feet (Gait) 5' x2 + 10'; 2 person assist for trunk support + 1 to block/advance R LE (P)   -ZB     Deviations/Abnormal Patterns (Gait) festinating/shuffling;stride length decreased;sandhya decreased;gait speed decreased (P)   -ZB     Bilateral Gait Deviations forward flexed posture (P)   -ZB     Right Sided Gait Deviations foot drop/toe drag;knee buckling, right side (P)   -ZB     [place] Level (Stairs) unable to assess (P)   -ZB           User Key  (r) = Recorded By, (t) = Taken By, (c) = Cosigned By    Initials Name Provider Type    Nestor Howard PT Student PT Student               Obj/Interventions     Row Name 04/24/23 1512          Balance    Balance Assessment sitting static balance;sitting dynamic balance;standing static balance;standing dynamic balance (P)   -ZB     Static Sitting Balance standby assist (P)   -ZB     Dynamic Sitting  Balance standby assist;contact guard (P)   -ZB     Position, Sitting Balance sitting edge of bed (P)   -ZB     Static Standing Balance minimal assist;2-person assist (P)   -ZB     Dynamic Standing Balance moderate assist;maximum assist;2-person assist;verbal cues (P)   -ZB     Position/Device Used, Standing Balance supported;other (see comments) (P)   hha  -ZB     Balance Interventions sitting;standing;sit to stand;supported;static;dynamic (P)   -ZB     Comment, Balance post lean w/ all trials; difficulty bearing wt through/advancing R LE (P)   -ZB           User Key  (r) = Recorded By, (t) = Taken By, (c) = Cosigned By    Initials Name Provider Type    Nestor Howard, PT Student PT Student               Goals/Plan    No documentation.                Clinical Impression     Row Name 04/24/23 1514          Pain    Pretreatment Pain Rating 0/10 - no pain (P)   -ZB     Posttreatment Pain Rating 0/10 - no pain (P)   -ZB     Pain Intervention(s) Repositioned;Ambulation/increased activity (P)   -ZB     Row Name 04/24/23 1514          Plan of Care Review    Plan of Care Reviewed With patient (P)   -ZB     Progress no change (P)   -ZB     Outcome Evaluation Pt in bed and agreeable to PT/OT co-tx session this AM. Pt came to EOB w/ cga and use of bedrails, STS from EOB Amish x2, amb 5' x2 w/ assist x3 (2x support + 1 to manage R LE). Pt amb addtl 10' from BR commode to recliner w/ assist x3. Pt demos poor carryover from previous sessions and required multiple breaks d/t decreased endurance. PT will continue to monitor and progress as able. (P)   -ZB     Row Name 04/24/23 1514          Positioning and Restraints    Pre-Treatment Position in bed (P)   -ZB     Post Treatment Position chair (P)   -ZB     In Chair notified nsg;reclined;call light within reach;encouraged to call for assist;exit alarm on (P)   -ZB           User Key  (r) = Recorded By, (t) = Taken By, (c) = Cosigned By    Initials Name Provider Type    CHARLY Rae  Nestor, PT Student PT Student               Outcome Measures     Row Name 04/24/23 1519 04/24/23 1306       How much help from another person do you currently need...    Turning from your back to your side while in flat bed without using bedrails? 4 (P)   -ZB 2  -KP    Moving from lying on back to sitting on the side of a flat bed without bedrails? 3 (P)   -ZB 1  -KP    Moving to and from a bed to a chair (including a wheelchair)? 2 (P)   -ZB 1  -KP    Standing up from a chair using your arms (e.g., wheelchair, bedside chair)? 3 (P)   -ZB 1  -KP    Climbing 3-5 steps with a railing? 1 (P)   -ZB 1  -KP    To walk in hospital room? 2 (P)   -ZB 1  -KP    AM-PAC 6 Clicks Score (PT) 15 (P)   -ZB 7  -KP    Highest level of mobility 4 --> Transferred to chair/commode (P)   -ZB 2 --> Bed activities/dependent transfer  -    Row Name 04/24/23 1216          Functional Assessment    Outcome Measure Options AM-PAC 6 Clicks Daily Activity (OT)  -           User Key  (r) = Recorded By, (t) = Taken By, (c) = Cosigned By    Initials Name Provider Type    Johanny Guaman, RN Registered Nurse    Karishma Williamson, OT Occupational Therapist    Nestor Howard, PT Student PT Student                             Physical Therapy Education     Title: PT OT SLP Therapies (Done)     Topic: Physical Therapy (Done)     Point: Mobility training (Done)     Learning Progress Summary           Patient Acceptance, E,TB,H, VU by MS at 4/21/2023 1838    Acceptance, E, NR by CW at 4/21/2023 1537    Acceptance, E, VU,DU by CHARLY at 4/19/2023 1130    Acceptance, E, NR by CW at 4/18/2023 0950    Acceptance, E, VU,NR by NICOLASA at 4/16/2023 1158    Acceptance, E,TB,H, VU,NR by MS at 4/13/2023 1817    Acceptance, E, NR by CW at 4/13/2023 1341    Acceptance, E, NR by CW at 4/12/2023 1201    Acceptance, E, NR by CW at 4/11/2023 1158    Acceptance, E, NR by CW at 4/10/2023 1414    Acceptance, E,TB, NR by CS at 4/8/2023 0928    Acceptance, E, NR by CW at  4/7/2023 1215    Acceptance, E, NR by CW at 4/6/2023 1335    Acceptance, E, NR by CW at 4/5/2023 1043    Acceptance, E, NR by CW at 4/4/2023 0906    Acceptance, E, NR by CW at 4/3/2023 1531    Acceptance, E,TB, VU,NR by CAMERON at 4/1/2023 1530    Acceptance, E, NR by CW at 3/31/2023 0925    Acceptance, E, NR by CW at 3/30/2023 1209    Acceptance, E, NR,NL by CW at 3/29/2023 1336    Acceptance, E, NR by ZB at 3/28/2023 1317    Acceptance, E, NR by CW at 3/27/2023 1401    Acceptance, E,D, NR,VU by JM at 3/24/2023 1631    Comment: seemed to comprehend commands this session    Acceptance, E,D, NR by JM at 3/23/2023 1655    Acceptance, E,D, NR by JM at 3/22/2023 1545    Acceptance, E,TB, VU,NR by CB at 3/21/2023 1544    Acceptance, E,TB,H, VU by MS at 3/16/2023 1801    Acceptance, E,D, DU,NR by MO at 3/16/2023 1451    Acceptance, E, DU,NR by MO at 3/15/2023 1200    Acceptance, E, NR by LM at 3/15/2023 0521    Acceptance, E,D, DU,NR by MO at 3/14/2023 1458   Family Acceptance, E,TB,H, VU by MS at 4/21/2023 1838    Acceptance, E,TB,H, VU,NR by MS at 4/13/2023 1817    Acceptance, E,TB,H, VU by MS at 3/16/2023 1801                   Point: Home exercise program (Done)     Learning Progress Summary           Patient Acceptance, E,TB,H, VU by MS at 4/21/2023 1838    Acceptance, E, VU,NR by DJ at 4/16/2023 1158    Acceptance, E,TB,H, VU,NR by MS at 4/13/2023 1817    Acceptance, E, NR by CW at 4/13/2023 1341    Acceptance, E,TB, NR by CS at 4/8/2023 0928    Acceptance, E, NR by CW at 4/7/2023 1215    Acceptance, E, NR by CW at 4/6/2023 1335    Acceptance, E,TB, VU,NR by CAMERON at 4/1/2023 1530    Acceptance, E, NR by CW at 3/31/2023 0925    Acceptance, E, NR by CHARLY at 3/28/2023 1317    Acceptance, E, NR by CW at 3/27/2023 1401    Acceptance, E,D, NR,VU by ZACH at 3/24/2023 1631    Comment: seemed to comprehend commands this session    Acceptance, E,D, NR by ZACH at 3/23/2023 1655    Acceptance, E,D, NR by ZACH at 3/22/2023 1545    Acceptance,  E,TB, VU,NR by CB at 3/21/2023 1544    Acceptance, E,TB,H, VU by MS at 3/16/2023 1801    Acceptance, E,D, DU,NR by MO at 3/16/2023 1451    Acceptance, E, NR by LM at 3/15/2023 0521    Acceptance, E,D, DU,NR by MO at 3/14/2023 1458   Family Acceptance, E,TB,H, VU by MS at 4/21/2023 1838    Acceptance, E,TB,H, VU,NR by MS at 4/13/2023 1817    Acceptance, E,TB,H, VU by MS at 3/16/2023 1801                   Point: Body mechanics (Done)     Learning Progress Summary           Patient Acceptance, E,TB,H, VU by MS at 4/21/2023 1838    Acceptance, E, VU,NR by DJ at 4/16/2023 1158    Acceptance, E,TB,H, VU,NR by MS at 4/13/2023 1817    Acceptance, E, NR by CW at 4/13/2023 1341    Acceptance, E, NR by CW at 4/11/2023 1158    Acceptance, E, NR by CW at 4/10/2023 1414    Acceptance, E,TB, NR by CS at 4/8/2023 0928    Acceptance, E, NR by CW at 4/7/2023 1215    Acceptance, E, NR by CW at 4/4/2023 0906    Acceptance, E, NR by CW at 4/3/2023 1531    Acceptance, E,TB, VU,NR by CAMERON at 4/1/2023 1530    Acceptance, E,D, NR,VU by JM at 3/24/2023 1631    Comment: seemed to comprehend commands this session    Acceptance, E,D, NR by JM at 3/23/2023 1655    Acceptance, E,D, NR by JM at 3/22/2023 1545    Acceptance, E,TB, VU,NR by CB at 3/21/2023 1544    Acceptance, E,TB,H, VU by MS at 3/16/2023 1801    Acceptance, E,D, DU,NR by MO at 3/16/2023 1451    Acceptance, E, DU,NR by MO at 3/15/2023 1200    Acceptance, E, NR by LM at 3/15/2023 0521    Acceptance, E,D, DU,NR by MO at 3/14/2023 1458   Family Acceptance, E,TB,H, VU by MS at 4/21/2023 1838    Acceptance, E,TB,H, VU,NR by MS at 4/13/2023 1817    Acceptance, E,TB,H, VU by MS at 3/16/2023 1801                   Point: Precautions (Done)     Learning Progress Summary           Patient Acceptance, E,TB,H, VU by MS at 4/21/2023 1838    Acceptance, E, VU,NR by DJ at 4/16/2023 1158    Acceptance, E,TB,H, VU,NR by MS at 4/13/2023 1817    Acceptance, E, NR by CW at 4/13/2023 1341    Acceptance,  E,TB, NR by CS at 4/8/2023 0928    Acceptance, E, NR by CW at 4/7/2023 1215    Acceptance, E, NR by CW at 4/4/2023 0906    Acceptance, E, NR by CW at 4/3/2023 1531    Acceptance, E,TB, VU,NR by CAMERON at 4/1/2023 1530    Acceptance, E,D, NR,VU by JM at 3/24/2023 1631    Comment: seemed to comprehend commands this session    Acceptance, E,D, NR by JM at 3/23/2023 1655    Acceptance, E,D, NR by JM at 3/22/2023 1545    Acceptance, E,TB, VU,NR by CB at 3/21/2023 1544    Acceptance, E,TB,H, VU by MS at 3/16/2023 1801    Acceptance, E,D, DU,NR by MO at 3/16/2023 1451    Acceptance, E, DU,NR by MO at 3/15/2023 1200    Acceptance, E, NR by LM at 3/15/2023 0521    Acceptance, E,D, DU,NR by MO at 3/14/2023 1458   Family Acceptance, E,TB,H, VU by MS at 4/21/2023 1838    Acceptance, E,TB,H, VU,NR by MS at 4/13/2023 1817    Acceptance, E,TB,H, VU by MS at 3/16/2023 1801                               User Key     Initials Effective Dates Name Provider Type Discipline    ZACH 03/07/18 -  Tiffanie Grace, PTA Physical Therapist Assistant PT    CAMERON 05/19/21 -  Leeann Wiley, PT Physical Therapist PT    DJ 10/25/19 -  Jacqueline Chamberlain, PT Physical Therapist PT    MS 06/16/21 -  Angel Luis Cr, RN Registered Nurse Nurse    CW 12/13/22 -  Tamica Willoughby, PT Physical Therapist PT    CB 10/22/21 -  Bety Benitez, PT Physical Therapist PT    CS 09/22/22 -  Adelia Montalvo, PT Physical Therapist PT    LM 10/13/22 -  Silke Grace, RN Registered Nurse Nurse    MO 01/27/23 -  Lacie Tanner, PT Student PT Student PT    ZB 03/10/23 -  Nestor Rae, PT Student PT Student PT              PT Recommendation and Plan     Plan of Care Reviewed With: (P) patient  Progress: (P) no change  Outcome Evaluation: (P) Pt in bed and agreeable to PT/OT co-tx session this AM. Pt came to EOB w/ cga and use of bedrails, STS from EOB Amish x2, amb 5' x2 w/ assist x3 (2x support + 1 to manage R LE). Pt amb addtl 10' from BR commode to recliner w/ assist x3. Pt  demos poor carryover from previous sessions and required multiple breaks d/t decreased endurance. PT will continue to monitor and progress as able.     Time Calculation:    PT Charges     Row Name 04/24/23 1520             Time Calculation    Start Time 1019 (P)   -ZB      Stop Time 1035 (P)   -ZB      Time Calculation (min) 16 min (P)   -ZB      PT Received On 04/24/23 (P)   -ZB      PT - Next Appointment 04/25/23 (P)   -ZB         Time Calculation- PT    Total Timed Code Minutes- PT 16 minute(s) (P)   -ZB         Timed Charges    11742 - PT Therapeutic Activity Minutes 16 (P)   -ZB         Total Minutes    Timed Charges Total Minutes 16 (P)   -ZB       Total Minutes 16 (P)   -ZB            User Key  (r) = Recorded By, (t) = Taken By, (c) = Cosigned By    Initials Name Provider Type    JEROMYB Nestor Rae, PT Student PT Student              Therapy Charges for Today     Code Description Service Date Service Provider Modifiers Qty    41500389269 HC PT THERAPEUTIC ACT EA 15 MIN 4/24/2023 Nestor Rae, PT Student GP 1    81990767761 HC PT THER SUPP EA 15 MIN 4/24/2023 Nestor Rae, PT Student GP 1          PT G-Codes  Outcome Measure Options: AM-PAC 6 Clicks Daily Activity (OT)  AM-PAC 6 Clicks Score (PT): (P) 15  AM-PAC 6 Clicks Score (OT): 13  Modified [place] Scale: 4 - Moderately severe disability.  Unable to walk without assistance, and unable to attend to own bodily needs without assistance.       Nestor Rae PT Student  4/24/2023

## 2023-04-24 NOTE — PROGRESS NOTES
"    Name: Manuel Durant ADMIT: 3/12/2023   : 1950  PCP: Provider, No Known    MRN: [id number] LOS: 43 days   AGE/SEX: 73 y.o. male  ROOM: [facility]     Subjective   Subjective   Resting in bed. No family present. He states he didn't sleep well last night. Otherwise reports \"I'm fine.\" No chest pain, dyspnea, nausea or vomiting.     Objective   Objective   Vital Signs  Temp:  [97.6 °F (36.4 °C)-98.6 °F (37 °C)] 97.6 °F (36.4 °C)  Heart Rate:  [52-92] 66  Resp:  [16-20] 16  BP: (104-142)/(63-97) 131/67  SpO2:  [93 %-97 %] 94 %  on   ;   Device (Oxygen Therapy): room air  Body mass index is 22.2 kg/m².     Physical Exam  Vitals and nursing note reviewed.   Constitutional:       General: He is awake and alert. He is not in acute distress. Lying in bed on RA  Cardiovascular:      Rate and Rhythm: Normal rate and regular rhythm.      Pulses: Normal pulses.      Heart sounds: Normal heart sounds. No murmur heard.  Pulmonary:      Effort: Pulmonary effort is normal. No respiratory distress.      Breath sounds: Normal breath sounds.   Abdominal:      Palpations: Abdomen is soft and non-tender. Bowel sounds active.    Musculoskeletal:      Right shoulder pain with passive ROM     Right lower leg: No edema.      Left lower leg: No edema.  Skin:     General: Skin is dry.   Neurological:      Motor: Weakness present.      Comments: Alert and oriented to person/place/situation- little confused to year. Right-sided upper extremity paresis- slowing improving as he has mobility in raising it as well as his right leg.   Psychiatric:         Mood and Affect: Mood normal.         Behavior: Behavior normal.         Thought Content: Thought content normal.         Judgment: Judgment normal    Results Review:       I reviewed the patient's new clinical results.  Results from last 7 days   Lab Units 23  0813 23  0753   WBC 10*3/mm3 7.17 6.77   HEMOGLOBIN g/dL 14.4 13.7   PLATELETS 10*3/mm3 223 187     Results from last " 7 days   Lab Units 04/24/23  0813 04/21/23  0753   SODIUM mmol/L 140 141   POTASSIUM mmol/L 4.0 4.1   CHLORIDE mmol/L 107 107   CO2 mmol/L 24.0 23.1   BUN mg/dL 13 16   CREATININE mg/dL 0.79 0.71*   GLUCOSE mg/dL 103* 84   Estimated Creatinine Clearance: 75.7 mL/min (by C-G formula based on SCr of 0.79 mg/dL).    Results from last 7 days   Lab Units 04/24/23  0813 04/21/23  0753   CALCIUM mg/dL 9.4 9.0       No results found for: HGBA1C, POCGLU    amLODIPine, 10 mg, Oral, QAM AC  apixaban, 5 mg, Oral, Q12H  baclofen, 2.5 mg, Oral, Q8H  carvedilol, 6.25 mg, Oral, BID With Meals  hydrALAZINE, 10 mg, Oral, Q8H  lidocaine, 1 patch, Transdermal, Q24H  sodium chloride, 10 mL, Intravenous, Q12H  valsartan, 160 mg, Oral, QAM AC       Diet: Regular/House Diet; No Mixed Consistencies, Feeding Assistance - Nursing; Texture: Soft to Chew (NDD 3); Soft to Chew: Chopped Meat; Fluid Consistency: Nectar Thick       Assessment/Plan     Active Hospital Problems    Diagnosis  POA   • **Intracranial hemorrhage [I62.9]  Yes   • Hemiplegia [G81.90]  Yes   • Right shoulder pain [M25.511]  Yes   • Severe Malnutrition (HCC) [E43]  Yes   • Acute DVT (deep venous thrombosis) [I82.409]  Yes   • HTN (hypertension) [I10]  Yes   • Hypertensive emergency [I16.1]  Yes      Resolved Hospital Problems    Diagnosis Date Resolved POA   • Hypokalemia [E87.6] 04/10/2023 Unknown     Mr. Durant is a 73 year old male who initially presented to the hospital 3/12/23 for intracranial bleed. He was found unresponsive by friends and initially taken to [facility] where he was found to have poorly controlled BP and right sided hemiplegia. He was transferred here for closer monitoring in the ICU and neurosurgical consultation. He was cleared to move out of the ICU on 3/16/23.     • Intracranial hemorrhage: CTH 3/17/23 stable left basal ganglia/internal capsule hemorrhage with left lateral ventricular hemorrhage. On Lovenox for DVT and repeat CTH 3/18 also  stable. ANDRADE followed and signed off 3/23 with plans for repeat MRI brain/MRA head in 1 month- ANDRADE saw again 4/20 and ordered MRI/MRA. However, these are not currently ordered anymore. I reached out to SLICK Castro who is unsure why they were cancelled but will reorder. Goal BP < 150 systolic. Remains on baclofen for spasticity.      • HTN emergency: On amlodipine 10 mg, valsartan 160 mg, Coreg 6.25 mg BID and hydralazine 10 mg TID. BP well controlled.      • Acute DVT: Found 3/17/23 with acute RLE DVT in soleal. ANDRADE okayed for blood thinners. Now on Eliquis.     • Hypokalemia: Resolved.     Discussed with patient.      Medically stable. Family is setting up a time to come to [place] and get him. He will need to be assist x 1 to fly. CCP discussing with BAR again.     • Eliquis for DVT prophylaxis.  • Full code.  • Anticipate discharge as above.    SLICK Arroyo  [facility]  04/24/23  09:59 EDT

## 2023-04-24 NOTE — PROGRESS NOTES
[facility] Acute Inpt Rehab    Evaluation in progress for acute rehab.  Discharge disposition still remains unsteady. Will family be available for proper training. Will discuss with Dr. Magaña    Thank you,  Meche Peoples RN  Acute Rehab Admission Nurse

## 2023-04-24 NOTE — PLAN OF CARE
Problem: Fall Injury Risk  Goal: Absence of Fall and Fall-Related Injury  Outcome: Ongoing, Progressing  Intervention: Promote Injury-Free Environment  Recent Flowsheet Documentation  Taken 4/24/2023 0800 by Johanny Buchanan RN  Safety Promotion/Fall Prevention:   safety round/check completed   fall prevention program maintained   elopement precautions     Problem: Skin Injury Risk Increased  Goal: Skin Health and Integrity  Outcome: Ongoing, Progressing  Intervention: Optimize Skin Protection  Recent Flowsheet Documentation  Taken 4/24/2023 0800 by Johanny Buchanan RN  Pressure Reduction Techniques:   frequent weight shift encouraged   weight shift assistance provided  Pressure Reduction Devices:   alternating pressure pump (ADD)   pressure-redistributing mattress utilized  Skin Protection:   adhesive use limited   incontinence pads utilized   transparent dressing maintained   tubing/devices free from skin contact     Problem: Adult Inpatient Plan of Care  Goal: Plan of Care Review  Outcome: Ongoing, Progressing  Flowsheets (Taken 4/24/2023 1052)  Progress: no change  Plan of Care Reviewed With: patient  Goal: Patient-Specific Goal (Individualized)  Outcome: Ongoing, Progressing  Goal: Absence of Hospital-Acquired Illness or Injury  Outcome: Ongoing, Progressing  Intervention: Identify and Manage Fall Risk  Recent Flowsheet Documentation  Taken 4/24/2023 0800 by Johanny Buchanan RN  Safety Promotion/Fall Prevention:   safety round/check completed   fall prevention program maintained   elopement precautions  Intervention: Prevent Skin Injury  Recent Flowsheet Documentation  Taken 4/24/2023 0800 by Johanny Buchanan RN  Skin Protection:   adhesive use limited   incontinence pads utilized   transparent dressing maintained   tubing/devices free from skin contact  Intervention: Prevent and Manage VTE (Venous Thromboembolism) Risk  Recent Flowsheet Documentation  Taken 4/24/2023 0800 by Johanny Buchanan RN  VTE  Prevention/Management:   bilateral   sequential compression devices on  Range of Motion: active ROM (range of motion) encouraged  Goal: Optimal Comfort and Wellbeing  Outcome: Ongoing, Progressing  Goal: Readiness for Transition of Care  Outcome: Ongoing, Progressing     Problem: Adjustment to Illness (Stroke, Ischemic/Transient Ischemic Attack)  Goal: Optimal Coping  Outcome: Ongoing, Progressing     Problem: Bowel Elimination Impaired (Stroke, Ischemic/Transient Ischemic Attack)  Goal: Effective Bowel Elimination  Outcome: Ongoing, Progressing     Problem: Cerebral Tissue Perfusion (Stroke, Ischemic/Transient Ischemic Attack)  Goal: Optimal Cerebral Tissue Perfusion  Outcome: Ongoing, Progressing     Problem: Cognitive Impairment (Stroke, Ischemic/Transient Ischemic Attack)  Goal: Optimal Cognitive Function  Outcome: Ongoing, Progressing     Problem: Communication Impairment (Stroke, Ischemic/Transient Ischemic Attack)  Goal: Improved Communication Skills  Outcome: Ongoing, Progressing     Problem: Functional Ability Impaired (Stroke, Ischemic/Transient Ischemic Attack)  Goal: Optimal Functional Ability  Outcome: Ongoing, Progressing     Problem: Respiratory Compromise (Stroke, Ischemic/Transient Ischemic Attack)  Goal: Effective Oxygenation and Ventilation  Outcome: Ongoing, Progressing     Problem: Sensorimotor Impairment (Stroke, Ischemic/Transient Ischemic Attack)  Goal: Improved Sensorimotor Function  Outcome: Ongoing, Progressing  Intervention: Optimize Range of Motion, Motor Control and Function  Recent Flowsheet Documentation  Taken 4/24/2023 0800 by Johanny Buchanan RN  Range of Motion: active ROM (range of motion) encouraged  Intervention: Optimize Sensory and Perceptual Ability  Recent Flowsheet Documentation  Taken 4/24/2023 0800 by Johanny Buchanan RN  Pressure Reduction Techniques:   frequent weight shift encouraged   weight shift assistance provided  Pressure Reduction Devices:   alternating  pressure pump (ADD)   pressure-redistributing mattress utilized     Problem: Swallowing Impairment (Stroke, Ischemic/Transient Ischemic Attack)  Goal: Optimal Eating and Swallowing without Aspiration  Outcome: Ongoing, Progressing     Problem: Urinary Elimination Impaired (Stroke, Ischemic/Transient Ischemic Attack)  Goal: Effective Urinary Elimination  Outcome: Ongoing, Progressing     Problem: Adjustment to Illness (Stroke, Hemorrhagic)  Goal: Optimal Coping  Outcome: Ongoing, Progressing     Problem: Bowel Elimination Impaired (Stroke, Hemorrhagic)  Goal: Effective Bowel Elimination  Outcome: Ongoing, Progressing     Problem: Cerebral Tissue Perfusion (Stroke, Hemorrhagic)  Goal: Optimal Cerebral Tissue Perfusion  Outcome: Ongoing, Progressing     Problem: Cognitive Impairment (Stroke, Hemorrhagic)  Goal: Optimal Cognitive Function  Outcome: Ongoing, Progressing     Problem: Communication Impairment (Stroke, Hemorrhagic)  Goal: Effective Communication Skills  Outcome: Ongoing, Progressing     Problem: Functional Ability Impaired (Stroke, Hemorrhagic)  Goal: Optimal Functional Ability  Outcome: Ongoing, Progressing     Problem: Pain (Stroke, Hemorrhagic)  Goal: Acceptable Pain Control  Outcome: Ongoing, Progressing     Problem: Respiratory Compromise (Stroke, Hemorrhagic)  Goal: Effective Oxygenation and Ventilation  Outcome: Ongoing, Progressing     Problem: Sensorimotor Impairment (Stroke, Hemorrhagic)  Goal: Improved Sensorimotor Function  Outcome: Ongoing, Progressing  Intervention: Optimize Range of Motion, Motor Control and Function  Recent Flowsheet Documentation  Taken 4/24/2023 0800 by Johanny Buchanan RN  Range of Motion: active ROM (range of motion) encouraged  Intervention: Optimize Sensory and Perceptual Ability  Recent Flowsheet Documentation  Taken 4/24/2023 0800 by Johanny Buchanan RN  Pressure Reduction Techniques:   frequent weight shift encouraged   weight shift assistance provided  Pressure  Reduction Devices:   alternating pressure pump (ADD)   pressure-redistributing mattress utilized     Problem: Swallowing Impairment (Stroke, Hemorrhagic)  Goal: Optimal Eating and Swallowing Without Aspiration  Outcome: Ongoing, Progressing     Problem: Urinary Elimination Impaired (Stroke, Hemorrhagic)  Goal: Effective Urinary Elimination  Outcome: Ongoing, Progressing

## 2023-04-24 NOTE — THERAPY TREATMENT NOTE
Patient Name: Manuel Durant  : 1950    MRN: [id number]                              Today's Date: 2023       Admit Date: 3/12/2023    Visit Dx:     ICD-10-CM ICD-9-CM   1. Intracranial hemorrhage  I62.9 432.9   2. Altered mental status, unspecified altered mental status type  R41.82 780.97     Patient Active Problem List   Diagnosis   • Intracranial hemorrhage   • Hypertensive emergency   • Acute DVT (deep venous thrombosis)   • HTN (hypertension)   • Severe Malnutrition (HCC)   • Hemiplegia   • Right shoulder pain     Past Medical History:   Diagnosis Date   • Hypokalemia 3/17/2023     History reviewed. No pertinent surgical history.   General Information     Row Name 23 1205          OT Time and Intention    Document Type therapy note (daily note)  -MW     Mode of Treatment co-treatment;physical therapy  -     Row Name 23 1205          General Information    Patient Profile Reviewed yes  -MW     Existing Precautions/Restrictions fall  -MW     Row Name 23 1205          Cognition    Orientation Status (Cognition) oriented to;person  -     Row Name 23 1205          Safety Issues, Functional Mobility    Impairments Affecting Function (Mobility) balance;coordination;endurance/activity tolerance;grasp;motor control;postural/trunk control;strength;range of motion (ROM);cognition;visual/perceptual  -MW     Cognitive Impairments, Mobility Safety/Performance insight into deficits/self-awareness;impulsivity;safety precaution awareness;safety precaution follow-through  -           User Key  (r) = Recorded By, (t) = Taken By, (c) = Cosigned By    Initials Name Provider Type    MW Karishma Spence OT Occupational Therapist                 Mobility/ADL's     Row Name 23 1209          Bed Mobility    Bed Mobility supine-sit;sit-supine  -MW     Supine-Sit [place] (Bed Mobility) contact guard  -MW     Sit-Supine [place] (Bed Mobility) not tested  -MW     Bed Mobility,  Safety Issues cognitive deficits limit understanding  -MW     Assistive Device (Bed Mobility) bed rails;head of bed elevated  -MW     Comment, (Bed Mobility) increased time to complete, [facility] end of session  -MW     Row Name 04/24/23 1205          Transfers    Transfers sit-stand transfer;toilet transfer;stand-sit transfer  -MW     Comment, (Transfers) multiple STS from shower chair level, recliner, EOB, BR commode  -MW     Row Name 04/24/23 1205          Sit-Stand Transfer    Sit-Stand [place] (Transfers) contact guard;verbal cues;2 person assist;minimum assist (75% patient effort);moderate assist (50% patient effort)  -MW     Comment, (Sit-Stand Transfer) HHA R side  -MW     Row Name 04/24/23 1205          Stand-Sit Transfer    Stand-Sit [place] (Transfers) minimum assist (75% patient effort);2 person assist  -MW     Row Name 04/24/23 1205          Toilet Transfer    Type (Toilet Transfer) stand-sit;sit-stand  -MW     [place] Level (Toilet Transfer) moderate assist (50% patient effort);maximum assist (25% patient effort);2 person assist  -MW     Assistive Device (Toilet Transfer) commode;commode, 3-in-1  -MW     Comment, (Toilet Transfer) 3:1 over commode  -MW     Row Name 04/24/23 1205          Functional Mobility    Functional Mobility- Ind. Level moderate assist (50% patient effort);maximum assist (25% patient effort);2 person assist required  -MW     Functional Mobility- Device --  HHA R side  -MW     Functional Mobility- Comment multiple short mobility trials with end goal BR commode, required multiple rest breaks in chair/shower en route to BR due to poor balance, inconsistency with carryover, max cues for sequencing  -MW     Row Name 04/24/23 1205          Activities of Daily Living    BADL Assessment/Intervention lower body dressing;toileting  -MW     Row Name 04/24/23 1205          Lower Body Dressing Assessment/Training    [place] Level (Lower Body Dressing) lower body dressing  skills;moderate assist (50% patient effort)  -MW     Position (Lower Body Dressing) supported standing  -MW     Comment, (Lower Body Dressing) assist with RUE  -MW     Row Name 04/24/23 1205          Toileting Assessment/Training    Comment, (Toileting) demo'd transfer only  -MW           User Key  (r) = Recorded By, (t) = Taken By, (c) = Cosigned By    Initials Name Provider Type    Karishma Williamson OT Occupational Therapist               Obj/Interventions     Row Name 04/24/23 1210          Balance    Balance Assessment sitting static balance;sitting dynamic balance;sit to stand dynamic balance;standing static balance;standing dynamic balance  -MW     Static Sitting Balance standby assist  -MW     Dynamic Sitting Balance standby assist;contact guard  -MW     Position, Sitting Balance sitting edge of bed  -MW     Sit to Stand Dynamic Balance minimal assist;2-person assist  -MW     Static Standing Balance minimal assist;2-person assist  -MW     Dynamic Standing Balance maximum assist;2-person assist;moderate assist  -MW     Position/Device Used, Standing Balance supported  -MW     Balance Interventions occupation based/functional task  -MW     Comment, Balance posterior leaning with all mobility trials, pt with inconsistency with LLE foot on ground throughout transfers  -MW           User Key  (r) = Recorded By, (t) = Taken By, (c) = Cosigned By    Initials Name Provider Type    Karishma Williamson OT Occupational Therapist               Goals/Plan    No documentation.                Clinical Impression     Row Name 04/24/23 1211          Pain Assessment    Pretreatment Pain Rating 0/10 - no pain  -MW     Posttreatment Pain Rating 0/10 - no pain  -MW     Row Name 04/24/23 1211          Plan of Care Review    Plan of Care Reviewed With patient  -MW     Progress no change  -MW     Outcome Evaluation Pt seen this date for OT/PT tx session in AM to maximize therapeutic benefit. Pt coming to EOB with CGA, heavy  use of bed rails. Multiple STS this date from household surfaces, pt required min A x2 for STS, however increased assist of 3 with short mobility trials with end goal being BR commode, multiple seated rest breaks required due to poor balance and poor carryover of cues with 3rd person assisting at RLE. Pt demo'd commode transfer only in BR, denied toileting needs. Pt will continue to benefit from skilled OT to address noted functional deficits and progress toward goals.  -MW     Row Name 04/24/23 1211          Vital Signs    O2 Delivery Pre Treatment room air  -MW     Pre Patient Position Supine  -MW     Intra Patient Position Standing  -MW     Post Patient Position Sitting  -MW     Row Name 04/24/23 1211          Positioning and Restraints    Pre-Treatment Position in bed  -MW     Post Treatment Position chair  -MW     In Chair notified nsg;reclined;call light within reach;encouraged to call for assist;exit alarm on  -MW           User Key  (r) = Recorded By, (t) = Taken By, (c) = Cosigned By    Initials Name Provider Type    Karishma Williamson OT Occupational Therapist               Outcome Measures     Row Name 04/24/23 1216          How much help from another is currently needed...    Putting on and taking off regular lower body clothing? 2  -MW     Bathing (including washing, rinsing, and drying) 2  -MW     Toileting (which includes using toilet bed pan or urinal) 1  -MW     Putting on and taking off regular upper body clothing 2  -MW     Taking care of personal grooming (such as brushing teeth) 3  -MW     Eating meals 3  -MW     AM-PAC 6 Clicks Score (OT) 13  -MW     Row Name 04/24/23 1216          Functional Assessment    Outcome Measure Options AM-PAC 6 Clicks Daily Activity (OT)  -MW           User Key  (r) = Recorded By, (t) = Taken By, (c) = Cosigned By    Initials Name Provider Type    Karishma Williamson OT Occupational Therapist                Occupational Therapy Education     Title: PT OT SLP  Therapies (Done)     Topic: Occupational Therapy (Done)     Point: ADL training (Done)     Description:   Instruct learner(s) on proper safety adaptation and remediation techniques during self care or transfers.   Instruct in proper use of assistive devices.              Learning Progress Summary           Patient Acceptance, E,TB,H, VU by MS at 4/21/2023 1838    Acceptance, E,TB,H, VU,NR by MS at 4/13/2023 1817    Acceptance, E,TB,H, VU by MS at 3/16/2023 1801    Acceptance, E, NR by LM at 3/15/2023 0521   Family Acceptance, E,TB,H, VU by MS at 4/21/2023 1838    Acceptance, E,TB,H, VU,NR by MS at 4/13/2023 1817    Acceptance, E,TB,H, VU by MS at 3/16/2023 1801                   Point: Home exercise program (Done)     Description:   Instruct learner(s) on appropriate technique for monitoring, assisting and/or progressing therapeutic exercises/activities.              Learning Progress Summary           Patient Acceptance, E,TB,H, VU by MS at 4/21/2023 1838    Acceptance, E,TB,H, VU,NR by MS at 4/13/2023 1817    Acceptance, E,TB,H, VU by MS at 3/16/2023 1801    Acceptance, E, NR by LM at 3/15/2023 0521   Family Acceptance, E,TB,H, VU by MS at 4/21/2023 1838    Acceptance, E,TB,H, VU,NR by MS at 4/13/2023 1817    Acceptance, E,TB,H, VU by MS at 3/16/2023 1801                   Point: Precautions (Done)     Description:   Instruct learner(s) on prescribed precautions during self-care and functional transfers.              Learning Progress Summary           Patient Acceptance, E,TB,H, VU by MS at 4/21/2023 1838    Acceptance, E,TB,H, VU,NR by MS at 4/13/2023 1817    Acceptance, E,TB,H, VU by MS at 3/16/2023 1801    Acceptance, E, NR by LM at 3/15/2023 0521   Family Acceptance, E,TB,H, VU by MS at 4/21/2023 1838    Acceptance, E,TB,H, VU,NR by MS at 4/13/2023 1817    Acceptance, E,TB,H, VU by MS at 3/16/2023 1801                   Point: Body mechanics (Done)     Description:   Instruct learner(s) on proper positioning  and spine alignment during self-care, functional mobility activities and/or exercises.              Learning Progress Summary           Patient Acceptance, E,TB,H, VU by MS at 4/21/2023 1838    Acceptance, E,TB,H, VU,NR by MS at 4/13/2023 1817    Acceptance, E,TB,H, VU by MS at 3/16/2023 1801    Acceptance, E, NR by LM at 3/15/2023 0521   Family Acceptance, E,TB,H, VU by MS at 4/21/2023 1838    Acceptance, E,TB,H, VU,NR by MS at 4/13/2023 1817    Acceptance, E,TB,H, VU by MS at 3/16/2023 1801                               User Key     Initials Effective Dates Name Provider Type Discipline    MS 06/16/21 -  Angel Luis Cr, RN Registered Nurse Nurse     10/13/22 -  Silke Grace, RN Registered Nurse Nurse              OT Recommendation and Plan     Plan of Care Review  Plan of Care Reviewed With: patient  Progress: no change  Outcome Evaluation: Pt seen this date for OT/PT tx session in AM to maximize therapeutic benefit. Pt coming to EOB with CGA, heavy use of bed rails. Multiple STS this date from household surfaces, pt required min A x2 for STS, however increased assist of 3 with short mobility trials with end goal being BR commode, multiple seated rest breaks required due to poor balance and poor carryover of cues with 3rd person assisting at RLE. Pt demo'd commode transfer only in BR, denied toileting needs. Pt will continue to benefit from skilled OT to address noted functional deficits and progress toward goals.     Time Calculation:    Time Calculation- OT     Row Name 04/24/23 1217             Time Calculation- OT    OT Start Time 1018  -MW      OT Stop Time 1035  -MW      OT Time Calculation (min) 17 min  -MW      Total Timed Code Minutes- OT 17 minute(s)  -MW      OT Received On 04/24/23  -MW      OT - Next Appointment 04/25/23  -MW         Timed Charges    87599 - OT Self Care/Mgmt Minutes 17  -MW         Total Minutes    Timed Charges Total Minutes 17  -MW       Total Minutes 17  -MW            User  Key  (r) = Recorded By, (t) = Taken By, (c) = Cosigned By    Initials Name Provider Type    Karishma Williamson OT Occupational Therapist              Therapy Charges for Today     Code Description Service Date Service Provider Modifiers Qty    [id number] HC OT SELF CARE/MGMT/TRAIN EA 15 MIN 4/24/2023 Karishma Spence OT GO 1               Karishma Spence OT  4/24/2023

## 2023-04-24 NOTE — CASE MANAGEMENT/SOCIAL WORK
Continued Stay Note  [facility]     Patient Name: Manuel Durant  MRN: [id number]  Today's Date: 4/24/2023    Admit Date: 3/12/2023    Plan: Home to [place] by air with one person assisting for transfers and personal care.   Discharge Plan     Row Name 04/24/23 1415       Plan    Plan Home to [place] by air with one person assisting for transfers and personal care.    Plan Comments Spoke with pt's granddaughter Natalie in [place].  She has been working on travel dates with her father Ariel.  She is able to come to [place] to assist her uncle getting her grandfather the airport and on the plane.  Her uncle will accompany him home.  They request a target date of May 5-8.  She is going to speak with her father today and then begin arranging the flights.  [facility] communicated pt's current status with PT/OT as well as needs and abilities including diet and toileting.  Family is aware and ready to provide needed care.  Natalie will call [facility] tomorrow am with a firmer date and arrangements.  Faye ARMSTRONG               Discharge Codes    No documentation.               Expected Discharge Date and Time     Expected Discharge Date Expected Discharge Time    Apr 28, 2023             Faye Moy RN

## 2023-04-24 NOTE — PLAN OF CARE
Goal Outcome Evaluation:  Plan of Care Reviewed With: patient        Progress: no change  Outcome Evaluation: Pt seen this date for OT/PT tx session in AM to maximize therapeutic benefit. Pt coming to EOB with CGA, heavy use of bed rails. Multiple STS this date from household surfaces, pt required min A x2 for STS, however increased assist of 3 with short mobility trials with end goal being BR commode, multiple seated rest breaks required due to poor balance and poor carryover of cues with 3rd person assisting at RLE. Pt demo'd commode transfer only in BR, denied toileting needs. Pt will continue to benefit from skilled OT to address noted functional deficits and progress toward goals.

## 2023-04-25 PROCEDURE — 97535 SELF CARE MNGMENT TRAINING: CPT

## 2023-04-25 PROCEDURE — 99231 SBSQ HOSP IP/OBS SF/LOW 25: CPT | Performed by: NURSE PRACTITIONER

## 2023-04-25 PROCEDURE — 97530 THERAPEUTIC ACTIVITIES: CPT

## 2023-04-25 PROCEDURE — 92526 ORAL FUNCTION THERAPY: CPT

## 2023-04-25 RX ADMIN — AMLODIPINE BESYLATE 10 MG: 10 TABLET ORAL at 06:30

## 2023-04-25 RX ADMIN — HYDRALAZINE HYDROCHLORIDE 10 MG: 10 TABLET, FILM COATED ORAL at 20:24

## 2023-04-25 RX ADMIN — HYDRALAZINE HYDROCHLORIDE 10 MG: 10 TABLET, FILM COATED ORAL at 13:36

## 2023-04-25 RX ADMIN — VALSARTAN 160 MG: 160 TABLET, FILM COATED ORAL at 06:30

## 2023-04-25 RX ADMIN — CARVEDILOL 6.25 MG: 12.5 TABLET, FILM COATED ORAL at 08:21

## 2023-04-25 RX ADMIN — APIXABAN 5 MG: 5 TABLET, FILM COATED ORAL at 08:21

## 2023-04-25 RX ADMIN — Medication 10 ML: at 20:24

## 2023-04-25 RX ADMIN — BACLOFEN 2.5 MG: 10 TABLET ORAL at 13:36

## 2023-04-25 RX ADMIN — HYDRALAZINE HYDROCHLORIDE 10 MG: 10 TABLET, FILM COATED ORAL at 06:30

## 2023-04-25 RX ADMIN — BACLOFEN 2.5 MG: 10 TABLET ORAL at 06:30

## 2023-04-25 RX ADMIN — LIDOCAINE 1 PATCH: 700 PATCH TOPICAL at 08:22

## 2023-04-25 RX ADMIN — APIXABAN 5 MG: 5 TABLET, FILM COATED ORAL at 20:25

## 2023-04-25 RX ADMIN — Medication 10 ML: at 08:20

## 2023-04-25 RX ADMIN — BACLOFEN 2.5 MG: 10 TABLET ORAL at 20:25

## 2023-04-25 NOTE — PLAN OF CARE
Goal Outcome Evaluation:  Plan of Care Reviewed With: patient           Outcome Evaluation: pt participated with PT this AM. Focus on session on stand pivot transfers as pt will ideally need to be able to transfer with x1 assist to be able to get on a flight with family. Pt requires variable assist during transfers.Atbest he transfers min Ax1 towards his L side.  He required up tomod/max A x1 for transfer towards his R. At times he lufts his R leg up off the floor and attempts tohop on his L foot. Limited carry over session to session for hand placement and technique. Will continue to follow.

## 2023-04-25 NOTE — PLAN OF CARE
Goal Outcome Evaluation:  Plan of Care Reviewed With: patient     Pt seen for Clinical Swallow Re-evaluation. Used  via phone. Pt was oriented to person, city and state; not oriented to situation. No overt s/s of aspiration or penetration observed with thins by cup/straw, NTL by cup/straw, puree, mixed and regular. No difficulty with mastication observed. Recommend continue soft (chopped meats)/no mixed consistencies with NTL. Recommend meds with applesauce or NTL. Sit upright for meals. Pt now appears safe for hydration protocol by cup 30 minutes after meals with adequate oral care. Plan to complete VFSS on next date to objectively assess swallow function.

## 2023-04-25 NOTE — PROGRESS NOTES
[facility] NEUROSURGERY INTRACRANIAL PROGRESS NOTE    PATIENT IDENTIFICATION:   Name:  Manuel Durant      MRN:  [id number]     73 y.o.  male               CC: [facility]      Subjective   Translation jimmy utilized    Interval History: CTh/CTA complete. Blood pressure well controlled.  Denies headache.  Hoping to go back to [place] soon    ROS:  HEENT: No headache  GI: No nausea, vomiting  Neuro: Right-sided weakness    Objective     Vital signs in last 24 hours:  Temp:  [97.3 °F (36.3 °C)-98.1 °F (36.7 °C)] 97.7 °F (36.5 °C)  Heart Rate:  [53-63] 62  Resp:  [18] 18  BP: (112-132)/(61-72) 129/72      Intake/Output this shift:  I/O this shift:  In: 420 [P.O.:420]  Out: -       Intake/Output last 3 shifts:  I/O last 3 completed shifts:  In: 580 [P.O.:580]  Out: -     LABS:  Results from last 7 days   Lab Units 04/24/23  0813 04/21/23  0753   WBC 10*3/mm3 7.17 6.77   HEMOGLOBIN g/dL 14.4 13.7   HEMATOCRIT % 40.5 40.9   PLATELETS 10*3/mm3 223 187     Results from last 7 days   Lab Units 04/24/23  0813 04/21/23  0753   SODIUM mmol/L 140 141   POTASSIUM mmol/L 4.0 4.1   CHLORIDE mmol/L 107 107   CO2 mmol/L 24.0 23.1   BUN mg/dL 13 16   CREATININE mg/dL 0.79 0.71*   CALCIUM mg/dL 9.4 9.0   GLUCOSE mg/dL 103* 84       IMAGING STUDIES:  CTA/CTh- previous area of hyperdense hemorrhage has evolved to a low-attenuation area within the left basal ganglia/thalamus.  Resolved intraventricular hemorrhage.  The area of low density has peripheral enhancement which is likely secondary to hematoma cavity but possible peripherally enhancing mass/cystic lesion cannot be excluded on the basis of the study.  No evidence of vascular abnormality.    I personally viewed and interpreted the patient's CTA/CTh.  Also reviewed by and discussed with Dr. Doreen Rosales reviewed/changed: Yes  Eliquis 5 mg every 12 hours  Baclofen 2.5 mg p.o. every 8 hours      Physical Exam:    General: Awake, alert.  Sitting up in chair.   jimmy utilized for  conversation.  Speech fluent.  CN III IV VI: EOMI  CN VII: Right facial weakness-mild  Motor:    Normal strength left side.  Right side weakness 4/5 proximal.  Antigravity with mild drift  Station and Gait:  Per PT note 4/26-requires variable assist during transfers.Atbest he transfers min Ax1 towards his L side.  He required up tomod/max A x1 for transfer towards his R. At times he lufts his R leg up off the floor and attempts tohop on his L foot. Limited carry over session to session for hand placement and technique.    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  He continues to make progress with speech and weakness.  He is anticipating return to [place] with family in the near future once arrangements can be made.  Neurologically he is stable to improved.  Right-sided weakness continues to improve.  He is antigravity.  His right-sided neglect has improved as well.  CTA/CT head obtained as patient could not be cleared for MRI.  There is evidence of hemorrhage cavity left sided with some peripheral enhancement which may just be related to resolving hemorrhage but underlying mass cannot be excluded.  Patient will need follow-up imaging after further hemorrhage resolution.  Recommend 6 to 8 weeks with CT head with contrast or MRI brain if able to be cleared locally for this.  Patient advised that he needs follow-up with neurologist or neurosurgeon in [place].  This information also placed in ADT under follow-up providers.  Nothing further to add at this time.  Please call with questions or concerns.     PLAN:   Pt will need follow up with local neurologist/neurosurgeon for ongoing follow up and MRI or additional  "CT imaging.    I discussed the patient's findings and my recommendations with patient and Dr. Logan    During patient visit, I utilized appropriate personal protective equipment gloves. Appropriate PPE was worn during the entire visit.  Hand hygiene was completed before and after.      chief complaint, exam data copied forward from previous encounters in EMR is unchanged.          LOS: 44 days       Roxanna Ramirez, APRN  4/25/2023  12:16 EDT    \"Dictated utilizing Dragon dictation\".      "

## 2023-04-25 NOTE — PLAN OF CARE
Goal Outcome Evaluation:              Outcome Evaluation: Patient vital signs are kept within the parameter given by the MD. Patient can't understand and talk in English, uses the phone  to translate simple words or command. Provided comfort measures. All questions and concerns were addressed and acknowledged.

## 2023-04-25 NOTE — CASE MANAGEMENT/SOCIAL WORK
Continued Stay Note  [facility]     Patient Name: Manuel Durant  MRN: [id number]  Today's Date: 4/25/2023    Admit Date: 3/12/2023    Plan: Home to [place] by air with one person assisting for transfers and personal care.  His family is aware of his needs and able to provide care when he arrives.  His cousin is an MD and will assist.  Tentative DC in 2 weeks.   Discharge Plan     Row Name 04/25/23 1104       Plan    Plan Home to [place] by air with one person assisting for transfers and personal care.  His family is aware of his needs and able to provide care when he arrives.  His cousin is an MD and will assist.  Tentative DC in 2 weeks.    Plan Comments Spoke also with liaison for [facility] to discuss possible rehab.  She will check with her administration and call back.  Faye ARMSTRONG    Row Name 04/25/23 1058       Plan    Plan Comments Spoke with [facility] liaison Tammy.  She will review pt's referral, check existing benefits, and speak with her administration about the possibility of accepting pt for rehab.  Spoke with [facility].  She will review the referral, check insurance coverage and call CCP back.   Faye ARMSTRONG               Discharge Codes    No documentation.               Expected Discharge Date and Time     Expected Discharge Date Expected Discharge Time    Apr 28, 2023             Faye Moy RN

## 2023-04-25 NOTE — PLAN OF CARE
Problem: Fall Injury Risk  Goal: Absence of Fall and Fall-Related Injury  Outcome: Ongoing, Progressing  Intervention: Identify and Manage Contributors  Recent Flowsheet Documentation  Taken 4/25/2023 1000 by Johnna Peck RN  Medication Review/Management: medications reviewed  Taken 4/25/2023 0845 by Johnna Peck RN  Medication Review/Management: medications reviewed  Intervention: Promote Injury-Free Environment  Recent Flowsheet Documentation  Taken 4/25/2023 1000 by Johnna Peck RN  Safety Promotion/Fall Prevention:   assistive device/personal items within reach   clutter free environment maintained   room organization consistent   safety round/check completed  Taken 4/25/2023 0845 by Johnna Peck RN  Safety Promotion/Fall Prevention:   clutter free environment maintained   assistive device/personal items within reach   safety round/check completed   room organization consistent     Problem: Fall Injury Risk  Goal: Absence of Fall and Fall-Related Injury  Intervention: Identify and Manage Contributors  Recent Flowsheet Documentation  Taken 4/25/2023 1000 by Johnna Peck RN  Medication Review/Management: medications reviewed  Taken 4/25/2023 0845 by Johnna Peck RN  Medication Review/Management: medications reviewed     Problem: Fall Injury Risk  Goal: Absence of Fall and Fall-Related Injury  Intervention: Promote Injury-Free Environment  Recent Flowsheet Documentation  Taken 4/25/2023 1000 by Johnna Peck RN  Safety Promotion/Fall Prevention:   assistive device/personal items within reach   clutter free environment maintained   room organization consistent   safety round/check completed  Taken 4/25/2023 0845 by Johnna Peck RN  Safety Promotion/Fall Prevention:   clutter free environment maintained   assistive device/personal items within reach   safety round/check completed   room organization consistent     Problem: Skin Injury Risk Increased  Goal: Skin Health and  Integrity  Outcome: Ongoing, Progressing  Intervention: Optimize Skin Protection  Recent Flowsheet Documentation  Taken 4/25/2023 0845 by Johnna Peck RN  Pressure Reduction Techniques:   frequent weight shift encouraged   weight shift assistance provided  Head of Bed (HOB) Positioning: HOB elevated  Pressure Reduction Devices:   alternating pressure pump (ADD)   positioning supports utilized   pressure-redistributing mattress utilized  Skin Protection:   adhesive use limited   tubing/devices free from skin contact   transparent dressing maintained   incontinence pads utilized     Problem: Skin Injury Risk Increased  Goal: Skin Health and Integrity  Intervention: Optimize Skin Protection  Recent Flowsheet Documentation  Taken 4/25/2023 0845 by Johnna Peck RN  Pressure Reduction Techniques:   frequent weight shift encouraged   weight shift assistance provided  Head of Bed (HOB) Positioning: HOB elevated  Pressure Reduction Devices:   alternating pressure pump (ADD)   positioning supports utilized   pressure-redistributing mattress utilized  Skin Protection:   adhesive use limited   tubing/devices free from skin contact   transparent dressing maintained   incontinence pads utilized     Problem: Adult Inpatient Plan of Care  Goal: Plan of Care Review  Outcome: Ongoing, Progressing     Problem: Adult Inpatient Plan of Care  Goal: Absence of Hospital-Acquired Illness or Injury  Outcome: Ongoing, Progressing  Intervention: Identify and Manage Fall Risk  Recent Flowsheet Documentation  Taken 4/25/2023 1000 by Johnna Peck RN  Safety Promotion/Fall Prevention:   assistive device/personal items within reach   clutter free environment maintained   room organization consistent   safety round/check completed  Taken 4/25/2023 0845 by Johnna Peck RN  Safety Promotion/Fall Prevention:   clutter free environment maintained   assistive device/personal items within reach   safety round/check completed   room  organization consistent  Intervention: Prevent Skin Injury  Recent Flowsheet Documentation  Taken 4/25/2023 0845 by Johnna Peck RN  Body Position: position changed independently  Skin Protection:   adhesive use limited   tubing/devices free from skin contact   transparent dressing maintained   incontinence pads utilized  Intervention: Prevent and Manage VTE (Venous Thromboembolism) Risk  Recent Flowsheet Documentation  Taken 4/25/2023 0845 by Johnna Peck RN  VTE Prevention/Management:   bilateral   sequential compression devices on  Intervention: Prevent Infection  Recent Flowsheet Documentation  Taken 4/25/2023 1000 by Johnna Peck RN  Infection Prevention:   environmental surveillance performed   equipment surfaces disinfected   hand hygiene promoted   personal protective equipment utilized   rest/sleep promoted   single patient room provided  Taken 4/25/2023 0845 by Johnna Peck RN  Infection Prevention:   visitors restricted/screened   single patient room provided   rest/sleep promoted   personal protective equipment utilized   hand hygiene promoted   equipment surfaces disinfected   cohorting utilized   environmental surveillance performed     Problem: Adult Inpatient Plan of Care  Goal: Absence of Hospital-Acquired Illness or Injury  Intervention: Identify and Manage Fall Risk  Recent Flowsheet Documentation  Taken 4/25/2023 1000 by Johnna Peck RN  Safety Promotion/Fall Prevention:   assistive device/personal items within reach   clutter free environment maintained   room organization consistent   safety round/check completed  Taken 4/25/2023 0845 by Johnna Peck RN  Safety Promotion/Fall Prevention:   clutter free environment maintained   assistive device/personal items within reach   safety round/check completed   room organization consistent     Problem: Adult Inpatient Plan of Care  Goal: Absence of Hospital-Acquired Illness or Injury  Intervention: Prevent Skin  Injury  Recent Flowsheet Documentation  Taken 4/25/2023 0845 by Johnna Peck RN  Body Position: position changed independently  Skin Protection:   adhesive use limited   tubing/devices free from skin contact   transparent dressing maintained   incontinence pads utilized     Problem: Adult Inpatient Plan of Care  Goal: Absence of Hospital-Acquired Illness or Injury  Intervention: Prevent and Manage VTE (Venous Thromboembolism) Risk  Recent Flowsheet Documentation  Taken 4/25/2023 0845 by Johnna Peck RN  VTE Prevention/Management:   bilateral   sequential compression devices on     Problem: Adult Inpatient Plan of Care  Goal: Absence of Hospital-Acquired Illness or Injury  Intervention: Prevent Infection  Recent Flowsheet Documentation  Taken 4/25/2023 1000 by Johnna Peck RN  Infection Prevention:   environmental surveillance performed   equipment surfaces disinfected   hand hygiene promoted   personal protective equipment utilized   rest/sleep promoted   single patient room provided  Taken 4/25/2023 0845 by Johnna Peck RN  Infection Prevention:   visitors restricted/screened   single patient room provided   rest/sleep promoted   personal protective equipment utilized   hand hygiene promoted   equipment surfaces disinfected   cohorting utilized   environmental surveillance performed     Problem: Adult Inpatient Plan of Care  Goal: Optimal Comfort and Wellbeing  Outcome: Ongoing, Progressing  Intervention: Monitor Pain and Promote Comfort  Recent Flowsheet Documentation  Taken 4/25/2023 0845 by Johnna Peck RN  Pain Management Interventions:   position adjusted   pillow support provided  Intervention: Provide Person-Centered Care  Recent Flowsheet Documentation  Taken 4/25/2023 0845 by Johnna Peck RN  Trust Relationship/Rapport:   care explained   reassurance provided   thoughts/feelings acknowledged   questions answered     Problem: Adult Inpatient Plan of Care  Goal: Optimal Comfort  and Wellbeing  Intervention: Monitor Pain and Promote Comfort  Recent Flowsheet Documentation  Taken 4/25/2023 0845 by Johnna Peck RN  Pain Management Interventions:   position adjusted   pillow support provided     Problem: Adult Inpatient Plan of Care  Goal: Optimal Comfort and Wellbeing  Intervention: Provide Person-Centered Care  Recent Flowsheet Documentation  Taken 4/25/2023 0845 by Johnna Peck RN  Trust Relationship/Rapport:   care explained   reassurance provided   thoughts/feelings acknowledged   questions answered     Problem: Adjustment to Illness (Stroke, Ischemic/Transient Ischemic Attack)  Goal: Optimal Coping  Outcome: Ongoing, Progressing  Intervention: Support Psychosocial Response to Stroke  Recent Flowsheet Documentation  Taken 4/25/2023 0845 by Johnna Peck RN  Supportive Measures:   active listening utilized   positive reinforcement provided  Family/Support System Care: self-care encouraged     Problem: Bowel Elimination Impaired (Stroke, Ischemic/Transient Ischemic Attack)  Goal: Effective Bowel Elimination  Outcome: Ongoing, Progressing     Problem: Communication Impairment (Stroke, Ischemic/Transient Ischemic Attack)  Goal: Improved Communication Skills  Outcome: Ongoing, Progressing  Intervention: Optimize Communication Skills  Recent Flowsheet Documentation  Taken 4/25/2023 0845 by Johnna Peck RN  Communication Enhancement Strategies:   call light answered in person   repetition utilized   one-step directions provided   extra time allowed for response   device use encouraged     Problem: Cognitive Impairment (Stroke, Ischemic/Transient Ischemic Attack)  Goal: Optimal Cognitive Function  Outcome: Ongoing, Progressing  Intervention: Optimize Cognitive Function  Recent Flowsheet Documentation  Taken 4/25/2023 0845 by Johnna Peck RN  Sensory Stimulation Regulation: care clustered  Reorientation Measures: clock in view     Problem: Sensorimotor Impairment (Stroke,  Ischemic/Transient Ischemic Attack)  Goal: Improved Sensorimotor Function  Outcome: Ongoing, Progressing  Intervention: Optimize Range of Motion, Motor Control and Function  Recent Flowsheet Documentation  Taken 4/25/2023 0845 by Johnna Peck RN  Spasticity Management: positioned with supportive device  Positioning/Transfer Devices:   pillows   in use  Intervention: Optimize Sensory and Perceptual Ability  Recent Flowsheet Documentation  Taken 4/25/2023 0845 by Johnna Peck RN  Pressure Reduction Techniques:   frequent weight shift encouraged   weight shift assistance provided  Pressure Reduction Devices:   alternating pressure pump (ADD)   positioning supports utilized   pressure-redistributing mattress utilized     Problem: Sensorimotor Impairment (Stroke, Ischemic/Transient Ischemic Attack)  Goal: Improved Sensorimotor Function  Intervention: Optimize Range of Motion, Motor Control and Function  Recent Flowsheet Documentation  Taken 4/25/2023 0845 by Johnna Peck RN  Spasticity Management: positioned with supportive device  Positioning/Transfer Devices:   pillows   in use     Problem: Swallowing Impairment (Stroke, Ischemic/Transient Ischemic Attack)  Goal: Optimal Eating and Swallowing without Aspiration  Intervention: Optimize Eating and Swallowing  Recent Flowsheet Documentation  Taken 4/25/2023 0845 by Johnna Peck RN  Swallowing Interventions: Dysphagia: upright position maintained 30 mins after intake   Goal Outcome Evaluation:

## 2023-04-25 NOTE — THERAPY TREATMENT NOTE
Patient Name: Manuel Durant  : 1950    MRN: [id number]                              Today's Date: 2023       Admit Date: 3/12/2023    Visit Dx:     ICD-10-CM ICD-9-CM   1. Intracranial hemorrhage  I62.9 432.9   2. Altered mental status, unspecified altered mental status type  R41.82 780.97     Patient Active Problem List   Diagnosis   • Intracranial hemorrhage   • Hypertensive emergency   • Acute DVT (deep venous thrombosis)   • HTN (hypertension)   • Severe Malnutrition (HCC)   • Hemiplegia   • Right shoulder pain     Past Medical History:   Diagnosis Date   • Hypokalemia 3/17/2023     History reviewed. No pertinent surgical history.   General Information     Row Name 23 1237          OT Time and Intention    Document Type therapy note (daily note)  -MW     Mode of Treatment co-treatment;occupational therapy  -     Row Name 23 1237          General Information    Patient Profile Reviewed yes  -MW     Existing Precautions/Restrictions fall  -MW     Row Name 23 1237          Cognition    Orientation Status (Cognition) oriented to;person  -     Row Name 23 1237          Safety Issues, Functional Mobility    Impairments Affecting Function (Mobility) balance;coordination;endurance/activity tolerance;grasp;motor control;postural/trunk control;strength;range of motion (ROM);cognition;visual/perceptual  -           User Key  (r) = Recorded By, (t) = Taken By, (c) = Cosigned By    Initials Name Provider Type    MW Karishma pSence OT Occupational Therapist                 Mobility/ADL's     Row Name 23 1237          Bed Mobility    Bed Mobility supine-sit  -MW     Supine-Sit Linn (Bed Mobility) standby assist;contact guard  -MW     Sit-Supine Linn (Bed Mobility) not tested  -MW     Assistive Device (Bed Mobility) bed rails;head of bed elevated  -MW     Comment, (Bed Mobility) heavy reliance on bed rail with sup <> sit  -MW     Row Name 23 1232           Transfers    Transfers sit-stand transfer;stand-sit transfer;bed-chair transfer  -     Row Name 04/25/23 1237          Bed-Chair Transfer    Bed-Chair Pinal (Transfers) 1 person assist;moderate assist (50% patient effort);verbal cues;minimum assist (75% patient effort);nonverbal cues (demo/gesture)  -     Assistive Device (Bed-Chair Transfers) other (see comments)  -     Comment, (Bed-Chair Transfer) HHA; total x9 stand pivot transfers to both sides with increased difficulty to R side, min A x1 to L side at times, in prep for potential airline travel back to [place]  -     Row Name 04/25/23 1237          Sit-Stand Transfer    Sit-Stand Pinal (Transfers) minimum assist (75% patient effort);verbal cues  -     Assistive Device (Sit-Stand Transfers) other (see comments)  -     Comment, (Sit-Stand Transfer) HHA  -     Row Name 04/25/23 1237          Stand-Sit Transfer    Stand-Sit Pinal (Transfers) minimum assist (75% patient effort)  -     Row Name 04/25/23 1237          Toilet Transfer    Comment, (Toilet Transfer) simulation with stand pivots to chair  -     Row Name 04/25/23 1237          Activities of Daily Living    BADL Assessment/Intervention lower body dressing  -     Row Name 04/25/23 1237          Lower Body Dressing Assessment/Training    Position (Lower Body Dressing) edge of bed sitting  -     Comment, (Lower Body Dressing) educated on karen dressing tech with one handed techinque - pt required max A to thread sock and mod A to complete task entirely due to impaired use of RUE  -           User Key  (r) = Recorded By, (t) = Taken By, (c) = Cosigned By    Initials Name Provider Type    Karishma Williamson OT Occupational Therapist               Obj/Interventions     Row Name 04/25/23 1240          Shoulder (Therapeutic Exercise)    Shoulder AROM (Therapeutic Exercise) bilateral;aBduction;aDduction;scapular elevation;15 repititions;sitting  -     Shoulder  AAROM (Therapeutic Exercise) right;extension;flexion;10 repetitions  -MW     Row Name 04/25/23 1240          Motor Skills    Therapeutic Exercise shoulder  -MW     Row Name 04/25/23 1240          Balance    Balance Assessment sitting static balance;sitting dynamic balance;sit to stand dynamic balance;standing static balance;standing dynamic balance  -MW     Static Sitting Balance standby assist  -MW     Dynamic Sitting Balance contact guard  -MW     Position, Sitting Balance sitting edge of bed  -MW     Sit to Stand Dynamic Balance minimal assist;moderate assist  -MW     Static Standing Balance minimal assist  -MW     Dynamic Standing Balance minimal assist;moderate assist;maximum assist;1-person assist  -MW     Position/Device Used, Standing Balance supported;other (see comments)  HHAx1  -MW           User Key  (r) = Recorded By, (t) = Taken By, (c) = Cosigned By    Initials Name Provider Type    Karishma Williamson OT Occupational Therapist               Goals/Plan    No documentation.                Clinical Impression     Row Name 04/25/23 1241          Pain Assessment    Pretreatment Pain Rating 0/10 - no pain  -MW     Posttreatment Pain Rating 0/10 - no pain  -MW     Row Name 04/25/23 1241          Plan of Care Review    Plan of Care Reviewed With patient  -MW     Progress no change  -MW     Outcome Evaluation Pt seen for OT this AM, pt agreeable and focus on functional transfers in prep for potential air travel with assist x1 from a family member. Pt with varying levels of assist with stand pivot transfers this date, from min A x1 to L side and mod-max A x1 to R side. Pt required cues for hand placement, poor carryover between reps due to cognition. Educated on karen dressing tech with sock donning task, required max A for threading, mod A to complete task due to impaired use of RUE and impaired ROM of BLE. Pt CGA/min A for sit balance throughout. AAROM/AROM to bilat shoulder planes completed at EOB to  improve overall functional use. Once UIC at end of session, pt s/up for self feeding, max A to open containers and s/up tray. Pt able to self feed with spoon with min spillage. Continue to progress as able.  -     Row Name 04/25/23 1241          Therapy Plan Review/Discharge Plan (OT)    Anticipated Discharge Disposition (OT) inpatient rehabilitation facility  -     Row Name 04/25/23 1241          Vital Signs    O2 Delivery Pre Treatment room air  -MW     Pre Patient Position Supine  -MW     Intra Patient Position Standing  -MW     Post Patient Position Sitting  -     Row Name 04/25/23 1241          Positioning and Restraints    Pre-Treatment Position in bed  -MW     Post Treatment Position chair  -MW     In Chair notified nsg;reclined;call light within reach;exit alarm on;encouraged to call for assist  -MW           User Key  (r) = Recorded By, (t) = Taken By, (c) = Cosigned By    Initials Name Provider Type    Karishma Williamson, OT Occupational Therapist               Outcome Measures     Row Name 04/25/23 1244          How much help from another is currently needed...    Putting on and taking off regular lower body clothing? 2  -MW     Bathing (including washing, rinsing, and drying) 2  -MW     Toileting (which includes using toilet bed pan or urinal) 1  -MW     Putting on and taking off regular upper body clothing 2  -MW     Taking care of personal grooming (such as brushing teeth) 2  -MW     Eating meals 3  -MW     AM-PAC 6 Clicks Score (OT) 12  -MW     Row Name 04/25/23 0936          How much help from another person do you currently need...    Turning from your back to your side while in flat bed without using bedrails? 3  -CW     Moving from lying on back to sitting on the side of a flat bed without bedrails? 3  -CW     Moving to and from a bed to a chair (including a wheelchair)? 3  -CW     Standing up from a chair using your arms (e.g., wheelchair, bedside chair)? 3  -CW     Climbing 3-5 steps  with a railing? 1  -CW     To walk in hospital room? 2  -CW     AM-PAC 6 Clicks Score (PT) 15  -CW     Highest level of mobility 4 --> Transferred to chair/commode  -CW     Row Name 04/25/23 1244 04/25/23 0936       Functional Assessment    Outcome Measure Options AM-PAC 6 Clicks Daily Activity (OT)  -MW AM-PAC 6 Clicks Basic Mobility (PT)  -CW          User Key  (r) = Recorded By, (t) = Taken By, (c) = Cosigned By    Initials Name Provider Type    Tamica Eddy, PT Physical Therapist    Karishma Williamson, OT Occupational Therapist                Occupational Therapy Education     Title: PT OT SLP Therapies (Done)     Topic: Occupational Therapy (Done)     Point: ADL training (Done)     Description:   Instruct learner(s) on proper safety adaptation and remediation techniques during self care or transfers.   Instruct in proper use of assistive devices.              Learning Progress Summary           Patient Acceptance, E,TB,H, VU by MS at 4/21/2023 1838    Acceptance, E,TB,H, VU,NR by MS at 4/13/2023 1817    Acceptance, E,TB,H, VU by MS at 3/16/2023 1801    Acceptance, E, NR by LM at 3/15/2023 0521   Family Acceptance, E,TB,H, VU by MS at 4/21/2023 1838    Acceptance, E,TB,H, VU,NR by MS at 4/13/2023 1817    Acceptance, E,TB,H, VU by MS at 3/16/2023 1801                   Point: Home exercise program (Done)     Description:   Instruct learner(s) on appropriate technique for monitoring, assisting and/or progressing therapeutic exercises/activities.              Learning Progress Summary           Patient Acceptance, E,TB,H, VU by MS at 4/21/2023 1838    Acceptance, E,TB,H, VU,NR by MS at 4/13/2023 1817    Acceptance, E,TB,H, VU by MS at 3/16/2023 1801    Acceptance, E, NR by LM at 3/15/2023 0521   Family Acceptance, E,TB,H, VU by MS at 4/21/2023 1838    Acceptance, E,TB,H, VU,NR by MS at 4/13/2023 1817    Acceptance, E,TB,H, VU by MS at 3/16/2023 1801                   Point: Precautions (Done)      Description:   Instruct learner(s) on prescribed precautions during self-care and functional transfers.              Learning Progress Summary           Patient Acceptance, E,TB,H, VU by MS at 4/21/2023 1838    Acceptance, E,TB,H, VU,NR by MS at 4/13/2023 1817    Acceptance, E,TB,H, VU by MS at 3/16/2023 1801    Acceptance, E, NR by LM at 3/15/2023 0521   Family Acceptance, E,TB,H, VU by MS at 4/21/2023 1838    Acceptance, E,TB,H, VU,NR by MS at 4/13/2023 1817    Acceptance, E,TB,H, VU by MS at 3/16/2023 1801                   Point: Body mechanics (Done)     Description:   Instruct learner(s) on proper positioning and spine alignment during self-care, functional mobility activities and/or exercises.              Learning Progress Summary           Patient Acceptance, E,TB,H, VU by MS at 4/21/2023 1838    Acceptance, E,TB,H, VU,NR by MS at 4/13/2023 1817    Acceptance, E,TB,H, VU by MS at 3/16/2023 1801    Acceptance, E, NR by LM at 3/15/2023 0521   Family Acceptance, E,TB,H, VU by MS at 4/21/2023 1838    Acceptance, E,TB,H, VU,NR by MS at 4/13/2023 1817    Acceptance, E,TB,H, VU by MS at 3/16/2023 1801                               User Key     Initials Effective Dates Name Provider Type Discipline    MS 06/16/21 -  Angel Luis Cr, RN Registered Nurse Nurse     10/13/22 -  Silke Grace, RN Registered Nurse Nurse              OT Recommendation and Plan     Plan of Care Review  Plan of Care Reviewed With: patient  Progress: no change  Outcome Evaluation: Pt seen for OT this AM, pt agreeable and focus on functional transfers in prep for potential air travel with assist x1 from a family member. Pt with varying levels of assist with stand pivot transfers this date, from min A x1 to L side and mod-max A x1 to R side. Pt required cues for hand placement, poor carryover between reps due to cognition. Educated on karen dressing tech with sock donning task, required max A for threading, mod A to complete task due to  impaired use of RUE and impaired ROM of BLE. Pt CGA/min A for sit balance throughout. AAROM/AROM to bilat shoulder planes completed at EOB to improve overall functional use. Once UIC at end of session, pt s/up for self feeding, max A to open containers and s/up tray. Pt able to self feed with spoon with min spillage. Continue to progress as able.     Time Calculation:    Time Calculation- OT     Row Name 04/25/23 1245             Time Calculation- OT    OT Start Time 0830  -MW      OT Stop Time 0859  -MW      OT Time Calculation (min) 29 min  -MW      Total Timed Code Minutes- OT 29 minute(s)  -MW      OT Received On 04/25/23  -MW      OT - Next Appointment 04/26/23  -MW         Timed Charges    00744 - OT Therapeutic Exercise Minutes 9  -MW      17695 - OT Therapeutic Activity Minutes 10  -MW      81512 - OT Self Care/Mgmt Minutes 10  -MW         Total Minutes    Timed Charges Total Minutes 29  -MW       Total Minutes 29  -MW            User Key  (r) = Recorded By, (t) = Taken By, (c) = Cosigned By    Initials Name Provider Type    Karishma Williamson OT Occupational Therapist              Therapy Charges for Today     Code Description Service Date Service Provider Modifiers Qty    29193253018 HC OT SELF CARE/MGMT/TRAIN EA 15 MIN 4/24/2023 Karishma Spence OT GO 1    10296042866 HC OT THERAPEUTIC ACT EA 15 MIN 4/25/2023 Karishma Spence OT GO 1    81926212207 HC OT SELF CARE/MGMT/TRAIN EA 15 MIN 4/25/2023 Karishma Spence OT GO 1               Karishma Spence OT  4/25/2023

## 2023-04-25 NOTE — CASE MANAGEMENT/SOCIAL WORK
Continued Stay Note  [facility]     Patient Name: Manuel Durant  MRN: [id number]  Today's Date: 4/25/2023    Admit Date: 3/12/2023    Plan: Home to [place] by air with one person assisting for transfers and personal care.  Family is aware of needs and state they are able to provide care for transport and when he arrives home.  Tentative DC May 20 if air travel can be arranged.   Discharge Plan     Row Name 04/25/23 1659       Plan    Plan Home to [place] by air with one person assisting for transfers and personal care.  Family is aware of needs and state they are able to provide care for transport and when he arrives home.  Tentative DC May 20 if air travel can be arranged.    Plan Comments Received call from pt's granddaughter Natalie.  Discussed again pt's PT/OT notes and needs.  She states that her uncle is the only family member with a VISA as he travels for work.  He will be the person traveling with and caring for her grandfather.  He can come as early as this weekend or not until May 20.  Discussed current requirements for assistance and that it does not appear he will be able to travel by air by this weekend.  Natalie will speak again with her father and uncle and start working on arrangements with the airline to travel with a disabled person and alert them of his needs.  Tentative DC May 20, 2023.  She will call back tomorrow with more details.  Faye ARMSTRONG               Discharge Codes    No documentation.               Expected Discharge Date and Time     Expected Discharge Date Expected Discharge Time    Apr 28, 2023             Faye Moy RN

## 2023-04-25 NOTE — CASE MANAGEMENT/SOCIAL WORK
Continued Stay Note  [facility]     Patient Name: Manuel Durant  MRN: [id number]  Today's Date: 4/25/2023    Admit Date: 3/12/2023    Plan: Home to [place] by air with one person assisting for transfers and personal care.  His family is aware of his needs and able to provide care when he arrives.  His cousin is an MD and will assist.  Tentative DC in 2 weeks.   Discharge Plan     Row Name 04/25/23 1202       Plan    Plan Comments Received call back from Anika for [facility].  She states that they will not be able to accept due to lack of insurance coverage.  Faye ARMSTRONG    Row Name 04/25/23 1104                      Discharge Codes    No documentation.               Expected Discharge Date and Time     Expected Discharge Date Expected Discharge Time    Apr 28, 2023             Faye Moy RN

## 2023-04-25 NOTE — PLAN OF CARE
Goal Outcome Evaluation:  Plan of Care Reviewed With: patient        Progress: no change  Outcome Evaluation: Pt seen for OT this AM, pt agreeable and focus on functional transfers in prep for potential air travel with assist x1 from a family member. Pt with varying levels of assist with stand pivot transfers this date, from min A x1 to L side and mod-max A x1 to R side. Pt required cues for hand placement, poor carryover between reps due to cognition. Educated on karen dressing tech with sock donning task, required max A for threading, mod A to complete task due to impaired use of RUE and impaired ROM of BLE. Pt CGA/min A for sit balance throughout. AAROM/AROM to bilat shoulder planes completed at EOB to improve overall functional use. Once UIC at end of session, pt s/up for self feeding, max A to open containers and s/up tray. Pt able to self feed with spoon with min spillage. Continue to progress as able.

## 2023-04-25 NOTE — PROGRESS NOTES
Name: Manuel Durant ADMIT: 3/12/2023   : 1950  PCP: Provider, No Known    MRN: [id number] LOS: 44 days   AGE/SEX: 73 y.o. male  ROOM: [facility]     Subjective   Subjective   Resting in chair. No family present. Still without complaints. States he is eating/drinking. Slept okay. No overt pain or trouble breathing. Eating 75% to 100% of meals. BM yesterday.     Objective   Objective   Vital Signs  Temp:  [97.3 °F (36.3 °C)-98.1 °F (36.7 °C)] 97.7 °F (36.5 °C)  Heart Rate:  [53-63] 62  Resp:  [18] 18  BP: (112-132)/(61-72) 129/72  SpO2:  [90 %-100 %] 98 %  on   ;   Device (Oxygen Therapy): room air  Body mass index is 22.2 kg/m².     Physical Exam  Vitals and nursing note reviewed.   Constitutional:       General: He is awake and alert. He is not in acute distress. Lying in bed on RA  Cardiovascular:      Rate and Rhythm: Normal rate and regular rhythm.      Pulses: Normal pulses.      Heart sounds: Normal heart sounds. No murmur heard.  Pulmonary:      Effort: Pulmonary effort is normal. No respiratory distress.      Breath sounds: Normal breath sounds.   Abdominal:      Palpations: Abdomen is soft and non-tender. Bowel sounds active.    Musculoskeletal:      Right shoulder pain with passive ROM     Right lower leg: No edema.      Left lower leg: No edema.  Skin:     General: Skin is dry.   Neurological:      Motor: Weakness present.      Comments: Alert and oriented to person/place. Intermittent disorientation. Right-sided upper extremity paresis- slowing improving as he has mobility in raising it as well as his right leg.   Psychiatric:         Mood and Affect: Mood normal.         Behavior: Behavior normal.         Thought Content: Thought content normal.         Judgment: Judgment normal    Results Review:       I reviewed the patient's new clinical results.  Results from last 7 days   Lab Units 23  0813 23  0753   WBC 10*3/mm3 7.17 6.77   HEMOGLOBIN g/dL 14.4 13.7   PLATELETS 10*3/mm3 223  187     Results from last 7 days   Lab Units 04/24/23  0813 04/21/23  0753   SODIUM mmol/L 140 141   POTASSIUM mmol/L 4.0 4.1   CHLORIDE mmol/L 107 107   CO2 mmol/L 24.0 23.1   BUN mg/dL 13 16   CREATININE mg/dL 0.79 0.71*   GLUCOSE mg/dL 103* 84   Estimated Creatinine Clearance: 75.7 mL/min (by C-G formula based on SCr of 0.79 mg/dL).    Results from last 7 days   Lab Units 04/24/23  0813 04/21/23  0753   CALCIUM mg/dL 9.4 9.0       No results found for: HGBA1C, POCGLU    amLODIPine, 10 mg, Oral, QAM AC  apixaban, 5 mg, Oral, Q12H  baclofen, 2.5 mg, Oral, Q8H  carvedilol, 6.25 mg, Oral, BID With Meals  hydrALAZINE, 10 mg, Oral, Q8H  lidocaine, 1 patch, Transdermal, Q24H  sodium chloride, 10 mL, Intravenous, Q12H  valsartan, 160 mg, Oral, QAM AC       Diet: Regular/House Diet; No Mixed Consistencies, Feeding Assistance - Nursing; Texture: Soft to Chew (NDD 3); Soft to Chew: Chopped Meat; Fluid Consistency: Nectar Thick       Assessment/Plan     Active Hospital Problems    Diagnosis  POA   • **Intracranial hemorrhage [I62.9]  Yes   • Hemiplegia [G81.90]  Yes   • Right shoulder pain [M25.511]  Yes   • Severe Malnutrition (HCC) [E43]  Yes   • Acute DVT (deep venous thrombosis) [I82.409]  Yes   • HTN (hypertension) [I10]  Yes   • Hypertensive emergency [I16.1]  Yes      Resolved Hospital Problems    Diagnosis Date Resolved POA   • Hypokalemia [E87.6] 04/10/2023 Unknown     Mr. Durant is a 73 year old male who initially presented to the hospital 3/12/23 for intracranial bleed. He was found unresponsive by friends and initially taken to [facility] where he was found to have poorly controlled BP and right sided hemiplegia. He was transferred here for closer monitoring in the ICU and neurosurgical consultation. He was cleared to move out of the ICU on 3/16/23.     • Intracranial hemorrhage: CTH 3/17/23 stable left basal ganglia/internal capsule hemorrhage with left lateral ventricular hemorrhage. On Lovenox for DVT  and repeat CTH 3/18 also stable. ANDRADE followed and signed off 3/23 with plans for repeat MRI brain/MRA head in 1 month- ANDRADE saw again 4/20- plans for repeat imaging. Goal BP < 150 systolic. Remains on baclofen for spasticity.      • HTN emergency: On amlodipine 10 mg, valsartan 160 mg, Coreg 6.25 mg BID and hydralazine 10 mg TID. BP well controlled.      • Acute DVT: Found 3/17/23 with acute RLE DVT in soleal. ANDRADE okayed for blood thinners. Now on Eliquis.     • Hypokalemia: Resolved.     Discussed with patient.      Medically stable. Family is setting up a time to come to [place] and get him. He will need to be assist x 1 to fly. CCP discussing with [facility] again who has rejected him both here and [facility].     • Eliquis for DVT prophylaxis.  • Full code.  • Anticipate discharge as above.      SLICK Arroyo  [facility]  04/25/23  12:49 EDT

## 2023-04-25 NOTE — THERAPY TREATMENT NOTE
Patient Name: Manuel Durant  : 1950    MRN: [id number]                              Today's Date: 2023       Admit Date: 3/12/2023    Visit Dx:     ICD-10-CM ICD-9-CM   1. Intracranial hemorrhage  I62.9 432.9   2. Altered mental status, unspecified altered mental status type  R41.82 780.97     Patient Active Problem List   Diagnosis   • Intracranial hemorrhage   • Hypertensive emergency   • Acute DVT (deep venous thrombosis)   • HTN (hypertension)   • Severe Malnutrition (HCC)   • Hemiplegia   • Right shoulder pain     Past Medical History:   Diagnosis Date   • Hypokalemia 3/17/2023     History reviewed. No pertinent surgical history.   General Information     Row Name 23          Physical Therapy Time and Intention    Document Type therapy note (daily note)  -CW     Mode of Treatment physical therapy  -CW     Row Name 23          General Information    Patient Profile Reviewed yes  -CW     Existing Precautions/Restrictions fall  -CW     Row Name 23          Cognition    Orientation Status (Cognition) oriented to;person  -CW     Row Name 23          Safety Issues, Functional Mobility    Impairments Affecting Function (Mobility) balance;coordination;endurance/activity tolerance;grasp;motor control;postural/trunk control;strength;range of motion (ROM);cognition;visual/perceptual  -CW           User Key  (r) = Recorded By, (t) = Taken By, (c) = Cosigned By    Initials Name Provider Type    CW Tamica Willoughby PT Physical Therapist               Mobility     Row Name 23          Bed Mobility    Bed Mobility supine-sit  -CW     Sit-Supine [place] (Bed Mobility) standby assist  -CW     Assistive Device (Bed Mobility) bed rails;head of bed elevated  -CW     Row Name 23          Bed-Chair Transfer    Bed-Chair [place] (Transfers) 1 person assist;moderate assist (50% patient effort);verbal cues;minimum assist (75% patient  effort);nonverbal cues (demo/gesture)  -CW     Assistive Device (Bed-Chair Transfers) other (see comments)  HHA  -CW     Comment, (Bed-Chair Transfer) completed x9 transfers bed<>chair for practice, most difficulty transferringtowards R side. Lifts R leg off the floor at times and attempts to hop. min A x1 to L at times, up to mod/max Ax1 for transfer towards R side  -CW     Row Name 04/25/23 0921          Sit-Stand Transfer    Sit-Stand [place] (Transfers) minimum assist (75% patient effort);verbal cues  -CW     Comment, (Sit-Stand Transfer) HHA  -CW     Row Name 04/25/23 0921          Gait/Stairs (Locomotion)    [place] Level (Gait) not tested  -CW           User Key  (r) = Recorded By, (t) = Taken By, (c) = Cosigned By    Initials Name Provider Type    Tamica Eddy PT Physical Therapist               Obj/Interventions     Row Name 04/25/23 0924          Motor Skills    Therapeutic Exercise other (see comments)  R rosette hogan AP x20 reps  -CW     Row Name 04/25/23 0924          Balance    Static Sitting Balance standby assist  -CW     Dynamic Sitting Balance contact guard;standby assist  -CW     Dynamic Standing Balance moderate assist;minimal assist;maximum assist;1-person assist;verbal cues;non-verbal cues (demo/gesture)  -CW     Comment, Balance variable assist for balance during transfers -min A x1 up to mod/max A x1  -CW           User Key  (r) = Recorded By, (t) = Taken By, (c) = Cosigned By    Initials Name Provider Type    Tamica Eddy PT Physical Therapist               Goals/Plan    No documentation.                Clinical Impression     Row Name 04/25/23 0925          Pain    Pretreatment Pain Rating 0/10 - no pain  -CW     Posttreatment Pain Rating 0/10 - no pain  -CW     Row Name 04/25/23 0925          Plan of Care Review    Plan of Care Reviewed With patient  -CW     Outcome Evaluation pt participated with PT this AM. Focus on session on stand pivot transfers as pt will  ideally need to be able to transfer with x1 assist to be able to get on a flight with family. Pt requires variable assist during transfers.Atbest he transfers min Ax1 towards his L side.  He required up tomod/max A x1 for transfer towards his R. At times he lufts his R leg up off the floor and attempts tohop on his L foot. Limited carry over session to session for hand placement and technique. Will continue to follow.  -CW     Row Name 04/25/23 0925          Vital Signs    O2 Delivery Pre Treatment room air  -CW     Row Name 04/25/23 0925          Positioning and Restraints    Pre-Treatment Position in bed  -CW     Post Treatment Position chair  -CW     In Chair reclined;call light within reach;encouraged to call for assist;exit alarm on;notified nsg;legs elevated  -CW           User Key  (r) = Recorded By, (t) = Taken By, (c) = Cosigned By    Initials Name Provider Type    Tamica Eddy PT Physical Therapist               Outcome Measures     Row Name 04/25/23 0936 04/24/23 7929       How much help from another person do you currently need...    Turning from your back to your side while in flat bed without using bedrails? 3  -CW 4  -MP    Moving from lying on back to sitting on the side of a flat bed without bedrails? 3  -CW 3  -MP    Moving to and from a bed to a chair (including a wheelchair)? 3  -CW 2  -MP    Standing up from a chair using your arms (e.g., wheelchair, bedside chair)? 3  -CW 3  -MP    Climbing 3-5 steps with a railing? 1  -CW 1  -MP    To walk in hospital room? 2  -CW 2  -MP    AM-PAC 6 Clicks Score (PT) 15  -CW 15  -MP    Highest level of mobility 4 --> Transferred to chair/commode  -CW 4 --> Transferred to chair/commode  -MP    Row Name 04/25/23 0936          Functional Assessment    Outcome Measure Options AM-PAC 6 Clicks Basic Mobility (PT)  -CW           User Key  (r) = Recorded By, (t) = Taken By, (c) = Cosigned By    Initials Name Provider Type    Tamica Eddy PT Physical  Therapist    ALOK Ramos, Deana Roper RN Registered Nurse                             Physical Therapy Education     Title: PT OT SLP Therapies (Done)     Topic: Physical Therapy (Done)     Point: Mobility training (Done)     Learning Progress Summary           Patient Acceptance, E,TB,H, VU by MS at 4/21/2023 1838    Acceptance, E, NR by CW at 4/21/2023 1537    Acceptance, E, VU,DU by JEROMYB at 4/19/2023 1130    Acceptance, E, NR by CW at 4/18/2023 0950    Acceptance, E, VU,NR by NICOLASA at 4/16/2023 1158    Acceptance, E,TB,H, VU,NR by MS at 4/13/2023 1817    Acceptance, E, NR by CW at 4/13/2023 1341    Acceptance, E, NR by CW at 4/12/2023 1201    Acceptance, E, NR by CW at 4/11/2023 1158    Acceptance, E, NR by CW at 4/10/2023 1414    Acceptance, E,TB, NR by CS at 4/8/2023 0928    Acceptance, E, NR by CW at 4/7/2023 1215    Acceptance, E, NR by CW at 4/6/2023 1335    Acceptance, E, NR by CW at 4/5/2023 1043    Acceptance, E, NR by CW at 4/4/2023 0906    Acceptance, E, NR by CW at 4/3/2023 1531    Acceptance, E,TB, VU,NR by CAMERON at 4/1/2023 1530    Acceptance, E, NR by CW at 3/31/2023 0925    Acceptance, E, NR by CW at 3/30/2023 1209    Acceptance, E, NR,NL by CW at 3/29/2023 1336    Acceptance, E, NR by ZB at 3/28/2023 1317    Acceptance, E, NR by CW at 3/27/2023 1401    Acceptance, E,D, NR,VU by JM at 3/24/2023 1631    Comment: seemed to comprehend commands this session    Acceptance, E,D, NR by ZACH at 3/23/2023 1655    Acceptance, E,D, NR by JM at 3/22/2023 1545    Acceptance, E,TB, VU,NR by CB at 3/21/2023 1544    Acceptance, E,TB,H, VU by MS at 3/16/2023 1801    Acceptance, E,D, DU,NR by MO at 3/16/2023 1451    Acceptance, E, DU,NR by MO at 3/15/2023 1200    Acceptance, E, NR by LM at 3/15/2023 0521    Acceptance, E,D, DU,NR by MO at 3/14/2023 1458   Family Acceptance, E,TB,H, VU by MS at 4/21/2023 1838    Acceptance, E,TB,H, VU,NR by MS at 4/13/2023 1817    Acceptance, E,TB,H, VU by MS at 3/16/2023 1801                    Point: Home exercise program (Done)     Learning Progress Summary           Patient Acceptance, E,TB,H, VU by MS at 4/21/2023 1838    Acceptance, E, VU,NR by DJ at 4/16/2023 1158    Acceptance, E,TB,H, VU,NR by MS at 4/13/2023 1817    Acceptance, E, NR by CW at 4/13/2023 1341    Acceptance, E,TB, NR by CS at 4/8/2023 0928    Acceptance, E, NR by CW at 4/7/2023 1215    Acceptance, E, NR by CW at 4/6/2023 1335    Acceptance, E,TB, VU,NR by CAMERON at 4/1/2023 1530    Acceptance, E, NR by CW at 3/31/2023 0925    Acceptance, E, NR by ZB at 3/28/2023 1317    Acceptance, E, NR by CW at 3/27/2023 1401    Acceptance, E,D, NR,VU by JM at 3/24/2023 1631    Comment: seemed to comprehend commands this session    Acceptance, E,D, NR by JM at 3/23/2023 1655    Acceptance, E,D, NR by JM at 3/22/2023 1545    Acceptance, E,TB, VU,NR by CB at 3/21/2023 1544    Acceptance, E,TB,H, VU by MS at 3/16/2023 1801    Acceptance, E,D, DU,NR by MO at 3/16/2023 1451    Acceptance, E, NR by LM at 3/15/2023 0521    Acceptance, E,D, DU,NR by MO at 3/14/2023 1458   Family Acceptance, E,TB,H, VU by MS at 4/21/2023 1838    Acceptance, E,TB,H, VU,NR by MS at 4/13/2023 1817    Acceptance, E,TB,H, VU by MS at 3/16/2023 1801                   Point: Body mechanics (Done)     Learning Progress Summary           Patient Acceptance, E,TB,H, VU by MS at 4/21/2023 1838    Acceptance, E, VU,NR by DJ at 4/16/2023 1158    Acceptance, E,TB,H, VU,NR by MS at 4/13/2023 1817    Acceptance, E, NR by CW at 4/13/2023 1341    Acceptance, E, NR by CW at 4/11/2023 1158    Acceptance, E, NR by CW at 4/10/2023 1414    Acceptance, E,TB, NR by CS at 4/8/2023 0928    Acceptance, E, NR by CW at 4/7/2023 1215    Acceptance, E, NR by CW at 4/4/2023 0906    Acceptance, E, NR by CW at 4/3/2023 1531    Acceptance, E,TB, VU,NR by CAMERON at 4/1/2023 1530    Acceptance, E,D, NR,VU by ZACH at 3/24/2023 1631    Comment: seemed to comprehend commands this session    Acceptance, E,D, NR by ZACH at  3/23/2023 1655    Acceptance, E,D, NR by JM at 3/22/2023 1545    Acceptance, E,TB, VU,NR by CB at 3/21/2023 1544    Acceptance, E,TB,H, VU by MS at 3/16/2023 1801    Acceptance, E,D, DU,NR by MO at 3/16/2023 1451    Acceptance, E, DU,NR by MO at 3/15/2023 1200    Acceptance, E, NR by LM at 3/15/2023 0521    Acceptance, E,D, DU,NR by MO at 3/14/2023 1458   Family Acceptance, E,TB,H, VU by MS at 4/21/2023 1838    Acceptance, E,TB,H, VU,NR by MS at 4/13/2023 1817    Acceptance, E,TB,H, VU by MS at 3/16/2023 1801                   Point: Precautions (Done)     Learning Progress Summary           Patient Acceptance, E,TB,H, VU by MS at 4/21/2023 1838    Acceptance, E, VU,NR by DJ at 4/16/2023 1158    Acceptance, E,TB,H, VU,NR by MS at 4/13/2023 1817    Acceptance, E, NR by CW at 4/13/2023 1341    Acceptance, E,TB, NR by CS at 4/8/2023 0928    Acceptance, E, NR by CW at 4/7/2023 1215    Acceptance, E, NR by CW at 4/4/2023 0906    Acceptance, E, NR by CW at 4/3/2023 1531    Acceptance, E,TB, VU,NR by CAMERON at 4/1/2023 1530    Acceptance, E,D, NR,VU by JM at 3/24/2023 1631    Comment: seemed to comprehend commands this session    Acceptance, E,D, NR by JM at 3/23/2023 1655    Acceptance, E,D, NR by JM at 3/22/2023 1545    Acceptance, E,TB, VU,NR by CB at 3/21/2023 1544    Acceptance, E,TB,H, VU by MS at 3/16/2023 1801    Acceptance, E,D, DU,NR by MO at 3/16/2023 1451    Acceptance, E, DU,NR by MO at 3/15/2023 1200    Acceptance, E, NR by LM at 3/15/2023 0521    Acceptance, E,D, DU,NR by MO at 3/14/2023 1458   Family Acceptance, E,TB,H, VU by MS at 4/21/2023 1838    Acceptance, E,TB,H, VU,NR by MS at 4/13/2023 1817    Acceptance, E,TB,H, VU by MS at 3/16/2023 1801                               User Key     Initials Effective Dates Name Provider Type Discipline    JM 03/07/18 -  Tiffanie Grace, PTA Physical Therapist Assistant PT    CAMERON 05/19/21 -  Leeann Wiley, PT Physical Therapist PT    DJ 10/25/19 -  Bulmaro  Jacqueline, PT Physical Therapist PT    MS 06/16/21 -  Angel Luis Cr, RN Registered Nurse Nurse    CW 12/13/22 -  Tamica Willoughby, PT Physical Therapist PT    CB 10/22/21 -  Bety Benitez, PT Physical Therapist PT    CS 09/22/22 -  Adelia Montalvo, PT Physical Therapist PT    LM 10/13/22 -  Silke Grace, RN Registered Nurse Nurse    MO 01/27/23 -  Lacie Tanner, PT Student PT Student PT    ZB 03/10/23 -  Nestor Rae, PT Student PT Student PT              PT Recommendation and Plan     Plan of Care Reviewed With: patient  Outcome Evaluation: pt participated with PT this AM. Focus on session on stand pivot transfers as pt will ideally need to be able to transfer with x1 assist to be able to get on a flight with family. Pt requires variable assist during transfers.Atbest he transfers min Ax1 towards his L side.  He required up tomod/max A x1 for transfer towards his R. At times he lufts his R leg up off the floor and attempts tohop on his L foot. Limited carry over session to session for hand placement and technique. Will continue to follow.     Time Calculation:    PT Charges     Row Name 04/25/23 0920             Time Calculation    Start Time 0830  -CW      Stop Time 0859  -CW      Time Calculation (min) 29 min  -CW      PT Received On 04/25/23  -CW      PT - Next Appointment 04/26/23  -            User Key  (r) = Recorded By, (t) = Taken By, (c) = Cosigned By    Initials Name Provider Type    CW Tamica Willoughby, JOSE ELIAS Physical Therapist              Therapy Charges for Today     Code Description Service Date Service Provider Modifiers Qty    [id number]  PT THERAPEUTIC ACT EA 15 MIN 4/25/2023 Tamica Willoughby, PT GP 2          PT G-Codes  Outcome Measure Options: AM-PAC 6 Clicks Basic Mobility (PT)  AM-PAC 6 Clicks Score (PT): 15  AM-PAC 6 Clicks Score (OT): 13  Modified [place]x Scale: 4 - Moderately severe disability.  Unable to walk without assistance, and unable to attend to own bodily needs without  assistance.  PT Discharge Summary  Anticipated Discharge Disposition (PT): inpatient rehabilitation facility, skilled nursing facility    Tamica Willoughby, PT  4/25/2023

## 2023-04-25 NOTE — PROGRESS NOTES
[facility] Acute Inpt Rehab    Reviewed pt with Dr. Magaña.  Pt is not appropriate for acute rehab.  No discharge plan, family not available to do proper teaching.  Will sign off at this time.     Thank you,  Meche Peoples RN  Acute Rehab Admission Nurse

## 2023-04-26 ENCOUNTER — APPOINTMENT (OUTPATIENT)
Dept: GENERAL RADIOLOGY | Facility: HOSPITAL | Age: 73
DRG: 64 | End: 2023-04-26
Payer: MEDICAID

## 2023-04-26 PROCEDURE — 97530 THERAPEUTIC ACTIVITIES: CPT

## 2023-04-26 PROCEDURE — 92611 MOTION FLUOROSCOPY/SWALLOW: CPT | Performed by: SPEECH-LANGUAGE PATHOLOGIST

## 2023-04-26 PROCEDURE — 74230 X-RAY XM SWLNG FUNCJ C+: CPT

## 2023-04-26 PROCEDURE — 97112 NEUROMUSCULAR REEDUCATION: CPT

## 2023-04-26 RX ADMIN — AMLODIPINE BESYLATE 10 MG: 10 TABLET ORAL at 06:21

## 2023-04-26 RX ADMIN — HYDRALAZINE HYDROCHLORIDE 10 MG: 10 TABLET, FILM COATED ORAL at 21:15

## 2023-04-26 RX ADMIN — LIDOCAINE 1 PATCH: 700 PATCH TOPICAL at 09:16

## 2023-04-26 RX ADMIN — HYDRALAZINE HYDROCHLORIDE 10 MG: 10 TABLET, FILM COATED ORAL at 15:10

## 2023-04-26 RX ADMIN — HYDRALAZINE HYDROCHLORIDE 10 MG: 10 TABLET, FILM COATED ORAL at 06:21

## 2023-04-26 RX ADMIN — BACLOFEN 2.5 MG: 10 TABLET ORAL at 06:21

## 2023-04-26 RX ADMIN — BARIUM SULFATE 50 ML: 400 SUSPENSION ORAL at 10:39

## 2023-04-26 RX ADMIN — Medication 10 ML: at 21:15

## 2023-04-26 RX ADMIN — VALSARTAN 160 MG: 160 TABLET, FILM COATED ORAL at 06:20

## 2023-04-26 RX ADMIN — Medication 10 ML: at 09:17

## 2023-04-26 RX ADMIN — APIXABAN 5 MG: 5 TABLET, FILM COATED ORAL at 21:15

## 2023-04-26 RX ADMIN — BARIUM SULFATE 55 ML: 0.81 POWDER, FOR SUSPENSION ORAL at 10:39

## 2023-04-26 RX ADMIN — CARVEDILOL 6.25 MG: 12.5 TABLET, FILM COATED ORAL at 09:16

## 2023-04-26 RX ADMIN — BACLOFEN 2.5 MG: 10 TABLET ORAL at 21:15

## 2023-04-26 RX ADMIN — APIXABAN 5 MG: 5 TABLET, FILM COATED ORAL at 09:16

## 2023-04-26 RX ADMIN — BACLOFEN 2.5 MG: 10 TABLET ORAL at 15:10

## 2023-04-26 RX ADMIN — BARIUM SULFATE 4 ML: 980 POWDER, FOR SUSPENSION ORAL at 10:39

## 2023-04-26 NOTE — CASE MANAGEMENT/SOCIAL WORK
Continued Stay Note  [facility]     Patient Name: Manuel Durant  MRN: [id number]  Today's Date: 4/26/2023    Admit Date: 3/12/2023    Plan: Home to [place] by air.  Family is aware of care needs and state they are prepared to care for pt during transport and at home in [place].  Tentative DC dates 5/4 or 5/20.  Family arranging transport.   Discharge Plan       Row Name 04/26/23 0916       Plan    Plan Comments Call received from [facility] for [facility].  They are unable to assist at this time due to lack of insurance coverage.  Faye ARMSTRONG      Row Name 04/26/23 0905       Plan    Plan Home to [place] by air.  Family is aware of care needs and state they are prepared to care for pt during transport and at home in [place].  Tentative DC dates 5/4 or 5/20.  Family arranging transport.    Plan Comments Received a call last evening from pt's son Ariel.  He states that he is currently working with family to arrange transportation home.  He states that he will call his brother in law for a decision about date he can travel.  He states that his understanding is that May 4 is an option.  He states that if his father needs two people to travel with him, he will request that his daughter also accompany pt on the flight.  He will speak with all family members involved overnight and will call back today with final date.  Faye ARMSTRONG                   Discharge Codes    No documentation.                 Expected Discharge Date and Time       Expected Discharge Date Expected Discharge Time    Apr 28, 2023               Faye Moy RN

## 2023-04-26 NOTE — PLAN OF CARE
Goal Outcome Evaluation:  Plan of Care Reviewed With: patient        Progress: no change  Outcome Evaluation: Pt seen fo r OT/PT tx session in AM, continue to focus on functional stand pivot transfers to various household surfaces to improve carryover of technique. Pt completed multiple stand pivots to BSC and hard back chair with min A to L side, and mod -max A at times to R side with pivots. Pt engaging in functional reaching and dynamic challenges sitting unsupported throughout task, focusing on grasp and release of small objects to improve accuracy and improve coordination of RUE. Pt encouraged to functionally use RUE as ROM is present however pt guarding limb throughout transfers and ADLs. Pt will continue to benefit from skilled OT to address goals.

## 2023-04-26 NOTE — THERAPY TREATMENT NOTE
Patient Name: Manuel Durant  : 1950    MRN: [id number]                              Today's Date: 2023       Admit Date: 3/12/2023    Visit Dx:     ICD-10-CM ICD-9-CM   1. Intracranial hemorrhage  I62.9 432.9   2. Altered mental status, unspecified altered mental status type  R41.82 780.97     Patient Active Problem List   Diagnosis   • Intracranial hemorrhage   • Hypertensive emergency   • Acute DVT (deep venous thrombosis)   • HTN (hypertension)   • Severe Malnutrition (HCC)   • Hemiplegia   • Right shoulder pain     Past Medical History:   Diagnosis Date   • Hypokalemia 3/17/2023     History reviewed. No pertinent surgical history.   General Information     Row Name 23 1256          OT Time and Intention    Document Type therapy note (daily note)  -     Mode of Treatment co-treatment;occupational therapy  -     Row Name 23 1256          General Information    Patient Profile Reviewed yes  -     Existing Precautions/Restrictions fall  -     Row Name 23 1256          Cognition    Orientation Status (Cognition) oriented to;person  -     Row Name 23 1256          Safety Issues, Functional Mobility    Impairments Affecting Function (Mobility) balance;coordination;endurance/activity tolerance;grasp;motor control;postural/trunk control;strength;range of motion (ROM);cognition;visual/perceptual  -           User Key  (r) = Recorded By, (t) = Taken By, (c) = Cosigned By    Initials Name Provider Type     Karishma Spence OT Occupational Therapist                 Mobility/ADL's     Row Name 23 1257          Bed Mobility    Bed Mobility supine-sit  -     Supine-Sit [place] (Bed Mobility) standby assist;contact guard  -     Sit-Supine [place] (Bed Mobility) not tested  -     Assistive Device (Bed Mobility) bed rails;head of bed elevated  -     Comment, (Bed Mobility) with transport at end of session  -     Row Name 23 1257           Transfers    Transfers sit-stand transfer;stand-sit transfer;toilet transfer;bed-chair transfer  -     Row Name 04/26/23 1257          Bed-Chair Transfer    Bed-Chair [place] (Transfers) 1 person assist;moderate assist (50% patient effort);verbal cues;minimum assist (75% patient effort);nonverbal cues (demo/gesture)  -     Comment, (Bed-Chair Transfer) HHA, x8 transfers completed with min A to L side, increased assist mod-max A when transferring to R side  -     Row Name 04/26/23 1257          Sit-Stand Transfer    Sit-Stand [place] (Transfers) minimum assist (75% patient effort);verbal cues  -     Assistive Device (Sit-Stand Transfers) other (see comments)  -     Comment, (Sit-Stand Transfer) HHA  -     Row Name 04/26/23 1257          Stand-Sit Transfer    Stand-Sit [place] (Transfers) minimum assist (75% patient effort)  -     Row Name 04/26/23 1257          Toilet Transfer    Type (Toilet Transfer) stand pivot/stand step  -     [place] Level (Toilet Transfer) minimum assist (75% patient effort);moderate assist (50% patient effort)  -     Assistive Device (Toilet Transfer) commode;commode, 3-in-1  -     Comment, (Toilet Transfer) [facility] [place] trials x2 for increased carryover to ease transfer assist level  -     Row Name 04/26/23 1257          Activities of Daily Living    BADL Assessment/Intervention toileting  -[facility] Name 04/26/23 1257          Toileting Assessment/Training    Comment, (Toileting) demo'd transfer only  -           User Key  (r) = Recorded By, (t) = Taken By, (c) = Cosigned By    Initials Name Provider Type     Karishma Spence OT Occupational Therapist               Obj/Interventions     Row Name 04/26/23 1259          Shoulder (Therapeutic Exercise)    Shoulder AROM (Therapeutic Exercise) --  encouraged functional use of RUE with increased ROM in shoulder and elbow plane once therapist completed x5 reps of stretching at extremity  -     Row Name  04/26/23 1259          Motor Skills    Motor Control/Coordination Interventions functional task specific training;occupation/activity based treatment;therapeutic exercise/ROM  -MW     Row Name 04/26/23 1252          Balance    Balance Assessment sitting static balance;sitting dynamic balance;sit to stand dynamic balance;standing static balance;standing dynamic balance  -MW     Static Sitting Balance standby assist  -MW     Dynamic Sitting Balance contact guard  -MW     Position, Sitting Balance sitting edge of bed  -MW     Sit to Stand Dynamic Balance minimal assist  -MW     Static Standing Balance minimal assist  -MW     Dynamic Standing Balance minimal assist;moderate assist;1-person assist  -MW     Position/Device Used, Standing Balance supported;other (see comments)  HHA  -MW     Balance Interventions occupation based/functional task  -MW     Comment, Balance variable assist during transfers, min A to mod-max A to R side  -MW     Row Name 04/26/23 0209          Neuromuscular Re-education    Equipment Used (Neuromuscular Re-education) func reaching sitting unsupported on BSC with RUE, focusing on functional grasp and release of small objects  -MW           User Key  (r) = Recorded By, (t) = Taken By, (c) = Cosigned By    Initials Name Provider Type    Karishma Williamson OT Occupational Therapist               Goals/Plan    No documentation.                Clinical Impression     Row Name 04/26/23 130          Pain Assessment    Pretreatment Pain Rating 0/10 - no pain  -MW     Posttreatment Pain Rating 0/10 - no pain  -MW     Row Name 04/26/23 1307          Plan of Care Review    Plan of Care Reviewed With patient  -     Progress no change  -     Outcome Evaluation Pt seen fo r OT/PT tx session in AM, continue to focus on functional stand pivot transfers to various household surfaces to improve carryover of technique. Pt completed multiple stand pivots to BSC and hard back chair with min A to L side, and  mod -max A at times to R side with pivots. Pt engaging in functional reaching and dynamic challenges sitting unsupported throughout task, focusing on grasp and release of small objects to improve accuracy and improve coordination of RUE. Pt encouraged to functionally use RUE as ROM is present however pt guarding limb throughout transfers and ADLs. Pt will continue to benefit from skilled OT to address goals.  -     Row Name 04/26/23 1305          Therapy Plan Review/Discharge Plan (OT)    Anticipated Discharge Disposition (OT) inpatient rehabilitation facility  -     Row Name 04/26/23 1305          Vital Signs    O2 Delivery Pre Treatment room air  -MW     O2 Delivery Post Treatment room air  -MW     Pre Patient Position Supine  -MW     Intra Patient Position Standing  -MW     Post Patient Position --  transport  -     Row Name 04/26/23 1305          Positioning and Restraints    Pre-Treatment Position in bed  -MW     Post Treatment Position other  -MW     In Bed --  transport bed  -MW           User Key  (r) = Recorded By, (t) = Taken By, (c) = Cosigned By    Initials Name Provider Type    Karishma Williamson, OT Occupational Therapist               Outcome Measures     Row Name 04/26/23 1309          How much help from another is currently needed...    Putting on and taking off regular lower body clothing? 2  -MW     Bathing (including washing, rinsing, and drying) 2  -MW     Toileting (which includes using toilet bed pan or urinal) 1  -MW     Putting on and taking off regular upper body clothing 2  -MW     Taking care of personal grooming (such as brushing teeth) 2  -MW     Eating meals 2  -MW     AM-PAC 6 Clicks Score (OT) 11  -MW     Row Name 04/26/23 1228 04/26/23 0916       How much help from another person do you currently need...    Turning from your back to your side while in flat bed without using bedrails? 3 (P)   -ZB 3  -TF    Moving from lying on back to sitting on the side of a flat bed without  bedrails? 3 (P)   -ZB 3  -TF    Moving to and from a bed to a chair (including a wheelchair)? 3 (P)   -ZB 2  -TF    Standing up from a chair using your arms (e.g., wheelchair, bedside chair)? 3 (P)   -ZB 2  -TF    Climbing 3-5 steps with a railing? 1 (P)   -ZB 1  -TF    To walk in hospital room? 2 (P)   -ZB 1  -TF    AM-PAC 6 Clicks Score (PT) 15 (P)   -ZB 12  -TF    Highest level of mobility 4 --> Transferred to chair/commode (P)   -ZB 4 --> Transferred to chair/commode  -TF    Row Name 04/26/23 1309 04/26/23 1228       Functional Assessment    Outcome Measure Options AM-PAC 6 Clicks Daily Activity (OT)  -MW AM-PAC 6 Clicks Basic Mobility (PT) (P)   -ZB          User Key  (r) = Recorded By, (t) = Taken By, (c) = Cosigned By    Initials Name Provider Type    Miriam Snow, RN Registered Nurse     Karishma Spence, OT Occupational Therapist    Nestor Howard, PT Student PT Student                Occupational Therapy Education     Title: PT OT SLP Therapies (Done)     Topic: Occupational Therapy (Done)     Point: ADL training (Done)     Description:   Instruct learner(s) on proper safety adaptation and remediation techniques during self care or transfers.   Instruct in proper use of assistive devices.              Learning Progress Summary           Patient Acceptance, E,TB,H, VU by MS at 4/21/2023 1838    Acceptance, E,TB,H, VU,NR by MS at 4/13/2023 1817    Acceptance, E,TB,H, VU by MS at 3/16/2023 1801    Acceptance, E, NR by LM at 3/15/2023 0521   Family Acceptance, E,TB,H, VU by MS at 4/21/2023 1838    Acceptance, E,TB,H, VU,NR by MS at 4/13/2023 1817    Acceptance, E,TB,H, VU by MS at 3/16/2023 1801                   Point: Home exercise program (Done)     Description:   Instruct learner(s) on appropriate technique for monitoring, assisting and/or progressing therapeutic exercises/activities.              Learning Progress Summary           Patient Acceptance, E,TB,H, VU by MS at 4/21/2023 9885     Acceptance, E,TB,H, VU,NR by MS at 4/13/2023 1817    Acceptance, E,TB,H, VU by MS at 3/16/2023 1801    Acceptance, E, NR by LM at 3/15/2023 0521   Family Acceptance, E,TB,H, VU by MS at 4/21/2023 1838    Acceptance, E,TB,H, VU,NR by MS at 4/13/2023 1817    Acceptance, E,TB,H, VU by MS at 3/16/2023 1801                   Point: Precautions (Done)     Description:   Instruct learner(s) on prescribed precautions during self-care and functional transfers.              Learning Progress Summary           Patient Acceptance, E,TB,H, VU by MS at 4/21/2023 1838    Acceptance, E,TB,H, VU,NR by MS at 4/13/2023 1817    Acceptance, E,TB,H, VU by MS at 3/16/2023 1801    Acceptance, E, NR by LM at 3/15/2023 0521   Family Acceptance, E,TB,H, VU by MS at 4/21/2023 1838    Acceptance, E,TB,H, VU,NR by MS at 4/13/2023 1817    Acceptance, E,TB,H, VU by MS at 3/16/2023 1801                   Point: Body mechanics (Done)     Description:   Instruct learner(s) on proper positioning and spine alignment during self-care, functional mobility activities and/or exercises.              Learning Progress Summary           Patient Acceptance, E,TB,H, VU by MS at 4/21/2023 1838    Acceptance, E,TB,H, VU,NR by MS at 4/13/2023 1817    Acceptance, E,TB,H, VU by MS at 3/16/2023 1801    Acceptance, E, NR by LM at 3/15/2023 0521   Family Acceptance, E,TB,H, VU by MS at 4/21/2023 1838    Acceptance, E,TB,H, VU,NR by MS at 4/13/2023 1817    Acceptance, E,TB,H, VU by MS at 3/16/2023 1801                               User Key     Initials Effective Dates Name Provider Type Discipline    MS 06/16/21 -  Angel Luis Cr, RN Registered Nurse Nurse     10/13/22 -  Silke Grace, RN Registered Nurse Nurse              OT Recommendation and Plan     Plan of Care Review  Plan of Care Reviewed With: patient  Progress: no change  Outcome Evaluation: Pt seen fo r OT/PT tx session in AM, continue to focus on functional stand pivot transfers to various household  surfaces to improve carryover of technique. Pt completed multiple stand pivots to BSC and hard back chair with min A to L side, and mod -max A at times to R side with pivots. Pt engaging in functional reaching and dynamic challenges sitting unsupported throughout task, focusing on grasp and release of small objects to improve accuracy and improve coordination of RUE. Pt encouraged to functionally use RUE as ROM is present however pt guarding limb throughout transfers and ADLs. Pt will continue to benefit from skilled OT to address goals.     Time Calculation:    Time Calculation- OT     Row Name 04/26/23 1309             Time Calculation- OT    OT Start Time 0951  -MW      OT Stop Time 1022  -MW      OT Time Calculation (min) 31 min  -MW      Total Timed Code Minutes- OT 31 minute(s)  -MW      OT Received On 04/26/23  -MW      OT - Next Appointment 04/27/23  -MW         Timed Charges    07525 -  OT Neuromuscular Reeducation Minutes 15  -MW      66996 - OT Therapeutic Activity Minutes 16  -MW         Total Minutes    Timed Charges Total Minutes 31  -MW       Total Minutes 31  -MW            User Key  (r) = Recorded By, (t) = Taken By, (c) = Cosigned By    Initials Name Provider Type     Karishma Spence OT Occupational Therapist              Therapy Charges for Today     Code Description Service Date Service Provider Modifiers Qty    23310349455 HC OT THERAPEUTIC ACT EA 15 MIN 4/25/2023 Karishma Spence OT GO 1    54867403876 HC OT SELF CARE/MGMT/TRAIN EA 15 MIN 4/25/2023 Karishma Spence OT GO 1    03132365154 HC OT NEUROMUSC RE EDUCATION EA 15 MIN 4/26/2023 Karishma Spence OT GO 1    94508625777 HC OT THERAPEUTIC ACT EA 15 MIN 4/26/2023 Karishma Spence OT GO 1               Karishma Spence OT  4/26/2023

## 2023-04-26 NOTE — THERAPY TREATMENT NOTE
Patient Name: Manuel Durant  : 1950    MRN: [id number]                              Today's Date: 2023       Admit Date: 3/12/2023    Visit Dx:     ICD-10-CM ICD-9-CM   1. Intracranial hemorrhage  I62.9 432.9   2. Altered mental status, unspecified altered mental status type  R41.82 780.97     Patient Active Problem List   Diagnosis   • Intracranial hemorrhage   • Hypertensive emergency   • Acute DVT (deep venous thrombosis)   • HTN (hypertension)   • Severe Malnutrition (HCC)   • Hemiplegia   • Right shoulder pain     Past Medical History:   Diagnosis Date   • Hypokalemia 3/17/2023     History reviewed. No pertinent surgical history.   General Information     Row Name 23 1219          Physical Therapy Time and Intention    Document Type therapy note (daily note) (P)   -ZB     Mode of Treatment co-treatment;physical therapy;occupational therapy (P)   -ZB     Row Name 23 1219          General Information    Patient Profile Reviewed yes (P)   -ZB     Existing Precautions/Restrictions fall (P)   -ZB     Row Name 23 1219          Cognition    Orientation Status (Cognition) oriented to;person (P)   -ZB     Row Name 23 1219          Safety Issues, Functional Mobility    Impairments Affecting Function (Mobility) balance;coordination;endurance/activity tolerance;grasp;motor control;postural/trunk control;strength;range of motion (ROM);cognition;visual/perceptual (P)   -ZB           User Key  (r) = Recorded By, (t) = Taken By, (c) = Cosigned By    Initials Name Provider Type    ZB Nestor Rae, PT Student PT Student               Mobility     Row Name 23 1220          Bed Mobility    Comment, (Bed Mobility) NT UIC (P)   -ZB     Row Name 23 1220          Bed-Chair Transfer    Bed-Chair [place] (Transfers) not tested (P)   -ZB     Comment, (Bed-Chair Transfer) NT UIC (P)   -ZB     Row Name 23 1220          Sit-Stand Transfer    Sit-Stand [place] (Transfers)  minimum assist (75% patient effort);1 person assist;verbal cues (P)   -ZB     Comment, (Sit-Stand Transfer) hha (P)   -ZB     Row Name 04/26/23 1220          Gait/Stairs (Locomotion)    [place] Level (Gait) moderate assist (50% patient effort);2 person assist;verbal cues;nonverbal cues (demo/gesture) (P)   -ZB     Assistive Device (Gait) other (see comments) (P)   hha  -ZB     Distance in Feet (Gait) 8' chair>transport bed; 1 person for support at gait belt + 1 person to block/advance R LE (P)   -ZB     Deviations/Abnormal Patterns (Gait) festinating/shuffling;stride length decreased;sandhya decreased;gait speed decreased (P)   -ZB     Bilateral Gait Deviations forward flexed posture (P)   -ZB     Right Sided Gait Deviations foot drop/toe drag;knee buckling, right side (P)   -ZB     [place] Level (Stairs) unable to assess (P)   -ZB           User Key  (r) = Recorded By, (t) = Taken By, (c) = Cosigned By    Initials Name Provider Type    ZB Nestor Rae, PT Student PT Student               Obj/Interventions     Row Name 04/26/23 1222          Balance    Balance Assessment sitting static balance;sitting dynamic balance;standing static balance;standing dynamic balance (P)   -ZB     Static Sitting Balance supervision (P)   -ZB     Dynamic Sitting Balance standby assist (P)   -ZB     Position, Sitting Balance unsupported;other (see comments) (P)   sitting BSC  -ZB     Static Standing Balance minimal assist (P)   -ZB     Dynamic Standing Balance minimal assist;moderate assist;1-person assist;verbal cues;non-verbal cues (demo/gesture) (P)   -ZB     Position/Device Used, Standing Balance supported;other (see comments) (P)   hha  -ZB     Balance Interventions sitting;standing;sit to stand;supported;dynamic;static;moderate challenge;occupation based/functional task;weight shifting activity (P)   -ZB     Comment, Balance variable assist for txfrs; mass repitions to various surfaces to prepare for DC (P)   -ZB            User Key  (r) = Recorded By, (t) = Taken By, (c) = Cosigned By    Initials Name Provider Type    ZB Nestor Rae, PT Student PT Student               Goals/Plan     Row Name 04/26/23 1229          Bed Mobility Goal 1 (PT)    Activity/Assistive Device (Bed Mobility Goal 1, PT) bed mobility activities, all (P)   -ZB     [place] Level/Cues Needed (Bed Mobility Goal 1, PT) independent (P)   -ZB     Time Frame (Bed Mobility Goal 1, PT) short term goal (STG);2 weeks (P)   -ZB     Progress/Outcomes (Bed Mobility Goal 1, PT) goal revised this date;good progress toward goal (P)   -ZB     Row Name 04/26/23 1229          Transfer Goal 1 (PT)    Activity/Assistive Device (Transfer Goal 1, PT) transfers, all (P)   -ZB     [place] Level/Cues Needed (Transfer Goal 1, PT) minimum assist (75% or more patient effort) (P)   -ZB     Time Frame (Transfer Goal 1, PT) short term goal (STG);2 weeks (P)   -ZB     Progress/Outcome (Transfer Goal 1, PT) good progress toward goal;goal revised this date (P)   -ZB     Row Name 04/26/23 1229          Gait Training Goal 1 (PT)    Activity/Assistive Device (Gait Training Goal 1, PT) gait (walking locomotion) (P)   -ZB     [place] Level (Gait Training Goal 1, PT) minimum assist (75% or more patient effort) (P)   -ZB     Distance (Gait Training Goal 1, PT) 20 (P)   -ZB     Time Frame (Gait Training Goal 1, PT) short term goal (STG);2 weeks (P)   -ZB     Progress/Outcome (Gait Training Goal 1, PT) continuing progress toward goal;goal ongoing (P)   -ZB           User Key  (r) = Recorded By, (t) = Taken By, (c) = Cosigned By    Initials Name Provider Type    ZB Nestor Rae, PT Student PT Student               Clinical Impression     Row Name 04/26/23 1223          Pain    Pretreatment Pain Rating 0/10 - no pain (P)   -ZB     Posttreatment Pain Rating 0/10 - no pain (P)   -ZB     Pain Intervention(s) Repositioned;Ambulation/increased activity (P)   -ZB     Row Name 04/26/23 1224           Plan of Care Review    Plan of Care Reviewed With patient (P)   -ZB     Progress improving (P)   -ZB     Outcome Evaluation Pt UIC and agreeable to PT/OT tx session this AM. Pt completed 7x txfrs to/from recliner to BSC + static chair in room. Pt consistently Amish when txfr'ing to unafected L side. Fluctuates min-mod-maxA when going to the R. Pt static/dynamic seated balance continues to improve; was able to tolerate sitting unsupported on BSC w/o assist for dynamic reaching activity. Pt amb 8' chair>transport bed w/ modA x2. PT will continue to monitor and progress as appropriate. (P)   -ZB     Row Name 04/26/23 1223          Vital Signs    O2 Delivery Pre Treatment room air (P)   -ZB     Row Name 04/26/23 1223          Positioning and Restraints    Pre-Treatment Position sitting in chair/recliner (P)   -ZB     Post Treatment Position other (P)   transport bed for swallow study  -ZB     Other Position with other staff (P)   w/ transport tech in transport bed  -ZB           User Key  (r) = Recorded By, (t) = Taken By, (c) = Cosigned By    Initials Name Provider Type    Nestor Howard, PT Student PT Student               Outcome Measures     Row Name 04/26/23 1228 04/26/23 0916       How much help from another person do you currently need...    Turning from your back to your side while in flat bed without using bedrails? 3 (P)   -ZB 3  -TF    Moving from lying on back to sitting on the side of a flat bed without bedrails? 3 (P)   -ZB 3  -TF    Moving to and from a bed to a chair (including a wheelchair)? 3 (P)   -ZB 2  -TF    Standing up from a chair using your arms (e.g., wheelchair, bedside chair)? 3 (P)   -ZB 2  -TF    Climbing 3-5 steps with a railing? 1 (P)   -ZB 1  -TF    To walk in hospital room? 2 (P)   -ZB 1  -TF    AM-PAC 6 Clicks Score (PT) 15 (P)   -ZB 12  -TF    Highest level of mobility 4 --> Transferred to chair/commode (P)   -ZB 4 --> Transferred to chair/commode  -TF    Row Name 04/26/23 1223           Functional Assessment    Outcome Measure Options AM-PAC 6 Clicks Basic Mobility (PT) (P)   -ZB           User Key  (r) = Recorded By, (t) = Taken By, (c) = Cosigned By    Initials Name Provider Type    Miriam Snow, RN Registered Nurse    Nestor Howard, PT Student PT Student                             Physical Therapy Education     Title: PT OT SLP Therapies (Done)     Topic: Physical Therapy (Done)     Point: Mobility training (Done)     Learning Progress Summary           Patient Acceptance, E,TB,H, VU by MS at 4/21/2023 1838    Acceptance, E, NR by CW at 4/21/2023 1537    Acceptance, E, VU,DU by JEROMYB at 4/19/2023 1130    Acceptance, E, NR by CW at 4/18/2023 0950    Acceptance, E, VU,NR by NICOLASA at 4/16/2023 1158    Acceptance, E,TB,H, VU,NR by MS at 4/13/2023 1817    Acceptance, E, NR by CW at 4/13/2023 1341    Acceptance, E, NR by CW at 4/12/2023 1201    Acceptance, E, NR by CW at 4/11/2023 1158    Acceptance, E, NR by CW at 4/10/2023 1414    Acceptance, E,TB, NR by CS at 4/8/2023 0928    Acceptance, E, NR by CW at 4/7/2023 1215    Acceptance, E, NR by CW at 4/6/2023 1335    Acceptance, E, NR by CW at 4/5/2023 1043    Acceptance, E, NR by CW at 4/4/2023 0906    Acceptance, E, NR by CW at 4/3/2023 1531    Acceptance, E,TB, VU,NR by CAMERON at 4/1/2023 1530    Acceptance, E, NR by CW at 3/31/2023 0925    Acceptance, E, NR by CW at 3/30/2023 1209    Acceptance, E, NR,NL by CW at 3/29/2023 1336    Acceptance, E, NR by ZB at 3/28/2023 1317    Acceptance, E, NR by CW at 3/27/2023 1401    Acceptance, E,D, NR,VU by ZACH at 3/24/2023 1631    Comment: seemed to comprehend commands this session    Acceptance, E,D, NR by JM at 3/23/2023 1655    Acceptance, E,D, NR by JM at 3/22/2023 1545    Acceptance, E,TB, VU,NR by CB at 3/21/2023 1544    Acceptance, E,TB,H, VU by MS at 3/16/2023 1801    Acceptance, E,D, DU,NR by MO at 3/16/2023 1451    Acceptance, E, DU,NR by MO at 3/15/2023 1200    Acceptance, E, NR by LM at  3/15/2023 0521    Acceptance, E,D, DU,NR by MO at 3/14/2023 1458   Family Acceptance, E,TB,H, VU by MS at 4/21/2023 1838    Acceptance, E,TB,H, VU,NR by MS at 4/13/2023 1817    Acceptance, E,TB,H, VU by MS at 3/16/2023 1801                   Point: Home exercise program (Done)     Learning Progress Summary           Patient Acceptance, E,TB,H, VU by MS at 4/21/2023 1838    Acceptance, E, VU,NR by DJ at 4/16/2023 1158    Acceptance, E,TB,H, VU,NR by MS at 4/13/2023 1817    Acceptance, E, NR by CW at 4/13/2023 1341    Acceptance, E,TB, NR by CS at 4/8/2023 0928    Acceptance, E, NR by CW at 4/7/2023 1215    Acceptance, E, NR by CW at 4/6/2023 1335    Acceptance, E,TB, VU,NR by CAMERON at 4/1/2023 1530    Acceptance, E, NR by CW at 3/31/2023 0925    Acceptance, E, NR by ZB at 3/28/2023 1317    Acceptance, E, NR by CW at 3/27/2023 1401    Acceptance, E,D, NR,VU by JM at 3/24/2023 1631    Comment: seemed to comprehend commands this session    Acceptance, E,D, NR by JM at 3/23/2023 1655    Acceptance, E,D, NR by JM at 3/22/2023 1545    Acceptance, E,TB, VU,NR by CB at 3/21/2023 1544    Acceptance, E,TB,H, VU by MS at 3/16/2023 1801    Acceptance, E,D, DU,NR by MO at 3/16/2023 1451    Acceptance, E, NR by LM at 3/15/2023 0521    Acceptance, E,D, DU,NR by MO at 3/14/2023 1458   Family Acceptance, E,TB,H, VU by MS at 4/21/2023 1838    Acceptance, E,TB,H, VU,NR by MS at 4/13/2023 1817    Acceptance, E,TB,H, VU by MS at 3/16/2023 1801                   Point: Body mechanics (Done)     Learning Progress Summary           Patient Acceptance, E,TB,H, VU by MS at 4/21/2023 1838    Acceptance, E, VU,NR by DJ at 4/16/2023 1158    Acceptance, E,TB,H, VU,NR by MS at 4/13/2023 1817    Acceptance, E, NR by CW at 4/13/2023 1341    Acceptance, E, NR by CW at 4/11/2023 1158    Acceptance, E, NR by CW at 4/10/2023 1414    Acceptance, E,TB, NR by CS at 4/8/2023 0928    Acceptance, E, NR by CW at 4/7/2023 1215    Acceptance, E, NR by CW at  4/4/2023 0906    Acceptance, E, NR by CW at 4/3/2023 1531    Acceptance, E,TB, VU,NR by CAMERON at 4/1/2023 1530    Acceptance, E,D, NR,VU by JM at 3/24/2023 1631    Comment: seemed to comprehend commands this session    Acceptance, E,D, NR by JM at 3/23/2023 1655    Acceptance, E,D, NR by JM at 3/22/2023 1545    Acceptance, E,TB, VU,NR by CB at 3/21/2023 1544    Acceptance, E,TB,H, VU by MS at 3/16/2023 1801    Acceptance, E,D, DU,NR by MO at 3/16/2023 1451    Acceptance, E, DU,NR by MO at 3/15/2023 1200    Acceptance, E, NR by LM at 3/15/2023 0521    Acceptance, E,D, DU,NR by MO at 3/14/2023 1458   Family Acceptance, E,TB,H, VU by MS at 4/21/2023 1838    Acceptance, E,TB,H, VU,NR by MS at 4/13/2023 1817    Acceptance, E,TB,H, VU by MS at 3/16/2023 1801                   Point: Precautions (Done)     Learning Progress Summary           Patient Acceptance, E,TB,H, VU by MS at 4/21/2023 1838    Acceptance, E, VU,NR by DJ at 4/16/2023 1158    Acceptance, E,TB,H, VU,NR by MS at 4/13/2023 1817    Acceptance, E, NR by CW at 4/13/2023 1341    Acceptance, E,TB, NR by CS at 4/8/2023 0928    Acceptance, E, NR by CW at 4/7/2023 1215    Acceptance, E, NR by CW at 4/4/2023 0906    Acceptance, E, NR by CW at 4/3/2023 1531    Acceptance, E,TB, VU,NR by CAMERON at 4/1/2023 1530    Acceptance, E,D, NR,VU by JM at 3/24/2023 1631    Comment: seemed to comprehend commands this session    Acceptance, E,D, NR by JM at 3/23/2023 1655    Acceptance, E,D, NR by JM at 3/22/2023 1545    Acceptance, E,TB, VU,NR by CB at 3/21/2023 1544    Acceptance, E,TB,H, VU by MS at 3/16/2023 1801    Acceptance, E,D, DU,NR by MO at 3/16/2023 1451    Acceptance, E, DU,NR by MO at 3/15/2023 1200    Acceptance, E, NR by LM at 3/15/2023 0521    Acceptance, E,D, DU,NR by MO at 3/14/2023 1458   Family Acceptance, E,TB,H, VU by MS at 4/21/2023 1838    Acceptance, E,TB,H, VU,NR by MS at 4/13/2023 1817    Acceptance, E,TB,H, VU by MS at 3/16/2023 1801                                User Key     Initials Effective Dates Name Provider Type Discipline    JM 03/07/18 -  Tiffanie Grace, PTA Physical Therapist Assistant PT    CAMERON 05/19/21 -  Leeann Wiley, PT Physical Therapist PT    DJ 10/25/19 -  Jacqueline Chamberlain, PT Physical Therapist PT    MS 06/16/21 -  Angel Luis Cr, RN Registered Nurse Nurse    CW 12/13/22 -  Tamica Willoughby, PT Physical Therapist PT    CB 10/22/21 -  Bety Benitez, PT Physical Therapist PT    CS 09/22/22 -  Adelia Montalvo, PT Physical Therapist PT    LM 10/13/22 -  Silke Grace, RN Registered Nurse Nurse    MO 01/27/23 -  Lacie Tanner, PT Student PT Student PT    ZB 03/10/23 -  Nestor Rae, PT Student PT Student PT              PT Recommendation and Plan     Plan of Care Reviewed With: (P) patient  Progress: (P) improving  Outcome Evaluation: (P) Pt UIC and agreeable to PT/OT tx session this AM. Pt completed 7x txfrs to/from recliner to BSC + static chair in room. Pt consistently [place] when txfr'ing to unafected L side. Fluctuates min-mod-maxA when going to the R. Pt static/dynamic seated balance continues to improve; was able to tolerate sitting unsupported on BSC w/o assist for dynamic reaching activity. Pt amb 8' chair>transport bed w/ modA x2. PT will continue to monitor and progress as appropriate.     Time Calculation:    PT Charges     Row Name 04/26/23 1229             Time Calculation    Start Time 0951 (P)   -ZB      Stop Time 1022 (P)   -ZB      Time Calculation (min) 31 min (P)   -ZB      PT Received On 04/26/23 (P)   -ZB      PT - Next Appointment 04/27/23 (P)   -ZB      PT Goal Re-Cert Due Date 05/10/23 (P)   -ZB         Time Calculation- PT    Total Timed Code Minutes- PT 31 minute(s) (P)   -ZB         Timed Charges    61184 - PT Therapeutic Activity Minutes 31 (P)   -ZB         Total Minutes    Timed Charges Total Minutes 31 (P)   -ZB       Total Minutes 31 (P)   -ZB            User Key  (r) = Recorded By, (t) = Taken By, (c) = Cosigned  By    Initials Name Provider Type    ZB Nestor Rae, PT Student PT Student              Therapy Charges for Today     Code Description Service Date Service Provider Modifiers Qty    [id number] HC PT THERAPEUTIC ACT EA 15 MIN 4/26/2023 Nestor Rae, PT Student GP 2          PT G-Codes  Outcome Measure Options: (P) AM-PAC 6 Clicks Basic Mobility (PT)  AM-PAC 6 Clicks Score (PT): (P) 15  AM-PAC 6 Clicks Score (OT): 12  Modified Maury Scale: 4 - Moderately severe disability.  Unable to walk without assistance, and unable to attend to own bodily needs without assistance.       Nestor Rae PT Student  4/26/2023

## 2023-04-26 NOTE — PLAN OF CARE
Goal Outcome Evaluation:           Progress: improving  Outcome Evaluation: Patient vital signs are monitored and kept within set limits by the provider. No complain of pain. All comfort measures were provided. All concerns and questions were acknowledged and addressed. Continue to monitor patient. All due medications were given accordingly.

## 2023-04-26 NOTE — MBS/VFSS/FEES
Acute Care - Speech Language Pathology   Swallow Initial Evaluation [facility]     Patient Name: Manuel Durant  : 1950  MRN: [id number]  Today's Date: 2023               Admit Date: 3/12/2023    Visit Dx:     ICD-10-CM ICD-9-CM   1. Intracranial hemorrhage  I62.9 432.9   2. Altered mental status, unspecified altered mental status type  R41.82 780.97     Patient Active Problem List   Diagnosis    Intracranial hemorrhage    Hypertensive emergency    Acute DVT (deep venous thrombosis)    HTN (hypertension)    Severe Malnutrition (HCC)    Hemiplegia    Right shoulder pain     Past Medical History:   Diagnosis Date    Hypokalemia 3/17/2023     History reviewed. No pertinent surgical history.    SLP Recommendation and Plan  SLP Swallowing Diagnosis: mild, oral dysphagia, pharyngeal dysphagia (23)  SLP Diet Recommendation: soft to chew textures, chopped, no mixed consistencies, nectar thick liquids, water between meals after oral care, with supervision (23)  Recommended Precautions and Strategies: upright posture during/after eating, small bites of food and sips of liquid, assist with feeding (23)  SLP Rec. for Method of Medication Administration: meds whole, with puree, with thick liquids, as tolerated (23)     Monitor for Signs of Aspiration: yes, notify SLP if any concerns (23)     Swallow Criteria for Skilled Therapeutic Interventions Met: demonstrates skilled criteria (23)  Anticipated Discharge Disposition (SLP): anticipate therapy at next level of care (23)  Rehab Potential/Prognosis, Swallowing: good, to achieve stated therapy goals (23)  Therapy Frequency (Swallow): PRN (23)  Predicted Duration Therapy Intervention (Days): until discharge (23)                                        Plan of Care Reviewed With: patient  Outcome Evaluation: VFSS completed patient demonstrates mild  "oropharyngeal dysphagia recommend patient continue with mechanical soft no mixed and NTL, meds whole with puree. Okay for water protocol. Small bites and sips.      SWALLOW EVALUATION (last 72 hours)       SLP Adult Swallow Evaluation       Row Name 04/26/23 1100 04/25/23 1084                Rehab Evaluation    Document Type evaluation  -KA re-evaluation  -AL       Subjective Information no complaints  -KA no complaints  -AL       Patient Observations alert;cooperative  -KA alert;cooperative  -AL       Patient/Family/Caregiver Comments/Observations -- Sitting upright in recliner. Used  via phone.  -AL       Patient Effort good  -KA --       Symptoms Noted During/After Treatment none  -KA --          General Information    Patient Profile Reviewed yes  -KA yes  -AL       Pertinent History Of Current Problem -- 73-year-old admitted with left basal ganglia/internal capsule hemorrhage. Most recent VFSS completed 3/31/23 with the following results: \"Patient presents with mild-moderate oropharyngeal dysphagia characterized by swallow mistiming, poor oral control and transit, reduced hyolaryngeal elevation/excursion, and poor airway protective response. Deep transient penetration of consecutive large drinks of honey thick liquid by cup. Deep penetration to the vocal folds observed during the swallow with thins by straw. No aspiration/penetration observed with honey by straw, nectar by cup/straw, thins by cup, puree, soft solids, or regular solids. Recommend nectar thick liquids and soft solids/chopped. No mixed consistencies.\"  -AL       Current Method of Nutrition -- soft to chew textures;chopped;no mixed consistencies;nectar/syrup-thick liquids  -AL       Precautions/Limitations, Vision -- WFL with corrective lenses;for purposes of eval  -AL       Precautions/Limitations, Hearing -- WFL;for purposes of eval  -AL       Prior Level of Function-Communication -- English as a second language;receptive " language impairment;expressive language impairment  -AL       Prior Level of Function-Swallowing -- safe, efficient swallowing in all situations  -AL       Plans/Goals Discussed with -- patient;agreed upon  -AL       Barriers to Rehab -- cognitive status  -AL       Patient's Goals for Discharge -- patient did not state  -AL          Pain Scale: Numbers Pre/Post-Treatment    Pretreatment Pain Rating -- 0/10 - no pain  -AL          Oral Motor Structure and Function    Dentition Assessment -- natural, present and adequate  -AL       Secretion Management -- WNL/WFL  -AL       Mucosal Quality -- moist, healthy  -AL          Oral Musculature and Cranial Nerve Assessment    Oral Motor General Assessment -- WFL  -AL          General Eating/Swallowing Observations    Respiratory Support Currently in Use -- room air  -AL          Clinical Swallow Eval    Clinical Swallow Evaluation Summary -- Pt seen for Clinical Swallow Re-evaluation. Used  via phone. Pt was oriented to person, city and state; not oriented to situation. No overt s/s of aspiration or penetration observed with thins by cup/straw, NTL by cup/straw, puree, mixed and regular. No difficulty with mastication observed. Swallow initiation appeared timely. Vocal quality remained clear. Recommend continue soft (chopped meats)/no mixed consistencies with NTL. Recommend meds with applesauce or NTL. Sit upright for meals. Plan to complete VFSS on next date to objectively assess swallow function.  -AL          MBS/VFSS    Utensils Used spoon;cup  -KA --       Consistencies Trialed soft to chew textures;chopped;mixed consistency;pureed;thin liquids;nectar/syrup-thick liquids  -KA --          MBS/VFSS Interpretation    VFSS Summary Radiologist Dr Steele present: Patient demonstrates mild oropharyngeal dysphagia characterized by mistiming and premature spillage with thins to pyriforms with deep silent penetration with thin liquids during the swallow and  suspected trace silent aspiration x1. Patient impulsive and unable to follow directions for one sip at a time (IPAD  used). With NTL patient demonstrated either no penetration or  transient penetration during the swallow. No penetration with puree and  trace penetration with mechanical soft mixed consistency at times. No significant pharyngeal residue.  -KA --          SLP Communication to Radiology    Summary Statement Radiologist Dr Steele present: Patient demonstrates mild oropharyngeal dysphagia characterized by mistiming and premature spillage with thins to pyriforms with deep silent penetration with thin liquids during the swallow and suspected trace silent aspiration x1. Patient impulsive and unable to follow directions for one sip at a time (IPAD  used). With NTL patient demonstrated either no penetration or  transient penetration during the swallow. No penetration with puree and  trace penetration with mechanical soft mixed consistency at times. No significant pharyngeal residue.  -KA --          SLP Evaluation Clinical Impression    SLP Swallowing Diagnosis mild;oral dysphagia;pharyngeal dysphagia  -KA R/O pharyngeal dysphagia  -AL       Functional Impact risk of aspiration/pneumonia  -KA risk of aspiration/pneumonia  -AL       Rehab Potential/Prognosis, Swallowing good, to achieve stated therapy goals  -KA good, to achieve stated therapy goals  -AL       Swallow Criteria for Skilled Therapeutic Interventions Met demonstrates skilled criteria  -KA demonstrates skilled criteria  -AL          Recommendations    Therapy Frequency (Swallow) PRN  -KA PRN  -AL       Predicted Duration Therapy Intervention (Days) until discharge  -KA until discharge  -AL       SLP Diet Recommendation soft to chew textures;chopped;no mixed consistencies;nectar thick liquids;water between meals after oral care, with supervision  -KA soft to chew textures;chopped;no mixed consistencies;nectar thick  liquids;water between meals after oral care, with supervision  -AL       Recommended Diagnostics -- VFSS (MBS)  -AL       Recommended Precautions and Strategies upright posture during/after eating;small bites of food and sips of liquid;assist with feeding  -KA upright posture during/after eating;small bites of food and sips of liquid;assist with feeding  -AL       Oral Care Recommendations Oral Care BID/PRN  -KA Oral Care BID/PRN  -AL       SLP Rec. for Method of Medication Administration meds whole;with puree;with thick liquids;as tolerated  -KA meds whole;with puree;with thick liquids;as tolerated  -AL       Monitor for Signs of Aspiration yes;notify SLP if any concerns  -KA yes;notify SLP if any concerns  -AL       Anticipated Discharge Disposition (SLP) anticipate therapy at next level of care  -KA anticipate therapy at next level of care  -AL          (LTG) Swallow    (LTG) Swallow -- Pt will participate in MBSS to assess pharygneal stage of swallow.  -AL                 User Key  (r) = Recorded By, (t) = Taken By, (c) = Cosigned By      Initials Name Effective Dates    Robel Ortega MA,[facility]-SLP 06/02/22 -     Nurys Beavers, MS CCC-SLP 06/16/21 -                     EDUCATION  The patient has been educated in the following areas:   Dysphagia (Swallowing Impairment).        SLP GOALS       Row Name 04/25/23 1255             (LTG) Swallow    (LTG) Swallow Pt will participate in MBSS to assess pharygneal stage of swallow.  -AL                User Key  (r) = Recorded By, (t) = Taken By, (c) = Cosigned By      Initials Name Provider Type    Nurys Beavers, MS CCC-SLP Speech and Language Pathologist                       Time Calculation:    Time Calculation- SLP       Row Name 04/26/23 1207             Time Calculation- SLP    SLP Start Time 1015  -KA      SLP Received On 04/26/23  -KA         Untimed Charges    SLP Eval/Re-eval  [facility] [employer] Eval Swallow - 92611  -KA      06182-RR [employer]  Eval Swallow Minutes 60  -KA         Total Minutes    Untimed Charges Total Minutes 60  -KA       Total Minutes 60  -KA                User Key  (r) = Recorded By, (t) = Taken By, (c) = Cosigned By      Initials Name Provider Type    Robel Ortega MA,CCC-SLP Speech and Language Pathologist                    Therapy Charges for Today       Code Description Service Date Service Provider Modifiers Qty    26519581438 HC ST [employer] FLUORO EVAL SWALLOW 4 4/26/2023 Robel Pettit MA,CCC-SLP GN 1                 Robel Pettit MA,CCC-SLP  4/26/2023

## 2023-04-26 NOTE — THERAPY TREATMENT NOTE
Acute Care - Speech Language Pathology   Swallow Re-Evaluation [facility]     Patient Name: Manuel Durant  : 1950  MRN: [id number]  Today's Date: 2023               Admit Date: 3/12/2023    Visit Dx:     ICD-10-CM ICD-9-CM   1. Intracranial hemorrhage  I62.9 432.9   2. Altered mental status, unspecified altered mental status type  R41.82 780.97     Patient Active Problem List   Diagnosis   • Intracranial hemorrhage   • Hypertensive emergency   • Acute DVT (deep venous thrombosis)   • HTN (hypertension)   • Severe Malnutrition (HCC)   • Hemiplegia   • Right shoulder pain     Past Medical History:   Diagnosis Date   • Hypokalemia 3/17/2023     History reviewed. No pertinent surgical history.    SLP Recommendation and Plan  SLP Swallowing Diagnosis: R/O pharyngeal dysphagia (23)  SLP Diet Recommendation: soft to chew textures, chopped, no mixed consistencies, nectar thick liquids, water between meals after oral care, with supervision (23)  Recommended Precautions and Strategies: upright posture during/after eating, small bites of food and sips of liquid, assist with feeding (23)  SLP Rec. for Method of Medication Administration: meds whole, with puree, with thick liquids, as tolerated (23)     Monitor for Signs of Aspiration: yes, notify SLP if any concerns (23)  Recommended Diagnostics: VFSS (MBS) (23)  Swallow Criteria for Skilled Therapeutic Interventions Met: demonstrates skilled criteria (23)  Anticipated Discharge Disposition (SLP): anticipate therapy at next level of care (23)  Rehab Potential/Prognosis, Swallowing: good, to achieve stated therapy goals (23)  Therapy Frequency (Swallow): PRN (23)  Predicted Duration Therapy Intervention (Days): until discharge (23)                                        Plan of Care Reviewed With: patient      SWALLOW EVALUATION (last 72  "hours)     SLP Adult Swallow Evaluation     Row Name 04/25/23 8776                   Rehab Evaluation    Document Type re-evaluation  -AL        Subjective Information no complaints  -AL        Patient Observations alert;cooperative  -AL        Patient/Family/Caregiver Comments/Observations Sitting upright in recliner. Used  via phone.  -AL           General Information    Patient Profile Reviewed yes  -AL        Pertinent History Of Current Problem 73-year-old admitted with left basal ganglia/internal capsule hemorrhage. Most recent VFSS completed 3/31/23 with the following results: \"Patient presents with mild-moderate oropharyngeal dysphagia characterized by swallow mistiming, poor oral control and transit, reduced hyolaryngeal elevation/excursion, and poor airway protective response. Deep transient penetration of consecutive large drinks of honey thick liquid by cup. Deep penetration to the vocal folds observed during the swallow with thins by straw. No aspiration/penetration observed with honey by straw, nectar by cup/straw, thins by cup, puree, soft solids, or regular solids. Recommend nectar thick liquids and soft solids/chopped. No mixed consistencies.\"  -AL        Current Method of Nutrition soft to chew textures;chopped;no mixed consistencies;nectar/syrup-thick liquids  -AL        Precautions/Limitations, Vision WFL with corrective lenses;for purposes of eval  -AL        Precautions/Limitations, Hearing WFL;for purposes of eval  -AL        Prior Level of Function-Communication English as a second language;receptive language impairment;expressive language impairment  -AL        Prior Level of Function-Swallowing safe, efficient swallowing in all situations  -AL        Plans/Goals Discussed with patient;agreed upon  -AL        Barriers to Rehab cognitive status  -AL        Patient's Goals for Discharge patient did not state  -AL           Pain Scale: Numbers Pre/Post-Treatment    " Pretreatment Pain Rating 0/10 - no pain  -AL           Oral Motor Structure and Function    Dentition Assessment natural, present and adequate  -AL        Secretion Management WNL/WFL  -AL        Mucosal Quality moist, healthy  -AL           Oral Musculature and Cranial Nerve Assessment    Oral Motor General Assessment WFL  -AL           General Eating/Swallowing Observations    Respiratory Support Currently in Use room air  -AL           Clinical Swallow Eval    Clinical Swallow Evaluation Summary Pt seen for Clinical Swallow Re-evaluation. Used  via phone. Pt was oriented to person, city and state; not oriented to situation. No overt s/s of aspiration or penetration observed with thins by cup/straw, NTL by cup/straw, puree, mixed and regular. No difficulty with mastication observed. Swallow initiation appeared timely. Vocal quality remained clear. Recommend continue soft (chopped meats)/no mixed consistencies with NTL. Recommend meds with applesauce or NTL. Sit upright for meals. Plan to complete VFSS on next date to objectively assess swallow function.  -AL           SLP Evaluation Clinical Impression    SLP Swallowing Diagnosis R/O pharyngeal dysphagia  -AL        Functional Impact risk of aspiration/pneumonia  -AL        Rehab Potential/Prognosis, Swallowing good, to achieve stated therapy goals  -AL        Swallow Criteria for Skilled Therapeutic Interventions Met demonstrates skilled criteria  -AL           Recommendations    Therapy Frequency (Swallow) PRN  -AL        Predicted Duration Therapy Intervention (Days) until discharge  -AL        SLP Diet Recommendation soft to chew textures;chopped;no mixed consistencies;nectar thick liquids;water between meals after oral care, with supervision  -AL        Recommended Diagnostics VFSS (MBS)  -AL        Recommended Precautions and Strategies upright posture during/after eating;small bites of food and sips of liquid;assist with feeding  -AL         Oral Care Recommendations Oral Care BID/PRN  -AL        SLP Rec. for Method of Medication Administration meds whole;with puree;with thick liquids;as tolerated  -AL        Monitor for Signs of Aspiration yes;notify SLP if any concerns  -AL        Anticipated Discharge Disposition (SLP) anticipate therapy at next level of care  -AL           (LTG) Swallow    (LTG) Swallow Pt will participate in MBSS to assess pharygneal stage of swallow.  -AL              User Key  (r) = Recorded By, (t) = Taken By, (c) = Cosigned By    Initials Name Effective Dates    Nurys Beavers MS CCC-SLP 06/16/21 -                 EDUCATION  The patient has been educated in the following areas:   Dysphagia (Swallowing Impairment).        SLP GOALS     Row Name 04/25/23 1255             (LTG) Swallow    (LTG) Swallow Pt will participate in MBSS to assess pharygneal stage of swallow.  -AL            User Key  (r) = Recorded By, (t) = Taken By, (c) = Cosigned By    Initials Name Provider Type    Nurys Beavers MS CCC-SLP Speech and Language Pathologist                   Time Calculation:       Therapy Charges for Today     Code Description Service Date Service Provider Modifiers Qty    93839030008 HC ST TREATMENT SWALLOW 3 4/25/2023 Nurys Dixon MS CCC-SLP GN 1               Nurys Dixon MS CCC-SLP  4/26/2023

## 2023-04-26 NOTE — PLAN OF CARE
Goal Outcome Evaluation:  Plan of Care Reviewed With: patient           Outcome Evaluation: VFSS completed patient demonstrates mild oropharyngeal dysphagia recommend patient continue with mechanical soft no mixed and NTL, meds whole with puree. Okay for water protocol. Small bites and sips.

## 2023-04-26 NOTE — PLAN OF CARE
Goal Outcome Evaluation:  Plan of Care Reviewed With: (P) patient        Progress: (P) improving  Outcome Evaluation: (P) Pt UIC and agreeable to PT/OT tx session this AM. Pt completed 7x txfrs to/from recliner to BSC + static chair in room. Pt consistently Amish when txfr'ing to unafected L side. Fluctuates min-mod-maxA when going to the R. Pt static/dynamic seated balance continues to improve; was able to tolerate sitting unsupported on BSC w/o assist for dynamic reaching activity. Pt amb 8' chair>transport bed w/ modA x2. PT will continue to monitor and progress as appropriate.

## 2023-04-26 NOTE — CASE MANAGEMENT/SOCIAL WORK
Continued Stay Note  [facility]     Patient Name: Manuel Durant  MRN: [id number]  Today's Date: 4/26/2023    Admit Date: 3/12/2023    Plan: Home to [place] by air.  Family is aware of care needs and state they are prepared to care for pt during transport and at home in [place].  Tentative DC dates 5/4 or 5/20.  Family arranging transport.   Discharge Plan       Row Name 04/26/23 0905       Plan    Plan Home to [place] by air.  Family is aware of care needs and state they are prepared to care for pt during transport and at home in [place].  Tentative DC dates 5/4 or 5/20.  Family arranging transport.    Plan Comments Received a call last evening from pt's son Ariel.  He states that he is currently working with family to arrange transportation home.  He states that he will call his brother in law for a decision about date he can travel.  He states that his understanding is that May 4 is an option.  He states that if his father needs two people to travel with him, he will request that his daughter also accompany pt on the flight.  He will speak with all family members involved overnight and will call back today with final date.  Faye ARMSTRONG                   Discharge Codes    No documentation.                 Expected Discharge Date and Time       Expected Discharge Date Expected Discharge Time    Apr 28, 2023               Faye Moy RN

## 2023-04-26 NOTE — PROGRESS NOTES
"    Name: Manuel Durant ADMIT: 3/12/2023   : 1950  PCP: Provider, No Known    MRN: [id number] LOS: 45 days   AGE/SEX: 73 y.o. male  ROOM: [facility]     Subjective   Subjective   Resting in bed. No family present. He continues to deny any complaints. Always states, \"I'm good.\" Moving right arm and leg, but still having some limited strength and requiring Ax2 at times. He denies any nausea or vomiting. No dyspnea.      Objective   Objective   Vital Signs  Temp:  [97.4 °F (36.3 °C)-98.2 °F (36.8 °C)] 97.5 °F (36.4 °C)  Heart Rate:  [53-68] 61  Resp:  [16-20] 16  BP: (114-132)/(63-75) 120/71  SpO2:  [90 %-98 %] 98 %  on   ;   Device (Oxygen Therapy): room air  Body mass index is 22.96 kg/m².     Physical Exam  Vitals and nursing note reviewed.   Constitutional:       General: He is awake and alert. He is not in acute distress. Lying in bed on RA  Cardiovascular:      Rate and Rhythm: Normal rate and regular rhythm.      Pulses: Normal pulses.      Heart sounds: Normal heart sounds. No murmur heard.  Pulmonary:      Effort: Pulmonary effort is normal. No respiratory distress.      Breath sounds: Normal breath sounds.   Abdominal:      Palpations: Abdomen is soft and non-tender. Bowel sounds active.    Musculoskeletal:      Right shoulder pain with passive ROM     Right lower leg: No edema.      Left lower leg: No edema.  Skin:     General: Skin is dry.   Neurological:      Motor: Weakness present.      Comments: Alert and oriented to person/place. Intermittent disorientation. Right-sided upper extremity paresis- slowing improving as he has mobility in raising it as well as his right leg.   Psychiatric:         Mood and Affect: Mood normal.         Behavior: Behavior normal.         Thought Content: Thought content normal.         Judgment: Judgment normal     Results Review:       I reviewed the patient's new clinical results.  Results from last 7 days   Lab Units 23  0813 23  0753   WBC 10*3/mm3 7.17 " 6.77   HEMOGLOBIN g/dL 14.4 13.7   PLATELETS 10*3/mm3 223 187     Results from last 7 days   Lab Units 04/24/23  0813 04/21/23  0753   SODIUM mmol/L 140 141   POTASSIUM mmol/L 4.0 4.1   CHLORIDE mmol/L 107 107   CO2 mmol/L 24.0 23.1   BUN mg/dL 13 16   CREATININE mg/dL 0.79 0.71*   GLUCOSE mg/dL 103* 84   Estimated Creatinine Clearance: 78.2 mL/min (by C-G formula based on SCr of 0.79 mg/dL).    Results from last 7 days   Lab Units 04/24/23  0813 04/21/23  0753   CALCIUM mg/dL 9.4 9.0       No results found for: HGBA1C, POCGLU    amLODIPine, 10 mg, Oral, QAM AC  apixaban, 5 mg, Oral, Q12H  baclofen, 2.5 mg, Oral, Q8H  carvedilol, 6.25 mg, Oral, BID With Meals  hydrALAZINE, 10 mg, Oral, Q8H  lidocaine, 1 patch, Transdermal, Q24H  sodium chloride, 10 mL, Intravenous, Q12H  valsartan, 160 mg, Oral, QAM AC       Diet: Regular/House Diet; No Mixed Consistencies, Feeding Assistance - Nursing; Texture: Soft to Chew (NDD 3); Soft to Chew: Chopped Meat; Fluid Consistency: Nectar Thick       Assessment/Plan     Active Hospital Problems    Diagnosis  POA   • **Intracranial hemorrhage [I62.9]  Yes   • Hemiplegia [G81.90]  Yes   • Right shoulder pain [M25.511]  Yes   • Severe Malnutrition (HCC) [E43]  Yes   • Acute DVT (deep venous thrombosis) [I82.409]  Yes   • HTN (hypertension) [I10]  Yes   • Hypertensive emergency [I16.1]  Yes      Resolved Hospital Problems    Diagnosis Date Resolved POA   • Hypokalemia [E87.6] 04/10/2023 Unknown     Mr. Durant is a 73 year old male who initially presented to the hospital 3/12/23 for intracranial bleed. He was found unresponsive by friends and initially taken to [facility] where he was found to have poorly controlled BP and right sided hemiplegia. He was transferred here for closer monitoring in the ICU and neurosurgical consultation. He was cleared to move out of the ICU on 3/16/23.     • Intracranial hemorrhage: CTH 3/17/23 stable left basal ganglia/internal capsule hemorrhage with  left lateral ventricular hemorrhage. On Lovenox for DVT and repeat CTH 3/18 also stable. ANDRADE followed and signed off 3/23 with plans for repeat MRI brain/MRA head in 1 month- ANDRADE saw again 4/20 and ordered MRI but this was not done as patient couldn't be cleared for MRI. CTA/CT head with evidence of hemorrhage cavity left sided with some peripheral enhancement which may just be related to resolving hemorrhage but underlying mass cannot be excluded- needs repeat CTH with contrast or MRI brain in 6-8 weeks and local follow up with neurology/ANDRADE in [place]. Goal BP < 150 systolic. Remains on baclofen for spasticity.      • HTN emergency: On amlodipine 10 mg, valsartan 160 mg, Coreg 6.25 mg BID and hydralazine 10 mg TID. BP well controlled.      • Acute DVT: Found 3/17/23 with acute RLE DVT in soleal. ANDRADE okayed for blood thinners. Now on Eliquis.     • Hypokalemia: Resolved.     Discussed with patient and nursing staff.     Medically stable. Family is setting up a time to come to [place] and get him. He will need to be assist x 1 to fly. Looks like this won't be for another 2-3 weeks until they can come in this weekend, but he will likely not be ready by then from a mobility standpoint.     • Eliquis for DVT prophylaxis.  • Full code.  • Anticipate discharge as above.    SLICK Arroyo  [facility]  04/26/23  14:49 EDT

## 2023-04-27 PROCEDURE — 97530 THERAPEUTIC ACTIVITIES: CPT

## 2023-04-27 RX ORDER — CARVEDILOL 3.12 MG/1
3.12 TABLET ORAL 2 TIMES DAILY WITH MEALS
Status: DISCONTINUED | OUTPATIENT
Start: 2023-04-27 | End: 2023-05-08 | Stop reason: HOSPADM

## 2023-04-27 RX ADMIN — APIXABAN 5 MG: 5 TABLET, FILM COATED ORAL at 08:19

## 2023-04-27 RX ADMIN — HYDRALAZINE HYDROCHLORIDE 10 MG: 10 TABLET, FILM COATED ORAL at 15:20

## 2023-04-27 RX ADMIN — BACLOFEN 2.5 MG: 10 TABLET ORAL at 15:20

## 2023-04-27 RX ADMIN — CARVEDILOL 6.25 MG: 12.5 TABLET, FILM COATED ORAL at 08:19

## 2023-04-27 RX ADMIN — LIDOCAINE 1 PATCH: 700 PATCH TOPICAL at 08:19

## 2023-04-27 RX ADMIN — APIXABAN 5 MG: 5 TABLET, FILM COATED ORAL at 20:39

## 2023-04-27 RX ADMIN — Medication 10 ML: at 20:40

## 2023-04-27 RX ADMIN — CARVEDILOL 3.12 MG: 3.12 TABLET, FILM COATED ORAL at 19:01

## 2023-04-27 RX ADMIN — BACLOFEN 2.5 MG: 10 TABLET ORAL at 23:50

## 2023-04-27 RX ADMIN — HYDRALAZINE HYDROCHLORIDE 10 MG: 10 TABLET, FILM COATED ORAL at 23:50

## 2023-04-27 RX ADMIN — Medication 10 ML: at 08:19

## 2023-04-27 RX ADMIN — VALSARTAN 160 MG: 160 TABLET, FILM COATED ORAL at 06:31

## 2023-04-27 RX ADMIN — AMLODIPINE BESYLATE 10 MG: 10 TABLET ORAL at 06:31

## 2023-04-27 RX ADMIN — HYDRALAZINE HYDROCHLORIDE 10 MG: 10 TABLET, FILM COATED ORAL at 06:31

## 2023-04-27 RX ADMIN — BACLOFEN 2.5 MG: 10 TABLET ORAL at 06:31

## 2023-04-27 NOTE — PROGRESS NOTES
"    Name: Manuel Durant ADMIT: 3/12/2023   : 1950  PCP: Provider, No Known    MRN: [id number] LOS: 46 days   AGE/SEX: 73 y.o. male  ROOM: [facility]     Subjective   Subjective   Resting in bed. No family present. Continues to state, \"I am okay.\" States he slept well. No nausea or vomiting. Eating/drinking.      Objective   Objective   Vital Signs  Temp:  [97.5 °F (36.4 °C)-98.5 °F (36.9 °C)] 98.3 °F (36.8 °C)  Heart Rate:  [58-72] 68  Resp:  [16-18] 18  BP: (110-127)/(55-73) 110/67  SpO2:  [90 %-98 %] 96 %  on   ;   Device (Oxygen Therapy): room air  Body mass index is 23.82 kg/m².     Physical Exam  Vitals and nursing note reviewed.   Constitutional:       General: He is awake and alert. He is not in acute distress. Lying in bed on RA  Cardiovascular:      Rate and Rhythm: Normal rate and regular rhythm.      Pulses: Normal pulses.      Heart sounds: Normal heart sounds. No murmur heard.  Pulmonary:      Effort: Pulmonary effort is normal. No respiratory distress.      Breath sounds: Normal breath sounds.   Abdominal:      Palpations: Abdomen is soft and non-tender. Bowel sounds active.    Musculoskeletal:      Right shoulder pain with passive ROM     Right lower leg: No edema.      Left lower leg: No edema.  Skin:     General: Skin is dry.   Neurological:      Motor: Weakness present.      Comments: Alert and oriented to person/place. Intermittent disorientation. Right-sided upper extremity paresis- slowing improving as he has mobility in raising it as well as his right leg.   Psychiatric:         Mood and Affect: Mood normal.         Behavior: Behavior normal.         Thought Content: Thought content normal.         Judgment: Judgment normal     Results Review:       I reviewed the patient's new clinical results.  Results from last 7 days   Lab Units 23  0813 23  0753   WBC 10*3/mm3 7.17 6.77   HEMOGLOBIN g/dL 14.4 13.7   PLATELETS 10*3/mm3 223 187     Results from last 7 days   Lab Units " 04/24/23  0813 04/21/23  0753   SODIUM mmol/L 140 141   POTASSIUM mmol/L 4.0 4.1   CHLORIDE mmol/L 107 107   CO2 mmol/L 24.0 23.1   BUN mg/dL 13 16   CREATININE mg/dL 0.79 0.71*   GLUCOSE mg/dL 103* 84   Estimated Creatinine Clearance: 81.2 mL/min (by C-G formula based on SCr of 0.79 mg/dL).    Results from last 7 days   Lab Units 04/24/23  0813 04/21/23  0753   CALCIUM mg/dL 9.4 9.0       No results found for: HGBA1C, POCGLU    amLODIPine, 10 mg, Oral, QAM AC  apixaban, 5 mg, Oral, Q12H  baclofen, 2.5 mg, Oral, Q8H  carvedilol, 6.25 mg, Oral, BID With Meals  hydrALAZINE, 10 mg, Oral, Q8H  lidocaine, 1 patch, Transdermal, Q24H  sodium chloride, 10 mL, Intravenous, Q12H  valsartan, 160 mg, Oral, QAM AC       Diet: Regular/House Diet; No Mixed Consistencies, Feeding Assistance - Nursing; Texture: Soft to Chew (NDD 3); Soft to Chew: Chopped Meat; Fluid Consistency: Nectar Thick       Assessment/Plan     Active Hospital Problems    Diagnosis  POA   • **Intracranial hemorrhage [I62.9]  Yes   • Hemiplegia [G81.90]  Yes   • Right shoulder pain [M25.511]  Yes   • Severe Malnutrition (HCC) [E43]  Yes   • Acute DVT (deep venous thrombosis) [I82.409]  Yes   • HTN (hypertension) [I10]  Yes   • Hypertensive emergency [I16.1]  Yes      Resolved Hospital Problems    Diagnosis Date Resolved POA   • Hypokalemia [E87.6] 04/10/2023 Unknown     Mr. Durant is a 73 year old male who initially presented to the hospital 3/12/23 for intracranial bleed. He was found unresponsive by friends and initially taken to [facility] where he was found to have poorly controlled BP and right sided hemiplegia. He was transferred here for closer monitoring in the ICU and neurosurgical consultation. He was cleared to move out of the ICU on 3/16/23.     • Intracranial hemorrhage: CTH 3/17/23 stable left basal ganglia/internal capsule hemorrhage with left lateral ventricular hemorrhage. On Lovenox for DVT and repeat CTH 3/18 also stable. ANDRADE followed  and signed off 3/23 with plans for repeat MRI brain/MRA head in 1 month- ANDRADE saw again 4/20 and ordered MRI but this was not done as patient couldn't be cleared for MRI. CTA/CT head with evidence of hemorrhage cavity left sided with some peripheral enhancement which may just be related to resolving hemorrhage but underlying mass cannot be excluded- needs repeat CTH with contrast or MRI brain in 6-8 weeks and local follow up with neurology/ANDRADE in [place]. Goal BP < 150 systolic. Remains on baclofen for spasticity.      • HTN emergency: On amlodipine 10 mg, valsartan 160 mg, Coreg 6.25 mg BID and hydralazine 10 mg TID. BP controlled, but last check in 90s. Often, nursing having to hold a dose of Coreg at night time d/t lower rates. Will decrease Coreg to 3.125 mg BID and see how he does with this.     • Acute DVT: Found 3/17/23 with acute RLE DVT in soleal. ANDRADE okayed for blood thinners. Now on Eliquis.     • Hypokalemia: Resolved.     Discussed with patient and nursing staff.     Medically stable. Family set up a time to come to [place] and get him to fly [place]- tentative discharge date May 20 th.     • Eliquis for DVT prophylaxis.  • Full code.  • Anticipate discharge as above.      SLICK Arroyo  [facility]  04/27/23  09:09 EDT

## 2023-04-27 NOTE — THERAPY TREATMENT NOTE
Patient Name: Manuel Durant  : 1950    MRN: [id number]                              Today's Date: 2023       Admit Date: 3/12/2023    Visit Dx:     ICD-10-CM ICD-9-CM   1. Intracranial hemorrhage  I62.9 432.9   2. Altered mental status, unspecified altered mental status type  R41.82 780.97     Patient Active Problem List   Diagnosis   • Intracranial hemorrhage   • Hypertensive emergency   • Acute DVT (deep venous thrombosis)   • HTN (hypertension)   • Severe Malnutrition (HCC)   • Hemiplegia   • Right shoulder pain     Past Medical History:   Diagnosis Date   • Hypokalemia 3/17/2023     History reviewed. No pertinent surgical history.   General Information     Row Name 23 1157          Physical Therapy Time and Intention    Document Type therapy note (daily note) (P)   -ZB     Mode of Treatment individual therapy;physical therapy (P)   -ZB     Row Name 23 1157          General Information    Patient Profile Reviewed yes (P)   -ZB     Existing Precautions/Restrictions fall (P)   -ZB     Row Name 23 1157          Cognition    Orientation Status (Cognition) oriented to;person (P)   -ZB     Row Name 23 1157          Safety Issues, Functional Mobility    Impairments Affecting Function (Mobility) balance;coordination;endurance/activity tolerance;grasp;motor control;postural/trunk control;strength;range of motion (ROM);cognition;visual/perceptual (P)   -ZB           User Key  (r) = Recorded By, (t) = Taken By, (c) = Cosigned By    Initials Name Provider Type    ZB Nestor Rae, PT Student PT Student               Mobility     Row Name 23 1158          Bed Mobility    Bed Mobility supine-sit (P)   -ZB     Supine-Sit [place] (Bed Mobility) contact guard (P)   -ZB     Sit-Supine [place] (Bed Mobility) not tested (P)   -ZB     Assistive Device (Bed Mobility) bed rails (P)   -ZB     Comment, (Bed Mobility) [facility] end of session (P)   -ZB     Row Name 23 1157           Bed-Chair Transfer    Bed-Chair [place] (Transfers) moderate assist (50% patient effort);2 person assist;verbal cues;nonverbal cues (demo/gesture) (P)   -ZB     Assistive Device (Bed-Chair Transfers) other (see comments) (P)   hha  -ZB     Comment, (Bed-Chair Transfer) amb' 5'x3 chair<>bed (P)   -ZB     Row Name 04/27/23 1158          Sit-Stand Transfer    Sit-Stand [place] (Transfers) minimum assist (75% patient effort);verbal cues (P)   -ZB     Assistive Device (Sit-Stand Transfers) other (see comments) (P)   hha  -ZB     Row Name 04/27/23 1158          Gait/Stairs (Locomotion)    [place] Level (Gait) moderate assist (50% patient effort);2 person assist;verbal cues;nonverbal cues (demo/gesture) (P)   -ZB     Assistive Device (Gait) other (see comments) (P)   hha  -ZB     Distance in Feet (Gait) 5' x3 (bed<>chair); 1 person for support + 1 person to advance/block  R LE (P)   -ZB     Deviations/Abnormal Patterns (Gait) festinating/shuffling;stride length decreased;sandhya decreased;gait speed decreased (P)   -ZB     Bilateral Gait Deviations forward flexed posture (P)   -ZB     Right Sided Gait Deviations foot drop/toe drag;knee buckling, right side (P)   -ZB     [place] Level (Stairs) unable to assess (P)   -ZB     Comment, (Gait/Stairs) Manual assist at E for advancing and buckling (P)   -ZB           User Key  (r) = Recorded By, (t) = Taken By, (c) = Cosigned By    Initials Name Provider Type    ZB Nestor Rae PT Student PT Student               Obj/Interventions     Row Name 04/27/23 1201          Balance    Balance Assessment sitting static balance;sitting dynamic balance;standing static balance;standing dynamic balance (P)   -ZB     Static Sitting Balance standby assist (P)   -ZB     Dynamic Sitting Balance standby assist (P)   -ZB     Position, Sitting Balance unsupported;sitting edge of bed;sitting in chair (P)   -ZB     Static Standing Balance minimal assist (P)   -ZB     Dynamic  Standing Balance moderate assist;minimal assist (P)   -ZB     Position/Device Used, Standing Balance supported;other (see comments) (P)   hha  -ZB     Balance Interventions sitting;standing;sit to stand;supported;static;dynamic;occupation based/functional task;moderate challenge (P)   -ZB           User Key  (r) = Recorded By, (t) = Taken By, (c) = Cosigned By    Initials Name Provider Type    ZB Nestor Rae, PT Student PT Student               Goals/Plan    No documentation.                Clinical Impression     Row Name 04/27/23 1202          Pain    Pretreatment Pain Rating 0/10 - no pain (P)   -ZB     Posttreatment Pain Rating 0/10 - no pain (P)   -ZB     Pain Intervention(s) Repositioned;Ambulation/increased activity (P)   -ZB     Row Name 04/27/23 1202          Plan of Care Review    Plan of Care Reviewed With patient (P)   -ZB     Progress improving (P)   -ZB     Outcome Evaluation Pt in bed and agreeable to therapy this AM. Pt came to EOB sba, amb 5' x3 bed<>chair; completed 4x txfr from recliner to stationary chair, and completed total of 7x STS. Pt varies in assist level for txfrs; consistently req Amish and vcs for txfr going to his unaffected L side. Pt varies from min-modA when moving to the right and requires more vcs to be successful. Pt ambulation and txfr improved following wt shifting activity, but a long-standing issue in his case has been carryover of progression w/ mobility tasks. Physically he is able to utilize the R LE to a greater capacity than he demo's but the cognitive piece is a limiting factor. PT will continue to monitor and progress as appropriate. (P)   -ZB     Row Name 04/27/23 1202          Positioning and Restraints    Pre-Treatment Position in bed (P)   -ZB     Post Treatment Position chair (P)   -ZB     In Chair notified nsg;reclined;call light within reach;encouraged to call for assist;exit alarm on;RUE elevated (P)   -ZB           User Key  (r) = Recorded By, (t) = Taken By,  (c) = Cosigned By    Initials Name Provider Type    Nestor Howard, PT Student PT Student               Outcome Measures     Row Name 04/27/23 1209 04/27/23 0819       How much help from another person do you currently need...    Turning from your back to your side while in flat bed without using bedrails? 4 (P)   -ZB 3  -TF    Moving from lying on back to sitting on the side of a flat bed without bedrails? 3 (P)   -ZB 3  -TF    Moving to and from a bed to a chair (including a wheelchair)? 3 (P)   -ZB 2  -TF    Standing up from a chair using your arms (e.g., wheelchair, bedside chair)? 3 (P)   -ZB 2  -TF    Climbing 3-5 steps with a railing? 1 (P)   -ZB 1  -TF    To walk in hospital room? 2 (P)   -ZB 1  -TF    AM-PAC 6 Clicks Score (PT) 16 (P)   -ZB 12  -TF    Highest level of mobility 5 --> Static standing (P)   -ZB 4 --> Transferred to chair/commode  -TF    Row Name 04/27/23 1209          Functional Assessment    Outcome Measure Options AM-PAC 6 Clicks Basic Mobility (PT) (P)   -ZB           User Key  (r) = Recorded By, (t) = Taken By, (c) = Cosigned By    Initials Name Provider Type    TF Miriam Smiley RN Registered Nurse    Nestor Howard, PT Student PT Student                             Physical Therapy Education     Title: PT OT SLP Therapies (Done)     Topic: Physical Therapy (Done)     Point: Mobility training (Done)     Learning Progress Summary           Patient Acceptance, E,TB,H, VU by MS at 4/21/2023 1838    Acceptance, E, NR by CW at 4/21/2023 1537    Acceptance, E, VU,DU by CHARLY at 4/19/2023 1130    Acceptance, E, NR by CW at 4/18/2023 0950    Acceptance, E, VU,NR by NICOLASA at 4/16/2023 1158    Acceptance, E,TB,H, VU,NR by MS at 4/13/2023 1817    Acceptance, E, NR by CW at 4/13/2023 1341    Acceptance, E, NR by CW at 4/12/2023 1201    Acceptance, E, NR by CW at 4/11/2023 1158    Acceptance, E, NR by CW at 4/10/2023 1414    Acceptance, E,TB, NR by CS at 4/8/2023 0928    Acceptance, E, NR by CW at  4/7/2023 1215    Acceptance, E, NR by CW at 4/6/2023 1335    Acceptance, E, NR by CW at 4/5/2023 1043    Acceptance, E, NR by CW at 4/4/2023 0906    Acceptance, E, NR by CW at 4/3/2023 1531    Acceptance, E,TB, VU,NR by CAMERON at 4/1/2023 1530    Acceptance, E, NR by CW at 3/31/2023 0925    Acceptance, E, NR by CW at 3/30/2023 1209    Acceptance, E, NR,NL by CW at 3/29/2023 1336    Acceptance, E, NR by ZB at 3/28/2023 1317    Acceptance, E, NR by CW at 3/27/2023 1401    Acceptance, E,D, NR,VU by JM at 3/24/2023 1631    Comment: seemed to comprehend commands this session    Acceptance, E,D, NR by JM at 3/23/2023 1655    Acceptance, E,D, NR by JM at 3/22/2023 1545    Acceptance, E,TB, VU,NR by CB at 3/21/2023 1544    Acceptance, E,TB,H, VU by MS at 3/16/2023 1801    Acceptance, E,D, DU,NR by MO at 3/16/2023 1451    Acceptance, E, DU,NR by MO at 3/15/2023 1200    Acceptance, E, NR by LM at 3/15/2023 0521    Acceptance, E,D, DU,NR by MO at 3/14/2023 1458   Family Acceptance, E,TB,H, VU by MS at 4/21/2023 1838    Acceptance, E,TB,H, VU,NR by MS at 4/13/2023 1817    Acceptance, E,TB,H, VU by MS at 3/16/2023 1801                   Point: Home exercise program (Done)     Learning Progress Summary           Patient Acceptance, E,TB,H, VU by MS at 4/21/2023 1838    Acceptance, E, VU,NR by DJ at 4/16/2023 1158    Acceptance, E,TB,H, VU,NR by MS at 4/13/2023 1817    Acceptance, E, NR by CW at 4/13/2023 1341    Acceptance, E,TB, NR by CS at 4/8/2023 0928    Acceptance, E, NR by CW at 4/7/2023 1215    Acceptance, E, NR by CW at 4/6/2023 1335    Acceptance, E,TB, VU,NR by CAMERON at 4/1/2023 1530    Acceptance, E, NR by CW at 3/31/2023 0925    Acceptance, E, NR by CHARLY at 3/28/2023 1317    Acceptance, E, NR by CW at 3/27/2023 1401    Acceptance, E,D, NR,VU by ZACH at 3/24/2023 1631    Comment: seemed to comprehend commands this session    Acceptance, E,D, NR by ZACH at 3/23/2023 1655    Acceptance, E,D, NR by ZACH at 3/22/2023 1545    Acceptance,  E,TB, VU,NR by CB at 3/21/2023 1544    Acceptance, E,TB,H, VU by MS at 3/16/2023 1801    Acceptance, E,D, DU,NR by MO at 3/16/2023 1451    Acceptance, E, NR by LM at 3/15/2023 0521    Acceptance, E,D, DU,NR by MO at 3/14/2023 1458   Family Acceptance, E,TB,H, VU by MS at 4/21/2023 1838    Acceptance, E,TB,H, VU,NR by MS at 4/13/2023 1817    Acceptance, E,TB,H, VU by MS at 3/16/2023 1801                   Point: Body mechanics (Done)     Learning Progress Summary           Patient Acceptance, E,TB,H, VU by MS at 4/21/2023 1838    Acceptance, E, VU,NR by DJ at 4/16/2023 1158    Acceptance, E,TB,H, VU,NR by MS at 4/13/2023 1817    Acceptance, E, NR by CW at 4/13/2023 1341    Acceptance, E, NR by CW at 4/11/2023 1158    Acceptance, E, NR by CW at 4/10/2023 1414    Acceptance, E,TB, NR by CS at 4/8/2023 0928    Acceptance, E, NR by CW at 4/7/2023 1215    Acceptance, E, NR by CW at 4/4/2023 0906    Acceptance, E, NR by CW at 4/3/2023 1531    Acceptance, E,TB, VU,NR by CAMERON at 4/1/2023 1530    Acceptance, E,D, NR,VU by JM at 3/24/2023 1631    Comment: seemed to comprehend commands this session    Acceptance, E,D, NR by JM at 3/23/2023 1655    Acceptance, E,D, NR by JM at 3/22/2023 1545    Acceptance, E,TB, VU,NR by CB at 3/21/2023 1544    Acceptance, E,TB,H, VU by MS at 3/16/2023 1801    Acceptance, E,D, DU,NR by MO at 3/16/2023 1451    Acceptance, E, DU,NR by MO at 3/15/2023 1200    Acceptance, E, NR by LM at 3/15/2023 0521    Acceptance, E,D, DU,NR by MO at 3/14/2023 1458   Family Acceptance, E,TB,H, VU by MS at 4/21/2023 1838    Acceptance, E,TB,H, VU,NR by MS at 4/13/2023 1817    Acceptance, E,TB,H, VU by MS at 3/16/2023 1801                   Point: Precautions (Done)     Learning Progress Summary           Patient Acceptance, E,TB,H, VU by MS at 4/21/2023 1838    Acceptance, E, VU,NR by DJ at 4/16/2023 1158    Acceptance, E,TB,H, VU,NR by MS at 4/13/2023 1817    Acceptance, E, NR by CW at 4/13/2023 1341    Acceptance,  E,TB, NR by CS at 4/8/2023 0928    Acceptance, E, NR by CW at 4/7/2023 1215    Acceptance, E, NR by CW at 4/4/2023 0906    Acceptance, E, NR by CW at 4/3/2023 1531    Acceptance, E,TB, VU,NR by CAMERON at 4/1/2023 1530    Acceptance, E,D, NR,VU by JM at 3/24/2023 1631    Comment: seemed to comprehend commands this session    Acceptance, E,D, NR by JM at 3/23/2023 1655    Acceptance, E,D, NR by JM at 3/22/2023 1545    Acceptance, E,TB, VU,NR by CB at 3/21/2023 1544    Acceptance, E,TB,H, VU by MS at 3/16/2023 1801    Acceptance, E,D, DU,NR by MO at 3/16/2023 1451    Acceptance, E, DU,NR by MO at 3/15/2023 1200    Acceptance, E, NR by LM at 3/15/2023 0521    Acceptance, E,D, DU,NR by MO at 3/14/2023 1458   Family Acceptance, E,TB,H, VU by MS at 4/21/2023 1838    Acceptance, E,TB,H, VU,NR by MS at 4/13/2023 1817    Acceptance, E,TB,H, VU by MS at 3/16/2023 1801                               User Key     Initials Effective Dates Name Provider Type Discipline    ZACH 03/07/18 -  Tiffanie Grace, PTA Physical Therapist Assistant PT    CAMERON 05/19/21 -  Leeann Wiley, PT Physical Therapist PT    DJ 10/25/19 -  Jacqueline Chamberlain, PT Physical Therapist PT    MS 06/16/21 -  Angel Luis Cr, RN Registered Nurse Nurse    CW 12/13/22 -  Tamica Willoughby, PT Physical Therapist PT    CB 10/22/21 -  Bety Benitez, PT Physical Therapist PT    CS 09/22/22 -  Adelia Montalvo, PT Physical Therapist PT    LM 10/13/22 -  Silke Grace, RN Registered Nurse Nurse    MO 01/27/23 -  Lacie Tanner, PT Student PT Student PT    ZB 03/10/23 -  Nestor Rae, PT Student PT Student PT              PT Recommendation and Plan     Plan of Care Reviewed With: (P) patient  Progress: (P) improving  Outcome Evaluation: (P) Pt in bed and agreeable to therapy this AM. Pt came to EOB sba, amb 5' x3 bed<>chair; completed 4x txfr from recliner to stationary chair, and completed total of 7x STS. Pt varies in assist level for txfrs; consistently req Amish and vcs  for txfr going to his unaffected L side. Pt varies from min-modA when moving to the right and requires more vcs to be successful. Pt ambulation and txfr improved following wt shifting activity, but a long-standing issue in his case has been carryover of progression w/ mobility tasks. Physically he is able to utilize the R LE to a greater capacity than he demo's but the cognitive piece is a limiting factor. PT will continue to monitor and progress as appropriate.     Time Calculation:    PT Charges     Row Name 04/27/23 1209             Time Calculation    Start Time 1104 (P)   -ZB      Stop Time 1123 (P)   -ZB      Time Calculation (min) 19 min (P)   -ZB      PT Received On 04/27/23 (P)   -ZB      PT - Next Appointment 04/28/23 (P)   -ZB         Time Calculation- PT    Total Timed Code Minutes- PT 19 minute(s) (P)   -ZB         Timed Charges    72724 - PT Therapeutic Activity Minutes 19 (P)   -ZB         Total Minutes    Timed Charges Total Minutes 19 (P)   -ZB       Total Minutes 19 (P)   -ZB            User Key  (r) = Recorded By, (t) = Taken By, (c) = Cosigned By    Initials Name Provider Type    ZB Nestor Rae, PT Student PT Student              Therapy Charges for Today     Code Description Service Date Service Provider Modifiers Qty    55673430965  PT THERAPEUTIC ACT EA 15 MIN 4/26/2023 Nestor Rae, PT Student GP 2    94669273817 HC PT THERAPEUTIC ACT EA 15 MIN 4/27/2023 Nestor Rae, PT Student GP 1    [id number]  PT THER SUPP EA 15 MIN 4/27/2023 Nesotr Rae, PT Student GP 1          PT G-Codes  Outcome Measure Options: (P) AM-PAC 6 Clicks Basic Mobility (PT)  AM-PAC 6 Clicks Score (PT): (P) 16  AM-PAC 6 Clicks Score (OT): 11  Modified Potter Scale: 4 - Moderately severe disability.  Unable to walk without assistance, and unable to attend to own bodily needs without assistance.       JOSE ELIAS Vallejo  4/27/2023

## 2023-04-27 NOTE — CASE MANAGEMENT/SOCIAL WORK
Continued Stay Note  [facility]     Patient Name: Manuel Durant  MRN: [id number]  Today's Date: 4/27/2023    Admit Date: 3/12/2023    Plan: Home to [place] by air.  Family is arranging to pick pt up on 5/20/2023 to fly with him home to [place].   Discharge Plan       Row Name 04/27/23 1134       Plan    Plan Home to [place] by air.  Family is arranging to pick pt up on 5/20/2023 to fly with him home to [place].    Plan Comments Call received from pt's son Ariel from [employer].  He states that he has spoken with all family members and they are arranging to pick pt up on 5/20/23 to take him home to [place] by air.  He will call with any concerns.  Faye ARMSTRONG                   Discharge Codes    No documentation.                 Expected Discharge Date and Time       Expected Discharge Date Expected Discharge Time    May 3, 2023               Faye Moy RN

## 2023-04-27 NOTE — PLAN OF CARE
Goal Outcome Evaluation:  Plan of Care Reviewed With: (P) patient        Progress: (P) improving  Outcome Evaluation: (P) Pt in bed and agreeable to therapy this AM. Pt came to EOB sba, amb 5' x3 bed<>chair; completed 4x txfr from recliner to stationary chair, and completed total of 7x STS. Pt varies in assist level for txfrs; consistently req Amish and vcs for txfr going to his unaffected L side. Pt varies from min-modA when moving to the right and requires more vcs to be successful. Pt ambulation and txfr improved following wt shifting activity, but a long-standing issue in his case has been carryover of progression w/ mobility tasks. Physically he is able to utilize the R LE to a greater capacity than he demo's but the cognitive piece is a limiting factor. PT will continue to monitor and progress as appropriate.

## 2023-04-27 NOTE — PLAN OF CARE
Goal Outcome Evaluation:              Outcome Evaluation: Alert and oriented x3. Up to chair with PT and back to bed with nursing. Right sided weakness noted. Right facial droop noted. Tolerating diet. No complaints.

## 2023-04-27 NOTE — PLAN OF CARE
Goal Outcome Evaluation:              Outcome Evaluation: Alert and oriented x3. Continues with right sided facial droop and right sided weakness. Tolerating meals. Incontinent of bowel and bladder. No complaints.

## 2023-04-28 PROCEDURE — 97530 THERAPEUTIC ACTIVITIES: CPT

## 2023-04-28 PROCEDURE — 97535 SELF CARE MNGMENT TRAINING: CPT

## 2023-04-28 RX ADMIN — CARVEDILOL 3.12 MG: 3.12 TABLET, FILM COATED ORAL at 18:47

## 2023-04-28 RX ADMIN — APIXABAN 5 MG: 5 TABLET, FILM COATED ORAL at 22:49

## 2023-04-28 RX ADMIN — HYDRALAZINE HYDROCHLORIDE 10 MG: 10 TABLET, FILM COATED ORAL at 06:28

## 2023-04-28 RX ADMIN — BACLOFEN 2.5 MG: 10 TABLET ORAL at 15:13

## 2023-04-28 RX ADMIN — AMLODIPINE BESYLATE 10 MG: 10 TABLET ORAL at 06:30

## 2023-04-28 RX ADMIN — BACLOFEN 2.5 MG: 10 TABLET ORAL at 22:49

## 2023-04-28 RX ADMIN — HYDRALAZINE HYDROCHLORIDE 10 MG: 10 TABLET, FILM COATED ORAL at 15:13

## 2023-04-28 RX ADMIN — CARVEDILOL 3.12 MG: 3.12 TABLET, FILM COATED ORAL at 10:02

## 2023-04-28 RX ADMIN — Medication 10 ML: at 10:02

## 2023-04-28 RX ADMIN — LIDOCAINE 1 PATCH: 700 PATCH TOPICAL at 10:02

## 2023-04-28 RX ADMIN — APIXABAN 5 MG: 5 TABLET, FILM COATED ORAL at 10:02

## 2023-04-28 RX ADMIN — Medication 10 ML: at 22:53

## 2023-04-28 RX ADMIN — HYDRALAZINE HYDROCHLORIDE 10 MG: 10 TABLET, FILM COATED ORAL at 22:49

## 2023-04-28 RX ADMIN — VALSARTAN 160 MG: 160 TABLET, FILM COATED ORAL at 06:30

## 2023-04-28 RX ADMIN — BACLOFEN 2.5 MG: 10 TABLET ORAL at 06:28

## 2023-04-28 NOTE — PROGRESS NOTES
"    Name: Manuel Durant ADMIT: 3/12/2023   : 1950  PCP: Provider, No Known    MRN: [id number] LOS: 47 days   AGE/SEX: 73 y.o. male  ROOM: [facility]     Subjective   Subjective   Resting in bed. No family present. No issues reported overnight. States, \"I'm fine.\" Denies pain or trouble breathing.     Objective   Objective   Vital Signs  Temp:  [97.3 °F (36.3 °C)-98.2 °F (36.8 °C)] 98.1 °F (36.7 °C)  Heart Rate:  [52-70] 70  Resp:  [16-18] 18  BP: ()/(61-69) 118/61  SpO2:  [95 %-100 %] 100 %  on   ;   Device (Oxygen Therapy): room air  Body mass index is 23.85 kg/m².     Physical Exam  Vitals and nursing note reviewed.   Constitutional:       General: He is awake and alert. He is not in acute distress. Lying in bed on RA  Cardiovascular:      Rate and Rhythm: Normal rate and regular rhythm.      Pulses: Normal pulses.      Heart sounds: Normal heart sounds. No murmur heard.  Pulmonary:      Effort: Pulmonary effort is normal. No respiratory distress.      Breath sounds: Normal breath sounds.   Abdominal:      Palpations: Abdomen is soft and non-tender. Bowel sounds active.    Musculoskeletal:      Right shoulder pain with passive ROM     Right lower leg: No edema.      Left lower leg: No edema.  Skin:     General: Skin is dry.   Neurological:      Motor: Weakness present.      Comments: Alert and oriented to person/place. Intermittent disorientation. Right-sided upper extremity paresis- slowing improving as he has mobility in raising it as well as his right leg.   Psychiatric:         Mood and Affect: Mood normal.         Behavior: Behavior normal.         Thought Content: Thought content normal.         Judgment: Judgment normal  Results Review:       I reviewed the patient's new clinical results.  Results from last 7 days   Lab Units 23  0813   WBC 10*3/mm3 7.17   HEMOGLOBIN g/dL 14.4   PLATELETS 10*3/mm3 223     Results from last 7 days   Lab Units 23  0813   SODIUM mmol/L 140   POTASSIUM " mmol/L 4.0   CHLORIDE mmol/L 107   CO2 mmol/L 24.0   BUN mg/dL 13   CREATININE mg/dL 0.79   GLUCOSE mg/dL 103*   Estimated Creatinine Clearance: 81.3 mL/min (by C-G formula based on SCr of 0.79 mg/dL).    Results from last 7 days   Lab Units 04/24/23  0813   CALCIUM mg/dL 9.4       No results found for: HGBA1C, POCGLU    amLODIPine, 10 mg, Oral, QAM AC  apixaban, 5 mg, Oral, Q12H  baclofen, 2.5 mg, Oral, Q8H  carvedilol, 3.125 mg, Oral, BID With Meals  hydrALAZINE, 10 mg, Oral, Q8H  lidocaine, 1 patch, Transdermal, Q24H  sodium chloride, 10 mL, Intravenous, Q12H  valsartan, 160 mg, Oral, QAM AC       Diet: Regular/House Diet; No Mixed Consistencies, Feeding Assistance - Nursing; Texture: Soft to Chew (NDD 3); Soft to Chew: Chopped Meat; Fluid Consistency: Nectar Thick       Assessment/Plan     Active Hospital Problems    Diagnosis  POA   • **Intracranial hemorrhage [I62.9]  Yes   • Hemiplegia [G81.90]  Yes   • Right shoulder pain [M25.511]  Yes   • Severe Malnutrition (HCC) [E43]  Yes   • Acute DVT (deep venous thrombosis) [I82.409]  Yes   • HTN (hypertension) [I10]  Yes   • Hypertensive emergency [I16.1]  Yes      Resolved Hospital Problems    Diagnosis Date Resolved POA   • Hypokalemia [E87.6] 04/10/2023 Unknown     Mr. Durant is a 73 year old male who initially presented to the hospital 3/12/23 for intracranial bleed. He was found unresponsive by friends and initially taken to [facility] where he was found to have poorly controlled BP and right sided hemiplegia. He was transferred here for closer monitoring in the ICU and neurosurgical consultation. He was cleared to move out of the ICU on 3/16/23.     • Intracranial hemorrhage: CTH 3/17/23 stable left basal ganglia/internal capsule hemorrhage with left lateral ventricular hemorrhage. On Lovenox for DVT and repeat CTH 3/18 also stable. ANDRADE followed and signed off 3/23 with plans for repeat MRI brain/MRA head in 1 month- ANDRADE saw again 4/20 and ordered MRI  but this was not done as patient couldn't be cleared for MRI. CTA/CT head with evidence of hemorrhage cavity left sided with some peripheral enhancement which may just be related to resolving hemorrhage but underlying mass cannot be excluded- needs repeat CTH with contrast or MRI brain in 6-8 weeks and local follow up with neurology/ANDRADE in [place]. Goal BP < 150 systolic. Remains on baclofen for spasticity.      • HTN emergency: On amlodipine 10 mg, valsartan 160 mg, Coreg 3.125 mg BID (reduced 4/27) and hydralazine 10 mg TID. BP better today with adjustments.      • Acute DVT: Found 3/17/23 with acute RLE DVT in soleal. ANDRADE okayed for blood thinners. Now on Eliquis.     • Hypokalemia: Resolved.     Discussed with patient.     Medically stable. Family set up a time to come to [place] and get him to fly [place]- tentative discharge date May 20 th.    • Eliquis for DVT prophylaxis.  • Full code.  • Anticipate discharge as above.      SLICK Arroyo  [facility]  04/28/23  09:14 EDT

## 2023-04-28 NOTE — PLAN OF CARE
Goal Outcome Evaluation:  Plan of Care Reviewed With: patient        Progress: no change  Outcome Evaluation: No changes from nursing perspective.        Problem: Fall Injury Risk  Goal: Absence of Fall and Fall-Related Injury  Outcome: Ongoing, Progressing     Problem: Skin Injury Risk Increased  Goal: Skin Health and Integrity  Outcome: Ongoing, Progressing     Problem: Adult Inpatient Plan of Care  Goal: Plan of Care Review  Outcome: Ongoing, Progressing  Flowsheets (Taken 4/28/2023 1026)  Progress: no change  Plan of Care Reviewed With: patient  Outcome Evaluation: No changes from nursing perspective.  Goal: Patient-Specific Goal (Individualized)  Outcome: Ongoing, Progressing  Goal: Absence of Hospital-Acquired Illness or Injury  Outcome: Ongoing, Progressing  Goal: Optimal Comfort and Wellbeing  Outcome: Ongoing, Progressing  Goal: Readiness for Transition of Care  Outcome: Ongoing, Progressing     Problem: Adjustment to Illness (Stroke, Ischemic/Transient Ischemic Attack)  Goal: Optimal Coping  Outcome: Ongoing, Progressing     Problem: Bowel Elimination Impaired (Stroke, Ischemic/Transient Ischemic Attack)  Goal: Effective Bowel Elimination  Outcome: Ongoing, Progressing     Problem: Cerebral Tissue Perfusion (Stroke, Ischemic/Transient Ischemic Attack)  Goal: Optimal Cerebral Tissue Perfusion  Outcome: Ongoing, Progressing     Problem: Cognitive Impairment (Stroke, Ischemic/Transient Ischemic Attack)  Goal: Optimal Cognitive Function  Outcome: Ongoing, Progressing     Problem: Communication Impairment (Stroke, Ischemic/Transient Ischemic Attack)  Goal: Improved Communication Skills  Outcome: Ongoing, Progressing     Problem: Functional Ability Impaired (Stroke, Ischemic/Transient Ischemic Attack)  Goal: Optimal Functional Ability  Outcome: Ongoing, Progressing     Problem: Respiratory Compromise (Stroke, Ischemic/Transient Ischemic Attack)  Goal: Effective Oxygenation and Ventilation  Outcome: Ongoing,  Progressing     Problem: Sensorimotor Impairment (Stroke, Ischemic/Transient Ischemic Attack)  Goal: Improved Sensorimotor Function  Outcome: Ongoing, Progressing     Problem: Swallowing Impairment (Stroke, Ischemic/Transient Ischemic Attack)  Goal: Optimal Eating and Swallowing without Aspiration  Outcome: Ongoing, Progressing     Problem: Urinary Elimination Impaired (Stroke, Ischemic/Transient Ischemic Attack)  Goal: Effective Urinary Elimination  Outcome: Ongoing, Progressing     Problem: Adjustment to Illness (Stroke, Hemorrhagic)  Goal: Optimal Coping  Outcome: Ongoing, Progressing     Problem: Bowel Elimination Impaired (Stroke, Hemorrhagic)  Goal: Effective Bowel Elimination  Outcome: Ongoing, Progressing     Problem: Cerebral Tissue Perfusion (Stroke, Hemorrhagic)  Goal: Optimal Cerebral Tissue Perfusion  Outcome: Ongoing, Progressing     Problem: Cognitive Impairment (Stroke, Hemorrhagic)  Goal: Optimal Cognitive Function  Outcome: Ongoing, Progressing     Problem: Communication Impairment (Stroke, Hemorrhagic)  Goal: Effective Communication Skills  Outcome: Ongoing, Progressing     Problem: Functional Ability Impaired (Stroke, Hemorrhagic)  Goal: Optimal Functional Ability  Outcome: Ongoing, Progressing     Problem: Pain (Stroke, Hemorrhagic)  Goal: Acceptable Pain Control  Outcome: Ongoing, Progressing     Problem: Respiratory Compromise (Stroke, Hemorrhagic)  Goal: Effective Oxygenation and Ventilation  Outcome: Ongoing, Progressing     Problem: Sensorimotor Impairment (Stroke, Hemorrhagic)  Goal: Improved Sensorimotor Function  Outcome: Ongoing, Progressing     Problem: Swallowing Impairment (Stroke, Hemorrhagic)  Goal: Optimal Eating and Swallowing Without Aspiration  Outcome: Ongoing, Progressing     Problem: Urinary Elimination Impaired (Stroke, Hemorrhagic)  Goal: Effective Urinary Elimination  Outcome: Ongoing, Progressing

## 2023-04-28 NOTE — PLAN OF CARE
Goal Outcome Evaluation:  Plan of Care Reviewed With: (P) patient        Progress: (P) no change  Outcome Evaluation: (P) Pt in bed and agreeable to PT/OT co-tx session this AM. Pt came to EOB sba, STS from EOB Amish, and completed wt shifting activity to encourage WB through R LE. R LE manually stretched followed by amb 5' to recliner. Pt completed seated dynamic reaching activity w/ R UE and occasional use of L UE to support the R. Pt able to extend digits to grasp small objects. Pt completed addtl 2x txfr from chair<>BSC w/ [place]ish going to the L and min-modA going R. PT will continue to monitor and progress as able.

## 2023-04-28 NOTE — THERAPY TREATMENT NOTE
Patient Name: Manuel Durant  : 1950    MRN: [id number]                              Today's Date: 2023       Admit Date: 3/12/2023    Visit Dx:     ICD-10-CM ICD-9-CM   1. Intracranial hemorrhage  I62.9 432.9   2. Altered mental status, unspecified altered mental status type  R41.82 780.97     Patient Active Problem List   Diagnosis   • Intracranial hemorrhage   • Hypertensive emergency   • Acute DVT (deep venous thrombosis)   • HTN (hypertension)   • Severe Malnutrition (HCC)   • Hemiplegia   • Right shoulder pain     Past Medical History:   Diagnosis Date   • Hypokalemia 3/17/2023     History reviewed. No pertinent surgical history.   General Information     Row Name 23 1252          OT Time and Intention    Document Type therapy note (daily note)  -     Mode of Treatment co-treatment;occupational therapy  -     Row Name 23 1252          General Information    Patient Profile Reviewed yes  -MW     Existing Precautions/Restrictions fall  -     Row Name 23 1252          Cognition    Orientation Status (Cognition) oriented to;person  -     Row Name 23 1252          Safety Issues, Functional Mobility    Impairments Affecting Function (Mobility) balance;coordination;endurance/activity tolerance;grasp;motor control;postural/trunk control;strength;range of motion (ROM);cognition;visual/perceptual  -           User Key  (r) = Recorded By, (t) = Taken By, (c) = Cosigned By    Initials Name Provider Type    MW Karishma Spence OT Occupational Therapist                 Mobility/ADL's     Row Name 23 1252          Bed Mobility    Bed Mobility supine-sit  -     Supine-Sit [place] (Bed Mobility) standby assist;contact guard  -     Supine-Sit-Supine [place] (Bed Mobility) not tested  -     Assistive Device (Bed Mobility) bed rails  -     Comment, (Bed Mobility) [facility] end of session, heavy use of bed rails for coming to EOB  -     Row Name 23 1256           Transfers    Transfers sit-stand transfer;stand-sit transfer;toilet transfer  -MW     Row Name 04/28/23 1252          Sit-Stand Transfer    Sit-Stand [place] (Transfers) minimum assist (75% patient effort);verbal cues;1 person assist;2 person assist  -     Assistive Device (Sit-Stand Transfers) other (see comments)  -     Comment, (Sit-Stand Transfer) HHA  -MW     Row Name 04/28/23 1252          Toilet Transfer    Type (Toilet Transfer) stand pivot/stand step  -     [place] Level (Toilet Transfer) minimum assist (75% patient effort);moderate assist (50% patient effort)  -     Assistive Device (Toilet Transfer) commode;commode, 3-in-1  -     Comment, (Toilet Transfer) multiple stand pivots chair <> BSC <> chair with min A to transfer to stronger L side, mod-max A at times for transfer to R side  -MW     Row Name 04/28/23 1252          Functional Mobility    Functional Mobility- Comment focusing on weight shifting, max cues required as pt difficulty with grasping activity, kept picking up RLE with task  -MW     Row Name 04/28/23 1252          Activities of Daily Living    BADL Assessment/Intervention lower body dressing;toileting;feeding;bathing  -MW     Row Name 04/28/23 1252          Lower Body Dressing Assessment/Training    [place] Level (Lower Body Dressing) lower body dressing skills;moderate assist (50% patient effort);socks  -     Position (Lower Body Dressing) supported sitting  -MW     Row Name 04/28/23 1252          Toileting Assessment/Training    Comment, (Toileting) demo'd transfers only  -MW     Row Name 04/28/23 1252          Self-Feeding Assessment/Training    Comment, (Feeding) max A for tray set up, opening containers and cutting up food, hand to mouth with LUE with finger feeding (I)  -MW     Row Name 04/28/23 1252          Bathing Assessment/Intervention    Comment, (Bathing) with s/up, pt able to use LUE to wash RUE thoroughly sitting unsupported in chair with  SBA and max cues  -MW           User Key  (r) = Recorded By, (t) = Taken By, (c) = Cosigned By    Initials Name Provider Type    Karishma Williamson OT Occupational Therapist               Obj/Interventions     Row Name 04/28/23 1256          Motor Skills    Motor Skills coordination  -     Muscle Tone other (see comments)  focus on grasp and relaase of RUE with target with household objects. x4 reps, encouraging more functional use of RUE however poor carryover  -MW     Row Name 04/28/23 1256          Balance    Balance Assessment sitting static balance;sitting dynamic balance;sit to stand dynamic balance;standing static balance;standing dynamic balance  -MW     Static Sitting Balance standby assist  -MW     Dynamic Sitting Balance standby assist  -MW     Position, Sitting Balance sitting edge of bed;sitting in chair  -MW     Sit to Stand Dynamic Balance minimal assist  -MW     Static Standing Balance minimal assist  -MW     Dynamic Standing Balance moderate assist;minimal assist;1-person assist;2-person assist  -MW     Position/Device Used, Standing Balance supported  -MW     Balance Interventions occupation based/functional task  -           User Key  (r) = Recorded By, (t) = Taken By, (c) = Cosigned By    Initials Name Provider Type    Karishma Williamson OT Occupational Therapist               Goals/Plan    No documentation.                Clinical Impression     Row Name 04/28/23 1258          Pain Assessment    Pretreatment Pain Rating 0/10 - no pain  -MW     Posttreatment Pain Rating 0/10 - no pain  -     Row Name 04/28/23 1258          Plan of Care Review    Plan of Care Reviewed With patient  -     Progress no change  -     Outcome Evaluation Pt seen for OT/PT tx session in AM, pt agreeable, coming to EOB with SBA/CGA using bed rails. Pt engaging in RUE functional task specific training, ADLs, functional transfers and improved standing balance to assist with improved support throughout  upright activity. Pt continues to required min-mod A for stand pivot to L side to BSC, at times max A for transfer to R affected side. Encouraged continued functional use of RUE throughout daily activity, s/up for self feeding and max A for s/up- able to coordinate spoon to mouth (I) once tray and food within reach. Will continue to progress.  -     Row Name 04/28/23 1258          Vital Signs    O2 Delivery Pre Treatment room air  -MW     Pre Patient Position Supine  -MW     Intra Patient Position Standing  -MW     Post Patient Position Sitting  -MW     Row Name 04/28/23 1258          Positioning and Restraints    Pre-Treatment Position in bed  -MW     Post Treatment Position chair  -MW     In Chair notified nsg;reclined;call light within reach;encouraged to call for assist;exit alarm on  -MW           User Key  (r) = Recorded By, (t) = Taken By, (c) = Cosigned By    Initials Name Provider Type    Karishma Williamson, OT Occupational Therapist               Outcome Measures     Row Name 04/28/23 1301          How much help from another is currently needed...    Putting on and taking off regular lower body clothing? 2  -MW     Bathing (including washing, rinsing, and drying) 2  -MW     Toileting (which includes using toilet bed pan or urinal) 1  -MW     Putting on and taking off regular upper body clothing 2  -MW     Taking care of personal grooming (such as brushing teeth) 2  -MW     Eating meals 3  -MW     AM-PAC 6 Clicks Score (OT) 12  -MW     Row Name 04/28/23 1201 04/28/23 1000       How much help from another person do you currently need...    Turning from your back to your side while in flat bed without using bedrails? 4 (P)   -ZB 4  -KP    Moving from lying on back to sitting on the side of a flat bed without bedrails? 3 (P)   -ZB 3  -KP    Moving to and from a bed to a chair (including a wheelchair)? 3 (P)   -ZB 3  -KP    Standing up from a chair using your arms (e.g., wheelchair, bedside chair)? 3 (P)    -ZB 3  -KP    Climbing 3-5 steps with a railing? 2 (P)   -ZB 1  -KP    To walk in hospital room? 2 (P)   -ZB 2  -KP    AM-PAC 6 Clicks Score (PT) 17 (P)   -ZB 16  -KP    Highest level of mobility 5 --> Static standing (P)   -ZB 5 --> Static standing  -KP    Row Name 04/28/23 1301 04/28/23 1201       Functional Assessment    Outcome Measure Options AM-PAC 6 Clicks Daily Activity (OT)  -MW AM-PAC 6 Clicks Basic Mobility (PT) (P)   -ZB          User Key  (r) = Recorded By, (t) = Taken By, (c) = Cosigned By    Initials Name Provider Type    Johanny Guaman, RN Registered Nurse    Karishma Williamson, OT Occupational Therapist    Nestor Howard, PT Student PT Student                Occupational Therapy Education     Title: PT OT SLP Therapies (Done)     Topic: Occupational Therapy (Done)     Point: ADL training (Done)     Description:   Instruct learner(s) on proper safety adaptation and remediation techniques during self care or transfers.   Instruct in proper use of assistive devices.              Learning Progress Summary           Patient Acceptance, E,TB,H, VU by MS at 4/21/2023 1838    Acceptance, E,TB,H, VU,NR by MS at 4/13/2023 1817    Acceptance, E,TB,H, VU by MS at 3/16/2023 1801    Acceptance, E, NR by LM at 3/15/2023 0521   Family Acceptance, E,TB,H, VU by MS at 4/21/2023 1838    Acceptance, E,TB,H, VU,NR by MS at 4/13/2023 1817    Acceptance, E,TB,H, VU by MS at 3/16/2023 1801                   Point: Home exercise program (Done)     Description:   Instruct learner(s) on appropriate technique for monitoring, assisting and/or progressing therapeutic exercises/activities.              Learning Progress Summary           Patient Acceptance, E,TB,H, VU by MS at 4/21/2023 1838    Acceptance, E,TB,H, VU,NR by MS at 4/13/2023 1817    Acceptance, E,TB,H, VU by MS at 3/16/2023 1801    Acceptance, E, NR by LM at 3/15/2023 0521   Family Acceptance, E,TB,H, VU by MS at 4/21/2023 1838    Acceptance, E,TB,H, VU,NR  by MS at 4/13/2023 1817    Acceptance, E,TB,H, VU by MS at 3/16/2023 1801                   Point: Precautions (Done)     Description:   Instruct learner(s) on prescribed precautions during self-care and functional transfers.              Learning Progress Summary           Patient Acceptance, E,TB,H, VU by MS at 4/21/2023 1838    Acceptance, E,TB,H, VU,NR by MS at 4/13/2023 1817    Acceptance, E,TB,H, VU by MS at 3/16/2023 1801    Acceptance, E, NR by LM at 3/15/2023 0521   Family Acceptance, E,TB,H, VU by MS at 4/21/2023 1838    Acceptance, E,TB,H, VU,NR by MS at 4/13/2023 1817    Acceptance, E,TB,H, VU by MS at 3/16/2023 1801                   Point: Body mechanics (Done)     Description:   Instruct learner(s) on proper positioning and spine alignment during self-care, functional mobility activities and/or exercises.              Learning Progress Summary           Patient Acceptance, E,TB,H, VU by MS at 4/21/2023 1838    Acceptance, E,TB,H, VU,NR by MS at 4/13/2023 1817    Acceptance, E,TB,H, VU by MS at 3/16/2023 1801    Acceptance, E, NR by LM at 3/15/2023 0521   Family Acceptance, E,TB,H, VU by MS at 4/21/2023 1838    Acceptance, E,TB,H, VU,NR by MS at 4/13/2023 1817    Acceptance, E,TB,H, VU by MS at 3/16/2023 1801                               User Key     Initials Effective Dates Name Provider Type Discipline    MS 06/16/21 -  Angel Luis Cr, RN Registered Nurse Nurse     10/13/22 -  Silke Grace, RN Registered Nurse Nurse              OT Recommendation and Plan     Plan of Care Review  Plan of Care Reviewed With: patient  Progress: no change  Outcome Evaluation: Pt seen for OT/PT tx session in AM, pt agreeable, coming to EOB with SBA/CGA using bed rails. Pt engaging in RUE functional task specific training, ADLs, functional transfers and improved standing balance to assist with improved support throughout upright activity. Pt continues to required min-mod A for stand pivot to L side to BSC, at times  max A for transfer to R affected side. Encouraged continued functional use of RUE throughout daily activity, s/up for self feeding and max A for s/up- able to coordinate spoon to mouth (I) once tray and food within reach. Will continue to progress.     Time Calculation:    Time Calculation- OT     Row Name 04/28/23 1302             Time Calculation- OT    OT Start Time 0920  -MW      OT Stop Time 0946  -MW      OT Time Calculation (min) 26 min  -MW      Total Timed Code Minutes- OT 26 minute(s)  -MW      OT Received On 04/28/23  -MW      OT - Next Appointment 05/01/23  -MW         Timed Charges    45212 - OT Therapeutic Activity Minutes 16  -MW      80488 - OT Self Care/Mgmt Minutes 10  -MW         Total Minutes    Timed Charges Total Minutes 26  -MW       Total Minutes 26  -MW            User Key  (r) = Recorded By, (t) = Taken By, (c) = Cosigned By    Initials Name Provider Type     Karishma Spence OT Occupational Therapist              Therapy Charges for Today     Code Description Service Date Service Provider Modifiers Qty    [id number]  OT THERAPEUTIC ACT EA 15 MIN 4/28/2023 Karishma Spence OT GO 1    [id number]  OT SELF CARE/MGMT/TRAIN EA 15 MIN 4/28/2023 Karishma Spence OT GO 1               Karishma Spence OT  4/28/2023

## 2023-04-28 NOTE — THERAPY TREATMENT NOTE
Patient Name: Manuel Durant  : 1950    MRN: [id number]                              Today's Date: 2023       Admit Date: 3/12/2023    Visit Dx:     ICD-10-CM ICD-9-CM   1. Intracranial hemorrhage  I62.9 432.9   2. Altered mental status, unspecified altered mental status type  R41.82 780.97     Patient Active Problem List   Diagnosis   • Intracranial hemorrhage   • Hypertensive emergency   • Acute DVT (deep venous thrombosis)   • HTN (hypertension)   • Severe Malnutrition (HCC)   • Hemiplegia   • Right shoulder pain     Past Medical History:   Diagnosis Date   • Hypokalemia 3/17/2023     History reviewed. No pertinent surgical history.   General Information     Row Name 23 1150          Physical Therapy Time and Intention    Document Type therapy note (daily note) (P)   -ZB     Mode of Treatment co-treatment;physical therapy;occupational therapy (P)   -ZB     Row Name 23 1150          General Information    Patient Profile Reviewed yes (P)   -ZB     Existing Precautions/Restrictions fall (P)   -ZB     Row Name 23 1150          Cognition    Orientation Status (Cognition) oriented to;person (P)   -ZB     Row Name 23 1150          Safety Issues, Functional Mobility    Impairments Affecting Function (Mobility) balance;coordination;endurance/activity tolerance;grasp;motor control;postural/trunk control;strength;range of motion (ROM);cognition;visual/perceptual (P)   -ZB           User Key  (r) = Recorded By, (t) = Taken By, (c) = Cosigned By    Initials Name Provider Type    ZB Nestor Rae, PT Student PT Student               Mobility     Row Name 23 1150          Bed Mobility    Bed Mobility supine-sit (P)   -ZB     Supine-Sit [place] (Bed Mobility) standby assist (P)   -ZB     Assistive Device (Bed Mobility) bed rails (P)   -ZB     Comment, (Bed Mobility) [facility] end of session (P)   -ZB     Row Name 23 1150          Transfers    Comment, (Transfers) txfr bed>chair  w/ steps; chair<>BSC x2 (P)   -ZB     Row Name 04/28/23 1150          Bed-Chair Transfer    Bed-Chair [place] (Transfers) moderate assist (50% patient effort);2 person assist;verbal cues;nonverbal cues (demo/gesture) (P)   -ZB     Assistive Device (Bed-Chair Transfers) other (see comments) (P)   hha  -ZB     Comment, (Bed-Chair Transfer) 5' bed>chair (P)   -ZB     Row Name 04/28/23 1150          Sit-Stand Transfer    Sit-Stand [place] (Transfers) minimum assist (75% patient effort);verbal cues (P)   -ZB     Assistive Device (Sit-Stand Transfers) other (see comments) (P)   hha  -ZB     Row Name 04/28/23 1150          Gait/Stairs (Locomotion)    [place] Level (Gait) moderate assist (50% patient effort);2 person assist;verbal cues;nonverbal cues (demo/gesture) (P)   -ZB     Assistive Device (Gait) other (see comments) (P)   hha  -ZB     Distance in Feet (Gait) 5' bed>chair (P)   -ZB     Deviations/Abnormal Patterns (Gait) festinating/shuffling;stride length decreased;sandhya decreased;gait speed decreased (P)   -ZB     Bilateral Gait Deviations forward flexed posture (P)   -ZB     Right Sided Gait Deviations foot drop/toe drag;knee buckling, right side (P)   -ZB     [place] Level (Stairs) unable to assess (P)   -ZB     Comment, (Gait/Stairs) Manual contacts to advance R LE and prevent buckling w/ stance (P)   -ZB           User Key  (r) = Recorded By, (t) = Taken By, (c) = Cosigned By    Initials Name Provider Type    ZB Nestor Rae, PT Student PT Student               Obj/Interventions     Row Name 04/28/23 1153          Balance    Balance Assessment sitting static balance;sitting dynamic balance;standing static balance;standing dynamic balance (P)   -ZB     Static Sitting Balance standby assist (P)   -ZB     Dynamic Sitting Balance standby assist (P)   -ZB     Position, Sitting Balance unsupported;sitting edge of bed;other (see comments) (P)   BSC  -ZB     Static Standing Balance minimal assist  (P)   -ZB     Dynamic Standing Balance moderate assist;2-person assist (P)   -ZB     Position/Device Used, Standing Balance supported;other (see comments) (P)   hha  -ZB     Balance Interventions sitting;sit to stand;standing;supported;static;dynamic;moderate challenge;weight shifting activity;dynamic reaching (P)   -ZB           User Key  (r) = Recorded By, (t) = Taken By, (c) = Cosigned By    Initials Name Provider Type    Nestor Howard, PT Student PT Student               Goals/Plan    No documentation.                Clinical Impression     Row Name 04/28/23 1154          Pain    Pretreatment Pain Rating 0/10 - no pain (P)   -ZB     Posttreatment Pain Rating 0/10 - no pain (P)   -ZB     Pain Intervention(s) Repositioned;Ambulation/increased activity (P)   -ZB     Row Name 04/28/23 1154          Plan of Care Review    Plan of Care Reviewed With patient (P)   -ZB     Progress no change (P)   -ZB     Outcome Evaluation Pt in bed and agreeable to PT/OT co-tx session this AM. Pt came to EOB sba, STS from EOB Amish, and completed wt shifting activity to encourage WB through R LE. R LE manually stretched followed by amb 5' to recliner. Pt completed seated dynamic reaching activity w/ R UE and occasional use of L UE to support the R. Pt able to extend digits to grasp small objects. Pt completed addtl 2x txfr from chair<>BSC w/ [place] going to the L and min-modA going R. PT will continue to monitor and progress as able. (P)   -ZB     Row Name 04/28/23 1154 04/28/23 0951       Therapy Assessment/Plan (PT)    Therapy Frequency (PT) 5 times/wk (P)   -ZB --  -CW    Row Name 04/28/23 1154          Vital Signs    O2 Delivery Pre Treatment room air (P)   -ZB     Row Name 04/28/23 1154          Positioning and Restraints    Pre-Treatment Position in bed (P)   -ZB     Post Treatment Position chair (P)   -ZB     In Chair notified nsg;reclined;call light within reach;encouraged to call for assist;exit alarm on (P)   -ZB            User Key  (r) = Recorded By, (t) = Taken By, (c) = Cosigned By    Initials Name Provider Type    Tamica Eddy, PT Physical Therapist    Nestor Howard, PT Student PT Student               Outcome Measures     Row Name 04/28/23 1201 04/28/23 1000       How much help from another person do you currently need...    Turning from your back to your side while in flat bed without using bedrails? 4 (P)   -ZB 4  -KP    Moving from lying on back to sitting on the side of a flat bed without bedrails? 3 (P)   -ZB 3  -KP    Moving to and from a bed to a chair (including a wheelchair)? 3 (P)   -ZB 3  -KP    Standing up from a chair using your arms (e.g., wheelchair, bedside chair)? 3 (P)   -ZB 3  -KP    Climbing 3-5 steps with a railing? 2 (P)   -ZB 1  -KP    To walk in hospital room? 2 (P)   -ZB 2  -KP    AM-PAC 6 Clicks Score (PT) 17 (P)   -ZB 16  -KP    Highest level of mobility 5 --> Static standing (P)   -ZB 5 --> Static standing  -KP    Row Name 04/28/23 1201          Functional Assessment    Outcome Measure Options AM-PAC 6 Clicks Basic Mobility (PT) (P)   -ZB           User Key  (r) = Recorded By, (t) = Taken By, (c) = Cosigned By    Initials Name Provider Type    Johanny Guaman, RN Registered Nurse    Nestor Howard, PT Student PT Student                             Physical Therapy Education     Title: PT OT SLP Therapies (Done)     Topic: Physical Therapy (Done)     Point: Mobility training (Done)     Learning Progress Summary           Patient Acceptance, E,TB,H, VU by MS at 4/21/2023 1838    Acceptance, E, NR by CW at 4/21/2023 1537    Acceptance, E, VU,DU by CHARLY at 4/19/2023 1130    Acceptance, E, NR by CW at 4/18/2023 0950    Acceptance, E, VU,NR by NICOLASA at 4/16/2023 1158    Acceptance, E,TB,H, VU,NR by MS at 4/13/2023 1817    Acceptance, E, NR by CW at 4/13/2023 1341    Acceptance, E, NR by CW at 4/12/2023 1201    Acceptance, E, NR by CW at 4/11/2023 1158    Acceptance, E, NR by CW at 4/10/2023 1414     Acceptance, E,TB, NR by CS at 4/8/2023 0928    Acceptance, E, NR by CW at 4/7/2023 1215    Acceptance, E, NR by CW at 4/6/2023 1335    Acceptance, E, NR by CW at 4/5/2023 1043    Acceptance, E, NR by CW at 4/4/2023 0906    Acceptance, E, NR by CW at 4/3/2023 1531    Acceptance, E,TB, VU,NR by CAMERON at 4/1/2023 1530    Acceptance, E, NR by CW at 3/31/2023 0925    Acceptance, E, NR by CW at 3/30/2023 1209    Acceptance, E, NR,NL by CW at 3/29/2023 1336    Acceptance, E, NR by ZB at 3/28/2023 1317    Acceptance, E, NR by CW at 3/27/2023 1401    Acceptance, E,D, NR,VU by JM at 3/24/2023 1631    Comment: seemed to comprehend commands this session    Acceptance, E,D, NR by JM at 3/23/2023 1655    Acceptance, E,D, NR by JM at 3/22/2023 1545    Acceptance, E,TB, VU,NR by CB at 3/21/2023 1544    Acceptance, E,TB,H, VU by MS at 3/16/2023 1801    Acceptance, E,D, DU,NR by MO at 3/16/2023 1451    Acceptance, E, DU,NR by MO at 3/15/2023 1200    Acceptance, E, NR by LM at 3/15/2023 0521    Acceptance, E,D, DU,NR by MO at 3/14/2023 1458   Family Acceptance, E,TB,H, VU by MS at 4/21/2023 1838    Acceptance, E,TB,H, VU,NR by MS at 4/13/2023 1817    Acceptance, E,TB,H, VU by MS at 3/16/2023 1801                   Point: Home exercise program (Done)     Learning Progress Summary           Patient Acceptance, E,TB,H, VU by MS at 4/21/2023 1838    Acceptance, E, VU,NR by DJ at 4/16/2023 1158    Acceptance, E,TB,H, VU,NR by MS at 4/13/2023 1817    Acceptance, E, NR by CW at 4/13/2023 1341    Acceptance, E,TB, NR by CS at 4/8/2023 0928    Acceptance, E, NR by CW at 4/7/2023 1215    Acceptance, E, NR by CW at 4/6/2023 1335    Acceptance, E,TB, VU,NR by CAMERON at 4/1/2023 1530    Acceptance, E, NR by CW at 3/31/2023 0925    Acceptance, E, NR by CHARLY at 3/28/2023 1317    Acceptance, E, NR by CW at 3/27/2023 1401    Acceptance, E,D, NR,VU by ZACH at 3/24/2023 1631    Comment: seemed to comprehend commands this session    Acceptance, E,D, NR by ZACH at  3/23/2023 1655    Acceptance, E,D, NR by JM at 3/22/2023 1545    Acceptance, E,TB, VU,NR by CB at 3/21/2023 1544    Acceptance, E,TB,H, VU by MS at 3/16/2023 1801    Acceptance, E,D, DU,NR by MO at 3/16/2023 1451    Acceptance, E, NR by LM at 3/15/2023 0521    Acceptance, E,D, DU,NR by MO at 3/14/2023 1458   Family Acceptance, E,TB,H, VU by MS at 4/21/2023 1838    Acceptance, E,TB,H, VU,NR by MS at 4/13/2023 1817    Acceptance, E,TB,H, VU by MS at 3/16/2023 1801                   Point: Body mechanics (Done)     Learning Progress Summary           Patient Acceptance, E,TB,H, VU by MS at 4/21/2023 1838    Acceptance, E, VU,NR by DJ at 4/16/2023 1158    Acceptance, E,TB,H, VU,NR by MS at 4/13/2023 1817    Acceptance, E, NR by CW at 4/13/2023 1341    Acceptance, E, NR by CW at 4/11/2023 1158    Acceptance, E, NR by CW at 4/10/2023 1414    Acceptance, E,TB, NR by CS at 4/8/2023 0928    Acceptance, E, NR by CW at 4/7/2023 1215    Acceptance, E, NR by CW at 4/4/2023 0906    Acceptance, E, NR by CW at 4/3/2023 1531    Acceptance, E,TB, VU,NR by CAMERON at 4/1/2023 1530    Acceptance, E,D, NR,VU by JM at 3/24/2023 1631    Comment: seemed to comprehend commands this session    Acceptance, E,D, NR by JM at 3/23/2023 1655    Acceptance, E,D, NR by JM at 3/22/2023 1545    Acceptance, E,TB, VU,NR by CB at 3/21/2023 1544    Acceptance, E,TB,H, VU by MS at 3/16/2023 1801    Acceptance, E,D, DU,NR by MO at 3/16/2023 1451    Acceptance, E, DU,NR by MO at 3/15/2023 1200    Acceptance, E, NR by LM at 3/15/2023 0521    Acceptance, E,D, DU,NR by MO at 3/14/2023 1458   Family Acceptance, E,TB,H, VU by MS at 4/21/2023 1838    Acceptance, E,TB,H, VU,NR by MS at 4/13/2023 1817    Acceptance, E,TB,H, VU by MS at 3/16/2023 1801                   Point: Precautions (Done)     Learning Progress Summary           Patient Acceptance, E,TB,H, VU by MS at 4/21/2023 1838    Acceptance, E, VU,NR by DJ at 4/16/2023 1158    Acceptance, E,TB,H, VU,NR by MS  at 4/13/2023 1817    Acceptance, E, NR by CW at 4/13/2023 1341    Acceptance, E,TB, NR by CS at 4/8/2023 0928    Acceptance, E, NR by CW at 4/7/2023 1215    Acceptance, E, NR by CW at 4/4/2023 0906    Acceptance, E, NR by CW at 4/3/2023 1531    Acceptance, E,TB, VU,NR by CAMERON at 4/1/2023 1530    Acceptance, E,D, NR,VU by JM at 3/24/2023 1631    Comment: seemed to comprehend commands this session    Acceptance, E,D, NR by JM at 3/23/2023 1655    Acceptance, E,D, NR by JM at 3/22/2023 1545    Acceptance, E,TB, VU,NR by CB at 3/21/2023 1544    Acceptance, E,TB,H, VU by MS at 3/16/2023 1801    Acceptance, E,D, DU,NR by MO at 3/16/2023 1451    Acceptance, E, DU,NR by MO at 3/15/2023 1200    Acceptance, E, NR by LM at 3/15/2023 0521    Acceptance, E,D, DU,NR by MO at 3/14/2023 1458   Family Acceptance, E,TB,H, VU by MS at 4/21/2023 1838    Acceptance, E,TB,H, VU,NR by MS at 4/13/2023 1817    Acceptance, E,TB,H, VU by MS at 3/16/2023 1801                               User Key     Initials Effective Dates Name Provider Type Discipline    ZACH 03/07/18 -  Tiffanie Grace, PTA Physical Therapist Assistant PT    CAMERON 05/19/21 -  Leeann Wiley, PT Physical Therapist PT    DJ 10/25/19 -  Jacqueline Chamberlain, PT Physical Therapist PT    MS 06/16/21 -  Angel Luis Cr, RN Registered Nurse Nurse    CW 12/13/22 -  Tamica Willoughby, PT Physical Therapist PT    CB 10/22/21 -  Bety Benitez, PT Physical Therapist PT    CS 09/22/22 -  Adelia Montalvo, PT Physical Therapist PT    LM 10/13/22 -  Silke Grace, RN Registered Nurse Nurse    MO 01/27/23 -  Lacie Tanner, PT Student PT Student PT    ZB 03/10/23 -  Nestor Rae, PT Student PT Student PT              PT Recommendation and Plan     Plan of Care Reviewed With: (P) patient  Progress: (P) no change  Outcome Evaluation: (P) Pt in bed and agreeable to PT/OT co-tx session this AM. Pt came to EOB sba, STS from EOB [place], and completed wt shifting activity to encourage WB through R LE.  R LE manually stretched followed by amb 5' to recliner. Pt completed seated dynamic reaching activity w/ R UE and occasional use of L UE to support the R. Pt able to extend digits to grasp small objects. Pt completed addtl 2x txfr from chair<>BSC w/ [place] going to the L and min-modA going R. PT will continue to monitor and progress as able.     Time Calculation:    PT Charges     Row Name 04/28/23 1201             Time Calculation    Start Time 0921 (P)   -ZB      Stop Time 0946 (P)   -ZB      Time Calculation (min) 25 min (P)   -ZB      PT Received On 04/28/23 (P)   -ZB      PT - Next Appointment 05/01/23 (P)   -ZB         Time Calculation- PT    Total Timed Code Minutes- PT 25 minute(s) (P)   -ZB         Timed Charges    46782 - PT Therapeutic Activity Minutes 25 (P)   -ZB         Total Minutes    Timed Charges Total Minutes 25 (P)   -ZB       Total Minutes 25 (P)   -ZB            User Key  (r) = Recorded By, (t) = Taken By, (c) = Cosigned By    Initials Name Provider Type    ZB Nestor Rae, PT Student PT Student              Therapy Charges for Today     Code Description Service Date Service Provider Modifiers Qty    88753154944  PT THERAPEUTIC ACT EA 15 MIN 4/27/2023 Nestor Rae, PT Student GP 1    [id number]  PT THER SUPP EA 15 MIN 4/27/2023 Nestor Rae, PT Student GP 1    [id number]  PT THERAPEUTIC ACT EA 15 MIN 4/28/2023 Nestor Rae, PT Student GP 2          PT G-Codes  Outcome Measure Options: (P) AM-PAC 6 Clicks Basic Mobility (PT)  AM-PAC 6 Clicks Score (PT): (P) 17  AM-PAC 6 Clicks Score (OT): 11  Modified [place] Scale: 4 - Moderately severe disability.  Unable to walk without assistance, and unable to attend to own bodily needs without assistance.       JOSE ELIAS Vallejo  4/28/2023

## 2023-04-28 NOTE — PLAN OF CARE
Goal Outcome Evaluation:  Plan of Care Reviewed With: patient        Progress: no change  Outcome Evaluation: Pt seen for OT/PT tx session in AM, pt agreeable, coming to EOB with SBA/CGA using bed rails. Pt engaging in RUE functional task specific training, ADLs, functional transfers and improved standing balance to assist with improved support throughout upright activity. Pt continues to required min-mod A for stand pivot to L side to BSC, at times max A for transfer to R affected side. Encouraged continued functional use of RUE throughout daily activity, s/up for self feeding and max A for s/up- able to coordinate spoon to mouth (I) once tray and food within reach. Will continue to progress.

## 2023-04-29 PROBLEM — I16.1 HYPERTENSIVE EMERGENCY: Status: RESOLVED | Noted: 2023-03-16 | Resolved: 2023-04-29

## 2023-04-29 RX ADMIN — CARVEDILOL 3.12 MG: 3.12 TABLET, FILM COATED ORAL at 18:34

## 2023-04-29 RX ADMIN — VALSARTAN 160 MG: 160 TABLET, FILM COATED ORAL at 10:32

## 2023-04-29 RX ADMIN — APIXABAN 5 MG: 5 TABLET, FILM COATED ORAL at 10:31

## 2023-04-29 RX ADMIN — APIXABAN 5 MG: 5 TABLET, FILM COATED ORAL at 20:24

## 2023-04-29 RX ADMIN — HYDRALAZINE HYDROCHLORIDE 10 MG: 10 TABLET, FILM COATED ORAL at 20:23

## 2023-04-29 RX ADMIN — BACLOFEN 2.5 MG: 10 TABLET ORAL at 20:23

## 2023-04-29 RX ADMIN — BACLOFEN 2.5 MG: 10 TABLET ORAL at 06:39

## 2023-04-29 RX ADMIN — LIDOCAINE 1 PATCH: 700 PATCH TOPICAL at 10:32

## 2023-04-29 RX ADMIN — AMLODIPINE BESYLATE 10 MG: 10 TABLET ORAL at 10:32

## 2023-04-29 RX ADMIN — BACLOFEN 2.5 MG: 10 TABLET ORAL at 16:11

## 2023-04-29 RX ADMIN — HYDRALAZINE HYDROCHLORIDE 10 MG: 10 TABLET, FILM COATED ORAL at 06:39

## 2023-04-29 RX ADMIN — Medication 10 ML: at 10:33

## 2023-04-29 RX ADMIN — Medication 10 ML: at 20:23

## 2023-04-29 RX ADMIN — CARVEDILOL 3.12 MG: 3.12 TABLET, FILM COATED ORAL at 10:32

## 2023-04-29 NOTE — PLAN OF CARE
Problem: Fall Injury Risk  Goal: Absence of Fall and Fall-Related Injury  Outcome: Ongoing, Progressing  Intervention: Identify and Manage Contributors  Recent Flowsheet Documentation  Taken 4/29/2023 0000 by Elyssa Thorne RN  Medication Review/Management: medications reviewed  Taken 4/28/2023 2200 by Elyssa Thorne RN  Medication Review/Management: medications reviewed  Intervention: Promote Injury-Free Environment  Recent Flowsheet Documentation  Taken 4/29/2023 0000 by Elyssa Thorne RN  Safety Promotion/Fall Prevention: safety round/check completed  Taken 4/28/2023 2200 by Elyssa Thorne RN  Safety Promotion/Fall Prevention: safety round/check completed  Taken 4/28/2023 1939 by Elyssa Thorne RN  Safety Promotion/Fall Prevention:   assistive device/personal items within reach   clutter free environment maintained   Goal Outcome Evaluation:  Plan of Care Reviewed With: patient        Progress: no change  Outcome Evaluation: Patient vss, no distress.   awaiting dc with family.

## 2023-04-29 NOTE — PLAN OF CARE
Goal Outcome Evaluation:  Plan of Care Reviewed With: patient        Progress: no change  Outcome Evaluation: No changes from nursing perspective.      Problem: Fall Injury Risk  Goal: Absence of Fall and Fall-Related Injury  Outcome: Ongoing, Progressing     Problem: Skin Injury Risk Increased  Goal: Skin Health and Integrity  Outcome: Ongoing, Progressing     Problem: Adult Inpatient Plan of Care  Goal: Plan of Care Review  Outcome: Ongoing, Progressing  Flowsheets (Taken 4/29/2023 2009)  Progress: no change  Plan of Care Reviewed With: patient  Goal: Patient-Specific Goal (Individualized)  Outcome: Ongoing, Progressing  Goal: Absence of Hospital-Acquired Illness or Injury  Outcome: Ongoing, Progressing  Goal: Optimal Comfort and Wellbeing  Outcome: Ongoing, Progressing  Goal: Readiness for Transition of Care  Outcome: Ongoing, Progressing     Problem: Adjustment to Illness (Stroke, Ischemic/Transient Ischemic Attack)  Goal: Optimal Coping  Outcome: Ongoing, Progressing     Problem: Bowel Elimination Impaired (Stroke, Ischemic/Transient Ischemic Attack)  Goal: Effective Bowel Elimination  Outcome: Ongoing, Progressing     Problem: Cerebral Tissue Perfusion (Stroke, Ischemic/Transient Ischemic Attack)  Goal: Optimal Cerebral Tissue Perfusion  Outcome: Ongoing, Progressing     Problem: Cognitive Impairment (Stroke, Ischemic/Transient Ischemic Attack)  Goal: Optimal Cognitive Function  Outcome: Ongoing, Progressing     Problem: Communication Impairment (Stroke, Ischemic/Transient Ischemic Attack)  Goal: Improved Communication Skills  Outcome: Ongoing, Progressing     Problem: Functional Ability Impaired (Stroke, Ischemic/Transient Ischemic Attack)  Goal: Optimal Functional Ability  Outcome: Ongoing, Progressing     Problem: Respiratory Compromise (Stroke, Ischemic/Transient Ischemic Attack)  Goal: Effective Oxygenation and Ventilation  Outcome: Ongoing, Progressing     Problem: Sensorimotor Impairment (Stroke,  Ischemic/Transient Ischemic Attack)  Goal: Improved Sensorimotor Function  Outcome: Ongoing, Progressing     Problem: Swallowing Impairment (Stroke, Ischemic/Transient Ischemic Attack)  Goal: Optimal Eating and Swallowing without Aspiration  Outcome: Ongoing, Progressing     Problem: Urinary Elimination Impaired (Stroke, Ischemic/Transient Ischemic Attack)  Goal: Effective Urinary Elimination  Outcome: Ongoing, Progressing     Problem: Adjustment to Illness (Stroke, Hemorrhagic)  Goal: Optimal Coping  Outcome: Ongoing, Progressing     Problem: Bowel Elimination Impaired (Stroke, Hemorrhagic)  Goal: Effective Bowel Elimination  Outcome: Ongoing, Progressing     Problem: Cerebral Tissue Perfusion (Stroke, Hemorrhagic)  Goal: Optimal Cerebral Tissue Perfusion  Outcome: Ongoing, Progressing     Problem: Cognitive Impairment (Stroke, Hemorrhagic)  Goal: Optimal Cognitive Function  Outcome: Ongoing, Progressing     Problem: Communication Impairment (Stroke, Hemorrhagic)  Goal: Effective Communication Skills  Outcome: Ongoing, Progressing     Problem: Functional Ability Impaired (Stroke, Hemorrhagic)  Goal: Optimal Functional Ability  Outcome: Ongoing, Progressing     Problem: Pain (Stroke, Hemorrhagic)  Goal: Acceptable Pain Control  Outcome: Ongoing, Progressing     Problem: Respiratory Compromise (Stroke, Hemorrhagic)  Goal: Effective Oxygenation and Ventilation  Outcome: Ongoing, Progressing     Problem: Sensorimotor Impairment (Stroke, Hemorrhagic)  Goal: Improved Sensorimotor Function  Outcome: Ongoing, Progressing     Problem: Swallowing Impairment (Stroke, Hemorrhagic)  Goal: Optimal Eating and Swallowing Without Aspiration  Outcome: Ongoing, Progressing     Problem: Urinary Elimination Impaired (Stroke, Hemorrhagic)  Goal: Effective Urinary Elimination  Outcome: Ongoing, Progressing

## 2023-04-30 RX ADMIN — Medication 10 ML: at 08:36

## 2023-04-30 RX ADMIN — AMLODIPINE BESYLATE 10 MG: 10 TABLET ORAL at 08:36

## 2023-04-30 RX ADMIN — BACLOFEN 2.5 MG: 10 TABLET ORAL at 05:32

## 2023-04-30 RX ADMIN — HYDRALAZINE HYDROCHLORIDE 10 MG: 10 TABLET, FILM COATED ORAL at 14:12

## 2023-04-30 RX ADMIN — APIXABAN 5 MG: 5 TABLET, FILM COATED ORAL at 08:36

## 2023-04-30 RX ADMIN — CARVEDILOL 3.12 MG: 3.12 TABLET, FILM COATED ORAL at 08:36

## 2023-04-30 RX ADMIN — VALSARTAN 160 MG: 160 TABLET, FILM COATED ORAL at 08:36

## 2023-04-30 RX ADMIN — LIDOCAINE 1 PATCH: 700 PATCH TOPICAL at 08:36

## 2023-04-30 RX ADMIN — HYDRALAZINE HYDROCHLORIDE 10 MG: 10 TABLET, FILM COATED ORAL at 21:10

## 2023-04-30 RX ADMIN — BACLOFEN 2.5 MG: 10 TABLET ORAL at 14:12

## 2023-04-30 RX ADMIN — BACLOFEN 2.5 MG: 10 TABLET ORAL at 21:10

## 2023-04-30 RX ADMIN — APIXABAN 5 MG: 5 TABLET, FILM COATED ORAL at 21:10

## 2023-04-30 RX ADMIN — HYDRALAZINE HYDROCHLORIDE 10 MG: 10 TABLET, FILM COATED ORAL at 05:32

## 2023-04-30 RX ADMIN — CARVEDILOL 3.12 MG: 3.12 TABLET, FILM COATED ORAL at 18:28

## 2023-04-30 NOTE — PLAN OF CARE
Goal Outcome Evaluation:              Outcome Evaluation: Alert and oriented x3. Remains with right sided weakness and facial droop. Stand and pivot to chair at bedside with assist of gait belt and assist x1. Tolerated diet. No complaints.

## 2023-04-30 NOTE — PROGRESS NOTES
"    Name: Manuel Durant ADMIT: 3/12/2023   : 1950  PCP: Provider, No Known    MRN: [id number] LOS: 49 days   AGE/SEX: 73 y.o. male  ROOM: [facility]     Subjective   Subjective   \"I feel good\"    Review of Systems     Objective   Objective   Vital Signs  Temp:  [97.6 °F (36.4 °C)-98.1 °F (36.7 °C)] 98 °F (36.7 °C)  Heart Rate:  [57-63] 57  Resp:  [16-18] 16  BP: (121-130)/(63-75) 123/71  SpO2:  [93 %-94 %] 93 %  on   ;   Device (Oxygen Therapy): room air  Body mass index is 23.46 kg/m².  Physical Exam  Vitals reviewed.   Constitutional:       General: He is not in acute distress.     Appearance: He is not ill-appearing.   Cardiovascular:      Rate and Rhythm: Normal rate and regular rhythm.   Pulmonary:      Effort: Pulmonary effort is normal. No respiratory distress.   Musculoskeletal:      Right lower leg: No edema.      Left lower leg: No edema.   Skin:     General: Skin is warm and dry.   Neurological:      Mental Status: He is alert.      Comments: Able to move all his extremities in bed   Psychiatric:         Mood and Affect: Mood normal.       Results Review     I reviewed the patient's new clinical results.  Results from last 7 days   Lab Units 23  0813   WBC 10*3/mm3 7.17   HEMOGLOBIN g/dL 14.4   PLATELETS 10*3/mm3 223     Results from last 7 days   Lab Units 23  0813   SODIUM mmol/L 140   POTASSIUM mmol/L 4.0   CHLORIDE mmol/L 107   CO2 mmol/L 24.0   BUN mg/dL 13   CREATININE mg/dL 0.79   GLUCOSE mg/dL 103*   EGFR mL/min/1.73 95.0       Results from last 7 days   Lab Units 23  0813   CALCIUM mg/dL 9.4       No results found for: HGBA1C, POCGLU    No radiology results for the last day  I have personally reviewed all medications:  Scheduled Medications  amLODIPine, 10 mg, Oral, QAM AC  apixaban, 5 mg, Oral, Q12H  baclofen, 2.5 mg, Oral, Q8H  carvedilol, 3.125 mg, Oral, BID With Meals  hydrALAZINE, 10 mg, Oral, Q8H  lidocaine, 1 patch, Transdermal, Q24H  sodium chloride, 10 mL, " Intravenous, Q12H  valsartan, 160 mg, Oral, QAM AC    Infusions   Diet  Diet: Regular/House Diet; No Mixed Consistencies, Feeding Assistance - Nursing; Texture: Soft to Chew (NDD 3); Soft to Chew: Chopped Meat; Fluid Consistency: Nectar Thick    I have personally reviewed:  []  Laboratory   []  Microbiology   []  Radiology   []  EKG/Telemetry  []  Cardiology/Vascular   []  Pathology    [x]  Records       Assessment/Plan     Active Hospital Problems    Diagnosis  POA   • **Intracranial hemorrhage [I62.9]  Yes   • Hemiplegia [G81.90]  Yes   • Right shoulder pain [M25.511]  Yes   • Severe Malnutrition (HCC) [E43]  Yes   • Acute DVT (deep venous thrombosis) [I82.409]  Yes   • HTN (hypertension) [I10]  Yes      Resolved Hospital Problems    Diagnosis Date Resolved POA   • Hypokalemia [E87.6] 04/10/2023 Unknown   • Hypertensive emergency [I16.1] 04/29/2023 Yes       Mr. Durant is a 73 year old male who initially presented to the hospital 3/12/23 for intracranial bleed. He was found unresponsive by friends and initially taken to [facility] where he was found to have poorly controlled BP and right sided hemiplegia. He was transferred here for closer monitoring in the ICU and neurosurgical consultation. He was cleared to move out of the ICU on 3/16/23.     • Intracranial hemorrhage: CTH 3/17/23 stable left basal ganglia/internal capsule hemorrhage with left lateral ventricular hemorrhage. On Lovenox for DVT and repeat CTH 3/18 also stable. ANDRADE followed and signed off 3/23 with plans for repeat MRI brain/MRA head in 1 month- ANDRADE saw again 4/20 and ordered MRI but this was not done as patient couldn't be cleared for MRI. CTA/CT head with evidence of hemorrhage cavity left sided with some peripheral enhancement which may just be related to resolving hemorrhage but underlying mass cannot be excluded- needs repeat CTH with contrast or MRI brain in 6-8 weeks and local follow up with neurology/ANDRADE in [place]. Goal BP < 150  systolic. Remains on baclofen for spasticity.  Continuing PT and OT      • HTN: Well-controlled on current regimen.     • Acute DVT: Found 3/17/23 with acute RLE DVT in soleal. ANDRADE okayed for blood thinners. Now on Eliquis.     Disposition pending family arrangements for transport back to [place].  Apparently unable to place any facility here.  Remains medically stable      Giovany Romeo MD  [facility]  04/30/23  16:58 EDT

## 2023-05-01 PROCEDURE — 97530 THERAPEUTIC ACTIVITIES: CPT

## 2023-05-01 PROCEDURE — 97112 NEUROMUSCULAR REEDUCATION: CPT

## 2023-05-01 RX ADMIN — VALSARTAN 160 MG: 160 TABLET, FILM COATED ORAL at 08:09

## 2023-05-01 RX ADMIN — Medication 10 ML: at 21:39

## 2023-05-01 RX ADMIN — BACLOFEN 2.5 MG: 10 TABLET ORAL at 21:39

## 2023-05-01 RX ADMIN — CARVEDILOL 3.12 MG: 3.12 TABLET, FILM COATED ORAL at 08:09

## 2023-05-01 RX ADMIN — APIXABAN 5 MG: 5 TABLET, FILM COATED ORAL at 08:09

## 2023-05-01 RX ADMIN — HYDRALAZINE HYDROCHLORIDE 10 MG: 10 TABLET, FILM COATED ORAL at 21:39

## 2023-05-01 RX ADMIN — BACLOFEN 2.5 MG: 10 TABLET ORAL at 07:00

## 2023-05-01 RX ADMIN — CARVEDILOL 3.12 MG: 3.12 TABLET, FILM COATED ORAL at 19:10

## 2023-05-01 RX ADMIN — LIDOCAINE 1 PATCH: 700 PATCH TOPICAL at 08:09

## 2023-05-01 RX ADMIN — AMLODIPINE BESYLATE 10 MG: 10 TABLET ORAL at 08:09

## 2023-05-01 RX ADMIN — BACLOFEN 2.5 MG: 10 TABLET ORAL at 14:18

## 2023-05-01 RX ADMIN — HYDRALAZINE HYDROCHLORIDE 10 MG: 10 TABLET, FILM COATED ORAL at 07:00

## 2023-05-01 RX ADMIN — APIXABAN 5 MG: 5 TABLET, FILM COATED ORAL at 21:39

## 2023-05-01 RX ADMIN — Medication 10 ML: at 08:10

## 2023-05-01 NOTE — PLAN OF CARE
Goal Outcome Evaluation:  Plan of Care Reviewed With: patient        Progress: no change  Outcome Evaluation: Pt seen for OT tx session in AM, [facility] on arrival, pt agreeable. Pt completed x8 func transfers to C - min-mod A x1, max visual and verbal cues for carryover. STS from chair with heavy use of L handrail CGA-min A x1. Pt engaging in dynamic reaching in sitting/standing with RUE to improve functional use of extremity while focusing on weight shifting safely onto LLE. Pt [facility] end of session, continue to progress as able.

## 2023-05-01 NOTE — THERAPY TREATMENT NOTE
Patient Name: Manuel Durant  : 1950    MRN: [id number]                              Today's Date: 2023       Admit Date: 3/12/2023    Visit Dx:     ICD-10-CM ICD-9-CM   1. Intracranial hemorrhage  I62.9 432.9   2. Altered mental status, unspecified altered mental status type  R41.82 780.97     Patient Active Problem List   Diagnosis   • Intracranial hemorrhage   • Acute DVT (deep venous thrombosis)   • HTN (hypertension)   • Severe Malnutrition (HCC)   • Hemiplegia   • Right shoulder pain     Past Medical History:   Diagnosis Date   • Hypokalemia 3/17/2023     History reviewed. No pertinent surgical history.   General Information     Row Name 23 1201          OT Time and Intention    Document Type therapy note (daily note)  -     Mode of Treatment occupational therapy  -     Row Name 23 120          General Information    Patient Profile Reviewed yes  -     Existing Precautions/Restrictions fall  -[facility] Name 23 120          Cognition    Orientation Status (Cognition) oriented to;person  -[facility] Name 23 120          Safety Issues, Functional Mobility    Impairments Affecting Function (Mobility) balance;coordination;endurance/activity tolerance;grasp;motor control;postural/trunk control;strength;range of motion (ROM);cognition;visual/perceptual  -           User Key  (r) = Recorded By, (t) = Taken By, (c) = Cosigned By    Initials Name Provider Type    Karishma Williamson OT Occupational Therapist                 Mobility/ADL's     Row Name 23 120          Bed Mobility    Comment, (Bed Mobility) [facility] on entry  -[facility] Name 23 1201          Transfers    Transfers sit-stand transfer;stand-sit transfer;toilet transfer  -     Row Name 23 120          Sit-Stand Transfer    Sit-Stand [place] (Transfers) contact guard;verbal cues  -     Comment, (Sit-Stand Transfer) L hand rail of chair  -     Row Name 23 1201           Stand-Sit Transfer    Stand-Sit [place] (Transfers) minimum assist (75% patient effort)  -     Row Name 05/01/23 1201          Toilet Transfer    Type (Toilet Transfer) stand pivot/stand step;sit-stand;stand-sit  -     [place] Level (Toilet Transfer) minimum assist (75% patient effort);moderate assist (50% patient effort)  -     Assistive Device (Toilet Transfer) commode, 3-in-1  -     Comment, (Toilet Transfer) x8 total transfers to [facility] to improve carryover, min-mod A x1 mostly, however posterior leaning at times and poor carryover of visual cues requiring increased assist posteriorly with safety  -     Row Name 05/01/23 1201          Toileting Assessment/Training    Comment, (Toileting) transfers only  -           User Key  (r) = Recorded By, (t) = Taken By, (c) = Cosigned By    Initials Name Provider Type     Karishma Spence OT Occupational Therapist               Obj/Interventions     Row Name 05/01/23 1203          Elbow/Forearm (Therapeutic Exercise)    Elbow/Forearm (Therapeutic Exercise) AROM (active range of motion)  -     Elbow/Forearm AROM (Therapeutic Exercise) right;flexion;extension  -     Row Name 05/01/23 1203          Motor Skills    Motor Skills coordination  grasp and release of RUE, functional use encouragement, increased time and pt using LUE to assist RUE through end range for reaching of household objects, max cues  -     Row Name 05/01/23 1203          Balance    Balance Assessment sitting static balance;sitting dynamic balance;sit to stand dynamic balance;standing static balance;standing dynamic balance  -     Static Sitting Balance standby assist  -     Dynamic Sitting Balance standby assist  -     Position, Sitting Balance sitting in chair  -     Sit to Stand Dynamic Balance contact guard;minimal assist  -     Static Standing Balance minimal assist  -     Dynamic Standing Balance minimal assist;moderate assist  -     Position/Device Used,  Standing Balance supported  -MW     Comment, Balance functional reaching in sitting/standing with RUE focus on functional movement as well as weight shifting  -MW           User Key  (r) = Recorded By, (t) = Taken By, (c) = Cosigned By    Initials Name Provider Type    Karishma Williamson OT Occupational Therapist               Goals/Plan    No documentation.                Clinical Impression     Row Name 05/01/23 1206          Pain Assessment    Pretreatment Pain Rating 0/10 - no pain  -MW     Posttreatment Pain Rating 0/10 - no pain  -MW     Row Name 05/01/23 1206          Plan of Care Review    Plan of Care Reviewed With patient  -MW     Progress no change  -MW     Outcome Evaluation Pt seen for OT tx session in AM, [facility] on arrival, pt agreeable. Pt completed x8 func transfers to [facility] - min-mod A x1, max visual and verbal cues for carryover. STS from chair with heavy use of L handrail CGA-min A x1. Pt engaging in dynamic reaching in sitting/standing with RUE to improve functional use of extremity while focusing on weight shifting safely onto LLE. Pt [facility] end of session, continue to progress as able.  -MW     Row Name 05/01/23 1206          Vital Signs    O2 Delivery Pre Treatment room air  -MW     Pre Patient Position Sitting  -MW     Intra Patient Position Standing  -MW     Post Patient Position Sitting  -MW     Row Name 05/01/23 1206          Positioning and Restraints    Pre-Treatment Position sitting in chair/recliner  -MW     Post Treatment Position chair  -MW     In Chair notified nsg;reclined;call light within reach;exit alarm on;encouraged to call for assist  -MW           User Key  (r) = Recorded By, (t) = Taken By, (c) = Cosigned By    Initials Name Provider Type    Karishma Williamson OT Occupational Therapist               Outcome Measures     Row Name 05/01/23 1210          How much help from another is currently needed...    Putting on and taking off regular lower body clothing? 2  -MW     Bathing  (including washing, rinsing, and drying) 2  -MW     Toileting (which includes using toilet bed pan or urinal) 1  -MW     Putting on and taking off regular upper body clothing 2  -MW     Taking care of personal grooming (such as brushing teeth) 2  -MW     Eating meals 3  -MW     AM-PAC 6 Clicks Score (OT) 12  -MW     Row Name 05/01/23 1005 05/01/23 0809       How much help from another person do you currently need...    Turning from your back to your side while in flat bed without using bedrails? 3  -CW 2  -TF    Moving from lying on back to sitting on the side of a flat bed without bedrails? 3  -CW 2  -TF    Moving to and from a bed to a chair (including a wheelchair)? 3  -CW 2  -TF    Standing up from a chair using your arms (e.g., wheelchair, bedside chair)? 3  -CW 2  -TF    Climbing 3-5 steps with a railing? 1  -CW 1  -TF    To walk in hospital room? 2  -CW 1  -TF    AM-PAC 6 Clicks Score (PT) 15  -CW 10  -TF    Highest level of mobility 4 --> Transferred to chair/commode  -CW 4 --> Transferred to chair/commode  -TF    Row Name 05/01/23 1210 05/01/23 1005       Functional Assessment    Outcome Measure Options AM-PAC 6 Clicks Daily Activity (OT)  -MW AM-PAC 6 Clicks Basic Mobility (PT)  -CW          User Key  (r) = Recorded By, (t) = Taken By, (c) = Cosigned By    Initials Name Provider Type    TF Miriam Smiley RN Registered Nurse    Tamica Eddy, PT Physical Therapist    Karishma Williamson OT Occupational Therapist                Occupational Therapy Education     Title: PT OT SLP Therapies (In Progress)     Topic: Occupational Therapy (In Progress)     Point: ADL training (In Progress)     Description:   Instruct learner(s) on proper safety adaptation and remediation techniques during self care or transfers.   Instruct in proper use of assistive devices.              Learning Progress Summary           Patient Acceptance, E, NL by JOON at 4/29/2023 0136    Comment: Patient cognition unable to  allow for teaching at this time.    Acceptance, E,TB,H, VU by MS at 4/21/2023 1838    Acceptance, E,TB,H, VU,NR by MS at 4/13/2023 1817    Acceptance, E,TB,H, VU by MS at 3/16/2023 1801    Acceptance, E, NR by LM at 3/15/2023 0521   Family Acceptance, E,TB,H, VU by MS at 4/21/2023 1838    Acceptance, E,TB,H, VU,NR by MS at 4/13/2023 1817    Acceptance, E,TB,H, VU by MS at 3/16/2023 1801                   Point: Home exercise program (In Progress)     Description:   Instruct learner(s) on appropriate technique for monitoring, assisting and/or progressing therapeutic exercises/activities.              Learning Progress Summary           Patient Acceptance, E, NL by BB at 4/29/2023 0136    Comment: Patient cognition unable to allow for teaching at this time.    Acceptance, E,TB,H, VU by MS at 4/21/2023 1838    Acceptance, E,TB,H, VU,NR by MS at 4/13/2023 1817    Acceptance, E,TB,H, VU by MS at 3/16/2023 1801    Acceptance, E, NR by LM at 3/15/2023 0521   Family Acceptance, E,TB,H, VU by MS at 4/21/2023 1838    Acceptance, E,TB,H, VU,NR by MS at 4/13/2023 1817    Acceptance, E,TB,H, VU by MS at 3/16/2023 1801                   Point: Precautions (In Progress)     Description:   Instruct learner(s) on prescribed precautions during self-care and functional transfers.              Learning Progress Summary           Patient Acceptance, E, NL by BB at 4/29/2023 0136    Comment: Patient cognition unable to allow for teaching at this time.    Acceptance, E,TB,H, VU by MS at 4/21/2023 1838    Acceptance, E,TB,H, VU,NR by MS at 4/13/2023 1817    Acceptance, E,TB,H, VU by MS at 3/16/2023 1801    Acceptance, E, NR by LM at 3/15/2023 0521   Family Acceptance, E,TB,H, VU by MS at 4/21/2023 1838    Acceptance, E,TB,H, VU,NR by MS at 4/13/2023 1817    Acceptance, E,TB,H, VU by MS at 3/16/2023 1801                   Point: Body mechanics (In Progress)     Description:   Instruct learner(s) on proper positioning and spine alignment  during self-care, functional mobility activities and/or exercises.              Learning Progress Summary           Patient Acceptance, E, NL by BB at 4/29/2023 0136    Comment: Patient cognition unable to allow for teaching at this time.    Acceptance, E,TB,H, VU by MS at 4/21/2023 1838    Acceptance, E,TB,H, VU,NR by MS at 4/13/2023 1817    Acceptance, E,TB,H, VU by MS at 3/16/2023 1801    Acceptance, E, NR by LM at 3/15/2023 0521   Family Acceptance, E,TB,H, VU by MS at 4/21/2023 1838    Acceptance, E,TB,H, VU,NR by MS at 4/13/2023 1817    Acceptance, E,TB,H, VU by MS at 3/16/2023 1801                               User Key     Initials Effective Dates Name Provider Type Discipline    MS 06/16/21 -  Angel Luis Cr, RN Registered Nurse Nurse    BB 01/10/23 -  Elyssa Thorne, RN Registered Nurse Nurse    MAGDALENE 10/13/22 -  Silke Grace, RN Registered Nurse Nurse              OT Recommendation and Plan     Plan of Care Review  Plan of Care Reviewed With: patient  Progress: no change  Outcome Evaluation: Pt seen for OT tx session in AM, [facility] on arrival, pt agreeable. Pt completed x8 func transfers to [facility] - min-mod A x1, max visual and verbal cues for carryover. STS from chair with heavy use of L handrail CGA-min A x1. Pt engaging in dynamic reaching in sitting/standing with RUE to improve functional use of extremity while focusing on weight shifting safely onto LLE. Pt UI end of session, continue to progress as able.     Time Calculation:    Time Calculation- OT     Row Name 05/01/23 1210             Time Calculation- OT    OT Start Time 0924  -MW      OT Stop Time 0950  -MW      OT Time Calculation (min) 26 min  -MW      Total Timed Code Minutes- OT 26 minute(s)  -MW      OT Received On 05/01/23  -MW      OT - Next Appointment 05/02/23  -MW         Timed Charges    88885 -  OT Neuromuscular Reeducation Minutes 10  -MW      76792 - OT Therapeutic Activity Minutes 16  -MW         Total Minutes    Timed Charges Total  Minutes 26  -MW       Total Minutes 26  -MW            User Key  (r) = Recorded By, (t) = Taken By, (c) = Cosigned By    Initials Name Provider Type    Karishma Williamson OT Occupational Therapist              Therapy Charges for Today     Code Description Service Date Service Provider Modifiers Qty    50607363767  OT NEUROMUSC RE EDUCATION EA 15 MIN 5/1/2023 Karishma Spence OT GO 1    [id number]  OT THERAPEUTIC ACT EA 15 MIN 5/1/2023 Karishma Spence OT GO 1               Karishma Spence OT  5/1/2023

## 2023-05-01 NOTE — PROGRESS NOTES
"    Name: Manuel Durant ADMIT: 3/12/2023   : 1950  PCP: Provider, No Known    MRN: [id number] LOS: 50 days   AGE/SEX: 73 y.o. male  ROOM: [facility]     Subjective   Subjective   Resting in bed. No family present. Discussed with nursing- he thought he was in [place] earlier, but easily re-oriented and aware of [place], [place]. Patient states he had a good night. \"I am okay.\" Eating well. Nursing reports he was A x1 to chair today but very difficult transfer.      Objective   Objective   Vital Signs  Temp:  [97.2 °F (36.2 °C)-98 °F (36.7 °C)] 97.3 °F (36.3 °C)  Heart Rate:  [54-65] 65  Resp:  [16-18] 18  BP: (109-123)/(63-91) 109/71  SpO2:  [94 %-97 %] 94 %  on   ;   Device (Oxygen Therapy): room air  Body mass index is 23.46 kg/m².     Physical Exam  Vitals and nursing note reviewed.   Constitutional:       General: He is awake and alert. He is not in acute distress. Lying in bed on RA  Cardiovascular:      Rate and Rhythm: Normal rate and regular rhythm.      Pulses: Normal pulses.      Heart sounds: Normal heart sounds. No murmur heard.  Pulmonary:      Effort: Pulmonary effort is normal. No respiratory distress.      Breath sounds: Normal breath sounds.   Abdominal:      Palpations: Abdomen is soft and non-tender. Bowel sounds active.    Musculoskeletal:      Right shoulder pain with passive ROM     Right lower leg: No edema.      Left lower leg: No edema.  Skin:     General: Skin is dry.   Neurological:      Motor: Weakness present.      Comments: Alert and oriented to person/place. Intermittent disorientation. Right-sided upper extremity paresis- slowing improving as he has mobility in raising it as well as his right leg.   Psychiatric:         Mood and Affect: Mood normal.         Behavior: Behavior normal.         Thought Content: Thought content normal.         Judgment: Judgment normal  Results Review:       I reviewed the patient's new clinical results.      Estimated Creatinine Clearance: 79.9 mL/min " (by C-G formula based on SCr of 0.79 mg/dL).        No results found for: HGBA1C, POCGLU    amLODIPine, 10 mg, Oral, QAM AC  apixaban, 5 mg, Oral, Q12H  baclofen, 2.5 mg, Oral, Q8H  carvedilol, 3.125 mg, Oral, BID With Meals  hydrALAZINE, 10 mg, Oral, Q8H  lidocaine, 1 patch, Transdermal, Q24H  sodium chloride, 10 mL, Intravenous, Q12H  valsartan, 160 mg, Oral, QAM AC       Diet: Regular/House Diet; No Mixed Consistencies, Feeding Assistance - Nursing; Texture: Soft to Chew (NDD 3); Soft to Chew: Chopped Meat; Fluid Consistency: Nectar Thick       Assessment/Plan     Active Hospital Problems    Diagnosis  POA   • **Intracranial hemorrhage [I62.9]  Yes   • Hemiplegia [G81.90]  Yes   • Right shoulder pain [M25.511]  Yes   • Severe Malnutrition (HCC) [E43]  Yes   • Acute DVT (deep venous thrombosis) [I82.409]  Yes   • HTN (hypertension) [I10]  Yes      Resolved Hospital Problems    Diagnosis Date Resolved POA   • Hypokalemia [E87.6] 04/10/2023 Unknown   • Hypertensive emergency [I16.1] 04/29/2023 Yes     Mr. Durant is a 73 year old male who initially presented to the hospital 3/12/23 for intracranial bleed. He was found unresponsive by friends and initially taken to [facility] where he was found to have poorly controlled BP and right sided hemiplegia. He was transferred here for closer monitoring in the ICU and neurosurgical consultation. He was cleared to move out of the ICU on 3/16/23.     • Intracranial hemorrhage: CTH 3/17/23 stable left basal ganglia/internal capsule hemorrhage with left lateral ventricular hemorrhage. On Lovenox for DVT and repeat CTH 3/18 also stable. ANDRADE followed and signed off 3/23 with plans for repeat MRI brain/MRA head in 1 month- ANDRADE saw again 4/20 and ordered MRI but this was not done as patient couldn't be cleared for MRI. CTA/CT head with evidence of hemorrhage cavity left sided with some peripheral enhancement which may just be related to resolving hemorrhage but underlying mass  cannot be excluded- needs repeat CTH with contrast or MRI brain in 6-8 weeks and local follow up with neurology/ANDRADE in [place]. Goal BP < 150 systolic. Remains on baclofen for spasticity.      • HTN emergency: On amlodipine 10 mg, valsartan 160 mg, Coreg 3.125 mg BID and hydralazine 10 mg TID. Good BP readings.     • Acute DVT: Found 3/17/23 with acute RLE DVT in soleal. ANDRADE okayed for blood thinners. Now on Eliquis.     • Hypokalemia: Resolved.     Discussed with patient and nurse.     Medically stable. Family set up a time to come to [place] and get him to fly [place]- tentative discharge date May 20 th.     • Eliquis for DVT prophylaxis.  • Full code.  • Anticipate discharge as above.    SLICK Arroyo  [facility]  05/01/23  11:42 EDT

## 2023-05-01 NOTE — THERAPY TREATMENT NOTE
Patient Name: Manuel Durant  : 1950    MRN: [id number]                              Today's Date: 2023       Admit Date: 3/12/2023    Visit Dx:     ICD-10-CM ICD-9-CM   1. Intracranial hemorrhage  I62.9 432.9   2. Altered mental status, unspecified altered mental status type  R41.82 780.97     Patient Active Problem List   Diagnosis   • Intracranial hemorrhage   • Acute DVT (deep venous thrombosis)   • HTN (hypertension)   • Severe Malnutrition (HCC)   • Hemiplegia   • Right shoulder pain     Past Medical History:   Diagnosis Date   • Hypokalemia 3/17/2023     History reviewed. No pertinent surgical history.   General Information     Row Name 2359          Physical Therapy Time and Intention    Document Type therapy note (daily note)  -CW     Mode of Treatment physical therapy  -CW     Row Name 23 0959          General Information    Patient Profile Reviewed yes  -CW     Existing Precautions/Restrictions fall  -CW     Row Name 2359          Cognition    Orientation Status (Cognition) oriented to;person  -CW     Row Name 2359          Safety Issues, Functional Mobility    Safety Issues Affecting Function (Mobility) insight into deficits/self-awareness;awareness of need for assistance;impulsivity;problem-solving;judgment;safety precaution awareness;safety precautions follow-through/compliance  -CW     Impairments Affecting Function (Mobility) balance;coordination;endurance/activity tolerance;grasp;motor control;postural/trunk control;strength;range of motion (ROM);cognition;visual/perceptual  -CW           User Key  (r) = Recorded By, (t) = Taken By, (c) = Cosigned By    Initials Name Provider Type    CW Tamica Willoughby PT Physical Therapist               Mobility     Row Name 23 0959          Bed Mobility    Comment, (Bed Mobility) UIC at arrival  -CW     Row Name 2359          Bed-Chair Transfer    Bed-Chair [place] (Transfers) minimum assist  (75% patient effort);1 person assist;verbal cues  -CW     Comment, (Bed-Chair Transfer) x8 total transfers chair<> BSC. impulsive and unsafe at times. Hopping on L at times despite cues to utilize R  -CW     Row Name 05/01/23 0959          Sit-Stand Transfer    Sit-Stand [place] (Transfers) contact guard;verbal cues  -CW     Comment, (Sit-Stand Transfer) holding on L rail on chair  -CW           User Key  (r) = Recorded By, (t) = Taken By, (c) = Cosigned By    Initials Name Provider Type    Tamica Eddy PT Physical Therapist               Obj/Interventions     Row Name 05/01/23 1000          Balance    Comment, Balance standing reaching task, assist at RLE to keep it on the floor  -CW           User Key  (r) = Recorded By, (t) = Taken By, (c) = Cosigned By    Initials Name Provider Type    Tamica Eddy PT Physical Therapist               Goals/Plan    No documentation.                Clinical Impression     Row Name 05/01/23 1000          Pain    Pretreatment Pain Rating 0/10 - no pain  -CW     Posttreatment Pain Rating 0/10 - no pain  -CW     Row Name 05/01/23 1000          Plan of Care Review    Plan of Care Reviewed With patient  -CW     Outcome Evaluation Pt participated with PT this morning. Pt in chair upon arrival, pleasant and agreeable. Worked on stand pivot transfers to BSC- min A x1 but cues for safety due to impulsivity. Pt at times hopping on LLE despite cues to utilize RLE. Will continue to follow.  -[facility] Name 05/01/23 1000          Vital Signs    O2 Delivery Pre Treatment room air  -CW     Row Name 05/01/23 1000          Positioning and Restraints    Pre-Treatment Position sitting in chair/recliner  -CW     Post Treatment Position chair  -CW     In Chair reclined;call light within reach;encouraged to call for assist;exit alarm on;RUE elevated;notified nsg  -           User Key  (r) = Recorded By, (t) = Taken By, (c) = Cosigned By    Initials Name Provider Type    DANAY  Tamica Willoughby, JOSE ELIAS Physical Therapist               Outcome Measures     Row Name 05/01/23 1005 05/01/23 0809       How much help from another person do you currently need...    Turning from your back to your side while in flat bed without using bedrails? 3  -CW 2  -TF    Moving from lying on back to sitting on the side of a flat bed without bedrails? 3  -CW 2  -TF    Moving to and from a bed to a chair (including a wheelchair)? 3  -CW 2  -TF    Standing up from a chair using your arms (e.g., wheelchair, bedside chair)? 3  -CW 2  -TF    Climbing 3-5 steps with a railing? 1  -CW 1  -TF    To walk in hospital room? 2  -CW 1  -TF    AM-PAC 6 Clicks Score (PT) 15  -CW 10  -TF    Highest level of mobility 4 --> Transferred to chair/commode  -CW 4 --> Transferred to chair/commode  -TF    Row Name 05/01/23 1005          Functional Assessment    Outcome Measure Options AM-PAC 6 Clicks Basic Mobility (PT)  -CW           User Key  (r) = Recorded By, (t) = Taken By, (c) = Cosigned By    Initials Name Provider Type    TF Miriam Smiley RN Registered Nurse    Tamica Eddy, JOSE ELIAS Physical Therapist                             Physical Therapy Education     Title: PT OT SLP Therapies (In Progress)     Topic: Physical Therapy (In Progress)     Point: Mobility training (Done)     Learning Progress Summary           Patient Acceptance, E, VU,NR by CW at 5/1/2023 1005    Acceptance, E, NL by JOON at 4/29/2023 0136    Comment: Patient cognition unable to allow for teaching at this time.    Acceptance, E,TB,H, VU by MS at 4/21/2023 1838    Acceptance, E, NR by CW at 4/21/2023 1537    Acceptance, E, VU,DU by CHARLY at 4/19/2023 1130    Acceptance, E, NR by CW at 4/18/2023 0950    Acceptance, E, VU,NR by NICOLASA at 4/16/2023 1158    Acceptance, E,TB,H, VU,NR by MS at 4/13/2023 1817    Acceptance, E, NR by CW at 4/13/2023 1341    Acceptance, E, NR by CW at 4/12/2023 1201    Acceptance, E, NR by CW at 4/11/2023 1158    Acceptance, E, NR  by CW at 4/10/2023 1414    Acceptance, E,TB, NR by CS at 4/8/2023 0928    Acceptance, E, NR by CW at 4/7/2023 1215    Acceptance, E, NR by CW at 4/6/2023 1335    Acceptance, E, NR by CW at 4/5/2023 1043    Acceptance, E, NR by CW at 4/4/2023 0906    Acceptance, E, NR by CW at 4/3/2023 1531    Acceptance, E,TB, VU,NR by CAMERON at 4/1/2023 1530    Acceptance, E, NR by CW at 3/31/2023 0925    Acceptance, E, NR by CW at 3/30/2023 1209    Acceptance, E, NR,NL by CW at 3/29/2023 1336    Acceptance, E, NR by ZB at 3/28/2023 1317    Acceptance, E, NR by CW at 3/27/2023 1401    Acceptance, E,D, NR,VU by JM at 3/24/2023 1631    Comment: seemed to comprehend commands this session    Acceptance, E,D, NR by JM at 3/23/2023 1655    Acceptance, E,D, NR by JM at 3/22/2023 1545    Acceptance, E,TB, VU,NR by CB at 3/21/2023 1544    Acceptance, E,TB,H, VU by MS at 3/16/2023 1801    Acceptance, E,D, DU,NR by MO at 3/16/2023 1451    Acceptance, E, DU,NR by MO at 3/15/2023 1200    Acceptance, E, NR by LM at 3/15/2023 0521    Acceptance, E,D, DU,NR by MO at 3/14/2023 1458   Family Acceptance, E,TB,H, VU by MS at 4/21/2023 1838    Acceptance, E,TB,H, VU,NR by MS at 4/13/2023 1817    Acceptance, E,TB,H, VU by MS at 3/16/2023 1801                   Point: Home exercise program (In Progress)     Learning Progress Summary           Patient Acceptance, E, NL by BB at 4/29/2023 0136    Comment: Patient cognition unable to allow for teaching at this time.    Acceptance, E,TB,H, VU by MS at 4/21/2023 1838    Acceptance, E, VU,NR by DJ at 4/16/2023 1158    Acceptance, E,TB,H, VU,NR by MS at 4/13/2023 1817    Acceptance, E, NR by CW at 4/13/2023 1341    Acceptance, E,TB, NR by CS at 4/8/2023 0928    Acceptance, E, NR by CW at 4/7/2023 1215    Acceptance, E, NR by CW at 4/6/2023 1335    Acceptance, E,TB, VU,NR by CAMERON at 4/1/2023 1530    Acceptance, E, NR by CW at 3/31/2023 0925    Acceptance, E, NR by ZB at 3/28/2023 1317    Acceptance, E, NR by CW at  3/27/2023 1401    Acceptance, E,D, NR,VU by JM at 3/24/2023 1631    Comment: seemed to comprehend commands this session    Acceptance, E,D, NR by JM at 3/23/2023 1655    Acceptance, E,D, NR by JM at 3/22/2023 1545    Acceptance, E,TB, VU,NR by CB at 3/21/2023 1544    Acceptance, E,TB,H, VU by MS at 3/16/2023 1801    Acceptance, E,D, DU,NR by MO at 3/16/2023 1451    Acceptance, E, NR by LM at 3/15/2023 0521    Acceptance, E,D, DU,NR by MO at 3/14/2023 1458   Family Acceptance, E,TB,H, VU by MS at 4/21/2023 1838    Acceptance, E,TB,H, VU,NR by MS at 4/13/2023 1817    Acceptance, E,TB,H, VU by MS at 3/16/2023 1801                   Point: Body mechanics (In Progress)     Learning Progress Summary           Patient Acceptance, E, NL by BB at 4/29/2023 0136    Comment: Patient cognition unable to allow for teaching at this time.    Acceptance, E,TB,H, VU by MS at 4/21/2023 1838    Acceptance, E, VU,NR by DJ at 4/16/2023 1158    Acceptance, E,TB,H, VU,NR by MS at 4/13/2023 1817    Acceptance, E, NR by CW at 4/13/2023 1341    Acceptance, E, NR by CW at 4/11/2023 1158    Acceptance, E, NR by CW at 4/10/2023 1414    Acceptance, E,TB, NR by CS at 4/8/2023 0928    Acceptance, E, NR by CW at 4/7/2023 1215    Acceptance, E, NR by CW at 4/4/2023 0906    Acceptance, E, NR by CW at 4/3/2023 1531    Acceptance, E,TB, VU,NR by CAMERON at 4/1/2023 1530    Acceptance, E,D, NR,VU by JM at 3/24/2023 1631    Comment: seemed to comprehend commands this session    Acceptance, E,D, NR by JM at 3/23/2023 1655    Acceptance, E,D, NR by JM at 3/22/2023 1545    Acceptance, E,TB, VU,NR by CB at 3/21/2023 1544    Acceptance, E,TB,H, VU by MS at 3/16/2023 1801    Acceptance, E,D, DU,NR by MO at 3/16/2023 1451    Acceptance, E, DU,NR by MO at 3/15/2023 1200    Acceptance, E, NR by LM at 3/15/2023 0521    Acceptance, E,D, DU,NR by MO at 3/14/2023 1458   Family Acceptance, E,TB,H, VU by MS at 4/21/2023 1838    Acceptance, E,TB,H, VU,NR by MS at 4/13/2023  1817    Acceptance, E,TB,H, VU by MS at 3/16/2023 1801                   Point: Precautions (In Progress)     Learning Progress Summary           Patient Acceptance, E, NL by BB at 4/29/2023 0136    Comment: Patient cognition unable to allow for teaching at this time.    Acceptance, E,TB,H, VU by MS at 4/21/2023 1838    Acceptance, E, VU,NR by DJ at 4/16/2023 1158    Acceptance, E,TB,H, VU,NR by MS at 4/13/2023 1817    Acceptance, E, NR by CW at 4/13/2023 1341    Acceptance, E,TB, NR by CS at 4/8/2023 0928    Acceptance, E, NR by CW at 4/7/2023 1215    Acceptance, E, NR by CW at 4/4/2023 0906    Acceptance, E, NR by CW at 4/3/2023 1531    Acceptance, E,TB, VU,NR by CAMERON at 4/1/2023 1530    Acceptance, E,D, NR,VU by JM at 3/24/2023 1631    Comment: seemed to comprehend commands this session    Acceptance, E,D, NR by JM at 3/23/2023 1655    Acceptance, E,D, NR by JM at 3/22/2023 1545    Acceptance, E,TB, VU,NR by CB at 3/21/2023 1544    Acceptance, E,TB,H, VU by MS at 3/16/2023 1801    Acceptance, E,D, DU,NR by MO at 3/16/2023 1451    Acceptance, E, DU,NR by MO at 3/15/2023 1200    Acceptance, E, NR by LM at 3/15/2023 0521    Acceptance, E,D, DU,NR by MO at 3/14/2023 1458   Family Acceptance, E,TB,H, VU by MS at 4/21/2023 1838    Acceptance, E,TB,H, VU,NR by MS at 4/13/2023 1817    Acceptance, E,TB,H, VU by MS at 3/16/2023 1801                               User Key     Initials Effective Dates Name Provider Type Discipline    ZACH 03/07/18 -  Tiffanie Grace PTA Physical Therapist Assistant PT    CAMERON 05/19/21 -  Inez, Leeann Jazzmine, PT Physical Therapist PT    DJ 10/25/19 -  Jacqueline Chamberlain, PT Physical Therapist PT    MS 06/16/21 -  Angel Luis Cr, RN Registered Nurse Nurse    CW 12/13/22 -  Tamica Willoughby, PT Physical Therapist PT    CB 10/22/21 -  Bety Benitez, PT Physical Therapist PT    BB 01/10/23 -  Elyssa Thorne, RN Registered Nurse Nurse    CS 09/22/22 -  Adelia Montalvo, PT Physical Therapist PT     LM 10/13/22 -  Silke Grace, RN Registered Nurse Nurse    MO 01/27/23 -  Lacie Tanner, PT Student PT Student PT    CHARLY 03/10/23 -  Nestor Rae, PT Student PT Student PT              PT Recommendation and Plan     Plan of Care Reviewed With: patient  Outcome Evaluation: Pt participated with PT this morning. Pt in chair upon arrival, pleasant and agreeable. Worked on stand pivot transfers to BSC- min A x1 but cues for safety due to impulsivity. Pt at times hopping on LLE despite cues to utilize RLE. Will continue to follow.     Time Calculation:    PT Charges     Row Name 05/01/23 0958             Time Calculation    Start Time 0924  -CW      Stop Time 0950  -CW      Time Calculation (min) 26 min  -CW      PT Received On 05/01/23  -CW      PT - Next Appointment 05/02/23  -CW            User Key  (r) = Recorded By, (t) = Taken By, (c) = Cosigned By    Initials Name Provider Type    CW Tamica Willoughby, PT Physical Therapist              Therapy Charges for Today     Code Description Service Date Service Provider Modifiers Qty    [id number] HC PT THERAPEUTIC ACT EA 15 MIN 5/1/2023 Tamica Willoughby, PT GP 2          PT G-Codes  Outcome Measure Options: AM-PAC 6 Clicks Basic Mobility (PT)  AM-PAC 6 Clicks Score (PT): 15  AM-PAC 6 Clicks Score (OT): 12  Modified Yam[place]x Scale: 4 - Moderately severe disability.  Unable to walk without assistance, and unable to attend to own bodily needs without assistance.  PT Discharge Summary  Anticipated Discharge Disposition (PT): inpatient rehabilitation facility, skilled nursing facility    Tamica Willoughby PT  5/1/2023

## 2023-05-01 NOTE — PLAN OF CARE
Goal Outcome Evaluation:  Plan of Care Reviewed With: patient           Outcome Evaluation: Pt participated with PT this morning. Pt in chair upon arrival, pleasant and agreeable. Worked on stand pivot transfers to BSC- min A x1 but cues for safety due to impulsivity. Pt at times hopping on LLE despite cues to utilize RLE. Will continue to follow.

## 2023-05-02 PROCEDURE — 97530 THERAPEUTIC ACTIVITIES: CPT

## 2023-05-02 PROCEDURE — 97116 GAIT TRAINING THERAPY: CPT

## 2023-05-02 PROCEDURE — 92526 ORAL FUNCTION THERAPY: CPT

## 2023-05-02 RX ORDER — HYDRALAZINE HYDROCHLORIDE 10 MG/1
10 TABLET, FILM COATED ORAL EVERY 12 HOURS SCHEDULED
Status: DISCONTINUED | OUTPATIENT
Start: 2023-05-02 | End: 2023-05-04

## 2023-05-02 RX ADMIN — BACLOFEN 2.5 MG: 10 TABLET ORAL at 21:28

## 2023-05-02 RX ADMIN — CARVEDILOL 3.12 MG: 3.12 TABLET, FILM COATED ORAL at 18:05

## 2023-05-02 RX ADMIN — APIXABAN 5 MG: 5 TABLET, FILM COATED ORAL at 09:04

## 2023-05-02 RX ADMIN — BACLOFEN 2.5 MG: 10 TABLET ORAL at 16:11

## 2023-05-02 RX ADMIN — LIDOCAINE 1 PATCH: 700 PATCH TOPICAL at 09:04

## 2023-05-02 RX ADMIN — Medication 10 ML: at 20:41

## 2023-05-02 RX ADMIN — HYDRALAZINE HYDROCHLORIDE 10 MG: 10 TABLET, FILM COATED ORAL at 20:42

## 2023-05-02 RX ADMIN — HYDRALAZINE HYDROCHLORIDE 10 MG: 10 TABLET, FILM COATED ORAL at 07:04

## 2023-05-02 RX ADMIN — APIXABAN 5 MG: 5 TABLET, FILM COATED ORAL at 20:41

## 2023-05-02 RX ADMIN — CARVEDILOL 3.12 MG: 3.12 TABLET, FILM COATED ORAL at 09:04

## 2023-05-02 RX ADMIN — Medication 10 ML: at 09:05

## 2023-05-02 RX ADMIN — AMLODIPINE BESYLATE 10 MG: 10 TABLET ORAL at 09:04

## 2023-05-02 RX ADMIN — BACLOFEN 2.5 MG: 10 TABLET ORAL at 07:00

## 2023-05-02 RX ADMIN — VALSARTAN 160 MG: 160 TABLET, FILM COATED ORAL at 09:04

## 2023-05-02 NOTE — THERAPY TREATMENT NOTE
Acute Care - Speech Language Pathology   Swallow Treatment Note [facility]     Patient Name: Manuel Durant  : 1950  MRN: [id number]  Today's Date: 2023               Admit Date: 3/12/2023    Visit Dx:     ICD-10-CM ICD-9-CM   1. Intracranial hemorrhage  I62.9 432.9   2. Altered mental status, unspecified altered mental status type  R41.82 780.97     Patient Active Problem List   Diagnosis   • Intracranial hemorrhage   • Acute DVT (deep venous thrombosis)   • HTN (hypertension)   • Severe Malnutrition (HCC)   • Hemiplegia   • Right shoulder pain     Past Medical History:   Diagnosis Date   • Hypokalemia 3/17/2023     History reviewed. No pertinent surgical history.    SLP Recommendation and Plan         Outcome Evaluation: Pt seen for VFSS follow to provide education regarding results and recommendations, especially new recommendation of water protocol. Education limited due to language barrier and no  device working at this time despite many attempts. Discussed with pt that he can now have sips of water after oral care inbetween meals. Pt first given swab to clean mouth and pt able to clear back of mouth/dentition but missed anterior dentition. SLP finished oral care and removed food residue between upper lip and dentition with swabs. On first drink of thin liquid water, pt swished around in mouth and then expectorated into different cup appeared to just wash mouth out. Pt then drank trials of thin liquids water by cup. Initially taking large drinks, but then with mod cues pt able to take small drinks by cup. No overt s/s of penetration/aspiration noted, however, suspect continued incoordination of oral transit and swallow initiation. Offered other trials of p.o. afterwards, however, pt declined stating he just had eaten was too full. This SLP had planned to complete language tx this date, however, unable due to no interpreting service available at this time. Will continue to follow for  dysphagia and language tx. Continue to recommend soft/chopped/no mixed solids and nectar thick liquids. Meds whole in puree. Fully upright posture. Water in between meals after oral care.      SWALLOW EVALUATION (last 72 hours)     SLP Adult Swallow Evaluation     Row Name 05/02/23 1315          Document Type therapy note (daily note)  -ML    Subjective Information no complaints  -ML    Patient Observations alert;cooperative  -ML    Patient Effort good  -ML    Comment No  IPAD noted in room. Discussed with RN who got one located on the floor. IPAD was not charged and attempted to charge IPAD for 20 minutes but did not turn on. Attempted to use  phone in room. Pt initially stating he didn't want to use phone but then agreeable. SLP and RN attempted to use phone but it did not work either.  -ML          Diet Texture (Demonstrate functional swallow) soft to chew (chopped) textures  -ML    Liquid viscosity (Demonstrate functional swallow) nectar/ mildly thick liquids  -ML    [place] (Demonstrate functional swallow) with minimal cues (75-90% accuracy)  -ML    Time Frame (Demonstrate functional swallow) by discharge  -ML    Barriers (Demonstrate functional swallow) no  available  -ML    Progress/Outcomes (Demonstrate functional swallow) goal ongoing  -ML    Comment (Demonstrate functional swallow) Pt seen for VFSS follow to provided education regarding results and recommendations, especially new recommendation of water protocol. Education limited due to language barrier and no  device working at this time despite many attempts. Discussed with pt that he can now have sips of water after oral care inbetween meals. Pt first given swab to clean mouth and pt able to clear back of mouth/dentition but missed anterior dentition. SLP finished oral care and removed food residue between upper lip and dentition with swabs. On first drink of thin liquid water, pt swished around in mouth  and then expectorated into different cup appeared to just wash mouth out. Pt then drank trials of thin liquids water by cup. Initially taking large drinks, but then with mod cues pt able to take small drinks by cup. No overt s.s of penetration/aspiration noted, however suspect continued incoordination of oral transit and swallow initiation. Offered other trials of p.o. afterwards, however, pt declined stating he just had eaten was too full. This SLP had planned to complete language tx this date, however, unable due to no interpreting service available at this time. Will continue to follow for dysphagia and language tx.  -ML          User Key  (r) = Recorded By, (t) = Taken By, (c) = Cosigned By    Initials Name Effective Dates    ML April Avelar MS CCC-SLP 06/16/21 -                 EDUCATION  The patient has been educated in the following areas:   Dysphagia (Swallowing Impairment).        SLP GOALS     Row Name 05/02/23 1315             (LTG) Patient will demonstrate functional swallow for    Diet Texture (Demonstrate functional swallow) soft to chew (chopped) textures  -ML      Liquid viscosity (Demonstrate functional swallow) nectar/ mildly thick liquids  -ML      [place] (Demonstrate functional swallow) with minimal cues (75-90% accuracy)  -ML      Time Frame (Demonstrate functional swallow) by discharge  -ML      Barriers (Demonstrate functional swallow) no  available  -ML      Progress/Outcomes (Demonstrate functional swallow) goal ongoing  -ML      Comment (Demonstrate functional swallow) Pt seen for VFSS follow to provided education regarding results and recommendations, especially new recommendation of water protocol. Education limited due to language barrier and no  device working at this time despite many attempts. Discussed with pt that he can now have sips of water after oral care inbetween meals. Pt first given swab to clean mouth and pt able to clear back of  mouth/dentition but missed anterior dentition. SLP finished oral care and removed food residue between upper lip and dentition with swabs. On first drink of thin liquid water, pt swished around in mouth and then expectorated into different cup appeared to just wash mouth out. Pt then drank trials of thin liquids water by cup. Initially taking large drinks, but then with mod cues pt able to take small drinks by cup. No overt s.s of penetration/aspiration noted, however suspect continued incoordination of oral transit and swallow initiation. Offered other trials of p.o. afterwards, however, pt declined stating he just had eaten was too full. This SLP had planned to complete language tx this date, however, unable due to no interpreting service available at this time. Will continue to follow for dysphagia and language tx.  -ML            User Key  (r) = Recorded By, (t) = Taken By, (c) = Cosigned By    Initials Name Provider Type    April Elizalde MS CCC-SLP Speech and Language Pathologist                   Time Calculation:    Time Calculation- SLP     Row Name 05/02/23 1418             Time Calculation- SLP    SLP Start Time 1315  -ML      SLP Received On 05/02/23  -ML         Untimed Charges    25252-OQ Treatment Swallow Minutes 60  -ML         Total Minutes    Untimed Charges Total Minutes 60  -ML       Total Minutes 60  -ML            User Key  (r) = Recorded By, (t) = Taken By, (c) = Cosigned By    Initials Name Provider Type    April Elizalde MS CCC-SLP Speech and Language Pathologist                Therapy Charges for Today     Code Description Service Date Service Provider Modifiers Qty    [id number]  ST TREATMENT SWALLOW 4 5/2/2023 April Avelar MS CCC-SLP GN 1               MS DEEPAK Pate  5/2/2023

## 2023-05-02 NOTE — THERAPY TREATMENT NOTE
Patient Name: Manuel Durant  : 1950    MRN: [id number]                              Today's Date: 2023       Admit Date: 3/12/2023    Visit Dx:     ICD-10-CM ICD-9-CM   1. Intracranial hemorrhage  I62.9 432.9   2. Altered mental status, unspecified altered mental status type  R41.82 780.97     Patient Active Problem List   Diagnosis   • Intracranial hemorrhage   • Acute DVT (deep venous thrombosis)   • HTN (hypertension)   • Severe Malnutrition (HCC)   • Hemiplegia   • Right shoulder pain     Past Medical History:   Diagnosis Date   • Hypokalemia 3/17/2023     History reviewed. No pertinent surgical history.   General Information     Row Name 23 1547          OT Time and Intention    Document Type therapy note (daily note)  -RB     Mode of Treatment co-treatment;physical therapy;occupational therapy  -RB     Row Name 23 1547          General Information    Existing Precautions/Restrictions fall  -RB     Row Name 23 1547          Cognition    Orientation Status (Cognition) oriented to;person  -RB           User Key  (r) = Recorded By, (t) = Taken By, (c) = Cosigned By    Initials Name Provider Type    RB Ana Lui OT Occupational Therapist                 Mobility/ADL's     Row Name 23 1545          Bed Mobility    Bed Mobility supine-sit  -RB     Supine-Sit [place]iner (Bed Mobility) standby assist;verbal cues  -RB     Assistive Device (Bed Mobility) bed rails  -RB     Row Name 23 1545          Transfers    Transfers sit-stand transfer;stand-sit transfer;toilet transfer  -RB     Comment, (Transfers) multiple functional transfers to simulate an airplane (he is D/C back to [place] with family). Mod-Max A required for general balance and safety. LUE reaching.  -RB     Row Name 23 1545          Sit-Stand Transfer    Sit-Stand Gardiner (Transfers) contact guard  -RB     Row Name 23 1545          Stand-Sit Transfer    Stand-Sit Gardiner (Transfers)  contact guard;minimum assist (75% patient effort)  -RB     Row Name 05/02/23 1545          Toilet Transfer    Type (Toilet Transfer) sit-stand;stand-sit  -RB     [place] Level (Toilet Transfer) moderate assist (50% patient effort);maximum assist (25% patient effort)  -RB     Assistive Device (Toilet Transfer) commode;grab bars/safety frame  -RB     Row Name 05/02/23 1545          Functional Mobility    Functional Mobility- Ind. Level moderate assist (50% patient effort);maximum assist (25% patient effort);verbal cues required  -RB     Functional Mobility- Comment bathroom doorway to commode and back to doorway where his recliner chair was positioned  -RB           User Key  (r) = Recorded By, (t) = Taken By, (c) = Cosigned By    Initials Name Provider Type    Ana Banks OT Occupational Therapist               Obj/Interventions     Row Name 05/02/23 1545          Motor Skills    Therapeutic Exercise --  Stretching of R arm completed as tolerated  -RB     Row Name 05/02/23 1545          Balance    Comment, Balance CGA/Min A sitting balance. Mod-Max A for standing balance.  -RB           User Key  (r) = Recorded By, (t) = Taken By, (c) = Cosigned By    Initials Name Provider Type    Ana Banks OT Occupational Therapist               Goals/Plan    No documentation.                Clinical Impression     Row Name 05/02/23 1544          Pain Assessment    Pretreatment Pain Rating 0/10 - no pain  -RB     Posttreatment Pain Rating 0/10 - no pain  -RB     Row Name 05/02/23 1544          Plan of Care Review    Plan of Care Reviewed With patient  -RB     Progress no change  -RB     Outcome Evaluation OT/PT co-tx this afternoon. Pt brought to the sunroom on 5P via recliner chair. Therapist set up chairs in close proximity to one another to simulate the transfers required when he boards an airplane post D/C. The pt required Mod-Max A x1-2 for safety awareness, balance and cueing the pt. He was able to  participate in the functional transfers and pushed back to his hospital room. He then was set up at his bathroom doorway and he hopped/mobilized from the doorway to the bathroom commode with Mod-Max A x1-2.  Unsuccessful with toileting, total A for brief management. Mobilized back to his recliner chair, R side weakness continues. He was resting comfortably in his chair post session.  -RB     Row Name 05/02/23 1544          Therapy Assessment/Plan (OT)    Rehab Potential (OT) good, to achieve stated therapy goals  -RB     Criteria for Skilled Therapeutic Interventions Met (OT) yes;skilled treatment is necessary  -RB     Therapy Frequency (OT) 5 times/wk  -RB     Row Name 05/02/23 1544          Therapy Plan Review/Discharge Plan (OT)    Anticipated Discharge Disposition (OT) inpatient rehabilitation facility  -RB     Row Name 05/02/23 1544          Vital Signs    Pre Patient Position Supine  -RB     Intra Patient Position Standing  -RB     Post Patient Position Sitting  -RB     Row Name 05/02/23 1544          Positioning and Restraints    Pre-Treatment Position sitting in chair/recliner  -RB     Post Treatment Position chair  -RB     In Chair notified nsg;reclined;sitting;call light within reach;encouraged to call for assist;exit alarm on;legs elevated  -RB           User Key  (r) = Recorded By, (t) = Taken By, (c) = Cosigned By    Initials Name Provider Type    Ana Banks, OT Occupational Therapist               Outcome Measures     Row Name 05/02/23 1401 05/02/23 0904       How much help from another person do you currently need...    Turning from your back to your side while in flat bed without using bedrails? 3  -CW 2  -TF    Moving from lying on back to sitting on the side of a flat bed without bedrails? 3  -CW 2  -TF    Moving to and from a bed to a chair (including a wheelchair)? 3  -CW 2  -TF    Standing up from a chair using your arms (e.g., wheelchair, bedside chair)? 3  -CW 2  -TF    Climbing 3-5  steps with a railing? 1  -CW 1  -TF    To walk in hospital room? 2  -CW 1  -TF    AM-PAC 6 Clicks Score (PT) 15  -CW 10  -TF    Highest level of mobility 4 --> Transferred to chair/commode  -CW 4 --> Transferred to chair/commode  -TF    Row Name 05/02/23 1401          Functional Assessment    Outcome Measure Options AM-PAC 6 Clicks Basic Mobility (PT)  -CW           User Key  (r) = Recorded By, (t) = Taken By, (c) = Cosigned By    Initials Name Provider Type    TF Miriam Smiley RN Registered Nurse    Tamica Eddy, PT Physical Therapist                Occupational Therapy Education     Title: PT OT SLP Therapies (In Progress)     Topic: Occupational Therapy (In Progress)     Point: ADL training (In Progress)     Description:   Instruct learner(s) on proper safety adaptation and remediation techniques during self care or transfers.   Instruct in proper use of assistive devices.              Learning Progress Summary           Patient Acceptance, E, NL by BB at 4/29/2023 0136    Comment: Patient cognition unable to allow for teaching at this time.    Acceptance, E,TB,H, VU by MS at 4/21/2023 1838    Acceptance, E,TB,H, VU,NR by MS at 4/13/2023 1817    Acceptance, E,TB,H, VU by MS at 3/16/2023 1801    Acceptance, E, NR by LM at 3/15/2023 0521   Family Acceptance, E,TB,H, VU by MS at 4/21/2023 1838    Acceptance, E,TB,H, VU,NR by MS at 4/13/2023 1817    Acceptance, E,TB,H, VU by MS at 3/16/2023 1801                   Point: Home exercise program (In Progress)     Description:   Instruct learner(s) on appropriate technique for monitoring, assisting and/or progressing therapeutic exercises/activities.              Learning Progress Summary           Patient Acceptance, E, NL by BB at 4/29/2023 0136    Comment: Patient cognition unable to allow for teaching at this time.    Acceptance, E,TB,H, VU by MS at 4/21/2023 1838    Acceptance, E,TB,H, VU,NR by MS at 4/13/2023 1817    Acceptance, E,TB,H, VU by MS at  3/16/2023 1801    Acceptance, E, NR by LM at 3/15/2023 0521   Family Acceptance, E,TB,H, VU by MS at 4/21/2023 1838    Acceptance, E,TB,H, VU,NR by MS at 4/13/2023 1817    Acceptance, E,TB,H, VU by MS at 3/16/2023 1801                   Point: Precautions (In Progress)     Description:   Instruct learner(s) on prescribed precautions during self-care and functional transfers.              Learning Progress Summary           Patient Acceptance, E, NL by BB at 4/29/2023 0136    Comment: Patient cognition unable to allow for teaching at this time.    Acceptance, E,TB,H, VU by MS at 4/21/2023 1838    Acceptance, E,TB,H, VU,NR by MS at 4/13/2023 1817    Acceptance, E,TB,H, VU by MS at 3/16/2023 1801    Acceptance, E, NR by LM at 3/15/2023 0521   Family Acceptance, E,TB,H, VU by MS at 4/21/2023 1838    Acceptance, E,TB,H, VU,NR by MS at 4/13/2023 1817    Acceptance, E,TB,H, VU by MS at 3/16/2023 1801                   Point: Body mechanics (In Progress)     Description:   Instruct learner(s) on proper positioning and spine alignment during self-care, functional mobility activities and/or exercises.              Learning Progress Summary           Patient Acceptance, E, NL by BB at 4/29/2023 0136    Comment: Patient cognition unable to allow for teaching at this time.    Acceptance, E,TB,H, VU by MS at 4/21/2023 1838    Acceptance, E,TB,H, VU,NR by MS at 4/13/2023 1817    Acceptance, E,TB,H, VU by MS at 3/16/2023 1801    Acceptance, E, NR by LM at 3/15/2023 0521   Family Acceptance, E,TB,H, VU by MS at 4/21/2023 1838    Acceptance, E,TB,H, VU,NR by MS at 4/13/2023 1817    Acceptance, E,TB,H, VU by MS at 3/16/2023 1801                               User Key     Initials Effective Dates Name Provider Type Discipline    MS 06/16/21 -  Angel Luis Cr, RN Registered Nurse Nurse    BB 01/10/23 -  Elyssa Thorne, RN Registered Nurse Nurse    LM 10/13/22 -  Silke Grace, RN Registered Nurse Nurse              OT  Recommendation and Plan  Planned Therapy Interventions (OT): transfer/mobility retraining, strengthening exercise, ROM/therapeutic exercise, activity tolerance training, adaptive equipment training, BADL retraining, functional balance retraining, occupation/activity based interventions, patient/caregiver education/training, neuromuscular control/coordination retraining, cognitive/visual perception retraining, edema control/reduction, passive ROM/stretching  Therapy Frequency (OT): 5 times/wk  Plan of Care Review  Plan of Care Reviewed With: patient  Progress: no change  Outcome Evaluation: OT/PT co-tx this afternoon. Pt brought to the sunroom on 5P via recliner chair. Therapist set up chairs in close proximity to one another to simulate the transfers required when he boards an airplane post D/C. The pt required Mod-Max A x1-2 for safety awareness, balance and cueing the pt. He was able to participate in the functional transfers and pushed back to his hospital room. He then was set up at his bathroom doorway and he hopped/mobilized from the doorway to the bathroom commode with Mod-Max A x1-2.  Unsuccessful with toileting, total A for brief management. Mobilized back to his recliner chair, R side weakness continues. He was resting comfortably in his chair post session.     Time Calculation:    Time Calculation- OT     Row Name 05/02/23 1543             Time Calculation- OT    OT Start Time 1400  -RB      OT Stop Time 1429  -RB      OT Time Calculation (min) 29 min  -RB      Total Timed Code Minutes- OT 29 minute(s)  -RB      OT Received On 05/02/23  -RB      OT - Next Appointment 05/03/23  -RB         Timed Charges    82593 - OT Therapeutic Activity Minutes 29  -RB         Total Minutes    Timed Charges Total Minutes 29  -RB       Total Minutes 29  -RB            User Key  (r) = Recorded By, (t) = Taken By, (c) = Cosigned By    Initials Name Provider Type    Ana Banks OT Occupational Therapist               Therapy Charges for Today     Code Description Service Date Service Provider Modifiers Qty    [id number]  OT THERAPEUTIC ACT EA 15 MIN 5/2/2023 Ana Lui OT GO 2               Ana Lui OT  5/2/2023

## 2023-05-02 NOTE — PLAN OF CARE
OT/PT co-tx this afternoon. Pt brought to the sunroom on 5P via recliner chair. Therapist set up chairs in close proximity to one another to simulate the transfers required when he boards an airplane post D/C. The pt required Mod-Max A x1-2 for safety awareness, balance and cueing the pt. He was able to participate in the functional transfers and pushed back to his hospital room. He then was set up at his bathroom doorway and he hopped/mobilized from the doorway to the bathroom commode with Mod-Max A x1-2.  Unsuccessful with toileting, total A for brief management. Mobilized back to his recliner chair, R side weakness continues. He was resting comfortably in his chair post session.

## 2023-05-02 NOTE — PROGRESS NOTES
"    Name: Manuel Durant ADMIT: 3/12/2023   : 1950  PCP: Provider, No Known    MRN: [id number] LOS: 51 days   AGE/SEX: 73 y.o. male  ROOM: [facility]     Subjective   Subjective   Resting in bed. No family present. Nursing states they had to hold afternoon dose of hydralazine yesterday d/t borderline Bps. Patient denies any complaints. No pain or dyspnea. States, \"I am okay.\"     Objective   Objective   Vital Signs  Temp:  [97.5 °F (36.4 °C)-98.4 °F (36.9 °C)] 98.2 °F (36.8 °C)  Heart Rate:  [54-81] 62  Resp:  [16] 16  BP: (100-127)/(60-78) 116/65  SpO2:  [95 %-98 %] 96 %  on   ;   Device (Oxygen Therapy): room air  Body mass index is 23.46 kg/m².     Physical Exam  Vitals and nursing note reviewed.   Constitutional:       General: He is awake and alert. He is not in acute distress. Lying in bed on RA  Cardiovascular:      Rate and Rhythm: Normal rate and regular rhythm.      Pulses: Normal pulses.      Heart sounds: Normal heart sounds. No murmur heard.  Pulmonary:      Effort: Pulmonary effort is normal. No respiratory distress.      Breath sounds: Normal breath sounds.   Abdominal:      Palpations: Abdomen is soft and non-tender. Bowel sounds active.    Musculoskeletal:      Right shoulder pain with passive ROM     Right lower leg: No edema.      Left lower leg: No edema.  Skin:     General: Skin is dry.   Neurological:      Motor: Weakness present.      Comments: Alert and oriented to person/place. Intermittent disorientation. Right-sided upper extremity paresis- slowing improving as he has mobility in raising it as well as his right leg.   Psychiatric:         Mood and Affect: Mood normal.         Behavior: Behavior normal.         Thought Content: Thought content normal.         Judgment: Judgment normal    Results Review:       I reviewed the patient's new clinical results.      Estimated Creatinine Clearance: 79.9 mL/min (by C-G formula based on SCr of 0.79 mg/dL).        No results found for: HGBA1C, " POCGLU    amLODIPine, 10 mg, Oral, QAM AC  apixaban, 5 mg, Oral, Q12H  baclofen, 2.5 mg, Oral, Q8H  carvedilol, 3.125 mg, Oral, BID With Meals  hydrALAZINE, 10 mg, Oral, Q12H  lidocaine, 1 patch, Transdermal, Q24H  sodium chloride, 10 mL, Intravenous, Q12H  valsartan, 160 mg, Oral, QAM AC       Diet: Regular/House Diet; No Mixed Consistencies, Feeding Assistance - Nursing; Texture: Soft to Chew (NDD 3); Soft to Chew: Chopped Meat; Fluid Consistency: Nectar Thick       Assessment/Plan     Active Hospital Problems    Diagnosis  POA   • **Intracranial hemorrhage [I62.9]  Yes   • Hemiplegia [G81.90]  Yes   • Right shoulder pain [M25.511]  Yes   • Severe Malnutrition (HCC) [E43]  Yes   • Acute DVT (deep venous thrombosis) [I82.409]  Yes   • HTN (hypertension) [I10]  Yes      Resolved Hospital Problems    Diagnosis Date Resolved POA   • Hypokalemia [E87.6] 04/10/2023 Unknown   • Hypertensive emergency [I16.1] 04/29/2023 Yes     Mr. Durant is a 73 year old male who initially presented to the hospital 3/12/23 for intracranial bleed. He was found unresponsive by friends and initially taken to [facility] where he was found to have poorly controlled BP and right sided hemiplegia. He was transferred here for closer monitoring in the ICU and neurosurgical consultation. He was cleared to move out of the ICU on 3/16/23.     • Intracranial hemorrhage: CTH 3/17/23 stable left basal ganglia/internal capsule hemorrhage with left lateral ventricular hemorrhage. On Lovenox for DVT and repeat CTH 3/18 also stable. ANDRADE followed and signed off 3/23 with plans for repeat MRI brain/MRA head in 1 month- ANDRADE saw again 4/20 and ordered MRI but this was not done as patient couldn't be cleared for MRI. CTA/CT head with evidence of hemorrhage cavity left sided with some peripheral enhancement which may just be related to resolving hemorrhage but underlying mass cannot be excluded- needs repeat CTH with contrast or MRI brain in 6-8 weeks and  local follow up with neurology/ANDRADE in [place]. Goal BP < 150 systolic. Remains on baclofen for spasticity.      • HTN emergency: On amlodipine 10 mg, valsartan 160 mg, Coreg 3.125 mg BID and hydralazine 10 mg TID. A couple doses of hydralazine held in afternoon over last few days. I will decrease frequency of hydralazine to BID for now.      • Acute DVT: Found 3/17/23 with acute RLE DVT in soleal. ANDRADE okayed for blood thinners. Now on Eliquis.     • Hypokalemia: Resolved.     Discussed with patient and nurse.     Medically stable. Family set up a time to come to [place] and get him to fly [place]- tentative discharge date May 20 th.     • Eliquis for DVT prophylaxis.  • Full code.  • Anticipate discharge as above.    SLICK Arroyo  [facility]  05/02/23  13:23 EDT

## 2023-05-02 NOTE — PLAN OF CARE
Goal Outcome Evaluation:              Outcome Evaluation: Pt seen for VFSS follow to provide education regarding results and recommendations, especially new recommendation of water protocol. Education limited due to language barrier and no  device working at this time despite many attempts. Discussed with pt that he can now have sips of water after oral care inbetween meals. Pt first given swab to clean mouth and pt able to clear back of mouth/dentition but missed anterior dentition. SLP finished oral care and removed food residue between upper lip and dentition with swabs. On first drink of thin liquid water, pt swished around in mouth and then expectorated into different cup appeared to just wash mouth out. Pt then drank trials of thin liquids water by cup. Initially taking large drinks, but then with mod cues pt able to take small drinks by cup. No overt s/s of penetration/aspiration noted, however, suspect continued incoordination of oral transit and swallow initiation. Offered other trials of p.o. afterwards, however, pt declined stating he just had eaten was too full. This SLP had planned to complete language tx this date, however, unable due to no interpreting service available at this time. Will continue to follow for dysphagia and language tx. Continue to recommend soft/chopped/no mixed solids and nectar thick liquids. Meds whole in puree. Fully upright posture. Water in between meals after oral care.

## 2023-05-02 NOTE — THERAPY TREATMENT NOTE
Patient Name: Manuel Durant  : 1950    MRN: [id number]                              Today's Date: 2023       Admit Date: 3/12/2023    Visit Dx:     ICD-10-CM ICD-9-CM   1. Intracranial hemorrhage  I62.9 432.9   2. Altered mental status, unspecified altered mental status type  R41.82 780.97     Patient Active Problem List   Diagnosis   • Intracranial hemorrhage   • Acute DVT (deep venous thrombosis)   • HTN (hypertension)   • Severe Malnutrition (HCC)   • Hemiplegia   • Right shoulder pain     Past Medical History:   Diagnosis Date   • Hypokalemia 3/17/2023     History reviewed. No pertinent surgical history.   General Information     Row Name 23 140          Physical Therapy Time and Intention    Document Type therapy note (daily note)  -CW     Mode of Treatment physical therapy  -CW     Row Name 23 140          General Information    Patient Profile Reviewed yes  -CW     Existing Precautions/Restrictions fall  -CW     Row Name 23 140          Cognition    Orientation Status (Cognition) oriented to;person  -CW     Row Name 23 140          Safety Issues, Functional Mobility    Safety Issues Affecting Function (Mobility) awareness of need for assistance;insight into deficits/self-awareness;safety precautions follow-through/compliance;safety precaution awareness;impulsivity;problem-solving;judgment  -CW     Impairments Affecting Function (Mobility) balance;coordination;endurance/activity tolerance;grasp;motor control;postural/trunk control;strength;range of motion (ROM);cognition;visual/perceptual  -CW           User Key  (r) = Recorded By, (t) = Taken By, (c) = Cosigned By    Initials Name Provider Type    CW Tamica Willoughby PT Physical Therapist               Mobility     Row Name 23 140          Bed Mobility    Bed Mobility supine-sit  -CW     Supine-Sit [place] (Bed Mobility) standby assist  -CW     Row Name 23 140          Bed-Chair Transfer     Bed-Chair [place] (Transfers) moderate assist (50% patient effort);maximum assist (25% patient effort);verbal cues  -CW     Comment, (Bed-Chair Transfer) x6 transfers to chairs in lobby  -CW     Row Name 05/02/23 1401          Sit-Stand Transfer    Sit-Stand [place] (Transfers) contact guard  -CW     Row Name 05/02/23 1401          Gait/Stairs (Locomotion)    [place] Level (Gait) moderate assist (50% patient effort);maximum assist (25% patient effort);verbal cues;2 person assist  -CW     Assistive Device (Gait) other (see comments)  L hand supported  -CW     Distance in Feet (Gait) 8' and 6'  -CW     Deviations/Abnormal Patterns (Gait) festinating/shuffling;stride length decreased;sandhya decreased;gait speed decreased;ataxic  -CW     Bilateral Gait Deviations forward flexed posture  -CW     Comment, (Gait/Stairs) Narrow CAROL, RLE at times crossing midline. Incoordination with RLE  -CW           User Key  (r) = Recorded By, (t) = Taken By, (c) = Cosigned By    Initials Name Provider Type    Tamica Eddy PT Physical Therapist               Obj/Interventions     Row Name 05/02/23 1440 05/02/23 1402       Balance    Static Sitting Balance -- standby assist  -CW    Dynamic Sitting Balance -- standby assist  -CW    Position, Sitting Balance -- sitting edge of bed  -CW    Dynamic Standing Balance moderate assist;maximum assist  -CW --          User Key  (r) = Recorded By, (t) = Taken By, (c) = Cosigned By    Initials Name Provider Type    Tamica Eddy PT Physical Therapist               Goals/Plan    No documentation.                Clinical Impression     Row Name 05/02/23 1403          Pain    Pre/Posttreatment Pain Comment reports pain in R shoulder, does not rate  -CW     Pain Intervention(s) Repositioned;Rest;Ambulation/increased activity  -CW     Row Name 05/02/23 1403          Plan of Care Review    Plan of Care Reviewed With patient  -CW     Outcome Evaluation Pt participated with  PT this afternoon. Pleasant and cooperative. Pt completed multiple transfers and practiced ambulation short distances within the room. Required mod/max Ax2 to ambulate with L UE supported. Continues to demo impulsive behavior. Will continue to follow and progress as able. Plans to d/c home with family later in May via plane ride.  -CW     Row Name 05/02/23 1403          Vital Signs    O2 Delivery Pre Treatment room air  -CW     Row Name 05/02/23 1403          Positioning and Restraints    Pre-Treatment Position in bed  -CW     Post Treatment Position chair  -CW     In Chair reclined;call light within reach;encouraged to call for assist;exit alarm on;notified nsg;legs elevated  -CW           User Key  (r) = Recorded By, (t) = Taken By, (c) = Cosigned By    Initials Name Provider Type    Tamica Eddy, JOSE ELIAS Physical Therapist               Outcome Measures     Row Name 05/02/23 1401 05/02/23 0904       How much help from another person do you currently need...    Turning from your back to your side while in flat bed without using bedrails? 3  -CW 2  -TF    Moving from lying on back to sitting on the side of a flat bed without bedrails? 3  -CW 2  -TF    Moving to and from a bed to a chair (including a wheelchair)? 3  -CW 2  -TF    Standing up from a chair using your arms (e.g., wheelchair, bedside chair)? 3  -CW 2  -TF    Climbing 3-5 steps with a railing? 1  -CW 1  -TF    To walk in hospital room? 2  -CW 1  -TF    AM-PAC 6 Clicks Score (PT) 15  -CW 10  -TF    Highest level of mobility 4 --> Transferred to chair/commode  -CW 4 --> Transferred to chair/commode  -TF    Row Name 05/02/23 1401          Functional Assessment    Outcome Measure Options AM-PAC 6 Clicks Basic Mobility (PT)  -CW           User Key  (r) = Recorded By, (t) = Taken By, (c) = Cosigned By    Initials Name Provider Type    Miriam Snow RN Registered Nurse    Tamica Eddy, JOSE ELIAS Physical Therapist                              Physical Therapy Education     Title: PT OT SLP Therapies (In Progress)     Topic: Physical Therapy (In Progress)     Point: Mobility training (In Progress)     Learning Progress Summary           Patient Acceptance, E, NR by CW at 5/2/2023 1402    Acceptance, E, VU,NR by CW at 5/1/2023 1005    Acceptance, E, NL by BB at 4/29/2023 0136    Comment: Patient cognition unable to allow for teaching at this time.    Acceptance, E,TB,H, VU by MS at 4/21/2023 1838    Acceptance, E, NR by CW at 4/21/2023 1537    Acceptance, E, VU,DU by JEROMYB at 4/19/2023 1130    Acceptance, E, NR by CW at 4/18/2023 0950    Acceptance, E, VU,NR by NICOLASA at 4/16/2023 1158    Acceptance, E,TB,H, VU,NR by MS at 4/13/2023 1817    Acceptance, E, NR by CW at 4/13/2023 1341    Acceptance, E, NR by CW at 4/12/2023 1201    Acceptance, E, NR by CW at 4/11/2023 1158    Acceptance, E, NR by CW at 4/10/2023 1414    Acceptance, E,TB, NR by CS at 4/8/2023 0928    Acceptance, E, NR by CW at 4/7/2023 1215    Acceptance, E, NR by CW at 4/6/2023 1335    Acceptance, E, NR by CW at 4/5/2023 1043    Acceptance, E, NR by CW at 4/4/2023 0906    Acceptance, E, NR by CW at 4/3/2023 1531    Acceptance, E,TB, VU,NR by CAMERON at 4/1/2023 1530    Acceptance, E, NR by CW at 3/31/2023 0925    Acceptance, E, NR by CW at 3/30/2023 1209    Acceptance, E, NR,NL by CW at 3/29/2023 1336    Acceptance, E, NR by ZB at 3/28/2023 1317    Acceptance, E, NR by CW at 3/27/2023 1401    Acceptance, E,D, NR,VU by ZACH at 3/24/2023 1631    Comment: seemed to comprehend commands this session    Acceptance, E,D, NR by ZACH at 3/23/2023 1655    Acceptance, E,D, NR by JM at 3/22/2023 1545    Acceptance, E,TB, VU,NR by CB at 3/21/2023 1544    Acceptance, E,TB,H, VU by MS at 3/16/2023 1801    Acceptance, E,D, DU,NR by MO at 3/16/2023 1451    Acceptance, E, DU,NR by MO at 3/15/2023 1200    Acceptance, E, NR by LM at 3/15/2023 0521    Acceptance, E,D, DU,NR by MO at 3/14/2023 1458   Family Acceptance, E,TB,H,  VU by MS at 4/21/2023 1838    Acceptance, E,TB,H, VU,NR by MS at 4/13/2023 1817    Acceptance, E,TB,H, VU by MS at 3/16/2023 1801                   Point: Home exercise program (In Progress)     Learning Progress Summary           Patient Acceptance, E, NL by BB at 4/29/2023 0136    Comment: Patient cognition unable to allow for teaching at this time.    Acceptance, E,TB,H, VU by MS at 4/21/2023 1838    Acceptance, E, VU,NR by DJ at 4/16/2023 1158    Acceptance, E,TB,H, VU,NR by MS at 4/13/2023 1817    Acceptance, E, NR by CW at 4/13/2023 1341    Acceptance, E,TB, NR by CS at 4/8/2023 0928    Acceptance, E, NR by CW at 4/7/2023 1215    Acceptance, E, NR by CW at 4/6/2023 1335    Acceptance, E,TB, VU,NR by CAMERON at 4/1/2023 1530    Acceptance, E, NR by CW at 3/31/2023 0925    Acceptance, E, NR by ZB at 3/28/2023 1317    Acceptance, E, NR by CW at 3/27/2023 1401    Acceptance, E,D, NR,VU by JM at 3/24/2023 1631    Comment: seemed to comprehend commands this session    Acceptance, E,D, NR by JM at 3/23/2023 1655    Acceptance, E,D, NR by JM at 3/22/2023 1545    Acceptance, E,TB, VU,NR by CB at 3/21/2023 1544    Acceptance, E,TB,H, VU by MS at 3/16/2023 1801    Acceptance, E,D, DU,NR by MO at 3/16/2023 1451    Acceptance, E, NR by LM at 3/15/2023 0521    Acceptance, E,D, DU,NR by MO at 3/14/2023 1458   Family Acceptance, E,TB,H, VU by MS at 4/21/2023 1838    Acceptance, E,TB,H, VU,NR by MS at 4/13/2023 1817    Acceptance, E,TB,H, VU by MS at 3/16/2023 1801                   Point: Body mechanics (In Progress)     Learning Progress Summary           Patient Acceptance, E, NL by BB at 4/29/2023 0136    Comment: Patient cognition unable to allow for teaching at this time.    Acceptance, E,TB,H, VU by MS at 4/21/2023 1838    Acceptance, E, VU,NR by DJ at 4/16/2023 1158    Acceptance, E,TB,H, VU,NR by MS at 4/13/2023 1817    Acceptance, E, NR by CW at 4/13/2023 1341    Acceptance, E, NR by CW at 4/11/2023 1158    Acceptance, E,  NR by CW at 4/10/2023 1414    Acceptance, E,TB, NR by CS at 4/8/2023 0928    Acceptance, E, NR by CW at 4/7/2023 1215    Acceptance, E, NR by CW at 4/4/2023 0906    Acceptance, E, NR by CW at 4/3/2023 1531    Acceptance, E,TB, VU,NR by CAMERON at 4/1/2023 1530    Acceptance, E,D, NR,VU by JM at 3/24/2023 1631    Comment: seemed to comprehend commands this session    Acceptance, E,D, NR by JM at 3/23/2023 1655    Acceptance, E,D, NR by JM at 3/22/2023 1545    Acceptance, E,TB, VU,NR by CB at 3/21/2023 1544    Acceptance, E,TB,H, VU by MS at 3/16/2023 1801    Acceptance, E,D, DU,NR by MO at 3/16/2023 1451    Acceptance, E, DU,NR by MO at 3/15/2023 1200    Acceptance, E, NR by LM at 3/15/2023 0521    Acceptance, E,D, DU,NR by MO at 3/14/2023 1458   Family Acceptance, E,TB,H, VU by MS at 4/21/2023 1838    Acceptance, E,TB,H, VU,NR by MS at 4/13/2023 1817    Acceptance, E,TB,H, VU by MS at 3/16/2023 1801                   Point: Precautions (In Progress)     Learning Progress Summary           Patient Acceptance, E, NL by BB at 4/29/2023 0136    Comment: Patient cognition unable to allow for teaching at this time.    Acceptance, E,TB,H, VU by MS at 4/21/2023 1838    Acceptance, E, VU,NR by DJ at 4/16/2023 1158    Acceptance, E,TB,H, VU,NR by MS at 4/13/2023 1817    Acceptance, E, NR by CW at 4/13/2023 1341    Acceptance, E,TB, NR by CS at 4/8/2023 0928    Acceptance, E, NR by CW at 4/7/2023 1215    Acceptance, E, NR by CW at 4/4/2023 0906    Acceptance, E, NR by CW at 4/3/2023 1531    Acceptance, E,TB, VU,NR by CAMERON at 4/1/2023 1530    Acceptance, E,D, NR,VU by JM at 3/24/2023 1631    Comment: seemed to comprehend commands this session    Acceptance, E,D, NR by JM at 3/23/2023 1655    Acceptance, E,D, NR by JM at 3/22/2023 1545    Acceptance, E,TB, VU,NR by CB at 3/21/2023 1544    Acceptance, E,TB,H, VU by MS at 3/16/2023 1801    Acceptance, E,D, DU,NR by MO at 3/16/2023 1451    Acceptance, E, DU,NR by MO at 3/15/2023 1200     Acceptance, E, NR by LM at 3/15/2023 0521    Acceptance, E,D, DU,NR by MO at 3/14/2023 1458   Family Acceptance, E,TB,H, VU by MS at 4/21/2023 1838    Acceptance, E,TB,H, VU,NR by MS at 4/13/2023 1817    Acceptance, E,TB,H, VU by MS at 3/16/2023 1801                               User Key     Initials Effective Dates Name Provider Type Discipline    JM 03/07/18 -  Tiffanie Grace, PTA Physical Therapist Assistant PT    CAMERON 05/19/21 -  Leeann Wiley, PT Physical Therapist PT    DJ 10/25/19 -  Jacqueline Chamberlain, PT Physical Therapist PT    MS 06/16/21 -  Angel Luis Cr, RN Registered Nurse Nurse    CW 12/13/22 -  Tamica Willoughby, PT Physical Therapist PT    CB 10/22/21 -  Bety Benitez, PT Physical Therapist PT    BB 01/10/23 -  Elyssa Thorne, RN Registered Nurse Nurse    CS 09/22/22 -  Adelia Montalvo, PT Physical Therapist PT    LM 10/13/22 -  Silke Grace, RN Registered Nurse Nurse    MO 01/27/23 -  Lcaie Tanner, PT Student PT Student PT    ZB 03/10/23 -  Nestor Rae, PT Student PT Student PT              PT Recommendation and Plan     Plan of Care Reviewed With: patient  Outcome Evaluation: Pt participated with PT this afternoon. Pleasant and cooperative. Pt completed multiple transfers and practiced ambulation short distances within the room. Required mod/max Ax2 to ambulate with L UE supported. Continues to demo impulsive behavior. Will continue to follow and progress as able. Plans to d/c home with family later in May via plane ride.     Time Calculation:    PT Charges     Row Name 05/02/23 1448             Time Calculation    Start Time 1359  -CW      Stop Time 1429  -CW      Time Calculation (min) 30 min  -CW      PT Received On 05/02/23  -CW      PT - Next Appointment 05/03/23  -CW            User Key  (r) = Recorded By, (t) = Taken By, (c) = Cosigned By    Initials Name Provider Type    CW Tamica Willoughby, PT Physical Therapist              Therapy Charges for Today     Code Description  Service Date Service Provider Modifiers Qty    18803626995  PT THERAPEUTIC ACT EA 15 MIN 5/1/2023 Tamica Willoughby, PT GP 2    [id number]  GAIT TRAINING EA 15 MIN 5/2/2023 Tamica Willoughby, PT GP 1    [id number]  PT THERAPEUTIC ACT EA 15 MIN 5/2/2023 Tamica Willoughby, PT GP 1          PT G-Codes  Outcome Measure Options: AM-PAC 6 Clicks Basic Mobility (PT)  AM-PAC 6 Clicks Score (PT): 15  AM-PAC 6 Clicks Score (OT): 12  Modified Troy Scale: 4 - Moderately severe disability.  Unable to walk without assistance, and unable to attend to own bodily needs without assistance.  PT Discharge Summary  Anticipated Discharge Disposition (PT): inpatient rehabilitation facility, skilled nursing facility    Tamica Willoughby, PT  5/2/2023

## 2023-05-02 NOTE — PLAN OF CARE
Goal Outcome Evaluation:  Plan of Care Reviewed With: patient           Outcome Evaluation: Pt participated with PT this afternoon. Pleasant and cooperative. Pt completed multiple transfers and practiced ambulation short distances within the room. Required mod/max Ax2 to ambulate with L UE supported. Continues to demo impulsive behavior. Will continue to follow and progress as able. Plans to d/c home with family later in May via plane ride.

## 2023-05-02 NOTE — CASE MANAGEMENT/SOCIAL WORK
"Continued Stay Note  [facility]     Patient Name: Manuel Durant  MRN: [id number]  Today's Date: 5/2/2023    Admit Date: 3/12/2023    Plan: Home to [place] by air.  Family is arranging to pick pt up on 5/20/2023 to fly with him home to [place].   Discharge Plan       Row Name 05/02/23 1448       Plan    Plan Home to [place] by air.  Family is arranging to pick pt up on 5/20/2023 to fly with him home to [place].    Plan Comments Call placed to pt's son Ariel and pt's granddaughter Natalie.  They confirm that air transport is being set up.  Natalie is trying to find a direct flight but may have to pick one with a stop.  Ariel is concerned that a family member from [place] named Ari is indicating to family members that he is going to take pt home with him to [place].  Assured Ariel that pt will not be discharged with anyone not approved by himself.  Discussed pt's care needs and progress with therapy.  He states that his cousin is a physical therapist and would like to have a discussion with [facility] PT to learn the treatment plan for pt when he is discharged.  Assured Ariel that pt will be sent with copies of his hospital record with post acute instructions and radiology films as requested by [facility].  [facility] [place]OT call pt's family member, Azar Chan, [phone], on May 18 which will be closer to discharge and therefore more helpful to Azar who will be providing pt's PT in [place].  Ariel also indicates that they will have accepting MDs available for follow up care and family to care for him at home.  William states that she and her uncle will come at least one day prior to discharge in order to get some care instructions from staff.  Family are very grateful for the \"wonderful care\" pt is receiving and request that this be passed onto all staff and providers.   Faye DELGADILLO-STANLEY                   Discharge Codes    No documentation.                 Expected Discharge Date and Time       Expected Discharge Date " Expected Discharge Time    May 20, 2023               Faye Moy RN

## 2023-05-03 PROCEDURE — 97530 THERAPEUTIC ACTIVITIES: CPT

## 2023-05-03 RX ADMIN — APIXABAN 5 MG: 5 TABLET, FILM COATED ORAL at 10:31

## 2023-05-03 RX ADMIN — VALSARTAN 160 MG: 160 TABLET, FILM COATED ORAL at 06:48

## 2023-05-03 RX ADMIN — AMLODIPINE BESYLATE 10 MG: 10 TABLET ORAL at 06:48

## 2023-05-03 RX ADMIN — Medication 10 ML: at 10:31

## 2023-05-03 RX ADMIN — Medication 10 ML: at 20:38

## 2023-05-03 RX ADMIN — APIXABAN 5 MG: 5 TABLET, FILM COATED ORAL at 20:38

## 2023-05-03 RX ADMIN — CARVEDILOL 3.12 MG: 3.12 TABLET, FILM COATED ORAL at 10:30

## 2023-05-03 RX ADMIN — CARVEDILOL 3.12 MG: 3.12 TABLET, FILM COATED ORAL at 20:38

## 2023-05-03 RX ADMIN — BACLOFEN 2.5 MG: 10 TABLET ORAL at 06:48

## 2023-05-03 RX ADMIN — BACLOFEN 2.5 MG: 10 TABLET ORAL at 16:50

## 2023-05-03 RX ADMIN — HYDRALAZINE HYDROCHLORIDE 10 MG: 10 TABLET, FILM COATED ORAL at 20:38

## 2023-05-03 RX ADMIN — LIDOCAINE 1 PATCH: 700 PATCH TOPICAL at 10:31

## 2023-05-03 RX ADMIN — BACLOFEN 2.5 MG: 10 TABLET ORAL at 22:27

## 2023-05-03 NOTE — PROGRESS NOTES
"Nutrition Services    Patient Name:  Manuel Durant  YOB: 1950  MRN: [id number]  Admit Date:  3/12/2023    FOLLOW UP - CLINICAL NUTRITION    Assessment Date:  05/03/23     Encounter Information         Reason for Encounter Follow up     Current Issues Patient eating 50-75% pf meals. SLP is allowing him to have sips of thin liquids after oral care. Plans for family to come get patient and go to [place] on 5/20. +BM 5/1, no labs to assess. Will continue to follow      Current Nutrition Orders & Evaluation of Intake       Oral Nutrition     Current PO Diet Diet: Regular/House Diet; No Mixed Consistencies, Feeding Assistance - Nursing; Texture: Soft to Chew (NDD 3); Soft to Chew: Chopped Meat; Fluid Consistency: Nectar Thick   Supplement n/a   PO Evaluation     % PO Intake 50-75%     # of Days Evaluated     Factors Affecting Intake  No factors at this time   --  Anthropometrics          Height    Weight Height: 167.6 cm (65.98\")  Weight: 65.7 kg (144 lb 13.5 oz) (05/03/23 0650)    BMI kg/m2 Body mass index is 23.39 kg/m².    Weight trend Stable  Documented weights    04/09/23 0517 04/10/23 0041 04/11/23 0600 04/12/23 0530   Weight: 67.3 kg (148 lb 5.9 oz) 67.3 kg (148 lb 5.9 oz) 68.6 kg (151 lb 3.8 oz) 68.7 kg (151 lb 7.3 oz)    04/13/23 0517 04/14/23 0530 04/15/23 0014 04/16/23 0251   Weight: 68.1 kg (150 lb 2.1 oz) 67.1 kg (147 lb 14.9 oz) 67.1 kg (147 lb 14.9 oz) 67.1 kg (147 lb 14.9 oz)    04/16/23 0449 04/17/23 0017 04/18/23 0100 04/19/23 0002   Weight: 68.6 kg (151 lb 3.8 oz) 61.6 kg (135 lb 12.9 oz) 61.5 kg (135 lb 9.3 oz) 61.5 kg (135 lb 9.3 oz)    04/20/23 0002 04/21/23 0032 04/22/23 0631 04/23/23 0509   Weight: 61.5 kg (135 lb 9.3 oz) 61.5 kg (135 lb 9.3 oz) 62.4 kg (137 lb 9.1 oz) 61.3 kg (135 lb 2.3 oz)    04/24/23 0300 04/26/23 0500 04/27/23 0600 04/28/23 0600   Weight: 62.4 kg (137 lb 9.1 oz) 64.5 kg (142 lb 3.2 oz) 66.9 kg (147 lb 7.8 oz) 67 kg (147 lb 11.3 oz)    04/29/23 0600 05/02/23 " 0704 05/03/23 0648 05/03/23 0650   Weight: 65.9 kg (145 lb 4.5 oz) 65.9 kg (145 lb 4.5 oz) 65.7 kg (144 lb 13.5 oz) 65.7 kg (144 lb 13.5 oz)          Labs        Pertinent Labs Reviewed, listed below           Invalid input(s): LABALBU, PROT          No results found for: COVID19  Lab Results   Component Value Date    HGBA1C 5.60 03/15/2023          Medications            Scheduled Medications amLODIPine, 10 mg, Oral, QAM AC  apixaban, 5 mg, Oral, Q12H  baclofen, 2.5 mg, Oral, Q8H  carvedilol, 3.125 mg, Oral, BID With Meals  hydrALAZINE, 10 mg, Oral, Q12H  lidocaine, 1 patch, Transdermal, Q24H  sodium chloride, 10 mL, Intravenous, Q12H  valsartan, 160 mg, Oral, QAM AC        Infusions      PRN Medications •  acetaminophen  •  senna-docusate sodium **AND** polyethylene glycol **AND** bisacodyl **AND** bisacodyl  •  Calcium Gluconate-NaCl **AND** calcium gluconate IVPB **AND** Calcium  •  labetalol  •  loperamide  •  magnesium sulfate **OR** magnesium sulfate **OR** magnesium sulfate  •  potassium chloride **OR** potassium chloride **OR** potassium chloride  •  potassium phosphate infusion greater than 15 mMoles **OR** potassium phosphate infusion greater than 15 mMoles **OR** potassium phosphate **OR** sodium phosphate IVPB **OR** sodium phosphate IVPB  •  [COMPLETED] Insert Peripheral IV **AND** sodium chloride  •  sodium chloride  •  sodium chloride     Physical Findings          Physical Appearance alert, disoriented   Oral/Mouth Cavity teeth missing   Edema  no edema   Gastrointestinal fecal incontinence, normoactive, last bowel movement:5/2   Skin  skin intact   Tubes/Drains none   NFPE MSA documented on 4/4    --  NUTRITION INTERVENTION / PLAN OF CARE  Intervention Goal         Intervention Goal(s) Maintain intake and Maintain weight     Nutrition Intervention         RD Action Follow Tx Progress and Care plan reviewed     Nutrition Prescription         Diet Prescription     Supplement Prescription n/a    EN/PN  Prescription    New Prescription Ordered? Continue same per protocol   --  Monitor/Evaluation        Monitor Per protocol   Discharge Needs Other: plans to d/c and go home with family to [place]     Education Will instruct as appropriate   --    RD to follow up per protocol.    Electronically signed by:  Richelle Milligan RD  05/03/23 15:08 EDT

## 2023-05-03 NOTE — PROGRESS NOTES
Name: Manuel Durant ADMIT: 3/12/2023   : 1950  PCP: Provider, No Known    MRN: [id number] LOS: 52 days   AGE/SEX: 73 y.o. male  ROOM: [facility]     Subjective   Subjective   Resting in bed. Getting a bed bath with aide. Denies any pain, dyspnea. Slept well. Ate breakfast. No new issues.     Objective   Objective   Vital Signs  Temp:  [97.9 °F (36.6 °C)-98.4 °F (36.9 °C)] 98.4 °F (36.9 °C)  Heart Rate:  [57-71] 63  Resp:  [16-18] 18  BP: (107-135)/(61-93) 107/64  SpO2:  [95 %-98 %] 95 %  on   ;   Device (Oxygen Therapy): room air  Body mass index is 23.39 kg/m².     Physical Exam  Vitals and nursing note reviewed.   Constitutional:       General: He is awake and alert. He is not in acute distress. Lying in bed on RA  Cardiovascular:      Rate and Rhythm: Normal rate and regular rhythm.      Pulses: Normal pulses.      Heart sounds: Normal heart sounds. No murmur heard.  Pulmonary:      Effort: Pulmonary effort is normal. No respiratory distress.      Breath sounds: Normal breath sounds.   Abdominal:      Palpations: Abdomen is soft and non-tender. Bowel sounds active.    Musculoskeletal:      Right shoulder pain with passive ROM     Right lower leg: No edema.      Left lower leg: No edema.  Skin:     General: Skin is dry.   Neurological:      Motor: Weakness present.      Comments: Alert and oriented to person/place. Intermittent disorientation. Right-sided upper extremity paresis  Psychiatric:         Mood and Affect: Mood normal.         Behavior: Behavior normal.         Thought Content: Thought content normal.         Judgment: Judgment normal    Results Review:       I reviewed the patient's new clinical results.      Estimated Creatinine Clearance: 79.7 mL/min (by C-G formula based on SCr of 0.79 mg/dL).        No results found for: HGBA1C, POCGLU    amLODIPine, 10 mg, Oral, QAM AC  apixaban, 5 mg, Oral, Q12H  baclofen, 2.5 mg, Oral, Q8H  carvedilol, 3.125 mg, Oral, BID With Meals  hydrALAZINE, 10  mg, Oral, Q12H  lidocaine, 1 patch, Transdermal, Q24H  sodium chloride, 10 mL, Intravenous, Q12H  valsartan, 160 mg, Oral, QAM AC       Diet: Regular/House Diet; No Mixed Consistencies, Feeding Assistance - Nursing; Texture: Soft to Chew (NDD 3); Soft to Chew: Chopped Meat; Fluid Consistency: Nectar Thick       Assessment/Plan     Active Hospital Problems    Diagnosis  POA   • **Intracranial hemorrhage [I62.9]  Yes   • Hemiplegia [G81.90]  Yes   • Right shoulder pain [M25.511]  Yes   • Severe Malnutrition (HCC) [E43]  Yes   • Acute DVT (deep venous thrombosis) [I82.409]  Yes   • HTN (hypertension) [I10]  Yes      Resolved Hospital Problems    Diagnosis Date Resolved POA   • Hypokalemia [E87.6] 04/10/2023 Unknown   • Hypertensive emergency [I16.1] 04/29/2023 Yes     Mr. Durant is a 73 year old male who initially presented to the hospital 3/12/23 for intracranial bleed. He was found unresponsive by friends and initially taken to [facility] where he was found to have poorly controlled BP and right sided hemiplegia. He was transferred here for closer monitoring in the ICU and neurosurgical consultation. He was cleared to move out of the ICU on 3/16/23.     • Intracranial hemorrhage: CTH 3/17/23 stable left basal ganglia/internal capsule hemorrhage with left lateral ventricular hemorrhage. On Lovenox for DVT and repeat CTH 3/18 also stable. ANDRADE followed and signed off 3/23 with plans for repeat MRI brain/MRA head in 1 month- ANDRADE saw again 4/20 and ordered MRI but this was not done as patient couldn't be cleared for MRI. CTA/CT head with evidence of hemorrhage cavity left sided with some peripheral enhancement which may just be related to resolving hemorrhage but underlying mass cannot be excluded- needs repeat CTH with contrast or MRI brain in 6-8 weeks and local follow up with neurology/ANDRADE in [place]. Goal BP < 150 systolic. Remains on baclofen for spasticity.      • HTN emergency: On amlodipine 10 mg,  valsartan 160 mg, Coreg 3.125 mg BID and hydralazine 10 mg BID (reduced frequency yesterday). BP still very stable and I am going to add holding parameters to hydralazine for now to see if this can possibly be removed as well.      • Acute DVT: Found 3/17/23 with acute RLE DVT in soleal. ANDRADE okayed for blood thinners. Now on Eliquis.     • Hypokalemia: Resolved.     Discussed with patient and nurse.     Medically stable. Family set up a time to come to [place] and get him to fly [place]- tentative discharge date May 20 th.     • Eliquis for DVT prophylaxis.  • Full code.  • Anticipate discharge as above.    SLICK Arroyo  [facility]  05/03/23  09:44 EDT

## 2023-05-03 NOTE — THERAPY TREATMENT NOTE
Patient Name: Manuel Durant  : 1950    MRN: [id number]                              Today's Date: 5/3/2023       Admit Date: 3/12/2023    Visit Dx:     ICD-10-CM ICD-9-CM   1. Intracranial hemorrhage  I62.9 432.9   2. Altered mental status, unspecified altered mental status type  R41.82 780.97     Patient Active Problem List   Diagnosis   • Intracranial hemorrhage   • Acute DVT (deep venous thrombosis)   • HTN (hypertension)   • Severe Malnutrition (HCC)   • Hemiplegia   • Right shoulder pain     Past Medical History:   Diagnosis Date   • Hypokalemia 3/17/2023     History reviewed. No pertinent surgical history.   General Information     Row Name 23 1147          Physical Therapy Time and Intention    Document Type therapy note (daily note) (P)   -ZB     Mode of Treatment individual therapy;physical therapy (P)   -ZB     Row Name 23 1147          General Information    Patient Profile Reviewed yes (P)   -ZB     Existing Precautions/Restrictions fall (P)   -ZB     Row Name 23 1147          Cognition    Orientation Status (Cognition) oriented to;person (P)   -ZB     Row Name 23 1147          Safety Issues, Functional Mobility    Impairments Affecting Function (Mobility) balance;coordination;endurance/activity tolerance;grasp;motor control;postural/trunk control;strength;range of motion (ROM);cognition;visual/perceptual (P)   -ZB           User Key  (r) = Recorded By, (t) = Taken By, (c) = Cosigned By    Initials Name Provider Type    ZB Nestor Rae, PT Student PT Student               Mobility     Row Name 23 1148          Bed Mobility    Bed Mobility supine-sit (P)   -ZB     Supine-Sit [place] (Bed Mobility) standby assist (P)   -ZB     Assistive Device (Bed Mobility) bed rails (P)   -ZB     Comment, (Bed Mobility) [facility] end of session (P)   -ZB     Row Name 23 1148          Bed-Chair Transfer    Bed-Chair [place] (Transfers) moderate assist (50% patient  effort);2 person assist;verbal cues;nonverbal cues (demo/gesture) (P)   -ZB     Assistive Device (Bed-Chair Transfers) other (see comments) (P)   hha  -ZB     Comment, (Bed-Chair Transfer) x1 txfr (P)   -ZB     Row Name 05/03/23 1148          Sit-Stand Transfer    Sit-Stand [place] (Transfers) contact guard (P)   -ZB     Assistive Device (Sit-Stand Transfers) other (see comments) (P)   hha  -ZB     Comment, (Sit-Stand Transfer) x3 total (P)   -ZB     Row Name 05/03/23 1148          Gait/Stairs (Locomotion)    [place] Level (Gait) moderate assist (50% patient effort);verbal cues;2 person assist (P)   -ZB     Assistive Device (Gait) other (see comments) (P)   hha  -ZB     Distance in Feet (Gait) 6' bed>chair (P)   -ZB     Deviations/Abnormal Patterns (Gait) festinating/shuffling;stride length decreased;sandhya decreased;gait speed decreased;ataxic (P)   -ZB     Bilateral Gait Deviations forward flexed posture (P)   -ZB     Right Sided Gait Deviations foot drop/toe drag;knee buckling, right side (P)   -ZB     [place] Level (Stairs) unable to assess (P)   -ZB     Comment, (Gait/Stairs) DF assist wrap donned prior to amb; narrow CAROL, RLE crossing midline at times; (P)   -ZB           User Key  (r) = Recorded By, (t) = Taken By, (c) = Cosigned By    Initials Name Provider Type    Nestor Howard PT Student PT Student               Obj/Interventions     Row Name 05/03/23 1151          Motor Skills    Therapeutic Exercise knee (P)   LAQ  -ZB     Row Name 05/03/23 1151          Balance    Balance Assessment sitting static balance;sitting dynamic balance;standing static balance;standing dynamic balance (P)   -ZB     Static Sitting Balance standby assist (P)   -ZB     Dynamic Sitting Balance standby assist (P)   -ZB     Position, Sitting Balance unsupported;sitting edge of bed (P)   -ZB     Static Standing Balance minimal assist;verbal cues;non-verbal cues (demo/gesture) (P)   -ZB     Dynamic Standing Balance  "moderate assist;2-person assist;verbal cues;non-verbal cues (demo/gesture) (P)   -ZB     Position/Device Used, Standing Balance supported;other (see comments) (P)   hha  -ZB     Balance Interventions sitting;standing;sit to stand;supported;static;dynamic;moderate challenge;weight shifting activity (P)   -ZB     Comment, Balance Standing wt shifting activity to encourage WB through RLE (P)   -ZB           User Key  (r) = Recorded By, (t) = Taken By, (c) = Cosigned By    Initials Name Provider Type    ZB Nestor Rae, PT Student PT Student               Goals/Plan    No documentation.                Clinical Impression     Row Name 05/03/23 1152          Pain    Pre/Posttreatment Pain Comment reports pain in R calf; no swelling or redness noted; describes pain as being \"mucho\" (P)   -ZB     Pain Intervention(s) Repositioned;Ambulation/increased activity (P)   -ZB     Row Name 05/03/23 1152          Plan of Care Review    Plan of Care Reviewed With patient (P)   -ZB     Progress no change (P)   -ZB     Outcome Evaluation Pt in bed and agreeable to therapy this AM. Pt came to EOB sba + increased time. DF assist wrap donned w/ pt seated EOB. Completed standing wt shift activity then amb 6' bed>chair modAx1 for physical support + modAx1 for R LE advancement/buckling. Pt completed addtl STS from chair for wt shifting activity. He continues to demo increased distal muscular activation in both the UE and LE. PT will continue to monitor and progress as able in preparation for safe DC. (P)   -ZB     Row Name 05/03/23 1152          Vital Signs    O2 Delivery Pre Treatment room air (P)   -ZB     O2 Delivery Post Treatment room air (P)   -ZB     Row Name 05/03/23 1152          Positioning and Restraints    Pre-Treatment Position in bed (P)   -ZB     Post Treatment Position chair (P)   -ZB     In Chair notified nsg;call light within reach;encouraged to call for assist;exit alarm on;reclined (P)   -ZB           User Key  (r) = " Recorded By, (t) = Taken By, (c) = Cosigned By    Initials Name Provider Type    Nestor Howard, PT Student PT Student               Outcome Measures     Row Name 05/03/23 1157          How much help from another person do you currently need...    Turning from your back to your side while in flat bed without using bedrails? 4 (P)   -ZB     Moving from lying on back to sitting on the side of a flat bed without bedrails? 3 (P)   -ZB     Moving to and from a bed to a chair (including a wheelchair)? 3 (P)   -ZB     Standing up from a chair using your arms (e.g., wheelchair, bedside chair)? 3 (P)   -ZB     Climbing 3-5 steps with a railing? 1 (P)   -ZB     To walk in hospital room? 2 (P)   -ZB     AM-PAC 6 Clicks Score (PT) 16 (P)   -ZB     Highest level of mobility 5 --> Static standing (P)   -ZB     Row Name 05/03/23 1157          Functional Assessment    Outcome Measure Options AM-PAC 6 Clicks Basic Mobility (PT) (P)   -ZB           User Key  (r) = Recorded By, (t) = Taken By, (c) = Cosigned By    Initials Name Provider Type    Nestor Howard, PT Student PT Student                             Physical Therapy Education     Title: PT OT SLP Therapies (In Progress)     Topic: Physical Therapy (In Progress)     Point: Mobility training (In Progress)     Learning Progress Summary           Patient Acceptance, E, NR by CW at 5/2/2023 1402    Acceptance, E, VU,NR by CW at 5/1/2023 1005    Acceptance, E, NL by BB at 4/29/2023 0136    Comment: Patient cognition unable to allow for teaching at this time.    Acceptance, E,TB,H, VU by MS at 4/21/2023 1838    Acceptance, E, NR by CW at 4/21/2023 1537    Acceptance, E, VU,DU by CHARLY at 4/19/2023 1130    Acceptance, E, NR by CW at 4/18/2023 0950    Acceptance, E, VU,NR by NICOLASA at 4/16/2023 1158    Acceptance, E,TB,H, VU,NR by MS at 4/13/2023 1817    Acceptance, E, NR by CW at 4/13/2023 1341    Acceptance, E, NR by CW at 4/12/2023 1201    Acceptance, E, NR by CW at 4/11/2023 1158     Acceptance, E, NR by CW at 4/10/2023 1414    Acceptance, E,TB, NR by CS at 4/8/2023 0928    Acceptance, E, NR by CW at 4/7/2023 1215    Acceptance, E, NR by CW at 4/6/2023 1335    Acceptance, E, NR by CW at 4/5/2023 1043    Acceptance, E, NR by CW at 4/4/2023 0906    Acceptance, E, NR by CW at 4/3/2023 1531    Acceptance, E,TB, VU,NR by CAMERON at 4/1/2023 1530    Acceptance, E, NR by CW at 3/31/2023 0925    Acceptance, E, NR by CW at 3/30/2023 1209    Acceptance, E, NR,NL by CW at 3/29/2023 1336    Acceptance, E, NR by ZB at 3/28/2023 1317    Acceptance, E, NR by CW at 3/27/2023 1401    Acceptance, E,D, NR,VU by JM at 3/24/2023 1631    Comment: seemed to comprehend commands this session    Acceptance, E,D, NR by JM at 3/23/2023 1655    Acceptance, E,D, NR by JM at 3/22/2023 1545    Acceptance, E,TB, VU,NR by CB at 3/21/2023 1544    Acceptance, E,TB,H, VU by MS at 3/16/2023 1801    Acceptance, E,D, DU,NR by MO at 3/16/2023 1451    Acceptance, E, DU,NR by MO at 3/15/2023 1200    Acceptance, E, NR by LM at 3/15/2023 0521    Acceptance, E,D, DU,NR by MO at 3/14/2023 1458   Family Acceptance, E,TB,H, VU by MS at 4/21/2023 1838    Acceptance, E,TB,H, VU,NR by MS at 4/13/2023 1817    Acceptance, E,TB,H, VU by MS at 3/16/2023 1801                   Point: Home exercise program (In Progress)     Learning Progress Summary           Patient Acceptance, E, NL by BB at 4/29/2023 0136    Comment: Patient cognition unable to allow for teaching at this time.    Acceptance, E,TB,H, VU by MS at 4/21/2023 1838    Acceptance, E, VU,NR by DJ at 4/16/2023 1158    Acceptance, E,TB,H, VU,NR by MS at 4/13/2023 1817    Acceptance, E, NR by CW at 4/13/2023 1341    Acceptance, E,TB, NR by CS at 4/8/2023 0928    Acceptance, E, NR by CW at 4/7/2023 1215    Acceptance, E, NR by CW at 4/6/2023 1335    Acceptance, E,TB, VU,NR by CAMERON at 4/1/2023 1530    Acceptance, E, NR by CW at 3/31/2023 0925    Acceptance, E, NR by ZB at 3/28/2023 1317    Acceptance,  E, NR by CW at 3/27/2023 1401    Acceptance, E,D, NR,VU by JM at 3/24/2023 1631    Comment: seemed to comprehend commands this session    Acceptance, E,D, NR by JM at 3/23/2023 1655    Acceptance, E,D, NR by JM at 3/22/2023 1545    Acceptance, E,TB, VU,NR by CB at 3/21/2023 1544    Acceptance, E,TB,H, VU by MS at 3/16/2023 1801    Acceptance, E,D, DU,NR by MO at 3/16/2023 1451    Acceptance, E, NR by LM at 3/15/2023 0521    Acceptance, E,D, DU,NR by MO at 3/14/2023 1458   Family Acceptance, E,TB,H, VU by MS at 4/21/2023 1838    Acceptance, E,TB,H, VU,NR by MS at 4/13/2023 1817    Acceptance, E,TB,H, VU by MS at 3/16/2023 1801                   Point: Body mechanics (In Progress)     Learning Progress Summary           Patient Acceptance, E, NL by BB at 4/29/2023 0136    Comment: Patient cognition unable to allow for teaching at this time.    Acceptance, E,TB,H, VU by MS at 4/21/2023 1838    Acceptance, E, VU,NR by DJ at 4/16/2023 1158    Acceptance, E,TB,H, VU,NR by MS at 4/13/2023 1817    Acceptance, E, NR by CW at 4/13/2023 1341    Acceptance, E, NR by CW at 4/11/2023 1158    Acceptance, E, NR by CW at 4/10/2023 1414    Acceptance, E,TB, NR by CS at 4/8/2023 0928    Acceptance, E, NR by CW at 4/7/2023 1215    Acceptance, E, NR by CW at 4/4/2023 0906    Acceptance, E, NR by CW at 4/3/2023 1531    Acceptance, E,TB, VU,NR by CAMERON at 4/1/2023 1530    Acceptance, E,D, NR,VU by JM at 3/24/2023 1631    Comment: seemed to comprehend commands this session    Acceptance, E,D, NR by JM at 3/23/2023 1655    Acceptance, E,D, NR by JM at 3/22/2023 1545    Acceptance, E,TB, VU,NR by CB at 3/21/2023 1544    Acceptance, E,TB,H, VU by MS at 3/16/2023 1801    Acceptance, E,D, DU,NR by MO at 3/16/2023 1451    Acceptance, E, DU,NR by MO at 3/15/2023 1200    Acceptance, E, NR by LM at 3/15/2023 0521    Acceptance, E,D, DU,NR by MO at 3/14/2023 1458   Family Acceptance, E,TB,H, VU by MS at 4/21/2023 1838    Acceptance, E,TB,H, VU,NR by MS  at 4/13/2023 1817    Acceptance, E,TB,H, VU by MS at 3/16/2023 1801                   Point: Precautions (In Progress)     Learning Progress Summary           Patient Acceptance, E, NL by BB at 4/29/2023 0136    Comment: Patient cognition unable to allow for teaching at this time.    Acceptance, E,TB,H, VU by MS at 4/21/2023 1838    Acceptance, E, VU,NR by DJ at 4/16/2023 1158    Acceptance, E,TB,H, VU,NR by MS at 4/13/2023 1817    Acceptance, E, NR by CW at 4/13/2023 1341    Acceptance, E,TB, NR by CS at 4/8/2023 0928    Acceptance, E, NR by CW at 4/7/2023 1215    Acceptance, E, NR by CW at 4/4/2023 0906    Acceptance, E, NR by CW at 4/3/2023 1531    Acceptance, E,TB, VU,NR by CAMERON at 4/1/2023 1530    Acceptance, E,D, NR,VU by JM at 3/24/2023 1631    Comment: seemed to comprehend commands this session    Acceptance, E,D, NR by JM at 3/23/2023 1655    Acceptance, E,D, NR by JM at 3/22/2023 1545    Acceptance, E,TB, VU,NR by CB at 3/21/2023 1544    Acceptance, E,TB,H, VU by MS at 3/16/2023 1801    Acceptance, E,D, DU,NR by MO at 3/16/2023 1451    Acceptance, E, DU,NR by MO at 3/15/2023 1200    Acceptance, E, NR by LM at 3/15/2023 0521    Acceptance, E,D, DU,NR by MO at 3/14/2023 1458   Family Acceptance, E,TB,H, VU by MS at 4/21/2023 1838    Acceptance, E,TB,H, VU,NR by MS at 4/13/2023 1817    Acceptance, E,TB,H, VU by MS at 3/16/2023 1801                               User Key     Initials Effective Dates Name Provider Type Discipline    ZACH 03/07/18 -  Tiffanie Grace PTA Physical Therapist Assistant PT    CAMERON 05/19/21 -  Leeann Wiley, PT Physical Therapist PT    DJ 10/25/19 -  Jacqueline Chamberlain, PT Physical Therapist PT    MS 06/16/21 -  Angel Luis Cr, RN Registered Nurse Nurse    CW 12/13/22 -  Tamica Willoughby, PT Physical Therapist PT    CB 10/22/21 -  Bety Benitez, PT Physical Therapist PT    BB 01/10/23 -  Elyssa Thorne, RN Registered Nurse Nurse    CS 09/22/22 -  Adelia Montalvo, PT Physical  Therapist PT    LM 10/13/22 -  Silke Grace, RN Registered Nurse Nurse    MO 01/27/23 -  Lacie Tanner, PT Student PT Student PT    ZB 03/10/23 -  Nestor Rae, PT Student PT Student PT              PT Recommendation and Plan     Plan of Care Reviewed With: (P) patient  Progress: (P) no change  Outcome Evaluation: (P) Pt in bed and agreeable to therapy this AM. Pt came to EOB sba + increased time. DF assist wrap donned w/ pt seated EOB. Completed standing wt shift activity then amb 6' bed>chair modAx1 for physical support + modAx1 for R LE advancement/buckling. Pt completed addtl STS from chair for wt shifting activity. He continues to demo increased distal muscular activation in both the UE and LE. PT will continue to monitor and progress as able in preparation for safe DC.     Time Calculation:    PT Charges     Row Name 05/03/23 1157             Time Calculation    Start Time 1123 (P)   -ZB      Stop Time 1141 (P)   -ZB      Time Calculation (min) 18 min (P)   -ZB      PT Received On 05/03/23 (P)   -ZB      PT - Next Appointment 05/04/23 (P)   -ZB         Time Calculation- PT    Total Timed Code Minutes- PT 18 minute(s) (P)   -ZB         Timed Charges    41567 - PT Therapeutic Activity Minutes 18 (P)   -ZB         Total Minutes    Timed Charges Total Minutes 18 (P)   -ZB       Total Minutes 18 (P)   -ZB            User Key  (r) = Recorded By, (t) = Taken By, (c) = Cosigned By    Initials Name Provider Type    ZB Nestor Rae, PT Student PT Student              Therapy Charges for Today     Code Description Service Date Service Provider Modifiers Qty    41281673844 HC PT THERAPEUTIC ACT EA 15 MIN 5/3/2023 Nestor Rae, PT Student GP 1    57006142025 HC PT THER SUPP EA 15 MIN 5/3/2023 Nestor Rae, PT Student GP 1          PT G-Codes  Outcome Measure Options: (P) AM-PAC 6 Clicks Basic Mobility (PT)  AM-PAC 6 Clicks Score (PT): (P) 16  AM-PAC 6 Clicks Score (OT): 12  Modified Yamilex Scale: 4 - Moderately severe  disability.  Unable to walk without assistance, and unable to attend to own bodily needs without assistance.       Nestor Rae, PT Student  5/3/2023

## 2023-05-03 NOTE — PLAN OF CARE
Goal Outcome Evaluation:  Plan of Care Reviewed With: patient        Progress: no change  Outcome Evaluation: No changes from nursing standpoint.        Problem: Fall Injury Risk  Goal: Absence of Fall and Fall-Related Injury  Outcome: Ongoing, Progressing     Problem: Skin Injury Risk Increased  Goal: Skin Health and Integrity  Outcome: Ongoing, Progressing     Problem: Adult Inpatient Plan of Care  Goal: Plan of Care Review  Outcome: Ongoing, Progressing  Flowsheets (Taken 5/3/2023 1041)  Progress: no change  Plan of Care Reviewed With: patient  Outcome Evaluation: No changes from nursing standpoint.  Goal: Patient-Specific Goal (Individualized)  Outcome: Ongoing, Progressing  Goal: Absence of Hospital-Acquired Illness or Injury  Outcome: Ongoing, Progressing  Goal: Optimal Comfort and Wellbeing  Outcome: Ongoing, Progressing  Goal: Readiness for Transition of Care  Outcome: Ongoing, Progressing     Problem: Adjustment to Illness (Stroke, Ischemic/Transient Ischemic Attack)  Goal: Optimal Coping  Outcome: Ongoing, Progressing     Problem: Bowel Elimination Impaired (Stroke, Ischemic/Transient Ischemic Attack)  Goal: Effective Bowel Elimination  Outcome: Ongoing, Progressing     Problem: Cerebral Tissue Perfusion (Stroke, Ischemic/Transient Ischemic Attack)  Goal: Optimal Cerebral Tissue Perfusion  Outcome: Ongoing, Progressing     Problem: Cognitive Impairment (Stroke, Ischemic/Transient Ischemic Attack)  Goal: Optimal Cognitive Function  Outcome: Ongoing, Progressing     Problem: Communication Impairment (Stroke, Ischemic/Transient Ischemic Attack)  Goal: Improved Communication Skills  Outcome: Ongoing, Progressing     Problem: Functional Ability Impaired (Stroke, Ischemic/Transient Ischemic Attack)  Goal: Optimal Functional Ability  Outcome: Ongoing, Progressing     Problem: Respiratory Compromise (Stroke, Ischemic/Transient Ischemic Attack)  Goal: Effective Oxygenation and Ventilation  Outcome: Ongoing,  Progressing     Problem: Sensorimotor Impairment (Stroke, Ischemic/Transient Ischemic Attack)  Goal: Improved Sensorimotor Function  Outcome: Ongoing, Progressing     Problem: Swallowing Impairment (Stroke, Ischemic/Transient Ischemic Attack)  Goal: Optimal Eating and Swallowing without Aspiration  Outcome: Ongoing, Progressing     Problem: Urinary Elimination Impaired (Stroke, Ischemic/Transient Ischemic Attack)  Goal: Effective Urinary Elimination  Outcome: Ongoing, Progressing     Problem: Adjustment to Illness (Stroke, Hemorrhagic)  Goal: Optimal Coping  Outcome: Ongoing, Progressing     Problem: Bowel Elimination Impaired (Stroke, Hemorrhagic)  Goal: Effective Bowel Elimination  Outcome: Ongoing, Progressing     Problem: Cerebral Tissue Perfusion (Stroke, Hemorrhagic)  Goal: Optimal Cerebral Tissue Perfusion  Outcome: Ongoing, Progressing     Problem: Cognitive Impairment (Stroke, Hemorrhagic)  Goal: Optimal Cognitive Function  Outcome: Ongoing, Progressing     Problem: Communication Impairment (Stroke, Hemorrhagic)  Goal: Effective Communication Skills  Outcome: Ongoing, Progressing     Problem: Functional Ability Impaired (Stroke, Hemorrhagic)  Goal: Optimal Functional Ability  Outcome: Ongoing, Progressing     Problem: Pain (Stroke, Hemorrhagic)  Goal: Acceptable Pain Control  Outcome: Ongoing, Progressing     Problem: Respiratory Compromise (Stroke, Hemorrhagic)  Goal: Effective Oxygenation and Ventilation  Outcome: Ongoing, Progressing     Problem: Sensorimotor Impairment (Stroke, Hemorrhagic)  Goal: Improved Sensorimotor Function  Outcome: Ongoing, Progressing     Problem: Swallowing Impairment (Stroke, Hemorrhagic)  Goal: Optimal Eating and Swallowing Without Aspiration  Outcome: Ongoing, Progressing     Problem: Urinary Elimination Impaired (Stroke, Hemorrhagic)  Goal: Effective Urinary Elimination  Outcome: Ongoing, Progressing

## 2023-05-03 NOTE — PLAN OF CARE
Goal Outcome Evaluation:  Plan of Care Reviewed With: (P) patient        Progress: (P) no change  Outcome Evaluation: (P) Pt in bed and agreeable to therapy this AM. Pt came to EOB sba + increased time. DF assist wrap donned w/ pt seated EOB. Completed standing wt shift activity then amb 6' bed>chair modAx1 for physical support + modAx1 for R LE advancement/buckling. Pt completed addtl STS from chair for wt shifting activity. He continues to demo increased distal muscular activation in both the UE and LE. PT will continue to monitor and progress as able in preparation for safe DC.

## 2023-05-04 PROCEDURE — 97530 THERAPEUTIC ACTIVITIES: CPT

## 2023-05-04 PROCEDURE — 97535 SELF CARE MNGMENT TRAINING: CPT

## 2023-05-04 RX ADMIN — APIXABAN 5 MG: 5 TABLET, FILM COATED ORAL at 10:12

## 2023-05-04 RX ADMIN — CARVEDILOL 3.12 MG: 3.12 TABLET, FILM COATED ORAL at 10:12

## 2023-05-04 RX ADMIN — AMLODIPINE BESYLATE 10 MG: 10 TABLET ORAL at 06:38

## 2023-05-04 RX ADMIN — BACLOFEN 2.5 MG: 10 TABLET ORAL at 06:38

## 2023-05-04 RX ADMIN — LIDOCAINE 1 PATCH: 700 PATCH TOPICAL at 10:12

## 2023-05-04 RX ADMIN — Medication 10 ML: at 21:32

## 2023-05-04 RX ADMIN — APIXABAN 5 MG: 5 TABLET, FILM COATED ORAL at 21:31

## 2023-05-04 RX ADMIN — VALSARTAN 160 MG: 160 TABLET, FILM COATED ORAL at 06:38

## 2023-05-04 RX ADMIN — BACLOFEN 2.5 MG: 10 TABLET ORAL at 15:37

## 2023-05-04 RX ADMIN — BACLOFEN 2.5 MG: 10 TABLET ORAL at 21:31

## 2023-05-04 RX ADMIN — Medication 10 ML: at 10:12

## 2023-05-04 NOTE — PROGRESS NOTES
Name: Manuel Durant ADMIT: 3/12/2023   : 1950  PCP: Provider, No Known    MRN: [id number] LOS: 53 days   AGE/SEX: 73 y.o. male  ROOM: [facility]     Subjective   Subjective   Denies any new complaints.  Says he is doing well.     Objective   Objective   Vital Signs  Temp:  [97.4 °F (36.3 °C)-98.4 °F (36.9 °C)] 98 °F (36.7 °C)  Heart Rate:  [63-72] 70  Resp:  [18] 18  BP: (107-129)/(61-78) 124/73  SpO2:  [93 %-99 %] 99 %  on   ;   Device (Oxygen Therapy): room air  Body mass index is 21.15 kg/m².   Physical Exam  Vitals and nursing note reviewed.   Constitutional:       Appearance: Normal appearance.   Pulmonary:      Effort: Pulmonary effort is normal.   Musculoskeletal:      Comments: RUE weakness   Neurological:      Mental Status: He is alert. Mental status is at baseline.   Psychiatric:         Mood and Affect: Mood normal.       Results Review:       I reviewed the patient's new clinical results.      Estimated Creatinine Clearance: 72.1 mL/min (by C-G formula based on SCr of 0.79 mg/dL).        No results found for: HGBA1C, POCGLU    amLODIPine, 10 mg, Oral, QAM AC  apixaban, 5 mg, Oral, Q12H  baclofen, 2.5 mg, Oral, Q8H  carvedilol, 3.125 mg, Oral, BID With Meals  hydrALAZINE, 10 mg, Oral, Q12H  lidocaine, 1 patch, Transdermal, Q24H  sodium chloride, 10 mL, Intravenous, Q12H  valsartan, 160 mg, Oral, QAM AC       Diet: Regular/House Diet; No Mixed Consistencies, Feeding Assistance - Nursing; Texture: Soft to Chew (NDD 3); Soft to Chew: Chopped Meat; Fluid Consistency: Nectar Thick       Assessment/Plan     Active Hospital Problems    Diagnosis  POA   • **Intracranial hemorrhage [I62.9]  Yes   • Hemiplegia [G81.90]  Yes   • Right shoulder pain [M25.511]  Yes   • Severe Malnutrition (HCC) [E43]  Yes   • Acute DVT (deep venous thrombosis) [I82.409]  Yes   • HTN (hypertension) [I10]  Yes      Resolved Hospital Problems    Diagnosis Date Resolved POA   • Hypokalemia [E87.6] 04/10/2023 Unknown   •  Hypertensive emergency [I16.1] 04/29/2023 Yes       Mr. Durant is a 73 year old male who initially presented to the hospital 3/12/23 for intracranial bleed. He was found unresponsive by friends and initially taken to [facility] where he was found to have poorly controlled BP and right sided hemiplegia. He was transferred here for closer monitoring in the ICU and neurosurgical consultation. He was cleared to move out of the ICU on 3/16/23.     • Intracranial hemorrhage: CTH 3/17/23 stable left basal ganglia/internal capsule hemorrhage with left lateral ventricular hemorrhage. On Lovenox for DVT and repeat CTH 3/18 also stable. ANDRADE followed and signed off 3/23 with plans for repeat MRI brain/MRA head in 1 month- ANDRADE saw again 4/20 and ordered MRI but this was not done as patient couldn't be cleared for MRI. CTA/CT head with evidence of hemorrhage cavity left sided with some peripheral enhancement which may just be related to resolving hemorrhage but underlying mass cannot be excluded- needs repeat CTH with contrast or MRI brain in 6-8 weeks and local follow up with neurology/ANDRADE in [place]. Goal BP < 150 systolic. Remains on baclofen for spasticity.      • HTN emergency: On amlodipine 10 mg, valsartan 160 mg, Coreg 3.125 mg BID and hydralazine 10 mg BID (reduced frequency yesterday). BP still very stable and will go ahead and stop hydralazine.  He is on a very low-dose of this and it is a poor agent for treating hypertension.  Would rather go up on his valsartan or carvedilol or add a thiazide.  Continue to monitor BP.     • Acute DVT: Found 3/17/23 with acute RLE DVT in soleal. ANDRADE okayed for blood thinners. Now on Eliquis.      Family set up a time to come to [place] and get him to fly [place]- tentative discharge date May 20 th.      Harpal Mcconnell MD  [facility]  05/04/23  07:09 EDT     no

## 2023-05-04 NOTE — PLAN OF CARE
Problem: Fall Injury Risk  Goal: Absence of Fall and Fall-Related Injury  Outcome: Ongoing, Progressing  Intervention: Identify and Manage Contributors  Recent Flowsheet Documentation  Taken 5/3/2023 2153 by Katt Kendall RN  Medication Review/Management: medications reviewed  Intervention: Promote Injury-Free Environment  Recent Flowsheet Documentation  Taken 5/4/2023 0621 by Katt Kendall RN  Safety Promotion/Fall Prevention:   assistive device/personal items within reach   clutter free environment maintained   fall prevention program maintained   safety round/check completed  Taken 5/4/2023 0411 by Katt Kendall RN  Safety Promotion/Fall Prevention:   assistive device/personal items within reach   clutter free environment maintained   fall prevention program maintained   safety round/check completed  Taken 5/4/2023 0211 by Katt Kendall RN  Safety Promotion/Fall Prevention:   assistive device/personal items within reach   clutter free environment maintained   fall prevention program maintained   safety round/check completed  Taken 5/4/2023 0017 by Katt Kendall RN  Safety Promotion/Fall Prevention:   assistive device/personal items within reach   clutter free environment maintained   fall prevention program maintained   safety round/check completed  Taken 5/3/2023 2153 by Katt Kendall RN  Safety Promotion/Fall Prevention:   assistive device/personal items within reach   clutter free environment maintained   fall prevention program maintained   safety round/check completed  Taken 5/3/2023 1956 by Katt Kendall RN  Safety Promotion/Fall Prevention:   clutter free environment maintained   assistive device/personal items within reach   fall prevention program maintained   safety round/check completed     Problem: Skin Injury Risk Increased  Goal: Skin Health and Integrity  Outcome: Ongoing, Progressing  Intervention: Optimize Skin Protection  Recent Flowsheet Documentation  Taken 5/4/2023 0621  by Katt Kendall RN  Head of Bed (hospitals) Positioning: HOB elevated  Taken 5/4/2023 0411 by Katt Kendall RN  Head of Bed (HOB) Positioning: HOB elevated  Taken 5/4/2023 0211 by Katt Kendall RN  Head of Bed (hospitals) Positioning: HOB elevated  Taken 5/4/2023 0017 by Katt Kendall RN  Head of Bed (hospitals) Positioning: HOB elevated  Taken 5/3/2023 2153 by Katt Kendall RN  Pressure Reduction Techniques:   sit time limited to 2 hours   weight shift assistance provided  Head of Bed (hospitals) Positioning: HOB elevated  Pressure Reduction Devices:   alternating pressure pump (ADD)   pressure-redistributing mattress utilized  Skin Protection: incontinence pads utilized  Taken 5/3/2023 1956 by Katt Kendall RN  Head of Bed (hospitals) Positioning: HOB elevated     Problem: Adult Inpatient Plan of Care  Goal: Plan of Care Review  Outcome: Ongoing, Progressing  Goal: Patient-Specific Goal (Individualized)  Outcome: Ongoing, Progressing  Goal: Absence of Hospital-Acquired Illness or Injury  Outcome: Ongoing, Progressing  Intervention: Identify and Manage Fall Risk  Recent Flowsheet Documentation  Taken 5/4/2023 0621 by Katt Kendall RN  Safety Promotion/Fall Prevention:   assistive device/personal items within reach   clutter free environment maintained   fall prevention program maintained   safety round/check completed  Taken 5/4/2023 0411 by Katt Kendall RN  Safety Promotion/Fall Prevention:   assistive device/personal items within reach   clutter free environment maintained   fall prevention program maintained   safety round/check completed  Taken 5/4/2023 0211 by Katt Kendall RN  Safety Promotion/Fall Prevention:   assistive device/personal items within reach   clutter free environment maintained   fall prevention program maintained   safety round/check completed  Taken 5/4/2023 0017 by Katt Kendall RN  Safety Promotion/Fall Prevention:   assistive device/personal items within reach   clutter free environment  maintained   fall prevention program maintained   safety round/check completed  Taken 5/3/2023 2153 by Katt Kendall RN  Safety Promotion/Fall Prevention:   assistive device/personal items within reach   clutter free environment maintained   fall prevention program maintained   safety round/check completed  Taken 5/3/2023 1956 by Katt Kendall RN  Safety Promotion/Fall Prevention:   clutter free environment maintained   assistive device/personal items within reach   fall prevention program maintained   safety round/check completed  Intervention: Prevent Skin Injury  Recent Flowsheet Documentation  Taken 5/4/2023 0621 by Katt Kendall RN  Body Position: supine, legs elevated  Taken 5/4/2023 0411 by Katt Kendall RN  Body Position: supine, legs elevated  Taken 5/4/2023 0211 by Katt Kendall RN  Body Position: supine, legs elevated  Taken 5/4/2023 0017 by Katt Kendall RN  Body Position: head facing, left  Taken 5/3/2023 2153 by Katt Kendall RN  Body Position: supine, legs elevated  Skin Protection: incontinence pads utilized  Taken 5/3/2023 1956 by Katt Kendall RN  Body Position: supine, legs elevated  Intervention: Prevent and Manage VTE (Venous Thromboembolism) Risk  Recent Flowsheet Documentation  Taken 5/3/2023 2153 by Katt Kendall RN  Activity Management: activity encouraged  Goal: Optimal Comfort and Wellbeing  Outcome: Ongoing, Progressing  Intervention: Provide Person-Centered Care  Recent Flowsheet Documentation  Taken 5/3/2023 2153 by Katt Kendall RN  Trust Relationship/Rapport: care explained  Goal: Readiness for Transition of Care  Outcome: Ongoing, Progressing   Goal Outcome Evaluation:

## 2023-05-04 NOTE — CASE MANAGEMENT/SOCIAL WORK
Continued Stay Note  [facility]     Patient Name: Manuel Durant  MRN: [id number]  Today's Date: 5/4/2023    Admit Date: 3/12/2023    Plan: Home to [place] by air.  Family plan is to come to [place] to accompany pt home to [place].  Tentative/planned DC 5/20.   Discharge Plan       Row Name 05/04/23 1651       Plan    Plan Home to [place] by air.  Family plan is to come to [place] to accompany pt home to [place].  Tentative/planned DC 5/20.    Plan Comments Call received from pt's granddaughter Natalie.  She had questions about pt's passport and documents.  She had questions about hospital records which will accompany pt so that her uncle who is an MD in [place], will be able to take over care.  All questions answered.  She will call Bridgett Shook about getting pt's documents at discharge.  Plan continues for May 20 for DC.  Faye ARMSTRONG                   Discharge Codes    No documentation.                 Expected Discharge Date and Time       Expected Discharge Date Expected Discharge Time    May 20, 2023               Faye Moy RN

## 2023-05-04 NOTE — PROGRESS NOTES
[facility]    Asked to re-evaluate for acute inpatient rehab.  Noted plan for family to arrive in [place] and fly with patient back to [place] on May 20th.  Will follow for progress and ability to tolerate 3 hours of therapy per day.  Will discuss with medical director tomorrow.    Thank you,  Meche Agustin, RN  Rehab Admission Nurse

## 2023-05-04 NOTE — THERAPY TREATMENT NOTE
Patient Name: Manuel Durant  : 1950    MRN: [id number]                              Today's Date: 2023       Admit Date: 3/12/2023    Visit Dx:     ICD-10-CM ICD-9-CM   1. Intracranial hemorrhage  I62.9 432.9   2. Altered mental status, unspecified altered mental status type  R41.82 780.97     Patient Active Problem List   Diagnosis   • Intracranial hemorrhage   • Acute DVT (deep venous thrombosis)   • HTN (hypertension)   • Severe Malnutrition (HCC)   • Hemiplegia   • Right shoulder pain     Past Medical History:   Diagnosis Date   • Hypokalemia 3/17/2023     History reviewed. No pertinent surgical history.   General Information     Row Name 23 1429          Physical Therapy Time and Intention    Document Type therapy note (daily note)  -CW     Mode of Treatment physical therapy  -CW     Row Name 23 1429          General Information    Patient Profile Reviewed yes  -CW     Existing Precautions/Restrictions fall  -CW     Row Name 23 1429          Cognition    Orientation Status (Cognition) oriented to;person  -CW     Row Name 23 1429          Safety Issues, Functional Mobility    Impairments Affecting Function (Mobility) balance;coordination;endurance/activity tolerance;grasp;motor control;postural/trunk control;strength;range of motion (ROM);cognition;visual/perceptual  -CW           User Key  (r) = Recorded By, (t) = Taken By, (c) = Cosigned By    Initials Name Provider Type    CW Tamica Willoughby PT Physical Therapist               Mobility     Row Name 23 1429          Bed Mobility    Bed Mobility supine-sit  -CW     Supine-Sit [place] (Bed Mobility) standby assist  -CW     Row Name 23 1429          Bed-Chair Transfer    Bed-Chair [place] (Transfers) moderate assist (50% patient effort);2 person assist;verbal cues;nonverbal cues (demo/gesture)  -CW     Comment, (Bed-Chair Transfer) transfer bed>BSC>chair. Pt hopping more today rather than using RLE  during transfer  -CW           User Key  (r) = Recorded By, (t) = Taken By, (c) = Cosigned By    Initials Name Provider Type    Tamica Eddy PT Physical Therapist               Obj/Interventions    No documentation.                Goals/Plan    No documentation.                Clinical Impression     Row Name 05/04/23 1439          Pain    Pretreatment Pain Rating 0/10 - no pain  -CW     Row Name 05/04/23 1439          Plan of Care Review    Plan of Care Reviewed With patient  -CW     Outcome Evaluation Pt participated with therapy this PM. Mod A x2 required for transfers today - pt hopping on LLE more despite cues to utilize RLE on the ground. Will progress as able.  -CW     Row Name 05/04/23 1439          Vital Signs    O2 Delivery Pre Treatment room air  -CW     Row Name 05/04/23 1439          Positioning and Restraints    Pre-Treatment Position in bed  -CW     Post Treatment Position chair  -CW     In Chair reclined;call light within reach;encouraged to call for assist;exit alarm on;notified nsg;legs elevated  -CW           User Key  (r) = Recorded By, (t) = Taken By, (c) = Cosigned By    Initials Name Provider Type    Tamica Eddy PT Physical Therapist               Outcome Measures     Row Name 05/04/23 1441          How much help from another person do you currently need...    Turning from your back to your side while in flat bed without using bedrails? 4  -CW     Moving from lying on back to sitting on the side of a flat bed without bedrails? 3  -CW     Moving to and from a bed to a chair (including a wheelchair)? 2  -CW     Standing up from a chair using your arms (e.g., wheelchair, bedside chair)? 3  -CW     Climbing 3-5 steps with a railing? 1  -CW     To walk in hospital room? 2  -CW     AM-PAC 6 Clicks Score (PT) 15  -CW     Highest level of mobility 4 --> Transferred to chair/commode  -CW     Row Name 05/04/23 1441          Functional Assessment    Outcome Measure Options AM-PAC 6  Clicks Basic Mobility (PT)  -CW           User Key  (r) = Recorded By, (t) = Taken By, (c) = Cosigned By    Initials Name Provider Type    Tamica Eddy PT Physical Therapist                             Physical Therapy Education     Title: PT OT SLP Therapies (In Progress)     Topic: Physical Therapy (In Progress)     Point: Mobility training (In Progress)     Learning Progress Summary           Patient Acceptance, E, NR by CW at 5/4/2023 1441    Acceptance, E, NR by CW at 5/2/2023 1402    Acceptance, E, VU,NR by CW at 5/1/2023 1005    Acceptance, E, NL by BB at 4/29/2023 0136    Comment: Patient cognition unable to allow for teaching at this time.    Acceptance, E,TB,H, VU by MS at 4/21/2023 1838    Acceptance, E, NR by CW at 4/21/2023 1537    Acceptance, E, VU,DU by CHARLY at 4/19/2023 1130    Acceptance, E, NR by CW at 4/18/2023 0950    Acceptance, E, VU,NR by NICOLASA at 4/16/2023 1158    Acceptance, E,TB,H, VU,NR by MS at 4/13/2023 1817    Acceptance, E, NR by CW at 4/13/2023 1341    Acceptance, E, NR by CW at 4/12/2023 1201    Acceptance, E, NR by CW at 4/11/2023 1158    Acceptance, E, NR by CW at 4/10/2023 1414    Acceptance, E,TB, NR by CS at 4/8/2023 0928    Acceptance, E, NR by CW at 4/7/2023 1215    Acceptance, E, NR by CW at 4/6/2023 1335    Acceptance, E, NR by CW at 4/5/2023 1043    Acceptance, E, NR by CW at 4/4/2023 0906    Acceptance, E, NR by CW at 4/3/2023 1531    Acceptance, E,TB, VU,NR by CAMERON at 4/1/2023 1530    Acceptance, E, NR by CW at 3/31/2023 0925    Acceptance, E, NR by CW at 3/30/2023 1209    Acceptance, E, NR,NL by CW at 3/29/2023 1336    Acceptance, E, NR by ZB at 3/28/2023 1317    Acceptance, E, NR by DANAY at 3/27/2023 1401    Acceptance, E,D, NR,VU by ZACH at 3/24/2023 1631    Comment: seemed to comprehend commands this session    Acceptance, E,D, NR by ZACH at 3/23/2023 1655    Acceptance, E,D, NR by ZACH at 3/22/2023 1545    Acceptance, E,TB, VU,NR by TITA at 3/21/2023 1544    Acceptance,  E,TB,H, VU by MS at 3/16/2023 1801    Acceptance, E,D, DU,NR by MO at 3/16/2023 1451    Acceptance, E, DU,NR by MO at 3/15/2023 1200    Acceptance, E, NR by LM at 3/15/2023 0521    Acceptance, E,D, DU,NR by MO at 3/14/2023 1458   Family Acceptance, E,TB,H, VU by MS at 4/21/2023 1838    Acceptance, E,TB,H, VU,NR by MS at 4/13/2023 1817    Acceptance, E,TB,H, VU by MS at 3/16/2023 1801                   Point: Home exercise program (In Progress)     Learning Progress Summary           Patient Acceptance, E, NL by BB at 4/29/2023 0136    Comment: Patient cognition unable to allow for teaching at this time.    Acceptance, E,TB,H, VU by MS at 4/21/2023 1838    Acceptance, E, VU,NR by DJ at 4/16/2023 1158    Acceptance, E,TB,H, VU,NR by MS at 4/13/2023 1817    Acceptance, E, NR by CW at 4/13/2023 1341    Acceptance, E,TB, NR by CS at 4/8/2023 0928    Acceptance, E, NR by CW at 4/7/2023 1215    Acceptance, E, NR by CW at 4/6/2023 1335    Acceptance, E,TB, VU,NR by CAMERON at 4/1/2023 1530    Acceptance, E, NR by CW at 3/31/2023 0925    Acceptance, E, NR by ZB at 3/28/2023 1317    Acceptance, E, NR by CW at 3/27/2023 1401    Acceptance, E,D, NR,VU by JM at 3/24/2023 1631    Comment: seemed to comprehend commands this session    Acceptance, E,D, NR by JM at 3/23/2023 1655    Acceptance, E,D, NR by JM at 3/22/2023 1545    Acceptance, E,TB, VU,NR by CB at 3/21/2023 1544    Acceptance, E,TB,H, VU by MS at 3/16/2023 1801    Acceptance, E,D, DU,NR by MO at 3/16/2023 1451    Acceptance, E, NR by LM at 3/15/2023 0521    Acceptance, E,D, DU,NR by MO at 3/14/2023 1458   Family Acceptance, E,TB,H, VU by MS at 4/21/2023 1838    Acceptance, E,TB,H, VU,NR by MS at 4/13/2023 1817    Acceptance, E,TB,H, VU by MS at 3/16/2023 1801                   Point: Body mechanics (In Progress)     Learning Progress Summary           Patient Acceptance, E, NL by BB at 4/29/2023 0136    Comment: Patient cognition unable to allow for teaching at this time.     Acceptance, E,TB,H, VU by MS at 4/21/2023 1838    Acceptance, E, VU,NR by DJ at 4/16/2023 1158    Acceptance, E,TB,H, VU,NR by MS at 4/13/2023 1817    Acceptance, E, NR by CW at 4/13/2023 1341    Acceptance, E, NR by CW at 4/11/2023 1158    Acceptance, E, NR by CW at 4/10/2023 1414    Acceptance, E,TB, NR by CS at 4/8/2023 0928    Acceptance, E, NR by CW at 4/7/2023 1215    Acceptance, E, NR by CW at 4/4/2023 0906    Acceptance, E, NR by CW at 4/3/2023 1531    Acceptance, E,TB, VU,NR by CAMERON at 4/1/2023 1530    Acceptance, E,D, NR,VU by JM at 3/24/2023 1631    Comment: seemed to comprehend commands this session    Acceptance, E,D, NR by JM at 3/23/2023 1655    Acceptance, E,D, NR by JM at 3/22/2023 1545    Acceptance, E,TB, VU,NR by CB at 3/21/2023 1544    Acceptance, E,TB,H, VU by MS at 3/16/2023 1801    Acceptance, E,D, DU,NR by MO at 3/16/2023 1451    Acceptance, E, DU,NR by MO at 3/15/2023 1200    Acceptance, E, NR by LM at 3/15/2023 0521    Acceptance, E,D, DU,NR by MO at 3/14/2023 1458   Family Acceptance, E,TB,H, VU by MS at 4/21/2023 1838    Acceptance, E,TB,H, VU,NR by MS at 4/13/2023 1817    Acceptance, E,TB,H, VU by MS at 3/16/2023 1801                   Point: Precautions (In Progress)     Learning Progress Summary           Patient Acceptance, E, NL by BB at 4/29/2023 0136    Comment: Patient cognition unable to allow for teaching at this time.    Acceptance, E,TB,H, VU by MS at 4/21/2023 1838    Acceptance, E, VU,NR by DJ at 4/16/2023 1158    Acceptance, E,TB,H, VU,NR by MS at 4/13/2023 1817    Acceptance, E, NR by CW at 4/13/2023 1341    Acceptance, E,TB, NR by CS at 4/8/2023 0928    Acceptance, E, NR by CW at 4/7/2023 1215    Acceptance, E, NR by CW at 4/4/2023 0906    Acceptance, E, NR by CW at 4/3/2023 1531    Acceptance, E,TB, VU,NR by CAMERON at 4/1/2023 1530    Acceptance, E,D, NR,VU by ZACH at 3/24/2023 1631    Comment: seemed to comprehend commands this session    Acceptance, E,D, NR by ZACH at  3/23/2023 1655    Acceptance, E,D, NR by JM at 3/22/2023 1545    Acceptance, E,TB, VU,NR by CB at 3/21/2023 1544    Acceptance, E,TB,H, VU by MS at 3/16/2023 1801    Acceptance, E,D, DU,NR by MO at 3/16/2023 1451    Acceptance, E, DU,NR by MO at 3/15/2023 1200    Acceptance, E, NR by LM at 3/15/2023 0521    Acceptance, E,D, DU,NR by MO at 3/14/2023 1458   Family Acceptance, E,TB,H, VU by MS at 4/21/2023 1838    Acceptance, E,TB,H, VU,NR by MS at 4/13/2023 1817    Acceptance, E,TB,H, VU by MS at 3/16/2023 1801                               User Key     Initials Effective Dates Name Provider Type Discipline    JM 03/07/18 -  Tiffanie Grace, PTA Physical Therapist Assistant PT    CAMERON 05/19/21 -  Leeann Wiley, PT Physical Therapist PT    DJ 10/25/19 -  Jacuqeline Chamberlain, PT Physical Therapist PT    MS 06/16/21 -  Angel Luis Cr, RN Registered Nurse Nurse    CW 12/13/22 -  Tamica Willoughby, PT Physical Therapist PT    CB 10/22/21 -  Bety Benitez, PT Physical Therapist PT    BB 01/10/23 -  Elyssa Thorne, RN Registered Nurse Nurse    CS 09/22/22 -  Adelia Montalvo, PT Physical Therapist PT    LM 10/13/22 -  Silke Grace, RN Registered Nurse Nurse    MO 01/27/23 -  Lacie Tanner, PT Student PT Student PT    ZB 03/10/23 -  Nestor Rae, PT Student PT Student PT              PT Recommendation and Plan     Plan of Care Reviewed With: patient  Outcome Evaluation: Pt participated with therapy this PM. Mod A x2 required for transfers today - pt hopping on LLE more despite cues to utilize RLE on the ground. Will progress as able.     Time Calculation:    PT Charges     Row Name 05/04/23 1425             Time Calculation    Start Time 1321  -CW      Stop Time 1338  -CW      Time Calculation (min) 17 min  -CW      PT Received On 05/04/23  -CW      PT - Next Appointment 05/05/23  -CW            User Key  (r) = Recorded By, (t) = Taken By, (c) = Cosigned By    Initials Name Provider Type    Tamica Eddy, PT  Physical Therapist              Therapy Charges for Today     Code Description Service Date Service Provider Modifiers Qty    [id number]  PT THERAPEUTIC ACT EA 15 MIN 5/4/2023 Tamica Willoughby, PT GP 1          PT G-Codes  Outcome Measure Options: AM-PAC 6 Clicks Basic Mobility (PT)  AM-PAC 6 Clicks Score (PT): 15  AM-PAC 6 Clicks Score (OT): 12  Modified Yamilex Scale: 4 - Moderately severe disability.  Unable to walk without assistance, and unable to attend to own bodily needs without assistance.  PT Discharge Summary  Anticipated Discharge Disposition (PT): inpatient rehabilitation facility, skilled nursing facility    Tamica Willoughby PT  5/4/2023

## 2023-05-04 NOTE — PLAN OF CARE
The pt participated in OT/PT this afternoon. SBA for bed mobility. The pt attempted to stand multiple times impulsively - cues to slow pacing and take directions from the therapists for pt safety. Max A x1-2 for a BSC transfer. Total A brief management/hygiene. He took steps to the bedside chair with Max A x2.

## 2023-05-04 NOTE — PLAN OF CARE
Goal Outcome Evaluation:  Plan of Care Reviewed With: patient           Outcome Evaluation: Pt participated with therapy this PM. Mod A x2 required for transfers today - pt hopping on LLE more despite cues to utilize RLE on the ground. Will progress as able.

## 2023-05-04 NOTE — PLAN OF CARE
Goal Outcome Evaluation:  Plan of Care Reviewed With: patient        Progress: no change  Outcome Evaluation: No changes from nursing standpoint.        Problem: Fall Injury Risk  Goal: Absence of Fall and Fall-Related Injury  Outcome: Ongoing, Progressing     Problem: Skin Injury Risk Increased  Goal: Skin Health and Integrity  Outcome: Ongoing, Progressing     Problem: Adult Inpatient Plan of Care  Goal: Plan of Care Review  Outcome: Ongoing, Progressing  Flowsheets (Taken 5/4/2023 1635)  Progress: no change  Goal: Patient-Specific Goal (Individualized)  Outcome: Ongoing, Progressing  Goal: Absence of Hospital-Acquired Illness or Injury  Outcome: Ongoing, Progressing  Intervention: Prevent and Manage VTE (Venous Thromboembolism) Risk  Recent Flowsheet Documentation  Taken 5/4/2023 1633 by Johanny Buchanan, RN  Activity Management: activity encouraged  Goal: Optimal Comfort and Wellbeing  Outcome: Ongoing, Progressing  Goal: Readiness for Transition of Care  Outcome: Ongoing, Progressing     Problem: Adjustment to Illness (Stroke, Ischemic/Transient Ischemic Attack)  Goal: Optimal Coping  Outcome: Ongoing, Progressing     Problem: Bowel Elimination Impaired (Stroke, Ischemic/Transient Ischemic Attack)  Goal: Effective Bowel Elimination  Outcome: Ongoing, Progressing     Problem: Cerebral Tissue Perfusion (Stroke, Ischemic/Transient Ischemic Attack)  Goal: Optimal Cerebral Tissue Perfusion  Outcome: Ongoing, Progressing     Problem: Cognitive Impairment (Stroke, Ischemic/Transient Ischemic Attack)  Goal: Optimal Cognitive Function  Outcome: Ongoing, Progressing     Problem: Communication Impairment (Stroke, Ischemic/Transient Ischemic Attack)  Goal: Improved Communication Skills  Outcome: Ongoing, Progressing     Problem: Functional Ability Impaired (Stroke, Ischemic/Transient Ischemic Attack)  Goal: Optimal Functional Ability  Outcome: Ongoing, Progressing  Intervention: Optimize Functional Ability  Recent Flowsheet  Documentation  Taken 5/4/2023 1633 by Johanny Buchanan RN  Activity Management: activity encouraged     Problem: Respiratory Compromise (Stroke, Ischemic/Transient Ischemic Attack)  Goal: Effective Oxygenation and Ventilation  Outcome: Ongoing, Progressing     Problem: Sensorimotor Impairment (Stroke, Ischemic/Transient Ischemic Attack)  Goal: Improved Sensorimotor Function  Outcome: Ongoing, Progressing     Problem: Swallowing Impairment (Stroke, Ischemic/Transient Ischemic Attack)  Goal: Optimal Eating and Swallowing without Aspiration  Outcome: Ongoing, Progressing     Problem: Urinary Elimination Impaired (Stroke, Ischemic/Transient Ischemic Attack)  Goal: Effective Urinary Elimination  Outcome: Ongoing, Progressing     Problem: Adjustment to Illness (Stroke, Hemorrhagic)  Goal: Optimal Coping  Outcome: Ongoing, Progressing     Problem: Bowel Elimination Impaired (Stroke, Hemorrhagic)  Goal: Effective Bowel Elimination  Outcome: Ongoing, Progressing     Problem: Cerebral Tissue Perfusion (Stroke, Hemorrhagic)  Goal: Optimal Cerebral Tissue Perfusion  Outcome: Ongoing, Progressing     Problem: Cognitive Impairment (Stroke, Hemorrhagic)  Goal: Optimal Cognitive Function  Outcome: Ongoing, Progressing     Problem: Communication Impairment (Stroke, Hemorrhagic)  Goal: Effective Communication Skills  Outcome: Ongoing, Progressing     Problem: Functional Ability Impaired (Stroke, Hemorrhagic)  Goal: Optimal Functional Ability  Outcome: Ongoing, Progressing  Intervention: Optimize Functional Ability  Recent Flowsheet Documentation  Taken 5/4/2023 1633 by Johanny Buchanan RN  Activity Management: activity encouraged     Problem: Pain (Stroke, Hemorrhagic)  Goal: Acceptable Pain Control  Outcome: Ongoing, Progressing     Problem: Respiratory Compromise (Stroke, Hemorrhagic)  Goal: Effective Oxygenation and Ventilation  Outcome: Ongoing, Progressing     Problem: Sensorimotor Impairment (Stroke, Hemorrhagic)  Goal:  Improved Sensorimotor Function  Outcome: Ongoing, Progressing     Problem: Swallowing Impairment (Stroke, Hemorrhagic)  Goal: Optimal Eating and Swallowing Without Aspiration  Outcome: Ongoing, Progressing     Problem: Urinary Elimination Impaired (Stroke, Hemorrhagic)  Goal: Effective Urinary Elimination  Outcome: Ongoing, Progressing

## 2023-05-04 NOTE — THERAPY TREATMENT NOTE
Patient Name: Manuel Durant  : 1950    MRN: [id number]                              Today's Date: 2023       Admit Date: 3/12/2023    Visit Dx:     ICD-10-CM ICD-9-CM   1. Intracranial hemorrhage  I62.9 432.9   2. Altered mental status, unspecified altered mental status type  R41.82 780.97     Patient Active Problem List   Diagnosis   • Intracranial hemorrhage   • Acute DVT (deep venous thrombosis)   • HTN (hypertension)   • Severe Malnutrition (HCC)   • Hemiplegia   • Right shoulder pain     Past Medical History:   Diagnosis Date   • Hypokalemia 3/17/2023     History reviewed. No pertinent surgical history.   General Information     Row Name 23 1538          OT Time and Intention    Document Type therapy note (daily note)  -RB     Mode of Treatment co-treatment;physical therapy;occupational therapy  -RB     Row Name 23 1538          General Information    Patient Profile Reviewed yes  -RB     Existing Precautions/Restrictions fall  -RB     Row Name 23 1538          Cognition    Orientation Status (Cognition) oriented to;person  -RB           User Key  (r) = Recorded By, (t) = Taken By, (c) = Cosigned By    Initials Name Provider Type    RB Ana Lui OT Occupational Therapist                 Mobility/ADL's     Row Name 23 1538          Bed Mobility    Bed Mobility supine-sit  -RB     Supine-Sit [place] (Bed Mobility) standby assist;verbal cues  -RB     Assistive Device (Bed Mobility) bed rails  -RB     Row Name 23 1538          Transfers    Transfers sit-stand transfer;stand-sit transfer;toilet transfer;bed-chair transfer  -RB     Row Name 23 1538          Bed-Chair Transfer    Bed-Chair [place] (Transfers) moderate assist (50% patient effort);2 person assist;verbal cues;maximum assist (25% patient effort)  -RB     Row Name 23 1538          Sit-Stand Transfer    Sit-Stand [place] (Transfers) moderate assist (50% patient effort);2 person  assist;verbal cues;maximum assist (25% patient effort)  -RB     Row Name 05/04/23 1538          Stand-Sit Transfer    Stand-Sit [place] (Transfers) moderate assist (50% patient effort);maximum assist (25% patient effort);2 person assist;verbal cues  -RB     Row Name 05/04/23 1538          Toilet Transfer    Type (Toilet Transfer) sit-stand;stand-sit  -RB     [place] Level (Toilet Transfer) maximum assist (25% patient effort);2 person assist;verbal cues  -RB     Comment, (Toilet Transfer) hhbetty provided for his transfers from the bed->commode->chair  -RB     Row Name 05/04/23 1538          Toileting Assessment/Training    [place] Level (Toileting) toileting skills;dependent (less than 25% patient effort)  -RB     Position (Toileting) supported sitting;supported standing  -RB           User Key  (r) = Recorded By, (t) = Taken By, (c) = Cosigned By    Initials Name Provider Type    Ana Banks OT Occupational Therapist               Obj/Interventions     Row Name 05/04/23 1539          Stepping Strategy (Balance)    Stepping Strategy Assessment (Balance) CGA/Min A sitting balance. Mod-Max A x2 for standing balance  -RB           User Key  (r) = Recorded By, (t) = Taken By, (c) = Cosigned By    Initials Name Provider Type    Ana Banks OT Occupational Therapist               Goals/Plan    No documentation.                Clinical Impression     Row Name 05/04/23 1540          Pain Assessment    Pretreatment Pain Rating 0/10 - no pain  -RB     Posttreatment Pain Rating 0/10 - no pain  -RB     Row Name 05/04/23 1540          Plan of Care Review    Plan of Care Reviewed With patient  -RB     Progress no change  -RB     Row Name 05/04/23 1544          Positioning and Restraints    Pre-Treatment Position in bed  -RB     Post Treatment Position chair  -RB     In Chair notified nsg;reclined;sitting;call light within reach;encouraged to call for assist;exit alarm on;legs elevated  -RB            User Key  (r) = Recorded By, (t) = Taken By, (c) = Cosigned By    Initials Name Provider Type    Ana Banks OT Occupational Therapist               Outcome Measures     Row Name 05/04/23 1441          How much help from another person do you currently need...    Turning from your back to your side while in flat bed without using bedrails? 4  -CW     Moving from lying on back to sitting on the side of a flat bed without bedrails? 3  -CW     Moving to and from a bed to a chair (including a wheelchair)? 2  -CW     Standing up from a chair using your arms (e.g., wheelchair, bedside chair)? 3  -CW     Climbing 3-5 steps with a railing? 1  -CW     To walk in hospital room? 2  -CW     AM-PAC 6 Clicks Score (PT) 15  -CW     Highest level of mobility 4 --> Transferred to chair/commode  -CW     Row Name 05/04/23 1441          Functional Assessment    Outcome Measure Options AM-PAC 6 Clicks Basic Mobility (PT)  -CW           User Key  (r) = Recorded By, (t) = Taken By, (c) = Cosigned By    Initials Name Provider Type    Tamica Eddy PT Physical Therapist                Occupational Therapy Education     Title: PT OT SLP Therapies (In Progress)     Topic: Occupational Therapy (In Progress)     Point: ADL training (In Progress)     Description:   Instruct learner(s) on proper safety adaptation and remediation techniques during self care or transfers.   Instruct in proper use of assistive devices.              Learning Progress Summary           Patient Acceptance, E, NL by BB at 4/29/2023 0136    Comment: Patient cognition unable to allow for teaching at this time.    Acceptance, E,TB,H, VU by MS at 4/21/2023 1838    Acceptance, E,TB,H, VU,NR by MS at 4/13/2023 1817    Acceptance, E,TB,H, VU by MS at 3/16/2023 1801    Acceptance, E, NR by LM at 3/15/2023 0521   Family Acceptance, E,TB,H, VU by MS at 4/21/2023 1838    Acceptance, E,TB,H, VU,NR by MS at 4/13/2023 1817    Acceptance, E,TB,H, VU by MS at  3/16/2023 1801                   Point: Home exercise program (In Progress)     Description:   Instruct learner(s) on appropriate technique for monitoring, assisting and/or progressing therapeutic exercises/activities.              Learning Progress Summary           Patient Acceptance, E, NL by BB at 4/29/2023 0136    Comment: Patient cognition unable to allow for teaching at this time.    Acceptance, E,TB,H, VU by MS at 4/21/2023 1838    Acceptance, E,TB,H, VU,NR by MS at 4/13/2023 1817    Acceptance, E,TB,H, VU by MS at 3/16/2023 1801    Acceptance, E, NR by LM at 3/15/2023 0521   Family Acceptance, E,TB,H, VU by MS at 4/21/2023 1838    Acceptance, E,TB,H, VU,NR by MS at 4/13/2023 1817    Acceptance, E,TB,H, VU by MS at 3/16/2023 1801                   Point: Precautions (In Progress)     Description:   Instruct learner(s) on prescribed precautions during self-care and functional transfers.              Learning Progress Summary           Patient Acceptance, E, NL by BB at 4/29/2023 0136    Comment: Patient cognition unable to allow for teaching at this time.    Acceptance, E,TB,H, VU by MS at 4/21/2023 1838    Acceptance, E,TB,H, VU,NR by MS at 4/13/2023 1817    Acceptance, E,TB,H, VU by MS at 3/16/2023 1801    Acceptance, E, NR by LM at 3/15/2023 0521   Family Acceptance, E,TB,H, VU by MS at 4/21/2023 1838    Acceptance, E,TB,H, VU,NR by MS at 4/13/2023 1817    Acceptance, E,TB,H, VU by MS at 3/16/2023 1801                   Point: Body mechanics (In Progress)     Description:   Instruct learner(s) on proper positioning and spine alignment during self-care, functional mobility activities and/or exercises.              Learning Progress Summary           Patient Acceptance, E, NL by BB at 4/29/2023 0136    Comment: Patient cognition unable to allow for teaching at this time.    Acceptance, E,TB,H, VU by MS at 4/21/2023 1838    Acceptance, E,TB,H, VU,NR by MS at 4/13/2023 1817    Acceptance, E,TB,H, VU by MS at  3/16/2023 1801    Acceptance, E, NR by LM at 3/15/2023 0521   Family Acceptance, E,TB,H, VU by MS at 4/21/2023 1838    Acceptance, E,TB,H, VU,NR by MS at 4/13/2023 1817    Acceptance, E,TB,H, VU by MS at 3/16/2023 1801                               User Key     Initials Effective Dates Name Provider Type Discipline    MS 06/16/21 -  Angel Luis Cr, RN Registered Nurse Nurse    BB 01/10/23 -  Elyssa Thorne, RN Registered Nurse Nurse    MAGDAL[place] 10/13/22 -  Silke Grace RN Registered Nurse Nurse              OT Recommendation and Plan  Planned Therapy Interventions (OT): transfer/mobility retraining, strengthening exercise, ROM/therapeutic exercise, activity tolerance training, adaptive equipment training, BADL retraining, functional balance retraining, occupation/activity based interventions, patient/caregiver education/training, neuromuscular control/coordination retraining, cognitive/visual perception retraining, edema control/reduction, passive ROM/stretching  Therapy Frequency (OT): 5 times/wk  Plan of Care Review  Plan of Care Reviewed With: patient  Progress: no change  Outcome Evaluation: OT/PT co-tx this afternoon. Pt brought to the sunroom on 5P via recliner chair. Therapist set up chairs in close proximity to one another to simulate the transfers required when he boards an airplane post D/C. The pt required Mod-Max A x1-2 for safety awareness, balance and cueing the pt. He was able to participate in the functional transfers and pushed back to his hospital room. He then was set up at his bathroom doorway and he hopped/mobilized from the doorway to the bathroom commode with Mod-Max A x1-2.  Unsuccessful with toileting, total A for brief management. Mobilized back to his recliner chair, R side weakness continues. He was resting comfortably in his chair post session.     Time Calculation:    Time Calculation- OT     Row Name 05/04/23 1537             Time Calculation- OT    OT Start Time 1320  -RB      OT  Stop Time 1336  -RB      OT Time Calculation (min) 16 min  -RB      Total Timed Code Minutes- OT 16 minute(s)  -RB      OT Received On 05/04/23  -RB      OT - Next Appointment 05/05/23  -RB         Timed Charges    13744 - OT Self Care/Mgmt Minutes 16  -RB         Total Minutes    Timed Charges Total Minutes 16  -RB       Total Minutes 16  -RB            User Key  (r) = Recorded By, (t) = Taken By, (c) = Cosigned By    Initials Name Provider Type    RB Ana Lui OT Occupational Therapist              Therapy Charges for Today     Code Description Service Date Service Provider Modifiers Qty    [id number] HC OT SELF CARE/MGMT/TRAIN EA 15 MIN 5/4/2023 Ana Lui OT GO 1               Ana Lui OT  5/4/2023

## 2023-05-05 PROCEDURE — 97530 THERAPEUTIC ACTIVITIES: CPT

## 2023-05-05 PROCEDURE — 97535 SELF CARE MNGMENT TRAINING: CPT

## 2023-05-05 RX ADMIN — BACLOFEN 2.5 MG: 10 TABLET ORAL at 16:00

## 2023-05-05 RX ADMIN — BACLOFEN 2.5 MG: 10 TABLET ORAL at 21:15

## 2023-05-05 RX ADMIN — CARVEDILOL 3.12 MG: 3.12 TABLET, FILM COATED ORAL at 10:03

## 2023-05-05 RX ADMIN — AMLODIPINE BESYLATE 10 MG: 10 TABLET ORAL at 06:30

## 2023-05-05 RX ADMIN — VALSARTAN 160 MG: 160 TABLET, FILM COATED ORAL at 06:30

## 2023-05-05 RX ADMIN — CARVEDILOL 3.12 MG: 3.12 TABLET, FILM COATED ORAL at 17:23

## 2023-05-05 RX ADMIN — Medication 10 ML: at 21:15

## 2023-05-05 RX ADMIN — LIDOCAINE 1 PATCH: 700 PATCH TOPICAL at 10:04

## 2023-05-05 RX ADMIN — Medication 10 ML: at 10:04

## 2023-05-05 RX ADMIN — APIXABAN 5 MG: 5 TABLET, FILM COATED ORAL at 10:03

## 2023-05-05 RX ADMIN — BACLOFEN 2.5 MG: 10 TABLET ORAL at 05:44

## 2023-05-05 RX ADMIN — APIXABAN 5 MG: 5 TABLET, FILM COATED ORAL at 21:15

## 2023-05-05 NOTE — THERAPY TREATMENT NOTE
Patient Name: Manuel Durant  : 1950    MRN: [id number]                              Today's Date: 2023       Admit Date: 3/12/2023    Visit Dx:     ICD-10-CM ICD-9-CM   1. Intracranial hemorrhage  I62.9 432.9   2. Altered mental status, unspecified altered mental status type  R41.82 780.97     Patient Active Problem List   Diagnosis   • Intracranial hemorrhage   • Acute DVT (deep venous thrombosis)   • HTN (hypertension)   • Severe Malnutrition (HCC)   • Hemiplegia   • Right shoulder pain     Past Medical History:   Diagnosis Date   • Hypokalemia 3/17/2023     History reviewed. No pertinent surgical history.   General Information     Row Name 23 1509          Physical Therapy Time and Intention    Document Type therapy note (daily note) (P)   -ZB     Mode of Treatment co-treatment;occupational therapy;physical therapy (P)   -ZB     Row Name 23 1509          General Information    Patient Profile Reviewed yes (P)   -ZB     Existing Precautions/Restrictions fall (P)   -ZB     Row Name 23 1509          Cognition    Orientation Status (Cognition) oriented to;person (P)   -ZB     Row Name 23 1509          Safety Issues, Functional Mobility    Impairments Affecting Function (Mobility) balance;coordination;endurance/activity tolerance;grasp;motor control;postural/trunk control;strength;range of motion (ROM);cognition;visual/perceptual (P)   -ZB           User Key  (r) = Recorded By, (t) = Taken By, (c) = Cosigned By    Initials Name Provider Type    ZB Nestor Rae, PT Student PT Student               Mobility     Row Name 23 1509          Bed Mobility    Bed Mobility supine-sit (P)   -ZB     Supine-Sit [place] (Bed Mobility) standby assist;verbal cues (P)   -ZB     Comment, (Bed Mobility) UIC to end session (P)   -ZB     Row Name 23 1509          Bed-Chair Transfer    Bed-Chair [place] (Transfers) verbal cues;maximum assist (25% patient effort);1 person  assist;2 person assist;moderate assist (50% patient effort) (P)   -ZB     Assistive Device (Bed-Chair Transfers) other (see comments) (P)   hha  -ZB     Comment, (Bed-Chair Transfer) bed<>chair<>harback chair<>BSC (P)   -ZB     Row Name 05/05/23 1509          Sit-Stand Transfer    Sit-Stand [place] (Transfers) verbal cues;moderate assist (50% patient effort);1 person assist;2 person assist (P)   -ZB     Row Name 05/05/23 1509          Gait/Stairs (Locomotion)    [place] Level (Gait) not tested (P)   -ZB           User Key  (r) = Recorded By, (t) = Taken By, (c) = Cosigned By    Initials Name Provider Type    Nestor Howard, PT Student PT Student               Obj/Interventions     Row Name 05/05/23 1512          Balance    Balance Assessment sitting static balance;sitting dynamic balance;standing static balance;standing dynamic balance (P)   -ZB     Static Sitting Balance standby assist (P)   -ZB     Dynamic Sitting Balance standby assist (P)   -ZB     Position, Sitting Balance unsupported;sitting in chair;sitting edge of bed (P)   -ZB     Static Standing Balance minimal assist (P)   -ZB     Dynamic Standing Balance minimal assist;moderate assist;2-person assist;verbal cues (P)   -ZB     Position/Device Used, Standing Balance supported;other (see comments) (P)   hha  -ZB     Balance Interventions standing;sit to stand;sitting;supported;static;dynamic (P)   -ZB     Comment, Balance Standing wt shifting activity (P)   -ZB           User Key  (r) = Recorded By, (t) = Taken By, (c) = Cosigned By    Initials Name Provider Type    Nestor Howard, PT Student PT Student               Goals/Plan    No documentation.                Clinical Impression     Row Name 05/05/23 1513          Pain    Pretreatment Pain Rating 0/10 - no pain (P)   -ZB     Posttreatment Pain Rating 0/10 - no pain (P)   -ZB     Pain Intervention(s) Repositioned;Ambulation/increased activity (P)   -ZB     Row Name 05/05/23 5862           Plan of Care Review    Plan of Care Reviewed With patient (P)   -ZB     Progress no change (P)   -ZB     Outcome Evaluation Pt seen for PT/OT co-tx session this AM. Pt came to [facility] sba, STS Amish, and completed numerous txfrs to various surfaces including recliner, hardback chair, and BSC w/ varying levels of assist from Amish x1 to mod-maxA x2 w/ max cues. Pt UIC to end session, assisted w/ s/u for breakfast, and left in chair all needs met. PT will continue to monitor and progress as able. (P)   -ZB     Row Name 05/05/23 1513          Vital Signs    O2 Delivery Pre Treatment room air (P)   -ZB     O2 Delivery Post Treatment room air (P)   -ZB     Pre Patient Position Supine (P)   -ZB     Intra Patient Position Standing (P)   -ZB     Post Patient Position Sitting (P)   -ZB     Row Name 05/05/23 1513          Positioning and Restraints    Pre-Treatment Position in bed (P)   -ZB     Post Treatment Position chair (P)   -ZB     In Chair notified nsg;reclined;call light within reach;encouraged to call for assist;exit alarm on (P)   -ZB           User Key  (r) = Recorded By, (t) = Taken By, (c) = Cosigned By    Initials Name Provider Type    Nestor Howard, PT Student PT Student               Outcome Measures     Row Name 05/05/23 1516          How much help from another person do you currently need...    Turning from your back to your side while in flat bed without using bedrails? 4 (P)   -ZB     Moving from lying on back to sitting on the side of a flat bed without bedrails? 3 (P)   -ZB     Moving to and from a bed to a chair (including a wheelchair)? 2 (P)   -ZB     Standing up from a chair using your arms (e.g., wheelchair, bedside chair)? 3 (P)   -ZB     Climbing 3-5 steps with a railing? 1 (P)   -ZB     To walk in hospital room? 2 (P)   -ZB     AM-PAC 6 Clicks Score (PT) 15 (P)   -ZB     Highest level of mobility 4 --> Transferred to chair/commode (P)   -ZB     Row Name 05/05/23 1516 05/05/23 1129       Functional  Assessment    Outcome Measure Options AM-PAC 6 Clicks Basic Mobility (PT) (P)   -CHARLY AM-PAC 6 Clicks Daily Activity (OT)  -MW          User Key  (r) = Recorded By, (t) = Taken By, (c) = Cosigned By    Initials Name Provider Type    Karishma Williamson, OT Occupational Therapist    Nestor Howard, PT Student PT Student                             Physical Therapy Education     Title: PT OT SLP Therapies (In Progress)     Topic: Physical Therapy (In Progress)     Point: Mobility training (In Progress)     Learning Progress Summary           Patient Acceptance, E, NR by CW at 5/4/2023 1441    Acceptance, E, NR by CW at 5/2/2023 1402    Acceptance, E, VU,NR by CW at 5/1/2023 1005    Acceptance, E, NL by BB at 4/29/2023 0136    Comment: Patient cognition unable to allow for teaching at this time.    Acceptance, E,TB,H, VU by MS at 4/21/2023 1838    Acceptance, E, NR by CW at 4/21/2023 1537    Acceptance, E, VU,DU by CHARLY at 4/19/2023 1130    Acceptance, E, NR by CW at 4/18/2023 0950    Acceptance, E, VU,NR by NICOLASA at 4/16/2023 1158    Acceptance, E,TB,H, VU,NR by MS at 4/13/2023 1817    Acceptance, E, NR by CW at 4/13/2023 1341    Acceptance, E, NR by CW at 4/12/2023 1201    Acceptance, E, NR by CW at 4/11/2023 1158    Acceptance, E, NR by CW at 4/10/2023 1414    Acceptance, E,TB, NR by CS at 4/8/2023 0928    Acceptance, E, NR by CW at 4/7/2023 1215    Acceptance, E, NR by CW at 4/6/2023 1335    Acceptance, E, NR by CW at 4/5/2023 1043    Acceptance, E, NR by CW at 4/4/2023 0906    Acceptance, E, NR by CW at 4/3/2023 1531    Acceptance, E,TB, VU,NR by CAMERON at 4/1/2023 1530    Acceptance, E, NR by CW at 3/31/2023 0925    Acceptance, E, NR by CW at 3/30/2023 1209    Acceptance, E, NR,NL by CW at 3/29/2023 1336    Acceptance, E, NR by ZB at 3/28/2023 1317    Acceptance, E, NR by CW at 3/27/2023 1401    Acceptance, E,D, NR,VU by ZACH at 3/24/2023 1631    Comment: seemed to comprehend commands this session    Acceptance, E,D, NR by  ZACH at 3/23/2023 1655    Acceptance, E,D, NR by JM at 3/22/2023 1545    Acceptance, E,TB, VU,NR by CB at 3/21/2023 1544    Acceptance, E,TB,H, VU by MS at 3/16/2023 1801    Acceptance, E,D, DU,NR by MO at 3/16/2023 1451    Acceptance, E, DU,NR by MO at 3/15/2023 1200    Acceptance, E, NR by LM at 3/15/2023 0521    Acceptance, E,D, DU,NR by MO at 3/14/2023 1458   Family Acceptance, E,TB,H, VU by MS at 4/21/2023 1838    Acceptance, E,TB,H, VU,NR by MS at 4/13/2023 1817    Acceptance, E,TB,H, VU by MS at 3/16/2023 1801                   Point: Home exercise program (In Progress)     Learning Progress Summary           Patient Acceptance, E, NL by BB at 4/29/2023 0136    Comment: Patient cognition unable to allow for teaching at this time.    Acceptance, E,TB,H, VU by MS at 4/21/2023 1838    Acceptance, E, VU,NR by DJ at 4/16/2023 1158    Acceptance, E,TB,H, VU,NR by MS at 4/13/2023 1817    Acceptance, E, NR by CW at 4/13/2023 1341    Acceptance, E,TB, NR by CS at 4/8/2023 0928    Acceptance, E, NR by CW at 4/7/2023 1215    Acceptance, E, NR by CW at 4/6/2023 1335    Acceptance, E,TB, VU,NR by CAMERON at 4/1/2023 1530    Acceptance, E, NR by CW at 3/31/2023 0925    Acceptance, E, NR by ZB at 3/28/2023 1317    Acceptance, E, NR by CW at 3/27/2023 1401    Acceptance, E,D, NR,VU by JM at 3/24/2023 1631    Comment: seemed to comprehend commands this session    Acceptance, E,D, NR by ZACH at 3/23/2023 1655    Acceptance, E,D, NR by ZACH at 3/22/2023 1545    Acceptance, E,TB, VU,NR by CB at 3/21/2023 1544    Acceptance, E,TB,H, VU by MS at 3/16/2023 1801    Acceptance, E,D, DU,NR by MO at 3/16/2023 1451    Acceptance, E, NR by LM at 3/15/2023 0521    Acceptance, E,D, DU,NR by MO at 3/14/2023 1458   Family Acceptance, E,TB,H, VU by MS at 4/21/2023 1838    Acceptance, E,TB,H, VU,NR by MS at 4/13/2023 1817    Acceptance, E,TB,H, VU by MS at 3/16/2023 1801                   Point: Body mechanics (In Progress)     Learning Progress Summary            Patient Acceptance, E, NL by BB at 4/29/2023 0136    Comment: Patient cognition unable to allow for teaching at this time.    Acceptance, E,TB,H, VU by MS at 4/21/2023 1838    Acceptance, E, VU,NR by DJ at 4/16/2023 1158    Acceptance, E,TB,H, VU,NR by MS at 4/13/2023 1817    Acceptance, E, NR by CW at 4/13/2023 1341    Acceptance, E, NR by CW at 4/11/2023 1158    Acceptance, E, NR by CW at 4/10/2023 1414    Acceptance, E,TB, NR by CS at 4/8/2023 0928    Acceptance, E, NR by CW at 4/7/2023 1215    Acceptance, E, NR by CW at 4/4/2023 0906    Acceptance, E, NR by CW at 4/3/2023 1531    Acceptance, E,TB, VU,NR by CAMERON at 4/1/2023 1530    Acceptance, E,D, NR,VU by JM at 3/24/2023 1631    Comment: seemed to comprehend commands this session    Acceptance, E,D, NR by JM at 3/23/2023 1655    Acceptance, E,D, NR by JM at 3/22/2023 1545    Acceptance, E,TB, VU,NR by CB at 3/21/2023 1544    Acceptance, E,TB,H, VU by MS at 3/16/2023 1801    Acceptance, E,D, DU,NR by MO at 3/16/2023 1451    Acceptance, E, DU,NR by MO at 3/15/2023 1200    Acceptance, E, NR by LM at 3/15/2023 0521    Acceptance, E,D, DU,NR by MO at 3/14/2023 1458   Family Acceptance, E,TB,H, VU by MS at 4/21/2023 1838    Acceptance, E,TB,H, VU,NR by MS at 4/13/2023 1817    Acceptance, E,TB,H, VU by MS at 3/16/2023 1801                   Point: Precautions (In Progress)     Learning Progress Summary           Patient Acceptance, E, NL by BB at 4/29/2023 0136    Comment: Patient cognition unable to allow for teaching at this time.    Acceptance, E,TB,H, VU by MS at 4/21/2023 1838    Acceptance, E, VU,NR by DJ at 4/16/2023 1158    Acceptance, E,TB,H, VU,NR by MS at 4/13/2023 1817    Acceptance, E, NR by CW at 4/13/2023 1341    Acceptance, E,TB, NR by CS at 4/8/2023 0928    Acceptance, E, NR by CW at 4/7/2023 1215    Acceptance, E, NR by CW at 4/4/2023 0906    Acceptance, E, NR by CW at 4/3/2023 1531    Acceptance, E,TB, VU,NR by CAMERON at 4/1/2023 1530     Acceptance, E,D, NR,VU by JM at 3/24/2023 1631    Comment: seemed to comprehend commands this session    Acceptance, E,D, NR by JM at 3/23/2023 1655    Acceptance, E,D, NR by JM at 3/22/2023 1545    Acceptance, E,TB, VU,NR by CB at 3/21/2023 1544    Acceptance, E,TB,H, VU by MS at 3/16/2023 1801    Acceptance, E,D, DU,NR by MO at 3/16/2023 1451    Acceptance, E, DU,NR by MO at 3/15/2023 1200    Acceptance, E, NR by LM at 3/15/2023 0521    Acceptance, E,D, DU,NR by MO at 3/14/2023 1458   Family Acceptance, E,TB,H, VU by MS at 4/21/2023 1838    Acceptance, E,TB,H, VU,NR by MS at 4/13/2023 1817    Acceptance, E,TB,H, VU by MS at 3/16/2023 1801                               User Key     Initials Effective Dates Name Provider Type Discipline     03/07/18 -  Tiffanie Grace, PTA Physical Therapist Assistant PT    CAMERON 05/19/21 -  Leeann Wiley, PT Physical Therapist PT    DJ 10/25/19 -  Jacqueline Chamberlain, PT Physical Therapist PT    MS 06/16/21 -  Angel Luis Cr, RN Registered Nurse Nurse    CW 12/13/22 -  Tamica Willoughby, PT Physical Therapist PT    CB 10/22/21 -  Bety Benitez, PT Physical Therapist PT    BB 01/10/23 -  Elyssa Thorne, RN Registered Nurse Nurse    CS 09/22/22 -  Adelia Montalvo, PT Physical Therapist PT    LM 10/13/22 -  Silke Grace, RN Registered Nurse Nurse    MO 01/27/23 -  Lacie Tanner, PT Student PT Student PT    ZB 03/10/23 -  Nestor Rae, PT Student PT Student PT              PT Recommendation and Plan     Plan of Care Reviewed With: (P) patient  Progress: (P) no change  Outcome Evaluation: (P) Pt seen for PT/OT co-tx session this AM. Pt came to [facility] sba, STS Amish, and completed numerous txfrs to various surfaces including recliner, hardback chair, and BSC w/ varying levels of assist from Amish x1 to mod-maxA x2 w/ max cues. Pt UIC to end session, assisted w/ s/u for breakfast, and left in chair all needs met. PT will continue to monitor and progress as able.     Time Calculation:     PT Charges     Row Name 05/05/23 1517             Time Calculation    Start Time 0823 (P)   -ZB      Stop Time 0849 (P)   -ZB      Time Calculation (min) 26 min (P)   -ZB      PT Received On 05/05/23 (P)   -ZB      PT - Next Appointment 05/08/23 (P)   -ZB         Time Calculation- PT    Total Timed Code Minutes- PT 26 minute(s) (P)   -ZB         Timed Charges    03985 - PT Therapeutic Activity Minutes 26 (P)   -ZB         Total Minutes    Timed Charges Total Minutes 26 (P)   -ZB       Total Minutes 26 (P)   -ZB            User Key  (r) = Recorded By, (t) = Taken By, (c) = Cosigned By    Initials Name Provider Type    Nestor Howard, PT Student PT Student              Therapy Charges for Today     Code Description Service Date Service Provider Modifiers Qty    [id number]  PT THERAPEUTIC ACT EA 15 MIN 5/5/2023 Nestor Rae, PT Student GP 2          PT G-Codes  Outcome Measure Options: (P) AM-PAC 6 Clicks Basic Mobility (PT)  AM-PAC 6 Clicks Score (PT): (P) 15  AM-PAC 6 Clicks Score (OT): 11  Modified [place] Scale: 4 - Moderately severe disability.  Unable to walk without assistance, and unable to attend to own bodily needs without assistance.       Nestor Rae PT Student  5/5/2023

## 2023-05-05 NOTE — THERAPY TREATMENT NOTE
Patient Name: Manuel Durant  : 1950    MRN: [id number]                              Today's Date: 2023       Admit Date: 3/12/2023    Visit Dx:     ICD-10-CM ICD-9-CM   1. Intracranial hemorrhage  I62.9 432.9   2. Altered mental status, unspecified altered mental status type  R41.82 780.97     Patient Active Problem List   Diagnosis   • Intracranial hemorrhage   • Acute DVT (deep venous thrombosis)   • HTN (hypertension)   • Severe Malnutrition (HCC)   • Hemiplegia   • Right shoulder pain     Past Medical History:   Diagnosis Date   • Hypokalemia 3/17/2023     History reviewed. No pertinent surgical history.   General Information     Row Name 23 [facility]          OT Time and Intention    Document Type therapy note (daily note)  -     Mode of Treatment co-treatment;occupational therapy;physical therapy  -     Row Name 23 1123          General Information    Patient Profile Reviewed yes  -MW     Existing Precautions/Restrictions fall  -     Row Name 23 1123          Cognition    Orientation Status (Cognition) oriented to;person  -     Row Name 23 1123          Safety Issues, Functional Mobility    Impairments Affecting Function (Mobility) balance;coordination;endurance/activity tolerance;grasp;motor control;postural/trunk control;strength;range of motion (ROM);cognition;visual/perceptual  -           User Key  (r) = Recorded By, (t) = Taken By, (c) = Cosigned By    Initials Name Provider Type    MW Karishma Spence OT Occupational Therapist                 Mobility/ADL's     Row Name 23 1123          Bed Mobility    Bed Mobility supine-sit  -MW     Supine-Sit [place] (Bed Mobility) standby assist;verbal cues  -     Assistive Device (Bed Mobility) bed rails  -     Comment, (Bed Mobility) [facility] end of session  -     Row Name 23 1123          Transfers    Transfers sit-stand transfer;stand-sit transfer;bed-chair transfer  -     Comment, (Transfers)  multiple functional transfers and stand pivots completed this date to improve carryover with activity, max cues throughout  -[facility] Name 05/05/23 1123          Bed-Chair Transfer    Bed-Chair [place] (Transfers) verbal cues;maximum assist (25% patient effort);1 person assist;2 person assist;moderate assist (50% patient effort)  -     Assistive Device (Bed-Chair Transfers) other (see comments)  -     Comment, (Bed-Chair Transfer) HHA, chair <> hardback chair <> BSC  -[facility] Name 05/05/23 [facility]3          Sit-Stand Transfer    Sit-Stand [place] (Transfers) verbal cues;moderate assist (50% patient effort);1 person assist;2 person assist  -     Comment, (Sit-Stand Transfer) HHA , multiple STS completed from various household surfaces  -MW     Row Name 05/05/23 [facility]3          Toilet Transfer    Comment, (Toilet Transfer) declined need to void with nurse aide present and checking  -MW     Row Name 05/05/23 1123          Activities of Daily Living    BADL Assessment/Intervention bathing;feeding  -MW     Row Name 05/05/23 [facility]3          Toileting Assessment/Training    [place] Level (Toileting) toileting skills;dependent (less than 25% patient effort)  -     Position (Toileting) supported sitting;supported standing  -[facility] Name 05/05/23 [facility]3          Self-Feeding Assessment/Training    Comment, (Feeding) max A for tray s/up, hand to mouth with LUE using spoon with increased (I)  -[facility] Name 05/05/23 [facility]          Bathing Assessment/Intervention    Position (Bathing) supported sitting  -     Comment, (Bathing) s/up of washcloth, able to use LUE to wash face and RUE, upper trunk  -           User Key  (r) = Recorded By, (t) = Taken By, (c) = Cosigned By    Initials Name Provider Type    MW Karishma Spence OT Occupational Therapist               Obj/Interventions     Row Name 05/05/23 1126          Shoulder (Therapeutic Exercise)    Shoulder AAROM (Therapeutic Exercise) right;scapular  protraction;scapular retraction;aBduction;aDduction;10 repetitions;sitting  -     Row Name 05/05/23 1126          Motor Skills    Therapeutic Exercise shoulder  -     Row Name 05/05/23 1126          Balance    Balance Assessment sitting static balance;sitting dynamic balance;sit to stand dynamic balance;standing static balance;standing dynamic balance  -     Static Sitting Balance standby assist  -     Dynamic Sitting Balance standby assist  -     Position, Sitting Balance sitting edge of bed;sitting in chair  -     Sit to Stand Dynamic Balance minimal assist;moderate assist  -MW     Static Standing Balance minimal assist  -MW     Dynamic Standing Balance minimal assist;moderate assist;2-person assist;verbal cues  -     Position/Device Used, Standing Balance supported;other (see comments)  HHA  -     Balance Interventions occupation based/functional task  -           User Key  (r) = Recorded By, (t) = Taken By, (c) = Cosigned By    Initials Name Provider Type    Karishma Williamson OT Occupational Therapist               Goals/Plan     Row Name 05/05/23 1129          Transfer Goal 1 (OT)    Activity/Assistive Device (Transfer Goal 1, OT) transfers, all  -     [place] Level/Cues Needed (Transfer Goal 1, OT) minimum assist (75% or more patient effort)  -MW     Time Frame (Transfer Goal 1, OT) 2 weeks  -MW     Progress/Outcome (Transfer Goal 1, OT) continuing progress toward goal  -MW     [facility] Name 05/05/23 1129          Dressing Goal 1 (OT)    Activity/Device (Dressing Goal 1, OT) dressing skills, all  -MW     [place]/Cues Needed (Dressing Goal 1, OT) minimum assist (75% or more patient effort)  -     Time Frame (Dressing Goal 1, OT) short term goal (STG);2 weeks  -MW     Progress/Outcome (Dressing Goal 1, OT) continuing progress toward goal  -MW     Row Name 05/05/23 1129          Toileting Goal 1 (OT)    Activity/Device (Toileting Goal 1, OT) toileting skills, all  -MW      [place] Level/Cues Needed (Toileting Goal 1, OT) minimum assist (75% or more patient effort)  -MW     Time Frame (Toileting Goal 1, OT) short term goal (STG);2 weeks  -MW     Progress/Outcome (Toileting Goal 1, OT) continuing progress toward goal  -MW     Row Name 05/05/23 1129          Grooming Goal 1 (OT)    Activity/Device (Grooming Goal 1, OT) grooming skills, all  -MW     [place] (Grooming Goal 1, OT) supervision required  -MW     Time Frame (Grooming Goal 1, OT) short term goal (STG);2 weeks  -MW     Progress/Outcome (Grooming Goal 1, OT) continuing progress toward goal  -MW           User Key  (r) = Recorded By, (t) = Taken By, (c) = Cosigned By    Initials Name Provider Type    Karishma Williamson, OT Occupational Therapist               Clinical Impression     Row Name 05/05/23 1127          Pain Assessment    Pretreatment Pain Rating 0/10 - no pain  -MW     Posttreatment Pain Rating 0/10 - no pain  -MW     Row Name 05/05/23 1127          Plan of Care Review    Plan of Care Reviewed With patient  -MW     Progress no change  -MW     Outcome Evaluation Pt seen for OT/PT tx session in AM, supine on arrival, coming to EOB with SBA. Pt completed multiple transfers this date to simulate transfers required at d/c, UE bathing completed with LUE use, cues for carryover, STS and stand pivots with varying levels of assist to mod-max A x2 at times with transfer and max cues. Pt UIC at end of session, s/up for breakfast and use LUE for spoon feeding (I). Pt will continue to progress as able.  -MW     Row Name 05/05/23 1127          Vital Signs    O2 Delivery Pre Treatment room air  -MW     O2 Delivery Post Treatment room air  -MW     Pre Patient Position Supine  -MW     Intra Patient Position Standing  -MW     Post Patient Position Sitting  -MW     Row Name 05/05/23 1127          Positioning and Restraints    Pre-Treatment Position in bed  -MW     Post Treatment Position chair  -MW     In Chair notified  nsg;reclined;call light within reach;encouraged to call for assist;exit alarm on  -MW           User Key  (r) = Recorded By, (t) = Taken By, (c) = Cosigned By    Initials Name Provider Type    Karishma Williamson OT Occupational Therapist               Outcome Measures     Row Name 05/05/23 1129          How much help from another is currently needed...    Putting on and taking off regular lower body clothing? 2  -MW     Bathing (including washing, rinsing, and drying) 2  -MW     Toileting (which includes using toilet bed pan or urinal) 1  -MW     Putting on and taking off regular upper body clothing 2  -MW     Taking care of personal grooming (such as brushing teeth) 2  -MW     Eating meals 2  -MW     AM-PAC 6 Clicks Score (OT) 11  -MW     Row Name 05/05/23 1129          Functional Assessment    Outcome Measure Options AM-PAC 6 Clicks Daily Activity (OT)  -MW           User Key  (r) = Recorded By, (t) = Taken By, (c) = Cosigned By    Initials Name Provider Type    Karishma Williamson OT Occupational Therapist                Occupational Therapy Education     Title: PT OT SLP Therapies (In Progress)     Topic: Occupational Therapy (In Progress)     Point: ADL training (In Progress)     Description:   Instruct learner(s) on proper safety adaptation and remediation techniques during self care or transfers.   Instruct in proper use of assistive devices.              Learning Progress Summary           Patient Acceptance, E, NL by BB at 4/29/2023 0136    Comment: Patient cognition unable to allow for teaching at this time.    Acceptance, E,TB,H, VU by MS at 4/21/2023 1838    Acceptance, E,TB,H, VU,NR by MS at 4/13/2023 1817    Acceptance, E,TB,H, VU by MS at 3/16/2023 1801    Acceptance, E, NR by LM at 3/15/2023 0521   Family Acceptance, E,TB,H, VU by MS at 4/21/2023 1838    Acceptance, E,TB,H, VU,NR by MS at 4/13/2023 1817    Acceptance, E,TB,H, VU by MS at 3/16/2023 1801                   Point: Home exercise  program (In Progress)     Description:   Instruct learner(s) on appropriate technique for monitoring, assisting and/or progressing therapeutic exercises/activities.              Learning Progress Summary           Patient Acceptance, E, NL by BB at 4/29/2023 0136    Comment: Patient cognition unable to allow for teaching at this time.    Acceptance, E,TB,H, VU by MS at 4/21/2023 1838    Acceptance, E,TB,H, VU,NR by MS at 4/13/2023 1817    Acceptance, E,TB,H, VU by MS at 3/16/2023 1801    Acceptance, E, NR by LM at 3/15/2023 0521   Family Acceptance, E,TB,H, VU by MS at 4/21/2023 1838    Acceptance, E,TB,H, VU,NR by MS at 4/13/2023 1817    Acceptance, E,TB,H, VU by MS at 3/16/2023 1801                   Point: Precautions (In Progress)     Description:   Instruct learner(s) on prescribed precautions during self-care and functional transfers.              Learning Progress Summary           Patient Acceptance, E, NL by BB at 4/29/2023 0136    Comment: Patient cognition unable to allow for teaching at this time.    Acceptance, E,TB,H, VU by MS at 4/21/2023 1838    Acceptance, E,TB,H, VU,NR by MS at 4/13/2023 1817    Acceptance, E,TB,H, VU by MS at 3/16/2023 1801    Acceptance, E, NR by LM at 3/15/2023 0521   Family Acceptance, E,TB,H, VU by MS at 4/21/2023 1838    Acceptance, E,TB,H, VU,NR by MS at 4/13/2023 1817    Acceptance, E,TB,H, VU by MS at 3/16/2023 1801                   Point: Body mechanics (In Progress)     Description:   Instruct learner(s) on proper positioning and spine alignment during self-care, functional mobility activities and/or exercises.              Learning Progress Summary           Patient Acceptance, E, NL by BB at 4/29/2023 0136    Comment: Patient cognition unable to allow for teaching at this time.    Acceptance, E,TB,H, VU by MS at 4/21/2023 1838    Acceptance, E,TB,H, VU,NR by MS at 4/13/2023 1817    Acceptance, E,TB,H, VU by MS at 3/16/2023 1801    Acceptance, E, NR by LM at 3/15/2023  0521   Family Acceptance, E,TB,H, VU by MS at 4/21/2023 1838    Acceptance, E,TB,H, VU,NR by MS at 4/13/2023 1817    Acceptance, E,TB,H, VU by MS at 3/16/2023 1801                               User Key     Initials Effective Dates Name Provider Type Discipline    MS 06/16/21 -  Angel Luis Cr, RN Registered Nurse Nurse    BB 01/10/23 -  Elyssa Thorne, RN Registered Nurse Nurse    LM 10/13/22 -  Silke Grace, RN Registered Nurse Nurse              OT Recommendation and Plan     Plan of Care Review  Plan of Care Reviewed With: patient  Progress: no change  Outcome Evaluation: Pt seen for OT/PT tx session in AM, supine on arrival, coming to EOB with SBA. Pt completed multiple transfers this date to simulate transfers required at d/c, UE bathing completed with LUE use, cues for carryover, STS and stand pivots with varying levels of assist to mod-max A x2 at times with transfer and max cues. Pt UIC at end of session, s/up for breakfast and use LUE for spoon feeding (I). Pt will continue to progress as able.     Time Calculation:    Time Calculation- OT     Row Name 05/05/23 1130             Time Calculation- OT    OT Start Time 0823  -MW      OT Stop Time 0849  -MW      OT Time Calculation (min) 26 min  -MW      Total Timed Code Minutes- OT 26 minute(s)  -MW      OT Received On 05/05/23  -MW      OT - Next Appointment 05/08/23  -MW      OT Goal Re-Cert Due Date 05/20/23  -MW         Timed Charges    87002 - OT Therapeutic Activity Minutes 16  -MW      87629 - OT Self Care/Mgmt Minutes 10  -MW         Total Minutes    Timed Charges Total Minutes 26  -MW       Total Minutes 26  -MW            User Key  (r) = Recorded By, (t) = Taken By, (c) = Cosigned By    Initials Name Provider Type     Karishma Spence OT Occupational Therapist              Therapy Charges for Today     Code Description Service Date Service Provider Modifiers Qty    08331937394  OT THERAPEUTIC ACT EA 15 MIN 5/5/2023 Karishma Spence OT  GO 1    24232967274 HC OT SELF CARE/MGMT/TRAIN EA 15 MIN 5/5/2023 Karishma Spence OT GO 1               Karishma Spnece OT  5/5/2023

## 2023-05-05 NOTE — PROGRESS NOTES
Name: Manuel Durant ADMIT: 3/12/2023   : 1950  PCP: Provider, No Known    MRN: [id number] LOS: 54 days   AGE/SEX: 73 y.o. male  ROOM: [facility]     Subjective   Subjective   No new issues     Objective   Objective   Vital Signs  Temp:  [97.9 °F (36.6 °C)-98.2 °F (36.8 °C)] 97.9 °F (36.6 °C)  Heart Rate:  [54-67] 61  Resp:  [16-18] 16  BP: (100-123)/(56-69) 112/69  SpO2:  [93 %-99 %] 95 %  on   ;   Device (Oxygen Therapy): room air  Body mass index is 21.15 kg/m².   Physical Exam  Vitals and nursing note reviewed.   Constitutional:       Appearance: Normal appearance.   Pulmonary:      Effort: Pulmonary effort is normal.   Musculoskeletal:      Comments: RUE weakness   Neurological:      Mental Status: He is alert. Mental status is at baseline.   Psychiatric:         Mood and Affect: Mood normal.       Results Review:       I reviewed the patient's new clinical results.      Estimated Creatinine Clearance: 72.1 mL/min (by C-G formula based on SCr of 0.79 mg/dL).        No results found for: HGBA1C, POCGLU    amLODIPine, 10 mg, Oral, QAM AC  apixaban, 5 mg, Oral, Q12H  baclofen, 2.5 mg, Oral, Q8H  carvedilol, 3.125 mg, Oral, BID With Meals  lidocaine, 1 patch, Transdermal, Q24H  sodium chloride, 10 mL, Intravenous, Q12H  valsartan, 160 mg, Oral, QAM AC       Diet: Regular/House Diet; No Mixed Consistencies, Feeding Assistance - Nursing; Texture: Soft to Chew (NDD 3); Soft to Chew: Chopped Meat; Fluid Consistency: Nectar Thick       Assessment/Plan     Active Hospital Problems    Diagnosis  POA   • **Intracranial hemorrhage [I62.9]  Yes   • Hemiplegia [G81.90]  Yes   • Right shoulder pain [M25.511]  Yes   • Severe Malnutrition (HCC) [E43]  Yes   • Acute DVT (deep venous thrombosis) [I82.409]  Yes   • HTN (hypertension) [I10]  Yes      Resolved Hospital Problems    Diagnosis Date Resolved POA   • Hypokalemia [E87.6] 04/10/2023 Unknown   • Hypertensive emergency [I16.1] 2023 Yes       Mr. Durant is a 73  year old male who initially presented to the hospital 3/12/23 for intracranial bleed. He was found unresponsive by friends and initially taken to [facility] where he was found to have poorly controlled BP and right sided hemiplegia. He was transferred here for closer monitoring in the ICU and neurosurgical consultation. He was cleared to move out of the ICU on 3/16/23.     • Intracranial hemorrhage: CTH 3/17/23 stable left basal ganglia/internal capsule hemorrhage with left lateral ventricular hemorrhage. On Lovenox for DVT and repeat CTH 3/18 also stable. ANDRADE followed and signed off 3/23 with plans for repeat MRI brain/MRA head in 1 month- ANDRADE saw again 4/20 and ordered MRI but this was not done as patient couldn't be cleared for MRI. CTA/CT head with evidence of hemorrhage cavity left sided with some peripheral enhancement which may just be related to resolving hemorrhage but underlying mass cannot be excluded- needs repeat CTH with contrast or MRI brain in 6-8 weeks and local follow up with neurology/ANDRADE in [place]. Goal BP < 150 systolic. Remains on baclofen for spasticity.      • HTN emergency: On amlodipine 10 mg, valsartan 160 mg, Coreg 3.125 mg BID and hydralazine 10 mg BID (reduced frequency yesterday). BP still very stable and will go ahead and stop hydralazine.  He is on a very low-dose of this and it is a poor agent for treating hypertension.  Would rather go up on his valsartan or carvedilol or add a thiazide.  Continue to monitor BP.     • Acute DVT: Found 3/17/23 with acute RLE DVT in soleal. ANDRADE okayed for blood thinners. Now on Eliquis.      Acute rehab consulted yesterday    Family set up a time to come to [place] and get him to fly [place]- tentative discharge date May 20 th.      Harpal Mcconnell MD  [facility]  05/05/23  07:09 EDT

## 2023-05-05 NOTE — PLAN OF CARE
Goal Outcome Evaluation:      No significant events overnight.              Problem: Fall Injury Risk  Goal: Absence of Fall and Fall-Related Injury  Outcome: Ongoing, Progressing  Intervention: Identify and Manage Contributors  Recent Flowsheet Documentation  Taken 5/4/2023 2130 by Meg Ybarra RN  Medication Review/Management: medications reviewed  Intervention: Promote Injury-Free Environment  Recent Flowsheet Documentation  Taken 5/5/2023 0412 by Meg Ybarra RN  Safety Promotion/Fall Prevention: safety round/check completed  Taken 5/5/2023 0200 by Meg Ybarra RN  Safety Promotion/Fall Prevention: safety round/check completed  Taken 5/4/2023 2200 by Meg Ybarra RN  Safety Promotion/Fall Prevention: safety round/check completed  Taken 5/4/2023 2130 by Meg Ybarra RN  Safety Promotion/Fall Prevention:   assistive device/personal items within reach   fall prevention program maintained   nonskid shoes/slippers when out of bed   safety round/check completed  Taken 5/4/2023 2000 by Meg Ybarra RN  Safety Promotion/Fall Prevention: safety round/check completed     Problem: Skin Injury Risk Increased  Goal: Skin Health and Integrity  Outcome: Ongoing, Progressing  Intervention: Optimize Skin Protection  Recent Flowsheet Documentation  Taken 5/5/2023 0412 by Meg Ybarra RN  Head of Bed (HOB) Positioning: HOB at 15 degrees  Taken 5/5/2023 0200 by Meg Ybarra RN  Head of Bed (HOB) Positioning: HOB at 20 degrees  Taken 5/4/2023 2200 by Meg Ybarra RN  Head of Bed (HOB) Positioning: HOB at 20 degrees  Taken 5/4/2023 2130 by Meg Ybarra RN  Head of Bed (HOB) Positioning: HOB at 20-30 degrees     Problem: Adult Inpatient Plan of Care  Goal: Plan of Care Review  Outcome: Ongoing, Progressing  Goal: Patient-Specific Goal (Individualized)  Outcome: Ongoing, Progressing  Goal: Absence of Hospital-Acquired Illness or Injury  Outcome: Ongoing, Progressing  Intervention: Identify and  Manage Fall Risk  Recent Flowsheet Documentation  Taken 5/5/2023 0412 by Meg Ybarra RN  Safety Promotion/Fall Prevention: safety round/check completed  Taken 5/5/2023 0200 by Meg Ybarra RN  Safety Promotion/Fall Prevention: safety round/check completed  Taken 5/4/2023 2200 by Meg Ybarra RN  Safety Promotion/Fall Prevention: safety round/check completed  Taken 5/4/2023 2130 by Meg Ybarra RN  Safety Promotion/Fall Prevention:   assistive device/personal items within reach   fall prevention program maintained   nonskid shoes/slippers when out of bed   safety round/check completed  Taken 5/4/2023 2000 by Meg Ybarra RN  Safety Promotion/Fall Prevention: safety round/check completed  Intervention: Prevent Skin Injury  Recent Flowsheet Documentation  Taken 5/5/2023 0412 by Meg Ybarra RN  Body Position: supine, legs elevated  Taken 5/5/2023 0200 by Meg Ybarra RN  Body Position: weight shifting  Taken 5/4/2023 2200 by Meg Ybarra RN  Body Position: weight shifting  Taken 5/4/2023 2000 by Meg Ybarra RN  Body Position:   tilted   left  Intervention: Prevent and Manage VTE (Venous Thromboembolism) Risk  Recent Flowsheet Documentation  Taken 5/4/2023 2130 by Meg Ybarra RN  Activity Management: activity encouraged  VTE Prevention/Management: (Eliquis) other (see comments)  Taken 5/4/2023 2000 by Meg Ybarra RN  Activity Management: activity encouraged  Goal: Optimal Comfort and Wellbeing  Outcome: Ongoing, Progressing  Goal: Readiness for Transition of Care  Outcome: Ongoing, Progressing     Problem: Adjustment to Illness (Stroke, Ischemic/Transient Ischemic Attack)  Goal: Optimal Coping  Outcome: Ongoing, Progressing     Problem: Bowel Elimination Impaired (Stroke, Ischemic/Transient Ischemic Attack)  Goal: Effective Bowel Elimination  Outcome: Ongoing, Progressing     Problem: Cerebral Tissue Perfusion (Stroke, Ischemic/Transient Ischemic Attack)  Goal: Optimal  Cerebral Tissue Perfusion  Outcome: Ongoing, Progressing     Problem: Cognitive Impairment (Stroke, Ischemic/Transient Ischemic Attack)  Goal: Optimal Cognitive Function  Outcome: Ongoing, Progressing     Problem: Communication Impairment (Stroke, Ischemic/Transient Ischemic Attack)  Goal: Improved Communication Skills  Outcome: Ongoing, Progressing     Problem: Functional Ability Impaired (Stroke, Ischemic/Transient Ischemic Attack)  Goal: Optimal Functional Ability  Outcome: Ongoing, Progressing  Intervention: Optimize Functional Ability  Recent Flowsheet Documentation  Taken 5/4/2023 2130 by Meg Ybarra RN  Activity Management: activity encouraged  Taken 5/4/2023 2000 by Meg Ybarra RN  Activity Management: activity encouraged     Problem: Respiratory Compromise (Stroke, Ischemic/Transient Ischemic Attack)  Goal: Effective Oxygenation and Ventilation  Outcome: Ongoing, Progressing  Intervention: Optimize Oxygenation and Ventilation  Recent Flowsheet Documentation  Taken 5/5/2023 0412 by Meg Ybarra RN  Head of Bed (HOB) Positioning: HOB at 15 degrees  Taken 5/5/2023 0200 by Meg Ybarra RN  Head of Bed (HOB) Positioning: HOB at 20 degrees  Taken 5/4/2023 2200 by Meg Ybarra RN  Head of Bed (HOB) Positioning: HOB at 20 degrees  Taken 5/4/2023 2130 by Meg Ybarra RN  Head of Bed (HOB) Positioning: HOB at 20-30 degrees     Problem: Sensorimotor Impairment (Stroke, Ischemic/Transient Ischemic Attack)  Goal: Improved Sensorimotor Function  Outcome: Ongoing, Progressing  Intervention: Optimize Range of Motion, Motor Control and Function  Recent Flowsheet Documentation  Taken 5/5/2023 0412 by Meg Ybarra RN  Positioning/Transfer Devices:   pillows   in use  Taken 5/4/2023 2200 by Meg Ybarra RN  Positioning/Transfer Devices:   pillows   in use  Taken 5/4/2023 2130 by Meg Ybarra RN  Positioning/Transfer Devices:   pillows   in use     Problem: Swallowing Impairment (Stroke,  Ischemic/Transient Ischemic Attack)  Goal: Optimal Eating and Swallowing without Aspiration  Outcome: Ongoing, Progressing     Problem: Urinary Elimination Impaired (Stroke, Ischemic/Transient Ischemic Attack)  Goal: Effective Urinary Elimination  Outcome: Ongoing, Progressing     Problem: Adjustment to Illness (Stroke, Hemorrhagic)  Goal: Optimal Coping  Outcome: Ongoing, Progressing     Problem: Bowel Elimination Impaired (Stroke, Hemorrhagic)  Goal: Effective Bowel Elimination  Outcome: Ongoing, Progressing     Problem: Cerebral Tissue Perfusion (Stroke, Hemorrhagic)  Goal: Optimal Cerebral Tissue Perfusion  Outcome: Ongoing, Progressing     Problem: Cognitive Impairment (Stroke, Hemorrhagic)  Goal: Optimal Cognitive Function  Outcome: Ongoing, Progressing     Problem: Communication Impairment (Stroke, Hemorrhagic)  Goal: Effective Communication Skills  Outcome: Ongoing, Progressing     Problem: Functional Ability Impaired (Stroke, Hemorrhagic)  Goal: Optimal Functional Ability  Outcome: Ongoing, Progressing  Intervention: Optimize Functional Ability  Recent Flowsheet Documentation  Taken 5/4/2023 2130 by Meg Ybarra RN  Activity Management: activity encouraged  Taken 5/4/2023 2000 by Meg Ybarra RN  Activity Management: activity encouraged     Problem: Pain (Stroke, Hemorrhagic)  Goal: Acceptable Pain Control  Outcome: Ongoing, Progressing     Problem: Respiratory Compromise (Stroke, Hemorrhagic)  Goal: Effective Oxygenation and Ventilation  Outcome: Ongoing, Progressing  Intervention: Optimize Oxygenation and Ventilation  Recent Flowsheet Documentation  Taken 5/5/2023 0412 by Meg Ybarra RN  Head of Bed (HOB) Positioning: HOB at 15 degrees  Taken 5/5/2023 0200 by Meg Ybarra RN  Head of Bed (HOB) Positioning: HOB at 20 degrees  Taken 5/4/2023 2200 by Meg Ybarra RN  Head of Bed (HOB) Positioning: HOB at 20 degrees  Taken 5/4/2023 2130 by Meg Ybarra RN  Head of Bed (HOB)  Positioning: HOB at 20-30 degrees     Problem: Sensorimotor Impairment (Stroke, Hemorrhagic)  Goal: Improved Sensorimotor Function  Outcome: Ongoing, Progressing  Intervention: Optimize Range of Motion, Motor Control and Function  Recent Flowsheet Documentation  Taken 5/5/2023 0412 by Meg Ybarra RN  Positioning/Transfer Devices:   pillows   in use  Taken 5/4/2023 2200 by Meg Ybarra RN  Positioning/Transfer Devices:   pillows   in use  Taken 5/4/2023 2130 by Meg Ybarra RN  Positioning/Transfer Devices:   pillows   in use     Problem: Swallowing Impairment (Stroke, Hemorrhagic)  Goal: Optimal Eating and Swallowing Without Aspiration  Outcome: Ongoing, Progressing     Problem: Urinary Elimination Impaired (Stroke, Hemorrhagic)  Goal: Effective Urinary Elimination  Outcome: Ongoing, Progressing

## 2023-05-05 NOTE — PROGRESS NOTES
[facility]    Discussed with both Dr. Magaña and Arielle Bean. Dr. Magaña would like us to call granddaughter Natalie to confirm dc plan and also find out arrival date for teaching and also planned date to dc to [facility]. Msg left for Natalie. Awaiting return call.   We would need to confirm dc plan as part of Acute Rehab work up for appropriateness.     Jazzmine Salas RN  Acute Rehab Admission Nurse

## 2023-05-05 NOTE — PLAN OF CARE
Goal Outcome Evaluation:  Plan of Care Reviewed With: (P) patient        Progress: (P) no change  Outcome Evaluation: (P) Pt seen for PT/OT co-tx session this AM. Pt came to EOB sba, STS Amish, and completed numerous txfrs to various surfaces including recliner, hardback chair, and BSC w/ varying levels of assist from Amish x1 to mod-maxA x2 w/ max cues. Pt UIC to end session, assisted w/ s/u for breakfast, and left in chair all needs met. PT will continue to monitor and progress as able.

## 2023-05-05 NOTE — PLAN OF CARE
Goal Outcome Evaluation:  Plan of Care Reviewed With: patient        Progress: no change  Outcome Evaluation: Pt seen for OT/PT tx session in AM, supine on arrival, coming to EOB with SBA. Pt completed multiple transfers this date to simulate transfers required at d/c, UE bathing completed with LUE use, cues for carryover, STS and stand pivots with varying levels of assist to mod-max A x2 at times with transfer and max cues. Pt UIC at end of session, s/up for breakfast and use LUE for spoon feeding (I). Pt will continue to progress as able.

## 2023-05-06 RX ADMIN — LIDOCAINE 1 PATCH: 700 PATCH TOPICAL at 07:57

## 2023-05-06 RX ADMIN — CARVEDILOL 3.12 MG: 3.12 TABLET, FILM COATED ORAL at 07:57

## 2023-05-06 RX ADMIN — AMLODIPINE BESYLATE 10 MG: 10 TABLET ORAL at 06:38

## 2023-05-06 RX ADMIN — BACLOFEN 2.5 MG: 10 TABLET ORAL at 16:36

## 2023-05-06 RX ADMIN — BACLOFEN 2.5 MG: 10 TABLET ORAL at 21:07

## 2023-05-06 RX ADMIN — CARVEDILOL 3.12 MG: 3.12 TABLET, FILM COATED ORAL at 19:00

## 2023-05-06 RX ADMIN — VALSARTAN 160 MG: 160 TABLET, FILM COATED ORAL at 06:38

## 2023-05-06 RX ADMIN — BACLOFEN 2.5 MG: 10 TABLET ORAL at 06:38

## 2023-05-06 RX ADMIN — APIXABAN 5 MG: 5 TABLET, FILM COATED ORAL at 21:07

## 2023-05-06 RX ADMIN — APIXABAN 5 MG: 5 TABLET, FILM COATED ORAL at 07:57

## 2023-05-06 NOTE — PLAN OF CARE
Goal Outcome Evaluation:      No significant events overnight. Patient slept well in between care.              Problem: Fall Injury Risk  Goal: Absence of Fall and Fall-Related Injury  Outcome: Ongoing, Progressing  Intervention: Promote Injury-Free Environment  Recent Flowsheet Documentation  Taken 5/6/2023 0435 by Meg Ybarra RN  Safety Promotion/Fall Prevention: safety round/check completed  Taken 5/6/2023 0230 by Meg Ybarra RN  Safety Promotion/Fall Prevention: safety round/check completed  Taken 5/6/2023 0000 by Meg Ybarra RN  Safety Promotion/Fall Prevention: safety round/check completed  Taken 5/5/2023 2200 by Meg Ybarra RN  Safety Promotion/Fall Prevention: safety round/check completed  Taken 5/5/2023 2000 by Meg Ybarra RN  Safety Promotion/Fall Prevention: safety round/check completed     Problem: Skin Injury Risk Increased  Goal: Skin Health and Integrity  Outcome: Ongoing, Progressing  Intervention: Optimize Skin Protection  Recent Flowsheet Documentation  Taken 5/6/2023 0435 by Meg Ybarra RN  Head of Bed (HOB) Positioning: HOB at 15 degrees  Taken 5/6/2023 0230 by Meg Ybarra RN  Head of Bed (HOB) Positioning: HOB at 15 degrees  Taken 5/6/2023 0000 by Meg Ybarra RN  Head of Bed (HOB) Positioning: HOB at 15 degrees  Taken 5/5/2023 2200 by Meg Ybarra RN  Head of Bed (HOB) Positioning: HOB lowered  Taken 5/5/2023 2000 by Meg Ybarra RN  Head of Bed (HOB) Positioning: HOB at 30 degrees     Problem: Adult Inpatient Plan of Care  Goal: Plan of Care Review  Outcome: Ongoing, Progressing  Goal: Patient-Specific Goal (Individualized)  Outcome: Ongoing, Progressing  Goal: Absence of Hospital-Acquired Illness or Injury  Outcome: Ongoing, Progressing  Intervention: Identify and Manage Fall Risk  Recent Flowsheet Documentation  Taken 5/6/2023 0435 by Meg Ybarra RN  Safety Promotion/Fall Prevention: safety round/check completed  Taken 5/6/2023 0230 by  Quirimit, Meg, RN  Safety Promotion/Fall Prevention: safety round/check completed  Taken 5/6/2023 0000 by Meg Ybarra RN  Safety Promotion/Fall Prevention: safety round/check completed  Taken 5/5/2023 2200 by Meg Ybarra RN  Safety Promotion/Fall Prevention: safety round/check completed  Taken 5/5/2023 2000 by Meg Ybarra RN  Safety Promotion/Fall Prevention: safety round/check completed  Intervention: Prevent Skin Injury  Recent Flowsheet Documentation  Taken 5/6/2023 0435 by Meg Ybarra RN  Body Position: supine, legs elevated  Taken 5/6/2023 0230 by Meg Ybarra RN  Body Position:   tilted   left  Taken 5/6/2023 0000 by Meg Ybarra RN  Body Position: weight shifting  Taken 5/5/2023 2200 by Meg Ybarra RN  Body Position: weight shifting  Taken 5/5/2023 2000 by Meg Ybarra RN  Body Position: sitting up in bed  Intervention: Prevent and Manage VTE (Venous Thromboembolism) Risk  Recent Flowsheet Documentation  Taken 5/5/2023 2000 by Meg Ybarra RN  Activity Management: activity encouraged  VTE Prevention/Management: (Eliquis) other (see comments)  Goal: Optimal Comfort and Wellbeing  Outcome: Ongoing, Progressing  Goal: Readiness for Transition of Care  Outcome: Ongoing, Progressing     Problem: Adjustment to Illness (Stroke, Ischemic/Transient Ischemic Attack)  Goal: Optimal Coping  Outcome: Ongoing, Progressing     Problem: Bowel Elimination Impaired (Stroke, Ischemic/Transient Ischemic Attack)  Goal: Effective Bowel Elimination  Outcome: Ongoing, Progressing     Problem: Cerebral Tissue Perfusion (Stroke, Ischemic/Transient Ischemic Attack)  Goal: Optimal Cerebral Tissue Perfusion  Outcome: Ongoing, Progressing     Problem: Cognitive Impairment (Stroke, Ischemic/Transient Ischemic Attack)  Goal: Optimal Cognitive Function  Outcome: Ongoing, Progressing     Problem: Communication Impairment (Stroke, Ischemic/Transient Ischemic Attack)  Goal: Improved Communication  Skills  Outcome: Ongoing, Progressing     Problem: Functional Ability Impaired (Stroke, Ischemic/Transient Ischemic Attack)  Goal: Optimal Functional Ability  Outcome: Ongoing, Progressing  Intervention: Optimize Functional Ability  Recent Flowsheet Documentation  Taken 5/5/2023 2000 by Meg Ybarra RN  Activity Management: activity encouraged     Problem: Respiratory Compromise (Stroke, Ischemic/Transient Ischemic Attack)  Goal: Effective Oxygenation and Ventilation  Outcome: Ongoing, Progressing  Intervention: Optimize Oxygenation and Ventilation  Recent Flowsheet Documentation  Taken 5/6/2023 0435 by Meg Ybarra RN  Head of Bed (HOB) Positioning: HOB at 15 degrees  Taken 5/6/2023 0230 by Meg Ybarra RN  Head of Bed (HOB) Positioning: HOB at 15 degrees  Taken 5/6/2023 0000 by Meg Ybarra RN  Head of Bed (HOB) Positioning: HOB at 15 degrees  Taken 5/5/2023 2200 by Meg Ybarra RN  Head of Bed (HOB) Positioning: HOB lowered  Taken 5/5/2023 2000 by Meg Ybarra RN  Head of Bed (HOB) Positioning: HOB at 30 degrees     Problem: Sensorimotor Impairment (Stroke, Ischemic/Transient Ischemic Attack)  Goal: Improved Sensorimotor Function  Outcome: Ongoing, Progressing  Intervention: Optimize Range of Motion, Motor Control and Function  Recent Flowsheet Documentation  Taken 5/6/2023 0230 by Meg Ybarra RN  Positioning/Transfer Devices:   pillows   in use  Taken 5/5/2023 2200 by Meg Ybarra RN  Positioning/Transfer Devices:   pillows   in use  Taken 5/5/2023 2000 by Meg Ybarra RN  Positioning/Transfer Devices:   pillows   in use     Problem: Swallowing Impairment (Stroke, Ischemic/Transient Ischemic Attack)  Goal: Optimal Eating and Swallowing without Aspiration  Outcome: Ongoing, Progressing     Problem: Urinary Elimination Impaired (Stroke, Ischemic/Transient Ischemic Attack)  Goal: Effective Urinary Elimination  Outcome: Ongoing, Progressing     Problem: Adjustment to Illness  (Stroke, Hemorrhagic)  Goal: Optimal Coping  Outcome: Ongoing, Progressing     Problem: Bowel Elimination Impaired (Stroke, Hemorrhagic)  Goal: Effective Bowel Elimination  Outcome: Ongoing, Progressing     Problem: Cerebral Tissue Perfusion (Stroke, Hemorrhagic)  Goal: Optimal Cerebral Tissue Perfusion  Outcome: Ongoing, Progressing     Problem: Cognitive Impairment (Stroke, Hemorrhagic)  Goal: Optimal Cognitive Function  Outcome: Ongoing, Progressing     Problem: Communication Impairment (Stroke, Hemorrhagic)  Goal: Effective Communication Skills  Outcome: Ongoing, Progressing     Problem: Functional Ability Impaired (Stroke, Hemorrhagic)  Goal: Optimal Functional Ability  Outcome: Ongoing, Progressing  Intervention: Optimize Functional Ability  Recent Flowsheet Documentation  Taken 5/5/2023 2000 by Meg Ybarra RN  Activity Management: activity encouraged     Problem: Pain (Stroke, Hemorrhagic)  Goal: Acceptable Pain Control  Outcome: Ongoing, Progressing     Problem: Respiratory Compromise (Stroke, Hemorrhagic)  Goal: Effective Oxygenation and Ventilation  Outcome: Ongoing, Progressing  Intervention: Optimize Oxygenation and Ventilation  Recent Flowsheet Documentation  Taken 5/6/2023 0435 by Meg Ybarra RN  Head of Bed (HOB) Positioning: HOB at 15 degrees  Taken 5/6/2023 0230 by Meg Ybarra RN  Head of Bed (HOB) Positioning: HOB at 15 degrees  Taken 5/6/2023 0000 by Meg Ybarra RN  Head of Bed (HOB) Positioning: HOB at 15 degrees  Taken 5/5/2023 2200 by Meg Ybarra RN  Head of Bed (HOB) Positioning: HOB lowered  Taken 5/5/2023 2000 by Meg Ybarra RN  Head of Bed (HOB) Positioning: HOB at 30 degrees     Problem: Sensorimotor Impairment (Stroke, Hemorrhagic)  Goal: Improved Sensorimotor Function  Outcome: Ongoing, Progressing  Intervention: Optimize Range of Motion, Motor Control and Function  Recent Flowsheet Documentation  Taken 5/6/2023 0230 by Meg Ybarra  RN  Positioning/Transfer Devices:   pillows   in use  Taken 5/5/2023 2200 by Meg Ybarra RN  Positioning/Transfer Devices:   pillows   in use  Taken 5/5/2023 2000 by Meg Ybarra RN  Positioning/Transfer Devices:   pillows   in use     Problem: Swallowing Impairment (Stroke, Hemorrhagic)  Goal: Optimal Eating and Swallowing Without Aspiration  Outcome: Ongoing, Progressing     Problem: Urinary Elimination Impaired (Stroke, Hemorrhagic)  Goal: Effective Urinary Elimination  Outcome: Ongoing, Progressing

## 2023-05-06 NOTE — PROGRESS NOTES
[facility]     Following patient progress.      At this time, trying to confirm discharge plan with granddaughter, Natalie.  Call was placed to Natalie on 5/5, message left requesting a return call.    At this time, no return call from Natalie.     Thanks,   Sarah Grace RN  Rehab Admission Nurse

## 2023-05-06 NOTE — PROGRESS NOTES
Name: Manuel Durant ADMIT: 3/12/2023   : 1950  PCP: Provider, No Known    MRN: [id number] LOS: 55 days   AGE/SEX: 73 y.o. male  [facility]OM[facility]     Subjective   Subjective   No new issues     Objective   Objective   Vital Signs  Temp:  [97.6 °F (36.4 °C)-98.2 °F (36.8 °C)] 97.6 °F (36.4 °C)  Heart Rate:  [55-69] 67  Resp:  [16] 16  BP: (107-135)/(62-78) 135/78  SpO2:  [95 %-96 %] 95 %  on   ;   Device (Oxygen Therapy): room air  Body mass index is 21.15 kg/m².   Physical Exam  Vitals and nursing note reviewed.   Constitutional:       Appearance: Normal appearance.   Pulmonary:      Effort: Pulmonary effort is normal.   Musculoskeletal:      Comments: RUE weakness   Neurological:      Mental Status: He is alert. Mental status is at baseline.   Psychiatric:         Mood and Affect: Mood normal.       Results Review:       I reviewed the patient's new clinical results.      Estimated Creatinine Clearance: 72.1 mL/min (by C-G formula based on SCr of 0.79 mg/dL).        No results found for: HGBA1C, POCGLU    amLODIPine, 10 mg, Oral, QAM AC  apixaban, 5 mg, Oral, Q12H  baclofen, 2.5 mg, Oral, Q8H  carvedilol, 3.125 mg, Oral, BID With Meals  lidocaine, 1 patch, Transdermal, Q24H  sodium chloride, 10 mL, Intravenous, Q12H  valsartan, 160 mg, Oral, QAM AC       Diet: Regular/House Diet; No Mixed Consistencies, Feeding Assistance - Nursing; Texture: Soft to Chew (NDD 3); Soft to Chew: Chopped Meat; Fluid Consistency: Nectar Thick       Assessment/Plan     Active Hospital Problems    Diagnosis  POA   • **Intracranial hemorrhage [I62.9]  Yes   • Hemiplegia [G81.90]  Yes   • Right shoulder pain [M25.511]  Yes   • Severe Malnutrition (HCC) [E43]  Yes   • Acute DVT (deep venous thrombosis) [I82.409]  Yes   • HTN (hypertension) [I10]  Yes      Resolved Hospital Problems    Diagnosis Date Resolved POA   • Hypokalemia [E87.6] 04/10/2023 Unknown   • Hypertensive emergency [I16.1] 2023 Yes       Mr. Durant is a 73  year old male who initially presented to the hospital 3/12/23 for intracranial bleed. He was found unresponsive by friends and initially taken to [facility] where he was found to have poorly controlled BP and right sided hemiplegia. He was transferred here for closer monitoring in the ICU and neurosurgical consultation. He was cleared to move out of the ICU on 3/16/23.     • Intracranial hemorrhage: CTH 3/17/23 stable left basal ganglia/internal capsule hemorrhage with left lateral ventricular hemorrhage. On Lovenox for DVT and repeat CTH 3/18 also stable. ANDRADE followed and signed off 3/23 with plans for repeat MRI brain/MRA head in 1 month- ANDRADE saw again 4/20 and ordered MRI but this was not done as patient couldn't be cleared for MRI. CTA/CT head with evidence of hemorrhage cavity left sided with some peripheral enhancement which may just be related to resolving hemorrhage but underlying mass cannot be excluded- needs repeat CTH with contrast or MRI brain in 6-8 weeks and local follow up with neurology/ANDRADE in [place]. Goal BP < 150 systolic. Remains on baclofen for spasticity.      • HTN emergency: On amlodipine 10 mg, valsartan 160 mg, Coreg 3.125 mg BID and hydralazine 10 mg BID (reduced frequency yesterday). BP still very stable and will go ahead and stop hydralazine.  He is on a very low-dose of this and it is a poor agent for treating hypertension.  Would rather go up on his valsartan or carvedilol or add a thiazide.  Continue to monitor BP.     • Acute DVT: Found 3/17/23 with acute RLE DVT in soleal. ANDRADE okayed for blood thinners. Now on Eliquis.      Acute rehab consulted     Family set up a time to come to [place] and get him to fly [place]- tentative discharge date May 20 th.      Harpal Mcconnell MD  [facility]  05/06/23  07:09 EDT

## 2023-05-07 RX ADMIN — CARVEDILOL 3.12 MG: 3.12 TABLET, FILM COATED ORAL at 10:06

## 2023-05-07 RX ADMIN — APIXABAN 5 MG: 5 TABLET, FILM COATED ORAL at 21:28

## 2023-05-07 RX ADMIN — BACLOFEN 2.5 MG: 10 TABLET ORAL at 14:37

## 2023-05-07 RX ADMIN — CARVEDILOL 3.12 MG: 3.12 TABLET, FILM COATED ORAL at 18:01

## 2023-05-07 RX ADMIN — BACLOFEN 2.5 MG: 10 TABLET ORAL at 06:38

## 2023-05-07 RX ADMIN — APIXABAN 5 MG: 5 TABLET, FILM COATED ORAL at 10:06

## 2023-05-07 RX ADMIN — LIDOCAINE 1 PATCH: 700 PATCH TOPICAL at 10:06

## 2023-05-07 RX ADMIN — AMLODIPINE BESYLATE 10 MG: 10 TABLET ORAL at 06:38

## 2023-05-07 RX ADMIN — BACLOFEN 2.5 MG: 10 TABLET ORAL at 21:28

## 2023-05-07 RX ADMIN — VALSARTAN 160 MG: 160 TABLET, FILM COATED ORAL at 06:38

## 2023-05-07 NOTE — PROGRESS NOTES
Name: Manuel Durant ADMIT: 3/12/2023   : 1950  PCP: Provider, No Known    MRN: [id number] LOS: 56 days   AGE/SEX: 73 y.o. male  ROOM: [facility]     Subjective   Subjective   No new issues, denies pain fevers or chills     Objective   Objective   Vital Signs  Temp:  [97.7 °F (36.5 °C)-98.1 °F (36.7 °C)] 97.7 °F (36.5 °C)  Heart Rate:  [62-73] 62  Resp:  [16] 16  BP: (103-137)/(59-79) 117/69  SpO2:  [91 %-98 %] 94 %  on   ;   Device (Oxygen Therapy): room air  Body mass index is 21.15 kg/m².   Physical Exam  Vitals and nursing note reviewed.   Constitutional:       Appearance: Normal appearance.   Cardiovascular:      Rate and Rhythm: Normal rate and regular rhythm.   Pulmonary:      Effort: Pulmonary effort is normal. No respiratory distress.      Breath sounds: No wheezing.   Musculoskeletal:      Comments: RUE weakness   Neurological:      Mental Status: He is alert. Mental status is at baseline.   Psychiatric:         Mood and Affect: Mood normal.       Results Review:       I reviewed the patient's new clinical results.      Estimated Creatinine Clearance: 72.1 mL/min (by C-G formula based on SCr of 0.79 mg/dL).        No results found for: HGBA1C, POCGLU    amLODIPine, 10 mg, Oral, QAM AC  apixaban, 5 mg, Oral, Q12H  baclofen, 2.5 mg, Oral, Q8H  carvedilol, 3.125 mg, Oral, BID With Meals  lidocaine, 1 patch, Transdermal, Q24H  sodium chloride, 10 mL, Intravenous, Q12H  valsartan, 160 mg, Oral, QAM AC       Diet: Regular/House Diet; No Mixed Consistencies, Feeding Assistance - Nursing; Texture: Soft to Chew (NDD 3); Soft to Chew: Chopped Meat; Fluid Consistency: Nectar Thick       Assessment/Plan     Active Hospital Problems    Diagnosis  POA   • **Intracranial hemorrhage [I62.9]  Yes   • Hemiplegia [G81.90]  Yes   • Right shoulder pain [M25.511]  Yes   • Severe Malnutrition (HCC) [E43]  Yes   • Acute DVT (deep venous thrombosis) [I82.409]  Yes   • HTN (hypertension) [I10]  Yes      Resolved Hospital  Problems    Diagnosis Date Resolved POA   • Hypokalemia [E87.6] 04/10/2023 Unknown   • Hypertensive emergency [I16.1] 04/29/2023 Yes       Mr. Durant is a 73 year old male who initially presented to the hospital 3/12/23 for intracranial bleed. He was found unresponsive by friends and initially taken to [facility] where he was found to have poorly controlled BP and right sided hemiplegia. He was transferred here for closer monitoring in the ICU and neurosurgical consultation. He was cleared to move out of the ICU on 3/16/23.     • Intracranial hemorrhage: CTH 3/17/23 stable left basal ganglia/internal capsule hemorrhage with left lateral ventricular hemorrhage. On Lovenox for DVT and repeat CTH 3/18 also stable. ANDRADE followed and signed off 3/23 with plans for repeat MRI brain/MRA head in 1 month- ANDRADE saw again 4/20 and ordered MRI but this was not done as patient couldn't be cleared for MRI. CTA/CT head with evidence of hemorrhage cavity left sided with some peripheral enhancement which may just be related to resolving hemorrhage but underlying mass cannot be excluded- needs repeat CTH with contrast or MRI brain in 6-8 weeks and local follow up with neurology/ANDRADE in [place]. Goal BP < 150 systolic. Remains on baclofen for spasticity.      • HTN emergency: On amlodipine 10 mg, valsartan 160 mg, Coreg 3.125 mg BID and hydralazine 10 mg BID (reduced frequency yesterday). BP still very stable and will go ahead and stop hydralazine.  He is on a very low-dose of this and it is a poor agent for treating hypertension.  Would rather go up on his valsartan or carvedilol or add a thiazide.  Continue to monitor BP.     • Acute DVT: Found 3/17/23 with acute RLE DVT in soleal. ANDRADE okayed for blood thinners. Now on Eliquis.    Family set up a time to come to [place] and get him to fly [place]- tentative discharge date May 20 th.      Harpal Mcconnell MD  [facility]  05/07/23  07:09 EDT

## 2023-05-07 NOTE — PLAN OF CARE
Problem: Fall Injury Risk  Goal: Absence of Fall and Fall-Related Injury  Outcome: Ongoing, Progressing  Intervention: Identify and Manage Contributors  Recent Flowsheet Documentation  Taken 5/6/2023 1941 by Meg Ybarra RN  Medication Review/Management: medications reviewed  Intervention: Promote Injury-Free Environment  Recent Flowsheet Documentation  Taken 5/7/2023 0432 by Meg Ybarra RN  Safety Promotion/Fall Prevention: safety round/check completed  Taken 5/7/2023 0218 by Meg Ybarra RN  Safety Promotion/Fall Prevention: safety round/check completed  Taken 5/7/2023 0030 by Meg Ybarra RN  Safety Promotion/Fall Prevention: safety round/check completed  Taken 5/6/2023 2200 by Meg Ybarra RN  Safety Promotion/Fall Prevention: safety round/check completed  Taken 5/6/2023 1941 by Meg Ybarra RN  Safety Promotion/Fall Prevention:   activity supervised   assistive device/personal items within reach   clutter free environment maintained   fall prevention program maintained   nonskid shoes/slippers when out of bed   safety round/check completed     Problem: Skin Injury Risk Increased  Goal: Skin Health and Integrity  Outcome: Ongoing, Progressing  Intervention: Optimize Skin Protection  Recent Flowsheet Documentation  Taken 5/7/2023 0218 by Meg Ybarra RN  Head of Bed (HOB) Positioning: HOB at 15 degrees  Taken 5/7/2023 0030 by Meg Ybarra RN  Head of Bed (HOB) Positioning: HOB at 15 degrees  Taken 5/6/2023 2200 by Meg Ybarra RN  Head of Bed (HOB) Positioning: HOB at 15 degrees  Taken 5/6/2023 1941 by Meg Ybarra RN  Head of Bed (HOB) Positioning: HOB at 30 degrees     Problem: Adult Inpatient Plan of Care  Goal: Plan of Care Review  Outcome: Ongoing, Progressing  Goal: Patient-Specific Goal (Individualized)  Outcome: Ongoing, Progressing  Goal: Absence of Hospital-Acquired Illness or Injury  Outcome: Ongoing, Progressing  Intervention: Identify and Manage Fall  Patient called saying that she had lab done on 7/31 and she is asking if the office can fax the results to her surgeon  Patient stated the fax number for the surgeon is [phone]  If any question patient stated the office can give her a call  Risk  Recent Flowsheet Documentation  Taken 5/7/2023 0432 by Meg Ybarra RN  Safety Promotion/Fall Prevention: safety round/check completed  Taken 5/7/2023 0218 by Meg Ybarra RN  Safety Promotion/Fall Prevention: safety round/check completed  Taken 5/7/2023 0030 by Meg Ybarra RN  Safety Promotion/Fall Prevention: safety round/check completed  Taken 5/6/2023 2200 by Meg Ybarra RN  Safety Promotion/Fall Prevention: safety round/check completed  Taken 5/6/2023 1941 by Meg Ybarra RN  Safety Promotion/Fall Prevention:   activity supervised   assistive device/personal items within reach   clutter free environment maintained   fall prevention program maintained   nonskid shoes/slippers when out of bed   safety round/check completed  Intervention: Prevent Skin Injury  Recent Flowsheet Documentation  Taken 5/7/2023 0432 by Meg Ybarra RN  Body Position: weight shifting  Taken 5/7/2023 0218 by Meg Ybarra RN  Body Position: weight shifting  Taken 5/7/2023 0030 by Meg Ybarra RN  Body Position:   tilted   right  Taken 5/6/2023 2200 by Meg Ybarra RN  Body Position: weight shifting  Taken 5/6/2023 1941 by Meg Ybarra RN  Body Position: sitting up in bed  Intervention: Prevent and Manage VTE (Venous Thromboembolism) Risk  Recent Flowsheet Documentation  Taken 5/6/2023 1941 by Meg Ybarra RN  VTE Prevention/Management: (Eliquis) other (see comments)  Goal: Optimal Comfort and Wellbeing  Outcome: Ongoing, Progressing  Goal: Readiness for Transition of Care  Outcome: Ongoing, Progressing     Problem: Adjustment to Illness (Stroke, Ischemic/Transient Ischemic Attack)  Goal: Optimal Coping  Outcome: Ongoing, Progressing     Problem: Bowel Elimination Impaired (Stroke, Ischemic/Transient Ischemic Attack)  Goal: Effective Bowel Elimination  Outcome: Ongoing, Progressing     Problem: Cerebral Tissue Perfusion (Stroke, Ischemic/Transient Ischemic Attack)  Goal: Optimal Cerebral  Tissue Perfusion  Outcome: Ongoing, Progressing     Problem: Cognitive Impairment (Stroke, Ischemic/Transient Ischemic Attack)  Goal: Optimal Cognitive Function  Outcome: Ongoing, Progressing     Problem: Communication Impairment (Stroke, Ischemic/Transient Ischemic Attack)  Goal: Improved Communication Skills  Outcome: Ongoing, Progressing     Problem: Functional Ability Impaired (Stroke, Ischemic/Transient Ischemic Attack)  Goal: Optimal Functional Ability  Outcome: Ongoing, Progressing     Problem: Respiratory Compromise (Stroke, Ischemic/Transient Ischemic Attack)  Goal: Effective Oxygenation and Ventilation  Outcome: Ongoing, Progressing  Intervention: Optimize Oxygenation and Ventilation  Recent Flowsheet Documentation  Taken 5/7/2023 0218 by Meg Ybarra RN  Head of Bed (HOB) Positioning: HOB at 15 degrees  Taken 5/7/2023 0030 by Meg Ybarra RN  Head of Bed (HOB) Positioning: HOB at 15 degrees  Taken 5/6/2023 2200 by Meg Ybarra RN  Head of Bed (HOB) Positioning: HOB at 15 degrees  Taken 5/6/2023 1941 by Meg Ybarra RN  Head of Bed (HOB) Positioning: HOB at 30 degrees     Problem: Sensorimotor Impairment (Stroke, Ischemic/Transient Ischemic Attack)  Goal: Improved Sensorimotor Function  Outcome: Ongoing, Progressing  Intervention: Optimize Range of Motion, Motor Control and Function  Recent Flowsheet Documentation  Taken 5/6/2023 1941 by Meg Ybarra RN  Positioning/Transfer Devices:   pillows   in use     Problem: Swallowing Impairment (Stroke, Ischemic/Transient Ischemic Attack)  Goal: Optimal Eating and Swallowing without Aspiration  Outcome: Ongoing, Progressing     Problem: Urinary Elimination Impaired (Stroke, Ischemic/Transient Ischemic Attack)  Goal: Effective Urinary Elimination  Outcome: Ongoing, Progressing     Problem: Adjustment to Illness (Stroke, Hemorrhagic)  Goal: Optimal Coping  Outcome: Ongoing, Progressing     Problem: Bowel Elimination Impaired (Stroke,  Hemorrhagic)  Goal: Effective Bowel Elimination  Outcome: Ongoing, Progressing     Problem: Cerebral Tissue Perfusion (Stroke, Hemorrhagic)  Goal: Optimal Cerebral Tissue Perfusion  Outcome: Ongoing, Progressing     Problem: Cognitive Impairment (Stroke, Hemorrhagic)  Goal: Optimal Cognitive Function  Outcome: Ongoing, Progressing     Problem: Communication Impairment (Stroke, Hemorrhagic)  Goal: Effective Communication Skills  Outcome: Ongoing, Progressing     Problem: Functional Ability Impaired (Stroke, Hemorrhagic)  Goal: Optimal Functional Ability  Outcome: Ongoing, Progressing     Problem: Pain (Stroke, Hemorrhagic)  Goal: Acceptable Pain Control  Outcome: Ongoing, Progressing     Problem: Respiratory Compromise (Stroke, Hemorrhagic)  Goal: Effective Oxygenation and Ventilation  Outcome: Ongoing, Progressing  Intervention: Optimize Oxygenation and Ventilation  Recent Flowsheet Documentation  Taken 5/7/2023 0218 by Meg Ybarra RN  Head of Bed (HOB) Positioning: HOB at 15 degrees  Taken 5/7/2023 0030 by Meg Ybarra RN  Head of Bed (HOB) Positioning: HOB at 15 degrees  Taken 5/6/2023 2200 by Meg Ybarra RN  Head of Bed (HOB) Positioning: HOB at 15 degrees  Taken 5/6/2023 1941 by Meg Ybarra RN  Head of Bed (HOB) Positioning: HOB at 30 degrees     Problem: Sensorimotor Impairment (Stroke, Hemorrhagic)  Goal: Improved Sensorimotor Function  Outcome: Ongoing, Progressing  Intervention: Optimize Range of Motion, Motor Control and Function  Recent Flowsheet Documentation  Taken 5/6/2023 1941 by Meg Ybarra RN  Positioning/Transfer Devices:   pillows   in use     Problem: Swallowing Impairment (Stroke, Hemorrhagic)  Goal: Optimal Eating and Swallowing Without Aspiration  Outcome: Ongoing, Progressing     Problem: Urinary Elimination Impaired (Stroke, Hemorrhagic)  Goal: Effective Urinary Elimination  Outcome: Ongoing, Progressing

## 2023-05-08 ENCOUNTER — HOSPITAL ENCOUNTER (INPATIENT)
Facility: HOSPITAL | Age: 73
DRG: 305 | End: 2023-05-08
Attending: PHYSICAL MEDICINE & REHABILITATION | Admitting: PHYSICAL MEDICINE & REHABILITATION
Payer: COMMERCIAL

## 2023-05-08 VITALS
OXYGEN SATURATION: 95 % | HEIGHT: 66 IN | DIASTOLIC BLOOD PRESSURE: 64 MMHG | BODY MASS INDEX: 21.05 KG/M2 | HEART RATE: 58 BPM | SYSTOLIC BLOOD PRESSURE: 113 MMHG | RESPIRATION RATE: 16 BRPM | TEMPERATURE: 98.3 F | WEIGHT: 130.95 LBS

## 2023-05-08 DIAGNOSIS — I69.151 SPASTIC HEMIPLEGIA OF RIGHT DOMINANT SIDE AS LATE EFFECT OF NONTRAUMATIC INTRAPARENCHYMAL HEMORRHAGE OF BRAIN: Primary | ICD-10-CM

## 2023-05-08 PROBLEM — I63.9 STROKE: Status: ACTIVE | Noted: 2023-05-08

## 2023-05-08 PROBLEM — M25.511 RIGHT SHOULDER PAIN: Status: RESOLVED | Noted: 2023-04-11 | Resolved: 2023-05-08

## 2023-05-08 PROBLEM — I61.9 ICH (INTRACEREBRAL HEMORRHAGE): Status: ACTIVE | Noted: 2023-05-08

## 2023-05-08 LAB
ALBUMIN SERPL-MCNC: 3.9 G/DL (ref 3.5–5.2)
ALBUMIN/GLOB SERPL: 1.6 G/DL
ALP SERPL-CCNC: 131 U/L (ref 39–117)
ALT SERPL W P-5'-P-CCNC: 44 U/L (ref 1–41)
ANION GAP SERPL CALCULATED.3IONS-SCNC: 7 MMOL/L (ref 5–15)
AST SERPL-CCNC: 18 U/L (ref 1–40)
BASOPHILS # BLD AUTO: 0.03 10*3/MM3 (ref 0–0.2)
BASOPHILS NFR BLD AUTO: 0.4 % (ref 0–1.5)
BILIRUB SERPL-MCNC: 0.3 MG/DL (ref 0–1.2)
BUN SERPL-MCNC: 18 MG/DL (ref 8–23)
BUN/CREAT SERPL: 23.7 (ref 7–25)
CALCIUM SPEC-SCNC: 8.6 MG/DL (ref 8.6–10.5)
CHLORIDE SERPL-SCNC: 106 MMOL/L (ref 98–107)
CO2 SERPL-SCNC: 25 MMOL/L (ref 22–29)
CREAT SERPL-MCNC: 0.76 MG/DL (ref 0.76–1.27)
DEPRECATED RDW RBC AUTO: 44.6 FL (ref 37–54)
EGFRCR SERPLBLD CKD-EPI 2021: 96.1 ML/MIN/1.73
EOSINOPHIL # BLD AUTO: 0.76 10*3/MM3 (ref 0–0.4)
EOSINOPHIL NFR BLD AUTO: 11 % (ref 0.3–6.2)
ERYTHROCYTE [DISTWIDTH] IN BLOOD BY AUTOMATED COUNT: 13.3 % (ref 12.3–15.4)
GLOBULIN UR ELPH-MCNC: 2.4 GM/DL
GLUCOSE SERPL-MCNC: 115 MG/DL (ref 65–99)
HCT VFR BLD AUTO: 38.5 % (ref 37.5–51)
HGB BLD-MCNC: 12.7 G/DL (ref 13–17.7)
IMM GRANULOCYTES # BLD AUTO: 0.02 10*3/MM3 (ref 0–0.05)
IMM GRANULOCYTES NFR BLD AUTO: 0.3 % (ref 0–0.5)
LYMPHOCYTES # BLD AUTO: 1.69 10*3/MM3 (ref 0.7–3.1)
LYMPHOCYTES NFR BLD AUTO: 24.5 % (ref 19.6–45.3)
MCH RBC QN AUTO: 30.1 PG (ref 26.6–33)
MCHC RBC AUTO-ENTMCNC: 33 G/DL (ref 31.5–35.7)
MCV RBC AUTO: 91.2 FL (ref 79–97)
MONOCYTES # BLD AUTO: 0.76 10*3/MM3 (ref 0.1–0.9)
MONOCYTES NFR BLD AUTO: 11 % (ref 5–12)
NEUTROPHILS NFR BLD AUTO: 3.65 10*3/MM3 (ref 1.7–7)
NEUTROPHILS NFR BLD AUTO: 52.8 % (ref 42.7–76)
NRBC BLD AUTO-RTO: 0 /100 WBC (ref 0–0.2)
PLATELET # BLD AUTO: 220 10*3/MM3 (ref 140–450)
PMV BLD AUTO: 10.3 FL (ref 6–12)
POTASSIUM SERPL-SCNC: 4.3 MMOL/L (ref 3.5–5.2)
PROT SERPL-MCNC: 6.3 G/DL (ref 6–8.5)
RBC # BLD AUTO: 4.22 10*6/MM3 (ref 4.14–5.8)
SODIUM SERPL-SCNC: 138 MMOL/L (ref 136–145)
WBC NRBC COR # BLD: 6.91 10*3/MM3 (ref 3.4–10.8)

## 2023-05-08 PROCEDURE — 97530 THERAPEUTIC ACTIVITIES: CPT

## 2023-05-08 PROCEDURE — 85025 COMPLETE CBC W/AUTO DIFF WBC: CPT | Performed by: PHYSICAL MEDICINE & REHABILITATION

## 2023-05-08 PROCEDURE — 97112 NEUROMUSCULAR REEDUCATION: CPT

## 2023-05-08 PROCEDURE — 80053 COMPREHEN METABOLIC PANEL: CPT | Performed by: PHYSICAL MEDICINE & REHABILITATION

## 2023-05-08 RX ORDER — LOPERAMIDE HYDROCHLORIDE 2 MG/1
2 CAPSULE ORAL EVERY 6 HOURS PRN
Status: CANCELLED | OUTPATIENT
Start: 2023-05-08

## 2023-05-08 RX ORDER — AMLODIPINE BESYLATE 10 MG/1
10 TABLET ORAL
Status: DISCONTINUED | OUTPATIENT
Start: 2023-05-09 | End: 2023-05-21 | Stop reason: HOSPADM

## 2023-05-08 RX ORDER — AMOXICILLIN 250 MG
2 CAPSULE ORAL NIGHTLY PRN
Status: DISCONTINUED | OUTPATIENT
Start: 2023-05-08 | End: 2023-05-21 | Stop reason: HOSPADM

## 2023-05-08 RX ORDER — ACETAMINOPHEN 325 MG/1
650 TABLET ORAL EVERY 6 HOURS PRN
Status: DISCONTINUED | OUTPATIENT
Start: 2023-05-08 | End: 2023-05-21 | Stop reason: HOSPADM

## 2023-05-08 RX ORDER — AMLODIPINE BESYLATE 10 MG/1
10 TABLET ORAL
Status: CANCELLED | OUTPATIENT
Start: 2023-05-09

## 2023-05-08 RX ORDER — VALSARTAN 160 MG/1
160 TABLET ORAL
Status: CANCELLED | OUTPATIENT
Start: 2023-05-09

## 2023-05-08 RX ORDER — BACLOFEN 10 MG/1
2.5 TABLET ORAL EVERY 8 HOURS SCHEDULED
Status: CANCELLED | OUTPATIENT
Start: 2023-05-08

## 2023-05-08 RX ORDER — AMOXICILLIN 250 MG
2 CAPSULE ORAL NIGHTLY PRN
Status: CANCELLED | OUTPATIENT
Start: 2023-05-08

## 2023-05-08 RX ORDER — POLYETHYLENE GLYCOL 3350 17 G/17G
17 POWDER, FOR SOLUTION ORAL DAILY PRN
Status: CANCELLED | OUTPATIENT
Start: 2023-05-08

## 2023-05-08 RX ORDER — LIDOCAINE 50 MG/G
1 PATCH TOPICAL
Status: DISCONTINUED | OUTPATIENT
Start: 2023-05-09 | End: 2023-05-21 | Stop reason: HOSPADM

## 2023-05-08 RX ORDER — LOPERAMIDE HYDROCHLORIDE 2 MG/1
2 CAPSULE ORAL EVERY 6 HOURS PRN
Status: DISCONTINUED | OUTPATIENT
Start: 2023-05-08 | End: 2023-05-21 | Stop reason: HOSPADM

## 2023-05-08 RX ORDER — BISACODYL 5 MG/1
5 TABLET, DELAYED RELEASE ORAL DAILY PRN
Status: CANCELLED | OUTPATIENT
Start: 2023-05-08

## 2023-05-08 RX ORDER — VALSARTAN 160 MG/1
160 TABLET ORAL
Status: DISCONTINUED | OUTPATIENT
Start: 2023-05-09 | End: 2023-05-21 | Stop reason: HOSPADM

## 2023-05-08 RX ORDER — CARVEDILOL 3.12 MG/1
3.12 TABLET ORAL 2 TIMES DAILY WITH MEALS
Status: DISCONTINUED | OUTPATIENT
Start: 2023-05-09 | End: 2023-05-21 | Stop reason: HOSPADM

## 2023-05-08 RX ORDER — POLYETHYLENE GLYCOL 3350 17 G/17G
17 POWDER, FOR SOLUTION ORAL DAILY PRN
Status: DISCONTINUED | OUTPATIENT
Start: 2023-05-08 | End: 2023-05-21 | Stop reason: HOSPADM

## 2023-05-08 RX ORDER — BISACODYL 10 MG
10 SUPPOSITORY, RECTAL RECTAL DAILY PRN
Status: CANCELLED | OUTPATIENT
Start: 2023-05-08

## 2023-05-08 RX ORDER — CARVEDILOL 3.12 MG/1
3.12 TABLET ORAL 2 TIMES DAILY WITH MEALS
Status: CANCELLED | OUTPATIENT
Start: 2023-05-08

## 2023-05-08 RX ORDER — LIDOCAINE 50 MG/G
1 PATCH TOPICAL
Status: CANCELLED | OUTPATIENT
Start: 2023-05-09

## 2023-05-08 RX ORDER — BISACODYL 10 MG
10 SUPPOSITORY, RECTAL RECTAL DAILY PRN
Status: DISCONTINUED | OUTPATIENT
Start: 2023-05-08 | End: 2023-05-21 | Stop reason: HOSPADM

## 2023-05-08 RX ORDER — ACETAMINOPHEN 325 MG/1
650 TABLET ORAL EVERY 6 HOURS PRN
Status: CANCELLED | OUTPATIENT
Start: 2023-05-08

## 2023-05-08 RX ORDER — BACLOFEN 10 MG/1
2.5 TABLET ORAL EVERY 8 HOURS SCHEDULED
Status: DISCONTINUED | OUTPATIENT
Start: 2023-05-08 | End: 2023-05-10

## 2023-05-08 RX ORDER — BISACODYL 5 MG/1
5 TABLET, DELAYED RELEASE ORAL DAILY PRN
Status: DISCONTINUED | OUTPATIENT
Start: 2023-05-08 | End: 2023-05-21 | Stop reason: HOSPADM

## 2023-05-08 RX ADMIN — BACLOFEN 2.5 MG: 10 TABLET ORAL at 06:40

## 2023-05-08 RX ADMIN — VALSARTAN 160 MG: 160 TABLET, FILM COATED ORAL at 06:40

## 2023-05-08 RX ADMIN — CARVEDILOL 3.12 MG: 3.12 TABLET, FILM COATED ORAL at 17:25

## 2023-05-08 RX ADMIN — CARVEDILOL 3.12 MG: 3.12 TABLET, FILM COATED ORAL at 09:06

## 2023-05-08 RX ADMIN — AMLODIPINE BESYLATE 10 MG: 10 TABLET ORAL at 06:40

## 2023-05-08 RX ADMIN — APIXABAN 5 MG: 5 TABLET, FILM COATED ORAL at 09:06

## 2023-05-08 RX ADMIN — BACLOFEN 2.5 MG: 10 TABLET ORAL at 13:32

## 2023-05-08 RX ADMIN — LIDOCAINE 1 PATCH: 700 PATCH TOPICAL at 09:06

## 2023-05-08 NOTE — CASE MANAGEMENT/SOCIAL WORK
Continued Stay Note  [facility]     Patient Name: Manuel Durant  MRN: [id number]  Today's Date: 5/8/2023    Admit Date: 3/12/2023    Plan: Home to [place] by air.  Son-in-law is to come to [place] on 5/18 to work with PT and then accompany pt home to [place] on 5/20 (granddaughter Natalie to arrive 5/20).  Await final confirmation of arrival date.   Discharge Plan       Row Name 05/08/23 1649       Plan    Plan Home to [place] by air.  Son-in-law is to come to [place] on 5/18 to work with PT and then accompany pt home to [place] on 5/20 (granddaughter Natalie to arrive 5/20).  Await final confirmation of arrival date.    Plan Comments Call placed to pt's son Ariel in [place] to provide new room # [id number] and contact information.  He was very appreciative and states that his brother-in-law is planning to come to [place] on 5/18 so that he can work with staff prior to discharge on 5/20.  He states that he believes the ticket is purchased as his mother sent the money to his brother-in-law.  He will call to find out and call [facility] back with the exact date he will arrive.  Natalie, his daughter, is also planning to come to assist get Mr. Durant to the plan for the trip home to [place].  Await call back with travel date.  Faye ARMSTRONG                                             Discharge Codes    No documentation.                 Expected Discharge Date and Time       Expected Discharge Date Expected Discharge Time    May 8, 2023               Faye Moy RN

## 2023-05-08 NOTE — PLAN OF CARE
Goal Outcome Evaluation: alert to person with some confusion to the time and situation. Room air. Up to chair today with PT. Georgian speaking but does speak and understand basic english. Takes pills whole with nectar thick liquids.  Transferring to acute rehab after dinner and report was called. Denies pain or SOA. Call light within reach.

## 2023-05-08 NOTE — H&P
[facility]   HISTORY AND PHYSICAL    Patient Name: Manuel Durant  : 1950  MRN: [id number]  Primary Care Physician:  Provider, No Known  Date of admission: (Not on file)    Subjective   Subjective     Chief Complaint:   Hemorrhagic stroke-left basal ganglia/internal capsule with intraventricular extension on 2023-  Right hemiparesis   Impaired mobility/impaired self-care  Diplopia  Dysphagia-mechanical soft/chopped meat/nectar thick liquid  Spasticity-baclofen 2.5 mg every 8 hours  Hypertension-amlodipine/carvedilol/valsartan  DVT-right soleal vein-2023-Eliquis    History of Present Illness   73 y.o. male  who is transferred to the acute rehab unit today for history of hemorrhagic stroke with right-sided weakness.  Per the discharge summary-[the patient initially presented to the hospital 3/12/23 for intracranial bleed. He was found unresponsive by friends and initially taken to [facility] where he was found to have poorly controlled BP and right sided hemiplegia. He was transferred here for closer monitoring in the ICU and neurosurgical consultation. He was cleared to move out of the ICU on 3/16/23.              The patient had a stable CTH on 3/17/23 with left basal ganglia/internal capsule hemorrhage with left lateral ventricular hemorrhage. He was found to have an acute DVT in his RLE around this time and was cleared for anticoagulation by Neurosurgery. He was initially placed on Lovenox and repeat CTH was obtained on 3/18/23 showing stability. He was successfully transitioned to Eliquis therapy.               Neurosurgery initially signed off but given his prolonged stay re-evaluated on 23. Plans were for MRI but he couldn't be cleared for this imaging at that time so repeat CTA/CTH head was obtained with evidence of hemorrhage cavity left sided with some peripheral enhancement. This was felt to represent possible resolving hemorrhage but underlying mass could not be  excluded so he will need either repeat CTH with contrast versus MRI in 6 to 8 weeks. Neurosurgery recommends he establish care with neurology/ANDRADE once he arrives in [place] and is to be given a disk with all his imaging on this when he leaves. He has been on baclofen for spacticity and has been working  with PT/OT in an effort to be more mobile for flight back to [place].              His BP was treated with oral therapy and titrated to avoid hypotension as well. He is currently on Norvasc 10 mg daily, valsartan 160 mg daily as well as Coreg 3.125 mg BID. His overall stay has been prolonged due to his disposition as he did not have any adequate insurance to cover a SNF stay as well as no family assistance to qualify for BAR. [facility] has worked well to obtain a discharge plan and his family is flying in to [place] on May 20th to personally transport him to [place] to reside with his son.  . He is discharged to [facility] acute rehabilitation unit today as he has been accepted now that there is a concrete discharge plan in place.]    The patient has some headache.  He complains of having diplopia.  Denies any swallowing complaints.  Denies any difficulty with his speech.  He is comfortable with his breathing.  He complains of pain in the right arm and in the right leg.  He has had tightness in the right arm.  He does not describe whether his strength is getting better or worse on the right side.    With Occupational Therapy on May 8, 2023-[coming to EOB with SBA. Pt engaging in multiple func transfers this date, max visual and verbal cues required for stand pivots to household surfaces. Pt with poor CAROL with transfers, unable to self correct despite max cueing. min-mod A x1-2 for transfers. Pt engaging in RUE coordination and AAROM activity, with emphasis on grasp/release on larger household objects. Pt with difficult securing  and control of RUE, table slides completed with RUBEN ABRAMS. Max A for s/up of  extremity with task]  With physical therapy on May 8, 2023-[Pt impulsive and generally having difficulty follow directions/cues.  Pt sat up to EOB SBA. Pt then stood and amb 3' to chair req min/mod A x 1-2 w/ HHA. Pt's gait ataxic w/ R foot blocked to maintain neutral CAROL w/ pt otherwise exhibiting very narrow CAROL and at times R crossing over w/ R LE. Pt transferred to various sitting surfaces req min/mod a x 1-2 w/ HHA. Unsteady noted throughout w/ no overt LOB. Ther ex performed w/ cues to incr ROM for R knee and R ankle for DF.]  With speech therapy on May 2, 2023-[Pt seen for VFSS follow to provide education regarding results and recommendations, especially new recommendation of water protocol. Education limited due to language barrier and no  device working at this time despite many attempts. Discussed with pt that he can now have sips of water after oral care inbetween meals. Pt first given swab to clean mouth and pt able to clear back of mouth/dentition but missed anterior dentition. SLP finished oral care and removed food residue between upper lip and dentition with swabs. On first drink of thin liquid water, pt swished around in mouth and then expectorated into different cup appeared to just wash mouth out. Pt then drank trials of thin liquids water by cup. Initially taking large drinks, but then with mod cues pt able to take small drinks by cup. No overt s/s of penetration/aspiration noted, however, suspect continued incoordination of oral transit and swallow initiation. Offered other trials of p.o. afterwards, however, pt declined stating he just had eaten was too full. This SLP had planned to complete language tx this date, however, unable due to no interpreting service available at this time. Will continue to follow for dysphagia and language tx. Continue to recommend soft/chopped/no mixed solids and nectar thick liquids. Meds whole in puree. Fully upright posture. Water in between meals  after oral care.]      Given his functional impairments and comorbidities he is now admitted for acute inpatient rehabilitation.        Review of Systems   The patient has some headache.  He complains of having diplopia.  Denies any swallowing complaints.  Denies any difficulty with his speech.  He is comfortable with his breathing.  He complains of pain in the right arm and in the right leg.  He has had tightness in the right arm.  He does not describe whether his strength is getting better or worse on the right side.  Personal History     Past Medical History:   Diagnosis Date   • Hypokalemia 3/17/2023       No past surgical history on file.    Family History: Family history is unknown by patient. Otherwise pertinent FHx was reviewed and not pertinent to current issue.    Social History:  reports that he has never smoked. He has never used smokeless tobacco. He reports that he does not drink alcohol and does not use drugs.  Patient has resided in the [place] for the past >20 years.  Current plan when he completes inpatient rehab is to have family fly up from [place] with family teaching done here at the end of his rehab stay and then for the patient to fly to [place] with his family.  Follow-up is arranged with a physician in [facility] and he has a family member who is a physical therapist that this is going to assist with his care as well  Home Medications:       Allergies:  No Known Allergies    Medications:  Inter-facility transfer medications (From admission, onward)                 amLODIPine (NORVASC) tablet 10 mg  Every Morning Before Breakfast               apixaban (ELIQUIS) tablet 5 mg  Every 12 Hours Scheduled               baclofen (LIORESAL) tablet 2.5 mg  Every 8 Hours Scheduled               carvedilol (COREG) tablet 3.125 mg  2 Times Daily With Meals               lidocaine (LIDODERM) 5 % 1 patch  Every 24 Hours Scheduled               valsartan (DIOVAN) tablet 160 mg  Every Morning Before  Breakfast               acetaminophen (TYLENOL) tablet 650 mg  Every 6 Hours PRN               loperamide (IMODIUM) capsule 2 mg  Every 6 Hours PRN               sennosides-docusate (PERICOLACE) 8.6-50 MG per tablet 2 tablet  (Bowel Management Regimen)  Nightly PRN        See Hyperspace for full Linked Orders Report.          polyethylene glycol (MIRALAX) packet 17 g  (Bowel Management Regimen)  Daily PRN        See Hyperspace for full Linked Orders Report.          bisacodyl (DULCOLAX) EC tablet 5 mg  (Bowel Management Regimen)  Daily PRN        See Hyperspace for full Linked Orders Report.          bisacodyl (DULCOLAX) suppository 10 mg  (Bowel Management Regimen)  Daily PRN        See Hyperspace for full Linked Orders Report.               Objective    Objective     Vitals:   Temp:  [97.2 °F (36.2 °C)-98.3 °F (36.8 °C)] 98.3 °F (36.8 °C)  Heart Rate:  [58-70] 58  Resp:  [16-18] 16  BP: (113-124)/(64-81) 113/64    Physical Exam  MENTAL STATUS -  AWAKE / ALERT  HEENT- NCAT, PUPILS EQUALLY ROUND, SCLERAE ANICTERIC, CONJUNCTIVAE PINK, OP MOIST, NO JVD, EARS UNREMARKABLE EXTERNALLY  LUNGS - CTA, NO WHEEZES, RALES OR RHONCHI  HEART- RRR, NO RUB, MURMUR, OR GALLOP  ABD - NORMOACTIVE BOWEL SOUNDS, SOFT, NT. NO HEPATOSPLENOMEGALY APPRECIATED  EXT -edema in the right hand with decreased range of motion  NEURO -awake alert.  Oriented.  No significant dysarthria.  Describes diplopia.  Extraocular movements intact.  Recognizes finger movement bilaterally  MOTOR EXAM -  LUE/LLE 5/5.    Right shoulder flexion 3-/5, elbow flexion 3-/5, finger flexion 2/5, hip flexion 3-/5, knee extension 3-/5, ankle dorsiflexion 2/5.  Mild hypertonicity in the right upper extremity and right lower extremity  Result Review    Result Review:     Latest Reference Range & Units 04/24/23 08:13   Glucose 65 - 99 mg/dL 103 (H)   Sodium 136 - 145 mmol/L 140   Potassium 3.5 - 5.2 mmol/L 4.0   CO2 22.0 - 29.0 mmol/L 24.0   Chloride 98 - 107 mmol/L 107    Anion Gap 5.0 - 15.0 mmol/L 9.0   Creatinine 0.76 - 1.27 mg/dL 0.79   BUN 8 - 23 mg/dL 13   BUN/Creatinine Ratio 7.0 - 25.0  16.5   Calcium 8.6 - 10.5 mg/dL 9.4   eGFR >60.0 mL/min/1.73 95.0   (H): Data is abnormally high   Latest Reference Range & Units 03/15/23 03:33   Hemoglobin A1C 4.80 - 5.60 % 5.60   Total Cholesterol 0 - 200 mg/dL 156   HDL Cholesterol 40 - 60 mg/dL 38 (L)   LDL Cholesterol  0 - 100 mg/dL 102 (H)   VLDL Cholesterol 5 - 40 mg/dL 16   Triglycerides 0 - 150 mg/dL 86   LDL/HDL Ratio  2.65   (L): Data is abnormally low  (H): Data is abnormally high   Latest Reference Range & Units 03/12/23 19:07   Alkaline Phosphatase 39 - 117 U/L 107   Total Protein 6.0 - 8.5 g/dL 7.6   ALT (SGPT) 1 - 41 U/L 25   AST (SGOT) 1 - 40 U/L 35   Total Bilirubin 0.0 - 1.2 mg/dL 0.7   Albumin 3.5 - 5.2 g/dL 4.9   Globulin gm/dL 2.7   A/G Ratio g/dL 1.8           Venous duplex lower extremity March 17, 2023  •  Acute right lower extremity deep vein thrombosis noted in the soleal.  •  All other veins appeared normal bilaterally.       CT OF THE BRAIN WITH AND WITHOUT CONTRAST AND CT ANGIOGRAPHY OF THE HEAD  WITH CONTRAST INCLUDING RECONSTRUCTION IMAGES 04/24/2023     HISTORY: Intracranial hemorrhage. Evaluate for possible vascular source.     Axial images were obtained through the brain without intravenous  contrast.     FINDINGS: There is a moderate size area of low attenuation involving the  left thalamus and basal ganglia and left temporal lobe the site of the  previously identified hemorrhage which was also seen on the CT scan of  the brain from 03/23/2023. No appreciable hyperdense hemorrhage is seen  on the current study. No midline shift is seen. There is mild diffuse  atrophy. There is diffuse decreased attenuation of the periventricular  white matter bilaterally consistent with small vessel white matter  ischemic disease.      Following the intravenous contrast injection, CT angiography was  performed through  the brain. Sagittal, coronal and 3D reconstruction  images were reviewed.     The bilateral internal carotid arteries appear patent. Small amount of  supraclinoid internal carotid artery calcification is seen bilaterally.  Bilateral middle and anterior cerebral arteries appear patent. Distal  vertebral arteries appear somewhat tortuous but patent. The right  posterior communicating artery partially supplies the right posterior  cerebral artery and there is a somewhat atretic P1 segment of the right  posterior cerebral artery. Left posterior cerebral artery appears  patent.     No definite aneurysm or vascular malformation is seen.     A postcontrast CT of the brain shows an approximately 2.8 cm by  approximately 2.9 cm peripherally enhancing but otherwise low-density  lesion involving the left thalamus and left basal ganglia at the site of  the previously identified hemorrhage.     No hemorrhage is seen in the ventricular system.     No other abnormal enhancement is seen on postcontrast CT of the brain.     IMPRESSION:  1. There is low attenuation in the left cerebral hemisphere at the site  of the previously identified area of hyperdense hemorrhage with  surrounding edema. Within the area of previously identified hyperdense  hemorrhage is a 2.8 cm x 2.9 cm low density cavity with some peripheral  enhancement. This most likely represents peripheral enhancement  surrounding the hematoma cavity. A cystic lesion is thought to be  unlikely. The exact cause of the hemorrhage is not established on this  study. Follow-up MRI of the brain with contrast may be helpful if  patient is able to have an MRI study.  2. No evidence of vascular malformation, aneurysm or other significant  vascular abnormality on CT angiography of the head.    Assessment & Plan   Assessment / Plan      Hemorrhagic stroke-left basal ganglia/internal capsule with intraventricular extension on March 12, 2023-  Right hemiparesis   Impaired  mobility/impaired self-care  Diplopia  Dysphagia-mechanical soft/chopped meat/nectar thick liquid  Spasticity-baclofen 2.5 mg every 8 hours  Hypertension-amlodipine/carvedilol/valsartan  DVT-right soleal vein-March 17, 2023-Eliquis    Now admit for comprehensive acute inpatient rehabilitation .  This would be an interdisciplinary program with physical therapy 1 hour,  occupational therapy 1 hour, and speech therapy 1 hour, 5 days a week.  Rehabilitation nursing for carryover, monitoring of hypertension and neurologic   status, bowel and bladder, and skin  Ongoing physician follow-up.  Weekly team conferences.  Goals are to achieve a level of min assist of 1 with  mobility and self-care and improved swallow.   Rehabilitation prognosis fair.  Medical prognosis fair.  Estimated length of stay is approximately 2 weeks, but is only an estimation.   The patient's functional status and clinical status is unchanged from preadmission assessment and the patient continues appropriate for acute inpatient rehabilitation.  Goal is for home with home health or outpatient   therapies in [facility].  Barrier to discharge: Impaired mobility and self-care- work on strength, transfers, trunk control, functional mobility, ADLs to overcome.          Follow-up Information     Jorge Logan MD .    Specialty: Neurosurgery  Contact information:  [address]  [place] [address]  [phone]             will need either repeat CTH with contrast versus MRI if can be cleared for an MRI in 6 to 8 weeks from April 20, 2023. Neurosurgery recommends he establish care with neurology/ANDRADE once he arrives in [place] Follow up.           to be given a disk with all his imaging on this when he leaves Follow up.           Dr Azar Chan Follow up.    Contact information:  [facility]  Phone:  [phone]           Contact to help establish follow up rehab care in [facility] Follow up.    Contact information:    Shelby Leigh    [facility]  [phone]                           Admission Status:  I believe this patient meets inpatient status.    Brad Magaña MD    During rounds, used appropriate personal protective equipment including mask and gloves.  Additional gown if indicated.  Mask used was standard procedure mask. Appropriate PPE was worn during the entire visit.  Hand hygiene was completed before and after.

## 2023-05-08 NOTE — PLAN OF CARE
Goal Outcome Evaluation:  Plan of Care Reviewed With: patient        Progress: no change  Outcome Evaluation: Pt seen for OT/PT tx session this AM, pt agreeable, coming to EOB with SBA. Pt engaging in multiple func transfers this date, max visual and verbal cues required for stand pivots to household surfaces. Pt with poor CAROL with transfers, unable to self correct despite max cueing. min-mod A x1-2 for transfers. Pt engaging in RUE coordination and AAROM activity, with emphasis on grasp/release on larger household objects. Pt with difficult securing  and control of RUE, table slides completed with RUE, AAROM. Max A for s/up of extremity with task, pt will continue to benefit from skilled OT to address noted functional deficits and progress toward goals in prep for discharge.

## 2023-05-08 NOTE — PROGRESS NOTES
[facility] Acute Rehab  Currently awaiting return call from granddahossein Estrada to confirm dc date and if airline flight has been purchased (not noted as of yet in progress notes as purchased)and also for plans for she and uncle to arrive a couple days before for teaching. Call was placed Friday with no return call. This was requested by Dr. Magaña to confirm their dc plans.     If no return call this morning, will attempt to reach her again.     Jazzmine Salas RN  Acute Rehab Admission Nurse

## 2023-05-08 NOTE — PROGRESS NOTES
Case Management Discharge Note      Final Note: [facility] can accept pt today. ZIYAD SPIVEY updated via liaison. Shaniqua Harmon LCSW         Selected Continued Care - Admitted Since 3/12/2023     Destination Coordination complete.    Service Provider Selected Services Address Phone Fax Patient Preferred    [facility] Inpatient Rehabilitation [address] [address] [phone] -- --          Durable Medical Equipment    No services have been selected for the patient.              Dialysis/Infusion    No services have been selected for the patient.              Home Medical Care    No services have been selected for the patient.              Therapy    No services have been selected for the patient.              Community Resources    No services have been selected for the patient.              Community & DME    No services have been selected for the patient.                       Final Discharge Disposition Code: 62 - inpatient rehab facility

## 2023-05-08 NOTE — PROGRESS NOTES
[facility] Acute [facility]  Discussed with Dr. Magaña. He has accepted pt for Acute Rehab. A bed is available today.   Please complete the DC/ Readmit along with the DC Summary before coming to Rehab  CCP informed  Faye Moy informed.     Jazzmine Salas RN  Acute Rehab Admission Nurse

## 2023-05-08 NOTE — THERAPY TREATMENT NOTE
Patient Name: Manuel Durant  : 1950    MRN: [id number]                              Today's Date: 2023       Admit Date: 3/12/2023    Visit Dx:     ICD-10-CM ICD-9-CM   1. Intracranial hemorrhage  I62.9 432.9   2. Altered mental status, unspecified altered mental status type  R41.82 780.97     Patient Active Problem List   Diagnosis   • Intracranial hemorrhage   • Acute DVT (deep venous thrombosis)   • HTN (hypertension)   • Severe Malnutrition (HCC)   • Hemiplegia   • Right shoulder pain     Past Medical History:   Diagnosis Date   • Hypokalemia 3/17/2023     History reviewed. No pertinent surgical history.   General Information     Row Name 23 1108          Physical Therapy Time and Intention    Document Type therapy note (daily note)  -     Mode of Treatment physical therapy;occupational therapy;co-treatment  -     Row Name 23 1108          General Information    Existing Precautions/Restrictions fall  -     Barriers to Rehab cognitive status  -     Row Name 23 1108          Safety Issues, Functional Mobility    Impairments Affecting Function (Mobility) balance;coordination;endurance/activity tolerance;strength;grasp;motor control;range of motion (ROM)  -     Comment, Safety Issues/Impairments (Mobility) gt belt and non skid socks donned; simple commands req  -           User Key  (r) = Recorded By, (t) = Taken By, (c) = Cosigned By    Initials Name Provider Type     Mirela Murray PTA Physical Therapist Assistant               Mobility     Row Name 23 1110          Bed Mobility    Bed Mobility supine-sit  -PH     Supine-Sit [place] (Bed Mobility) standby assist  -PH     Assistive Device (Bed Mobility) bed rails;head of bed elevated  -PH     Comment, (Bed Mobility) [facility] end of session; cues to scoot fwd and place B feet on floor  -PH     Row Name 23 1110          Transfers    Comment, (Transfers) mod/max A after cues to widen CAROL; L foot  blocked to maintain wider CAROL  -PH     Row Name 05/08/23 1110          Bed-Chair Transfer    Bed-Chair [place] (Transfers) minimum assist (75% patient effort);moderate assist (50% patient effort);2 person assist;verbal cues;nonverbal cues (demo/gesture);1 person assist  -PH     Comment, (Bed-Chair Transfer) b>c>arm chair>chair; HHA  -PH     Row Name 05/08/23 1110          Sit-Stand Transfer    Sit-Stand [place] (Transfers) minimum assist (75% patient effort);moderate assist (50% patient effort);1 person assist;2 person assist  -PH     Assistive Device (Sit-Stand Transfers) other (see comments)  HHA  -PH     Comment, (Sit-Stand Transfer) STS x 3  -PH     Row Name 05/08/23 1110          Gait/Stairs (Locomotion)    [place] Level (Gait) minimum assist (75% patient effort);moderate assist (50% patient effort);2 person assist;1 person assist;verbal cues;nonverbal cues (demo/gesture)  -PH     Distance in Feet (Gait) 3' to chair  -PH     Deviations/Abnormal Patterns (Gait) festinating/shuffling;gait speed decreased;sandhya decreased;ataxic;base of support, narrow;weight shifting decreased  -PH     Bilateral Gait Deviations forward flexed posture  -PH     Right Sided Gait Deviations foot drop/toe drag;knee buckling, right side  -PH     [place] Level (Stairs) unable to assess  -PH     Comment, (Gait/Stairs) unsteady w/ no overt LOB; R foot blocked to maintain wider CAROL w/ pt crossing over w/ R LE at times  -PH           User Key  (r) = Recorded By, (t) = Taken By, (c) = Cosigned By    Initials Name Provider Type    PH Mirela Murray PTA Physical Therapist Assistant               Obj/Interventions     Row Name 05/08/23 1115          Motor Skills    Therapeutic Exercise other (see comments)  LAq, 10 x 2; stretch R HS/calf; cues for full ROM  -PH     Row Name 05/08/23 1115          Balance    Balance Assessment sitting static balance;standing static balance  -PH     Static Sitting Balance standby  assist  -PH     Static Standing Balance minimal assist  -PH     Position/Device Used, Standing Balance other (see comments);supported  HHA  -PH           User Key  (r) = Recorded By, (t) = Taken By, (c) = Cosigned By    Initials Name Provider Type    PH Mirela Murray PTA Physical Therapist Assistant               Goals/Plan    No documentation.                Clinical Impression     Row Name 05/08/23 1116          Pain    Pre/Posttreatment Pain Comment pt grimacing at times w/ activities involving R LE/UE/hand  -PH     Pain Intervention(s) Repositioned;Ambulation/increased activity  -PH     Row Name 05/08/23 1116          Plan of Care Review    Plan of Care Reviewed With patient  -PH     Progress no change  -PH     Outcome Evaluation Pt seen by PT/OT this date for co - treatment. Pt impulsive and generally having difficulty follow directions/cues.  Pt sat up to EOB SBA. Pt then stood and amb 3' to chair req min/mod A x 1-2 w/ HHA. Pt's gait ataxic w/ R foot blocked to maintain neutral CAROL w/ pt otherwise exhibiting very narrow CAROL and at times R crossing over w/ R LE. Pt transferred to various sitting surfaces req min/mod a x 1-2 w/ HHA. Unsteady noted throughout w/ no overt LOB. Ther ex performed w/ cues to incr ROM for R knee and R ankle for DF. PT taylor prog as pt shay.  -PH     Row Name 05/08/23 1116          Vital Signs    O2 Delivery Pre Treatment room air  -PH     O2 Delivery Intra Treatment room air  -PH     O2 Delivery Post Treatment room air  -PH     Row Name 05/08/23 1116          Positioning and Restraints    Pre-Treatment Position in bed  -PH     Post Treatment Position chair  -PH     In Chair reclined;call light within reach;encouraged to call for assist;exit alarm on;notified nsg  -PH           User Key  (r) = Recorded By, (t) = Taken By, (c) = Cosigned By    Initials Name Provider Type    PH Mirela Murray PTA Physical Therapist Assistant               Outcome Measures     Row Name  05/08/23 1121          How much help from another person do you currently need...    Turning from your back to your side while in flat bed without using bedrails? 4  -PH     Moving from lying on back to sitting on the side of a flat bed without bedrails? 3  -PH     Moving to and from a bed to a chair (including a wheelchair)? 2  -PH     Standing up from a chair using your arms (e.g., wheelchair, bedside chair)? 2  -PH     Climbing 3-5 steps with a railing? 1  -PH     To walk in hospital room? 2  -PH     AM-PAC 6 Clicks Score (PT) 14  -PH     Highest level of mobility 4 --> Transferred to chair/commode  -PH     Row Name 05/08/23 1121          Functional Assessment    Outcome Measure Options AM-PAC 6 Clicks Basic Mobility (PT)  -PH           User Key  (r) = Recorded By, (t) = Taken By, (c) = Cosigned By    Initials Name Provider Type    PH Mirela Murray PTA Physical Therapist Assistant                             Physical Therapy Education     Title: PT OT SLP Therapies (In Progress)     Topic: Physical Therapy (In Progress)     Point: Mobility training (In Progress)     Learning Progress Summary           Patient Acceptance, E,TB,D, NR by PH at 5/8/2023 1122    Acceptance, E, NR by CW at 5/4/2023 1441    Acceptance, E, NR by CW at 5/2/2023 1402    Acceptance, E, VU,NR by CW at 5/1/2023 1005    Acceptance, E, NL by BB at 4/29/2023 0136    Comment: Patient cognition unable to allow for teaching at this time.    Acceptance, E,TB,H, VU by MS at 4/21/2023 1838    Acceptance, E, NR by CW at 4/21/2023 1537    Acceptance, E, VU,DU by CHARLY at 4/19/2023 1130    Acceptance, E, NR by CW at 4/18/2023 0950    Acceptance, E, VU,NR by NICOLASA at 4/16/2023 1158    Acceptance, E,TB,H, VU,NR by MS at 4/13/2023 1817    Acceptance, E, NR by CW at 4/13/2023 1341    Acceptance, E, NR by CW at 4/12/2023 1201    Acceptance, E, NR by CW at 4/11/2023 1158    Acceptance, E, NR by CW at 4/10/2023 1414    Acceptance, E,TB, NR by CS at 4/8/2023  0928    Acceptance, E, NR by CW at 4/7/2023 1215    Acceptance, E, NR by CW at 4/6/2023 1335    Acceptance, E, NR by CW at 4/5/2023 1043    Acceptance, E, NR by CW at 4/4/2023 0906    Acceptance, E, NR by CW at 4/3/2023 1531    Acceptance, E,TB, VU,NR by CAMERON at 4/1/2023 1530    Acceptance, E, NR by CW at 3/31/2023 0925    Acceptance, E, NR by CW at 3/30/2023 1209    Acceptance, E, NR,NL by CW at 3/29/2023 1336    Acceptance, E, NR by ZB at 3/28/2023 1317    Acceptance, E, NR by CW at 3/27/2023 1401    Acceptance, E,D, NR,VU by JM at 3/24/2023 1631    Comment: seemed to comprehend commands this session    Acceptance, E,D, NR by JM at 3/23/2023 1655    Acceptance, E,D, NR by JM at 3/22/2023 1545    Acceptance, E,TB, VU,NR by CB at 3/21/2023 1544    Acceptance, E,TB,H, VU by MS at 3/16/2023 1801    Acceptance, E,D, DU,NR by MO at 3/16/2023 1451    Acceptance, E, DU,NR by MO at 3/15/2023 1200    Acceptance, E, NR by LM at 3/15/2023 0521    Acceptance, E,D, DU,NR by MO at 3/14/2023 1458   Family Acceptance, E,TB,H, VU by MS at 4/21/2023 1838    Acceptance, E,TB,H, VU,NR by MS at 4/13/2023 1817    Acceptance, E,TB,H, VU by MS at 3/16/2023 1801                   Point: Home exercise program (In Progress)     Learning Progress Summary           Patient Acceptance, E,TB,D, NR by PH at 5/8/2023 1122    Acceptance, E, NL by BB at 4/29/2023 0136    Comment: Patient cognition unable to allow for teaching at this time.    Acceptance, E,TB,H, VU by MS at 4/21/2023 1838    Acceptance, E, VU,NR by DJ at 4/16/2023 1158    Acceptance, E,TB,H, VU,NR by MS at 4/13/2023 1817    Acceptance, E, NR by CW at 4/13/2023 1341    Acceptance, E,TB, NR by CS at 4/8/2023 0928    Acceptance, E, NR by CW at 4/7/2023 1215    Acceptance, E, NR by CW at 4/6/2023 1335    Acceptance, E,TB, VU,NR by CAMERON at 4/1/2023 1530    Acceptance, E, NR by CW at 3/31/2023 0925    Acceptance, E, NR by ZB at 3/28/2023 1317    Acceptance, E, NR by CW at 3/27/2023 1401     Acceptance, E,D, NR,VU by JM at 3/24/2023 1631    Comment: seemed to comprehend commands this session    Acceptance, E,D, NR by JM at 3/23/2023 1655    Acceptance, E,D, NR by JM at 3/22/2023 1545    Acceptance, E,TB, VU,NR by CB at 3/21/2023 1544    Acceptance, E,TB,H, VU by MS at 3/16/2023 1801    Acceptance, E,D, DU,NR by MO at 3/16/2023 1451    Acceptance, E, NR by LM at 3/15/2023 0521    Acceptance, E,D, DU,NR by MO at 3/14/2023 1458   Family Acceptance, E,TB,H, VU by MS at 4/21/2023 1838    Acceptance, E,TB,H, VU,NR by MS at 4/13/2023 1817    Acceptance, E,TB,H, VU by MS at 3/16/2023 1801                   Point: Body mechanics (In Progress)     Learning Progress Summary           Patient Acceptance, E,TB,D, NR by PH at 5/8/2023 1122    Acceptance, E, NL by BB at 4/29/2023 0136    Comment: Patient cognition unable to allow for teaching at this time.    Acceptance, E,TB,H, VU by MS at 4/21/2023 1838    Acceptance, E, VU,NR by DJ at 4/16/2023 1158    Acceptance, E,TB,H, VU,NR by MS at 4/13/2023 1817    Acceptance, E, NR by CW at 4/13/2023 1341    Acceptance, E, NR by CW at 4/11/2023 1158    Acceptance, E, NR by CW at 4/10/2023 1414    Acceptance, E,TB, NR by CS at 4/8/2023 0928    Acceptance, E, NR by CW at 4/7/2023 1215    Acceptance, E, NR by CW at 4/4/2023 0906    Acceptance, E, NR by CW at 4/3/2023 1531    Acceptance, E,TB, VU,NR by CAMERON at 4/1/2023 1530    Acceptance, E,D, NR,VU by JM at 3/24/2023 1631    Comment: seemed to comprehend commands this session    Acceptance, E,D, NR by JM at 3/23/2023 1655    Acceptance, E,D, NR by JM at 3/22/2023 1545    Acceptance, E,TB, VU,NR by CB at 3/21/2023 1544    Acceptance, E,TB,H, VU by MS at 3/16/2023 1801    Acceptance, E,D, DU,NR by MO at 3/16/2023 1451    Acceptance, E, DU,NR by MO at 3/15/2023 1200    Acceptance, E, NR by LM at 3/15/2023 0521    Acceptance, E,D, DU,NR by MO at 3/14/2023 1458   Family Acceptance, E,TB,H, VU by MS at 4/21/2023 1838    Acceptance,  E,TB,H, VU,NR by MS at 4/13/2023 1817    Acceptance, E,TB,H, VU by MS at 3/16/2023 1801                   Point: Precautions (In Progress)     Learning Progress Summary           Patient Acceptance, E,TB,D, NR by PH at 5/8/2023 1122    Acceptance, E, NL by BB at 4/29/2023 0136    Comment: Patient cognition unable to allow for teaching at this time.    Acceptance, E,TB,H, VU by MS at 4/21/2023 1838    Acceptance, E, VU,NR by DJ at 4/16/2023 1158    Acceptance, E,TB,H, VU,NR by MS at 4/13/2023 1817    Acceptance, E, NR by CW at 4/13/2023 1341    Acceptance, E,TB, NR by CS at 4/8/2023 0928    Acceptance, E, NR by CW at 4/7/2023 1215    Acceptance, E, NR by CW at 4/4/2023 0906    Acceptance, E, NR by CW at 4/3/2023 1531    Acceptance, E,TB, VU,NR by CAMERON at 4/1/2023 1530    Acceptance, E,D, NR,VU by JM at 3/24/2023 1631    Comment: seemed to comprehend commands this session    Acceptance, E,D, NR by JM at 3/23/2023 1655    Acceptance, E,D, NR by JM at 3/22/2023 1545    Acceptance, E,TB, VU,NR by CB at 3/21/2023 1544    Acceptance, E,TB,H, VU by MS at 3/16/2023 1801    Acceptance, E,D, DU,NR by MO at 3/16/2023 1451    Acceptance, E, DU,NR by MO at 3/15/2023 1200    Acceptance, E, NR by LM at 3/15/2023 0521    Acceptance, E,D, DU,NR by MO at 3/14/2023 1458   Family Acceptance, E,TB,H, VU by MS at 4/21/2023 1838    Acceptance, E,TB,H, VU,NR by MS at 4/13/2023 1817    Acceptance, E,TB,H, VU by MS at 3/16/2023 3586                               User Key     Initials Effective Dates Name Provider Type Discipline     03/07/18 -  Tiffanie Grace, PTA Physical Therapist Assistant PT    CAMERON 05/19/21 -  Leeann Wiley, PT Physical Therapist PT    PH 06/16/21 -  Mirela Murray, PTA Physical Therapist Assistant PT    DJ 10/25/19 -  Jacqueline Chamberlain, PT Physical Therapist PT    MS 06/16/21 -  Angel Luis Cr, RN Registered Nurse Nurse    CW 12/13/22 -  Tamica Willoughby, PT Physical Therapist PT    CB 10/22/21 -  Emmanuel  Bety, PT Physical Therapist PT    BB 01/10/23 -  Elyssa Thorne, RN Registered Nurse Nurse    CS 09/22/22 -  Adelia Montalvo, PT Physical Therapist PT    LM 10/13/22 -  Silke Grace, RN Registered Nurse Nurse    MO 01/27/23 -  Lacie Tanner, PT Student PT Student PT    ZB 03/10/23 -  Nestor Rae, PT Student PT Student PT              PT Recommendation and Plan     Plan of Care Reviewed With: patient  Progress: no change  Outcome Evaluation: Pt seen by PT/OT this date for co - treatment. Pt impulsive and generally having difficulty follow directions/cues.  Pt sat up to EOB SBA. Pt then stood and amb 3' to chair req min/mod A x 1-2 w/ HHA. Pt's gait ataxic w/ R foot blocked to maintain neutral CAROL w/ pt otherwise exhibiting very narrow CAROL and at times R crossing over w/ R LE. Pt transferred to various sitting surfaces req min/mod a x 1-2 w/ HHA. Unsteady noted throughout w/ no overt LOB. Ther ex performed w/ cues to incr ROM for R knee and R ankle for DF. PT taylor prog as pt shay.     Time Calculation:    PT Charges     Row Name 05/08/23 1123             Time Calculation    Start Time 0837  -PH      Stop Time 0902  -PH      Time Calculation (min) 25 min  -PH      PT Received On 05/08/23  -PH      PT - Next Appointment 05/09/23  -PH         Timed Charges    32021 - PT Therapeutic Activity Minutes 25  -PH         Total Minutes    Timed Charges Total Minutes 25  -PH       Total Minutes 25  -PH            User Key  (r) = Recorded By, (t) = Taken By, (c) = Cosigned By    Initials Name Provider Type    PH Mirela Murray PTA Physical Therapist Assistant              Therapy Charges for Today     Code Description Service Date Service Provider Modifiers Qty    35741063005 HC PT THERAPEUTIC ACT EA 15 MIN 5/8/2023 Mirela Murray PTA GP 2          PT G-Codes  Outcome Measure Options: AM-PAC 6 Clicks Basic Mobility (PT)  AM-PAC 6 Clicks Score (PT): 14  AM-PAC 6 Clicks Score (OT): 11  Modified [place]  Scale: 4 - Moderately severe disability.  Unable to walk without assistance, and unable to attend to own bodily needs without assistance.  PT Discharge Summary  Anticipated Discharge Disposition (PT): inpatient rehabilitation facility, skilled nursing facility    Mirela Murray, PTA  5/8/2023

## 2023-05-08 NOTE — CASE MANAGEMENT/SOCIAL WORK
Continued Stay Note  [facility]     Patient Name: Manuel Durant  MRN: [id number]  Today's Date: 5/8/2023    Admit Date: 3/12/2023    Plan: Home to [place] by air.  Family plan is to come to [place] to accompany pt home to [place].  Tentative/planned DC 5/20.  Decision on flight still pending.   Discharge Plan       Row Name 05/08/23 1438       Plan    Plan Home to [place] by air.  Family plan is to come to [place] to accompany pt home to [place].  Tentative/planned DC 5/20.  Decision on flight still pending.    Plan Comments Call placed to pt's granddaughter Natalie and VM left for return call to update her about rehab.  Once pt's room number is known, will also call pt's son Ariel in [place] to update him and continue discharge planning conversation.  Faye DELGADILLO-RN                                           Discharge Codes    No documentation.                 Expected Discharge Date and Time       Expected Discharge Date Expected Discharge Time    May 20, 2023               Faye Moy RN

## 2023-05-08 NOTE — DISCHARGE SUMMARY
[facility]    Date of Admission: 3/12/2023  Date of Discharge:  5/8/2023    PCP: Provider, No Known    Discharge Diagnosis:   Active Hospital Problems    Diagnosis  POA   • **Intracranial hemorrhage [I62.9]  Yes   • Hemiplegia [G81.90]  Yes   • Severe Malnutrition (HCC) [E43]  Yes   • Acute DVT (deep venous thrombosis) [I82.409]  Yes   • HTN (hypertension) [I10]  Yes      Resolved Hospital Problems    Diagnosis Date Resolved POA   • Right shoulder pain [M25.511] 05/08/2023 Yes   • Hypokalemia [E87.6] 04/10/2023 Unknown   • Hypertensive emergency [I16.1] 04/29/2023 Yes     Procedures Performed  none     Consults     Date and Time Order Name Status Description    3/16/2023  1:01 PM Inpatient Internal Medicine Consult Completed     3/12/2023  8:12 PM Pulmonology (on-call MD unless specified)      3/12/2023  7:58 PM Neurosurgery (on-call MD unless specified) Completed         Hospital Course  Please see history and physical for details. Patient is a 73 y.o. male who initially presented to the hospital 3/12/23 for intracranial bleed. He was found unresponsive by friends and initially taken to [facility] where he was found to have poorly controlled BP and right sided hemiplegia. He was transferred here for closer monitoring in the ICU and neurosurgical consultation. He was cleared to move out of the ICU on 3/16/23.   The patient had a stable CTH on 3/17/23 with left basal ganglia/internal capsule hemorrhage with left lateral ventricular hemorrhage. He was found to have an acute DVT in his RLE around this time and was cleared for anticoagulation by Neurosurgery. He was initially placed on Lovenox and repeat CTH was obtained on 3/18/23 showing stability. He was successfully transitioned to Eliquis therapy.    Neurosurgery initially signed off but given his prolonged stay re-evaluated on 4/20/23. Plans were for MRI but he couldn't be cleared for this imaging at that  time so repeat CTA/CTH head was obtained with evidence of hemorrhage cavity left sided with some peripheral enhancement. This was felt to represent possible resolving hemorrhage but underlying mass could not be excluded so he will need either repeat CTH with contrast versus MRI in 6 to 8 weeks. Neurosurgery recommends he establish care with neurology/[facility] once he arrives in [place] and is to be given a disk with all his imaging on this when he leaves. He has been on baclofen for spacticity and has been working diligently with PT/OT in an effort to be more mobile for flight back to [place].   His BP was treated with oral therapy and titrated to avoid hypotension as well. He is currently on Norvasc 10 mg daily, valsartan 160 mg daily as well as Coreg 3.125 mg BID. His overall stay has been prolonged due to his disposition as he did not have any adequate insurance to cover a SNF stay as well as no family assistance to qualify for [facility]. [facility] has worked well to obtain a discharge plan and his family is flying in to [place] on May 20th to personally transport him to [place] to reside with his son. He has been medically stable for weeks and denies any complaints including dyspnea, pain, nausea or vomiting. He will be discharged to [facility] today as they have accepted now that there is a concrete discharge plan in place.    I discussed the patient's findings and my recommendations with patient and nursing staff.    Condition on Discharge: Improved.     Temp:  [97.2 °F (36.2 °C)-98.3 °F (36.8 °C)] 98.3 °F (36.8 °C)  Heart Rate:  [58-70] 58  Resp:  [16-18] 16  BP: (106-124)/(62-81) 113/64  Body mass index is 21.15 kg/m².    Physical Exam  Vitals and nursing note reviewed.   Constitutional:       General: He is awake and alert. He is not in acute distress. Lying in bed on RA  Cardiovascular:      Rate and Rhythm: Normal rate and regular rhythm.      Pulses: Normal pulses.      Heart sounds: Normal heart sounds. No  murmur heard.  Pulmonary:      Effort: Pulmonary effort is normal. No respiratory distress.      Breath sounds: Normal breath sounds.   Abdominal:      Palpations: Abdomen is soft and non-tender. Bowel sounds active.    Musculoskeletal:      Right shoulder pain with passive ROM     Right lower leg: No edema.      Left lower leg: No edema.  Skin:     General: Skin is dry.   Neurological:      Motor: Weakness present.      Comments: Alert and oriented to person/place. Intermittent disorientation. Right-sided upper extremity paresis  Psychiatric:         Mood and Affect: Mood normal.         Behavior: Behavior normal.         Thought Content: Thought content normal.         Judgment: Judgment normal      Inter-facility transfer medications (From admission, onward)             amLODIPine (NORVASC) tablet 10 mg  Every Morning Before Breakfast             apixaban (ELIQUIS) tablet 5 mg  Every 12 Hours Scheduled             baclofen (LIORESAL) tablet 2.5 mg  Every 8 Hours Scheduled             carvedilol (COREG) tablet 3.125 mg  2 Times Daily With Meals             lidocaine (LIDODERM) 5 % 1 patch  Every 24 Hours Scheduled             valsartan (DIOVAN) tablet 160 mg  Every Morning Before Breakfast             acetaminophen (TYLENOL) tablet 650 mg  Every 6 Hours PRN             loperamide (IMODIUM) capsule 2 mg  Every 6 Hours PRN             sennosides-docusate (PERICOLACE) 8.6-50 MG per tablet 2 tablet  (Bowel Management Regimen)  Nightly PRN        See Hyperspace for full Linked Orders Report.        polyethylene glycol (MIRALAX) packet 17 g  (Bowel Management Regimen)  Daily PRN        See Hyperspace for full Linked Orders Report.        bisacodyl (DULCOLAX) EC tablet 5 mg  (Bowel Management Regimen)  Daily PRN        See Hyperspace for full Linked Orders Report.        bisacodyl (DULCOLAX) suppository 10 mg  (Bowel Management Regimen)  Daily PRN        See Hyperspace for full Linked Orders Report.                     Contact information for follow-up providers     neurology or neurosurgery. Schedule an appointment as soon as possible for a visit.    Why: need to establish with local neurologist or neurosurgeon in [place] upon return home. need to get MRI brain (if can be cleared of metal concerns) or CT head with contrast in 6-8 weeks to follow up on left thalamic hemorrhage etiology. has some peripheral enhancement of resection cavity on CT head 6 weeks post hemorrhage           Provider, No Known .    Contact information:  [facility] [address] [phone]                   Contact information for after-discharge care     Destination     [facility] ACUTE REHAB PRO[facility] .    Service: Inpatient Rehabilitation  Contact information:  [address] [id number] [phone]                           Test Results Pending at Discharge     Dimple Portillo, SLICK  05/08/23  14:52 EDT    Discharge time spent greater than 30 minutes.

## 2023-05-08 NOTE — PLAN OF CARE
Goal Outcome Evaluation:  Plan of Care Reviewed With: patient        Progress: no change  Outcome Evaluation: Pt seen by PT/OT this date for co - treatment. Pt impulsive and generally having difficulty follow directions/cues.  Pt sat up to EOB SBA. Pt then stood and amb 3' to chair req min/mod A x 1-2 w/ HHA. Pt's gait ataxic w/ R foot blocked to maintain neutral CAROL w/ pt otherwise exhibiting very narrow CAROL and at times R crossing over w/ R LE. Pt transferred to various sitting surfaces req min/mod a x 1-2 w/ HHA. Unsteady noted throughout w/ no overt LOB. Ther ex performed w/ cues to incr ROM for R knee and R ankle for DF. PT taylor prog as pt shay.

## 2023-05-08 NOTE — PLAN OF CARE
Goal Outcome Evaluation:      No new event this shift.        Problem: Fall Injury Risk  Goal: Absence of Fall and Fall-Related Injury  Outcome: Ongoing, Progressing  Intervention: Identify and Manage Contributors  Recent Flowsheet Documentation  Taken 5/7/2023 2051 by Esme Ybarra RN  Medication Review/Management: medications reviewed  Intervention: Promote Injury-Free Environment  Recent Flowsheet Documentation  Taken 5/8/2023 0051 by Esme Ybarra RN  Safety Promotion/Fall Prevention: safety round/check completed  Taken 5/7/2023 2231 by Esme Ybarra RN  Safety Promotion/Fall Prevention: safety round/check completed  Taken 5/7/2023 2051 by Esme Ybarra RN  Safety Promotion/Fall Prevention:   activity supervised   fall prevention program maintained   nonskid shoes/slippers when out of bed   safety round/check completed     Problem: Skin Injury Risk Increased  Goal: Skin Health and Integrity  Outcome: Ongoing, Progressing  Intervention: Optimize Skin Protection  Recent Flowsheet Documentation  Taken 5/7/2023 2130 by Esme Ybarra RN  Pressure Reduction Techniques:   frequent weight shift encouraged   weight shift assistance provided  Pressure Reduction Devices: positioning supports utilized  Skin Protection:   adhesive use limited   incontinence pads utilized   tubing/devices free from skin contact     Problem: Adult Inpatient Plan of Care  Goal: Plan of Care Review  Outcome: Ongoing, Progressing  Goal: Patient-Specific Goal (Individualized)  Outcome: Ongoing, Progressing  Goal: Absence of Hospital-Acquired Illness or Injury  Outcome: Ongoing, Progressing  Intervention: Identify and Manage Fall Risk  Recent Flowsheet Documentation  Taken 5/8/2023 0051 by Esme Ybarra RN  Safety Promotion/Fall Prevention: safety round/check completed  Taken 5/7/2023 2231 by Esme Ybarra RN  Safety Promotion/Fall Prevention: safety round/check completed  Taken 5/7/2023 2051 by Esme Ybarra  RN  Safety Promotion/Fall Prevention:   activity supervised   fall prevention program maintained   nonskid shoes/slippers when out of bed   safety round/check completed  Intervention: Prevent Skin Injury  Recent Flowsheet Documentation  Taken 5/8/2023 0051 by Esme Ybarra RN  Body Position: weight shifting  Taken 5/7/2023 2231 by Esme Ybarra RN  Body Position:   position changed independently   side-lying   right  Taken 5/7/2023 2130 by Esme Ybarra RN  Skin Protection:   adhesive use limited   incontinence pads utilized   tubing/devices free from skin contact  Taken 5/7/2023 2051 by Esme Ybarra RN  Body Position:   position changed independently   sitting up in bed  Intervention: Prevent and Manage VTE (Venous Thromboembolism) Risk  Recent Flowsheet Documentation  Taken 5/7/2023 2130 by Esme Ybarra RN  Activity Management: activity encouraged  VTE Prevention/Management: (Eliquis) other (see comments)  Goal: Optimal Comfort and Wellbeing  Outcome: Ongoing, Progressing  Intervention: Provide Person-Centered Care  Recent Flowsheet Documentation  Taken 5/7/2023 2130 by Esme Ybarra RN  Trust Relationship/Rapport:   care explained   thoughts/feelings acknowledged  Goal: Readiness for Transition of Care  Outcome: Ongoing, Progressing     Problem: Adjustment to Illness (Stroke, Hemorrhagic)  Goal: Optimal Coping  Outcome: Ongoing, Progressing     Problem: Bowel Elimination Impaired (Stroke, Hemorrhagic)  Goal: Effective Bowel Elimination  Outcome: Ongoing, Progressing     Problem: Cerebral Tissue Perfusion (Stroke, Hemorrhagic)  Goal: Optimal Cerebral Tissue Perfusion  Outcome: Ongoing, Progressing     Problem: Cognitive Impairment (Stroke, Hemorrhagic)  Goal: Optimal Cognitive Function  Outcome: Ongoing, Progressing     Problem: Communication Impairment (Stroke, Hemorrhagic)  Goal: Effective Communication Skills  Outcome: Ongoing, Progressing     Problem: Functional Ability Impaired  (Stroke, Hemorrhagic)  Goal: Optimal Functional Ability  Outcome: Ongoing, Progressing  Intervention: Optimize Functional Ability  Recent Flowsheet Documentation  Taken 5/7/2023 2130 by Esme Ybarra RN  Activity Management: activity encouraged     Problem: Pain (Stroke, Hemorrhagic)  Goal: Acceptable Pain Control  Outcome: Ongoing, Progressing     Problem: Respiratory Compromise (Stroke, Hemorrhagic)  Goal: Effective Oxygenation and Ventilation  Outcome: Ongoing, Progressing     Problem: Sensorimotor Impairment (Stroke, Hemorrhagic)  Goal: Improved Sensorimotor Function  Outcome: Ongoing, Progressing  Intervention: Optimize Sensory and Perceptual Ability  Recent Flowsheet Documentation  Taken 5/7/2023 2130 by Esme Ybarra RN  Pressure Reduction Techniques:   frequent weight shift encouraged   weight shift assistance provided  Pressure Reduction Devices: positioning supports utilized     Problem: Swallowing Impairment (Stroke, Hemorrhagic)  Goal: Optimal Eating and Swallowing Without Aspiration  Outcome: Ongoing, Progressing     Problem: Urinary Elimination Impaired (Stroke, Hemorrhagic)  Goal: Effective Urinary Elimination  Outcome: Ongoing, Progressing

## 2023-05-08 NOTE — THERAPY TREATMENT NOTE
Patient Name: Manuel Durant  : 1950    MRN: [id number]                              Today's Date: 2023       Admit Date: 3/12/2023    Visit Dx:     ICD-10-CM ICD-9-CM   1. Intracranial hemorrhage  I62.9 432.9   2. Altered mental status, unspecified altered mental status type  R41.82 780.97     Patient Active Problem List   Diagnosis   • Intracranial hemorrhage   • Acute DVT (deep venous thrombosis)   • HTN (hypertension)   • Severe Malnutrition (HCC)   • Hemiplegia   • Right shoulder pain     Past Medical History:   Diagnosis Date   • Hypokalemia 3/17/2023     History reviewed. No pertinent surgical history.   General Information     Row Name 23 1152          OT Time and Intention    Document Type therapy note (daily note)  -     Mode of Treatment co-treatment;occupational therapy;physical therapy  -     Row Name 23 1152          General Information    Patient Profile Reviewed yes  -MW     Existing Precautions/Restrictions fall  -     Row Name 23 1152          Cognition    Orientation Status (Cognition) oriented to;person  -     Row Name 23 1152          Safety Issues, Functional Mobility    Impairments Affecting Function (Mobility) balance;coordination;endurance/activity tolerance;strength;grasp;motor control;range of motion (ROM)  -           User Key  (r) = Recorded By, (t) = Taken By, (c) = Cosigned By    Initials Name Provider Type    Karishma Williamson OT Occupational Therapist                 Mobility/ADL's     Row Name 23 1152          Bed Mobility    Bed Mobility supine-sit  -MW     Supine-Sit [place] (Bed Mobility) standby assist  -MW     Sit-Supine [place] (Bed Mobility) not tested  -     Comment, (Bed Mobility) [facility] end of session  -     Row Name 23 1152          Transfers    Transfers sit-stand transfer;stand-sit transfer;bed-chair transfer  -     Row Name 23 1152          Bed-Chair Transfer    Bed-Chair [place]  (Transfers) minimum assist (75% patient effort);moderate assist (50% patient effort);2 person assist;verbal cues;nonverbal cues (demo/gesture);1 person assist  -     Assistive Device (Bed-Chair Transfers) other (see comments)  -     Comment, (Bed-Chair Transfer) attempted use of Rwx due to improved ROM in RUE, pt unable to follow through after cues given for sequencing, multiple stand pivots completed to simulate func transfers to BSC/chair  -     Row Name 05/08/23 1152          Sit-Stand Transfer    Sit-Stand [place] (Transfers) minimum assist (75% patient effort);moderate assist (50% patient effort);1 person assist;2 person assist  -     Assistive Device (Sit-Stand Transfers) other (see comments)  -     Comment, (Sit-Stand Transfer) HHA, STS x3-4  -     Row Name 05/08/23 1152          Functional Mobility    Functional Mobility- Comment short distance originally to chair, pt not following visual cues to keep R foot on ground with mobility  -     Row Name 05/08/23 1152          Activities of Daily Living    BADL Assessment/Intervention toileting  -[facility] Name 05/08/23 1152          Toileting Assessment/Training    Comment, (Toileting) completed simulation of BSC transfer, remains total A for toileting; nurse aide reports change of brief prior to session  -           User Key  (r) = Recorded By, (t) = Taken By, (c) = Cosigned By    Initials Name Provider Type     Karishma Spence OT Occupational Therapist               Obj/Interventions     Row Name 05/08/23 1155          Shoulder (Therapeutic Exercise)    Shoulder AAROM (Therapeutic Exercise) --  table slides with RUE with towel, attempting to obtain grasp prior to completion, sitting supported  -     Row Name 05/08/23 1155          Motor Skills    Motor Skills coordination;motor control/coordination interventions  -     Coordination --  pt engaged in RUE FM grasp and release task, attempting with cups however unable to obtain proper  grasp throughout, pt assisting with L hand over hand assisting RUE.  -     Row Name 05/08/23 1155          Balance    Balance Assessment sitting static balance;sitting dynamic balance;sit to stand dynamic balance;standing dynamic balance;standing static balance  -     Static Sitting Balance standby assist  -     Dynamic Sitting Balance contact guard  -     Position, Sitting Balance sitting edge of bed;sitting in chair  -MW     Static Standing Balance minimal assist  -MW     Dynamic Standing Balance minimal assist;moderate assist;1-person assist;2-person assist  -MW     Position/Device Used, Standing Balance supported  HHA  -           User Key  (r) = Recorded By, (t) = Taken By, (c) = Cosigned By    Initials Name Provider Type    Karishma Williamson OT Occupational Therapist               Goals/Plan    No documentation.                Clinical Impression     Row Name 05/08/23 1200          Pain Assessment    Pre/Posttreatment Pain Comment facial grimacing with RUE movement, shoulder plane, did not rate  -     Row Name 05/08/23 1200          Plan of Care Review    Plan of Care Reviewed With patient  -     Progress no change  -     Outcome Evaluation Pt seen for OT/PT tx session this AM, pt agreeable, coming to EOB with SBA. Pt engaging in multiple func transfers this date, max visual and verbal cues required for stand pivots to household surfaces. Pt with poor CAROL with transfers, unable to self correct despite max cueing. min-mod A x1-2 for transfers. Pt engaging in RUE coordination and AAROM activity, with emphasis on grasp/release on larger household objects. Pt with difficult securing  and control of RUE, table slides completed with RUE, AAROM. Max A for s/up of extremity with task, pt will continue to benefit from skilled OT to address noted functional deficits and progress toward goals in prep for discharge.  -     Row Name 05/08/23 1200          Vital Signs    O2 Delivery Pre Treatment  room air  -MW     Pre Patient Position Supine  -MW     Intra Patient Position Standing  -MW     Post Patient Position Sitting  -MW     Row Name 05/08/23 1200          Positioning and Restraints    Pre-Treatment Position in bed  -MW     Post Treatment Position chair  -MW     In Chair notified nsg;reclined;call light within reach;encouraged to call for assist;exit alarm on  -MW           User Key  (r) = Recorded By, (t) = Taken By, (c) = Cosigned By    Initials Name Provider Type    Karishma Williamson, OT Occupational Therapist               Outcome Measures     Row Name 05/08/23 1204          How much help from another is currently needed...    Putting on and taking off regular lower body clothing? 2  -MW     Bathing (including washing, rinsing, and drying) 2  -MW     Toileting (which includes using toilet bed pan or urinal) 1  -MW     Putting on and taking off regular upper body clothing 2  -MW     Taking care of personal grooming (such as brushing teeth) 2  -MW     Eating meals 3  -MW     AM-PAC 6 Clicks Score (OT) 12  -MW     Row Name 05/08/23 1121          How much help from another person do you currently need...    Turning from your back to your side while in flat bed without using bedrails? 4  -PH     Moving from lying on back to sitting on the side of a flat bed without bedrails? 3  -PH     Moving to and from a bed to a chair (including a wheelchair)? 2  -PH     Standing up from a chair using your arms (e.g., wheelchair, bedside chair)? 2  -PH     Climbing 3-5 steps with a railing? 1  -PH     To walk in hospital room? 2  -PH     AM-PAC 6 Clicks Score (PT) 14  -PH     Highest level of mobility 4 --> Transferred to chair/commode  -PH     Row Name 05/08/23 1204 05/08/23 1121       Functional Assessment    Outcome Measure Options AM-PAC 6 Clicks Daily Activity (OT)  -MW AM-PAC 6 Clicks Basic Mobility (PT)  -PH          User Key  (r) = Recorded By, (t) = Taken By, (c) = Cosigned By    Initials Name Provider Type     Mirela Cao PTA Physical Therapist Assistant    JOSE R Spence Karishma, OT Occupational Therapist                Occupational Therapy Education     Title: PT OT SLP Therapies (In Progress)     Topic: Occupational Therapy (In Progress)     Point: ADL training (In Progress)     Description:   Instruct learner(s) on proper safety adaptation and remediation techniques during self care or transfers.   Instruct in proper use of assistive devices.              Learning Progress Summary           Patient Acceptance, E, NL by BB at 4/29/2023 0136    Comment: Patient cognition unable to allow for teaching at this time.    Acceptance, E,TB,H, VU by MS at 4/21/2023 1838    Acceptance, E,TB,H, VU,NR by MS at 4/13/2023 1817    Acceptance, E,TB,H, VU by MS at 3/16/2023 1801    Acceptance, E, NR by LM at 3/15/2023 0521   Family Acceptance, E,TB,H, VU by MS at 4/21/2023 1838    Acceptance, E,TB,H, VU,NR by MS at 4/13/2023 1817    Acceptance, E,TB,H, VU by MS at 3/16/2023 1801                   Point: Home exercise program (In Progress)     Description:   Instruct learner(s) on appropriate technique for monitoring, assisting and/or progressing therapeutic exercises/activities.              Learning Progress Summary           Patient Acceptance, E, NL by BB at 4/29/2023 0136    Comment: Patient cognition unable to allow for teaching at this time.    Acceptance, E,TB,H, VU by MS at 4/21/2023 1838    Acceptance, E,TB,H, VU,NR by MS at 4/13/2023 1817    Acceptance, E,TB,H, VU by MS at 3/16/2023 1801    Acceptance, E, NR by LM at 3/15/2023 0521   Family Acceptance, E,TB,H, VU by MS at 4/21/2023 1838    Acceptance, E,TB,H, VU,NR by MS at 4/13/2023 1817    Acceptance, E,TB,H, VU by MS at 3/16/2023 1801                   Point: Precautions (In Progress)     Description:   Instruct learner(s) on prescribed precautions during self-care and functional transfers.              Learning Progress Summary           Patient  Acceptance, E, NL by BB at 4/29/2023 0136    Comment: Patient cognition unable to allow for teaching at this time.    Acceptance, E,TB,H, VU by MS at 4/21/2023 1838    Acceptance, E,TB,H, VU,NR by MS at 4/13/2023 1817    Acceptance, E,TB,H, VU by MS at 3/16/2023 1801    Acceptance, E, NR by LM at 3/15/2023 0521   Family Acceptance, E,TB,H, VU by MS at 4/21/2023 1838    Acceptance, E,TB,H, VU,NR by MS at 4/13/2023 1817    Acceptance, E,TB,H, VU by MS at 3/16/2023 1801                   Point: Body mechanics (In Progress)     Description:   Instruct learner(s) on proper positioning and spine alignment during self-care, functional mobility activities and/or exercises.              Learning Progress Summary           Patient Acceptance, E, NL by BB at 4/29/2023 0136    Comment: Patient cognition unable to allow for teaching at this time.    Acceptance, E,TB,H, VU by MS at 4/21/2023 1838    Acceptance, E,TB,H, VU,NR by MS at 4/13/2023 1817    Acceptance, E,TB,H, VU by MS at 3/16/2023 1801    Acceptance, E, NR by LM at 3/15/2023 0521   Family Acceptance, E,TB,H, VU by MS at 4/21/2023 1838    Acceptance, E,TB,H, VU,NR by MS at 4/13/2023 1817    Acceptance, E,TB,H, VU by MS at 3/16/2023 1801                               User Key     Initials Effective Dates Name Provider Type Discipline    MS 06/16/21 -  Angel Luis Cr, RN Registered Nurse Nurse    BB 01/10/23 -  Elyssa Thorne, RN Registered Nurse Nurse     10/13/22 -  Silke Grace, RN Registered Nurse Nurse              OT Recommendation and Plan     Plan of Care Review  Plan of Care Reviewed With: patient  Progress: no change  Outcome Evaluation: Pt seen for OT/PT tx session this AM, pt agreeable, coming to EOB with SBA. Pt engaging in multiple func transfers this date, max visual and verbal cues required for stand pivots to household surfaces. Pt with poor CAROL with transfers, unable to self correct despite max cueing. min-mod A x1-2 for transfers. Pt engaging  in RUE coordination and AAROM activity, with emphasis on grasp/release on larger household objects. Pt with difficult securing  and control of RUE, table slides completed with RUBEN ABRAMS. Max A for s/up of extremity with task, pt will continue to benefit from skilled OT to address noted functional deficits and progress toward goals in prep for discharge.     Time Calculation:    Time Calculation- OT     Row Name 05/08/23 1205             Time Calculation- OT    OT Start Time 0837  -MW      OT Stop Time 0902  -MW      OT Time Calculation (min) 25 min  -MW      Total Timed Code Minutes- OT 25 minute(s)  -MW      OT Received On 05/08/23  -MW      OT - Next Appointment 05/09/23  -MW         Timed Charges    20129 -  OT Neuromuscular Reeducation Minutes 10  -MW      64573 - OT Therapeutic Activity Minutes 15  -MW         Total Minutes    Timed Charges Total Minutes 25  -MW       Total Minutes 25  -MW            User Key  (r) = Recorded By, (t) = Taken By, (c) = Cosigned By    Initials Name Provider Type     Karishma Spence OT Occupational Therapist              Therapy Charges for Today     Code Description Service Date Service Provider Modifiers Qty    [id number]  OT NEUROMUSC RE EDUCATION EA 15 MIN 5/8/2023 Karishma Spence OT GO 1    [id number]  OT THERAPEUTIC ACT EA 15 MIN 5/8/2023 Karishma Spence OT GO 1               Karishma Spence OT  5/8/2023

## 2023-05-09 PROCEDURE — 97535 SELF CARE MNGMENT TRAINING: CPT

## 2023-05-09 PROCEDURE — 97530 THERAPEUTIC ACTIVITIES: CPT

## 2023-05-09 PROCEDURE — 97110 THERAPEUTIC EXERCISES: CPT

## 2023-05-09 PROCEDURE — 97166 OT EVAL MOD COMPLEX 45 MIN: CPT

## 2023-05-09 PROCEDURE — 97161 PT EVAL LOW COMPLEX 20 MIN: CPT

## 2023-05-09 PROCEDURE — 96125 COGNITIVE TEST BY HC PRO: CPT

## 2023-05-09 RX ADMIN — BACLOFEN 2.5 MG: 10 TABLET ORAL at 14:24

## 2023-05-09 RX ADMIN — BACLOFEN 2.5 MG: 10 TABLET ORAL at 04:28

## 2023-05-09 RX ADMIN — APIXABAN 5 MG: 5 TABLET, FILM COATED ORAL at 04:28

## 2023-05-09 RX ADMIN — LIDOCAINE 1 PATCH: 50 PATCH CUTANEOUS at 09:47

## 2023-05-09 RX ADMIN — ACETAMINOPHEN 650 MG: 325 TABLET, FILM COATED ORAL at 14:31

## 2023-05-09 RX ADMIN — CARVEDILOL 3.12 MG: 3.12 TABLET, FILM COATED ORAL at 17:36

## 2023-05-09 RX ADMIN — BACLOFEN 2.5 MG: 10 TABLET ORAL at 20:38

## 2023-05-09 RX ADMIN — AMLODIPINE BESYLATE 10 MG: 10 TABLET ORAL at 09:47

## 2023-05-09 RX ADMIN — CARVEDILOL 3.12 MG: 3.12 TABLET, FILM COATED ORAL at 09:47

## 2023-05-09 RX ADMIN — APIXABAN 5 MG: 5 TABLET, FILM COATED ORAL at 17:36

## 2023-05-09 RX ADMIN — VALSARTAN 160 MG: 160 TABLET, FILM COATED ORAL at 09:47

## 2023-05-09 NOTE — PROGRESS NOTES
Inpatient Rehabilitation Plan of Care Note    Plan of Care  Care Plan Reviewed - No updates at this time.    Safety    [RN] Potential for Injury(Active)  Current Status(05/08/2023): Risk for falls r'/t R side weakness  Weekly Goal(05/15/2023): Cue to use call bell  Discharge Goal: Patient will be aware of risk of fall and safety in the home  setting        Psychosocial    [RN] Coping/Adjustment(Active)  Current Status(05/08/2023): Expresses appropriate coping  Weekly Goal(05/15/2023): Educate patient regarding stroke and hypertention  Discharge Goal: Knowledgeable of care needs and stroke recovery        Sphincter Control    [RN] Bladder Management(Active)  Current Status(05/08/2023): Continent/incontinent of bladder at times  Weekly Goal(05/15/2023): Continent 75% of time  Discharge Goal: Continent 100% of time    [RN] Bowel Management(Active)  Current Status(05/08/2023): Continent/Incontinent at times of bowel  Weekly Goal(05/15/2023): Continent of bowel 75%  Discharge Goal: Continent of bowel 100%    Signed by: Joaquín Aylaa RN

## 2023-05-09 NOTE — PROGRESS NOTES
SECTION GG      Mobility Performance Discharge:     Roll Left and Right: [place] does less than half the effort. [place] lifts, holds  or supports trunk or limbs but provides less than half the effort.   Sit to Lying: [place] does less than half the effort. [place] lifts, holds or  supports trunk or limbs but provides less than half the effort.   Lying to Sitting on Side of Bed: [place] does less than half the effort. [place]  lifts, holds or supports trunk or limbs but provides less than half the effort.   Sit to Stand: [place] does less than half the effort. [place] lifts, holds or  supports trunk or limbs but provides less than half the effort.   Chair/Bed to Chair Transfer: [place] does less than half the effort. [place]  lifts, holds or supports trunk or limbs but provides less than half the effort.   Car Transfer: Not attempted due to medical or safety concerns.   Walk 10 Feet:   Not attempted due to medical or safety concerns.  1 Step Over Curb or Up/Down Stair:   Not attempted due to medical or safety  concerns.  Picking up an Object:   Not attempted due to medical or safety concerns. Uses  Wheelchair and/or Scooter: No    Section J. Health Conditions (Pain):  Pain Interference with Therapy Activities:   Rarely or not at all  Pain Interference with Day-to-Day Activities:   Rarely or not at all    Signed by: Tristin Vicente, PT

## 2023-05-09 NOTE — THERAPY EVALUATION
Inpatient Rehabilitation - Speech Language Pathology Initial Evaluation    [facility]     Patient Name: Manuel Durant  : 1950  MRN: [id number]    Today's Date: 2023                   Admit Date: 2023       Visit Dx:    No diagnosis found.    Patient Active Problem List   Diagnosis   • Intracranial hemorrhage   • Acute DVT (deep venous thrombosis)   • HTN (hypertension)   • Severe Malnutrition (HCC)   • Hemiplegia   • ICH (intracerebral hemorrhage)   • Stroke       Past Medical History:   Diagnosis Date   • Hypertension    • Hypokalemia 2023   • Stroke        No past surgical history on file.    SLP Recommendation and Plan    SLP Diagnosis: severe, cognitive-linguistic disorder, moderate, communication disorder (23 1000)        Rehab Potential/Prognosis: good (23 1000)     Anticipated Discharge Disposition (SLP): home with OP services (23 1000)        Predicted Duration Therapy Intervention (Days): until discharge (23 1000)                               SLP EVALUATION (last 72 hours)     SLP SLC Evaluation     Row Name 23 1000       Communication Assessment/Intervention    Document Type evaluation  -AL    Patient Observations alert;cooperative  -AL    Patient/Family/Caregiver Comments/Observations Used  via Ipad.  -AL    Patient Effort good  -AL    Symptoms Noted During/After Treatment none  -AL       General Information    Patient Profile Reviewed yes  -AL    Pertinent History Of Current Problem Pt is a 73-year-old male admitted to inpatient rehab under diagnosis L thalamic and basal ganglia hemorrhage with extension to L lateral ventricle with onset date 3/12/23. His primary language is Peruvian; however, he speaks some basic conversational English.  -AL    Precautions/Limitations, Vision corrective lenses needed for reading;neglect, right  -AL    Precautions/Limitations, Hearing WFL;for purposes of eval  -AL    Patient Level of Education  Unknown. Pt was working at [employer] prior to hospitalization.  -AL    Prior Level of Function-Communication WFL  -AL    Plans/Goals Discussed with patient;agreed upon  -AL    Barriers to Rehab none identified  -AL    Patient's Goals for Discharge functional communication;functional cognition  -AL    Standardized Assessment Used CLQT  -AL       Pain Scale: Numbers Pre/Post-Treatment    Pretreatment Pain Rating 0/10 - no pain  -AL    Pre/Posttreatment Pain Comment RIght shoulder pain in afternoon session. Pt did not rate.  -AL       Auditory Comprehension Assessment/Intervention    Auditory Comprehension (Communication) moderate impairment  -AL    Auditory Comprehension Communication, Comment Answering personal yes/no questions in Brazilian: 50% with NO cues; yes bias noted. Answering simple yes/no questions: 70% accuracy. Plan to further assess.  -AL       Verbal Expression Assessment/Intervention    Verbal Expression --  mild-moderate impairment  -AL    Verbal Expression, Comment Pt able to name 10/10 common objects. In picture description from WAB, pt with fluent output, but incomplete description of picture due to left neglect.  reported difficulty understanding pt x1 due to word finding difficulty; suspect paraphasia.  -AL       Motor Speech Assessment/Intervention    Motor Speech, Comment  reports pt's speech is not slurred. Unable to complete full motor speech evaluation due to time contraints.  -AL       Cognitive Assessment Intervention- SLP    Cognition, Comment BIMS: Pt repeated 3 unrelated words with one repetition. He was oriented to month and SARAH with NO cues; not oriented to year (stated 1963). He recalled 2/3 unrelated words after 2 minute delay; unable to recall 3rd word.  -AL       Standardized Tests    Cognitive/Memory Tests CLQT: Cognitive Linguistic Quick Test  -AL       CLQT (The Cognitive Linguistic Quick Test)    Attention Domain Score 35  -AL    Attention Severity Rating  "1: Severe  -AL    Memory Domain Score 59  -AL    Memory Severity Rating 1: Severe  -AL    Executive Function Domain Score 7  -AL    Executive Function Severity Rating 1: Severe  -AL    Language Domain Score 15  -AL    Language Severity Rating 1: Severe  -AL    Visuospatial Domain Score 29  -AL    Visuospatial Severity Rating 2: Moderate  -AL    Clock Drawing Total Score 0  Pt unable to write numbers legibly with left hand (nondominant). He wrote numbers on left side of clock, then wrote same numbers on right side of clock.  -AL    Clock Drawing Severity Rating Severe  -AL    Composite Severity Rating 1.2  -AL    Composite Severity Rating Range 1.4 - 1.0: Severe  -AL    CLQT Comments Pt presented with an overall severe cognitive-communication deficit, as well as fluent aphasia, characterized by mild-moderately impaired expressive language and moderately impaired receptive language. Evaluation was administered with use of  via video. Pt scored moderately impaired in the domain of visuospatial skills and severely impaired in the domains of attention, memory, executive functions, and language. Pt exhibited moderate inattention to the right; suspect pt also with right visual field cut and double vision, as he moved his head to the right and covered one eye intermittently during evaluation. Suspect aphasia impacted scores on memory tasks. Pt was able to recall his  and location of his birth, but had difficulty recalling age (stated 85) and his address. He was oriented to month and SARAH, but not year (stated \"\"), city, state or situation (stated he had \"spine surgery\"). He scored below the criterion cut-off for his age on stating personal facts (4/8), symbol cancellation (1/10), clock drawing (0/13), story retelling (0/10), symbol trails (1/10), generative naming (1/9), design memory (3/6) and design generation (1/13). Decreased comprehension and word finding noted on story retell task. " "Confrontation naming of common objects appeared WNL. During additional language testing, pt with moderately impaired comprehension of personal and simple yes/no questions, with \"yes\" bias noted. Picture description revealed fluent output with incomplete information and word finding difficulty x1. Recommend further assessment of language skills. Also, plan to complete Clinical Swallow Evaluation on next date. Pt has been tolerating soft (chopped meats)/no mixed consistencies with NTL.  -AL       SLP Evaluation Clinical Impressions    SLP Diagnosis severe;cognitive-linguistic disorder;moderate;communication disorder  -AL    Rehab Potential/Prognosis good  -AL    SLC Criteria for Skilled Therapy Interventions Met yes  -AL    Functional Impact needs 24 hour supervision;difficulty in expressing complex messages;decreased ability to respond to situations safely;difficulty completing home management task  -AL       Recommendations    Therapy Frequency (SLP SLC) 2 times/day;5 days per week  -AL    Predicted Duration Therapy Intervention (Days) until discharge  -AL    Anticipated Discharge Disposition (SLP) home with OP services  -AL       Communication Treatment Objective and Progress Goals (SLP)    SLC LTGs Patient will demonstrate functional cognitive-linguistic skills for return to discharge environment  -AL    Cognitive Linguistic Treatment Objectives Cognitive Linguistic Treatment Objectives (Group)  -AL       Patient will demonstrate functional language skills for return to discharge environment     [place] with minimal cues  -AL    Time frame by discharge  -AL    Progress/Outcomes new goal  -AL       Patient will demonstrate functional cognitive-linguistic skills for return to discharge environment    [place] with moderate cues  -AL    Time frame by discharge  -AL       Comprehend Questions Goal 1 (SLP)    Improve Ability to Comprehend Questions Goal 1 (SLP) questions about personal information;simple " yes/no questions;80%;independently (over 90% accuracy)  -AL    Time Frame (Comprehend Questions Goal 1, SLP) 1 week  -AL       Verbal Expression Treatment Objectives    Connected Speech Selection connected speech, SLP goal 1  -AL       Word Retrieval Skills Goal 1 (SLP)    Improve Word Retrieval Skills By Goal 1 (SLP) completing functional word finding tasks;completing a divergent task;80%;independently (over 90% accuracy)  -AL    Time Frame (Word Retrieval Goal 1, SLP) 1 week  -AL       Connected Speech to Express Thoughts Goal 1 (SLP)    Improve Narrative Discourse to Express Thoughts By Goal 1 (SLP) describing a picture;conversational task on a given topic;80%;with minimal cues (75-90%)  -AL    Time Frame (Connected Speech Goal 1, SLP) 1 week  -AL       Cognitive Linguistic Treatment Objectives    Attention Selection attention, SLP goal 1;attention, SLP goal (free text)  -AL    Memory Skills Selection memory skills, SLP goal 1  -AL    Problem Solving Selection problem solving, SLP goal 1  -AL       Attention Goal 1 (SLP)    Improve Attention by Goal 1 (SLP) attending to task;complete sustained attention task;80%;with minimal cues (75-90%)  -AL    Time Frame (Attention Goal 1, SLP) 1 week  -AL       Attention Goal (SLP)    Improve Attention by Goal (SLP) Pt will complete basic attention to detail tasks with MIN cues for attention and scanning to the right.  -AL    Time Frame (Attention Goal, SLP) 1 week  -AL       Memory Skills Goal 1 (SLP)    Improve Memory Skills Through Goal 1 (SLP) recall details of the day;80%;with moderate cues (50-74%)  -AL    Time Frame (Memory Skills Goal 1, SLP) 1 week  -AL       Functional Problem Solving Skills Goal 1 (SLP)    Improve Problem Solving Through Goal 1 (SLP) determine solutions to simple ADL/safety problems;name items for a task;80%;independently (over 90% accuracy)  -AL    Time Frame (Problem Solving Goal 1, SLP) 1 week  -AL          User Key  (r) = Recorded By, (t) =  Taken By, (c) = Cosigned By    Initials Name Effective Dates    AL Destiny Nurys [place], MS CCC-SLP 06/16/21 -                    EDUCATION    The patient has been educated in the following areas:       Cognitive Impairment Communication Impairment.             SLP GOALS     Row Name 05/09/23 1000             Patient will demonstrate functional language skills for return to discharge environment     [place] with minimal cues  -AL      Time frame by discharge  -AL      Progress/Outcomes new goal  -AL         Patient will demonstrate functional cognitive-linguistic skills for return to discharge environment    [place] with moderate cues  -AL      Time frame by discharge  -AL         Comprehend Questions Goal 1 (SLP)    Improve Ability to Comprehend Questions Goal 1 (SLP) questions about personal information;simple yes/no questions;80%;independently (over 90% accuracy)  -AL      Time Frame (Comprehend Questions Goal 1, SLP) 1 week  -AL         Word Retrieval Skills Goal 1 (SLP)    Improve Word Retrieval Skills By Goal 1 (SLP) completing functional word finding tasks;completing a divergent task;80%;independently (over 90% accuracy)  -AL      Time Frame (Word Retrieval Goal 1, SLP) 1 week  -AL         Connected Speech to Express Thoughts Goal 1 (SLP)    Improve Narrative Discourse to Express Thoughts By Goal 1 (SLP) describing a picture;conversational task on a given topic;80%;with minimal cues (75-90%)  -AL      Time Frame (Connected Speech Goal 1, SLP) 1 week  -AL         Attention Goal 1 (SLP)    Improve Attention by Goal 1 (SLP) attending to task;complete sustained attention task;80%;with minimal cues (75-90%)  -AL      Time Frame (Attention Goal 1, SLP) 1 week  -AL         Attention Goal (SLP)    Improve Attention by Goal (SLP) Pt will complete basic attention to detail tasks with MIN cues for attention and scanning to the right.  -AL      Time Frame (Attention Goal, SLP) 1 week  -AL         Memory Skills  Goal 1 (SLP)    Improve Memory Skills Through Goal 1 (SLP) recall details of the day;80%;with moderate cues (50-74%)  -AL      Time Frame (Memory Skills Goal 1, SLP) 1 week  -AL         Functional Problem Solving Skills Goal 1 (SLP)    Improve Problem Solving Through Goal 1 (SLP) determine solutions to simple ADL/safety problems;name items for a task;80%;independently (over 90% accuracy)  -AL      Time Frame (Problem Solving Goal 1, SLP) 1 week  -AL            User Key  (r) = Recorded By, (t) = Taken By, (c) = Cosigned By    Initials Name Provider Type    Nurys Beavers MS CCC-SLP Speech and Language Pathologist                            Time Calculation:        Time Calculation- SLP     Row Name 05/09/23 1532 05/09/23 1531          Time Calculation- SLP    SLP Start Time 1330  -AL 1000  -AL     SLP Stop Time 1400  -AL 1030  -AL     SLP Time Calculation (min) 30 min  -AL 30 min  -AL     Total Timed Code Minutes-  minute(s)  Chart review: [id number]. Scoring/documentation: [id number]  -AL --     SLP Received On -- 05/09/23  -AL        Timed Charges    96125-Standardized cognitive performance testing 120  -AL --        Total Minutes    Timed Charges Total Minutes 120  -AL --      Total Minutes 120  -AL --           User Key  (r) = Recorded By, (t) = Taken By, (c) = Cosigned By    Initials Name Provider Type    Nurys Beavers MS CCC-SLP Speech and Language Pathologist                  Therapy Charges for Today     Code Description Service Date Service Provider Modifiers Qty    [id number]  ST STD COG PERF TEST PER HOUR 5/9/2023 Nurys Dixon MS CCC-SLP GN 2                           Nurys Dixon MS CCC-SLP  5/9/2023

## 2023-05-09 NOTE — CONSULTS
Nutrition Services    Patient Name:  Manuel Durant  YOB: 1950  MRN: [id number]  Admit Date:  5/8/2023      Assessment Date:  05/09/23    CLINICAL NUTRITION ASSESSMENT    Comments: Nutrition consult received for new rehab admission.   Pt admitted d/t hemorrhagic stroke/ICH 3/12/23 with R hemiparesis, impaired mobility, and dysphagia, requiring a diet of no mixed consistencies, soft to chew, chopped meats, and nectar thick liquids.   Pt was seen by RD associate on [facility]-acute side and was dx with severe malnutrition 4/4. Pt's wt was down 13# (8.5%) x 1 mo; at that time, weighing 140#. Pt is currently down an additional 6# (4%) since assessment on 4/4 (x1mo). PO intakes were good at that time (~75% avg), but have since declined x 2-3 d to 25-50% PO per EMR review.   Visited pt in room and used translation services to complete interview. Pt reported a UBW of 148# with no perceived wt loss. Pt denies N/V or abdominal discomfort. Is tolerating modified diet/liquids well. Pt does report difficulty with opening packages/food items at meals and will require continued feeding assistance for optimal intakes. Advised pt of snacks available on unit or optional ONS, to which pt denied at this time. Skin intact. Last BM 5/8.     Recommend:   1. Continue current diet with feeding assistance for optimal intakes.   2. Will continue to encourage PO and monitor intakes/need for ONS.     RD will continue to follow-up, per protocol.          Reason for Assessment Acute Rehab Admission     Admitting Diagnosis ICH     Medical/Surgical History   Past Medical History:   Diagnosis Date   • Hypertension    • Hypokalemia 03/17/2023   • Stroke        No past surgical history on file.     Encounter Information        Nutrition History:  Was eating well 75% avg on acute [facility] side.    Food Preferences:    Supplements:    Factors Affecting Intake: chewing difficulty, early satiety , Other: Needing assistance with opening  "packages/food items.      Current Nutrition Orders & Evaluation of Intake       Oral Nutrition     Food Allergies NKFA   Current PO Diet Diet: Regular/House Diet; No Mixed Consistencies, Feeding Assistance - Nursing; Texture: Soft to Chew (NDD 3); Soft to Chew: Chopped Meat; Fluid Consistency: Nectar Thick   Supplement    PO Evaluation     % PO Intake 25-50% of recent meals x 2-3 d.     # of Days Evaluated 2   --  Anthropometrics        Current Height  Current Weight  BMI kg/m2 Height: 167.6 cm (65.98\")  Weight: 61.1 kg (134 lb 11.2 oz) (05/08/23 2100)  Body mass index is 21.75 kg/m².   Adj BMI (if applicable)    BMI Category Normal/Healthy (18.4 - 24.9)       Ideal Body Weight (IBW) 140#   Adj IBW (if applicable)        Usual Body Weight (UBW) 148#, per pt report.    Weight Change/Trend Loss, Amount/Timeframe:  down 6# (4%) x 1 mo; down 19# (12%) x ~2mo.      Down 14# (9%) from pt's reported UBW.        Weight History Wt Readings from Last 15 Encounters:   05/08/23 2100 61.1 kg (134 lb 11.2 oz)   05/04/23 0440 59.4 kg (130 lb 15.3 oz)   05/03/23 0650 65.7 kg (144 lb 13.5 oz)   05/03/23 0648 65.7 kg (144 lb 13.5 oz)   05/02/23 0704 65.9 kg (145 lb 4.5 oz)   04/29/23 0600 65.9 kg (145 lb 4.5 oz)   04/28/23 0600 67 kg (147 lb 11.3 oz)   04/27/23 0600 66.9 kg (147 lb 7.8 oz)   04/26/23 0500 64.5 kg (142 lb 3.2 oz)   04/24/23 0300 62.4 kg (137 lb 9.1 oz)   04/23/23 0509 61.3 kg (135 lb 2.3 oz)   04/22/23 0631 62.4 kg (137 lb 9.1 oz)   04/21/23 0032 61.5 kg (135 lb 9.3 oz)   04/20/23 0002 61.5 kg (135 lb 9.3 oz)   04/19/23 0002 61.5 kg (135 lb 9.3 oz)   04/18/23 0100 61.5 kg (135 lb 9.3 oz)   04/17/23 0017 61.6 kg (135 lb 12.9 oz)   04/16/23 0449 68.6 kg (151 lb 3.8 oz)   04/16/23 0251 67.1 kg (147 lb 14.9 oz)   04/15/23 0014 67.1 kg (147 lb 14.9 oz)   04/14/23 0530 67.1 kg (147 lb 14.9 oz)   04/13/23 0517 68.1 kg (150 lb 2.1 oz)   04/12/23 0530 68.7 kg (151 lb 7.3 oz)   04/11/23 0600 68.6 kg (151 lb 3.8 oz)   04/10/23 " 0041 67.3 kg (148 lb 5.9 oz)   04/09/23 0517 67.3 kg (148 lb 5.9 oz)   04/08/23 0545 69.5 kg (153 lb 3.5 oz)   04/07/23 0503 60.4 kg (133 lb 2.5 oz)   04/05/23 0418 63.9 kg (140 lb 14 oz)   04/03/23 0624 63.9 kg (140 lb 14 oz)   04/02/23 0529 69.8 kg (153 lb 14.1 oz)   03/31/23 0500 70.2 kg (154 lb 12.2 oz)   03/30/23 0501 70.6 kg (155 lb 10.3 oz)   03/27/23 0429 71.8 kg (158 lb 4.6 oz)   03/26/23 0647 71.3 kg (157 lb 3 oz)   03/25/23 0540 71.5 kg (157 lb 10.1 oz)   03/24/23 0509 70.9 kg (156 lb 4.9 oz)   03/23/23 0427 72.5 kg (159 lb 13.3 oz)   03/22/23 0505 72.1 kg (158 lb 15.2 oz)   03/21/23 0504 69.2 kg (152 lb 8.9 oz)   03/20/23 0600 71.8 kg (158 lb 4.6 oz)   03/19/23 0426 75.1 kg (165 lb 9.1 oz)   03/18/23 0517 75.1 kg (165 lb 9.1 oz)   03/17/23 0400 78.5 kg (173 lb 1 oz)   03/16/23 0348 78.4 kg (172 lb 13.5 oz)   03/15/23 0503 71.3 kg (157 lb 3 oz)   03/14/23 0539 71.2 kg (156 lb 15.5 oz)   03/13/23 0436 69.7 kg (153 lb 10.6 oz)   03/12/23 2147 69.7 kg (153 lb 10.6 oz)   04/24/23 1018 62.1 kg (137 lb)        Estimated/Assessed Needs       Energy Requirements    Weight for Calculation 61.1 kg   Method for Estimation  30 kcal/kg, 35 kcal/kg   EST Needs (kcal/day) 4807-5464 kcal       Protein Requirements    Weight for Calculation 61.1 kg   EST Protein Needs (g/kg) 1.0 - 1.2 gm/kg   EST Daily Needs (g/day) 61-73 g       Fluid Requirements     Method for Estimation 1 mL/kcal    Estimated Needs (mL/day) 8008-8463 mL/d     Physical Findings          Physical Appearance alert, oriented   Oral/Mouth Cavity tooth or teeth missing   Edema  1+ (trace), 2+ (mild)   Gastrointestinal non-distended , normoactive, last bowel movement:5/8   Skin  skin intact   Tubes/Drains/Lines none   NFPE Other: Will complete at f/u assessment.      Tests/Procedures/Labs        Tests/Procedures No new tests/procedures       Pertinent Labs     Results from last 7 days   Lab Units 05/08/23  2132   SODIUM mmol/L 138   POTASSIUM mmol/L 4.3    CHLORIDE mmol/L 106   CO2 mmol/L 25.0   BUN mg/dL 18   CREATININE mg/dL 0.76   CALCIUM mg/dL 8.6   BILIRUBIN mg/dL 0.3   ALK PHOS U/L 131*   ALT (SGPT) U/L 44*   AST (SGOT) U/L 18   GLUCOSE mg/dL 115*     Results from last 7 days   Lab Units 05/08/23  2132   HEMOGLOBIN g/dL 12.7*   HEMATOCRIT % 38.5   WBC 10*3/mm3 6.91   ALBUMIN g/dL 3.9     Results from last 7 days   Lab Units 05/08/23  2132   PLATELETS 10*3/mm3 220     No results found for: COVID19  Lab Results   Component Value Date    HGBA1C 5.60 03/15/2023        Medications           Scheduled Medications amLODIPine, 10 mg, Oral, QAM AC  apixaban, 5 mg, Oral, Q12H  baclofen, 2.5 mg, Oral, Q8H  carvedilol, 3.125 mg, Oral, BID With Meals  lidocaine, 1 patch, Transdermal, Q24H  valsartan, 160 mg, Oral, QAM AC       Infusions     PRN Medications •  acetaminophen  •  senna-docusate sodium **AND** polyethylene glycol **AND** bisacodyl **AND** bisacodyl  •  loperamide        Nutrition Diagnosis        Nutrition Dx Problem  Problem: Unintentional Weight Loss, Inadequate Nutrient Intake, Biting/Chewing Difficulty and Swallowing Difficulty  Etiology: Medical Diagnosis ICH  Signs/Symptoms: PO intake, Calories, Protein and Unintended Weight Change    Comment: Wt down 6# (4%) x 1 mo; down 19# (12%) x ~2mo.      Down 14# (9%) from pt's reported UBW. PO 25-50% x 2d.      NUTRITION INTERVENTION / PLAN OF CARE  Goals        Intervention Goal(s) Maintain nutrition status, Meet estimated needs, Increase intake, Advance diet, Maintain weight and No significant weight loss     Nutrition Intervention        RD Action Advise available snack, Encourage intake, Follow Tx Progress and Care plan reviewed     Prescription        Diet Prescription    Supplement Prescription    Snack Prescription    EN Prescription    New Prescription Ordered? No changes at this time     Monitor/Evaluation        Monitor Per protocol   Discharge Needs Pending clinical course   Education Will instruct as  appropriate       RD to follow up per protocol.    Electronically signed by:  Rosemary Flowers RD  05/09/23 14:44 EDT

## 2023-05-09 NOTE — PROGRESS NOTES
Case Management  Inpatient Rehabilitation Plan of Care and Discharge Plan Note    Rehabilitation Diagnosis:  Branch  Date of Onset:  Branch    Medical Summary:  Branch  Past Medical History: Branch    Plan of Care  Updated Problems/Interventions  Field    Expected Intensity:  Branch  Interdisciplinary Team:  Donovan  Estimated Length of Stay/Anticipated Discharge Date: Branch  Anticipated Discharge Destination:  Anticipated discharge destination from inpatient rehabilitation is community  discharge with assistance. Patient lives alone in apartment.  D/C plan is home with wife, son, and 2 adult granddgts to [place]. Son-in-law to  come from [place] to fly with patient back to [place].      Based on the patient's medical and functional status, their prognosis and  expected level of functional improvement is:  Donovan    Signed by: ROLAND Matthews

## 2023-05-09 NOTE — PLAN OF CARE
Goal Outcome Evaluation:  Plan of Care Reviewed With: patient           Outcome Evaluation: he is a new CVA-hemorrhagic due to hypertensive emergency, pt is from [place] and plans to stay on rehab unit until family is able to come get him later in the month, he is very pleasant and cooperative, speaks little english but able to use  pad very well, skin is intact, flaky on bottom of feet, meds whole w/ NTL, mech soft NTL diet, up w/ asst x1-2 for transfers, R side karen, R side facial droop, R side arm, can move at shoulder, very weak hand grasp, can move RLE weaker can plantar flex but not dorsi flex, does have some edema to R hand/wrist, cont of B/B, touch call light given to patient, he is resting well, will continue to monitor.

## 2023-05-09 NOTE — THERAPY EVALUATION
Inpatient Rehabilitation - Physical Therapy Initial Evaluation       [facility]     Patient Name: Manuel Durant  : 1950  MRN: [id number]    Today's Date: 2023                    Admit Date: 2023      Visit Dx:   No diagnosis found.    Patient Active Problem List   Diagnosis   • Intracranial hemorrhage   • Acute DVT (deep venous thrombosis)   • HTN (hypertension)   • Severe Malnutrition (HCC)   • Hemiplegia   • ICH (intracerebral hemorrhage)   • Stroke       Past Medical History:   Diagnosis Date   • Hypertension    • Hypokalemia 2023   • Stroke        No past surgical history on file.    PT ASSESSMENT (last 12 hours)     IRF PT Evaluation and Treatment     Row Name 23 0824          PT Time and Intention    Document Type initial evaluation  -DP     Mode of Treatment individual therapy;physical therapy  -DP     Patient/Family/Caregiver Comments/Observations Pt supine in bed upon PT arrival. USed Romanian interperter in the PM  -DP     Row Name 23 0824          General Information    Patient Profile Reviewed yes  -DP     Existing Precautions/Restrictions fall  -DP     Row Name 23 0824          Previous Level of Function/Home Environm    Bathing/Grooming, Premorbid Functional Level independent  -DP     Dressing, Premorbid Functional Level independent  -DP     Eating/Feeding, Premorbid Functional Level independent  -DP     Toileting, Premorbid Functional Level independent  -DP     BADLs, Premorbid Functional Level independent  -DP     IADLs, Premorbid Functional Level independent  -DP     Bed Mobility, Premorbid Functional Level independent  -DP     Transfers, Premorbid Functional Level independent  -DP     Household Ambulation, Premorbid Functional Level independent  -DP     Stairs, Premorbid Functional Level independent  -DP     Community Ambulation, Premorbid Functional Level independent  -DP     Row Name 23 0824          Living Environment    Current Living  Arrangements apartment  -DP     People in Home alone  -DP     Primary Care Provided by self  Pt will be going to live with his son in [place]  -DP     Row Name 05/09/23 0824          Stairs Within Home, Primary    Stairs, Within Home, Primary Pt said that he has no stairs in his apartment her nor does his son have any  -DP     Row Name 05/09/23 0824          Home Use of Assistive/Adaptive Equipment    Equipment Currently Used at Home none  -DP     Row Name 05/09/23 0824          Pain Assessment    Pretreatment Pain Rating 0/10 - no pain  -DP     Posttreatment Pain Rating 0/10 - no pain  -DP     Row Name 05/09/23 0824          Cognition/Psychosocial    Affect/Mental Status (Cognition) WFL;confused  -DP     Orientation Status (Cognition) disoriented to;person;place;situation  -DP     Follows Commands (Cognition) follows one-step commands;50-74% accuracy  language barrier  -DP     Row Name 05/09/23 0824          Range of Motion (ROM)    Range of Motion ROM is WFL  -DP     Row Name 05/09/23 0824          Strength Comprehensive (MMT)    General Manual Muscle Testing (MMT) Assessment lower extremity strength deficits identified  -DP     Row Name 05/09/23 0824          Lower Extremity (Manual Muscle Testing)    Lower Extremity: Manual Muscle Testing (MMT) left hip strength deficit;right hip strength deficit;left knee strength deficit;right knee strength deficit;left ankle strength deficit;right ankle strength deficit  -DP     Row Name 05/09/23 0824          Left Hip (Manual Muscle Testing)    Left Hip Manual Muscle Testing (MMT) flexion;aBduction;aDduction  -DP     MMT: Flexion, Left Hip flexion  -DP     MMT, Gross Movement: Left Hip Flexion (4/5) good  -DP     MMT: ABduction, Left Hip aBduction  -DP     MMT, Gross Movement: Left Hip ABduction (4/5) good  -DP     MMT, Gross Movement: Left Hip ADduction (4/5) good  -DP     Row Name 05/09/23 0824          Right Hip (Manual Muscle Testing)    Right Hip Manual Muscle Testing  (MMT) flexion;aBduction;aDduction  -DP     MMT: Flexion, Right Hip flexion  -DP     MMT, Gross Movement: Right Hip Flexion (3/5) fair  -DP     MMT: ABduction, Right Hip aBduction  -DP     MMT, Gross Movement: Right Hip ABduction (3/5) fair  -DP     MMT, Gross Movement: Right Hip ADduction (3/5) fair  -DP     Row Name 05/09/23 0824          Left Knee (Manual Muscle Testing)    Left Knee Manual Muscle Testing (MMT) extension;flexion  -DP     MMT: Extension, Left Knee extension  -DP     MMT, Gross Movement: Left Knee Extension (4/5) good  -DP     MMT: Flexion, Left Knee flexion  -DP     MMT, Gross Movement: Left Knee Flexion (4/5) good  -DP     Row Name 05/09/23 0824          Right Knee (Manual Muscle Testing)    Right Knee Manual Muscle Testing (MMT) extension;flexion  -DP     MMT: Extension, Right Knee extension  -DP     MMT, Gross Movement: Right Knee Extension (2+/5) poor plus  -DP     MMT: Flexion, Right Knee flexion  -DP     MMT, Gross Movement: Right Knee Flexion (3/5) fair  -DP     Row Name 05/09/23 0824          Left Ankle/Foot (Manual Muscle Testing)    Left Ankle Manual Muscle Testing (MMT) plantarflexion;dorsiflexion  -DP     MMT: Plantarflexion, Left Ankle plantarflexion  -DP     MMT, Gross Movement: Left Ankle Plantarflexion (4/5) good  -DP     MMT: Dorsiflexion Left Ankle Muscles dorsiflexion  -DP     MMT, Gross Movement: Left Ankle Dorsiflexion (4/5) good  -DP     Row Name 05/09/23 0824          Right Ankle/Foot (Manual Muscle Testing)    Right Ankle Manual Muscle Testing (MMT) plantarflexion;dorsiflexion  -DP     MMT: Plantarflexion, Right Ankle plantarflexion  -DP     MMT, Gross Movement: Right Ankle Plantarflexion (3/5) fair  -DP     MMT: Dorsiflexion, Right Ankle Muscles dorsiflexion  -DP     MMT, Gross Movement: Right Ankle Dorsiflexion (3/5) fair  -DP     Row Name 05/09/23 0824          Sensory    Additional Documentation Sensory Assessment (Somatosensory) (Group)  -DP     Row Name 05/09/23 0824           Vision Assessment/Intervention    Visual Processing Deficit karen-inattention/neglect, right  -DP     Row Name 05/09/23 0824          Sensory Assessment (Somatosensory)    Sensory Assessment Pt had decreased sensory as pt was tapping on R thigh and pt was unaware  -DP     Row Name 05/09/23 0824          Bed Mobility    Rolling Right [place] (Bed Mobility) minimum assist (75% patient effort)  -DP     Supine-Sit [place] (Bed Mobility) minimum assist (75% patient effort)  -DP     Bed Mobility, Safety Issues cognitive deficits limit understanding  -DP     Assistive Device (Bed Mobility) bed rails;head of bed elevated  -DP     Comment, (Bed Mobility) pulled up on rail and required Amish on this date  -DP     Row Name 05/09/23 0824          Bed-Chair Transfer    Bed-Chair [place] (Transfers) moderate assist (50% patient effort)  -DP     Comment, (Bed-Chair Transfer) stand pivot with HHA  -DP     Row Name 05/09/23 0824          Sit-Stand Transfer    Sit-Stand [place] (Transfers) minimum assist (75% patient effort);moderate assist (50% patient effort)  -DP     Assistive Device (Sit-Stand Transfers) karen bars  -DP     Row Name 05/09/23 0824          Stand-Sit Transfer    Stand-Sit [place] (Transfers) moderate assist (50% patient effort)  -DP     Comment, (Stand-Sit Transfer) karen bars  -DP     Row Name 05/09/23 0824          Gait/Stairs (Locomotion)    [place] Level (Gait) minimum assist (75% patient effort);moderate assist (50% patient effort);2 person assist;1 person assist;verbal cues;nonverbal cues (demo/gesture)  -DP     Assistive Device (Gait) karen bars  -DP     Distance in Feet (Gait) 8'x2  -DP     Pattern (Gait) step-to;step-through  -DP     Deviations/Abnormal Patterns (Gait) festinating/shuffling;gait speed decreased;sandhya decreased;ataxic;base of support, narrow;weight shifting decreased;scissoring;stride length decreased  -DP     Bilateral Gait Deviations forward flexed  posture;heel strike decreased;weight shift ability decreased  -DP     Right Sided Gait Deviations forward flexed posture;heel strike decreased;weight shift ability decreased  -DP     Gait Assessment/Intervention Pt demonstrates scissoring, toe walking and decreased toe off with RLE. Pt does have minimal to moderate buckling of R knee during AM session so PT did block R knee and also cued for knee flexion during swing phase. Difficulty weightbearing on L side with short quick steps on RLE. Flexed posture during gait  -DP     [place] Level (Stairs) not tested  -DP     Row Name 05/09/23 0824          Safety Issues, Functional Mobility    Impairments Affecting Function (Mobility) balance;coordination;endurance/activity tolerance;strength;grasp;motor control;range of motion (ROM)  -DP     Row Name 05/09/23 0824          Balance    Balance Assessment sitting static balance;sitting dynamic balance;standing static balance;standing dynamic balance  -DP     Static Sitting Balance standby assist  -DP     Dynamic Sitting Balance contact guard  -DP     Sit to Stand Dynamic Balance minimal assist  -DP     Static Standing Balance minimal assist  -DP     Dynamic Standing Balance moderate assist  -DP     Row Name 05/09/23 0824          Motor Skills    Therapeutic Exercise hip;knee;ankle  -DP     Row Name 05/09/23 0824          Hip (Therapeutic Exercise)    Hip (Therapeutic Exercise) strengthening exercise;AROM (active range of motion)  -DP     Hip AROM (Therapeutic Exercise) bilateral;flexion;sitting;10 repetitions  2 sets  -DP     Hip Strengthening (Therapeutic Exercise) bilateral;aBduction;sitting;resistance band;red;10 repetitions;2 sets  -DP     Row Name 05/09/23 0824          Knee (Therapeutic Exercise)    Knee (Therapeutic Exercise) strengthening exercise;AROM (active range of motion)  -DP     Knee AROM (Therapeutic Exercise) right;heel slides;sitting;10 repetitions  -DP     Knee Strengthening (Therapeutic Exercise)  right;hamstring curls;sitting;resistance band;red;5 repetitions  discontinued as pt compensated moderately to achieve full ROM  -DP     Row Name 05/09/23 0824          Ankle (Therapeutic Exercise)    Ankle (Therapeutic Exercise) AROM (active range of motion)  -DP     Ankle AROM (Therapeutic Exercise) bilateral;dorsiflexion;sitting;10 repetitions;2 sets  -DP     Row Name 05/09/23 0824          Positioning and Restraints    Pre-Treatment Position in bed  -DP     Post Treatment Position wheelchair  -DP     In Wheelchair sitting;exit alarm on;with other staff  with RT  -DP     Row Name 05/09/23 0824          Therapy Assessment/Plan (PT)    Functional Level at Time of Evaluation (PT) Min to MOD  -DP     Rehab Potential/Prognosis (PT) good, to achieve stated therapy goals  -DP     Frequency of Treatment (PT) 5 times per week;60 minutes per session  -DP     Estimated Duration of Therapy (PT) 2 weeks  -DP     Problem List (PT) problems related to;balance;cognition;coordination;hemiparesis/hemiplegia;mobility;muscle tone;motor control;sensation;strength;pain;postural control;vision;hearing;communication  -DP     Activity Limitations Related to Problem List (PT) unable to ambulate safely;unable to transfer safely;BADLs not performed adequately or safely;IADLs not performed adequately or safely;community activities not performed adequately or safely;home management activity not performed adequately or safely  -DP     Comment, Therapy Assessment/Plan (PT) Pt is a plesant 74 y/o male admitted to [facility] on 3/12 with an intracranial hemorrhage after being brought in by a neighbor with c/o alterned mental status, nausea/vomiting, and R side weakness. Pt lives at home by himeself but was ind with all PLOF. Pt's plant upon d/c is to go live with his some in [place]. On date of eval the pt is modA with bed mobility, modA with bed<>chair transfers, modAx2 for 8' at karen bars. With gait the pt demonstrated steppage like gait with RLE, poor  weightbearing on R requiring knee block and assistance with knee flexion. Pt does displayed R sided neglect and sesation issues with RLE. Pt demonstrated decreased strength, balance, coordination and decreased overall functional mobility and would benefit from skilled PT services in order to progress toward PLOF.  -DP     Row Name 05/09/23 0824          IRF PT Goals    Bed Mobility Goal Selection (PT-IRF) bed mobility, PT goal 1;bed mobility, PT goal 2  -DP     Transfer Goal Selection (PT-IRF) transfers, PT goal 1;transfers, PT goal 2;transfers, PT goal 3  -DP     Gait (Walking Locomotion) Goal Selection (PT-IRF) gait, PT goal 1;gait, PT goal 2  -DP     Wheelchair Locomotion Goal Selection (PT-IRF) wheelchair locomotion, PT goal 1  -DP     Row Name 05/09/23 0824          Bed Mobility Goal 1 (PT-IRF)    Activity/Assistive Device (Bed Mobility Goal 1, PT-IRF) bed mobility activities, all  -DP     [place] Level (Bed Mobility Goal 1, PT-IRF) contact guard required  -DP     Time Frame (Bed Mobility Goal 1, PT-IRF) short-term goal (STG);1 week  -DP     Row Name 05/09/23 0824          Bed Mobility Goal 2 (PT-IRF)    Activity/Assistive Device (Bed Mobility Goal 2, PT-IRF) bed mobility activities, all  -DP     [place] Level (Bed Mobility Goal 2, PT-IRF) supervision required  -DP     Time Frame (Bed Mobility Goal 2, PT-IRF) 2 weeks;long-term goal (LTG)  -DP     Row Name 05/09/23 0824          Transfer Goal 1 (PT-IRF)    Activity/Assistive Device (Transfer Goal 1, PT-IRF) sit-to-stand/stand-to-sit;bed-to-chair/chair-to-bed  -DP     [place] Level (Transfer Goal 1, PT-IRF) minimum assist (75% or more patient effort)  -DP     Time Frame (Transfer Goal 1, PT-IRF) short-term goal (STG);1 week  -DP     Row Name 05/09/23 0824          Transfer Goal 2 (PT-IRF)    Activity/Assistive Device (Transfer Goal 2, PT-IRF) sit-to-stand/stand-to-sit;bed-to-chair/chair-to-bed  -DP     [place] Level (Transfer Goal 2, PT-IRF)  standby assist  -DP     Time Frame (Transfer Goal 2, PT-IRF) long-term goal (LTG);2 weeks  -DP     Row Name 05/09/23 0824          Transfer Goal 3 (PT-IRF)    Activity/Assistive Device (Transfer Goal 3, PT-IRF) car transfer  -DP     [place] Level (Transfer Goal 3, PT-IRF) minimum assist (75% or more patient effort)  -DP     Time Frame (Transfer Goal 3, PT-IRF) long-term goal (LTG);2 weeks  -DP     Row Name 05/09/23 0824          Gait/Walking Locomotion Goal 1 (PT-IRF)    Activity/Assistive Device (Gait/Walking Locomotion Goal 1, PT-IRF) gait (walking locomotion)  karen bar  -DP     Gait/Walking Locomotion Distance Goal 1 (PT-IRF) 16  -DP     [place] Level (Gait/Walking Locomotion Goal 1, PT-IRF) minimum assist (75% or more patient effort)  -DP     Time Frame (Gait/Walking Locomotion Goal 1, PT-IRF) short-term goal (STG);1 week  -DP     Row Name 05/09/23 0824          Gait/Walking Locomotion Goal 2 (PT-IRF)    Activity/Assistive Device (Gait/Walking Locomotion Goal 2, PT-IRF) gait (walking locomotion);assistive device use  LBQC  -DP     Gait/Walking Locomotion Distance Goal 2 (PT-IRF) 20'  -DP     [place] Level (Gait/Walking Locomotion Goal 2, PT-IRF) contact guard required  -DP     Time Frame (Gait/Walking Locomotion Goal 2, PT-IRF) long-term goal (LTG);2 weeks  -DP     Row Name 05/09/23 0824          Wheelchair Locomotion Goal 1 (PT-IRF)    Activity (Wheelchair Locomotion Goal 1, PT-IRF) wheelchair mobility skills, all  -DP     [place] Level (Wheelchair Locomotion Goal 1, PT-IRF) supervision required  -DP     Distance Goal 1 (Wheelchair Locomotion, PT-IRF) 150  -DP     Time Frame (Wheelchair Locomotion Goal 1, PT-IRF) long-term goal (LTG);2 weeks  -DP           User Key  (r) = Recorded By, (t) = Taken By, (c) = Cosigned By    Initials Name Provider Type    DP Tristin Vicente, PT Physical Therapist                 Physical Therapy Education     Title: PT OT SLP Therapies (In Progress)     Topic:  Physical Therapy (In Progress)     Point: Mobility training (In Progress)     Learning Progress Summary           Patient Acceptance, E,D, NR by DP at 5/9/2023 1217                   Point: Home exercise program (In Progress)     Learning Progress Summary           Patient Acceptance, E,D, NR by DP at 5/9/2023 1217                   Point: Body mechanics (In Progress)     Learning Progress Summary           Patient Acceptance, E,D, NR by DP at 5/9/2023 1217                   Point: Precautions (In Progress)     Learning Progress Summary           Patient Acceptance, E,D, NR by DP at 5/9/2023 1217                               User Key     Initials Effective Dates Name Provider Type Discipline    DP 08/24/21 -  Tristin Vicente, PT Physical Therapist PT                PT Recommendation and Plan    Planned Therapy Interventions (PT): balance training, bed mobility training, home exercise program, gait training, motor coordination training, neuromuscular re-education, orthotic fitting/training, patient/family education, postural re-education, ROM (range of motion), stair training, strengthening, stretching, swiss ball techniques, transfer training, wheelchair management/propulsion training  Frequency of Treatment (PT): 5 times per week, 60 minutes per session                     Time Calculation:      PT Charges     Row Name 05/09/23 1557 05/09/23 1217          Time Calculation    Start Time 1230  -DP 1030  -DP     Stop Time 1300  -DP 1100  -DP     Time Calculation (min) 30 min  -DP 30 min  -DP     PT Received On -- 05/09/23  -DP     PT - Next Appointment -- 05/10/23  -DP     PT Goal Re-Cert Due Date -- 05/16/23  -DP        Time Calculation- PT    Total Timed Code Minutes- PT 30 minute(s)  -DP 30 minute(s)  -DP           User Key  (r) = Recorded By, (t) = Taken By, (c) = Cosigned By    Initials Name Provider Type    DP Tristin Vicente, PT Physical Therapist                Therapy Charges for Today     Code Description  Service Date Service Provider Modifiers Qty    29858368500 HC PT EVAL LOW COMPLEXITY 2 5/9/2023 Tristin Vicente, PT GP 1    24870122680 HC PT THERAPEUTIC ACT EA 15 MIN 5/9/2023 Tristin Vicente, PT GP 1    29507275453 HC PT THERAPEUTIC ACT EA 15 MIN 5/9/2023 Tristin Vicente, PT GP 1    15662685836 HC PT THER PROC EA 15 MIN 5/9/2023 Tristin Vicente, PT GP 1    25458109350 HC PT THER SUPP EA 15 MIN 5/9/2023 Tristin Vicente, PT GP 1            PT G-Codes  AM-PAC 6 Clicks Score (PT): 14      Tristin Vicente, PT  5/9/2023

## 2023-05-09 NOTE — PLAN OF CARE
"Goal Outcome Evaluation:  Plan of Care Reviewed With: patient      Pt presented with an overall severe cognitive-communication deficit, as well as fluent aphasia, characterized by mild-moderately impaired expressive language and moderately impaired receptive language. Evaluation was administered with use of  via video. Pt scored moderately impaired in the domain of visuospatial skills and severely impaired in the domains of attention, memory, executive functions, and language. Pt exhibited moderate inattention to the right; suspect pt also with right visual field cut and double vision, as he moved his head to the right and covered one eye intermittently during evaluation. Suspect aphasia impacted scores on memory tasks. Pt was able to recall his  and location of his birth, but had difficulty recalling age (stated 85) and his address. He was oriented to month and SARAH, but not year (stated \"1963\"), city, state or situation (stated he had \"spine surgery\"). He scored below the criterion cut-off for his age on stating personal facts (4/8), symbol cancellation (1/10), clock drawing (0/13), story retelling (0/10), symbol trails (1/10), generative naming (1/9), design memory (3/6) and design generation (1/13). Decreased comprehension and word finding noted on story retell task. Confrontation naming of common objects appeared WNL. During additional language testing, pt with moderately impaired comprehension of personal and simple yes/no questions, with \"yes\" bias noted. Picture description revealed fluent output with incomplete information and word finding difficulty x1. Recommend further assessment of language skills. Also, plan to complete Clinical Swallow Evaluation on next date. Pt has been tolerating soft (chopped meats)/no mixed consistencies with NTL. Hydration protocol 30 minutes after meals with adequate oral care and cues for small sips.               "

## 2023-05-09 NOTE — PROGRESS NOTES
LOS: 1 day   Patient Care Team:  Provider, No Known as PCP - General SHABNAM MCDONALD  1950      ADMITTING DIAGNOSIS:  Hemorrhagic stroke-left basal ganglia/internal capsule with intraventricular extension on March 12, 2023-  Right hemiparesis   Impaired mobility/impaired self-care  Diplopia  Dysphagia-mechanical soft/chopped meat/nectar thick liquid  Spasticity-baclofen 2.5 mg every 8 hours  Hypertension-amlodipine/carvedilol/valsartan  DVT-right soleal vein-March 17, 2023-Eliquis      Subjective   Complains of R shoulder pain this morning that is worse with movement. Locates his pain to his anterior shoulder. Position in a flexion synergy position on the RUE. Denies new weakness, dizziness, or blurry vision      Objective     Vitals:    05/09/23 0431   BP: 109/60   Pulse: 64   Resp: 16   Temp: 99 °F (37.2 °C)   SpO2: 97%       PHYSICAL EXAM:   MENTAL STATUS -  AWAKE / ALERT  HEENT- NCAT, PUPILS EQUALLY ROUND, SCLERAE ANICTERIC, CONJUNCTIVAE PINK, OP MOIST, NO JVD, EARS UNREMARKABLE EXTERNALLY  LUNGS - CTA, NO WHEEZES, RALES OR RHONCHI  HEART- RRR, NO RUB, MURMUR, OR GALLOP  ABD - NORMOACTIVE BOWEL SOUNDS, SOFT, NT. NO HEPATOSPLENOMEGALY APPRECIATED  EXT -edema in the right hand with decreased range of motion  NEURO -awake alert.  Oriented.  No significant dysarthria.  Describes diplopia.  Extraocular movements intact.  Recognizes finger movement bilaterally  MOTOR EXAM -  LUE/LLE 5/5.    Right shoulder flexion 3-/5, elbow flexion 3-/5, finger flexion 2/5, hip flexion 3-/5, knee extension 3-/5, ankle dorsiflexion 1/5.  MAS 2 in shoulder ABD, elbow flexors, elbow pronators      MEDICATIONS  Scheduled Meds:amLODIPine, 10 mg, Oral, QAM AC  apixaban, 5 mg, Oral, Q12H  baclofen, 2.5 mg, Oral, Q8H  carvedilol, 3.125 mg, Oral, BID With Meals  lidocaine, 1 patch, Transdermal, Q24H  valsartan, 160 mg, Oral, QAM AC      Continuous Infusions:   PRN Meds:.•  acetaminophen  •  senna-docusate sodium **AND**  polyethylene glycol **AND** bisacodyl **AND** bisacodyl  •  loperamide      RESULTS  No results found for: POCGLU  Results from last 7 days   Lab Units 05/08/23  2132   WBC 10*3/mm3 6.91   HEMOGLOBIN g/dL 12.7*   HEMATOCRIT % 38.5   PLATELETS 10*3/mm3 220     Results from last 7 days   Lab Units 05/08/23  2132   SODIUM mmol/L 138   POTASSIUM mmol/L 4.3   CHLORIDE mmol/L 106   CO2 mmol/L 25.0   BUN mg/dL 18   CREATININE mg/dL 0.76   CALCIUM mg/dL 8.6   BILIRUBIN mg/dL 0.3   ALK PHOS U/L 131*   ALT (SGPT) U/L 44*   AST (SGOT) U/L 18   GLUCOSE mg/dL 115*           ASSESSMENT and PLAN    ICH (intracerebral hemorrhage)    Stroke    Hemorrhagic stroke-left basal ganglia/internal capsule with intraventricular extension on March 12, 2023-    Right hemiparesis     Impaired mobility/impaired self-care    Diplopia    Dysphagia-mechanical soft/chopped meat/nectar thick liquid    Spasticity-baclofen 2.5 mg every 8 hours  May 9- Significant flexor synergy on the RUE with shoulder abd, elbow flexor and pronate tone. Currently on baclofen 2.5 q8h. Will evaluate tone with therapy today and make adjustments as needed.    Hypertension-amlodipine/carvedilol/valsartan    DVT-right soleal vein-March 17, 2023-Brendan Pruitt DO      During rounds, used appropriate personal protective equipment including mask and gloves.  Additional gown if indicated.  Mask used was standard procedure mask. Appropriate PPE was worn during the entire visit.  Hand hygiene was completed before and after.

## 2023-05-09 NOTE — PROGRESS NOTES
"Section B. Hearing, Speech, Vision: Expression of Ideas and Wants: Exhibits some  difficulty with expressing needs and ideas (e.g., some words or finishing  thoughts) or speech is not clear.  Understanding Verbal and Non-Verbal Content: Sometimes Understands: Understands  only basic conversations or simple, direct phrases. Frequently requires cues to  understand.    Section C. Cognitive Patterns: Brief Interview for Mental Status (BIMS) was  conducted.  Repetition of Three Words: Three words  Able to report correct year: Missed by more than 5 years or no answer  Able to report correct month: Accurate within 5 days  Able to report correct day of the week: Correct  Able to recall \"sock\": Yes, no cue required  Able to recall \"blue\": Yes, no cue required  Able to recall \"bed\": No, could not recall    BIMS SUMMARY SCORE: 10 Moderately impaired Patient was able to complete the  Brief Interview for Mental Status    Section C. Signs and Symptoms of Delirium (from CAM): Evidence of Acute Change  in Mental Status:   Yes  Inattention: Behavior continuously present, does not fluctuate  Thinking Disorganized or Incoherent:   Behavior not present  Altered Level of Consciousness:   Behavior not present    Signed by: Nurys Dixon, SLP    "

## 2023-05-09 NOTE — PROGRESS NOTES
SECTION GG    Eating Performance: Austin sets up or cleans up; patient completes activity.  Austin assists only prior to or following the activity.    Eating Discharge Goals: Branch    Section B. Hearing and Vision  Ability to Hear:  Adequate - no difficulty in normal conversation, social  interaction, listening to TV  Ability to See in Adequate Light:  Adequate - sees fine detail, such as regular  print in newspapers/books    Section B. Health Literacy  Frequency of Needing Assistance Reading:  Sometimes    Section D. Mood  Presence of little interest or pleasure in doing things:   No  Frequency of having little interest or pleasure in doing things:   Never or 1  day  Presence of feeling down, depressed, or hopeless:   No  Frequency of feeling down, depressed, or hopeless:   Never or 1 day   Interview Ended. Above responses do not meet criteria to continue.  Total Severity Score:   0    Section D. Social Isolation  Frequecy of Feeling Lonely or Isolated:  Sometimes    Section J. Health Conditions (Pain Effect on Sleep)  Pain Effect on Sleep:   Does not apply - Patient has not had any pain or hurting  in the past 5 days    Signed by: Meche Garcia RN

## 2023-05-09 NOTE — PLAN OF CARE
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

## 2023-05-09 NOTE — PROGRESS NOTES
Inpatient Rehabilitation Functional Measures Assessment and Plan of Care    Plan of Care  Updated Problems/Interventions  Cognition    [ST] Memory(Active)  Current Status(05/09/2023): Pt oriented to self and month.  Weekly Goal(05/18/2023): Pt will be oriented to person, place, time and  situation with MOD cues.  Discharge Goal: Improved memory for home environment.    [ST] Attention(Active)  Current Status(05/09/2023): Pt with moderate-severe inattention to the right.  Weekly Goal(05/18/2023): Pt will scan to the right during functional tasks with  MIN cues.  Discharge Goal: Improved attention skills for home environment.        Communication    [ST] Comprehension(Active)  Current Status(05/09/2023): Moderate difficulty comprehending personal and  simple yes/no questions.  Weekly Goal(05/18/2023): Will answer personal and simple yes/no questions with  80% accuracy.  Discharge Goal: Participate in conversation with occasional repetitions of  information.    [ST] Expression(Active)  Current Status(05/09/2023): Mild-moderate word finding difficulty during  conversation and picture description.  Weekly Goal(05/18/2023): Will describe a picture with NO cues.  Discharge Goal: Will participate in conversation with NO cues for use of  compensations for word finding difficulty.        Swallow Function    [ST] Swallowing(Active)  Current Status(05/09/2023): Pt tolerate soft (chopped meats)/no mixed  consistencies with NTL.  Weekly Goal(05/18/2023): Will tolerate soft (chopped) with thins with no s/s of  aspiration or penetration.  Discharge Goal: Tolerate the least restrictive diet with NO cues.    Signed by: Nurys Dixon, SLP

## 2023-05-09 NOTE — THERAPY EVALUATION
Inpatient Rehabilitation - Occupational Therapy Initial Evaluation    [facility]     Patient Name: Manuel Durant  : 1950  MRN: [id number]    Today's Date: 2023                 Admit Date: 2023       No diagnosis found.    Patient Active Problem List   Diagnosis   • Intracranial hemorrhage   • Acute DVT (deep venous thrombosis)   • HTN (hypertension)   • Severe Malnutrition (HCC)   • Hemiplegia   • ICH (intracerebral hemorrhage)   • Stroke       Past Medical History:   Diagnosis Date   • Hypertension    • Hypokalemia 2023   • Stroke        No past surgical history on file.          IRF OT ASSESSMENT FLOWSHEET (last 12 hours)     IRF OT Evaluation and Treatment     Row Name 23 1636          OT Time and Intention    Document Type initial evaluation  -DN     Mode of Treatment occupational therapy  -DN     Patient Effort good  -DN     Row Name 23 1636          General Information    Patient Profile Reviewed yes  -DN     Existing Precautions/Restrictions fall  -DN     Row Name 23 1636          Previous Level of Function/Home Environm    BADLs, Premorbid Functional Level independent  -DN     Transfers, Premorbid Functional Level independent  -DN     Row Name 23 1636          Living Environment    Current Living Arrangements apartment  -DN     People in Home alone  -DN     Primary Care Provided by self  -DN     Row Name 23 1636          Home Use of Assistive/Adaptive Equipment    Equipment Currently Used at Home none  -DN     Row Name 23 1636          Pain Scale: FACES Pre/Post-Treatment    Pain: FACES Scale, Pretreatment 0-->no hurt  -DN     Posttreatment Pain Rating 4-->hurts little more  -DN     Pain Location - Side/Orientation Right  -DN     Pain Location upper  -DN     Pain Location - shoulder  -DN     Pre/Posttreatment Pain Comment during assessment of RUE ROM pt grimace, pain also in abdomin and determined pt needed to go to the bathroom and had large BM  and motioned he feelt better  -DN     Row Name 05/09/23 1636          Cognition/Psychosocial    Affect/Mental Status (Cognition) --  pt willing to work with therapist, responed to gestures for basic adls and transfers  -DN     Orientation Status (Cognition) disoriented to;place;situation;time  -DN     Follows Commands (Cognition) follows one-step commands;25-49% accuracy;increased processing time needed;physical/tactile prompts required  -DN     Row Name 05/09/23 1636          Range of Motion (ROM)    Left Upper Extremity (ROM) left UE ROM is WFL  -DN     Right Upper Extremity (ROM) shoulder;elbow;wrist;hand  -DN     Shoulder, Right (Range of Motion) AROM 1/4 flexion, abduction, ERnd 1/2 IR; PROM 3/4 flex, 1/2 abduction, 1/2 ER and full IR  -DN     Elbow, Right (Range of Motion) AROM 1/2, PROM full  -DN     Wrist, Right (Range of Motion) AROM trace, PROM 1/4  -DN     Hand, Right (Range of Motion) Not tested due to had to go to commode asap  -DN     Row Name 05/09/23 1636          Strength (Manual Muscle Testing)    Strength (Manual Muscle Testing) left upper extremity;strength is WFL  -DN     Additional Documentation --  RUE is 1+/5 for shoulder, elbow, 1/5 wrist and fingers  -DN     Row Name 05/09/23 1636          Vision Assessment/Intervention    Visual Impairment/Limitations unable/difficult to assess  will assess later had to get off mat in pm and go to rest room  -DN     Visual Processing Deficit visual attention, right  -DN     Row Name 05/09/23 1636          Sensory Assessment (Somatosensory)    Sensory Assessment (Somatosensory) unable/difficult to assess  -DN     Row Name 05/09/23 1636          Bathing    [place] Level (Bathing) bathing skills;moderate assist (50% patient effort);verbal cues;nonverbal cues (demo/gesture)  -DN     Assistive Device (Bathing) grab bar/tub rail;hand held shower spray hose;tub bench  -DN     Position (Bathing) supported sitting;supported standing  -DN     Comment (Bathing)  hand over hand to get initial idea across to pt , RLE scissors across mindlne with standing due to tone  -DN     Row Name 05/09/23 1636          Upper Body Dressing    [place] Level (Upper Body Dressing) upper body dressing skills;doff;don;pull over garment;moderate assist (50-74% patient effort);verbal cues;nonverbal cues (demo/gesture)  -DN     Position (Upper Body Dressing) supported sitting  -DN     Set-up Assistance (Upper Body Dressing) obtain clothing  -DN     Comment (Upper Body Dressing) gestures for karen technique  -DN     Row Name 05/09/23 1636          Lower Body Dressing    [place] Level (Lower Body Dressing) doff;don;pants/bottoms;shoes/slippers;socks;underwear;dependent (less than 25% patient effort);verbal cues;nonverbal cues (demo/gesture)  -DN     Position (Lower Body Dressing) supported sitting;supported standing  -DN     Set-up Assistance (Lower Body Dressing) obtain clothing  -DN     Row Name 05/09/23 1636          Grooming    [place] Level (Grooming) grooming skills;hair care, combing/brushing;oral care regimen;minimum assist (75% patient effort);verbal cues;nonverbal cues (demo/gesture)  -DN     Position (Grooming) sink side;supported sitting  -DN     Comment (Grooming) again hand over hand for initial idea to get across however pt picked up  -DN     Row Name 05/09/23 1636          Toileting    [place] Level (Toileting) toileting skills;adjust/manage clothing;perform perineal hygiene;maximum assist (25% patient effort)  -DN     Assistive Device Use (Toileting) grab bar/safety frame;raised toilet seat  -DN     Position (Toileting) supported sitting;supported standing  -DN     Comment (Toileting) pt able to wipe bottom with tactile cues, but dependent for pants and RLE scissors with standing  -DN     Row Name 05/09/23 1636          Self-Feeding    Comment (Self-Feeding) pt decline during lunch will have to assess at later date  -DN     Row Name 05/09/23 1636          Toilet  Transfer    Type (Toilet Transfer) stand pivot/stand step  -DN     [place] Level (Toilet Transfer) maximum assist (25% patient effort)  -DN     Assistive Device (Toilet Transfer) grab bars/safety frame;wheelchair;raised toilet seat  -DN     Row Name 05/09/23 1636          Shower Transfer    Type (Shower Transfer) stand pivot/stand step  -DN     [place] Level (Shower Transfer) moderate assist (50% patient effort);verbal cues;nonverbal cues (demo/gesture)  -DN     Assistive Device (Shower Transfer) grab bar, tub/shower;wheelchair;tub bench  -DN     Row Name 05/09/23 1636          Safety Issues, Functional Mobility    Safety Issues Affecting Function (Mobility) awareness of need for assistance;insight into deficits/self-awareness;impulsivity;sequencing abilities;safety precautions follow-through/compliance;safety precaution awareness  -DN     Impairments Affecting Function (Mobility) balance;cognition;coordination;endurance/activity tolerance;motor control;motor planning;muscle tone abnormal;strength;visual/perceptual  -DN     Row Name 05/09/23 1636          Motor Skills    Motor Skills coordination;functional endurance;muscle tone;neuro-muscular function;motor control/coordination interventions  -DN     Coordination fine motor deficit;gross motor deficit;right;upper extremity;severe impairment  -DN     Functional Endurance fair-  -DN     Muscle Tone right;upper extremity(s);lower extremity(s);moderate impairment;severe impairment;hypertonia  -DN     Motor Control/Coordination Interventions weight-bearing activities;therapeutic exercise/ROM;neuro-muscular re-education;gross motor coordination activities  -DN     Row Name 05/09/23 1636          Balance    Static Sitting Balance standby assist  -DN     Dynamic Sitting Balance contact guard  -DN     Static Standing Balance minimal assist  -DN     Dynamic Standing Balance moderate assist;maximum assist  -DN     Row Name 05/09/23 1636          Positioning and  Restraints    Pre-Treatment Position sitting in chair/recliner  -DN     Post Treatment Position wheelchair  -DN     In Bed sitting;call light within reach;encouraged to call for assist;exit alarm on  -DN     Row Name 05/09/23 1636          Therapy Assessment/Plan (OT)    Functional Level at Time of Evaluation (OT) Pt presents with following defictis: R visual neglect, hemiparesis RUE/LE, Increased tone RUE/LE, decreased balance,endurance,pain in shoulder, language barrier, modified diet,all of which hinder functional transfers and adls in d/c environment.  Pt will benifit from skilled OT services prior to d/c home  -DN     OT Diagnosis stroke R karen  -DN     Rehab Potential/Prognosis (OT) adequate, monitor progress closely  -DN     Frequency of Treatment (OT) 5 times per week  -DN     Estimated Duration of Therapy (OT) until facility discharge;4 weeks  -DN     Problem List (OT) problems related to;balance;cognition;coordination;mobility;motor control;muscle tone;range of motion (ROM);strength;pain;postural control;vision;communication;inability to direct their own care;eating/swallowing  -DN     Activity Limitations Related to Problem List (OT) unable to ambulate safely;unable to transfer safely;BADLs not performed adequately or safely;unable to eat or drink safely without supervision;unable to eat or drink safely without assistance;home management activity not performed adequately or safely  -DN     Planned Therapy Interventions (OT) BADL retraining;cognitive/visual perception retraining;functional balance retraining;neuromuscular control/coordination retraining;passive ROM/stretching;patient/caregiver education/training;ROM/therapeutic exercise;strengthening exercise;transfer/mobility retraining  -DN     Row Name 05/09/23 1636          IRF OT Goals    Transfer Goal Selection (OT-IRF) transfers, OT goal 1;transfers, OT goal 2  -DN     Bathing Goal Selection (OT-IRF) bathing, OT goal 1;bathing, OT goal 2  -DN     UB  Dressing Goal Selection (OT-IRF) UB dressing, OT goal 1;UB dressing, OT goal 2  -DN     LB Dressing Goal Selection (OT-IRF) LB dressing, OT goal 1;LB dressing, OT goal 2  -DN     Grooming Goal Selection (OT-IRF) grooming, OT goal 1;grooming, OT goal 2  -DN     Toileting Goal Selection (OT-IRF) toileting, OT goal 1;toileting, OT goal 2  -DN     Self-Feeding Goal Selection (OT-IRF) self-feeding, OT goal 1;self-feeding, OT goal 2  -DN     Balance Goal Selection (OT) balance, OT goal 1;balance, OT goal 2  -DN     Endurance Goal Selection (OT) endurance, OT goal 2;endurance, OT goal 1  -DN     Isolated Movement Goal Selection (OT) isolated movement, OT goal 1;isolated movement, OT goal 2  -DN     Caregiver Training Goal Selection (OT-IRF) caregiver training, OT goal 2  -DN     Row Name 05/09/23 1636          Transfer Goal 1 (OT-IRF)    Activity/Assistive Device (Transfer Goal 1, OT-IRF) toilet;walk-in shower  -DN     [place] Level (Transfer Goal 1, OT-IRF) moderate assist (50-74% patient effort);verbal cues required;nonverbal cues (demo/gesture) required  -DN     Time Frame (Transfer Goal 1, OT-IRF) short-term goal (STG)  -DN     Progress/Outcomes (Transfer Goal 1, OT-IRF) continuing progress toward goal  -DN     Row Name 05/09/23 1636          Transfer Goal 2 (OT-IRF)    Activity/Assistive Device (Transfer Goal 2, OT-IRF) toilet;walk-in shower;tub bench;commode chair  -DN     [place] Level (Transfer Goal 2, OT-IRF) minimum assist (75% or more patient effort)  -DN     Time Frame (Transfer Goal 2, OT-IRF) long-term goal (LTG)  -DN     Progress/Outcomes (Transfer Goal 2, OT-IRF) continuing progress toward goal  -DN     Row Name 05/09/23 1636          Bathing Goal 1 (OT-IRF)    Activity/Device (Bathing Goal 1, OT-IRF) bathing skills, all  -DN     [place] Level (Bathing Goal 1, OT-IRF) minimum assist (75% or more patient effort)  -DN     Time Frame (Bathing Goal 1, OT-IRF) short-term goal (STG)  -DN      Progress/Outcomes (Bathing Goal 1, OT-IRF) continuing progress toward goal  -DN     Row Name 05/09/23 1636          Bathing Goal 2 (OT-IRF)    Activity/Device (Bathing Goal 2, OT-IRF) bathing skills, all  -DN     [place] Level (Bathing Goal 2, OT-IRF) minimum assist (75% or more patient effort)  -DN     Time Frame (Bathing Goal 2, OT-IRF) long-term goal (LTG)  -DN     Progress/Outcomes (Bathing Goal 2, OT-IRF) continuing progress toward goal  -DN     Row Name 05/09/23 1636          UB Dressing Goal 1 (OT-IRF)    Activity/Device (UB Dressing Goal 1, OT-IRF) upper body dressing  -DN     [place] (UB Dress Goal 1, OT-IRF) minimum assist (75% or more patient effort)  -DN     Time Frame (UB Dressing Goal 1, OT-IRF) short-term goal (STG)  -DN     Progress/Outcomes (UB Dressing Goal 1, OT-IRF) continuing progress toward goal  -DN     Row Name 05/09/23 1636          UB Dressing Goal 2 (OT-IRF)    Activity/Device (UB Dressing Goal 2, OT-IRF) upper body dressing  -DN     [place] (UB Dress Goal 2, OT-IRF) standby assist  -DN     Time Frame (UB Dressing Goal 2, OT-IRF) long-term goal (LTG)  -DN     Progress/Outcomes (UB Dressing Goal 2, OT-IRF) continuing progress toward goal  -DN     Row Name 05/09/23 1636          LB Dressing Goal 1 (OT-IRF)    Activity/Device (LB Dressing Goal 1, OT-IRF) lower body dressing  -DN     [place] (LB Dressing Goal 1, OT-IRF) maximum assist (25-49% patient effort)  -DN     Time Frame (LB Dressing Goal 1, OT-IRF) short-term goal (STG)  -DN     Progress/Outcomes (LB Dressing Goal 1, OT-IRF) continuing progress toward goal  -DN     Row Name 05/09/23 1636          LB Dressing Goal 2 (OT-IRF)    Activity/Device (LB Dressing Goal 2, OT-IRF) lower body dressing  -DN     [place] (LB Dressing Goal 2, OT-IRF) moderate assist (50-74% patient effort)  -DN     Time Frame (LB Dressing Goal 2, OT-IRF) long-term goal (LTG)  -DN     Progress/Outcomes (LB Dressing Goal 2, OT-IRF) continuing  progress toward goal  -DN     Row Name 05/09/23 1636          Grooming Goal 1 (OT-IRF)    Activity/Device (Grooming Goal 1, OT-IRF) grooming skills, all  -DN     [place] (Grooming Goal 1, OT-IRF) minimum assist (75% or more patient effort)  -DN     Time Frame (Grooming Goal 1, OT-IRF) short-term goal (STG)  -DN     Progress/Outcomes (Grooming Goal 1, OT-IRF) continuing progress toward goal  -DN     Row Name 05/09/23 1636          Grooming Goal 2 (OT-IRF)    Activity/Device (Grooming Goal 2, OT-IRF) grooming skills, all  -DN     [place] (Grooming Goal 2, OT-IRF) supervision required  -DN     Time Frame (Grooming Goal 2, OT-IRF) long-term goal (LTG)  -DN     Progress/Outcomes (Grooming Goal 2, OT-IRF) continuing progress toward goal  -DN     Row Name 05/09/23 1636          Toileting Goal 1 (OT-IRF)    Activity/Device (Toileting Goal 1, OT-IRF) toileting skills, all  -DN     [place] Level (Toileting Goal 1, OT-IRF) moderate assist (50-74% patient effort)  -DN     Progress/Outcomes (Toileting Goal 1, OT-IRF) continuing progress toward goal  -DN     Time Frame (Toileting Goal 1, OT-IRF) long-term goal (LTG)  -DN     Row Name 05/09/23 1636          Toileting Goal 2 (OT-IRF)    Activity/Device (Toileting Goal 2, OT-IRF) toileting skills, all  -DN     [place] Level (Toileting Goal 2, OT-IRF) moderate assist (50-74% patient effort)  -DN     Progress/Outcomes (Toileting Goal 2, OT-IRF) continuing progress toward goal  -DN     Time Frame (Toileting Goal 2, OT-IRF) long-term goal (LTG)  -DN     Row Name 05/09/23 1636          Self-Feeding Goal 1 (OT-IRF)    Activity/Device (Self-Feeding Goal 1, OT-IRF) self-feeding skills, all  -DN     Strategies/Barriers (Self-Feeding Goal 1, OT-IRF) to be assessed  -DN     Row Name 05/09/23 1636          Self-Feeding Goal 2 (OT-IRF)    Activity/Device (Self-Feeding Goal 2, OT-IRF) self-feeding skills, all  -DN     Strategies/Barriers (Self-Feeding Goal 2, OT-IRF) to be  assessed  -DN     Row Name 05/09/23 1636          Balance Goal 1 (OT)    Activity/Assistive Device (Balance Goal 1, OT) standing, static  -DN     [place] Level/Cues Needed (Balance Goal 1, OT) contact guard assist;verbal cues required  -DN     Time Frame (Balance Goal 1, OT) short term goal (STG)  -DN     Barriers (Balance Goal 1, OT) toileting  -DN     Progress/Outcomes (Balance Goal 1, OT) continuing progress toward goal  -DN     Row Name 05/09/23 1636          Balance Goal 2 (OT)    Activity/Assistive Device (Balance Goal 2, OT) standing, static  -DN     [place] Level (Balance Goal 2, OT) supervision required  -DN     Time Frame (Balance Goal 2, OT) long term goal (LTG)  -DN     Barriers (Balance Goal 2, OT) toileting  -DN     Progress/Outcome (Balance Goal 2, OT) continuing progress toward goal  -DN     Row Name 05/09/23 1636           Endurance Goal 1 (OT)    Endurance Goal 1 (OT) during adls  -DN     Activity Level (Endurance Goal 1, OT) endurance 2 fair  -DN     Time Frame (Endurance Goal 1, OT) short term goal (STG)  -DN     Progress/Outcome (Endurance Goal 1, OT) continuing progress toward goal  -DN     Row Name 05/09/23 1636          Endurance Goal 2 (OT)    Endurance Goal 2 (OT) during adls  -DN     Activity Level (Endurance Goal 2, OT) endurance 2 fair +  -DN     Time Frame (Endurance Goal 2, OT) long term goal (LTG)  -DN     Progress/Outcome (Endurance Goal 2, OT) continuing progress toward goal  -DN     Row Name 05/09/23 1636          Isolated Movement Goal 1 (OT)    Body Area (Isolated Movement Goal 1, OT) right scapula;right shoulder;right elbow;right forearm;right wrist;right fingers  -DN     Level (Isolated Movement Goal 1, OT) facilitate movement  -DN     Time Frame (Isolated Movement Goal 1, OT) short term goal (STG)  -DN     Progress/Outcomes (Isolated Movement Goal 1, OT) continuing progress toward goal  -DN     Row Name 05/09/23 1636          Isolated Movement Goal 2 (OT)    Body  Area (Isolated Movement Goal 2, OT) right scapula;right shoulder;right elbow;right forearm;right wrist;right fingers  -DN     Level (Isolated Movement Goal 2, OT) 1/2  -DN     Time Frame (Isolated Movement Goal 2, OT) long term goal (LTG)  -DN     Progress/Outcomes (Isolated Movement Goal 2, OT) continuing progress toward goal  -DN     Row Name 05/09/23 1636          Caregiver Training Goal 2 (OT-IRF)    Caregiver Training Goal 2 (OT-IRF) pt family to be independent with assist with pt adls and functional transfers for pt d/c home  -DN     Time Frame (Caregiver Training Goal 2, OT-IRF) long-term goal (LTG)  -DN     Progress/Outcomes (Caregiver Training Goal 2, OT-IRF) continuing progress toward goal  -DN           User Key  (r) = Recorded By, (t) = Taken By, (c) = Cosigned By    Initials Name Effective Dates    Alek Reyes OT 06/16/21 -                  Occupational Therapy Education     Title: PT OT SLP Therapies (In Progress)     Topic: Occupational Therapy (In Progress)     Point: ADL training (In Progress)     Description:   Instruct learner(s) on proper safety adaptation and remediation techniques during self care or transfers.   Instruct in proper use of assistive devices.              Learning Progress Summary           Patient Acceptance, E,D, NR by DN at 5/9/2023 1710    Comment: Ot role and karen adls and functional transfers                   Point: Home exercise program (In Progress)     Description:   Instruct learner(s) on appropriate technique for monitoring, assisting and/or progressing therapeutic exercises/activities.              Learning Progress Summary           Patient Acceptance, E,D, NR by DN at 5/9/2023 1710    Comment: Ot role and karen adls and functional transfers                   Point: Precautions (In Progress)     Description:   Instruct learner(s) on prescribed precautions during self-care and functional transfers.              Learning Progress Summary           Patient  Acceptance, E,D, NR by DN at 5/9/2023 1710    Comment: Ot role and karen adls and functional transfers                   Point: Body mechanics (In Progress)     Description:   Instruct learner(s) on proper positioning and spine alignment during self-care, functional mobility activities and/or exercises.              Learning Progress Summary           Patient Acceptance, E,D, NR by DN at 5/9/2023 1710    Comment: Ot role and karen adls and functional transfers                               User Key     Initials Effective Dates Name Provider Type Discipline    DN 06/16/21 -  Alek Duarte OT Occupational Therapist OT                    OT Recommendation and Plan    Planned Therapy Interventions (OT): BADL retraining, cognitive/visual perception retraining, functional balance retraining, neuromuscular control/coordination retraining, passive ROM/stretching, patient/caregiver education/training, ROM/therapeutic exercise, strengthening exercise, transfer/mobility retraining                    Time Calculation:      Time Calculation- OT     Row Name 05/09/23 1400 05/09/23 1100          Time Calculation- OT    OT Start Time 1400  -DN 1100  -DN     OT Stop Time 1430  -DN 1145  -DN     OT Time Calculation (min) 30 min  -DN 45 min  -DN           User Key  (r) = Recorded By, (t) = Taken By, (c) = Cosigned By    Initials Name Provider Type    Alek Reyes OT Occupational Therapist              Therapy Charges for Today     Code Description Service Date Service Provider Modifiers Qty    [id number]  OT EVAL MOD COMPLEXITY 3 5/9/2023 Alek Duarte OT GO 1    [id number]  OT SELF CARE/MGMT/TRAIN EA 15 MIN 5/9/2023 Alek Duarte OT GO 3                   Alek Duarte OT  5/9/2023

## 2023-05-09 NOTE — PROGRESS NOTES
Inpatient Rehabilitation Plan of Care Note    Plan of Care  Updated Problems/Interventions  Mobility    [PT] Bed Mobility(Active)  Current Status(05/09/2023): ModA  Weekly Goal(05/16/2023): [place]  Discharge Goal: SUP    [PT] Bed/Chair/Wheelchair(Active)  Current Status(05/09/2023): ModA  Weekly Goal(05/16/2023): Min  Discharge Goal: SBA    Signed by: Tristin Vicente, PT

## 2023-05-09 NOTE — PROGRESS NOTES
Discharge Planning Assessment  [facility]     Patient Name: Manuel Durant  MRN: [id number]  Today's Date: 2023    Admit Date: 2023    Plan: Discharge plan is for patient to go back to [place] to live with his wife, son, and son's 2 adult daughters in [facility] (near [facility]). Son-in-law will be coming to fly back with patient. Granddaughter who lives in [place] is arranging flights per son. Will confirm flight plans with granddaughter.   Discharge Needs Assessment    No documentation.                Discharge Plan     Row Name 23 1657       Plan    Plan Discharge plan is for patient to go back to [place] to live with his wife, son, and son's 2 adult daughters in [facility] (near [facility]). Son-in-law will be coming to fly back with patient. Granddaughter who lives in [place] is arranging flights per son. Will confirm flight plans with granddaughter.    Patient/Family in Agreement with Plan yes    Plan Comments Completed assessment with patient, using IPad . Patient does speak and understand some English. Patient has been in [place] for 20 plus years per son, although patient answered he has been here 5 years. Patient has no family here. Patient's wife, son, and other 3 children live in and around [facility]. Patient was living alone in an apartment and was working full time doing maintenance for a [employer]. Per CCP in acute part of hospital, patient's friend, Bridgett Shook, who works with him, has patient's original documents and also has closed out patient's apartment and is storing his truck and belongings. [facility] also has had [employer] apply for emergency Medicaid for patient. Will follow up with [employer] on status of application. Patient has some kind of benefit plan through [employer] but does not have regular insurance plan. Patient has green card but the one they found is  3/22 and not sure if he had reapplied for new one or not. Son did  not know as well and does not know if patient has bank account. Patient indicated he did not have account but not sure he understood question being asked. Son gave  permission to speak with patient's friends, Don and Bridgett, and also with patient's granddaughter (son's dgt) Natalie. Per son, the discharge plan is home with himself, his 2 adult dgts, and patient's wife who all live together in [facility] near [facility]. Son stated patient's wife is in good health and will be primary caregiver, but he and his adult dgts will also assist with patient's care. Son also stated they have cousin who is a therapist or possibly a doctor that will help provide care for patient once he is in [place]. Per son, Natalie (granddgt) lives in [place] and is supposed to be arranging flights for herself to come to [place] and for patient's son-in-law to fly from [place] here to [place] on 5/18. Son-in-law will be the one to fly back to [place] with patient. Son stated they plan to apply for social security in [place] as patient did work prior to coming to . Discussed that patient may also be eligible to draw social security here in  since has been working here over 20 years. Disucssed  role and team conference on 5/11 to discuss patient's progress. Will call son on 5/11 to update him on progress and d/c plans.  Will call patient's granddgt tomorrow regarding flight plans and will follow up with patient's friends. Will assist family and coordinate follow up care in [place].              Continued Care and Services - Admitted Since 5/8/2023    Coordination has not been started for this encounter.     Selected Continued Care - Prior Encounters Includes continued care and service providers with selected services from prior encounters from 2/7/2023 to 5/9/2023    Discharged on 5/8/2023 Admission date: 3/12/2023 - Discharge disposition: Rehab Facility or Unit ([facility])    Destination     Service Provider  Selected Services Address Phone Fax Patient Preferred    [facility] ACUTE REHAB PROGRAM Inpatient Rehabilitation [address] [place], [place] [id number] [phone] -- --                       Demographic Summary    No documentation.                Functional Status     Row Name 05/09/23 1639       Functional Status    Usual Activity Tolerance excellent    Current Activity Tolerance moderate    Functional Status Comments Patient was independent and active prior to hospitalization.       Functional Status, IADL    Medications independent    Meal Preparation independent    Housekeeping independent    Laundry independent    Shopping independent    IADL Comments Patient took care of his apartment and was driving.       Mental Status    General Appearance WDL WDL       Mental Status Summary    Recent Changes in Mental Status/Cognitive Functioning ability to understand;vision;mental status;memory (recent)    Mental Status Comments Patient having some decreased memory and trouble understanding questions, even with  at times. Family noted some memory problems since stroke and son stated patient reported having some vision problems.       Employment/    Employment Status employed full-time    Current or Previous Occupation service industry    Employment/ Comments Patient stated he worked 40 hours/week at [employer] and did maintenance.               Psychosocial     Row Name 05/09/23 1021       Values/Beliefs    Spiritual, Cultural Beliefs, Hoahaoism Practices, Values that Affect Care no       Behavior WDL    Behavior WDL interactions    Interactions cooperative;eye contact appropriate;appropriate to situation       Emotion Mood WDL    Emotion/Mood/Affect WDL emotion mood    Emotion/Mood appropriate to situation       Speech WDL    Speech WDL WDL       Perceptual State WDL    Perceptual State WDL WDL       Intellectual Performance WDL    Intellectual Performance WDL  intellectual performance    Intellectual Performance forgetfulness;disoriented to situation;impaired concentration;memory deficit, recent    Level of Consciousness Alert       Coping/Stress    Major Change/Loss/Stressor limited support system;medical condition/diagnosis;loss of independence    Patient Personal Strengths able to adapt;expressive of emotions;self-reliant;resilient;motivated;positive attitude;successful coping history    Sources of Support adult child(lonnie);friend(s);spouse;other family members    Techniques to [place] with Loss/Stress/Change diversional activities    Reaction to Health Status motivated;hopeful;adjusting    Understanding of Condition and Treatment needs time to process;poor grasp of illness/implications    Coping/Stress Comments Patient denied any history or current depression. He is not fully aware of his medical diagnosis       Developmental Stage (Eriksson's)    Developmental Stage Stage 8 (67 years-death/Late Adulthood) Integrity vs. Despair               Abuse/Neglect    No documentation.                Legal    No documentation.                Substance Abuse    No documentation.                Patient Forms    No documentation.                   VIK Barahona

## 2023-05-09 NOTE — PROGRESS NOTES
Recreational Therapy Note    Patient Name: Manuel Durant   MRN: [id number]    Therapeutic Recreation Eval and Treat (last 12 hours)     Therapeutic Recreation Eval & Treat     Row Name 05/09/23 1437       Lifestyle/Recreational History/Interest    Current Living Situation alone  -    Row Name 05/09/23 1437       Recreational History and Interests    How Important is Recreation to You? low importance  -    Current Hobbies/Interests outdoor activities;television/movies/videos;family functions  -    Row Name 05/09/23 1437       Coping/Wellbeing    Current State of Wellbeing calm  -    Factors Impacting Current State of Wellbeing no significant issues  -    Row Name 05/09/23 1437       Therapeutic Recreation Participation    Orientation to Therapeutic Recreation family  -          User Key  (r) = Recorded By, (t) = Taken By, (c) = Cosigned By    Initials Name Provider Type    Gabriela Villela, CTRS Recreational Therapist                  VENICE Briggs  5/9/2023

## 2023-05-09 NOTE — PROGRESS NOTES
Inpatient Rehabilitation Admission  Section A. Transportation  Issues Due to Lack of Transportation:   No    Signed by: ROLAND Matthews

## 2023-05-10 PROCEDURE — 97110 THERAPEUTIC EXERCISES: CPT

## 2023-05-10 PROCEDURE — 97112 NEUROMUSCULAR REEDUCATION: CPT

## 2023-05-10 PROCEDURE — 92610 EVALUATE SWALLOWING FUNCTION: CPT

## 2023-05-10 PROCEDURE — 97535 SELF CARE MNGMENT TRAINING: CPT

## 2023-05-10 PROCEDURE — 92507 TX SP LANG VOICE COMM INDIV: CPT

## 2023-05-10 RX ORDER — BACLOFEN 10 MG/1
5 TABLET ORAL EVERY 8 HOURS SCHEDULED
Status: DISCONTINUED | OUTPATIENT
Start: 2023-05-10 | End: 2023-05-21 | Stop reason: HOSPADM

## 2023-05-10 RX ORDER — ATORVASTATIN CALCIUM 20 MG/1
40 TABLET, FILM COATED ORAL DAILY
Status: DISCONTINUED | OUTPATIENT
Start: 2023-05-10 | End: 2023-05-21 | Stop reason: HOSPADM

## 2023-05-10 RX ADMIN — ATORVASTATIN CALCIUM 40 MG: 20 TABLET, FILM COATED ORAL at 08:25

## 2023-05-10 RX ADMIN — VALSARTAN 160 MG: 160 TABLET, FILM COATED ORAL at 08:22

## 2023-05-10 RX ADMIN — ACETAMINOPHEN 650 MG: 325 TABLET, FILM COATED ORAL at 04:15

## 2023-05-10 RX ADMIN — CARVEDILOL 3.12 MG: 3.12 TABLET, FILM COATED ORAL at 17:18

## 2023-05-10 RX ADMIN — LIDOCAINE 1 PATCH: 50 PATCH CUTANEOUS at 08:22

## 2023-05-10 RX ADMIN — AMLODIPINE BESYLATE 10 MG: 10 TABLET ORAL at 08:22

## 2023-05-10 RX ADMIN — APIXABAN 5 MG: 5 TABLET, FILM COATED ORAL at 17:18

## 2023-05-10 RX ADMIN — BACLOFEN 2.5 MG: 10 TABLET ORAL at 05:31

## 2023-05-10 RX ADMIN — CARVEDILOL 3.12 MG: 3.12 TABLET, FILM COATED ORAL at 08:22

## 2023-05-10 RX ADMIN — BACLOFEN 5 MG: 10 TABLET ORAL at 13:34

## 2023-05-10 RX ADMIN — BACLOFEN 5 MG: 10 TABLET ORAL at 22:25

## 2023-05-10 RX ADMIN — APIXABAN 5 MG: 5 TABLET, FILM COATED ORAL at 05:31

## 2023-05-10 NOTE — THERAPY TREATMENT NOTE
Inpatient Rehabilitation - Speech Language Pathology Treatment Note    [facility]     Patient Name: Manuel Durant  : 1950  MRN: [id number]    Today's Date: 5/10/2023                   Admit Date: 2023       Visit Dx:    No diagnosis found.    Patient Active Problem List   Diagnosis   • Intracranial hemorrhage   • Acute DVT (deep venous thrombosis)   • HTN (hypertension)   • Severe Malnutrition (HCC)   • Hemiplegia   • ICH (intracerebral hemorrhage)   • Stroke       Past Medical History:   Diagnosis Date   • Hypertension    • Hypokalemia 2023   • Stroke        No past surgical history on file.    SLP Recommendation and Plan                Swallow Criteria for Skilled Therapeutic Interventions Met: demonstrates skilled criteria (05/10/23 0830)  Anticipated Discharge Disposition (SLP): home with OP services (05/10/23 0830)     Therapy Frequency (Swallow): 5 days per week (05/10/23 0830)  Predicted Duration Therapy Intervention (Days): until discharge (05/10/23 0830)                               SLP EVALUATION (last 72 hours)     SLP SLC Evaluation     Row Name 05/10/23 1330 23 1000                Communication Assessment/Intervention    Document Type therapy note (daily note)  -AL evaluation  -AL       Patient Observations -- alert;cooperative  -AL       Patient/Family/Caregiver Comments/Observations Pt participated well. Used  via Ipad video.  -AL Used  via Ipad.  -AL       Patient Effort -- good  -AL       Symptoms Noted During/After Treatment -- none  -AL          General Information    Patient Profile Reviewed -- yes  -AL       Pertinent History Of Current Problem -- Pt is a 73-year-old male admitted to inpatient rehab under diagnosis L thalamic and basal ganglia hemorrhage with extension to L lateral ventricle with onset date 3/12/23. His primary language is Stateless; however, he speaks some basic conversational English.  -AL        Precautions/Limitations, Vision -- corrective lenses needed for reading;neglect, right  -AL       Precautions/Limitations, Hearing -- WFL;for purposes of eval  -AL       Patient Level of Education -- Unknown. Pt was working at [employer] prior to hospitalization.  -AL       Prior Level of Function-Communication -- WFL  -AL       Plans/Goals Discussed with -- patient;agreed upon  -AL       Barriers to Rehab -- none identified  -AL       Patient's Goals for Discharge -- functional communication;functional cognition  -AL       Standardized Assessment Used -- CLQT  -AL          Pain Scale: Numbers Pre/Post-Treatment    Pretreatment Pain Rating 0/10 - no pain  -AL 0/10 - no pain  -AL       Pre/Posttreatment Pain Comment -- RIght shoulder pain in afternoon session. Pt did not rate.  -AL          Auditory Comprehension Assessment/Intervention    Auditory Comprehension (Communication) -- moderate impairment  -AL       Auditory Comprehension Communication, Comment -- Answeirng personal yes/no questions in Finnish: 50% with NO cues; yes bias noted. Answering simple yes/no questions: 70% accuracy. Plan to further assess.  -AL          Verbal Expression Assessment/Intervention    Verbal Expression -- --  mild-moderate impairment  -AL       Verbal Expression, Comment -- Pt able to name 10/10 common objects. In picture description from WAB, pt with fluent output, but incomplete description of picture due to left neglect.  reported difficulty understanding pt x1 due to word finding difficulty; suspect paraphasia.  -AL          Motor Speech Assessment/Intervention    Motor Speech, Comment --  reports pt's speech is not slurred. Unable to complete full motor speech evaluation due to time contraints.  -AL          Cognitive Assessment Intervention- SLP    Cognition, Comment -- BIMS: Pt repeated 3 unrelated words with one repetition. He was oriented to month and SARAH with NO cues; not oriented to l[place] (stated  1961). He recalled 2/3 unrelated words after 2 minute delay; unable to recall 3rd word.  -AL          Standardized Tests    Cognitive/Memory Tests -- CLQT: Cognitive Linguistic Quick Test  -AL          CLQT (The Cognitive Linguistic Quick Test)    Attention Domain Score -- 35  -AL       Attention Severity Rating -- 1: Severe  -AL       Memory Domain Score -- 59  -AL       Memory Severity Rating -- 1: Severe  -AL       Executive Function Domain Score -- 7  -AL       Executive Function Severity Rating -- 1: Severe  -AL       Language Domain Score -- 15  -AL       Language Severity Rating -- 1: Severe  -AL       Visuospatial Domain Score -- 29  -AL       Visuospatial Severity Rating -- 2: Moderate  -AL       Clock Drawing Total Score -- 0  Pt unable to write numbers legibly with left hand (nondominant). He wrote numbers on left side of clock, then wrote same numbers on right side of clock.  -AL       Clock Drawing Severity Rating -- Severe  -AL       Composite Severity Rating -- 1.2  -AL       Composite Severity Rating Range -- 1.4 - 1.0: Severe  -AL       CLQT Comments -- Pt presented with an overall severe cognitive-communication deficit, as well as fluent aphasia, characterized by mild-moderately impaired expressive language and moderately impaired receptive language. Evaluation was administered with use of  via video. Pt scored moderately impaired in the domain of visuospatial skills and severely impaired in the domains of attention, memory, executive functions, and language. Pt exhibited moderate inattention to the right; suspect pt also with right visual field cut and double vision, as he moved his head to the right and covered one eye intermittently during evaluation. Suspect aphasia impacted scores on memory tasks. Pt was able to recall his  and location of his birth, but had difficulty recalling age (stated 85) and his address. He as oriented to month and SARAH, but not year (stated  "\"1961\"), city, state or situation (stated he had \"spine surgery\"). He scored below the criterion cut-off for his age on stating personal facts (4/8), symbol cancellation (1/10), clock drawing (0/13), story retelling (0/10), symbol trails (1/10), generative naming (1/9), design memory (3/6) and design generation (1/13). Decreased comprehension and word finding noted on story retell task. Confrontation naming of common objects appeared WNL. During additional language testing, pt with moderately impaired comprehension of personal and simple yes/no questions, with \"yes\" bias noted. Picture description revealed fluent output with incomplete information and word finding difficulty x1. Recommend further assessment of language skills. Also, plan to complete Clinical Swallow Evaluation on next date. Pt has been tolerating soft (chopped meats)/no mixed consistencies with NTL.  -AL          SLP Evaluation Clinical Impressions    SLP Diagnosis -- severe;cognitive-linguistic disorder;moderate;communication disorder  -AL       Rehab Potential/Prognosis -- good  -AL       SLC Criteria for Skilled Therapy Interventions Met -- yes  -AL       Functional Impact -- needs 24 hour supervision;difficulty in expressing complex messages;decreased ability to respond to situations safely;difficulty completing home management task  -AL          Recommendations    Therapy Frequency (SLP SLC) -- 2 times/day;5 days per week  -AL       Predicted Duration Therapy Intervention (Days) -- until discharge  -AL       Anticipated Discharge Disposition (SLP) -- home with OP services  -AL          Communication Treatment Objective and Progress Goals (SLP)    [facility] [place] [facility] -- Patient will demonstrate functional cognitive-linguistic skills for return to discharge environment  -AL       Cognitive Linguistic Treatment Objectives -- Cognitive Linguistic Treatment Objectives (Group)  -AL          Patient will demonstrate functional language skills for return to " discharge environment     [place] -- with minimal cues  -AL       Time frame -- by discharge  -AL       Progress/Outcomes -- new goal  -AL          Patient will demonstrate functional cognitive-linguistic skills for return to discharge environment    [place] -- with moderate cues  -AL       Time frame -- by discharge  -AL          Comprehend Questions Goal 1 (SLP)    Improve Ability to Comprehend Questions Goal 1 (SLP) -- questions about personal information;simple yes/no questions;80%;independently (over 90% accuracy)  -AL       Time Frame (Comprehend Questions Goal 1, SLP) -- 1 week  -AL          Verbal Expression Treatment Objectives    Connected Speech Selection -- connected speech, SLP goal 1  -AL          Word Retrieval Skills Goal 1 (SLP)    Improve Word Retrieval Skills By Goal 1 (SLP) -- completing functional word finding tasks;completing a divergent task;80%;independently (over 90% accuracy)  -AL       Time Frame (Word Retrieval Goal 1, SLP) -- 1 week  -AL          Connected Speech to Express Thoughts Goal 1 (SLP)    Improve Narrative Discourse to Express Thoughts By Goal 1 (SLP) -- describing a picture;conversational task on a given topic;80%;with minimal cues (75-90%)  -AL       Time Frame (Connected Speech Goal 1, SLP) -- 1 week  -AL          Cognitive Linguistic Treatment Objectives    Attention Selection -- attention, SLP goal 1;attention, SLP goal (free text)  -AL       Memory Skills Selection -- memory skills, SLP goal 1  -AL       Problem Solving Selection -- problem solving, SLP goal 1  -AL          Attention Goal 1 (SLP)    Improve Attention by Goal 1 (SLP) -- attending to task;complete sustained attention task;80%;with minimal cues (75-90%)  -AL       Time Frame (Attention Goal 1, SLP) -- 1 week  -AL          Attention Goal (SLP)    Improve Attention by Goal (SLP) -- Pt will complete basic attention to detail tasks with MIN cues for attention and scanning to the right.  -AL       Time  Frame (Attention Goal, SLP) -- 1 week  -AL          Memory Skills Goal 1 (SLP)    Improve Memory Skills Through Goal 1 (SLP) -- recall details of the day;80%;with moderate cues (50-74%)  -AL       Time Frame (Memory Skills Goal 1, SLP) -- 1 week  -AL          Functional Problem Solving Skills Goal 1 (SLP)    Improve Problem Solving Through Goal 1 (SLP) -- determine solutions to simple ADL/safety problems;name items for a task;80%;independently (over 90% accuracy)  -AL       Time Frame (Problem Solving Goal 1, SLP) -- 1 week  -AL             User Key  (r) = Recorded By, (t) = Taken By, (c) = Cosigned By    Initials Name Effective Dates    Nurys Beavers, MS CCC-SLP 06/16/21 -                    EDUCATION    The patient has been educated in the following areas:       Cognitive Impairment Communication Impairment.             SLP GOALS     Row Name 05/10/23 1330 05/10/23 0830 05/09/23 1000       (LTG) Patient will demonstrate functional swallow for    Diet Texture (Demonstrate functional swallow) -- regular textures  -AL --    Liquid viscosity (Demonstrate functional swallow) -- thin liquids  -AL --    [place] (Demonstrate functional swallow) -- independently (over 90% accuracy)  -AL --    Time Frame (Demonstrate functional swallow) -- by discharge  -AL --       (STG) Swallow 1    (STG) Swallow 1 -- Pt will exihibit no overt s/s of aspiration or penetration with trials of water by cup  -AL --    [place] (Swallow Short Term Goal 1) -- with minimal cues (75-90% accuracy)  -AL --    Time Frame (Swallow Short Term Goal 1) -- 1 week  -AL --       (STG) Pharyngeal Strengthening Exercise Goal 1 (SLP)    Activity (Pharyngeal Strengthening Goal 1, SLP) -- increase timing;increase superior movement of the hyolaryngeal complex;increase anterior movement of the hyolaryngeal complex  -AL --    Increase Timing -- hard effortful swallow  -AL --    Increase Superior Movement of the Hyolaryngeal Complex --  Mendelsohn;hard effortful swallow  -AL --    Increase Anterior Movement of the Hyolaryngeal Complex -- EMST  -AL --    [place]/Accuracy (Pharyngeal Strengthening Goal 1, SLP) -- with minimal cues (75-90% accuracy)  -AL --    Progress/Outcomes (Pharyngeal Strengthening Goal 1, SLP) -- new goal  -AL --       Patient will demonstrate functional language skills for return to discharge environment     [place] -- -- with minimal cues  -AL    Time frame -- -- by discharge  -AL    Progress/Outcomes -- -- new goal  -AL       Patient will demonstrate functional cognitive-linguistic skills for return to discharge environment    [place] -- -- with moderate cues  -AL    Time frame -- -- by discharge  -AL       Comprehend Questions Goal 1 (SLP)    Improve Ability to Comprehend Questions Goal 1 (SLP) questions about personal information;simple yes/no questions;80%;independently (over 90% accuracy)  -AL -- questions about personal information;simple yes/no questions;80%;independently (over 90% accuracy)  -AL    Time Frame (Comprehend Questions Goal 1, SLP) 1 week  -AL -- 1 week  -AL    Progress/Outcomes (Comprehend Questions Goal 1, SLP) goal ongoing  -AL -- --    Comment (Comprehend Questions Goal 1, SLP) simple and personal yes/no questions in Filipino: 90% with NO cues  -AL -- --       Follow Directions Goal 2 (SLP)    Comment (Follow Directions Goal 1, SLP) Diagnostic treatment: Pt followed 1-step and 2-step body part commands in Filipino with NO cues. Followed sequential commands in 1/4 trials with NO cues, 4/4 with demonstration and repetition.  -AL -- --       Word Retrieval Skills Goal 1 (SLP)    Improve Word Retrieval Skills By Goal 1 (SLP) completing functional word finding tasks;completing a divergent task;80%;independently (over 90% accuracy)  -AL -- completing functional word finding tasks;completing a divergent task;80%;independently (over 90% accuracy)  -AL    Time Frame (Word Retrieval Goal 1, SLP) 1  week  -AL -- 1 week  -AL    Progress/Outcomes (Word Retrieval Goal 1, SLP) good progress toward goal  -AL -- --    Comment (Word Retrieval Goal 1, SLP) Confrontation naming of pictures: 90% with NO cues, 100% with MIN cues; vision deficit impacted task; pt self-corrected a semantic paraphasia x1.  -AL -- --       Connected Speech to Express Thoughts Goal 1 (SLP)    Improve Narrative Discourse to Express Thoughts By Goal 1 (SLP) describing a picture;conversational task on a given topic;80%;with minimal cues (75-90%)  -AL -- describing a picture;conversational task on a given topic;80%;with minimal cues (75-90%)  -AL    Time Frame (Connected Speech Goal 1, SLP) 1 week  -AL -- 1 week  -AL    Progress/Outcomes (Connected Speech Goal 1, SLP) good progress toward goal  -AL -- --    Comment (Connected Speech Goal 1, SLP) Sentence description of pictures: 50% with NO cues, 100% with MIN-MOD cues to provide additional details and for correct verbs.  -AL -- --       Attention Goal 1 (SLP)    Improve Attention by Goal 1 (SLP) -- -- attending to task;complete sustained attention task;80%;with minimal cues (75-90%)  -AL    Time Frame (Attention Goal 1, SLP) -- -- 1 week  -AL       Attention Goal (SLP)    Improve Attention by Goal (SLP) -- -- Pt will complete basic attention to detail tasks with MIN cues for attention and scanning to the right.  -AL    Time Frame (Attention Goal, SLP) -- -- 1 week  -AL       Orientation Goal 1 ([facility])    Improve Orientation Through Goal 1 (SLP) demonstrating orientation to day;demonstrating orientation to month;demonstrating orientation to year;demonstrating orientation to place;demonstrating orientation to disease/impairment;80%;with minimal cues (75-90%)  -AL -- --    Time Frame (Orientation Goal 1, SLP) 2 weeks  -AL -- --    Progress/Outcomes (Orientation Goal 1, SLP) good progress toward goal  -AL -- --    Comment (Orientation Goal 1, SLP) Oriented to situation, city, and state with NO cues.  Required MAX cues for month and year.  -AL -- --       Memory Skills Goal 1 (SLP)    Improve Memory Skills Through Goal 1 (SLP) -- -- recall details of the day;80%;with moderate cues (50-74%)  -AL    Time Frame (Memory Skills Goal 1, SLP) -- -- 1 week  -AL       Functional Problem Solving Skills Goal 1 (SLP)    Improve Problem Solving Through Goal 1 (SLP) -- -- determine solutions to simple ADL/safety problems;name items for a task;80%;independently (over 90% accuracy)  -AL    Time Frame (Problem Solving Goal 1, SLP) -- -- 1 week  -AL          User Key  (r) = Recorded By, (t) = Taken By, (c) = Cosigned By    Initials Name Provider Type    Nurys Beavers MS CCC-SLP Speech and Language Pathologist                            Time Calculation:        Time Calculation- SLP     Row Name 05/10/23 1540 05/10/23 1155          Time Calculation- SLP    SLP Start Time 1330  -AL 0830  -AL     SLP Stop Time 1400  -AL 0900  -AL     SLP Time Calculation (min) 30 min  -AL 30 min  -AL           User Key  (r) = Recorded By, (t) = Taken By, (c) = Cosigned By    Initials Name Provider Type    Nurys Beavers MS CCC-SLP Speech and Language Pathologist                  Therapy Charges for Today     Code Description Service Date Service Provider Modifiers Qty    51593308741 HC ST STD COG PERF TEST PER HOUR 5/9/2023 Nurys Dixon MS CCC-SLP GN 2    12970969914 HC ST EVAL ORAL PHARYNG SWALLOW 2 5/10/2023 Nurys Dixon MS CCC-SLP GN 1    34876921044 HC ST TREATMENT SPEECH 2 5/10/2023 Nurys Dixon MS CCC-SLP GN 1                           MS DEEPAK De Santiago  5/10/2023

## 2023-05-10 NOTE — PROGRESS NOTES
Inpatient Rehabilitation Functional Measures Assessment and Plan of Care    Plan of Care  Updated Problems/Interventions  Cognition    [ST] Memory(Active)  Current Status(05/10/2023): Pt oriented to self and month.  Weekly Goal(05/18/2023): Pt will be oriented to person, place, time and  situation with MOD cues.  Discharge Goal: Improved memory for home environment.    [ST] Attention(Active)  Current Status(05/10/2023): Pt with moderate-severe inattention to the right.  Weekly Goal(05/18/2023): Pt will scan to the right during functional tasks with  MIN cues.  Discharge Goal: Improved attention skills for home environment.        Communication    [ST] Comprehension(Active)  Current Status(05/10/2023): Moderate difficulty comprehending personal and  simple yes/no questions on evaluatoin. On 5/10, answered simple yes/no questions  in Kazakh with 90% accuracy. Difficulty following sequential commands.  Weekly Goal(05/18/2023): Will answer personal and simple yes/no questions with  80% accuracy.  Discharge Goal: Participate in conversation with occasional repetitions of  information.    [ST] Expression(Active)  Current Status(05/10/2023): Mild-moderate word finding difficulty during  conversation and picture description.  Weekly Goal(05/18/2023): Will describe a picture with NO cues.  Discharge Goal: Will participate in conversation with NO cues for use of  compensations for word finding difficulty.        Swallow Function    [ST] Swallowing(Active)  Current Status(05/10/2023): Pt tolerate soft (chopped meats)/no mixed  consistencies with NTL.  Weekly Goal(05/18/2023): Will tolerate soft (chopped) with thins with no s/s of  aspiration or penetration.  Discharge Goal: Tolerate the least restrictive diet with NO cues.    Signed by: Nurys Dixon, SLP

## 2023-05-10 NOTE — PROGRESS NOTES
LOS: 2 days   Patient Care Team:  Provider, No Known as PCP - General SHABNAM MCDONALD  1950      ADMITTING DIAGNOSIS:  Hemorrhagic stroke-left basal ganglia/internal capsule with intraventricular extension on March 12, 2023-  Right hemiparesis   Impaired mobility/impaired self-care  Diplopia  Dysphagia-mechanical soft/chopped meat/nectar thick liquid  Spasticity-baclofen 2.5 mg every 8 hours  Hypertension-amlodipine/carvedilol/valsartan  DVT-right soleal vein-March 17, 2023-Eliquis      Subjective     Continues with anterior shoulder pain.  Increases with passive range of motion.  Continues with tightness in the right arm.  Participating in therapies.  No new weakness.  Video       Objective     Vitals:    05/10/23 1145   BP: 125/67   Pulse: 69   Resp: 18   Temp: 98.2 °F (36.8 °C)   SpO2: 97%       PHYSICAL EXAM:   MENTAL STATUS -  AWAKE / ALERT  HEENT- NCAT, PUPILS EQUALLY ROUND, SCLERAE ANICTERIC, CONJUNCTIVAE PINK, OP MOIST, NO JVD, EARS UNREMARKABLE EXTERNALLY  LUNGS - CTA, NO WHEEZES, RALES OR RHONCHI  HEART- RRR, NO RUB, MURMUR, OR GALLOP  ABD - NORMOACTIVE BOWEL SOUNDS, SOFT, NT. NO HEPATOSPLENOMEGALY APPRECIATED  EXT -edema in the right hand with decreased range of motion  Decreased range of motion right shoulder  NEURO -awake alert.  Oriented.  No significant dysarthria.     MOTOR EXAM -  LUE/LLE 5/5.    Right shoulder flexion 3-/5, elbow flexion 3-/5, finger flexion 2/5, hip flexion 3-/5, knee extension 3-/5, ankle dorsiflexion 1/5.  MAS 2 in shoulder ABD, elbow flexors, elbow pronators      MEDICATIONS  Scheduled Meds:amLODIPine, 10 mg, Oral, QAM AC  apixaban, 5 mg, Oral, Q12H  atorvastatin, 40 mg, Oral, Daily  baclofen, 5 mg, Oral, Q8H  carvedilol, 3.125 mg, Oral, BID With Meals  lidocaine, 1 patch, Transdermal, Q24H  valsartan, 160 mg, Oral, QAM AC      Continuous Infusions:   PRN Meds:.•  acetaminophen  •  senna-docusate sodium **AND** polyethylene glycol **AND** bisacodyl **AND**  bisacodyl  •  loperamide      RESULTS  No results found for: POCGLU  Results from last 7 days   Lab Units 05/08/23  2132   WBC 10*3/mm3 6.91   HEMOGLOBIN g/dL 12.7*   HEMATOCRIT % 38.5   PLATELETS 10*3/mm3 220     Results from last 7 days   Lab Units 05/08/23  2132   SODIUM mmol/L 138   POTASSIUM mmol/L 4.3   CHLORIDE mmol/L 106   CO2 mmol/L 25.0   BUN mg/dL 18   CREATININE mg/dL 0.76   CALCIUM mg/dL 8.6   BILIRUBIN mg/dL 0.3   ALK PHOS U/L 131*   ALT (SGPT) U/L 44*   AST (SGOT) U/L 18   GLUCOSE mg/dL 115*           ASSESSMENT and PLAN    ICH (intracerebral hemorrhage)    Stroke    Hemorrhagic stroke-left basal ganglia/internal capsule with intraventricular extension on March 12, 2023-    Right hemiparesis     Impaired mobility/impaired self-care    Diplopia    Dysphagia-mechanical soft/chopped meat/nectar thick liquid    Spasticity-baclofen 2.5 mg every 8 hours  May 9- Significant flexor synergy on the RUE with shoulder abd, elbow flexor and pronate tone. Currently on baclofen 2.5 q8h. Will evaluate tone with therapy today and make adjustments as needed.  May 10-titrate up on baclofen 5 mg every 8 hour    Hypertension-amlodipine/carvedilol/valsartan    DVT-right soleal vein-March 17, 2023-Brendan Magaña MD      During rounds, used appropriate personal protective equipment including mask and gloves.  Additional gown if indicated.  Mask used was standard procedure mask. Appropriate PPE was worn during the entire visit.  Hand hygiene was completed before and after.

## 2023-05-10 NOTE — PLAN OF CARE
Goal Outcome Evaluation:  Plan of Care Reviewed With: patient     Clinical Swallow Evaluation completed. Pt exhibited adequate mastication of solids, with intermittent anterior loss on the right with solids (x2) and liquids (x1). He reported he could not feel residue on right side of mouth. No pocketing observed. Pt exhibited cough in 1/3 trials of thins by tsp, delayed throat clear in 1/4 trials of thins by cup, strong cough in 2/3 trials of thins by straw and delayed cough in 1/3 trials of large bites of mixed. No overt s/s of aspiration or penetration observed with single ice chip, NTL by cup, puree, soft solid (drained peaches) and regular. Mild impulsivity noted when taking drinks. Recommend continue soft (chopped meats)/no mixed with NTL. Hydration protocol by cup 30 minutes after meals with staff supervision for small sips. Meds one at a time with applesauce or NTL.

## 2023-05-10 NOTE — THERAPY TREATMENT NOTE
Inpatient Rehabilitation - Physical Therapy Treatment Note       [facility]     Patient Name: Manuel Durant  : 1950  MRN: [id number]    Today's Date: 5/10/2023                    Admit Date: 2023      Visit Dx:   No diagnosis found.    Patient Active Problem List   Diagnosis   • Intracranial hemorrhage   • Acute DVT (deep venous thrombosis)   • HTN (hypertension)   • Severe Malnutrition (HCC)   • Hemiplegia   • ICH (intracerebral hemorrhage)   • Stroke       Past Medical History:   Diagnosis Date   • Hypertension    • Hypokalemia 2023   • Stroke        No past surgical history on file.    PT ASSESSMENT (last 12 hours)     IRF PT Evaluation and Treatment     Row Name 05/10/23 1000          PT Time and Intention    Document Type daily treatment  -LB     Mode of Treatment physical therapy  -LB     Patient/Family/Caregiver Comments/Observations Interpretor pad.  Pt does understand some English  -LB     Row Name 05/10/23 1000          General Information    Existing Precautions/Restrictions fall  -LB     Row Name 05/10/23 1000          Pain Scale: FACES Pre/Post-Treatment    Pain: FACES Scale, Pretreatment 4-->hurts little more  -LB     Posttreatment Pain Rating 6-->hurts even more  -LB     Pain Location - Side/Orientation Right  -LB     Pain Location - shoulder  -LB     Row Name 05/10/23 1000          Cognition/Psychosocial    Personal Safety Interventions fall prevention program maintained;gait belt;muscle strengthening facilitated;nonskid shoes/slippers when out of bed  -LB     Row Name 05/10/23 1000          Vision Assessment/Intervention    Visual Processing Deficit visual attention, right  -LB     Row Name 05/10/23 1000          Bed Mobility    Sit-Supine [place] (Bed Mobility) moderate assist (50% patient effort)  -LB     Row Name 05/10/23 1000          Transfer Assessment/Treatment    Transfers sit-stand transfer;stand-sit transfer;chair-bed transfer;bed-chair transfer  -     Row  Name 05/10/23 1000          Bed-Chair Transfer    Bed-Chair [place] (Transfers) verbal cues;moderate assist (50% patient effort)  -LB     Assistive Device (Bed-Chair Transfers) wheelchair  -LB     Row Name 05/10/23 1000          Chair-Bed Transfer    Chair-Bed [place] (Transfers) verbal cues;moderate assist (50% patient effort)  -LB     Assistive Device (Chair-Bed Transfers) wheelchair  -LB     Row Name 05/10/23 1000          Sit-Stand Transfer    Sit-Stand [place] (Transfers) verbal cues;moderate assist (50% patient effort)  -LB     Assistive Device (Sit-Stand Transfers) karen bars;wheelchair  -LB     Row Name 05/10/23 1000          Stand-Sit Transfer    Stand-Sit [place] (Transfers) verbal cues;moderate assist (50% patient effort)  -LB     Assistive Device (Stand-Sit Transfers) karen bars;wheelchair  -LB     Row Name 05/10/23 1000          Gait/Stairs (Locomotion)    [place] Level (Gait) moderate assist (50% patient effort)  -LB     Assistive Device (Gait) karen bars  -LB     Distance in Feet (Gait) 8 x 2  -LB     Pattern (Gait) step-to  -LB     Bilateral Gait Deviations forward flexed posture;weight shift ability decreased  -LB     Left Sided Gait Deviations --  decreased step length  -LB     Right Sided Gait Deviations foot slap  increased tone, weight bearing on toes, unable to place heel on the floor  -LB     Gait Assessment/Intervention Min A to advance right LE; increased tone in arm and LE with flexion tone with hip knee and ankle.  -LB     Row Name 05/10/23 1000          Safety Issues, Functional Mobility    Impairments Affecting Function (Mobility) balance;endurance/activity tolerance;motor planning;muscle tone abnormal;strength;visual/perceptual  -LB     Row Name 05/10/23 1000          Balance    Comment, Balance Stood at hemibars with weight shifting over right LE to decrease tone and stretch right ankle  -LB     Row Name 05/10/23 1000          Motor Skills    Muscle Tone  right;lower extremity(s);hypertonia  -LB     Motor Control/Coordination Interventions weight-bearing activities  -LB     Therapeutic Exercise core strength  -LB     Row Name 05/10/23 1000          Hip (Therapeutic Exercise)    Hip (Therapeutic Exercise) AAROM (active assistive range of motion)  -LB     Hip AAROM (Therapeutic Exercise) right;aBduction;aDduction;external rotation;internal rotation;10 repetitions;flexion;extension  -LB     Row Name 05/10/23 1000          Knee (Therapeutic Exercise)    Knee Strengthening (Therapeutic Exercise) right;SAQ (short arc quad);15 repititions  -LB     Row Name 05/10/23 1000          Ankle (Therapeutic Exercise)    Ankle (Therapeutic Exercise) PROM (passive range of motion)  -LB     Ankle PROM (Therapeutic Exercise) right;dorsiflexion;plantarflexion  -LB     Row Name 05/10/23 1000          Core Strength (Therapeutic Exercise)    Core Strength (Therapeutic Exercise) bridging, bilateral lower extremities;10 repetitions  -LB     Comment (Core Strength Therapeutic Exercise) demonstrated exercises and pt followed  -LB     Row Name 05/10/23 1000          Positioning and Restraints    Pre-Treatment Position sitting in chair/recliner  -LB     Post Treatment Position bathroom  -LB     In Wheelchair patient within staff view  -LB           User Key  (r) = Recorded By, (t) = Taken By, (c) = Cosigned By    Initials Name Provider Type    LB Le Reynoso, PT Physical Therapist                 Physical Therapy Education     Title: PT OT SLP Therapies (In Progress)     Topic: Physical Therapy (In Progress)     Point: Mobility training (In Progress)     Learning Progress Summary           Patient Acceptance, E,TB, NR by LB at 5/10/2023 1211    Acceptance, E,D, NR by DP at 5/9/2023 1217                   Point: Home exercise program (In Progress)     Learning Progress Summary           Patient Acceptance, E,D, NR by DP at 5/9/2023 1217                   Point: Body mechanics (In Progress)      Learning Progress Summary           Patient Acceptance, E,D, NR by DP at 5/9/2023 1217                   Point: Precautions (In Progress)     Learning Progress Summary           Patient Acceptance, E,D, NR by DP at 5/9/2023 1217                               User Key     Initials Effective Dates Name Provider Type Discipline    LB 06/16/21 -  Le Reynoso, PT Physical Therapist PT    DP 08/24/21 -  Tristin Vicente PT Physical Therapist PT                PT Recommendation and Plan                          Time Calculation:      PT Charges     Row Name 05/10/23 1254 05/10/23 1211          Time Calculation    Start Time 1230  -LB 0930  -LB     Stop Time 1300  -LB 1000  -LB     Time Calculation (min) 30 min  -LB 30 min  -LB     PT Received On 05/10/23  -LB 05/10/23  -LB     PT - Next Appointment 05/11/23  -LB 05/10/23  -LB           User Key  (r) = Recorded By, (t) = Taken By, (c) = Cosigned By    Initials Name Provider Type    LB Le Reynoso, PT Physical Therapist                Therapy Charges for Today     Code Description Service Date Service Provider Modifiers Qty    49782287339 HC PT THER PROC EA 15 MIN 5/10/2023 Le Reynoso, PT GP 2    69805679645 HC PT NEUROMUSC RE EDUCATION EA 15 MIN 5/10/2023 Le Reynoso, PT GP 2            PT G-Codes  AM-PAC 6 Clicks Score (PT): 14      Le Reynoso, PT  5/10/2023

## 2023-05-10 NOTE — PROGRESS NOTES
Inpatient Rehabilitation Functional Measures Assessment and Plan of Care    Plan of Care  Updated Problems/Interventions  Mobility    [OT] Toilet Transfers(Active)  Current Status(05/09/2023): Max-Mod  Weekly Goal(05/16/2023): Mod  Discharge Goal: Min    [OT] Tub/Shower Transfers(Active)  Current Status(05/09/2023): Mod  Weekly Goal(05/16/2023): Min  Discharge Goal: CGA        Self Care    [OT] Bathing(Active)  Current Status(05/09/2023): Mod  Weekly Goal(05/16/2023): Min  Discharge Goal: Min    [OT] Dressing (Lower)(Active)  Current Status(05/09/2023): Max  Weekly Goal(05/16/2023): Mod  Discharge Goal: Min    [OT] Dressing (Upper)(Active)  Current Status(05/09/2023): Mod  Weekly Goal(05/16/2023): Min  Discharge Goal: SBA    [OT] Eating(Active)  Current Status(05/09/2023): Min  Weekly Goal(05/16/2023): SBA  Discharge Goal: SBA    [OT] Grooming(Active)  Current Status(05/09/2023): Min  Weekly Goal(05/16/2023): Min  Discharge Goal: SBA    [OT] Toileting(Active)  Current Status(05/09/2023): Max  Weekly Goal(05/16/2023): Mod  Discharge Goal: Min    Functional Measures  SHELBY Eating:  Branch  SHELBY Grooming: Branch  SHELBY Bathing:  Branch  SHELBY Upper Body Dressing:  Branch  SHELBY Lower Body Dressing:  Branch  SHELBY Toileting:  Branch    SHELBY Bladder Management  Level of Assistance:  Branch  Frequency/Number of Accidents this Shift:  Branch    SHELBY Bowel Management  Level of Assistance: Branch  Frequency/Number of Accidents this Shift: Branch    SHELBY Bed/Chair/Wheelchair Transfer:  Branch  SHELBY Toilet Transfer:  Branch  SHELBY Tub/Shower Transfer:  Branch    Previously Documented Mode of Locomotion at Discharge: Field  SHELBY Expected Mode of Locomotion at Discharge: Branch  SHELBY Walk/Wheelchair:  Branch  SHELBY Stairs:  Branch    SHELBY Comprehension:  Branch  SHELBY Expression:  Branch  SHELBY Social Interaction:  Branch  SHELBY Problem Solving:  Branch  SHELBY Memory:  Branch    Therapy Mode Minutes  Occupational Therapy: Branch  Physical Therapy:  Branch  Speech Language Pathology:  Branch    Signed by: Alek Duarte, OTR/L

## 2023-05-10 NOTE — PROGRESS NOTES
Inpatient Rehabilitation Plan of Care Note    Plan of Care  Updated Problems/Interventions  Mobility    [PT] Bed Mobility(Active)  Current Status(05/10/2023): ModA  Weekly Goal(05/18/2023): [place]  Discharge Goal: SUP    [PT] Bed/Chair/Wheelchair(Active)  Current Status(05/10/2023): ModA  Weekly Goal(05/16/2023): Min  Discharge Goal: CGA/Min    [PT] Walk(Active)  Current Status(05/10/2023): 8 ft hemibars Mod  Weekly Goal(05/18/2023): PT only  Discharge Goal: 30 ft AAD Min    Signed by: Le Reynoso, PT

## 2023-05-10 NOTE — PLAN OF CARE
Goal Outcome Evaluation:  Plan of Care Reviewed With: patient        Progress: improving  Outcome Evaluation: Pt is alert, when asked orientation questions this AM pt was disoriented to place and stated the year to be 2025. Pt is [place] speaking, interrpruter used. R karen, R facial droop, diplopia, mild slurring of speech noted. Pt c/o of pain/tightness in R shoulder, lidocaine patch applied. SBP goal below 150. Incon/Con of B&B, last BM 5/10. Assist X1-2 to wc. Edema in RUE. Meds whole with NTL. Up for meals, supervision for meals. Pt educated on use of call light.

## 2023-05-10 NOTE — THERAPY TREATMENT NOTE
Inpatient Rehabilitation - Occupational Therapy Treatment Note    [facility]     Patient Name: Manuel Durant  : 1950  MRN: [id number]    Today's Date: 5/10/2023                 Admit Date: 2023       No diagnosis found.    Patient Active Problem List   Diagnosis   • Intracranial hemorrhage   • Acute DVT (deep venous thrombosis)   • HTN (hypertension)   • Severe Malnutrition (HCC)   • Hemiplegia   • ICH (intracerebral hemorrhage)   • Stroke       Past Medical History:   Diagnosis Date   • Hypertension    • Hypokalemia 2023   • Stroke        No past surgical history on file.          IRF OT ASSESSMENT FLOWSHEET (last 12 hours)     IRF OT Evaluation and Treatment     Row Name 05/10/23 1556          OT Time and Intention    Document Type daily treatment  -DN     Mode of Treatment occupational therapy  -DN     Patient Effort good  -DN     Row Name 05/10/23 1556          Pain Assessment    Pretreatment Pain Rating 1/10  -DN     Posttreatment Pain Rating /10  -DN     Pain Location - Side/Orientation Right  -DN     Pain Location upper  -DN     Pain Location - shoulder  -DN     Pre/Posttreatment Pain Comment pt presents with R sided tone at start of PM session at mod range to where he was having spasims, pt placed on mat working 20 minutes on trunk stretches and AAROM reaching with R arm in sitting, pt states pain decreased  -DN     Row Name 05/10/23 1556          Cognition/Psychosocial    Orientation Status (Cognition) oriented to;person  -DN     Follows Commands (Cognition) follows one-step commands;repetition of directions required;physical/tactile prompts required;verbal cues/prompting required  I PAD   -DN     Cognitive Interventions/Strategies --  again I pad   -DN     Row Name 05/10/23 1556          Range of Motion (ROM)    Right Upper Extremity (ROM) --  ER shoulder only to neutral today  -DN     Row Name 05/10/23 1556          Vision Assessment/Intervention    Visual  Impairment/Limitations visual/perceptual impairments present;peripheral vision impaired right  -DN     Row Name 05/10/23 1556          Sensory Assessment (Somatosensory)    Sensory Assessment (Somatosensory) unable/difficult to assess  -DN     Row Name 05/10/23 1556          Bathing    Comment (Bathing) pt declined adls  -DN     Row Name 05/10/23 1556          Toileting    [place] Level (Toileting) toileting skills;adjust/manage clothing;perform perineal hygiene;maximum assist (25% patient effort);verbal cues;nonverbal cues (demo/gesture)  -DN     Assistive Device Use (Toileting) grab bar/safety frame;raised toilet seat  -DN     Position (Toileting) supported standing;supported sitting  -DN     Row Name 05/10/23 1556          Toilet Transfer    Type (Toilet Transfer) stand pivot/stand step  -DN     [place] Level (Toilet Transfer) moderate assist (50% patient effort);verbal cues;nonverbal cues (demo/gesture)  -DN     Assistive Device (Toilet Transfer) grab bars/safety frame;raised toilet seat;wheelchair  -DN     Comment, (Toilet Transfer) pt not stepping with RLE due to Max tone, usally off the ground and shuffles L foot to get to chair, need to inprove on weight shift and to be able to put foot flat on ground, currently unable due to tone  -DN     Row Name 05/10/23 1556          Shower Transfer    Comment, (Shower Transfer) declined shower today will shower tomorrow  -DN     Row Name 05/10/23 1556          Safety Issues, Functional Mobility    Safety Issues Affecting Function (Mobility) insight into deficits/self-awareness;awareness of need for assistance;sequencing abilities;problem-solving;impulsivity  -DN     Impairments Affecting Function (Mobility) balance;cognition;coordination;motor control;motor planning;muscle tone abnormal;pain;strength;range of motion (ROM);visual/perceptual  -DN     Row Name 05/10/23 1556          Motor Skills    Motor Control/Coordination Interventions weight-bearing  activities  pt working on gross grasp and placement of cones on table from place to place with min a of LUE, yasmany bearing and AAROM/PROM completed prior to activity on mat  -DN     Row Name 05/10/23 1556          Balance    Dynamic Sitting Balance standby assist  pt participated on trunk stretching and WB activities seated on mat to help reduce tone and pain in R side of body.  Pt gestured mild improvement in pain and tone reduced as seen by therapist in both RUE and LE to allow for some gross motor movements  -DN     Row Name 05/10/23 1556          Positioning and Restraints    Pre-Treatment Position sitting in chair/recliner  -DN     Post Treatment Position wheelchair  -DN     In Chair sitting;call light within reach;encouraged to call for assist  -DN           User Key  (r) = Recorded By, (t) = Taken By, (c) = Cosigned By    Initials Name Effective Dates    Alek Reyes OT 06/16/21 -                  Occupational Therapy Education     Title: PT OT SLP Therapies (In Progress)     Topic: Occupational Therapy (In Progress)     Point: ADL training (In Progress)     Description:   Instruct learner(s) on proper safety adaptation and remediation techniques during self care or transfers.   Instruct in proper use of assistive devices.              Learning Progress Summary           Patient Acceptance, E,D, NR by DN at 5/9/2023 1710    Comment: Ot role and karen adls and functional transfers                   Point: Home exercise program (In Progress)     Description:   Instruct learner(s) on appropriate technique for monitoring, assisting and/or progressing therapeutic exercises/activities.              Learning Progress Summary           Patient Acceptance, E,D, NR by DN at 5/9/2023 1710    Comment: Ot role and karen adls and functional transfers                   Point: Precautions (In Progress)     Description:   Instruct learner(s) on prescribed precautions during self-care and functional transfers.               Learning Progress Summary           Patient Acceptance, E,D, NR by DN at 5/9/2023 1710    Comment: Ot role and karen adls and functional transfers                   Point: Body mechanics (In Progress)     Description:   Instruct learner(s) on proper positioning and spine alignment during self-care, functional mobility activities and/or exercises.              Learning Progress Summary           Patient Acceptance, E,D, NR by DN at 5/9/2023 1710    Comment: Ot role and karen adls and functional transfers                               User Key     Initials Effective Dates Name Provider Type Discipline    DN 06/16/21 -  Alek Duarte OT Occupational Therapist OT                    OT Recommendation and Plan    Planned Therapy Interventions (OT): BADL retraining, cognitive/visual perception retraining, functional balance retraining, neuromuscular control/coordination retraining, passive ROM/stretching, patient/caregiver education/training, ROM/therapeutic exercise, strengthening exercise, transfer/mobility retraining                    Time Calculation:      Time Calculation- OT     Row Name 05/10/23 1400 05/10/23 1100          Time Calculation- OT    OT Start Time 1400  -DN 1100  -DN     OT Stop Time 1430  -DN 1130  -DN     OT Time Calculation (min) 30 min  -DN 30 min  -DN           User Key  (r) = Recorded By, (t) = Taken By, (c) = Cosigned By    Initials Name Provider Type    Alek Reyes OT Occupational Therapist              Therapy Charges for Today     Code Description Service Date Service Provider Modifiers Qty    67367950656 HC OT EVAL MOD COMPLEXITY 3 5/9/2023 Alek Duarte OT GO 1    45957564821 HC OT SELF CARE/MGMT/TRAIN EA 15 MIN 5/9/2023 Alek Duarte OT GO 3    46957861542 HC OT SELF CARE/MGMT/TRAIN EA 15 MIN 5/10/2023 Alek Duarte OT GO 1    26018516593 HC OT THER PROC EA 15 MIN 5/10/2023 Alek Duarte OT GO 3                   Alek Duarte OT  5/10/2023

## 2023-05-10 NOTE — THERAPY EVALUATION
Inpatient Rehabilitation - Speech Language Pathology   Swallow Initial Evaluation [facility]     Patient Name: Manuel Durant  : 1950  MRN: [id number]  Today's Date: 5/10/2023               Admit Date: 2023    Visit Dx:   No diagnosis found.  Patient Active Problem List   Diagnosis   • Intracranial hemorrhage   • Acute DVT (deep venous thrombosis)   • HTN (hypertension)   • Severe Malnutrition (HCC)   • Hemiplegia   • ICH (intracerebral hemorrhage)   • Stroke     Past Medical History:   Diagnosis Date   • Hypertension    • Hypokalemia 2023   • Stroke      No past surgical history on file.    SLP Recommendation and Plan  SLP Swallowing Diagnosis: mild, oral dysphagia, pharyngeal dysphagia (05/10/23 0830)  SLP Diet Recommendation: soft to chew textures, chopped, no mixed consistencies, nectar thick liquids, water between meals after oral care, with supervision (05/10/23 0830)  Recommended Precautions and Strategies: upright posture during/after eating, small bites of food and sips of liquid, assist with feeding (05/10/23 0830)  SLP Rec. for Method of Medication Administration: meds whole, with puree, with thick liquids, as tolerated (05/10/23 0830)     Monitor for Signs of Aspiration: yes, notify SLP if any concerns (05/10/23 0830)  Recommended Diagnostics: reassess via clinical swallow evaluation (05/10/23 0830)  Swallow Criteria for Skilled Therapeutic Interventions Met: demonstrates skilled criteria (05/10/23 0830)  Anticipated Discharge Disposition (SLP): home with OP services (05/10/23 0830)  Rehab Potential/Prognosis, Swallowing: good, to achieve stated therapy goals (05/10/23 0830)  Therapy Frequency (Swallow): 5 days per week (05/10/23 0830)  Predicted Duration Therapy Intervention (Days): until discharge (05/10/23 0830)                                        Plan of Care Reviewed With: patient      SWALLOW EVALUATION (last 72 hours)     SLP Adult Swallow Evaluation     Row Name 05/10/23  0830                   Rehab Evaluation    Document Type evaluation  -AL        Subjective Information no complaints  -AL        Patient Observations alert;cooperative  -AL        Patient/Family/Caregiver Comments/Observations Used  via Ipad video.  -AL        Patient Effort good  -AL        Symptoms Noted During/After Treatment none  -AL           General Information    Patient Profile Reviewed yes  -AL        Pertinent History Of Current Problem Pt is a 73-year-old male admitted to inpatient rehab under diagnosis L thalamic and basal ganglia hemorrhage with extension to L lateral ventricle with onset date 3/12/23. His primary language is Amharic; however, he speaks some basic conversational English. Last VFSS completed at [facility] 4/26/23 showed: Patient demonstrates mild oropharyngeal dysphagia characterized by mistiming and premature spillage with thins to pyriforms with deep silent penetration with thin liquids during the swallow and suspected trace silent aspiration x1. Patient impulsive and unable to follow directions for one sip at a time (IPAD  used). With NTL patient demonstrated either no penetration or  transient penetration during the swallow. No penetration with puree and  trace penetration with mechanical soft mixed consistency at times. No significant pharyngeal residue. Recommended soft (chopped meats) with NTL; hydration protocol.  -AL        Current Method of Nutrition soft to chew textures;chopped;no mixed consistencies;nectar/syrup-thick liquids;water between meals  -AL        Precautions/Limitations, Vision corrective lenses needed for reading;neglect, right  -AL        Precautions/Limitations, Hearing WFL;for purposes of eval  -AL        Prior Level of Function-Communication English as a second language;receptive language impairment;expressive language impairment  -AL        Prior Level of Function-Swallowing safe, efficient swallowing in all situations  -AL        Plans/Goals  Discussed with patient;agreed upon  -AL        Barriers to Rehab none identified  -AL        Patient's Goals for Discharge return to regular diet  -AL           Pain Scale: Numbers Pre/Post-Treatment    Pre/Posttreatment Pain Comment Pt c/o right arm/shoulder pain. He did not rate pain.  -AL           Oral Motor Structure and Function    Dentition Assessment natural, present and adequate  Missing some teeth  -AL        Secretion Management WNL/WFL  -AL        Mucosal Quality moist, healthy  -AL           Oral Musculature and Cranial Nerve Assessment    Oral Motor, Comment Facial symmetry upon smiling/puckering appeared symmetrical, tongue at midline. WFL tongue lateralization. Suspect mild right facial weakness and reduced sensation due to intermittent mild anterior loss of food/drink.  -AL           General Eating/Swallowing Observations    Respiratory Support Currently in Use room air  -AL        Eating/Swallowing Skills self-fed  -AL        Positioning During Eating upright in chair  -AL        Utensils Used spoon;cup;straw  -AL        Consistencies Trialed regular textures;soft to chew textures;pureed;ice chips;thin liquids;nectar/syrup-thick liquids  -AL           Clinical Swallow Eval    Clinical Swallow Evaluation Summary Clinical Swallow Evaluation completed. Pt exhibited adequate mastication of solids, with intermittent anterior loss on the right with solids (x2) and liquids (x1). He reported he could not feel residue on right side of mouth. No pocketing observed. Pt exhibited cough in 1/3 trials of thins by tsp, delayed throat clear in 1/4 trials of thins by cup, strong cough in 2/3 trials of thins by straw and delayed cough in 1/3 trials of large bites of mixed. No overt s/s of aspiration or penetration observed with single ice chip, NTL by cup, puree, soft solid (drained peaches) and regular. Mild impulsivity noted when taking drinks. Recommend continue soft (chopped meats)/no mixed with NTL. Hydration  protocol by cup 30 minutes after meals with staff supervision for small sips. Meds one at a time with applesauce or NTL.  -AL           SLP Evaluation Clinical Impression    SLP Swallowing Diagnosis mild;oral dysphagia;pharyngeal dysphagia  -AL        Functional Impact risk of aspiration/pneumonia;risk of malnutrition;risk of dehydration  -AL        Rehab Potential/Prognosis, Swallowing good, to achieve stated therapy goals  -AL        Swallow Criteria for Skilled Therapeutic Interventions Met demonstrates skilled criteria  -AL           Recommendations    Therapy Frequency (Swallow) 5 days per week  -AL        Predicted Duration Therapy Intervention (Days) until discharge  -AL        SLP Diet Recommendation soft to chew textures;chopped;no mixed consistencies;nectar thick liquids;water between meals after oral care, with supervision  -AL        Recommended Diagnostics reassess via clinical swallow evaluation  -AL        Recommended Precautions and Strategies upright posture during/after eating;small bites of food and sips of liquid;assist with feeding  -AL        Oral Care Recommendations Oral Care BID/PRN;Before ice/water  -AL        SLP Rec. for Method of Medication Administration meds whole;with puree;with thick liquids;as tolerated  -AL        Monitor for Signs of Aspiration yes;notify SLP if any concerns  -AL        Anticipated Discharge Disposition (SLP) home with OP services  -AL           Swallow Goals (SLP)    Swallow STGs diet tolerance goal selection (SLP)  -AL        Diet Tolerance Goal Selection (SLP) Swallow Short Term Goal 1  -AL           (LTG) Patient will demonstrate functional swallow for    Diet Texture (Demonstrate functional swallow) regular textures  -AL        Liquid viscosity (Demonstrate functional swallow) thin liquids  -AL        [place] (Demonstrate functional swallow) independently (over 90% accuracy)  -AL        Time Frame (Demonstrate functional swallow) by discharge  -AL            (STG) Swallow 1    (STG) Swallow 1 Pt will exihibit no overt s/s of aspiration or penetration with trials of water by cup  -AL        [place] (Swallow Short Term Goal 1) with minimal cues (75-90% accuracy)  -AL        Time Frame (Swallow Short Term Goal 1) 1 week  -AL           (STG) Pharyngeal Strengthening Exercise Goal 1 (SLP)    Activity (Pharyngeal Strengthening Goal 1, SLP) increase timing;increase superior movement of the hyolaryngeal complex;increase anterior movement of the hyolaryngeal complex  -AL        Increase Timing hard effortful swallow  -AL        Increase Superior Movement of the Hyolaryngeal Complex Mendelsohn;hard effortful swallow  -AL        Increase Anterior Movement of the Hyolaryngeal Complex EMST  -AL        [place]/Accuracy (Pharyngeal Strengthening Goal 1, SLP) with minimal cues (75-90% accuracy)  -AL        Progress/Outcomes (Pharyngeal Strengthening Goal 1, SLP) new goal  -AL              User Key  (r) = Recorded By, (t) = Taken By, (c) = Cosigned By    Initials Name Effective Dates    Nurys Beavers MS CCC-SLP 06/16/21 -                 EDUCATION  The patient has been educated in the following areas:   Dysphagia (Swallowing Impairment).        SLP GOALS     Row Name 05/10/23 0830 05/09/23 1000          (LTG) Patient will demonstrate functional swallow for    Diet Texture (Demonstrate functional swallow) regular textures  -AL --     Liquid viscosity (Demonstrate functional swallow) thin liquids  -AL --     [place] (Demonstrate functional swallow) independently (over 90% accuracy)  -AL --     Time Frame (Demonstrate functional swallow) by discharge  -AL --        (STG) Swallow 1    (STG) Swallow 1 Pt will exihibit no overt s/s of aspiration or penetration with trials of water by cup  -AL --     [place] (Swallow Short Term Goal 1) with minimal cues (75-90% accuracy)  -AL --     Time Frame (Swallow Short Term Goal 1) 1 week  -AL --        (STG) Pharyngeal  Strengthening Exercise Goal 1 (SLP)    Activity (Pharyngeal Strengthening Goal 1, SLP) increase timing;increase superior movement of the hyolaryngeal complex;increase anterior movement of the hyolaryngeal complex  -AL --     Increase Timing hard effortful swallow  -AL --     Increase Superior Movement of the Hyolaryngeal Complex Mendelsohn;hard effortful swallow  -AL --     Increase Anterior Movement of the Hyolaryngeal Complex EMST  -AL --     [place]/Accuracy (Pharyngeal Strengthening Goal 1, SLP) with minimal cues (75-90% accuracy)  -AL --     Progress/Outcomes (Pharyngeal Strengthening Goal 1, SLP) new goal  -AL --        Patient will demonstrate functional language skills for return to discharge environment     [place] -- with minimal cues  -AL     Time frame -- by discharge  -AL     Progress/Outcomes -- new goal  -AL        Patient will demonstrate functional cognitive-linguistic skills for return to discharge environment    [place] -- with moderate cues  -AL     Time frame -- by discharge  -AL        Comprehend Questions Goal 1 (SLP)    Improve Ability to Comprehend Questions Goal 1 (SLP) -- questions about personal information;simple yes/no questions;80%;independently (over 90% accuracy)  -AL     Time Frame (Comprehend Questions Goal 1, SLP) -- 1 week  -AL        Word Retrieval Skills Goal 1 (SLP)    Improve Word Retrieval Skills By Goal 1 (SLP) -- completing functional word finding tasks;completing a divergent task;80%;independently (over 90% accuracy)  -AL     Time Frame (Word Retrieval Goal 1, SLP) -- 1 week  -AL        Connected Speech to Express Thoughts Goal 1 (SLP)    Improve Narrative Discourse to Express Thoughts By Goal 1 (SLP) -- describing a picture;conversational task on a given topic;80%;with minimal cues (75-90%)  -AL     Time Frame (Connected Speech Goal 1, SLP) -- 1 week  -AL        Attention Goal 1 (SLP)    Improve Attention by Goal 1 (SLP) -- attending to task;complete  sustained attention task;80%;with minimal cues (75-90%)  -AL     Time Frame (Attention Goal 1, SLP) -- 1 week  -AL        Attention Goal (SLP)    Improve Attention by Goal (SLP) -- Pt will complete basic attention to detail tasks with MIN cues for attention and scanning to the right.  -AL     Time Frame (Attention Goal, SLP) -- 1 week  -AL        Memory Skills Goal 1 (SLP)    Improve Memory Skills Through Goal 1 (SLP) -- recall details of the day;80%;with moderate cues (50-74%)  -AL     Time Frame (Memory Skills Goal 1, SLP) -- 1 week  -AL        Functional Problem Solving Skills Goal 1 (SLP)    Improve Problem Solving Through Goal 1 (SLP) -- determine solutions to simple ADL/safety problems;name items for a task;80%;independently (over 90% accuracy)  -AL     Time Frame (Problem Solving Goal 1, SLP) -- 1 week  -AL           User Key  (r) = Recorded By, (t) = Taken By, (c) = Cosigned By    Initials Name Provider Type    Nuyrs Beavers MS CCC-SLP Speech and Language Pathologist                   Time Calculation:    Time Calculation- SLP     Row Name 05/10/23 1155             Time Calculation- SLP    SLP Start Time 0830  -AL      SLP Stop Time 0900  -AL      SLP Time Calculation (min) 30 min  -AL            User Key  (r) = Recorded By, (t) = Taken By, (c) = Cosigned By    Initials Name Provider Type    Nurys Beavers MS CCC-SLP Speech and Language Pathologist                Therapy Charges for Today     Code Description Service Date Service Provider Modifiers Qty    44028203330 HC ST STD COG PERF TEST PER HOUR 5/9/2023 Nurys Dixon MS CCC-SLP GN 2    [id number] HC ST EVAL ORAL PHARYNG SWALLOW 2 5/10/2023 Nurys Dixon MS CCC-SLP GN 1               MS DEEPAK De Santiago  5/10/2023

## 2023-05-10 NOTE — PROGRESS NOTES
Inpatient Rehabilitation Plan of Care Note    Plan of Care  Care Plan Reviewed - No updates at this time.    Safety    Performed Intervention(s)  falls precautions  bed/chair alarm  safety rounds      Psychosocial    Performed Intervention(s)  Verbalizes needs and concerns  Therapeutic environmental set up      Sphincter Control    Performed Intervention(s)  Bowel/bladder training  Encourage fluid intake  Scheduled toileting    Signed by: Marichuy Murguia RN

## 2023-05-10 NOTE — PLAN OF CARE
Problem: Rehabilitation (IR[facility]) Plan of Care  Goal: Plan of Care Review  Outcome: Ongoing, Progressing  Flowsheets (Taken 5/10/2023 0105)  Progress: improving  Plan of Care Reviewed With: patient  Outcome Evaluation: Pt is A&Ox4, using  pad in room, no c/o pain, removed lidoderm patch from R shoulder, pt did c/o gas, however did not want anything for it, meds whole w/ NTL, up w/ asst x1-2, cont of B/B, BM 5/9, R karen, resting well tonight, will continue to monitor.   Goal Outcome Evaluation:  Plan of Care Reviewed With: patient        Progress: improving  Outcome Evaluation: Pt is A&Ox4, using  pad in room, no c/o pain, removed lidoderm patch from R shoulder, pt did c/o gas, however did not want anything for it, meds whole w/ NTL, up w/ asst x1-2, cont of B/B, BM 5/9, R karen, resting well tonight, will continue to monitor.

## 2023-05-10 NOTE — PROGRESS NOTES
SECTION GG    Self Care Performance:   Oral Hygiene: Doyle does less than half the effort. Doyle lifts, holds or  supports trunk or limbs but provides less than half the effort.   Toileting Hygiene: Doyle does more than half the effort. Doyle lifts or holds  trunk or limbs and provides more than half the effort.   Shower/Bathe Self: Doyle does less than half the effort. Doyle lifts, holds  or supports trunk or limbs but provides less than half the effort.   Upper Body Dressing: Doyle does less than half the effort. Doyle lifts, holds  or supports trunk or limbs but provides less than half the effort.   Lower Body Dressing: Doyle does more than half the effort. Doyle lifts or  holds trunk or limbs and provides more than half the effort.   Putting On/Taking Off Footwear: Doyle does all of the effort. Patient does  none of the effort to complete the activity. Or, the assistance of 2 or more  helpers is required for the patient to complete the activity.    Self Care Discharge Goals:   Oral Hygiene: Doyle provides verbal cues or touching/steadying assistance as  patient completes activity.   Toileting Hygiene: Doyle does less than half the effort. Doyle lifts, holds  or supports trunk or limbs but provides less than half the effort.   Shower/Bathe Self: Doyle does less than half the effort. Doyle lifts, holds  or supports trunk or limbs but provides less than half the effort.   Upper Body Dressing: Doyle provides verbal cues or touching/steadying  assistance as patient completes activity.   Lower Body Dressing: Doyle does less than half the effort. Doyle lifts, holds  or supports trunk or limbs but provides less than half the effort.   Putting On/Taking Off Footwear: Doyle does less than half the effort. Doyle  lifts, holds or supports trunk or limbs but provides less than half the effort.    Mobility Toilet Transfer Performance: Doyle does more than half the effort.  Doyle lifts or holds trunk or  limbs and provides more than half the effort.    Mobility Toilet Transfer Discharge Goal: [place] provides verbal cues or  touching/steadying assistance as patient completes activity.    Signed by: Alek Duarte OTR/BART

## 2023-05-10 NOTE — PROGRESS NOTES
Inpatient Rehabilitation Plan of Care Note    Plan of Care  Care Plan Reviewed - No updates at this time.    Safety    Performed Intervention(s)  falls precautions  bed/chair alarm  safety rounds      Psychosocial    Performed Intervention(s)  Verbalizes needs and concerns  Therapeutic environmental set up      Sphincter Control    Performed Intervention(s)  Bowel/bladder training  Encourage fluid intake  Scheduled toileting    Signed by: Joaquín Ayala RN

## 2023-05-11 PROCEDURE — 97130 THER IVNTJ EA ADDL 15 MIN: CPT

## 2023-05-11 PROCEDURE — 97535 SELF CARE MNGMENT TRAINING: CPT

## 2023-05-11 PROCEDURE — 92526 ORAL FUNCTION THERAPY: CPT

## 2023-05-11 PROCEDURE — 97110 THERAPEUTIC EXERCISES: CPT

## 2023-05-11 PROCEDURE — 97129 THER IVNTJ 1ST 15 MIN: CPT

## 2023-05-11 PROCEDURE — 97112 NEUROMUSCULAR REEDUCATION: CPT

## 2023-05-11 RX ORDER — GABAPENTIN 100 MG/1
100 CAPSULE ORAL 3 TIMES DAILY
Status: DISCONTINUED | OUTPATIENT
Start: 2023-05-11 | End: 2023-05-21 | Stop reason: HOSPADM

## 2023-05-11 RX ADMIN — GABAPENTIN 100 MG: 100 CAPSULE ORAL at 21:47

## 2023-05-11 RX ADMIN — ACETAMINOPHEN 650 MG: 325 TABLET, FILM COATED ORAL at 08:08

## 2023-05-11 RX ADMIN — APIXABAN 5 MG: 5 TABLET, FILM COATED ORAL at 05:57

## 2023-05-11 RX ADMIN — BACLOFEN 5 MG: 10 TABLET ORAL at 05:57

## 2023-05-11 RX ADMIN — LIDOCAINE 1 PATCH: 50 PATCH CUTANEOUS at 08:08

## 2023-05-11 RX ADMIN — BACLOFEN 5 MG: 10 TABLET ORAL at 21:47

## 2023-05-11 RX ADMIN — AMLODIPINE BESYLATE 10 MG: 10 TABLET ORAL at 09:27

## 2023-05-11 RX ADMIN — VALSARTAN 160 MG: 160 TABLET, FILM COATED ORAL at 09:27

## 2023-05-11 RX ADMIN — ACETAMINOPHEN 650 MG: 325 TABLET, FILM COATED ORAL at 21:47

## 2023-05-11 RX ADMIN — GABAPENTIN 100 MG: 100 CAPSULE ORAL at 15:27

## 2023-05-11 RX ADMIN — APIXABAN 5 MG: 5 TABLET, FILM COATED ORAL at 17:30

## 2023-05-11 RX ADMIN — ATORVASTATIN CALCIUM 40 MG: 20 TABLET, FILM COATED ORAL at 08:07

## 2023-05-11 RX ADMIN — CARVEDILOL 3.12 MG: 3.12 TABLET, FILM COATED ORAL at 08:07

## 2023-05-11 RX ADMIN — CARVEDILOL 3.12 MG: 3.12 TABLET, FILM COATED ORAL at 17:30

## 2023-05-11 RX ADMIN — GABAPENTIN 100 MG: 100 CAPSULE ORAL at 09:27

## 2023-05-11 RX ADMIN — BACLOFEN 5 MG: 10 TABLET ORAL at 15:27

## 2023-05-11 NOTE — PROGRESS NOTES
Inpatient Rehabilitation Plan of Care Note    Plan of Care  Care Plan Reviewed - No updates at this time.    Safety    [RN] Potential for Injury(Active)  Current Status(05/11/2023): Risk for falls r'/t R side weakness  Weekly Goal(05/15/2023): Cue to use call bell  Discharge Goal: Patient will be aware of risk of fall and safety in the home  setting    Performed Intervention(s)  falls precautions  bed/chair alarm  safety rounds      Psychosocial    [RN] Coping/Adjustment(Active)  Current Status(05/11/2023): Expresses appropriate coping  Weekly Goal(05/15/2023): Educate patient regarding stroke and hypertention  Discharge Goal: Knowledgeable of care needs and stroke recovery    Performed Intervention(s)  Verbalizes needs and concerns  Therapeutic environmental set up      Sphincter Control    [RN] Bladder Management(Active)  Current Status(05/11/2023): Continent/incontinent of bladder at times  Weekly Goal(05/15/2023): Continent 75% of time  Discharge Goal: Continent 100% of time    [RN] Bowel Management(Active)  Current Status(05/11/2023): Continent/Incontinent at times of bowel  Weekly Goal(05/15/2023): Continent of bowel 75%  Discharge Goal: Continent of bowel 100%    Performed Intervention(s)  Bowel/bladder training  Encourage fluid intake  Scheduled toileting    Signed by: Anabela Taveras RN

## 2023-05-11 NOTE — PROGRESS NOTES
TEAM CONF - MAY 11 - SPASTICITY RIGHT SIDE AND RIGHT SHOULDER PAIN - INCREASED BACLOFEN. WORKING ON STRETCHES AND WEIGHT BEARING.  BED MOD. TRANSFERS MOD. GAIT 8 FEET PRIYANKA-BARS MOD. TOILET TRANSFERS MOD MAX. SHOWER TRANSFERS MOD. BATH MOD. LBD MAX. UBD MOD.  TOILETING MAX ASSIST. DYSPHAGIA - continue soft (chopped meats)/no mixed with NTL. Hydration protocol by cup 30 minutes after meals with staff supervision for small sips. Meds one at a time with applesauce or NTL. Pt with moderate-severe inattention to the right.Moderate difficulty comprehending personal and simple yes/no questions on evaluatoin. On 5/10, answered simple yes/no questions in [place]an with 90% accuracy. Difficulty following sequential commands. Mild-moderate word finding difficulty during conversation and picture description.  MODERATE RECEPTIVE AND EXPRESSIVE APHASIA. SOME EPISODES OF INCONTINENCE.   [facility] - Sunday AM MAY 21 FLIGHT TO [facility]. FAMILY COMING Saturday MAY 20 FOR TEACHING. ARRANGING FOLLOW UP IN [facility] - NEEDS NEUROSURGERY FOLLOW UP. ARRANGING REHAB FOLLOW UP THROUGH NEUROREHAB DIRECTOR AT [facility]

## 2023-05-11 NOTE — THERAPY TREATMENT NOTE
Inpatient Rehabilitation - Speech Language Pathology Treatment Note    [facility]     Patient Name: Manuel Durant  : 1950  MRN: [id number]    Today's Date: 2023                   Admit Date: 2023       Visit Dx:    No diagnosis found.    Patient Active Problem List   Diagnosis   • Intracranial hemorrhage   • Acute DVT (deep venous thrombosis)   • HTN (hypertension)   • Severe Malnutrition (HCC)   • Hemiplegia   • ICH (intracerebral hemorrhage)   • Stroke       Past Medical History:   Diagnosis Date   • Hypertension    • Hypokalemia 2023   • Stroke        No past surgical history on file.    SLP Recommendation and Plan                                                            SLP EVALUATION (last 72 hours)     SLP SLC Evaluation     Row Name 23 1330 23 0830 05/10/23 1330 23 1000          Communication Assessment/Intervention    Document Type therapy note (daily note)  -AL therapy note (daily note)  -AL therapy note (daily note)  -AL evaluation  -AL     Patient Observations -- -- -- alert;cooperative  -AL     Patient/Family/Caregiver Comments/Observations Pt participated well. Used  via Ipad video.  -AL Pt participated well. Used  via Ipad.  -AL Pt participated well. Used  via Ipad video.  -AL Used  via Ipad.  -AL     Patient Effort -- -- -- good  -AL     Symptoms Noted During/After Treatment -- -- -- none  -AL        General Information    Patient Profile Reviewed -- -- -- yes  -AL     Pertinent History Of Current Problem -- -- -- Pt is a 73-year-old male admitted to inpatient rehab under diagnosis L thalamic and basal ganglia hemorrhage with extension to L lateral ventricle with onset date 3/12/23. His primary language is Irish; however, he speaks some basic conversational English.  -AL     Precautions/Limitations, Vision -- -- -- corrective lenses needed for reading;neglect, right  -AL      Precautions/Limitations, Hearing -- -- -- WFL;for purposes of eval  -AL     Patient Level of Education -- -- -- Unknown. Pt was working at [employer] (formerly [employer]) prior to hospitalization.  -AL     Prior Level of Function-Communication -- -- -- WFL  -AL     Plans/Goals Discussed with -- -- -- patient;agreed upon  -AL     Barriers to Rehab -- -- -- none identified  -AL     Patient's Goals for Discharge -- -- -- functional communication;functional cognition  -AL     Standardized Assessment Used -- -- -- CLQT  -AL        Pain Scale: Numbers Pre/Post-Treatment    Pretreatment Pain Rating -- -- 0/10 - no pain  -AL 0/10 - no pain  -AL     Pain Location - Side/Orientation Right  -AL -- -- --     Pain Location upper  -AL -- -- --     Pain Location - shoulder  -AL -- -- --     Pre/Posttreatment Pain Comment Pt did not rate pain.  -AL right shoulder pain; pt did not rate  -AL -- RIght shoulder pain in afternoon session. Pt did not rate.  -AL        Auditory Comprehension Assessment/Intervention    Auditory Comprehension (Communication) -- -- -- moderate impairment  -AL     Auditory Comprehension Communication, Comment -- -- -- Answeirng personal yes/no questions in Greenlandic: 50% with NO cues; yes bias noted. Answering simple yes/no questions: 70% accuracy. Plan to further assess.  -AL        Verbal Expression Assessment/Intervention    Verbal Expression -- -- -- --  mild-moderate impairment  -AL     Verbal Expression, Comment -- -- -- Pt able to name 10/10 common objects. In picture description from W[facility], pt with fluent output, but incomplete description of picture due to left neglect.  reported difficulty understanding pt x1 due to word finding difficulty; suspect paraphasia.  -AL        Motor Speech Assessment/Intervention    Motor Speech, Comment -- -- --  reports pt's speech is not slurred. Unable to complete full motor speech evaluation due to time contraints.  -AL        Cognitive Assessment Intervention- SLP     Cognition, Comment -- -- -- BIMS: Pt repeated 3 unrelated words with one repetition. He was oriented to month and SARAH with NO cues; not oriented to loida (stated 1961). He recalled 2/3 unrelated words after 2 minute delay; unable to recall 3rd word.  -AL        Standardized Tests    Cognitive/Memory Tests -- -- -- CLQT: Cognitive Linguistic Quick Test  -AL        CLQT (The Cognitive Linguistic Quick Test)    Attention Domain Score -- -- -- 35  -AL     Attention Severity Rating -- -- -- 1: Severe  -AL     Memory Domain Score -- -- -- 59  -AL     Memory Severity Rating -- -- -- 1: Severe  -AL     Executive Function Domain Score -- -- -- 7  -AL     Executive Function Severity Rating -- -- -- 1: Severe  -AL     Language Domain Score -- -- -- 15  -AL     Language Severity Rating -- -- -- 1: Severe  -AL     Visuospatial Domain Score -- -- -- 29  -AL     Visuospatial Severity Rating -- -- -- 2: Moderate  -AL     Clock Drawing Total Score -- -- -- 0  Pt unable to write numbers legibly with left hand (nondominant). He wrote numbers on left side of clock, then wrote same numbers on right side of clock.  -AL     Clock Drawing Severity Rating -- -- -- Severe  -AL     Composite Severity Rating -- -- -- 1.2  -AL     Composite Severity Rating Range -- -- -- 1.4 - 1.0: Severe  -AL     CLQT Comments -- -- -- Pt presented with an overall severe cognitive-communication deficit, as well as fluent aphasia, characterized by mild-moderately impaired expressive language and moderately impaired receptive language. Evaluation was administered with use of  via video. Pt scored moderately impaired in the domain of visuospatial skills and severely impaired in the domains of attention, memory, executive functions, and language. Pt exhibited moderate inattention to the right; suspect pt also with right visual field cut and double vision, as he moved his head to the right and covered one eye intermittently during evaluation.  "Suspect aphasia impacted scores on memory tasks. Pt was able to recall his  and location of his birth, but had difficulty recalling age (stated 85) and his address. He as oriented to month and SARAH, but not year (stated \"\"), city, state or situation (stated he had \"spine surgery\"). He scored below the criterion cut-off for his age on stating personal facts (4/8), symbol cancellation (1/10), clock drawing (0/13), story retelling (0/10), symbol trails (1/10), generative naming (1), design memory (3/6) and design generation (). Decreased comprehension and word finding noted on story retell task. Confrontation naming of common objects appeared WNL. During additional language testing, pt with moderately impaired comprehension of personal and simple yes/no questions, with \"yes\" bias noted. Picture description revealed fluent output with incomplete information and word finding difficulty x1. Recommend further assessment of language skills. Also, plan to complete Clinical Swallow Evaluation on next date. Pt has been tolerating soft (chopped meats)/no mixed consistencies with NTL.  -AL        SLP Evaluation Clinical Impressions    SLP Diagnosis -- -- -- severe;cognitive-linguistic disorder;moderate;communication disorder  -AL     Rehab Potential/Prognosis -- -- -- good  -AL     [facility] Criteria for Skilled Therapy Interventions Met -- -- -- yes  -AL     Functional Impact -- -- -- needs 24 hour supervision;difficulty in expressing complex messages;decreased ability to respond to situations safely;difficulty completing home management task  -AL        Recommendations    Therapy Frequency (SLP [facility]) -- -- -- 2 times/day;5 days per week  -AL     Predicted Duration Therapy Intervention (Days) -- -- -- until discharge  -AL     Anticipated Discharge Disposition (SLP) -- -- -- home with OP services  -AL        Communication Treatment Objective and Progress Goals (SLP)    [facility] LTGs -- -- -- Patient will demonstrate functional " cognitive-linguistic skills for return to discharge environment  -AL     Cognitive Linguistic Treatment Objectives -- -- -- Cognitive Linguistic Treatment Objectives (Group)  -AL        Patient will demonstrate functional language skills for return to discharge environment     [place] -- -- -- with minimal cues  -AL     Time frame -- -- -- by discharge  -AL     Progress/Outcomes -- -- -- new goal  -AL        Patient will demonstrate functional cognitive-linguistic skills for return to discharge environment    [place] -- -- -- with moderate cues  -AL     Time frame -- -- -- by discharge  -AL        Comprehend Questions Goal 1 (SLP)    Improve Ability to Comprehend Questions Goal 1 (SLP) -- -- -- questions about personal information;simple yes/no questions;80%;independently (over 90% accuracy)  -AL     Time Frame (Comprehend Questions Goal 1, SLP) -- -- -- 1 week  -AL        Verbal Expression Treatment Objectives    Connected Speech Selection -- -- -- connected speech, SLP goal 1  -AL        Word Retrieval Skills Goal 1 (SLP)    Improve Word Retrieval Skills By Goal 1 (SLP) -- -- -- completing functional word finding tasks;completing a divergent task;80%;independently (over 90% accuracy)  -AL     Time Frame (Word Retrieval Goal 1, SLP) -- -- -- 1 week  -AL        Connected Speech to Express Thoughts Goal 1 (SLP)    Improve Narrative Discourse to Express Thoughts By Goal 1 (SLP) -- -- -- describing a picture;conversational task on a given topic;80%;with minimal cues (75-90%)  -AL     Time Frame (Connected Speech Goal 1, SLP) -- -- -- 1 week  -AL        Cognitive Linguistic Treatment Objectives    Attention Selection -- -- -- attention, SLP goal 1;attention, SLP goal (free text)  -AL     Memory Skills Selection -- -- -- memory skills, SLP goal 1  -AL     Problem Solving Selection -- -- -- problem solving, SLP goal 1  -AL        Attention Goal 1 (SLP)    Improve Attention by Goal 1 (SLP) -- -- -- attending to  task;complete sustained attention task;80%;with minimal cues (75-90%)  -AL     Time Frame (Attention Goal 1, SLP) -- -- -- 1 week  -AL        Attention Goal (SLP)    Improve Attention by Goal (SLP) -- -- -- Pt will complete basic attention to detail tasks with MIN cues for attention and scanning to the right.  -AL     Time Frame (Attention Goal, SLP) -- -- -- 1 week  -AL        Memory Skills Goal 1 (SLP)    Improve Memory Skills Through Goal 1 (SLP) -- -- -- recall details of the day;80%;with moderate cues (50-74%)  -AL     Time Frame (Memory Skills Goal 1, SLP) -- -- -- 1 week  -AL        Functional Problem Solving Skills Goal 1 (SLP)    Improve Problem Solving Through Goal 1 (SLP) -- -- -- determine solutions to simple ADL/safety problems;name items for a task;80%;independently (over 90% accuracy)  -AL     Time Frame (Problem Solving Goal 1, SLP) -- -- -- 1 week  -AL           User Key  (r) = Recorded By, (t) = Taken By, (c) = Cosigned By    Initials Name Effective Dates    Nurys Beavers, MS CCC-SLP 06/16/21 -                    EDUCATION    The patient has been educated in the following areas:       Cognitive Impairment Communication Impairment Dysphagia (Swallowing Impairment).             SLP GOALS     Row Name 05/11/23 1330 05/11/23 0830 05/10/23 1330       (STG) Pharyngeal Strengthening Exercise Goal 1 (SLP)    Progress/Outcomes (Pharyngeal Strengthening Goal 1, SLP) good progress toward goal  -AL -- --    Comment (Pharyngeal Strengthening Goal 1, SLP) Pt completed 20 repetitions of effortful swallow with overall MOD-MAX cues. No overt s/s of aspiration or penetration observed in 11/13 trials of water by cup; immediate coughing x1, delayed cough x1.  -AL No overt s/s of aspiration or penetration observed wiht water by cup with initial cues for small, single sips.  -AL --       Comprehend Questions Goal 1 (SLP)    Improve Ability to Comprehend Questions Goal 1 (SLP) -- -- questions about personal  information;simple yes/no questions;80%;independently (over 90% accuracy)  -AL    Time Frame (Comprehend Questions Goal 1, SLP) -- -- 1 week  -AL    Progress/Outcomes (Comprehend Questions Goal 1, SLP) -- -- goal ongoing  -AL    Comment (Comprehend Questions Goal 1, SLP) -- -- simple and personal yes/no questions in Serbian: 90% with NO cues  -AL       Follow Directions Goal 2 (SLP)    Comment (Follow Directions Goal 1, [facility]) -- -- Diagnostic treatment: Pt followed 1-step and 2-step body part commands in Serbian with NO cues. Followed sequential commands in 1/4 trials with NO cues, 4/4 with demonstration and repetition.  -AL       Word Retrieval Skills Goal 1 (SLP)    Improve Word Retrieval Skills By Goal 1 (SLP) -- -- completing functional word finding tasks;completing a divergent task;80%;independently (over 90% accuracy)  -AL    Time Frame (Word Retrieval Goal 1, SLP) -- -- 1 week  -AL    Progress/Outcomes (Word Retrieval Goal 1, SLP) -- -- good progress toward goal  -AL    Comment (Word Retrieval Goal 1, SLP) -- -- Confrontation naming of pictures: 90% with NO cues, 100% with MIN cues; vision deficit impacted task; pt self-corrected a semantic paraphasia x1.  -AL       Connected Speech to Express Thoughts Goal 1 (SLP)    Improve Narrative Discourse to Express Thoughts By Goal 1 (SLP) -- describing a picture;conversational task on a given topic;80%;with minimal cues (75-90%)  -AL describing a picture;conversational task on a given topic;80%;with minimal cues (75-90%)  -AL    Time Frame (Connected Speech Goal 1, SLP) -- 1 week  -AL 1 week  -AL    Progress/Outcomes (Connected Speech Goal 1, SLP) -- good progress toward goal  -AL good progress toward goal  -AL    Comment (Connected Speech Goal 1, SLP) -- Sentence description of picture: 40% with NO cues, 80% with MIN cues, 100% with MAX cues. Pt able to order food for meals using  assistance with NO cues.  -AL Sentence description of pictures: 50% with NO  cues, 100% with MIN-MOD cues to provide additional details and for correct verbs.  -AL       Attention Goal (SLP)    Improve Attention by Goal (SLP) -- Pt will complete basic attention to detail tasks with MIN cues for attention and scanning to the right.  -AL --    Time Frame (Attention Goal, SLP) -- 1 week  -AL --    Comment (Attention Goal, SLP) -- Sorted cards into red vs. black for 2 minutes with NO cues. Pt exhibited difficulty identifying specific cards, suspect vision vs. aphasia impacting.  -AL --       Orientation Goal 1 (SLP)    Improve Orientation Through Goal 1 (SLP) -- demonstrating orientation to day;demonstrating orientation to month;demonstrating orientation to year;demonstrating orientation to place;demonstrating orientation to disease/impairment;80%;with minimal cues (75-90%)  -AL demonstrating orientation to day;demonstrating orientation to month;demonstrating orientation to year;demonstrating orientation to place;demonstrating orientation to disease/impairment;80%;with minimal cues (75-90%)  -AL    Time Frame (Orientation Goal 1, SLP) -- 2 weeks  -AL 2 weeks  -AL    Progress/Outcomes (Orientation Goal 1, SLP) good progress toward goal  -AL good progress toward goal  -AL good progress toward goal  -AL    Comment (Orientation Goal 1, SLP) Oriented to situation, city, state; required MAX cues for age, month and year. Able to recall  with NO cues.  -AL Oriented to situation, city and state. Required MAX cues for month, year and hospital.  -AL Oriented to situation, city, and state with NO cues. Required MAX cues for month and year.  -AL       Memory Skills Goal 1 (SLP)    Progress/Outcomes (Memory Skills Goal 1, SLP) -- good progress toward goal  -AL --    Comment (Memory Skills Goal 1, SLP) Recalled month and year after 5 minutes with NO cues.  -AL Recalled 2/2 salient piecies of info (year, month) after 5 minute delay x2. Recalled 2/2 hidden objects after 4 minutes with NO cues.  -AL --        Functional Problem Solving Skills Goal 1 (SLP)    Improve Problem Solving Through Goal 1 (SLP) determine solutions to simple ADL/safety problems;name items for a task;80%;independently (over 90% accuracy)  -AL -- --    Time Frame (Problem Solving Goal 1, SLP) 1 week  -AL -- --    Progress/Outcomes (Problem Solving Goal 1, SLP) good progress toward goal  -AL -- --    Comment (Problem Solving Goal 1, SLP) Stated 2 solutions to complex problems at home with 80% accuracy with NO cues, 100% with MIN cues to clarify his message.  -AL -- --    Row Name 05/10/23 0830 05/09/23 1000          (LTG) Patient will demonstrate functional swallow for    Diet Texture (Demonstrate functional swallow) regular textures  -AL --     Liquid viscosity (Demonstrate functional swallow) thin liquids  -AL --     [place] (Demonstrate functional swallow) independently (over 90% accuracy)  -AL --     Time Frame (Demonstrate functional swallow) by discharge  -AL --        (STG) Swallow 1    (STG) Swallow 1 Pt will exihibit no overt s/s of aspiration or penetration with trials of water by cup  -AL --     [place] (Swallow Short Term Goal 1) with minimal cues (75-90% accuracy)  -AL --     Time Frame (Swallow Short Term Goal 1) 1 week  -AL --        (STG) Pharyngeal Strengthening Exercise Goal 1 (SLP)    Activity (Pharyngeal Strengthening Goal 1, SLP) increase timing;increase superior movement of the hyolaryngeal complex;increase anterior movement of the hyolaryngeal complex  -AL --     Increase Timing hard effortful swallow  -AL --     Increase Superior Movement of the Hyolaryngeal Complex Mendelsohn;hard effortful swallow  -AL --     Increase Anterior Movement of the Hyolaryngeal Complex EMST  -AL --     [place]/Accuracy (Pharyngeal Strengthening Goal 1, SLP) with minimal cues (75-90% accuracy)  -AL --     Progress/Outcomes (Pharyngeal Strengthening Goal 1, SLP) new goal  -AL --        Patient will demonstrate functional language  skills for return to discharge environment     [place] -- with minimal cues  -AL     Time frame -- by discharge  -AL     Progress/Outcomes -- new goal  -AL        Patient will demonstrate functional cognitive-linguistic skills for return to discharge environment    [place] -- with moderate cues  -AL     Time frame -- by discharge  -AL        Comprehend Questions Goal 1 (SLP)    Improve Ability to Comprehend Questions Goal 1 (SLP) -- questions about personal information;simple yes/no questions;80%;independently (over 90% accuracy)  -AL     Time Frame (Comprehend Questions Goal 1, SLP) -- 1 week  -AL        Word Retrieval Skills Goal 1 (SLP)    Improve Word Retrieval Skills By Goal 1 (SLP) -- completing functional word finding tasks;completing a divergent task;80%;independently (over 90% accuracy)  -AL     Time Frame (Word Retrieval Goal 1, SLP) -- 1 week  -AL        Connected Speech to Express Thoughts Goal 1 (SLP)    Improve Narrative Discourse to Express Thoughts By Goal 1 (SLP) -- describing a picture;conversational task on a given topic;80%;with minimal cues (75-90%)  -AL     Time Frame (Connected Speech Goal 1, SLP) -- 1 week  -AL        Attention Goal 1 (SLP)    Improve Attention by Goal 1 (SLP) -- attending to task;complete sustained attention task;80%;with minimal cues (75-90%)  -AL     Time Frame (Attention Goal 1, SLP) -- 1 week  -AL        Attention Goal (SLP)    Improve Attention by Goal (SLP) -- Pt will complete basic attention to detail tasks with MIN cues for attention and scanning to the right.  -AL     Time Frame (Attention Goal, SLP) -- 1 week  -AL        Memory Skills Goal 1 (SLP)    Improve Memory Skills Through Goal 1 (SLP) -- recall details of the day;80%;with moderate cues (50-74%)  -AL     Time Frame (Memory Skills Goal 1, SLP) -- 1 week  -AL        Functional Problem Solving Skills Goal 1 (SLP)    Improve Problem Solving Through Goal 1 (SLP) -- determine solutions to simple  ADL/safety problems;name items for a task;80%;independently (over 90% accuracy)  -AL     Time Frame (Problem Solving Goal 1, SLP) -- 1 week  -AL           User Key  (r) = Recorded By, (t) = Taken By, (c) = Cosigned By    Initials Name Provider Type    Nurys Beavers MS CCC-SLP Speech and Language Pathologist                            Time Calculation:        Time Calculation- SLP     Row Name 05/11/23 1457 05/11/23 1254          Time Calculation- SLP    SLP Start Time 1330  -AL 0830  -AL     SLP Stop Time 1400  -AL 0900  -AL     SLP Time Calculation (min) 30 min  -AL 30 min  -AL           User Key  (r) = Recorded By, (t) = Taken By, (c) = Cosigned By    Initials Name Provider Type    Nurys Beavers MS CCC-SLP Speech and Language Pathologist                  Therapy Charges for Today     Code Description Service Date Service Provider Modifiers Qty    31150238436 HC ST EVAL ORAL PHARYNG SWALLOW 2 5/10/2023 Nurys Dixon MS CCC-SLP GN 1    49806823468 HC ST TREATMENT SPEECH 2 5/10/2023 Nurys Dixon MS CCC-SLP GN 1    92084206688 HC ST DEV OF COGN SKILLS INITIAL 15 MIN 5/11/2023 Nurys Dixon MS CCC-SLP  1    94680612986 HC ST DEV OF COGN SKILLS EACH ADDT'L 15 MIN 5/11/2023 Nurys Dixon MS CCC-SLP  1    80046638794 HC ST TREATMENT SWALLOW 2 5/11/2023 Nurys Dixon MS CCC-SLP GN 1                           Nurys Dixon MS CCC-SLP  5/11/2023

## 2023-05-11 NOTE — PROGRESS NOTES
Inpatient Rehabilitation Plan of Care Note    Plan of Care  Care Plan Reviewed - No updates at this time.    Safety    Performed Intervention(s)  falls precautions  bed/chair alarm  safety rounds      Psychosocial    Performed Intervention(s)  Verbalizes needs and concerns  Therapeutic environmental set up      Sphincter Control    Performed Intervention(s)  Bowel/bladder training  Encourage fluid intake  Scheduled toileting    Signed by: Lisset Bhatti RN

## 2023-05-11 NOTE — PLAN OF CARE
Goal Outcome Evaluation:  Plan of Care Reviewed With: patient             Problem: Rehabilitation ([facility]) Plan of Care  Goal: Plan of Care Review  5/11/2023 0431 by Anabela Taveras RN  Outcome: Ongoing, Progressing  Flowsheets (Taken 5/11/2023 0431)  Outcome Evaluation:  Alert and oriented x 3-4. Disoriented to time and place. Can answer most questions without the help of . Meds whole PO with NTL. Right hemiparesis with droop noted. Continent of bowel and bladder; incontinent mostly at nights. Wears brief; checked and changed q2 and as needed. RUE elevated r/t edema. Denies pain. Turned and repositioning q2. No unsafe behaviors. Call light within reach.

## 2023-05-11 NOTE — PROGRESS NOTES
Case Management  Inpatient Rehabilitation Team Conference    Conference Date/Time: 5/11/2023 8:08:41 AM    Team Conference Attendees:  Dr. Brad Lord, Pharmacist  Gabriela Kenyon, ROLAND Altman, PT  Alek Duarte, OT  Nurys Dixon, SLP  Gabriela Du, CTRS  Dominique Borrego RD, LD  Sarah Grace, RN  Lisset Bhatti, RN   Baylee Arzola, Neuropsychologist    Demographics            Age: 73Y            Gender: Male    Admission Date: 5/8/2023 7:29:00 PM  Rehabilitation Diagnosis:  Stroke  Past Medical History: Past Medical History:  DiagnosisDate  ?Hypokalemia3/17/2023  ?  No past sx hx      Plan of Care  Anticipated Discharge Date/Estimated Length of Stay: 2 weeks  Anticipated Discharge Destination: Community discharge with assistance  Discharge Plan : Patient lives alone in apartment.  D/C plan is home with wife, son, and 2 adult granddgts to [place]. Son-in-law to  come from [place] to fly with patient back to [place].  Medical Necessity Expected Level Rationale: Goals are to achieve a level of min  assist of 1 with  mobility and self-care and improved swallow.   Rehabilitation  prognosis fair.  Medical prognosis fair.  Intensity and Duration: an average of 3 hours/5 days per week  Medical Supervision and 24 Hour Rehab Nursing: x  Physical Therapy: x  PT Intensity/Duration: 1 hour / day, 5 days / week, for approximately 2 weeks  Occupational Therapy: x  OT Intensity/Duration: 1 hour / day, 5 days / week, for approximately 2 weeks  Speech and Language Therapy: x  SLP Intensity/Duration: 1 hour / day, 5 days / week, for approximately 2 weeks  Social Work: x  Therapeutic Recreation: x  Psychology: x  Registered Dietician: x  Updated (if changes indicated)    Anticipated Discharge Date/Estimated Length of Stay:   ELOS: May 21st to [facility] w/ family    Based on the patient's medical and functional status, their prognosis and  expected level of functional improvement is: Goals are to achieve a  level of min  assist of 1 with  mobility and self-care and improved swallow.   Rehabilitation  prognosis fair.  Medical prognosis fair.      Interdisciplinary Problem/Goals/Status    All Rehab Problems:  Safety    [RN] Potential for Injury(Active)  Current Status(05/11/2023): Risk for falls r'/t R side weakness  Weekly Goal(05/15/2023): Cue to use call bell  Discharge Goal: Patient will be aware of risk of fall and safety in the home  setting        Psychosocial    [RN] Coping/Adjustment(Active)  Current Status(05/11/2023): Expresses appropriate coping  Weekly Goal(05/15/2023): Educate patient regarding stroke and hypertention  Discharge Goal: Knowledgeable of care needs and stroke recovery        Sphincter Control    [RN] Bladder Management(Active)  Current Status(05/11/2023): Continent/incontinent of bladder at times  Weekly Goal(05/15/2023): Continent 75% of time  Discharge Goal: Continent 100% of time    [RN] Bowel Management(Active)  Current Status(05/11/2023): Continent/Incontinent at times of bowel  Weekly Goal(05/15/2023): Continent of bowel 75%  Discharge Goal: Continent of bowel 100%        Cognition    [ST] Memory(Active)  Current Status(05/10/2023): Pt oriented to self and month.  Weekly Goal(05/18/2023): Pt will be oriented to person, place, time and  situation with MOD cues.  Discharge Goal: Improved memory for home environment.    [ST] Attention(Active)  Current Status(05/10/2023): Pt with moderate-severe inattention to the right.  Weekly Goal(05/18/2023): Pt will scan to the right during functional tasks with  MIN cues.  Discharge Goal: Improved attention skills for home environment.        Communication    [ST] Comprehension(Active)  Current Status(05/10/2023): Moderate difficulty comprehending personal and  simple yes/no questions on evaluatoin. On 5/10, answered simple yes/no questions  in Irish with 90% accuracy. Difficulty following sequential commands.  Weekly Goal(05/18/2023): Will answer  personal and simple yes/no questions with  80% accuracy.  Discharge Goal: Participate in conversation with occasional repetitions of  information.    [ST] Expression(Active)  Current Status(05/10/2023): Mild-moderate word finding difficulty during  conversation and picture description.  Weekly Goal(05/18/2023): Will describe a picture with NO cues.  Discharge Goal: Will participate in conversation with NO cues for use of  compensations for word finding difficulty.        Swallow Function    [ST] Swallowing(Active)  Current Status(05/10/2023): Pt tolerate soft (chopped meats)/no mixed  consistencies with NTL.  Weekly Goal(05/18/2023): Will tolerate soft (chopped) with thins with no s/s of  aspiration or penetration.  Discharge Goal: Tolerate the least restrictive diet with NO cues.        Mobility    [PT] Bed Mobility(Active)  Current Status(05/10/2023): ModA  Weekly Goal(05/18/2023): [place]  Discharge Goal: SUP    [PT] Bed/Chair/Wheelchair(Active)  Current Status(05/10/2023): ModA  Weekly Goal(05/16/2023): Min  Discharge Goal: CGA/Min    [OT] Toilet Transfers(Active)  Current Status(05/09/2023): Max-Mod  Weekly Goal(05/16/2023): Mod  Discharge Goal: Min    [OT] Tub/Shower Transfers(Active)  Current Status(05/09/2023): Mod  Weekly Goal(05/16/2023): Min  Discharge Goal: CGA    [PT] Walk(Active)  Current Status(05/10/2023): 8 ft hemibars Mod  Weekly Goal(05/18/2023): PT only  Discharge Goal: 30 ft AAD Min        Self Care    [OT] Bathing(Active)  Current Status(05/09/2023): Mod  Weekly Goal(05/16/2023): Min  Discharge Goal: Min    [OT] Dressing (Lower)(Active)  Current Status(05/09/2023): Max  Weekly Goal(05/16/2023): Mod  Discharge Goal: Min    [OT] Dressing (Upper)(Active)  Current Status(05/09/2023): Mod  Weekly Goal(05/16/2023): Min  Discharge Goal: SBA    [OT] Eating(Active)  Current Status(05/09/2023): Min  Weekly Goal(05/16/2023): SBA  Discharge Goal: SBA    [OT] Grooming(Active)  Current Status(05/09/2023):  Min  Weekly Goal(05/16/2023): Min  Discharge Goal: SBA    [OT] Toileting(Active)  Current Status(05/09/2023): Max  Weekly Goal(05/16/2023): Mod  Discharge Goal: Min        Comments: 5/11 Bed mob Mod A, Mod A transfers, amb 8' at hemibars Mod A, Max -  Mod A toilet transfer, Mod a shower transfer.  Max A toileting.  Mod A bathing,  Max LBD, Mod A UBD.  Mod - Sev inattention to the right.  Mild - Mod difficulty  w/ word finding.  Tolerating Soft chopped meats, no mixed consistencies, NTL.  Cont / Incont bowel and bladder.  C/O pain to arm and shoulder, Baclofen  increased.    Signed by: Sarah Grace RN    Physician CoSigned By: Brad Magaña 05/11/2023 08:27:13

## 2023-05-11 NOTE — PROGRESS NOTES
Contacted patient's son, by phone, with patient in office to review team conference report. Discussed patient's current status and plans for d/c on 5/21. Received voicemail this morning from Faye in [facility], regarding her conversation with patient's granddaughter who has scheduled flights for patient's son-in-law, Terrence Atwood, to fly to [place] on 5/19 at 4:00 p.m. Granddaughter has flight to come on 5/20 and she has booked flights for patient and son-in-law to fly back to [place] on Sunday, 5/21 at 6:00 a.m. Informed son of these flight arrangements. Also discussed with son that patient's phone is not working so he cannot use the [employer] to communicate with them. Assuming that patient's service for his phone has been turned off.   Son confirmed that patient will be staying with him in one story home with no steps to enter. He stated patient might stay some with daughter who has 2 story home with no steps to enter and they would have patient on first floor. Discussed trying to get wheelchair for patient to take home and will plan to send home 90 day supply of medications. Son appreciative of this help for his father. Also discussed trying to FaceTime son while patient in therapies so can try to show him how patient doing and help he will need. May also try to schedule a family conference with son, by phone next week. Contacted patient's granddaughter this afternoon and confirmed flight arrangements with her. Discussed doing family teaching with son-in-law on Saturday, 5/20. Will plan to talk with her next week regarding plans. Contacted patient's friend, Cezar, to also update on patient's progress and d/c plan for 5/21. Tried to also contact friend, Bridgett, by phone and left voicemail. Will hope to talk with her tomorrow about d/c plans and to see if can also get number for owner of [employer] where patient worked so can see if patient has any past paychecks or if can find out if has bank account. Will continue to  help with d/c plans.

## 2023-05-11 NOTE — THERAPY TREATMENT NOTE
Inpatient Rehabilitation - Occupational Therapy Treatment Note    [facility]     Patient Name: Manuel Durant  : 1950  MRN: [id number]    Today's Date: 2023                 Admit Date: 2023       No diagnosis found.    Patient Active Problem List   Diagnosis   • Intracranial hemorrhage   • Acute DVT (deep venous thrombosis)   • HTN (hypertension)   • Severe Malnutrition (HCC)   • Hemiplegia   • ICH (intracerebral hemorrhage)   • Stroke       Past Medical History:   Diagnosis Date   • Hypertension    • Hypokalemia 2023   • Stroke        No past surgical history on file.          IRF OT ASSESSMENT FLOWSHEET (last 12 hours)     IRF OT Evaluation and Treatment     Row Name 23 1602          OT Time and Intention    Document Type daily treatment  -DN     Mode of Treatment occupational therapy  -DN     Patient Effort good  -DN     Row Name 23 1602          Pain Scale: FACES Pre/Post-Treatment    Pain: FACES Scale, Pretreatment 8-->hurts whole lot  -DN     Posttreatment Pain Rating 4-->hurts little more  -DN     Pain Location - Side/Orientation Right  -DN     Pain Location upper  -DN     Pain Location - shoulder  -DN     Pre/Posttreatment Pain Comment pt pain decreased with some trunk stretches and muscle massage around scapula and weight bearing pared with AAROM while seated on mat  -DN     Row Name 23 1602          Cognition/Psychosocial    Affect/Mental Status (Cognition) WFL  -DN     Follows Commands (Cognition) follows one-step commands;physical/tactile prompts required;verbal cues/prompting required  -DN     Personal Safety Interventions fall prevention program maintained;gait belt  -DN     Row Name 23 1602          Bathing    [place] Level (Bathing) bathing skills;moderate assist (50% patient effort);verbal cues;nonverbal cues (demo/gesture)  -DN     Assistive Device (Bathing) grab bar/tub rail;hand held shower spray hose;tub bench  -DN     Position (Bathing)  supported sitting;supported standing  -DN     Comment (Bathing) gestures for baron tech  -DN     Row Name 05/11/23 1602          Upper Body Dressing    [place] Level (Upper Body Dressing) upper body dressing skills;moderate assist (50-74% patient effort);verbal cues;nonverbal cues (demo/gesture)  -DN     Position (Upper Body Dressing) supported sitting  -DN     Set-up Assistance (Upper Body Dressing) obtain clothing  -DN     Comment (Upper Body Dressing) gestures for [employer] tech  -DN     Row Name 05/11/23 1602          Lower Body Dressing    [place] Level (Lower Body Dressing) maximum assist (25% patient effort);doff;don;shoes/slippers;pants/bottoms;socks  -DN     Position (Lower Body Dressing) supported sitting;supported standing  -DN     Set-up Assistance (Lower Body Dressing) obtain clothing  -DN     Row Name 05/11/23 1602          Grooming    [place] Level (Grooming) grooming skills;oral care regimen;hair care, combing/brushing;minimum assist (75% patient effort);verbal cues;nonverbal cues (demo/gesture)  -DN     Position (Grooming) sink side;supported sitting  -DN     Row Name 05/11/23 1602          Toileting    [place] Level (Toileting) toileting skills;adjust/manage clothing;perform perineal hygiene  -DN     Assistive Device Use (Toileting) grab bar/safety frame;raised toilet seat  -DN     Position (Toileting) supported sitting;supported standing  -DN     Comment (Toileting) pt able to complete hygene needed assist with paper tear off on roll  -DN     Row Name 05/11/23 1602          Self-Feeding    [place] Level (Self-Feeding) feeding skills;supervision;set up;verbal cues  -DN     Position (Self-Feeding) supported sitting  -DN     Comment (Self-Feeding) pt eating with LUE  -DN     Row Name 05/11/23 1602          Transfer Assessment/Treatment    Comment, (Transfers) w/c to mat with Mod a SPS  -DN     Row Name 05/11/23 1602          Toilet Transfer    Type (Toilet Transfer) stand  pivot/stand step  -DN     [place] Level (Toilet Transfer) moderate assist (50% patient effort);verbal cues;nonverbal cues (demo/gesture)  -DN     Assistive Device (Toilet Transfer) wheelchair  -DN     Row Name 05/11/23 1602          Safety Issues, Functional Mobility    Safety Issues Affecting Function (Mobility) insight into deficits/self-awareness  -DN     Impairments Affecting Function (Mobility) balance;cognition;visual/perceptual;muscle tone abnormal  -DN     Row Name 05/11/23 1602          Motor Skills    Functional Endurance fair  -DN     Muscle Tone right;upper extremity(s);moderate impairment  -DN     Motor Control/Coordination Interventions weight-bearing activities;gross motor coordination activities  pt min a with grasp of 1 inch foam shapes and placement into bucket from midline to R side to work on grasp and release and shoulder ER pared with abduction  -DN     Row Name 05/11/23 1602          Shoulder (Therapeutic Exercise)    Shoulder (Therapeutic Exercise) AAROM (active assistive range of motion)  -DN     Shoulder AROM (Therapeutic Exercise) right  -DN     Row Name 05/11/23 1602          Elbow/Forearm (Therapeutic Exercise)    Elbow/Forearm (Therapeutic Exercise) AAROM (active assistive range of motion)  -DN     Elbow/Forearm AROM (Therapeutic Exercise) right  -DN     Row Name 05/11/23 1602          Wrist (Therapeutic Exercise)    Wrist (Therapeutic Exercise) PROM (passive range of motion)  -DN     Wrist PROM (Therapeutic Exercise) right  -DN     Row Name 05/11/23 1602          Hand (Therapeutic Exercise)    Hand AROM/AAROM (Therapeutic Exercise) AAROM (active assistive range of motion);right  -DN     Row Name 05/11/23 1602          Positioning and Restraints    Pre-Treatment Position sitting in chair/recliner  -DN     Post Treatment Position wheelchair  -DN     In Bed sitting;call light within reach;encouraged to call for assist;exit alarm on  -DN           User Key  (r) = Recorded By, (t) =  Taken By, (c) = Cosigned By    Initials Name Effective Dates    Alek Reyes OT 06/16/21 -                  Occupational Therapy Education     Title: PT OT SLP Therapies (In Progress)     Topic: Occupational Therapy (In Progress)     Point: ADL training (In Progress)     Description:   Instruct learner(s) on proper safety adaptation and remediation techniques during self care or transfers.   Instruct in proper use of assistive devices.              Learning Progress Summary           Patient Acceptance, E,D, NR by DN at 5/9/2023 1710    Comment: Ot role and karen adls and functional transfers                   Point: Home exercise program (In Progress)     Description:   Instruct learner(s) on appropriate technique for monitoring, assisting and/or progressing therapeutic exercises/activities.              Learning Progress Summary           Patient Acceptance, E,D, NR by DN at 5/9/2023 1710    Comment: Ot role and karen adls and functional transfers                   Point: Precautions (In Progress)     Description:   Instruct learner(s) on prescribed precautions during self-care and functional transfers.              Learning Progress Summary           Patient Acceptance, E,D, NR by DN at 5/9/2023 1710    Comment: Ot role and karen adls and functional transfers                   Point: Body mechanics (In Progress)     Description:   Instruct learner(s) on proper positioning and spine alignment during self-care, functional mobility activities and/or exercises.              Learning Progress Summary           Patient Acceptance, E,D, NR by DN at 5/9/2023 1710    Comment: Ot role and karen adls and functional transfers                               User Key     Initials Effective Dates Name Provider Type Discipline    CLAUDIO 06/16/21 -  Alek Duarte OT Occupational Therapist OT                    OT Recommendation and Plan    Planned Therapy Interventions (OT): BADL retraining, cognitive/visual perception retraining,  functional balance retraining, neuromuscular control/coordination retraining, passive ROM/stretching, patient/caregiver education/training, ROM/therapeutic exercise, strengthening exercise, transfer/mobility retraining                    Time Calculation:      Time Calculation- OT     Row Name 05/11/23 1400 05/11/23 1100          Time Calculation- OT    OT Start Time 1400  -DN 1100  -DN     OT Stop Time 1430  -DN 1145  -DN     OT Time Calculation (min) 30 min  -DN 45 min  -DN           User Key  (r) = Recorded By, (t) = Taken By, (c) = Cosigned By    Initials Name Provider Type    Alek Reyes OT Occupational Therapist              Therapy Charges for Today     Code Description Service Date Service Provider Modifiers Qty    37731038420 HC OT SELF CARE/MGMT/TRAIN EA 15 MIN 5/10/2023 Alek Duarte OT GO 1    23577051748 HC OT THER PROC EA 15 MIN 5/10/2023 Alek Duarte OT GO 3    09251056795 HC OT SELF CARE/MGMT/TRAIN EA 15 MIN 5/11/2023 Alek Duarte OT GO 3    74040470546 HC OT THER PROC EA 15 MIN 5/11/2023 Alek Duarte OT GO 2                   Alek Duarte OT  5/11/2023

## 2023-05-11 NOTE — PLAN OF CARE
Goal Outcome Evaluation:           Progress: improving  Outcome Evaluation: Manuel is alert and oriented x2 unaware of year, interpretor used for communication with assessment, patient able to communicate simple english, incontinent of bladder, continues with pain to RUE Gabapentin ordered and started today, no unsafe behaivors, had to friends visit and patient able to facetime his daughter and wife on phone, right hemiparesis.

## 2023-05-11 NOTE — PROGRESS NOTES
Case Management  Inpatient Rehabilitation Plan of Care and Discharge Plan Note    Rehabilitation Diagnosis:  Stroke  Date of Onset:  03/12/2023    Medical Summary:  ?73 y.o.?male? who is transferred to the acute rehab unit  today for history of hemorrhagic stroke with right-sided weakness.  Per the  discharge summary-[the patient initially presented to the hospital 3/12/23 for  intracranial bleed. He was found unresponsive by friends and initially taken to  [facility] where he was found to have poorly controlled BP and right sided  hemiplegia. He was transferred here for closer monitoring in the ICU and  neurosurgical consultation. He was cleared to move out of the ICU on 3/16/23.  ????????????The patient had a stable CTH on 3/17/23 with left basal  ganglia/internal capsule hemorrhage with left lateral ventricular hemorrhage. He  was found to have an acute DVT in his RLE around this time and was cleared for  anticoagulation by Neurosurgery. He was initially placed on Lovenox and repeat  CTH was obtained on 3/18/23 showing stability. He was successfully transitioned  to Eliquis therapy.  ????????????Neurosurgery initially signed off but given his prolonged stay  re-evaluated on 4/20/23. Plans were for MRI but he couldn't be cleared for this  imaging at that time so repeat CTA/CTH head was obtained with evidence of  hemorrhage cavity left sided with some peripheral enhancement. This was felt to  represent possible resolving hemorrhage but underlying mass could not be  excluded so he will need either repeat CTH with contrast versus MRI in 6 to 8  weeks. Neurosurgery recommends he establish care with neurology/ANDRADE once he  arrives in [place] and is to be given a disk with all his imaging on this when he  leaves. He has been on baclofen for spacticity and has been working  with PT/OT  in an effort to be more mobile for flight back to [place].  ????????????His BP was treated with oral therapy and titrated to  avoid  hypotension as well. He is currently on Norvasc 10 mg daily, valsartan 160 mg  daily as well as Coreg 3.125 mg BID. His overall stay has been prolonged due to  his disposition as he did not have any adequate insurance to cover a SNF stay as  well as no family assistance to qualify for [facility]. [facility] has worked well to obtain a  discharge plan and his family is flying in to [place] on May 20th to personally  transport him to [place] to reside with his son.  . He is discharged to [facility] acute rehabilitation unit today as he has been accepted now  that there is a concrete discharge plan in place.]  ?  The patient has some headache.  He complains of having diplopia.  Denies any  swallowing complaints.  Denies any difficulty with his speech.  He is  comfortable with his breathing.  He complains of pain in the right arm and in  the right leg.  He has had tightness in the right arm.  He does not describe  whether his strength is getting better or worse on the right side.  ?  With Occupational Therapy on May 8, 2023-[coming to EOB with SBA. Pt engaging in  multiple func transfers this date, max visual and verbal cues required for stand  pivots to household surfaces. Pt with poor CAROL with transfers, unable to self  correct despite max cueing. min-mod A x1-2 for transfers. Pt engaging in RUE  coordination and AAROM activity, with emphasis on grasp/release on larger  household objects. Pt with difficult securing  and control of RUE, table  slides completed with RUBEN ABRAMS. Max A for s/up of extremity with task]  With physical therapy on May 8, 2023-[Pt impulsive and generally having  difficulty follow directions/cues. ?Pt sat up to EOB SBA. Pt then stood and amb  3' to chair req min/mod A x 1-2 w/ HHA. Pt's gait ataxic w/ R foot blocked to  maintain neutral CAROL w/ pt otherwise exhibiting very narrow CAROL and at times R  crossing over w/ R LE. Pt transferred to various sitting surfaces req min/mod a  x  1-2 w/ HHA. Unsteady noted throughout w/ no overt LOB. Ther ex performed w/  cues to incr ROM for R knee and R ankle for DF.]  With speech therapy on May 2, 2023-[Pt seen for VFSS follow to provide education  regarding results and recommendations, especially new recommendation of water  protocol. Education limited due to  Past Medical History: Past Medical History:  DiagnosisDate  ?Hypokalemia3/17/2023  ?  No past sx hx    Plan of Care  Updated Problems/Interventions  Field    Expected Intensity:  Average of 3 hours of therapy 5 days/week.  Interdisciplinary Team:  Interdisciplinary Team: Medical Supervision and 24 Hour Rehabilitation Nursing.,  Physical Therapy:, Occupational Therapy:, Speech and Language Therapy:, Social  Work, Therapeutic Recreation., Psychology., Registered Dietitian.  Physical Therapy Intensity/Duration: 1 hour / day, 5 days / week, for  approximately 2 weeks  Occupational Therapy Intensity/Duration: 1 hour / day, 5 days / week, for  approximately 2 weeks  Speech Language Pathology  Intensity/Duration: 1 hour / day, 5 days / week, for  approximately 2 weeks  Estimated Length of Stay/Anticipated Discharge Date: 2 weeks  Anticipated Discharge Destination:  Anticipated discharge destination from inpatient rehabilitation is community  discharge with assistance. Patient lives alone in apartment.  D/C plan is home with wife, son, and 2 adult granddgts to [place]. Son-in-law to  come from [place] to fly with patient back to [place].      Based on the patient's medical and functional status, their prognosis and  expected level of functional improvement is:  Goals are to achieve a level of  min assist of 1 with  mobility and self-care and improved swallow.  Rehabilitation prognosis fair.  Medical prognosis fair.    Signed by: Sarah Grace RN

## 2023-05-11 NOTE — PROGRESS NOTES
LOS: 3 days   Patient Care Team:  Provider, No Known as PCP - General SHABNAM MCDONALD  1950      ADMITTING DIAGNOSIS:  Hemorrhagic stroke-left basal ganglia/internal capsule with intraventricular extension on March 12, 2023-  Right hemiparesis   Impaired mobility/impaired self-care  Diplopia  Dysphagia-mechanical soft/chopped meat/nectar thick liquid  Spasticity-baclofen 2.5 mg every 8 hours  Hypertension-amlodipine/carvedilol/valsartan  DVT-right soleal vein-March 17, 2023-Eliquis      Subjective     Complains of shoulder pain that goes down to his elbow and hand. Describes the pain as tightness. Worse with movement of his right arm. States that it hurts the most in the front of his shoulder. Tolerating therapies this far. Increased baclofen yesterday which he seems to tolerate well.    Objective     Vitals:    05/11/23 1201   BP: 134/65   Pulse: 61   Resp: 18   Temp: 97.9 °F (36.6 °C)   SpO2: 95%       PHYSICAL EXAM:   MENTAL STATUS -  AWAKE / ALERT  HEENT- NCAT, PUPILS EQUALLY ROUND, SCLERAE ANICTERIC, CONJUNCTIVAE PINK, OP MOIST, NO JVD, EARS UNREMARKABLE EXTERNALLY  LUNGS - CTA, NO WHEEZES, RALES OR RHONCHI  HEART- RRR, NO RUB, MURMUR, OR GALLOP  ABD - NORMOACTIVE BOWEL SOUNDS, SOFT, NT. NO HEPATOSPLENOMEGALY APPRECIATED  EXT -edema in the right hand with decreased range of motion  Decreased range of motion right shoulder  NEURO -awake alert.  Oriented.  No significant dysarthria.     MOTOR EXAM -  LUE/LLE 5/5.    Right shoulder flexion 3-/5, elbow flexion 3-/5, finger flexion 2/5, hip flexion 3-/5, knee extension 3-/5, ankle dorsiflexion 1/5.  MAS 2 in shoulder ADD, elbow flexors, elbow pronators, wrist flexors, finger flexors      MEDICATIONS  Scheduled Meds:amLODIPine, 10 mg, Oral, QAM AC  apixaban, 5 mg, Oral, Q12H  atorvastatin, 40 mg, Oral, Daily  baclofen, 5 mg, Oral, Q8H  carvedilol, 3.125 mg, Oral, BID With Meals  gabapentin, 100 mg, Oral, TID  lidocaine, 1 patch, Transdermal,  Q24H  valsartan, 160 mg, Oral, QAM AC      Continuous Infusions:   PRN Meds:.•  acetaminophen  •  senna-docusate sodium **AND** polyethylene glycol **AND** bisacodyl **AND** bisacodyl  •  loperamide      RESULTS  No results found for: POCGLU  Results from last 7 days   Lab Units 05/08/23  2132   WBC 10*3/mm3 6.91   HEMOGLOBIN g/dL 12.7*   HEMATOCRIT % 38.5   PLATELETS 10*3/mm3 220     Results from last 7 days   Lab Units 05/08/23  2132   SODIUM mmol/L 138   POTASSIUM mmol/L 4.3   CHLORIDE mmol/L 106   CO2 mmol/L 25.0   BUN mg/dL 18   CREATININE mg/dL 0.76   CALCIUM mg/dL 8.6   BILIRUBIN mg/dL 0.3   ALK PHOS U/L 131*   ALT (SGPT) U/L 44*   AST (SGOT) U/L 18   GLUCOSE mg/dL 115*           ASSESSMENT and PLAN    ICH (intracerebral hemorrhage)    Stroke    Hemorrhagic stroke-left basal ganglia/internal capsule with intraventricular extension on March 12, 2023-    Right hemiparesis     Impaired mobility/impaired self-care    Diplopia    Dysphagia-mechanical soft/chopped meat/nectar thick liquid    Spasticity-baclofen 2.5 mg every 8 hours  May 9- Significant flexor synergy on the RUE with shoulder abd, elbow flexor and pronate tone. Currently on baclofen 2.5 q8h. Will evaluate tone with therapy today and make adjustments as needed.  May 10- titrate up on baclofen 5 mg every 8 hour  May 11- Continue to monitor RUE tone with increase of Baclofen yesterday. Will uptitrate as tolerated. OT to start NMES for motor training and spasticity to the RUE today.    Hypertension-amlodipine/carvedilol/valsartan    DVT-right soleal vein-March 17, 2023-Brendan Pruitt DO      During rounds, used appropriate personal protective equipment including mask and gloves.  Additional gown if indicated.  Mask used was standard procedure mask. Appropriate PPE was worn during the entire visit.  Hand hygiene was completed before and after.

## 2023-05-11 NOTE — THERAPY TREATMENT NOTE
Inpatient Rehabilitation - Physical Therapy Treatment Note       [facility]     Patient Name: Manuel Durant  : 1950  MRN: [id number]    Today's Date: 2023                    Admit Date: 2023      Visit Dx:   No diagnosis found.    Patient Active Problem List   Diagnosis   • Intracranial hemorrhage   • Acute DVT (deep venous thrombosis)   • HTN (hypertension)   • Severe Malnutrition (HCC)   • Hemiplegia   • ICH (intracerebral hemorrhage)   • Stroke       Past Medical History:   Diagnosis Date   • Hypertension    • Hypokalemia 2023   • Stroke        No past surgical history on file.    PT ASSESSMENT (last 12 hours)     IRF PT Evaluation and Treatment     Row Name 23 1100          PT Time and Intention    Document Type daily treatment  -AE     Mode of Treatment individual therapy;physical therapy  -AE     Patient/Family/Caregiver Comments/Observations performed AM session without IPAD . seemed to understand a lot of english. when asked if he wanted to use  he stated no  -AE     Row Name 23 1100          General Information    Patient Profile Reviewed yes  -AE     General Observations of Patient complains of stiffness  -AE     Existing Precautions/Restrictions fall  -AE     Row Name 23 1100          Pain Assessment    Pretreatment Pain Rating 5/10  -AE     Posttreatment Pain Rating 5/10  -AE     Pain Location - Side/Orientation Right  -AE     Pain Location upper  -AE     Pain Location - shoulder  -AE     Row Name 23 1100          Cognition/Psychosocial    Affect/Mental Status (Cognition) WFL  -AE     Orientation Status (Cognition) oriented to;person  -AE     Follows Commands (Cognition) follows one-step commands;repetition of directions required;physical/tactile prompts required;visual cue;verbal cues/prompting required  -AE     Personal Safety Interventions fall prevention program maintained;gait belt;muscle strengthening facilitated;nonskid  shoes/slippers when out of bed;supervised activity  -AE     Row Name 05/11/23 1100          Sit-Stand Transfer    Sit-Stand Anabel (Transfers) minimum assist (75% patient effort);moderate assist (50% patient effort);1 person assist  -AE     Assistive Device (Sit-Stand Transfers) karen bars;wheelchair  -AE     Comment, (Sit-Stand Transfer) Amish when pulling from bar, modA when pushing from wc. performed ~3-4x in AM session  performed ~5-6x in PM session from elevated mat height with mirror in front. helpful to have 2nd person for additional tactile/visual feedback  -AE     Row Name 05/11/23 1100          Stand-Sit Transfer    Stand-Sit Anabel (Transfers) minimum assist (75% patient effort)  -AE     Assistive Device (Stand-Sit Transfers) karen bars;wheelchair  -AE     Row Name 05/11/23 1100          Gait/Stairs (Locomotion)    Anabel Level (Gait) moderate assist (50% patient effort)  -AE     Assistive Device (Gait) cane, quad  -AE     Distance in Feet (Gait) 4 side steps L and R x 2  -AE     Pattern (Gait) step-to  -AE     Deviations/Abnormal Patterns (Gait) festinating/shuffling;gait speed decreased;sandhya decreased;ataxic;base of support, narrow;weight shifting decreased;scissoring;stride length decreased  -AE     Bilateral Gait Deviations forward flexed posture;weight shift ability decreased  -AE     Left Sided Gait Deviations weight shift ability decreased  -AE     Right Sided Gait Deviations foot slap  -AE     Gait Assessment/Intervention Amish to advance RLE and for proper placement. kept foot in between to prevent scissoring  -AE     Row Name 05/11/23 1100          Balance    Balance Interventions standing;supported;weight shifting activity  -AE     Comment, Balance standing at karen bars 3x for 2-3 min duration. min/modA at R foot and knee for knee extension and to work on heelcord stretching with weightbearing. able to get heel ~1in from ground today. able to stand with no UE support x 10sec  increments and m Liyah  in PM emphasized standing at EOM. has difficulty with static standing with flexor synergy kicking in. CGA with holding onto therapist. min/mod when holdingonto [facility]  -AE     Row Name 05/11/23 1100          Motor Skills    Therapeutic Exercise stretching  -AE     Row Name 05/11/23 1100          Hip (Therapeutic Exercise)    Hip Strengthening (Therapeutic Exercise) bilateral;marching while seated;3 sets;10 repetitions;red;resistance band  -AE     Row Name 05/11/23 1100          Knee (Therapeutic Exercise)    Knee Strengthening (Therapeutic Exercise) bilateral;hamstring curls;red;resistance band;3 sets;10 repetitions  -AE     Row Name 05/11/23 1100          Stretching (Therapeutic Exercise)    Comment (Stretching Therapeutic Exercise) pain with RLE SKTC, KANDY stretch, B open books x 5  -AE     Row Name 05/11/23 1100          Positioning and Restraints    Pre-Treatment Position sitting in chair/recliner  -AE     Post Treatment Position wheelchair  -AE     In Wheelchair sitting;encouraged to call for assist;exit alarm on;with nsg  -AE           User Key  (r) = Recorded By, (t) = Taken By, (c) = Cosigned By    Initials Name Provider Type    AE Angeles Altman PT Physical Therapist                 Physical Therapy Education     Title: PT OT SLP Therapies (In Progress)     Topic: Physical Therapy (In Progress)     Point: Mobility training (In Progress)     Learning Progress Summary           Patient Acceptance, E,TB, NR by LB at 5/10/2023 1211    Acceptance, E,D, NR by DP at 5/9/2023 1217                   Point: Home exercise program (In Progress)     Learning Progress Summary           Patient Acceptance, E,D, NR by DP at 5/9/2023 1217                   Point: Body mechanics (In Progress)     Learning Progress Summary           Patient Acceptance, E,D, NR by DP at 5/9/2023 1217                   Point: Precautions (In Progress)     Learning Progress Summary           Patient Acceptance, E,D, NR  by DP at 5/9/2023 1217                               User Key     Initials Effective Dates Name Provider Type Discipline    LB 06/16/21 -  Le Reynoso PT Physical Therapist PT    DP 08/24/21 -  Tristin Vicente PT Physical Therapist PT                PT Recommendation and Plan                          Time Calculation:      PT Charges     Row Name 05/11/23 1516 05/11/23 1158          Time Calculation    Start Time 1230  -AE 0930  -AE     Stop Time 1300  -AE 1000  -AE     Time Calculation (min) 30 min  -AE 30 min  -AE     PT Received On 05/11/23  -AE 05/11/23  -AE     PT - Next Appointment -- 05/12/23  -AE        Time Calculation- PT    Total Timed Code Minutes- PT 30 minute(s)  -AE 30 minute(s)  -AE           User Key  (r) = Recorded By, (t) = Taken By, (c) = Cosigned By    Initials Name Provider Type    AE Angeles Altman, PT Physical Therapist                Therapy Charges for Today     Code Description Service Date Service Provider Modifiers Qty    29667875915 HC PT NEUROMUSC RE EDUCATION EA 15 MIN 5/11/2023 Angeles Altman, PT GP 1    57976126935 HC PT THER PROC EA 15 MIN 5/11/2023 Angeles Altman, PT GP 1    11007136690 HC PT NEUROMUSC RE EDUCATION EA 15 MIN 5/11/2023 Angeles Altman, PT GP 1    58133429939 HC PT THER PROC EA 15 MIN 5/11/2023 Angeles Altman, PT GP 1    07529660692 HC PT THER SUPP EA 15 MIN 5/11/2023 Angeles Altman, PT GP 1            PT G-Codes  AM-PAC 6 Clicks Score (PT): 14      Angeles Altman, PT  5/11/2023

## 2023-05-12 ENCOUNTER — APPOINTMENT (OUTPATIENT)
Dept: GENERAL RADIOLOGY | Facility: HOSPITAL | Age: 73
DRG: 305 | End: 2023-05-12
Payer: COMMERCIAL

## 2023-05-12 PROCEDURE — 97110 THERAPEUTIC EXERCISES: CPT

## 2023-05-12 PROCEDURE — 97130 THER IVNTJ EA ADDL 15 MIN: CPT

## 2023-05-12 PROCEDURE — 92526 ORAL FUNCTION THERAPY: CPT

## 2023-05-12 PROCEDURE — 97112 NEUROMUSCULAR REEDUCATION: CPT

## 2023-05-12 PROCEDURE — 97535 SELF CARE MNGMENT TRAINING: CPT

## 2023-05-12 PROCEDURE — 97530 THERAPEUTIC ACTIVITIES: CPT

## 2023-05-12 PROCEDURE — 97129 THER IVNTJ 1ST 15 MIN: CPT

## 2023-05-12 PROCEDURE — 97116 GAIT TRAINING THERAPY: CPT

## 2023-05-12 PROCEDURE — 73030 X-RAY EXAM OF SHOULDER: CPT

## 2023-05-12 RX ADMIN — ACETAMINOPHEN 650 MG: 325 TABLET, FILM COATED ORAL at 07:31

## 2023-05-12 RX ADMIN — VALSARTAN 160 MG: 160 TABLET, FILM COATED ORAL at 07:32

## 2023-05-12 RX ADMIN — LIDOCAINE 1 PATCH: 50 PATCH CUTANEOUS at 07:31

## 2023-05-12 RX ADMIN — GABAPENTIN 100 MG: 100 CAPSULE ORAL at 21:53

## 2023-05-12 RX ADMIN — BACLOFEN 5 MG: 10 TABLET ORAL at 05:48

## 2023-05-12 RX ADMIN — APIXABAN 5 MG: 5 TABLET, FILM COATED ORAL at 05:48

## 2023-05-12 RX ADMIN — ATORVASTATIN CALCIUM 40 MG: 20 TABLET, FILM COATED ORAL at 07:32

## 2023-05-12 RX ADMIN — GABAPENTIN 100 MG: 100 CAPSULE ORAL at 07:32

## 2023-05-12 RX ADMIN — APIXABAN 5 MG: 5 TABLET, FILM COATED ORAL at 17:38

## 2023-05-12 RX ADMIN — CARVEDILOL 3.12 MG: 3.12 TABLET, FILM COATED ORAL at 07:32

## 2023-05-12 RX ADMIN — BACLOFEN 5 MG: 10 TABLET ORAL at 13:23

## 2023-05-12 RX ADMIN — BACLOFEN 5 MG: 10 TABLET ORAL at 21:53

## 2023-05-12 RX ADMIN — GABAPENTIN 100 MG: 100 CAPSULE ORAL at 15:59

## 2023-05-12 RX ADMIN — CARVEDILOL 3.12 MG: 3.12 TABLET, FILM COATED ORAL at 17:38

## 2023-05-12 RX ADMIN — AMLODIPINE BESYLATE 10 MG: 10 TABLET ORAL at 07:32

## 2023-05-12 NOTE — PROGRESS NOTES
Physical Medicine and Rehabilitation  Inpatient Rehabilitation Interdisciplinary Plan of Care    Demographics            Age: 73Y            Gender: Male    Admission Date: 5/8/2023 7:29:00 PM  Rehabilitation Diagnosis:  Stroke    Plan of Care  Anticipated Discharge Date/Estimated Length of Stay: ELOS: May 21st to [facility] w/ family  Anticipated Discharge Destination: Community discharge with assistance  Discharge Plan : Patient lives alone in apartment.  D/C plan is home with wife, son, and 2 adult granddgts to [place]. Son-in-law to  come from [place] to fly with patient back to [place].  Medical Necessity Expected Level Rationale: Goals are to achieve a level of min  assist of 1 with  mobility and self-care and improved swallow.   Rehabilitation  prognosis fair.  Medical prognosis fair.  Intensity and Duration: an average of 3 hours/5 days per week  Medical Supervision and 24 Hour Rehab Nursing: x  Physical Therapy: x  PT Intensity/Duration: 1 hour / day, 5 days / week, for approximately 2 weeks  Occupational Therapy: x  OT Intensity/Duration: 1 hour / day, 5 days / week, for approximately 2 weeks  Speech and Language Therapy: x  SLP Intensity/Duration: 1 hour / day, 5 days / week, for approximately 2 weeks  Social Work: x  Therapeutic Recreation: x  Psychology: x  Registered Dietician: x  Updated (if changes indicated)  No changes to plan.    Based on the patient's medical and functional status, their prognosis and  expected level of functional improvement is: Goals are to achieve a level of min  assist of 1 with  mobility and self-care and improved swallow.   Rehabilitation  prognosis fair.  Medical prognosis fair.    Interdisciplinary Problem/Goals/Status  Safety    [RN] Potential for Injury(Active)  Current Status(05/12/2023): Risk for falls r'/t R side weakness  Weekly Goal(05/15/2023): Cue to use call bell  Discharge Goal: Patient will be aware of risk of fall and safety in the  home  setting        Psychosocial    [RN] Coping/Adjustment(Active)  Current Status(05/12/2023): Expresses appropriate coping  Weekly Goal(05/15/2023): Educate patient regarding stroke and hypertention  Discharge Goal: Knowledgeable of care needs and stroke recovery        Sphincter Control    [RN] Bladder Management(Active)  Current Status(05/12/2023): Continent/incontinent of bladder at times  Weekly Goal(05/15/2023): Continent 75% of time  Discharge Goal: Continent 100% of time    [RN] Bowel Management(Active)  Current Status(05/12/2023): Continent/Incontinent at times of bowel  Weekly Goal(05/15/2023): Continent of bowel 75%  Discharge Goal: Continent of bowel 100%        Cognition    [ST] Memory(Active)  Current Status(05/10/2023): Pt oriented to self and month.  Weekly Goal(05/18/2023): Pt will be oriented to person, place, time and  situation with MOD cues.  Discharge Goal: Improved memory for home environment.    [ST] Attention(Active)  Current Status(05/10/2023): Pt with moderate-severe inattention to the right.  Weekly Goal(05/18/2023): Pt will scan to the right during functional tasks with  MIN cues.  Discharge Goal: Improved attention skills for home environment.        Communication    [ST] Comprehension(Active)  Current Status(05/10/2023): Moderate difficulty comprehending personal and  simple yes/no questions on evaluatoin. On 5/10, answered simple yes/no questions  in [place] with 90% accuracy. Difficulty following sequential commands.  Weekly Goal(05/18/2023): Will answer personal and simple yes/no questions with  80% accuracy.  Discharge Goal: Participate in conversation with occasional repetitions of  information.    [ST] Expression(Active)  Current Status(05/10/2023): Mild-moderate word finding difficulty during  conversation and picture description.  Weekly Goal(05/18/2023): Will describe a picture with NO cues.  Discharge Goal: Will participate in conversation with NO cues for use  of  compensations for word finding difficulty.        Swallow Function    [ST] Swallowing(Active)  Current Status(05/10/2023): Pt tolerate soft (chopped meats)/no mixed  consistencies with NTL.  Weekly Goal(05/18/2023): Will tolerate soft (chopped) with thins with no s/s of  aspiration or penetration.  Discharge Goal: Tolerate the least restrictive diet with NO cues.        Mobility    [PT] Bed Mobility(Active)  Current Status(05/10/2023): ModA  Weekly Goal(05/18/2023): Romeo  Discharge Goal: SUP    [PT] Bed/Chair/Wheelchair(Active)  Current Status(05/10/2023): ModA  Weekly Goal(05/16/2023): Min  Discharge Goal: CGA/Min    [OT] Toilet Transfers(Active)  Current Status(05/09/2023): Max-Mod  Weekly Goal(05/16/2023): Mod  Discharge Goal: Min    [OT] Tub/Shower Transfers(Active)  Current Status(05/09/2023): Mod  Weekly Goal(05/16/2023): Min  Discharge Goal: CGA    [PT] Walk(Active)  Current Status(05/10/2023): 8 ft hemibars Mod  Weekly Goal(05/18/2023): PT only  Discharge Goal: 30 ft AAD Min        Self Care    [OT] Bathing(Active)  Current Status(05/09/2023): Mod  Weekly Goal(05/16/2023): Min  Discharge Goal: Min    [OT] Dressing (Lower)(Active)  Current Status(05/09/2023): Max  Weekly Goal(05/16/2023): Mod  Discharge Goal: Min    [OT] Dressing (Upper)(Active)  Current Status(05/09/2023): Mod  Weekly Goal(05/16/2023): Min  Discharge Goal: SBA    [OT] Eating(Active)  Current Status(05/09/2023): Min  Weekly Goal(05/16/2023): SBA  Discharge Goal: SBA    [OT] Grooming(Active)  Current Status(05/09/2023): Min  Weekly Goal(05/16/2023): Min  Discharge Goal: SBA    [OT] Toileting(Active)  Current Status(05/09/2023): Max  Weekly Goal(05/16/2023): Mod  Discharge Goal: Min      Comments: 5/11 Bed mob Mod A, Mod A transfers, amb 8' at hemibars Mod A, Max -  Mod A toilet transfer, Mod a shower transfer.  Max A toileting.  Mod A bathing,  Max LBD, Mod A UBD.  Mod - Sev inattention to the right.  Mild - Mod difficulty  w/ word  finding.  Tolerating Soft chopped meats, no mixed consistencies, NTL.  Cont / Incont bowel and bladder.  C/O pain to arm and shoulder, Baclofen  increased.    Signed by: Brad Magaña MD

## 2023-05-12 NOTE — THERAPY TREATMENT NOTE
Inpatient Rehabilitation - Speech Language Pathology Treatment Note    [facility]     Patient Name: Manuel Durant  : 1950  MRN: [id number]    Today's Date: 2023                   Admit Date: 2023       Visit Dx:    No diagnosis found.    Patient Active Problem List   Diagnosis   • Intracranial hemorrhage   • Acute DVT (deep venous thrombosis)   • HTN (hypertension)   • Severe Malnutrition (HCC)   • Hemiplegia   • ICH (intracerebral hemorrhage)   • Stroke       Past Medical History:   Diagnosis Date   • Hypertension    • Hypokalemia 2023   • Stroke        No past surgical history on file.    SLP Recommendation and Plan                                                            SLP EVALUATION (last 72 hours)     SLP SLC Evaluation     Row Name 23 1330 23 0830 23 0830 05/10/23 1330       Communication Assessment/Intervention    Document Type therapy note (daily note)  -AL therapy note (daily note)  -AL therapy note (daily note)  -AL therapy note (daily note)  -AL therapy note (daily note)  -AL    Patient/Family/Caregiver Comments/Observations Used  via Ipad video.  -AL Pt participated well. Used  via Ipad video.  -AL Pt participated well. Used  via Ipad video.  -AL Pt participated well. Used  via Ipad.  -AL Pt participated well. Used  via Ipad video.  -AL       Pain Scale: Numbers Pre/Post-Treatment    Pretreatment Pain Rating -- -- -- -- 0/10 - no pain  -AL    Pain Location - Side/Orientation -- -- Right  -AL -- --    Pain Location -- -- upper  -AL -- --    Pain Location - -- -- shoulder  -AL -- --    Pre/Posttreatment Pain Comment Pt did not c/o pain.  -AL right shoulder pain, pt did not rate  -AL Pt did not rate pain.  -AL right shoulder pain; pt did not rate  -AL --          User Key  (r) = Recorded By, (t) = Taken By, (c) = Cosigned By    Initials Name  Effective Dates    Nurys Beavers MS CCC-SLP 06/16/21 -                    EDUCATION    The patient has been educated in the following areas:       Cognitive Impairment Communication Impairment Dysphagia (Swallowing Impairment).             SLP GOALS     Row Name 05/12/23 1330 05/12/23 0830 05/11/23 1330       (STG) Pharyngeal Strengthening Exercise Goal 1 (SLP)    Progress/Outcomes (Pharyngeal Strengthening Goal 1, SLP) -- good progress toward goal  -AL good progress toward goal  -AL    Comment (Pharyngeal Strengthening Goal 1, SLP) -- Pt completed 20 repetitions of effortful swallow with overall MIN-MOD cues cues. No overt s/s of aspiration or penetration observed in 9/10 trials; delayed cough x1. Required MIN-MOD cues for small sips.  -AL Pt completed 20 repetitions of effortful swallow with overall MOD-MAX cues. No overt s/s of aspiration or penetration observed in 11/13 trials of water by cup; immediate coughing x1, delayed cough x1.  -AL       Word Retrieval Skills Goal 1 (SLP)    Improve Word Retrieval Skills By Goal 1 (SLP) completing functional word finding tasks;completing a divergent task;80%;independently (over 90% accuracy)  -AL -- --    Time Frame (Word Retrieval Goal 1, SLP) 1 week  -AL -- --    Progress/Outcomes (Word Retrieval Goal 1, SLP) good progress toward goal  -AL -- --    Comment (Word Retrieval Goal 1, SLP) Confrontation naming of pictures: 90% with NO cues, 100% with MIN cues; self correction x1.  -AL -- --       Connected Speech to Express Thoughts Goal 1 (SLP)    Improve Narrative Discourse to Express Thoughts By Goal 1 (SLP) -- describing a picture;conversational task on a given topic;80%;with minimal cues (75-90%)  -AL --    Time Frame (Connected Speech Goal 1, SLP) -- 1 week  -AL --    Progress/Outcomes (Connected Speech Goal 1, SLP) -- good progress toward goal  -AL --    Comment (Connected Speech Goal 1, SLP) -- Sentence descripion of problems in pictures: 40% iwth NO cues,  100% with MIN-MOD cues.  requested repetition in 2/10 trials due to reduced intelligibility. Semantic paraphasias noted x3.  -AL --       Orientation Goal 1 (SLP)    Progress/Outcomes (Orientation Goal 1, SLP) -- good progress toward goal  -AL good progress toward goal  -AL    Comment (Orientation Goal 1, SLP) -- Oriented to situation, city, state, month, year. Required MIN cues for SARAH.  -AL Oriented to situation, city, state; required MAX cues for age, month and year. Able to recall  with NO cues.  -AL       Memory Skills Goal 1 (SLP)    Improve Memory Skills Through Goal 1 (SLP) recall details of the day;80%;with moderate cues (50-74%)  -AL -- --    Time Frame (Memory Skills Goal 1, SLP) 1 week  -AL -- --    Progress/Outcomes (Memory Skills Goal 1, SLP) good progress toward goal  -AL -- --    Comment (Memory Skills Goal 1, SLP) Pt required MAX cues to recall going to X-ray prior to session; however, suspect difficulty comprehending question. Required MAX cues for immediate memory for 3 fruits; however, recalled after 5 minutes with NO cues.  -AL -- Recalled month and year after 5 minutes with NO cues.  -AL       Functional Problem Solving Skills Goal 1 (SLP)    Improve Problem Solving Through Goal 1 (SLP) determine solutions to simple ADL/safety problems;name items for a task;80%;independently (over 90% accuracy)  -AL -- determine solutions to simple ADL/safety problems;name items for a task;80%;independently (over 90% accuracy)  -AL    Time Frame (Problem Solving Goal 1, SLP) 1 week  -AL -- 1 week  -AL    Progress/Outcomes (Problem Solving Goal 1, SLP) good progress toward goal  -AL -- good progress toward goal  -AL    Comment (Problem Solving Goal 1, SLP) Reading basic time on clock: 90% with NO cues, 100% with MOD cues.  -AL -- Stated 2 solutions to complex problems at home with 80% accuracy with NO cues, 100% with MIN cues to clarify his message.  -AL    Row Name 23 0830 05/10/23 7112  05/10/23 0830       (LTG) Patient will demonstrate functional swallow for    Diet Texture (Demonstrate functional swallow) -- -- regular textures  -AL    Liquid viscosity (Demonstrate functional swallow) -- -- thin liquids  -AL    [place] (Demonstrate functional swallow) -- -- independently (over 90% accuracy)  -AL    Time Frame (Demonstrate functional swallow) -- -- by discharge  -AL       (STG) Swallow 1    (STG) Swallow 1 -- -- Pt will exihibit no overt s/s of aspiration or penetration with trials of water by cup  -AL    [place] (Swallow Short Term Goal 1) -- -- with minimal cues (75-90% accuracy)  -AL    Time Frame (Swallow Short Term Goal 1) -- -- 1 week  -AL       (STG) Pharyngeal Strengthening Exercise Goal 1 (SLP)    Activity (Pharyngeal Strengthening Goal 1, SLP) -- -- increase timing;increase superior movement of the hyolaryngeal complex;increase anterior movement of the hyolaryngeal complex  -AL    Increase Timing -- -- hard effortful swallow  -AL    Increase Superior Movement of the Hyolaryngeal Complex -- -- Mendelsohn;hard effortful swallow  -AL    Increase Anterior Movement of the Hyolaryngeal Complex -- -- EMST  -AL    [place]/Accuracy (Pharyngeal Strengthening Goal 1, SLP) -- -- with minimal cues (75-90% accuracy)  -AL    Progress/Outcomes (Pharyngeal Strengthening Goal 1, SLP) -- -- new goal  -AL    Comment (Pharyngeal Strengthening Goal 1, SLP) No overt s/s of aspiration or penetration observed wiht water by cup with initial cues for small, single sips.  -AL -- --       Comprehend Questions Goal 1 (SLP)    Improve Ability to Comprehend Questions Goal 1 (SLP) -- questions about personal information;simple yes/no questions;80%;independently (over 90% accuracy)  -AL --    Time Frame (Comprehend Questions Goal 1, SLP) -- 1 week  -AL --    Progress/Outcomes (Comprehend Questions Goal 1, SLP) -- goal ongoing  -AL --    Comment (Comprehend Questions Goal 1, SLP) -- simple and personal  yes/no questions in Irish: 90% with NO cues  -AL --       Follow Directions Goal 2 (SLP)    Comment (Follow Directions Goal 1, SLP) -- Diagnostic treatment: Pt followed 1-step and 2-step body part commands in Irish with NO cues. Followed sequential commands in 1/4 trials with NO cues, 4/4 with demonstration and repetition.  -AL --       Word Retrieval Skills Goal 1 (SLP)    Improve Word Retrieval Skills By Goal 1 (SLP) -- completing functional word finding tasks;completing a divergent task;80%;independently (over 90% accuracy)  -AL --    Time Frame (Word Retrieval Goal 1, SLP) -- 1 week  -AL --    Progress/Outcomes (Word Retrieval Goal 1, SLP) -- good progress toward goal  -AL --    Comment (Word Retrieval Goal 1, SLP) -- Confrontation naming of pictures: 90% with NO cues, 100% with MIN cues; vision deficit impacted task; pt self-corrected a semantic paraphasia x1.  -AL --       Connected Speech to Express Thoughts Goal 1 (SLP)    Improve Narrative Discourse to Express Thoughts By Goal 1 (SLP) describing a picture;conversational task on a given topic;80%;with minimal cues (75-90%)  -AL describing a picture;conversational task on a given topic;80%;with minimal cues (75-90%)  -AL --    Time Frame (Connected Speech Goal 1, SLP) 1 week  -AL 1 week  -AL --    Progress/Outcomes (Connected Speech Goal 1, SLP) good progress toward goal  -AL good progress toward goal  -AL --    Comment (Connected Speech Goal 1, SLP) Sentence description of picture: 40% with NO cues, 80% with MIN cues, 100% with MAX cues. Pt able to order food for meals using  assistance with NO cues.  -AL Sentence description of pictures: 50% with NO cues, 100% with MIN-MOD cues to provide additional details and for correct verbs.  -AL --       Attention Goal (SLP)    Improve Attention by Goal (SLP) Pt will complete basic attention to detail tasks with MIN cues for attention and scanning to the right.  -AL -- --    Time Frame (Attention Goal,  SLP) 1 week  -AL -- --    Comment (Attention Goal, SLP) Sorted cards into red vs. black for 2 minutes with NO cues. Pt exhibited difficulty identifying specific cards, suspect vision vs. aphasia impacting.  -AL -- --       Orientation Goal 1 (SLP)    Improve Orientation Through Goal 1 (SLP) demonstrating orientation to day;demonstrating orientation to month;demonstrating orientation to year;demonstrating orientation to place;demonstrating orientation to disease/impairment;80%;with minimal cues (75-90%)  -AL demonstrating orientation to day;demonstrating orientation to month;demonstrating orientation to year;demonstrating orientation to place;demonstrating orientation to disease/impairment;80%;with minimal cues (75-90%)  -AL --    Time Frame (Orientation Goal 1, SLP) 2 weeks  -AL 2 weeks  -AL --    Progress/Outcomes (Orientation Goal 1, SLP) good progress toward goal  -AL good progress toward goal  -AL --    Comment (Orientation Goal 1, SLP) Oriented to situation, city and state. Required MAX cues for month, year and hospital.  -AL Oriented to situation, city, and state with NO cues. Required MAX cues for month and year.  -AL --       Memory Skills Goal 1 (SLP)    Progress/Outcomes (Memory Skills Goal 1, SLP) good progress toward goal  -AL -- --    Comment (Memory Skills Goal 1, SLP) Recalled 2/2 salient piecies of info (year, month) after 5 minute delay x2. Recalled 2/2 hidden objects after 4 minutes with NO cues.  -AL -- --          User Key  (r) = Recorded By, (t) = Taken By, (c) = Cosigned By    Initials Name Provider Type    Nurys Beavers MS CCC-SLP Speech and Language Pathologist                            Time Calculation:        Time Calculation- SLP     Row Name 05/12/23 1452 05/12/23 0920          Time Calculation- SLP    SLP Start Time 1330  -AL 0830  -AL     SLP Stop Time 1400  -AL 0900  -AL     SLP Time Calculation (min) 30 min  -AL 30 min  -AL           User Key  (r) = Recorded By, (t) = Taken  By, (c) = Cosigned By    Initials Name Provider Type    Nurys Beavers, MS CCC-SLP Speech and Language Pathologist                  Therapy Charges for Today     Code Description Service Date Service Provider Modifiers Qty    85251892343 HC ST DEV OF COGN SKILLS INITIAL 15 MIN 5/11/2023 Nurys Dixon, MS CCC-SLP  1    05233773169 HC ST DEV OF COGN SKILLS EACH ADDT'L 15 MIN 5/11/2023 Nurys Dixon, MS CCC-SLP  1    28591491149 HC ST TREATMENT SWALLOW 2 5/11/2023 Nurys Dixon, MS CCC-SLP GN 1    12018252498 HC ST TREATMENT SWALLOW 1 5/12/2023 Nurys Dixon, MS CCC-SLP GN 1    24302142006 HC ST DEV OF COGN SKILLS INITIAL 15 MIN 5/12/2023 Nurys Dixon, MS CCC-SLP  1    86705589721 HC ST DEV OF COGN SKILLS EACH ADDT'L 15 MIN 5/12/2023 Nurys Dixon, MS CCC-SLP  2                           Nurys Dixon MS CCC-SLP  5/12/2023

## 2023-05-12 NOTE — PROGRESS NOTES
LOS: 4 days   Patient Care Team:  Provider, No Known as PCP - General SHABNAM MCDONALD  1950      ADMITTING DIAGNOSIS:  Hemorrhagic stroke-left basal ganglia/internal capsule with intraventricular extension on March 12, 2023-  Right hemiparesis   Impaired mobility/impaired self-care  Diplopia  Dysphagia-mechanical soft/chopped meat/nectar thick liquid  Spasticity-baclofen 2.5 mg every 8 hours  Hypertension-amlodipine/carvedilol/valsartan  DVT-right soleal vein-March 17, 2023-Eliquis      Subjective     Video   Patient continues with weakness on the right side.  He is showing more motion but his pain is unchanged.  Continues with pain about the right shoulder.  Spasticity is showing improvement.  Occupational Therapy working with electrical stimulation to help with attention to the right side    Objective     Vitals:    05/12/23 1440   BP: 131/67   Pulse: 60   Resp: 16   Temp: 97.9 °F (36.6 °C)   SpO2: 96%       PHYSICAL EXAM:   MENTAL STATUS -  AWAKE / ALERT  HEENT- NCAT, PUPILS EQUALLY ROUND, SCLERAE ANICTERIC, CONJUNCTIVAE PINK, OP MOIST, NO JVD, EARS UNREMARKABLE EXTERNALLY  LUNGS - CTA, NO WHEEZES, RALES OR RHONCHI  HEART- RRR, NO RUB, MURMUR, OR GALLOP  ABD - NORMOACTIVE BOWEL SOUNDS, SOFT, NT. NO HEPATOSPLENOMEGALY APPRECIATED  EXT -edema in the right hand with decreased range of motion  Decreased range of motion right shoulder  NEURO -awake alert.  Oriented.  No significant dysarthria.     MOTOR EXAM -  LUE/LLE 5/5.    Right shoulder flexion 3-/5, elbow flexion 3-/5, finger flexion 2/5, hip flexion 3-/5, knee extension 3-/5, ankle dorsiflexion 1/5.  Overall better movement with better speed of movement on the right side  MAS 1+ in shoulder ADD, elbow flexors, elbow pronators, wrist flexors, finger flexors      MEDICATIONS  Scheduled Meds:amLODIPine, 10 mg, Oral, QAM AC  apixaban, 5 mg, Oral, Q12H  atorvastatin, 40 mg, Oral, Daily  baclofen, 5 mg, Oral, Q8H  carvedilol, 3.125 mg, Oral, BID  With Meals  gabapentin, 100 mg, Oral, TID  lidocaine, 1 patch, Transdermal, Q24H  valsartan, 160 mg, Oral, QAM AC      Continuous Infusions:   PRN Meds:.•  acetaminophen  •  senna-docusate sodium **AND** polyethylene glycol **AND** bisacodyl **AND** bisacodyl  •  loperamide      RESULTS  No results found for: POCGLU  Results from last 7 days   Lab Units 05/08/23  2132   WBC 10*3/mm3 6.91   HEMOGLOBIN g/dL 12.7*   HEMATOCRIT % 38.5   PLATELETS 10*3/mm3 220     Results from last 7 days   Lab Units 05/08/23  2132   SODIUM mmol/L 138   POTASSIUM mmol/L 4.3   CHLORIDE mmol/L 106   CO2 mmol/L 25.0   BUN mg/dL 18   CREATININE mg/dL 0.76   CALCIUM mg/dL 8.6   BILIRUBIN mg/dL 0.3   ALK PHOS U/L 131*   ALT (SGPT) U/L 44*   AST (SGOT) U/L 18   GLUCOSE mg/dL 115*           ASSESSMENT and PLAN    ICH (intracerebral hemorrhage)    Stroke    Hemorrhagic stroke-left basal ganglia/internal capsule with intraventricular extension on March 12, 2023-    Right hemiparesis     Impaired mobility/impaired self-care    Diplopia    Dysphagia-mechanical soft/chopped meat/nectar thick liquid    Spasticity-baclofen 2.5 mg every 8 hours  May 9- Significant flexor synergy on the RUE with shoulder abd, elbow flexor and pronate tone. Currently on baclofen 2.5 q8h. Will evaluate tone with therapy today and make adjustments as needed.  May 10- titrate up on baclofen 5 mg every 8 hour  May 11- Continue to monitor RUE tone with increase of Baclofen yesterday. Will uptitrate as tolerated. OT to start NMES for motor training and spasticity to the RUE today.  May 12-spasticity improved with increase in baclofen as well as addition of low-dose gabapentin    Right shoulder pain-May 12-no signs of complex regional pain syndrome.  Pain in part may be related to spasticity/immobility/degenerative changes.  X-ray of the right shoulder shows mild to moderate degenerative changes at the acromioclavicular joint.  May titrate up on baclofen and gabapentin over  time    Hypertension-amlodipine/carvedilol/valsartan    DVT-right soleal vein-March 17, 2023-Brendan Magaña MD      During rounds, used appropriate personal protective equipment including mask and gloves.  Additional gown if indicated.  Mask used was standard procedure mask. Appropriate PPE was worn during the entire visit.  Hand hygiene was completed before and after.

## 2023-05-12 NOTE — THERAPY TREATMENT NOTE
Inpatient Rehabilitation - Occupational Therapy Treatment Note    [facility]     Patient Name: Manuel Durant  : 1950  MRN: [id number]    Today's Date: 2023                 Admit Date: 2023       No diagnosis found.    Patient Active Problem List   Diagnosis   • Intracranial hemorrhage   • Acute DVT (deep venous thrombosis)   • HTN (hypertension)   • Severe Malnutrition (HCC)   • Hemiplegia   • ICH (intracerebral hemorrhage)   • Stroke       Past Medical History:   Diagnosis Date   • Hypertension    • Hypokalemia 2023   • Stroke        No past surgical history on file.          IRF OT ASSESSMENT FLOWSHEET (last 12 hours)     IRF OT Evaluation and Treatment     Row Name 23 1603          OT Time and Intention    Document Type daily treatment  -AF     Mode of Treatment occupational therapy  -AF     Patient Effort good  -AF     Row Name 23 1603          General Information    Patient/Family/Caregiver Comments/Observations pt sitting up in w/c for both session  -AF     Existing Precautions/Restrictions fall  -AF     Row Name 23 1603          Pain Assessment    Pretreatment Pain Rating 5/10  -AF     Posttreatment Pain Rating 5/10  -AF     Pain Location - Side/Orientation Right  -AF     Pain Location upper  -AF     Pain Location - shoulder  -AF     Pre/Posttreatment Pain Comment used TENS on R shoulder states that it felt looser after  -AF     Row Name 23 1603          Cognition/Psychosocial    Affect/Mental Status (Cognition) WFL;unable/difficult to assess  -AF     Orientation Status (Cognition) oriented to;person  -AF     Follows Commands (Cognition) follows one-step commands;physical/tactile prompts required;verbal cues/prompting required  -AF     Personal Safety Interventions fall prevention program maintained;gait belt;nonskid shoes/slippers when out of bed  -AF     Row Name 23 1603          Bathing    [place] Level (Bathing) bathing skills;moderate assist  (50% patient effort);verbal cues;nonverbal cues (demo/gesture)  -AF     Assistive Device (Bathing) grab bar/tub rail;hand held shower spray hose;tub bench  -AF     Position (Bathing) supported sitting;supported standing  -AF     Row Name 05/12/23 1603          Upper Body Dressing    [place] Level (Upper Body Dressing) upper body dressing skills;moderate assist (50-74% patient effort)  -AF     Position (Upper Body Dressing) supported sitting  -AF     Set-up Assistance (Upper Body Dressing) obtain clothing  -AF     Row Name 05/12/23 1603          Lower Body Dressing    [place] Level (Lower Body Dressing) maximum assist (25% patient effort);doff;don;shoes/slippers;pants/bottoms;socks  -AF     Position (Lower Body Dressing) supported sitting;supported standing  -AF     Set-up Assistance (Lower Body Dressing) obtain clothing  -AF     Row Name 05/12/23 1603          Grooming    [place] Level (Grooming) grooming skills;oral care regimen;hair care, combing/brushing;minimum assist (75% patient effort);verbal cues;nonverbal cues (demo/gesture)  -AF     Position (Grooming) sink side;supported sitting  -AF     Row Name 05/12/23 1603          Toileting    [place] Level (Toileting) toileting skills;adjust/manage clothing;perform perineal hygiene  -AF     Assistive Device Use (Toileting) grab bar/safety frame;raised toilet seat  -AF     Position (Toileting) supported standing;supported sitting  -AF     Row Name 05/12/23 1603          Bed Mobility    Comment, (Bed Mobility) up in w/c  -AF     Row Name 05/12/23 1603          Sit-Stand Transfer    Comment, (Sit-Stand Transfer) MIN with use of grab bar and set up of RLE prior to standing verbal and tactile cues reqiured to get R foot in correct spot  -AF     Row Name 05/12/23 1603          Stand-Sit Transfer    Stand-Sit [place] (Transfers) minimum assist (75% patient effort)  -AF     Comment, (Stand-Sit Transfer) grab bar in shower  -AF     Row Name 05/12/23  1603          Toilet Transfer    Type (Toilet Transfer) stand pivot/stand step  -AF     [place] Level (Toilet Transfer) moderate assist (50% patient effort);verbal cues  -AF     Assistive Device (Toilet Transfer) commode;crutches, forearm;wheelchair  -AF     Comment, (Toilet Transfer) slight ability to weight shifting to the R side with tranfsers  -AF     Row Name 05/12/23 1603          Shower Transfer    Type (Shower Transfer) stand pivot/stand step  -AF     [place] Level (Shower Transfer) moderate assist (50% patient effort);verbal cues  -AF     Assistive Device (Shower Transfer) grab bar, tub/shower;tub bench;wheelchair  -AF     Row Name 05/12/23 1603          Neuromuscular Re-education    Comment (Neuromuscular Re-education) seated in w/c therapist applied NMES ot R wrsit extensors and TENS to R shoulder, used IPAD to interpret. pt has difficulty with relaxing and educated on muscle tone, relxating, TENS, NMES and reducing pain to increased movement. with modalities in place worked on gross grasp/release, elbow AROM flex/ext and not compensating with trunk. pt has poor carryover with tasks, states that he would like to try the modailites again to assist with making his arm work better  -AF     Row Name 05/12/23 1603          Balance    Static Sitting Balance standby assist  -AF     Dynamic Sitting Balance minimal assist;contact guard  with LB bathing and dressing tasks  -AF     Static Standing Balance minimal assist  with therapist assistance to keep R foot in place  -AF     Row Name 05/12/23 1603          Positioning and Restraints    Pre-Treatment Position sitting in chair/recliner  -AF     Post Treatment Position wheelchair  -AF     In Wheelchair sitting;with nsg;exit alarm on;patient within staff view  with NSG in AM and at NSG station in PM  -AF           User Key  (r) = Recorded By, (t) = Taken By, (c) = Cosigned By    Initials Name Effective Dates    AF Carole Saenz, OTR 06/16/21 -                   Occupational Therapy Education     Title: PT OT SLP Therapies (In Progress)     Topic: Occupational Therapy (In Progress)     Point: ADL training (In Progress)     Description:   Instruct learner(s) on proper safety adaptation and remediation techniques during self care or transfers.   Instruct in proper use of assistive devices.              Learning Progress Summary           Patient Acceptance, E,D, NR by DN at 5/9/2023 1710    Comment: Ot role and karen adls and functional transfers                   Point: Home exercise program (In Progress)     Description:   Instruct learner(s) on appropriate technique for monitoring, assisting and/or progressing therapeutic exercises/activities.              Learning Progress Summary           Patient Acceptance, E,D, NR by DN at 5/9/2023 1710    Comment: Ot role and karen adls and functional transfers                   Point: Precautions (In Progress)     Description:   Instruct learner(s) on prescribed precautions during self-care and functional transfers.              Learning Progress Summary           Patient Acceptance, E,D, NR by DN at 5/9/2023 1710    Comment: Ot role and karen adls and functional transfers                   Point: Body mechanics (In Progress)     Description:   Instruct learner(s) on proper positioning and spine alignment during self-care, functional mobility activities and/or exercises.              Learning Progress Summary           Patient Acceptance, E,D, NR by DN at 5/9/2023 1710    Comment: Ot role and karen adls and functional transfers                               User Key     Initials Effective Dates Name Provider Type Discipline    CLAUDIO 06/16/21 -  Alek Duarte OT Occupational Therapist OT                    OT Recommendation and Plan                         Time Calculation:      Time Calculation- OT     Row Name 05/12/23 1611 05/12/23 1610          Time Calculation- OT    OT Start Time 1330  -AF 1100  -AF     OT Stop Time 1330  -AF  1130  -AF     OT Time Calculation (min) 0 min  -AF 30 min  -AF           User Key  (r) = Recorded By, (t) = Taken By, (c) = Cosigned By    Initials Name Provider Type    AF Carole Saenz, [place] Occupational Therapist              Therapy Charges for Today     Code Description Service Date Service Provider Modifiers Qty    97161109546 HC OT SELF CARE/MGMT/TRAIN EA 15 MIN 5/12/2023 Carole Saenz OTR GO 2    [id number]  OT NEUROMUSC RE EDUCATION EA 15 MIN 5/12/2023 Carole Saenz OTM[place]UE GO 2                   HELLEN Arriaga  5/12/2023

## 2023-05-12 NOTE — PROGRESS NOTES
Inpatient Rehabilitation Plan of Care Note    Plan of Care  Care Plan Reviewed - No updates at this time.    Safety    Performed Intervention(s)  falls precautions  bed/chair alarm  safety rounds      Psychosocial    Performed Intervention(s)  Verbalizes needs and concerns  Therapeutic environmental set up      Sphincter Control    Performed Intervention(s)  Bowel/bladder training  Encourage fluid intake  Scheduled toileting    Signed by: Jing Aparicio RN

## 2023-05-12 NOTE — PROGRESS NOTES
Reviewed team conference report and d/c plans again with patient is SW office utilizing IPad . Discussed his work at [employer] and if he had bank account where paycheck was deposited. Patient stated [employer] and said he did have bank card in his closet in his apartment. Contacted his friend, Bridgett, who was the one that cleaned out apartment and has patient's documents. She stated she is going to look through patient's belongings again that are in his truck and will see if she can find bank card. She will call back if finds this information. She and patient discussed selling his truck and she has someone coming to look at his truck later this afternoon. Patient is agreeable to sell truck. Discussed with Bridgett, that we will need patient's documents as the plan is for him to fly back home to [place] with son-in-law on Sunday, 5/21. She will bring documents here to hospital. She stated she did not have number to owner of [employer] but did give address of the [employer] that patient worked at so will try to call to reach manager and/or owner. Patient is appreciative of help. Discussed scheduling a family conference via phone or FaceTime with his son next week. Informed patient that we will send wheelchair home with him and his medications.   Contacted Stephanie, with [employer], regarding supplying wheelchair. She will look at the insurance patient has on Facesheet to see if there is any coverage but also informed her that patient may be approved in the next couple of months for emergency Medicaid. She stated they would supply wheelchair for patient and would bill Medicaid at later date. Also discussed patient's medications with Donny, Pharmacist on rehab. Patient does have some prescription coverage through this plan he has, but only covers 30 day supply. Since we will want to get him 90 day supply, pharmacist checked prices with copay cards and free trial card for Eliquis. Will plan to possibly use rehab  patient assistance fund for medications so can send home 90 day supply with him. Will continue to assist with plans.

## 2023-05-12 NOTE — PROGRESS NOTES
Inpatient Rehabilitation Plan of Care Note    Plan of Care  Care Plan Reviewed - No updates at this time.    Safety    [RN] Potential for Injury(Active)  Current Status(05/12/2023): Risk for falls r'/t R side weakness  Weekly Goal(05/15/2023): Cue to use call bell  Discharge Goal: Patient will be aware of risk of fall and safety in the home  setting    Performed Intervention(s)  falls precautions  bed/chair alarm  safety rounds      Psychosocial    [RN] Coping/Adjustment(Active)  Current Status(05/12/2023): Expresses appropriate coping  Weekly Goal(05/15/2023): Educate patient regarding stroke and hypertention  Discharge Goal: Knowledgeable of care needs and stroke recovery    Performed Intervention(s)  Verbalizes needs and concerns  Therapeutic environmental set up      Sphincter Control    [RN] Bladder Management(Active)  Current Status(05/12/2023): Continent/incontinent of bladder at times  Weekly Goal(05/15/2023): Continent 75% of time  Discharge Goal: Continent 100% of time    [RN] Bowel Management(Active)  Current Status(05/12/2023): Continent/Incontinent at times of bowel  Weekly Goal(05/15/2023): Continent of bowel 75%  Discharge Goal: Continent of bowel 100%    Performed Intervention(s)  Bowel/bladder training  Encourage fluid intake  Scheduled toileting    Signed by: Anabela Taveras RN

## 2023-05-12 NOTE — THERAPY TREATMENT NOTE
Inpatient Rehabilitation - Physical Therapy Treatment Note       [facility]     Patient Name: Manuel Durant  : 1950  MRN: [id number]    Today's Date: 2023                    Admit Date: 2023      Visit Dx:   No diagnosis found.    Patient Active Problem List   Diagnosis   • Intracranial hemorrhage   • Acute DVT (deep venous thrombosis)   • HTN (hypertension)   • Severe Malnutrition (HCC)   • Hemiplegia   • ICH (intracerebral hemorrhage)   • Stroke       Past Medical History:   Diagnosis Date   • Hypertension    • Hypokalemia 2023   • Stroke        No past surgical history on file.    PT ASSESSMENT (last 12 hours)     IRF PT Evaluation and Treatment     Row Name 23 1200          PT Time and Intention    Document Type daily treatment  -AE     Mode of Treatment individual therapy;physical therapy  -AE     Patient/Family/Caregiver Comments/Observations eager for PT  -AE     Row Name 23 1200          General Information    Patient Profile Reviewed yes  -AE     General Observations of Patient no ipad used this tx session  -AE     Existing Precautions/Restrictions fall  -AE     Row Name 23 1200          Pain Assessment    Pretreatment Pain Rating 5/10  -AE     Posttreatment Pain Rating 5/10  -AE     Pain Location - Side/Orientation Right  -AE     Pain Location upper  -AE     Pain Location - shoulder  -AE     Row Name 23 1200          Cognition/Psychosocial    Affect/Mental Status (Cognition) WFL;unable/difficult to assess  -AE     Orientation Status (Cognition) oriented to;person  -AE     Follows Commands (Cognition) follows one-step commands;physical/tactile prompts required;verbal cues/prompting required  -AE     Personal Safety Interventions fall prevention program maintained;gait belt;muscle strengthening facilitated;nonskid shoes/slippers when out of bed;supervised activity  -AE     Row Name 23 1200          Bed Mobility    Sit-Supine [place] (Bed  Mobility) minimum assist (75% patient effort);moderate assist (50% patient effort)  -AE     Supine-Sit-Supine [place] (Bed Mobility) moderate assist (50% patient effort)  -AE     Comment, (Bed Mobility) flat mat no rails, educated to perform L sidelying  -AE     Row Name 05/12/23 1200          Transfer Assessment/Treatment    Comment, (Transfers) emphasized sit<>stand transfers at karen bars in AM session including scooting in chair and pushing from wc to minimize pulling on bars. performed 3-4x in AM session  -AE     Row Name 05/12/23 1200          Bed-Chair Transfer    Bed-Chair [place] (Transfers) moderate assist (50% patient effort);1 person assist;2 person assist  -AE     Comment, (Bed-Chair Transfer) stand pivot  -AE     Row Name 05/12/23 1200          Chair-Bed Transfer    Chair-Bed [place] (Transfers) moderate assist (50% patient effort);1 person assist;2 person assist  -AE     Assistive Device (Chair-Bed Transfers) wheelchair  -AE     Comment, (Chair-Bed Transfer) stand pivot  -AE     Row Name 05/12/23 1200          Sit-Stand Transfer    Sit-Stand [place] (Transfers) minimum assist (75% patient effort);moderate assist (50% patient effort);1 person assist  -AE     Assistive Device (Sit-Stand Transfers) karen bars;wheelchair  -AE     Row Name 05/12/23 1200          Stand-Sit Transfer    Stand-Sit [place] (Transfers) minimum assist (75% patient effort)  -AE     Assistive Device (Stand-Sit Transfers) karen bars;wheelchair  -AE     Row Name 05/12/23 1200          Gait/Stairs (Locomotion)    [place] Level (Gait) moderate assist (50% patient effort)  -AE     Assistive Device (Gait) karen bars  -AE     Distance in Feet (Gait) 8ft karen bars x 2  -AE     Pattern (Gait) step-to;step-through  -AE     Deviations/Abnormal Patterns (Gait) festinating/shuffling;gait speed decreased;sandhya decreased;ataxic;base of support, narrow;weight shifting decreased;scissoring;stride length decreased  -AE      Bilateral Gait Deviations forward flexed posture;weight shift ability decreased  -AE     Left Sided Gait Deviations weight shift ability decreased  -AE     Right Sided Gait Deviations foot slap;weight shift ability decreased  -AE     Gait Assessment/Intervention performed 2x with extended rest break. demonstrated sequencing including short steps on RLE and reciprocal steps on LLE. close wc follow  -AE     Comment, (Gait/Stairs) very ataxic RLE with significant flexor tone. heel unable to fully reach ground  -AE     Row Name 05/12/23 1200          Knee (Therapeutic Exercise)    Knee Strengthening (Therapeutic Exercise) right;SAQ (short arc quad);left;SLR (straight leg raise);3 sets;10 repetitions  unable to perform SLR on R side  -AE     Row Name 05/12/23 1200          Core Strength (Therapeutic Exercise)    Core Strength (Therapeutic Exercise) bridging, bilateral lower extremities;3 sets;5 repetitions  -AE     Row Name 05/12/23 1200          Positioning and Restraints    Pre-Treatment Position sitting in chair/recliner  -AE     Post Treatment Position other  -AE     Other Position with other staff  transport to ay on stretcher  -AE           User Key  (r) = Recorded By, (t) = Taken By, (c) = Cosigned By    Initials Name Provider Type    AE Angeles Altman PT Physical Therapist                 Physical Therapy Education     Title: PT OT SLP Therapies (In Progress)     Topic: Physical Therapy (In Progress)     Point: Mobility training (In Progress)     Learning Progress Summary           Patient Acceptance, E,TB, NR by LB at 5/10/2023 1211    Acceptance, E,D, NR by DP at 5/9/2023 1217                   Point: Home exercise program (In Progress)     Learning Progress Summary           Patient Acceptance, E,D, NR by DP at 5/9/2023 1217                   Point: Body mechanics (In Progress)     Learning Progress Summary           Patient Acceptance, E,D, NR by DP at 5/9/2023 1217                   Point:  Precautions (In Progress)     Learning Progress Summary           Patient Acceptance, E,D, NR by DP at 5/9/2023 1217                               User Key     Initials Effective Dates Name Provider Type Discipline    LB 06/16/21 -  Le Reynoso PT Physical Therapist PT    DP 08/24/21 -  Tristin Vicente PT Physical Therapist PT                PT Recommendation and Plan                          Time Calculation:      PT Charges     Row Name 05/12/23 1513 05/12/23 1220          Time Calculation    Start Time 1230  -AE 0930  -AE     Stop Time 1300  -AE 1000  -AE     Time Calculation (min) 30 min  -AE 30 min  -AE     PT Received On 05/12/23  -AE 05/12/23  -AE     PT - Next Appointment 05/13/23  -AE 05/13/23  -AE        Time Calculation- PT    Total Timed Code Minutes- PT 30 minute(s)  -AE 30 minute(s)  -AE           User Key  (r) = Recorded By, (t) = Taken By, (c) = Cosigned By    Initials Name Provider Type    AE Angeles Altman, PT Physical Therapist                Therapy Charges for Today     Code Description Service Date Service Provider Modifiers Qty    86927071071 HC PT NEUROMUSC RE EDUCATION EA 15 MIN 5/11/2023 Angeles Altman, PT GP 1    95352636597 HC PT THER PROC EA 15 MIN 5/11/2023 Angeles Altman, PT GP 1    35904908315 HC PT NEUROMUSC RE EDUCATION EA 15 MIN 5/11/2023 Angeles Altman, PT GP 1    74347016167 HC PT THER PROC EA 15 MIN 5/11/2023 Angeles Altman, PT GP 1    83505334279 HC PT THER SUPP EA 15 MIN 5/11/2023 Angeles Altman, PT GP 1    87578255454 HC GAIT TRAINING EA 15 MIN 5/12/2023 Angeles Altman, PT GP 1    12375737249 HC PT THERAPEUTIC ACT EA 15 MIN 5/12/2023 Angeles Altman, PT GP 1    82321449297 HC PT THER SUPP EA 15 MIN 5/12/2023 Angeles Altman, PT GP 1    25871681277 HC PT THER PROC EA 15 MIN 5/12/2023 Angeles Altman, PT GP 1    32761513030 HC PT THERAPEUTIC ACT EA 15 MIN 5/12/2023 Angeles Altman, PT GP 1            PT G-Codes  AM-PAC 6 Clicks Score (PT):  14      Angeles Altman, PT  5/12/2023

## 2023-05-13 PROCEDURE — 92507 TX SP LANG VOICE COMM INDIV: CPT

## 2023-05-13 PROCEDURE — 92526 ORAL FUNCTION THERAPY: CPT

## 2023-05-13 PROCEDURE — 97535 SELF CARE MNGMENT TRAINING: CPT

## 2023-05-13 PROCEDURE — 97112 NEUROMUSCULAR REEDUCATION: CPT

## 2023-05-13 PROCEDURE — 97530 THERAPEUTIC ACTIVITIES: CPT

## 2023-05-13 PROCEDURE — 97110 THERAPEUTIC EXERCISES: CPT

## 2023-05-13 RX ADMIN — APIXABAN 5 MG: 5 TABLET, FILM COATED ORAL at 05:20

## 2023-05-13 RX ADMIN — ACETAMINOPHEN 650 MG: 325 TABLET, FILM COATED ORAL at 13:43

## 2023-05-13 RX ADMIN — APIXABAN 5 MG: 5 TABLET, FILM COATED ORAL at 17:08

## 2023-05-13 RX ADMIN — VALSARTAN 160 MG: 160 TABLET, FILM COATED ORAL at 07:47

## 2023-05-13 RX ADMIN — ATORVASTATIN CALCIUM 40 MG: 20 TABLET, FILM COATED ORAL at 07:47

## 2023-05-13 RX ADMIN — AMLODIPINE BESYLATE 10 MG: 10 TABLET ORAL at 07:47

## 2023-05-13 RX ADMIN — ACETAMINOPHEN 650 MG: 325 TABLET, FILM COATED ORAL at 05:30

## 2023-05-13 RX ADMIN — GABAPENTIN 100 MG: 100 CAPSULE ORAL at 07:47

## 2023-05-13 RX ADMIN — BACLOFEN 5 MG: 10 TABLET ORAL at 13:43

## 2023-05-13 RX ADMIN — CARVEDILOL 3.12 MG: 3.12 TABLET, FILM COATED ORAL at 17:08

## 2023-05-13 RX ADMIN — BACLOFEN 5 MG: 10 TABLET ORAL at 05:20

## 2023-05-13 RX ADMIN — LIDOCAINE 1 PATCH: 50 PATCH CUTANEOUS at 07:48

## 2023-05-13 RX ADMIN — BACLOFEN 5 MG: 10 TABLET ORAL at 22:39

## 2023-05-13 RX ADMIN — GABAPENTIN 100 MG: 100 CAPSULE ORAL at 16:07

## 2023-05-13 RX ADMIN — GABAPENTIN 100 MG: 100 CAPSULE ORAL at 22:39

## 2023-05-13 RX ADMIN — CARVEDILOL 3.12 MG: 3.12 TABLET, FILM COATED ORAL at 07:47

## 2023-05-13 NOTE — PROGRESS NOTES
LOS: 5 days   Patient Care Team:  Provider, No Known as PCP - General SHABNAM MCDONALD  1950      ADMITTING DIAGNOSIS:  Hemorrhagic stroke-left basal ganglia/internal capsule with intraventricular extension on March 12, 2023-  Right hemiparesis   Impaired mobility/impaired self-care  Diplopia  Dysphagia-mechanical soft/chopped meat/nectar thick liquid  Spasticity-baclofen 2.5 mg every 8 hours  Hypertension-amlodipine/carvedilol/valsartan  DVT-right soleal vein-March 17, 2023-Eliquis      Subjective     NAEON. Still w/ R-sided weakness. Complained of R shoulder/hand/hip/thigh pain. He denied fever, chest pain, SOA, or n/v/d.    Objective     Vitals:    05/13/23 1142   BP: 114/55   Pulse: 55   Resp: 18   Temp: 97.5 °F (36.4 °C)   SpO2: 98%       PHYSICAL EXAM:   MENTAL STATUS -  AWAKE / ALERT; laying in bed awake; friends at bedside  HEENT- NCAT, PUPILS EQUALLY ROUND, SCLERAE ANICTERIC, CONJUNCTIVAE PINK, OP MOIST, NO JVD, EARS UNREMARKABLE EXTERNALLY  LUNGS - CTA, NO WHEEZES, RALES OR RHONCHI  HEART- RRR, NO RUB, MURMUR, OR GALLOP  ABD - NORMOACTIVE BOWEL SOUNDS, SOFT, NT. NO HEPATOSPLENOMEGALY APPRECIATED  EXT -edema in the right hand with decreased range of motion  Decreased range of motion right shoulder  NEURO -awake alert.  Oriented.  No significant dysarthria.     MOTOR EXAM -  LUE/LLE 5/5.    Right shoulder flexion 3-/5, elbow flexion 3-/5, finger flexion 2/5, hip flexion 3-/5, knee extension 3-/5, ankle dorsiflexion 1/5.  Overall better movement with better speed of movement on the right side  MAS 1+ in shoulder ADD, elbow flexors, elbow pronators, wrist flexors, finger flexors      MEDICATIONS  Scheduled Meds:amLODIPine, 10 mg, Oral, QAM AC  apixaban, 5 mg, Oral, Q12H  atorvastatin, 40 mg, Oral, Daily  baclofen, 5 mg, Oral, Q8H  carvedilol, 3.125 mg, Oral, BID With Meals  gabapentin, 100 mg, Oral, TID  lidocaine, 1 patch, Transdermal, Q24H  valsartan, 160 mg, Oral, QAM AC      Continuous  Infusions:   PRN Meds:.•  acetaminophen  •  senna-docusate sodium **AND** polyethylene glycol **AND** bisacodyl **AND** bisacodyl  •  loperamide      RESULTS  No results found for: POCGLU  Results from last 7 days   Lab Units 05/08/23 2132   WBC 10*3/mm3 6.91   HEMOGLOBIN g/dL 12.7*   HEMATOCRIT % 38.5   PLATELETS 10*3/mm3 220     Results from last 7 days   Lab Units 05/08/23 2132   SODIUM mmol/L 138   POTASSIUM mmol/L 4.3   CHLORIDE mmol/L 106   CO2 mmol/L 25.0   BUN mg/dL 18   CREATININE mg/dL 0.76   CALCIUM mg/dL 8.6   BILIRUBIN mg/dL 0.3   ALK PHOS U/L 131*   ALT (SGPT) U/L 44*   AST (SGOT) U/L 18   GLUCOSE mg/dL 115*           ASSESSMENT and PLAN    ICH (intracerebral hemorrhage)    Stroke    Hemorrhagic stroke-left basal ganglia/internal capsule with intraventricular extension on March 12, 2023-    Right hemiparesis     Impaired mobility/impaired self-care    Diplopia    Dysphagia-mechanical soft/chopped meat/nectar thick liquid    Spasticity-baclofen 2.5 mg every 8 hours  May 9- Significant flexor synergy on the RUE with shoulder abd, elbow flexor and pronate tone. Currently on baclofen 2.5 q8h. Will evaluate tone with therapy today and make adjustments as needed.  May 10- titrate up on baclofen 5 mg every 8 hour  May 11- Continue to monitor RUE tone with increase of Baclofen yesterday. Will uptitrate as tolerated. OT to start NMES for motor training and spasticity to the RUE today.  May 12-spasticity improved with increase in baclofen as well as addition of low-dose gabapentin  May 13 - continue baclofen/gabapentin; tolerating well; denied side-effects    Right shoulder pain-May 12-no signs of complex regional pain syndrome.  Pain in part may be related to spasticity/immobility/degenerative changes.  X-ray of the right shoulder shows mild to moderate degenerative changes at the acromioclavicular joint.  May titrate up on baclofen and gabapentin over  time    Hypertension-amlodipine/carvedilol/valsartan    DVT-right soleal vein-March 17, 2023-Brendan Morillo MD      During rounds, used appropriate personal protective equipment including mask and gloves.  Additional gown if indicated.  Mask used was standard procedure mask. Appropriate PPE was worn during the entire visit.  Hand hygiene was completed before and after.

## 2023-05-13 NOTE — PLAN OF CARE
Problem: Rehabilitation (IR[facility]) Plan of Care  Goal: Plan of Care Review  Outcome: Ongoing, Progressing  Flowsheets (Taken 5/13/2023 0116)  Progress: improving  Plan of Care Reviewed With: patient  Outcome Evaluation: Pt is A&Ox3-4, he can sometimes get confused on time, speaks little english, meds whole w/ NTL, R hemiparesis, R arm very weak, he does c/o R shoulder pain intermittently but denied any pain last night, pt did have gas but did not want anything for it, refused scds, encouraged oral care, pt is very pleasant, resting well tonight, will continue to monitor.   Goal Outcome Evaluation:  Plan of Care Reviewed With: patient        Progress: improving  Outcome Evaluation: Pt is A&Ox3-4, he can sometimes get confused on time, speaks little english, meds whole w/ NTL, R hemiparesis, R arm very weak, he does c/o R shoulder pain intermittently but denied any pain last night, pt did have gas but did not want anything for it, refused scds, encouraged oral care, pt is very pleasant, resting well tonight, will continue to monitor.

## 2023-05-13 NOTE — THERAPY TREATMENT NOTE
Inpatient Rehabilitation - Physical Therapy Treatment Note       [facility]     Patient Name: Manuel Durant  : 1950  MRN: [id number]    Today's Date: 2023                    Admit Date: 2023      Visit Dx:   No diagnosis found.    Patient Active Problem List   Diagnosis   • Intracranial hemorrhage   • Acute DVT (deep venous thrombosis)   • HTN (hypertension)   • Severe Malnutrition (HCC)   • Hemiplegia   • ICH (intracerebral hemorrhage)   • Stroke       Past Medical History:   Diagnosis Date   • Hypertension    • Hypokalemia 2023   • Stroke        No past surgical history on file.    PT ASSESSMENT (last 12 hours)     IRF PT Evaluation and Treatment     Row Name 23 08          PT Time and Intention    Document Type daily treatment  -AE     Mode of Treatment physical therapy  -AE     Patient/Family/Caregiver Comments/Observations pt still complaining of R sided pain RUE > RLE  -AE     Row Name 23          General Information    Patient Profile Reviewed yes  -AE     General Observations of Patient no ipad used this session  -AE     Existing Precautions/Restrictions fall  -AE     Row Name 23          Pain Assessment    Pretreatment Pain Rating 5/10  -AE     Posttreatment Pain Rating 5/10  -AE     Pain Location - Side/Orientation Right  -AE     Pain Location upper  -AE     Pain Location - shoulder  -AE     Row Name 23 08          Cognition/Psychosocial    Affect/Mental Status (Cognition) WFL;unable/difficult to assess  -AE     Orientation Status (Cognition) oriented to;person  -AE     Follows Commands (Cognition) follows one-step commands;physical/tactile prompts required;verbal cues/prompting required  -AE     Personal Safety Interventions fall prevention program maintained;gait belt;muscle strengthening facilitated;nonskid shoes/slippers when out of bed;supervised activity  -AE     Row Name 23 08          Bed Mobility    Supine-Sit [place]  (Bed Mobility) moderate assist (50% patient effort)  -AE     Sit-Supine [place] (Bed Mobility) moderate assist (50% patient effort)  -AE     Comment, (Bed Mobility) flat mat no rails. tries to sit up into long sitting,e ducated on log rolling  -AE     Row Name 05/13/23 0800          Bed-Chair Transfer    Bed-Chair [place] (Transfers) moderate assist (50% patient effort);1 person assist;2 person assist  -AE     Assistive Device (Bed-Chair Transfers) wheelchair  -AE     Comment, (Bed-Chair Transfer) stand pivot  -AE     Row Name 05/13/23 0800          Chair-Bed Transfer    Chair-Bed [place] (Transfers) moderate assist (50% patient effort);1 person assist;2 person assist  -AE     Assistive Device (Chair-Bed Transfers) wheelchair  -AE     Comment, (Chair-Bed Transfer) stand pivot  -AE     Row Name 05/13/23 0800          Sit-Stand Transfer    Sit-Stand [place] (Transfers) minimum assist (75% patient effort);moderate assist (50% patient effort)  -AE     Assistive Device (Sit-Stand Transfers) karen bars;wheelchair;cane, quad  -AE     Comment, (Sit-Stand Transfer) --  -AE     Row Name 05/13/23 0800          Stand-Sit Transfer    Stand-Sit [place] (Transfers) minimum assist (75% patient effort)  -AE     Assistive Device (Stand-Sit Transfers) karen bars;wheelchair  -AE     Row Name 05/13/23 0800          Balance    Comment, Balance standing with no UE support in // bars 3 x 10 sec with CGA/Amish  -AE     Row Name 05/13/23 0800          Hip (Therapeutic Exercise)    Hip Strengthening (Therapeutic Exercise) bilateral;marching while seated;aBduction;aDduction;red;resistance band;2 sets;10 repetitions  -AE     Row Name 05/13/23 0800          Knee (Therapeutic Exercise)    Knee Strengthening (Therapeutic Exercise) right;SAQ (short arc quad);left;SLR (straight leg raise);bilateral;LAQ (long arc quad);hamstring curls;red;resistance band;2 sets;10 repetitions  -AE     Row Name 05/13/23 0800          Stretching  (Therapeutic Exercise)    Comment (Stretching Therapeutic Exercise) R ankle DF stretch, R TKE stretch, R SKTC stretch  -AE     Row Name 05/13/23 0800          Positioning and Restraints    Pre-Treatment Position sitting in chair/recliner  -AE     Post Treatment Position wheelchair  -AE     In Wheelchair sitting;call light within reach;encouraged to call for assist;exit alarm on  -AE           User Key  (r) = Recorded By, (t) = Taken By, (c) = Cosigned By    Initials Name Provider Type    AE Angeles Altman, JOSE ELIAS Physical Therapist                 Physical Therapy Education     Title: PT OT SLP Therapies (In Progress)     Topic: Physical Therapy (In Progress)     Point: Mobility training (In Progress)     Learning Progress Summary           Patient Acceptance, E,TB, NR by LB at 5/10/2023 1211    Acceptance, E,D, NR by DP at 5/9/2023 1217                   Point: Home exercise program (In Progress)     Learning Progress Summary           Patient Acceptance, E,D, NR by DP at 5/9/2023 1217                   Point: Body mechanics (In Progress)     Learning Progress Summary           Patient Acceptance, E,D, NR by DP at 5/9/2023 1217                   Point: Precautions (In Progress)     Learning Progress Summary           Patient Acceptance, E,D, NR by DP at 5/9/2023 1217                               User Key     Initials Effective Dates Name Provider Type Discipline    LB 06/16/21 -  Le Reynoso, PT Physical Therapist PT    DP 08/24/21 -  Tristin Vicente PT Physical Therapist PT                PT Recommendation and Plan                          Time Calculation:      PT Charges     Row Name 05/13/23 1151             Time Calculation    Start Time 1000  -AE      Stop Time 1100  -AE      Time Calculation (min) 60 min  -AE      PT Received On 05/13/23  -AE      PT - Next Appointment 05/15/23  -AE         Time Calculation- PT    Total Timed Code Minutes- PT 60 minute(s)  -AE            User Key  (r) = Recorded By, (t) =  Taken By, (c) = Cosigned By    Initials Name Provider Type    AE Angeles Altman, PT Physical Therapist                Therapy Charges for Today     Code Description Service Date Service Provider Modifiers Qty    83024200422 HC GAIT TRAINING EA 15 MIN 5/12/2023 Angeles Altman, PT GP 1    74930377977 HC PT THERAPEUTIC ACT EA 15 MIN 5/12/2023 Angeles Altman, PT GP 1    60295883471 HC PT THER SUPP EA 15 MIN 5/12/2023 Angeles Altman, PT GP 1    62748131979 HC PT THER PROC EA 15 MIN 5/12/2023 Angeles Altman, PT GP 1    50296809322 HC PT THERAPEUTIC ACT EA 15 MIN 5/12/2023 Angeles Altman, PT GP 1    94269678199 HC PT THERAPEUTIC ACT EA 15 MIN 5/13/2023 Angeles Altman, PT GP 2    21274524616 HC PT THER PROC EA 15 MIN 5/13/2023 Angeles Altman, PT GP 2    22997525714 HC PT THER SUPP EA 15 MIN 5/13/2023 Angeles Altman, PT GP 2            PT G-Codes  AM-PAC 6 Clicks Score (PT): 14      Angeles Altman, PT  5/13/2023

## 2023-05-13 NOTE — THERAPY TREATMENT NOTE
Inpatient Rehabilitation - Speech Language Pathology Treatment Note    [facility]     Patient Name: Manuel Durant  : 1950  MRN: [id number]    Today's Date: 2023                   Admit Date: 2023       Visit Dx:    No diagnosis found.    Patient Active Problem List   Diagnosis   • Intracranial hemorrhage   • Acute DVT (deep venous thrombosis)   • HTN (hypertension)   • Severe Malnutrition (HCC)   • Hemiplegia   • ICH (intracerebral hemorrhage)   • Stroke       Past Medical History:   Diagnosis Date   • Hypertension    • Hypokalemia 2023   • Stroke        No past surgical history on file.    SLP Recommendation and Plan                                                            SLP EVALUATION (last 72 hours)     SLP SLC Evaluation     Row Name 23 1330 23 0830 23 0830 05/10/23 1330       Communication Assessment/Intervention    Document Type therapy note (daily note)  -AL therapy note (daily note)  -AL therapy note (daily note)  -AL therapy note (daily note)  -AL therapy note (daily note)  -AL    Patient/Family/Caregiver Comments/Observations Used  via Ipad video.  -AL Pt participated well. Used  via Ipad video.  -AL Pt participated well. Used  via Ipad video.  -AL Pt participated well. Used  via Ipad.  -AL Pt participated well. Used  via Ipad video.  -AL       Pain Scale: Numbers Pre/Post-Treatment    Pretreatment Pain Rating -- -- -- -- 0/10 - no pain  -AL    Pain Location - Side/Orientation -- -- Right  -AL -- --    Pain Location -- -- upper  -AL -- --    Pain Location - -- -- shoulder  -AL -- --    Pre/Posttreatment Pain Comment Pt did not c/o pain.  -AL right shoulder pain, pt did not rate  -AL Pt did not rate pain.  -AL right shoulder pain; pt did not rate  -AL --          User Key  (r) = Recorded By, (t) = Taken By, (c) = Cosigned By    Initials Name  Effective Dates    Nurys Beavers, MS CCC-SLP 06/16/21 -                    EDUCATION    The patient has been educated in the following areas:       Cognitive Impairment.             SLP GOALS     Row Name 05/13/23 1000 05/12/23 1330 05/12/23 0830       ([facility]) Swallow 1    (STG) Swallow 1 Pt will exihibit no overt s/s of aspiration or penetration with trials of water by cup  -TH -- --    [place] (Swallow Short Term Goal 1) with minimal cues (75-90% accuracy)  -TH -- --    Time Frame (Swallow Short Term Goal 1) 1 week  Patient tolerated approximately 6 oz of thin liquids in correlation with completing effortful swallow exercise without s/sx of aspiration. effortful swallow completed x15  -TH -- --       (STG) Pharyngeal Strengthening Exercise Goal 1 (SLP)    Progress/Outcomes (Pharyngeal Strengthening Goal 1, SLP) -- -- good progress toward goal  -AL    Comment (Pharyngeal Strengthening Goal 1, SLP) -- -- Pt completed 20 repetitions of effortful swallow with overall MIN-MOD cues cues. No overt s/s of aspiration or penetration observed in 9/10 trials; delayed cough x1. Required MIN-MOD cues for small sips.  -AL       Word Retrieval Skills Goal 1 (SLP)    Improve Word Retrieval Skills By Goal 1 (SLP) completing functional word finding tasks;completing a divergent task;80%;independently (over 90% accuracy)  -TH completing functional word finding tasks;completing a divergent task;80%;independently (over 90% accuracy)  -AL --    Time Frame (Word Retrieval Goal 1, SLP) 1 week  -TH 1 week  -AL --    Progress/Outcomes (Word Retrieval Goal 1, SLP) good progress toward goal  -TH good progress toward goal  -AL --    Comment (Word Retrieval Goal 1, SLP) patient participated in confrontational naming task with 100% accuracy  -TH Confrontation naming of pictures: 90% with NO cues, 100% with MIN cues; self correction x1.  -AL --       Connected Speech to Express Thoughts Goal 1 (SLP)    Improve Narrative Discourse to  Express Thoughts By Goal 1 (SLP) describing a picture;conversational task on a given topic;80%;with minimal cues (75-90%)  -TH -- describing a picture;conversational task on a given topic;80%;with minimal cues (75-90%)  -AL    Time Frame (Connected Speech Goal 1, SLP) 1 week  -TH -- 1 week  -AL    Progress/Outcomes (Connected Speech Goal 1, SLP) good progress toward goal  -TH -- good progress toward goal  -AL    Comment (Connected Speech Goal 1, SLP) mod-max cues needed to accurately complete picture description task  -TH -- Sentence descripion of problems in pictures: 40% iwth NO cues, 100% with MIN-MOD cues.  requested repetition in 2/10 trials due to reduced intelligibility. Semantic paraphasias noted x3.  -AL       Orientation Goal 1 (SLP)    Improve Orientation Through Goal 1 (SLP) demonstrating orientation to day;demonstrating orientation to month;demonstrating orientation to year;demonstrating orientation to place;demonstrating orientation to disease/impairment;80%;with minimal cues (75-90%)  -TH -- --    Progress/Outcomes (Orientation Goal 1, SLP) good progress toward goal  -TH -- good progress toward goal  -AL    Comment (Orientation Goal 1, SLP) Patient was oriented to situaiton and place, but unsure of month/year.  -TH -- Oriented to situation, city, state, month, year. Required MIN cues for SARAH.  -AL       Memory Skills Goal 1 (SLP)    Improve Memory Skills Through Goal 1 (SLP) recall details of the day;80%;with moderate cues (50-74%)  -TH recall details of the day;80%;with moderate cues (50-74%)  -AL --    Time Frame (Memory Skills Goal 1, SLP) 1 week  -TH 1 week  -AL --    Progress/Outcomes (Memory Skills Goal 1, SLP) good progress toward goal  -TH good progress toward goal  -AL --    Comment (Memory Skills Goal 1, SLP) Patient was able to recall 3/3 items given 5 minute delay without cues. Given 30 minute delay patient was able to call 1/3 independently increasing to 3/3 given category cue.   -TH Pt required MAX cues to recall going to X-ray prior to session; however, suspect difficulty comprehending question. Required MAX cues for immediate memory for 3 fruits; however, recalled after 5 minutes with NO cues.  -AL --       Functional Problem Solving Skills Goal 1 (SLP)    Improve Problem Solving Through Goal 1 (SLP) -- determine solutions to simple ADL/safety problems;name items for a task;80%;independently (over 90% accuracy)  -AL --    Time Frame (Problem Solving Goal 1, SLP) -- 1 week  -AL --    Progress/Outcomes (Problem Solving Goal 1, SLP) -- good progress toward goal  -AL --    Comment (Problem Solving Goal 1, SLP) -- Reading basic time on clock: 90% with NO cues, 100% with MOD cues.  -AL --    Row Name 05/11/23 1330 05/11/23 0830 05/10/23 1330       ([facility]) Pharyngeal Strengthening Exercise Goal 1 (SLP)    Progress/Outcomes (Pharyngeal Strengthening Goal 1, SLP) good progress toward goal  -AL -- --    Comment (Pharyngeal Strengthening Goal 1, SLP) Pt completed 20 repetitions of effortful swallow with overall MOD-MAX cues. No overt s/s of aspiration or penetration observed in 11/13 trials of water by cup; immediate coughing x1, delayed cough x1.  -AL No overt s/s of aspiration or penetration observed wiht water by cup with initial cues for small, single sips.  -AL --       Comprehend Questions Goal 1 (SLP)    Improve Ability to Comprehend Questions Goal 1 (SLP) -- -- questions about personal information;simple yes/no questions;80%;independently (over 90% accuracy)  -AL    Time Frame (Comprehend Questions Goal 1, SLP) -- -- 1 week  -AL    Progress/Outcomes (Comprehend Questions Goal 1, SLP) -- -- goal ongoing  -AL    Comment (Comprehend Questions Goal 1, SLP) -- -- simple and personal yes/no questions in [place]: 90% with NO cues  -AL       Follow Directions Goal 2 (SLP)    Comment (Follow Directions Goal 1, SLP) -- -- Diagnostic treatment: Pt followed 1-step and 2-step body part commands in [place]  with NO cues. Followed sequential commands in 1/4 trials with NO cues, 4/4 with demonstration and repetition.  -AL       Word Retrieval Skills Goal 1 (SLP)    Improve Word Retrieval Skills By Goal 1 (SLP) -- -- completing functional word finding tasks;completing a divergent task;80%;independently (over 90% accuracy)  -AL    Time Frame (Word Retrieval Goal 1, SLP) -- -- 1 week  -AL    Progress/Outcomes (Word Retrieval Goal 1, SLP) -- -- good progress toward goal  -AL    Comment (Word Retrieval Goal 1, SLP) -- -- Confrontation naming of pictures: 90% with NO cues, 100% with MIN cues; vision deficit impacted task; pt self-corrected a semantic paraphasia x1.  -AL       Connected Speech to Express Thoughts Goal 1 (SLP)    Improve Narrative Discourse to Express Thoughts By Goal 1 (SLP) -- describing a picture;conversational task on a given topic;80%;with minimal cues (75-90%)  -AL describing a picture;conversational task on a given topic;80%;with minimal cues (75-90%)  -AL    Time Frame (Connected Speech Goal 1, SLP) -- 1 week  -AL 1 week  -AL    Progress/Outcomes (Connected Speech Goal 1, SLP) -- good progress toward goal  -AL good progress toward goal  -AL    Comment (Connected Speech Goal 1, SLP) -- Sentence description of picture: 40% with NO cues, 80% with MIN cues, 100% with MAX cues. Pt able to order food for meals using  assistance with NO cues.  -AL Sentence description of pictures: 50% with NO cues, 100% with MIN-MOD cues to provide additional details and for correct verbs.  -AL       Attention Goal (SLP)    Improve Attention by Goal (SLP) -- Pt will complete basic attention to detail tasks with MIN cues for attention and scanning to the right.  -AL --    Time Frame (Attention Goal, SLP) -- 1 week  -AL --    Comment (Attention Goal, SLP) -- Sorted cards into red vs. black for 2 minutes with NO cues. Pt exhibited difficulty identifying specific cards, suspect vision vs. aphasia impacting.  -AL --        Orientation Goal 1 (SLP)    Improve Orientation Through Goal 1 (SLP) -- demonstrating orientation to day;demonstrating orientation to month;demonstrating orientation to year;demonstrating orientation to place;demonstrating orientation to disease/impairment;80%;with minimal cues (75-90%)  -AL demonstrating orientation to day;demonstrating orientation to month;demonstrating orientation to year;demonstrating orientation to place;demonstrating orientation to disease/impairment;80%;with minimal cues (75-90%)  -AL    Time Frame (Orientation Goal 1, SLP) -- 2 weeks  -AL 2 weeks  -AL    Progress/Outcomes (Orientation Goal 1, SLP) good progress toward goal  -AL good progress toward goal  -AL good progress toward goal  -AL    Comment (Orientation Goal 1, SLP) Oriented to situation, city, state; required MAX cues for age, month and year. Able to recall  with NO cues.  -AL Oriented to situation, city and state. Required MAX cues for month, year and hospital.  -AL Oriented to situation, city, and state with NO cues. Required MAX cues for month and year.  -AL       Memory Skills Goal 1 (SLP)    Progress/Outcomes (Memory Skills Goal 1, SLP) -- good progress toward goal  -AL --    Comment (Memory Skills Goal 1, SLP) Recalled month and year after 5 minutes with NO cues.  -AL Recalled 2/2 salient piecies of info (year, month) after 5 minute delay x2. Recalled 2/2 hidden objects after 4 minutes with NO cues.  -AL --       Functional Problem Solving Skills Goal 1 (SLP)    Improve Problem Solving Through Goal 1 (SLP) determine solutions to simple ADL/safety problems;name items for a task;80%;independently (over 90% accuracy)  -AL -- --    Time Frame (Problem Solving Goal 1, SLP) 1 week  -AL -- --    Progress/Outcomes (Problem Solving Goal 1, SLP) good progress toward goal  -AL -- --    Comment (Problem Solving Goal 1, SLP) Stated 2 solutions to complex problems at home with 80% accuracy with NO cues, 100% with MIN cues to clarify his  message.  -AL -- --          User Key  (r) = Recorded By, (t) = Taken By, (c) = Cosigned By    Initials Name Provider Type    Nurys Beavers MS CCC-SLP Speech and Language Pathologist    Jennie Yang Speech and Language Pathologist                            Time Calculation:        Time Calculation- SLP     Row Name 05/13/23 1044             Time Calculation- SLP    SLP Start Time 0900  -TH      SLP Stop Time 1000  -TH      SLP Time Calculation (min) 60 min  -TH            User Key  (r) = Recorded By, (t) = Taken By, (c) = Cosigned By    Initials Name Provider Type    Jennie Yang Speech and Language Pathologist                  Therapy Charges for Today     Code Description Service Date Service Provider Modifiers Qty    88871688462 HC ST TREATMENT SWALLOW 1 5/13/2023 Jennie Senior 1    [id number] HC ST TREATMENT SPEECH 3 5/13/2023 Jennie Senior 1                           Jennie Senior  5/13/2023

## 2023-05-13 NOTE — PLAN OF CARE
Goal Outcome Evaluation:  Plan of Care Reviewed With: patient        Progress: improving  Outcome Evaluation: Pt is alert, disoriented to place and time, stated the year was 1992, easily reoriented. Interrupter used. Assist X1 to wc. PRN tylenol given for pain in RLE. Lidocaine patch applied for pain in RUE. Meds whole with NTL. Up for meals. R karen, mild slurred speech, diplopia in both eyes. Con/Incon of B&B. Edema noted in RUE. No unsafe behaviors noted, pt educated on use of call light for assistance. Pt participated with all therapies today.

## 2023-05-13 NOTE — THERAPY TREATMENT NOTE
Inpatient Rehabilitation - Occupational Therapy Treatment Note    [facility]     Patient Name: Manuel Durant  : 1950  MRN: [id number]    Today's Date: 2023                 Admit Date: 2023       No diagnosis found.    Patient Active Problem List   Diagnosis   • Intracranial hemorrhage   • Acute DVT (deep venous thrombosis)   • HTN (hypertension)   • Severe Malnutrition (HCC)   • Hemiplegia   • ICH (intracerebral hemorrhage)   • Stroke       Past Medical History:   Diagnosis Date   • Hypertension    • Hypokalemia 2023   • Stroke        No past surgical history on file.          IRF OT ASSESSMENT FLOWSHEET (last 12 hours)     IRF OT Evaluation and Treatment     Row Name 23 1148          OT Time and Intention    Document Type daily treatment  -CC     Mode of Treatment occupational therapy  -CC     Patient Effort good  -CC     Row Name 23 1148          Pain Scale: FACES Pre/Post-Treatment    Pain: FACES Scale, Pretreatment 4-->hurts little more  -CC     Posttreatment Pain Rating 2-->hurts little bit  -CC     Pain Location - Side/Orientation Right  -CC     Pain Location upper  -CC     Pain Location - shoulder  -CC     Row Name 23 1148          Cognition/Psychosocial    Affect/Mental Status (Cognition) WFL  -CC     Follows Commands (Cognition) follows one-step commands;repetition of directions required;verbal cues/prompting required;physical/tactile prompts required  -CC     Personal Safety Interventions fall prevention program maintained;gait belt;nonskid shoes/slippers when out of bed  -CC     Row Name 23 1148          Bathing    [place] Level (Bathing) bathing skills;verbal cues;nonverbal cues (demo/gesture);minimum assist (75% patient effort)  -CC     Assistive Device (Bathing) grab bar/tub rail;hand held shower spray hose;long-handled sponge  -CC     Position (Bathing) supported sitting;supported standing  -CC     Set-up Assistance (Bathing) obtain  supplies;adjust water temperature  -     Row Name 05/13/23 1148          Upper Body Dressing    [place] Level (Upper Body Dressing) upper body dressing skills;doff;don;pull over garment;verbal cues;nonverbal cues (demo/gesture);minimum assist (75% or more patient effort)  -CC     Position (Upper Body Dressing) supported sitting  -CC     Set-up Assistance (Upper Body Dressing) obtain clothing  -CC     Comment (Upper Body Dressing) karen dressing  -CC     Row Name 05/13/23 1148          Lower Body Dressing    [place] Level (Lower Body Dressing) doff;don;pants/bottoms;socks;verbal cues;nonverbal cues (demo/gesture);moderate assist (50% patient effort)  -CC     Position (Lower Body Dressing) supported sitting;supported standing  -CC     Set-up Assistance (Lower Body Dressing) obtain clothing  -CC     Comment (Lower Body Dressing) karen dressing  -     Row Name 05/13/23 1148          Grooming    [place] Level (Grooming) grooming skills;oral care regimen;hair care, combing/brushing;verbal cues;nonverbal cues (demo/gesture);standby assist;deodorant application;minimum assist (75% patient effort)  -     Position (Grooming) sink side;supported sitting  -CC     Comment (Grooming) assist w deodorant  -     Row Name 05/13/23 1148          Bed Mobility    Comment, (Bed Mobility) in w/c  -     Row Name 05/13/23 1148          Sit-Stand Transfer    Sit-Stand [place] (Transfers) minimum assist (75% patient effort);moderate assist (50% patient effort)  -     Assistive Device (Sit-Stand Transfers) wheelchair  -     Row Name 05/13/23 1148          Stand-Sit Transfer    Stand-Sit [place] (Transfers) minimum assist (75% patient effort)  -     Assistive Device (Stand-Sit Transfers) wheelchair  -     Row Name 05/13/23 1148          Shower Transfer    Type (Shower Transfer) stand pivot/stand step  -     [place] Level (Shower Transfer) minimum assist (75% patient effort)  -     Assistive  Device (Shower Transfer) grab bar, tub/shower;tub bench;wheelchair  -     Row Name 05/13/23 1148          Shoulder (Therapeutic Exercise)    Shoulder (Therapeutic Exercise) AAROM (active assistive range of motion)  -     Shoulder AROM (Therapeutic Exercise) right;flexion;extension;aBduction;aDduction;scapular elevation;scapular retraction;scapular protraction;sitting;5 repetitions  mosit heat applied prior to ROM  -     Row Name 05/13/23 1148          Balance    Static Sitting Balance standby assist  -     Static Standing Balance minimal assist  increased tone RLE  -     Row Name 05/13/23 1148          Positioning and Restraints    Pre-Treatment Position sitting in chair/recliner  -     Post Treatment Position wheelchair  -     In Wheelchair sitting;exit alarm on;with SLP  -CC           User Key  (r) = Recorded By, (t) = Taken By, (c) = Cosigned By    Initials Name Effective Dates    CC Kimberley Brownlee OTR 06/16/21 -                  Occupational Therapy Education     Title: PT OT SLP Therapies (In Progress)     Topic: Occupational Therapy (In Progress)     Point: ADL training (In Progress)     Description:   Instruct learner(s) on proper safety adaptation and remediation techniques during self care or transfers.   Instruct in proper use of assistive devices.              Learning Progress Summary           Patient Acceptance, E,D, NR by DN at 5/9/2023 1710    Comment: Ot role and karen adls and functional transfers                   Point: Home exercise program (In Progress)     Description:   Instruct learner(s) on appropriate technique for monitoring, assisting and/or progressing therapeutic exercises/activities.              Learning Progress Summary           Patient Acceptance, E,D, NR by DN at 5/9/2023 1710    Comment: Ot role and karen adls and functional transfers                   Point: Precautions (In Progress)     Description:   Instruct learner(s) on prescribed precautions during  self-care and functional transfers.              Learning Progress Summary           Patient Acceptance, E,D, NR by DN at 5/9/2023 1710    Comment: Ot role and karen adls and functional transfers                   Point: Body mechanics (In Progress)     Description:   Instruct learner(s) on proper positioning and spine alignment during self-care, functional mobility activities and/or exercises.              Learning Progress Summary           Patient Acceptance, E,D, NR by DN at 5/9/2023 1710    Comment: Ot role and karen adls and functional transfers                               User Key     Initials Effective Dates Name Provider Type Discipline    DN 06/16/21 -  Alek Duarte, OT Occupational Therapist OT                    OT Recommendation and Plan                         Time Calculation:      Time Calculation- OT     Row Name 05/13/23 0800             Time Calculation- OT    OT Start Time 0800  -CC      OT Stop Time 0900  -CC      OT Time Calculation (min) 60 min  -CC            User Key  (r) = Recorded By, (t) = Taken By, (c) = Cosigned By    Initials Name Provider Type    CC Kimberley Brownlee OTR Occupational Therapist              Therapy Charges for Today     Code Description Service Date Service Provider Modifiers Qty    73042098101 HC OT SELF CARE/MGMT/TRAIN EA 15 MIN 5/13/2023 Kimberley Brownlee OTR GO 3    38332040077 HC OT NEUROMUSC RE EDUCATION EA 15 MIN 5/13/2023 Kimberley Brownlee OTR GO 1                   HELLEN Gomez  5/13/2023

## 2023-05-14 RX ADMIN — APIXABAN 5 MG: 5 TABLET, FILM COATED ORAL at 05:22

## 2023-05-14 RX ADMIN — VALSARTAN 160 MG: 160 TABLET, FILM COATED ORAL at 08:26

## 2023-05-14 RX ADMIN — AMLODIPINE BESYLATE 10 MG: 10 TABLET ORAL at 08:26

## 2023-05-14 RX ADMIN — ATORVASTATIN CALCIUM 40 MG: 20 TABLET, FILM COATED ORAL at 08:26

## 2023-05-14 RX ADMIN — APIXABAN 5 MG: 5 TABLET, FILM COATED ORAL at 17:08

## 2023-05-14 RX ADMIN — BACLOFEN 5 MG: 10 TABLET ORAL at 13:56

## 2023-05-14 RX ADMIN — ACETAMINOPHEN 650 MG: 325 TABLET, FILM COATED ORAL at 15:12

## 2023-05-14 RX ADMIN — CARVEDILOL 3.12 MG: 3.12 TABLET, FILM COATED ORAL at 08:26

## 2023-05-14 RX ADMIN — GABAPENTIN 100 MG: 100 CAPSULE ORAL at 20:04

## 2023-05-14 RX ADMIN — GABAPENTIN 100 MG: 100 CAPSULE ORAL at 08:26

## 2023-05-14 RX ADMIN — BACLOFEN 5 MG: 10 TABLET ORAL at 20:04

## 2023-05-14 RX ADMIN — LIDOCAINE 1 PATCH: 50 PATCH CUTANEOUS at 08:26

## 2023-05-14 RX ADMIN — BACLOFEN 5 MG: 10 TABLET ORAL at 05:22

## 2023-05-14 RX ADMIN — GABAPENTIN 100 MG: 100 CAPSULE ORAL at 15:12

## 2023-05-14 RX ADMIN — ACETAMINOPHEN 650 MG: 325 TABLET, FILM COATED ORAL at 05:22

## 2023-05-14 RX ADMIN — ACETAMINOPHEN 650 MG: 325 TABLET, FILM COATED ORAL at 22:16

## 2023-05-14 RX ADMIN — DOCUSATE SODIUM 50MG AND SENNOSIDES 8.6MG 2 TABLET: 8.6; 5 TABLET, FILM COATED ORAL at 20:04

## 2023-05-14 NOTE — PLAN OF CARE
Goal Outcome Evaluation:  Plan of Care Reviewed With: patient        Progress: improving  Outcome Evaluation: Pt is alert, pt able to anwser orientation questions correctly when options are given.  Interrupter used. Assist X1 to wc. PRN tylenol given for pain in RLE. Lidocaine patch applied for pain in RUE. Meds whole with NTL. Up for meals. R karen, mild slurred speech, diplopia/blurred vision in both eyes. Incon of B&B. No unsafe behaviors noted, pt educated on use of call light for assistance.

## 2023-05-14 NOTE — PLAN OF CARE
Goal Outcome Evaluation:     Pt is A/Ox3, may be forgetful, calm/cooperative, not OOB overnight. Meds taken whole w NTL. Pt c/o pain to RUE; prn Tylenol given x1, scheduled Baclofen given. Pt has had urinary incontinence overnight.  services utilized for communication.

## 2023-05-14 NOTE — PROGRESS NOTES
LOS: 6 days   Patient Care Team:  Provider, No Known as PCP - General SHABNAM MCDONALD  1950      ADMITTING DIAGNOSIS:  Hemorrhagic stroke-left basal ganglia/internal capsule with intraventricular extension on March 12, 2023-  Right hemiparesis   Impaired mobility/impaired self-care  Diplopia  Dysphagia-mechanical soft/chopped meat/nectar thick liquid  Spasticity-baclofen 2.5 mg every 8 hours  Hypertension-amlodipine/carvedilol/valsartan  DVT-right soleal vein-March 17, 2023-Eliquis      Subjective     NAEON. Complained of R shoulder/hand/hip/thigh pain. He denied fever, chest pain, SOA, or n/v/d.    Objective     Vitals:    05/14/23 1324   BP: 97/56   Pulse: 58   Resp: 18   Temp: 98.6 °F (37 °C)   SpO2: 97%       PHYSICAL EXAM:   MENTAL STATUS -  AWAKE / ALERT; laying in bed awake; friends at bedside  HEENT- NCAT, PUPILS EQUALLY ROUND, SCLERAE ANICTERIC, CONJUNCTIVAE PINK, OP MOIST, NO JVD, EARS UNREMARKABLE EXTERNALLY  LUNGS - CTA, NO WHEEZES, RALES OR RHONCHI  HEART- RRR, NO RUB, MURMUR, OR GALLOP  ABD - NORMOACTIVE BOWEL SOUNDS, SOFT, NT. NO HEPATOSPLENOMEGALY APPRECIATED  EXT -edema in the right hand with decreased range of motion  Decreased range of motion right shoulder  NEURO -awake alert.  Oriented.  No significant dysarthria.     MOTOR EXAM -  LUE/LLE 5/5.    Right shoulder flexion 3-/5, elbow flexion 3-/5, finger flexion 2/5, hip flexion 3-/5, knee extension 3-/5, ankle dorsiflexion 1/5.  Overall better movement with better speed of movement on the right side  MAS 1+ in shoulder ADD, elbow flexors, elbow pronators, wrist flexors, finger flexors      MEDICATIONS  Scheduled Meds:amLODIPine, 10 mg, Oral, QAM AC  apixaban, 5 mg, Oral, Q12H  atorvastatin, 40 mg, Oral, Daily  baclofen, 5 mg, Oral, Q8H  carvedilol, 3.125 mg, Oral, BID With Meals  gabapentin, 100 mg, Oral, TID  lidocaine, 1 patch, Transdermal, Q24H  valsartan, 160 mg, Oral, QAM AC      Continuous Infusions:   PRN Meds:.•   acetaminophen  •  senna-docusate sodium **AND** polyethylene glycol **AND** bisacodyl **AND** bisacodyl  •  loperamide      RESULTS  No results found for: POCGLU  Results from last 7 days   Lab Units 05/08/23 2132   WBC 10*3/mm3 6.91   HEMOGLOBIN g/dL 12.7*   HEMATOCRIT % 38.5   PLATELETS 10*3/mm3 220     Results from last 7 days   Lab Units 05/08/23  2132   SODIUM mmol/L 138   POTASSIUM mmol/L 4.3   CHLORIDE mmol/L 106   CO2 mmol/L 25.0   BUN mg/dL 18   CREATININE mg/dL 0.76   CALCIUM mg/dL 8.6   BILIRUBIN mg/dL 0.3   ALK PHOS U/L 131*   ALT (SGPT) U/L 44*   AST (SGOT) U/L 18   GLUCOSE mg/dL 115*           ASSESSMENT and PLAN    ICH (intracerebral hemorrhage)    Stroke    Hemorrhagic stroke-left basal ganglia/internal capsule with intraventricular extension on March 12, 2023-    Right hemiparesis     Impaired mobility/impaired self-care    Diplopia    Dysphagia-mechanical soft/chopped meat/nectar thick liquid    Spasticity-baclofen 2.5 mg every 8 hours  May 9- Significant flexor synergy on the RUE with shoulder abd, elbow flexor and pronate tone. Currently on baclofen 2.5 q8h. Will evaluate tone with therapy today and make adjustments as needed.  May 10- titrate up on baclofen 5 mg every 8 hour  May 11- Continue to monitor RUE tone with increase of Baclofen yesterday. Will uptitrate as tolerated. OT to start NMES for motor training and spasticity to the RUE today.  May 12-spasticity improved with increase in baclofen as well as addition of low-dose gabapentin  May 13 - continue baclofen/gabapentin; tolerating well; denied side-effects    Right shoulder pain-May 12-no signs of complex regional pain syndrome.  Pain in part may be related to spasticity/immobility/degenerative changes.  X-ray of the right shoulder shows mild to moderate degenerative changes at the acromioclavicular joint.  May titrate up on baclofen and gabapentin over time  May 14 - heat therapy w/  K-pad    Hypertension-amlodipine/carvedilol/valsartan    DVT-right soleal vein-March 17, 2023-Brendan Morillo MD      During rounds, used appropriate personal protective equipment including mask and gloves.  Additional gown if indicated.  Mask used was standard procedure mask. Appropriate PPE was worn during the entire visit.  Hand hygiene was completed before and after.

## 2023-05-15 VITALS
OXYGEN SATURATION: 97 % | HEIGHT: 66 IN | HEART RATE: 65 BPM | BODY MASS INDEX: 21.65 KG/M2 | SYSTOLIC BLOOD PRESSURE: 141 MMHG | RESPIRATION RATE: 18 BRPM | DIASTOLIC BLOOD PRESSURE: 63 MMHG | WEIGHT: 134.7 LBS | TEMPERATURE: 97.6 F

## 2023-05-15 LAB
ALBUMIN SERPL-MCNC: 3.7 G/DL (ref 3.5–5.2)
ALBUMIN/GLOB SERPL: 1.5 G/DL
ALP SERPL-CCNC: 109 U/L (ref 39–117)
ALT SERPL W P-5'-P-CCNC: 34 U/L (ref 1–41)
ANION GAP SERPL CALCULATED.3IONS-SCNC: 7 MMOL/L (ref 5–15)
AST SERPL-CCNC: 19 U/L (ref 1–40)
BASOPHILS # BLD AUTO: 0.03 10*3/MM3 (ref 0–0.2)
BASOPHILS NFR BLD AUTO: 0.5 % (ref 0–1.5)
BILIRUB SERPL-MCNC: 0.3 MG/DL (ref 0–1.2)
BUN SERPL-MCNC: 14 MG/DL (ref 8–23)
BUN/CREAT SERPL: 20.3 (ref 7–25)
CALCIUM SPEC-SCNC: 8.6 MG/DL (ref 8.6–10.5)
CHLORIDE SERPL-SCNC: 109 MMOL/L (ref 98–107)
CO2 SERPL-SCNC: 27 MMOL/L (ref 22–29)
CREAT SERPL-MCNC: 0.69 MG/DL (ref 0.76–1.27)
DEPRECATED RDW RBC AUTO: 43.8 FL (ref 37–54)
EGFRCR SERPLBLD CKD-EPI 2021: 98.9 ML/MIN/1.73
EOSINOPHIL # BLD AUTO: 0.55 10*3/MM3 (ref 0–0.4)
EOSINOPHIL NFR BLD AUTO: 8.4 % (ref 0.3–6.2)
ERYTHROCYTE [DISTWIDTH] IN BLOOD BY AUTOMATED COUNT: 13.4 % (ref 12.3–15.4)
GLOBULIN UR ELPH-MCNC: 2.4 GM/DL
GLUCOSE SERPL-MCNC: 84 MG/DL (ref 65–99)
HCT VFR BLD AUTO: 33.9 % (ref 37.5–51)
HGB BLD-MCNC: 11.7 G/DL (ref 13–17.7)
IMM GRANULOCYTES # BLD AUTO: 0.01 10*3/MM3 (ref 0–0.05)
IMM GRANULOCYTES NFR BLD AUTO: 0.2 % (ref 0–0.5)
LYMPHOCYTES # BLD AUTO: 1.74 10*3/MM3 (ref 0.7–3.1)
LYMPHOCYTES NFR BLD AUTO: 26.6 % (ref 19.6–45.3)
MCH RBC QN AUTO: 31 PG (ref 26.6–33)
MCHC RBC AUTO-ENTMCNC: 34.5 G/DL (ref 31.5–35.7)
MCV RBC AUTO: 89.9 FL (ref 79–97)
MONOCYTES # BLD AUTO: 0.64 10*3/MM3 (ref 0.1–0.9)
MONOCYTES NFR BLD AUTO: 9.8 % (ref 5–12)
NEUTROPHILS NFR BLD AUTO: 3.56 10*3/MM3 (ref 1.7–7)
NEUTROPHILS NFR BLD AUTO: 54.5 % (ref 42.7–76)
NRBC BLD AUTO-RTO: 0 /100 WBC (ref 0–0.2)
PLATELET # BLD AUTO: 201 10*3/MM3 (ref 140–450)
PMV BLD AUTO: 10.3 FL (ref 6–12)
POTASSIUM SERPL-SCNC: 4.1 MMOL/L (ref 3.5–5.2)
PROT SERPL-MCNC: 6.1 G/DL (ref 6–8.5)
RBC # BLD AUTO: 3.77 10*6/MM3 (ref 4.14–5.8)
SODIUM SERPL-SCNC: 143 MMOL/L (ref 136–145)
WBC NRBC COR # BLD: 6.53 10*3/MM3 (ref 3.4–10.8)

## 2023-05-15 PROCEDURE — 85025 COMPLETE CBC W/AUTO DIFF WBC: CPT | Performed by: PHYSICAL MEDICINE & REHABILITATION

## 2023-05-15 PROCEDURE — 97110 THERAPEUTIC EXERCISES: CPT

## 2023-05-15 PROCEDURE — 92526 ORAL FUNCTION THERAPY: CPT

## 2023-05-15 PROCEDURE — 97112 NEUROMUSCULAR REEDUCATION: CPT

## 2023-05-15 PROCEDURE — 97535 SELF CARE MNGMENT TRAINING: CPT

## 2023-05-15 PROCEDURE — 97116 GAIT TRAINING THERAPY: CPT

## 2023-05-15 PROCEDURE — 97130 THER IVNTJ EA ADDL 15 MIN: CPT

## 2023-05-15 PROCEDURE — 97530 THERAPEUTIC ACTIVITIES: CPT

## 2023-05-15 PROCEDURE — 97129 THER IVNTJ 1ST 15 MIN: CPT

## 2023-05-15 PROCEDURE — 80053 COMPREHEN METABOLIC PANEL: CPT | Performed by: PHYSICAL MEDICINE & REHABILITATION

## 2023-05-15 RX ADMIN — ATORVASTATIN CALCIUM 40 MG: 20 TABLET, FILM COATED ORAL at 08:10

## 2023-05-15 RX ADMIN — BACLOFEN 5 MG: 10 TABLET ORAL at 06:01

## 2023-05-15 RX ADMIN — APIXABAN 5 MG: 5 TABLET, FILM COATED ORAL at 17:53

## 2023-05-15 RX ADMIN — AMLODIPINE BESYLATE 10 MG: 10 TABLET ORAL at 08:10

## 2023-05-15 RX ADMIN — CARVEDILOL 3.12 MG: 3.12 TABLET, FILM COATED ORAL at 17:53

## 2023-05-15 RX ADMIN — ACETAMINOPHEN 650 MG: 325 TABLET, FILM COATED ORAL at 21:30

## 2023-05-15 RX ADMIN — GABAPENTIN 100 MG: 100 CAPSULE ORAL at 15:29

## 2023-05-15 RX ADMIN — GABAPENTIN 100 MG: 100 CAPSULE ORAL at 08:10

## 2023-05-15 RX ADMIN — ACETAMINOPHEN 650 MG: 325 TABLET, FILM COATED ORAL at 11:28

## 2023-05-15 RX ADMIN — GABAPENTIN 100 MG: 100 CAPSULE ORAL at 21:30

## 2023-05-15 RX ADMIN — CARVEDILOL 3.12 MG: 3.12 TABLET, FILM COATED ORAL at 08:10

## 2023-05-15 RX ADMIN — APIXABAN 5 MG: 5 TABLET, FILM COATED ORAL at 06:01

## 2023-05-15 RX ADMIN — BACLOFEN 5 MG: 10 TABLET ORAL at 21:30

## 2023-05-15 RX ADMIN — VALSARTAN 160 MG: 160 TABLET, FILM COATED ORAL at 08:10

## 2023-05-15 RX ADMIN — ACETAMINOPHEN 650 MG: 325 TABLET, FILM COATED ORAL at 06:02

## 2023-05-15 RX ADMIN — LIDOCAINE 1 PATCH: 50 PATCH CUTANEOUS at 08:10

## 2023-05-15 RX ADMIN — BACLOFEN 5 MG: 10 TABLET ORAL at 15:30

## 2023-05-15 NOTE — PROGRESS NOTES
LOS: 7 days   Patient Care Team:  Provider, No Known as PCP - General SHABNAM MCDONALD  1950      ADMITTING DIAGNOSIS:  Hemorrhagic stroke-left basal ganglia/internal capsule with intraventricular extension on March 12, 2023-  Right hemiparesis   Impaired mobility/impaired self-care  Diplopia  Dysphagia-mechanical soft/chopped meat/nectar thick liquid  Spasticity-baclofen 2.5 mg every 8 hours  Hypertension-amlodipine/carvedilol/valsartan  DVT-right soleal vein-March 17, 2023-Eliquis      Subjective       Video .  Patient tolerating activities.  Still discomfort at the right shoulder but has improvement with his movement and spasticity on the right side..    Objective     Vitals:    05/15/23 1154   BP: 125/64   Pulse: 55   Resp: 18   Temp: 97.2 °F (36.2 °C)   SpO2: 96%       PHYSICAL EXAM:   MENTAL STATUS -  AWAKE / ALERT   HEENT- NCAT, PUPILS EQUALLY ROUND, SCLERAE ANICTERIC, CONJUNCTIVAE PINK, OP MOIST, NO JVD, EARS UNREMARKABLE EXTERNALLY  LUNGS - CTA, NO WHEEZES, RALES OR RHONCHI  HEART- RRR, NO RUB, MURMUR, OR GALLOP  ABD - NORMOACTIVE BOWEL SOUNDS, SOFT, NT. NO HEPATOSPLENOMEGALY APPRECIATED  EXT -edema in the right hand with decreased range of motion  Decreased range of motion right shoulder  NEURO -awake alert.  Oriented.  No significant dysarthria.     MOTOR EXAM -  LUE/LLE 5/5.    Right shoulder flexion 3-/5, elbow flexion 3-/5, finger flexion 2/5, hip flexion 3-/5, knee extension 3-/5, ankle dorsiflexion 1/5.  Overall better movement with better speed of movement on the right side  MAS 1+ in shoulder ADD, elbow flexors, elbow pronators, wrist flexors, finger flexors      MEDICATIONS  Scheduled Meds:amLODIPine, 10 mg, Oral, QAM AC  apixaban, 5 mg, Oral, Q12H  atorvastatin, 40 mg, Oral, Daily  baclofen, 5 mg, Oral, Q8H  carvedilol, 3.125 mg, Oral, BID With Meals  gabapentin, 100 mg, Oral, TID  lidocaine, 1 patch, Transdermal, Q24H  valsartan, 160 mg, Oral, QAM AC      Continuous  Infusions:   PRN Meds:.•  acetaminophen  •  senna-docusate sodium **AND** polyethylene glycol **AND** bisacodyl **AND** bisacodyl  •  loperamide      RESULTS  No results found for: POCGLU  Results from last 7 days   Lab Units 05/15/23  0557 05/08/23  2132   WBC 10*3/mm3 6.53 6.91   HEMOGLOBIN g/dL 11.7* 12.7*   HEMATOCRIT % 33.9* 38.5   PLATELETS 10*3/mm3 201 220     Results from last 7 days   Lab Units 05/15/23  0557 05/08/23  2132   SODIUM mmol/L 143 138   POTASSIUM mmol/L 4.1 4.3   CHLORIDE mmol/L 109* 106   CO2 mmol/L 27.0 25.0   BUN mg/dL 14 18   CREATININE mg/dL 0.69* 0.76   CALCIUM mg/dL 8.6 8.6   BILIRUBIN mg/dL 0.3 0.3   ALK PHOS U/L 109 131*   ALT (SGPT) U/L 34 44*   AST (SGOT) U/L 19 18   GLUCOSE mg/dL 84 115*           ASSESSMENT and PLAN    ICH (intracerebral hemorrhage)    Stroke    Hemorrhagic stroke-left basal ganglia/internal capsule with intraventricular extension on March 12, 2023-    Right hemiparesis     Impaired mobility/impaired self-care    Diplopia    Dysphagia-mechanical soft/chopped meat/nectar thick liquid    Spasticity-baclofen 2.5 mg every 8 hours  May 9- Significant flexor synergy on the RUE with shoulder abd, elbow flexor and pronate tone. Currently on baclofen 2.5 q8h. Will evaluate tone with therapy today and make adjustments as needed.  May 10- titrate up on baclofen 5 mg every 8 hour  May 11- Continue to monitor RUE tone with increase of Baclofen yesterday. Will uptitrate as tolerated. OT to start NMES for motor training and spasticity to the RUE today.  May 12-spasticity improved with increase in baclofen as well as addition of low-dose gabapentin  May 13 - continue baclofen/gabapentin; tolerating well; denied side-effects  May 15-spasticity improving    Right shoulder pain-May 12-no signs of complex regional pain syndrome.  Pain in part may be related to spasticity/immobility/degenerative changes.  X-ray of the right shoulder shows mild to moderate degenerative changes at the  acromioclavicular joint.  May titrate up on baclofen and gabapentin over time  May 14 - heat therapy w/ K-pad    Hypertension-amlodipine/carvedilol/valsartan    DVT-right soleal vein-March 17, 2023-Brendan Magaña MD      During rounds, used appropriate personal protective equipment including mask and gloves.  Additional gown if indicated.  Mask used was standard procedure mask. Appropriate PPE was worn during the entire visit.  Hand hygiene was completed before and after.

## 2023-05-15 NOTE — THERAPY TREATMENT NOTE
Inpatient Rehabilitation - Occupational Therapy Treatment Note    [facility]     Patient Name: Manuel Durant  : 1950  MRN: [id number]    Today's Date: 5/15/2023                 Admit Date: 2023       No diagnosis found.    Patient Active Problem List   Diagnosis   • Intracranial hemorrhage   • Acute DVT (deep venous thrombosis)   • HTN (hypertension)   • Severe Malnutrition (HCC)   • Hemiplegia   • ICH (intracerebral hemorrhage)   • Stroke       Past Medical History:   Diagnosis Date   • Hypertension    • Hypokalemia 2023   • Stroke        No past surgical history on file.          IRF OT ASSESSMENT FLOWSHEET (last 12 hours)     IRF OT Evaluation and Treatment     Row Name 05/15/23 1513          OT Time and Intention    Document Type daily treatment  -AF     Mode of Treatment occupational therapy  -AF     Patient Effort good  -AF     Row Name 05/15/23 1513          General Information    Patient/Family/Caregiver Comments/Observations pt sitting up in w/c in AM and PM sessions  -AF     Existing Precautions/Restrictions fall  -AF     Row Name 05/15/23 1513          Pain Assessment    Pretreatment Pain Rating 4/10  -AF     Posttreatment Pain Rating 4/10  -AF     Pain Location - Side/Orientation Right  -AF     Pain Location upper  -AF     Pain Location - shoulder  -AF     Pre/Posttreatment Pain Comment utilized  tens in PM session for pain control during AAROM/AROM  -AF     Row Name 05/15/23 1513          Cognition/Psychosocial    Affect/Mental Status (Cognition) WFL  -AF     Orientation Status (Cognition) oriented to;person  -AF     Follows Commands (Cognition) follows one-step commands;repetition of directions required;verbal cues/prompting required;physical/tactile prompts required  -AF     Personal Safety Interventions fall prevention program maintained;gait belt;nonskid shoes/slippers when out of bed  -AF     Row Name 05/15/23 1513          Bathing    [place] Level (Bathing) bathing  skills;verbal cues;nonverbal cues (demo/gesture);minimum assist (75% patient effort)  -AF     Position (Bathing) supported sitting;supported standing  -AF     Comment (Bathing) w/c levle at sink  -AF     Row Name 05/15/23 1513          Upper Body Dressing    [place] Level (Upper Body Dressing) upper body dressing skills;minimum assist (75% or more patient effort);verbal cues  -AF     Position (Upper Body Dressing) supported sitting  -AF     Comment (Upper Body Dressing) karen tech increased iwth visual demonstration  -AF     Row Name 05/15/23 1513          Lower Body Dressing    [place] Level (Lower Body Dressing) doff;don;pants/bottoms;shoes/slippers;socks;maximum assist (25% patient effort);verbal cues;moderate assist (50% patient effort)  -AF     Position (Lower Body Dressing) supported sitting;supported standing  -AF     Comment (Lower Body Dressing) karen tech, difficulty with keeping RLE on the ground duirng standing to balance to pull up pants  -AF     Row Name 05/15/23 1513          Grooming    [place] Level (Grooming) grooming skills;oral care regimen;hair care, combing/brushing;verbal cues;nonverbal cues (demo/gesture);standby assist;deodorant application;minimum assist (75% patient effort)  -AF     Position (Grooming) sink side;supported sitting  -AF     Row Name 05/15/23 1513          Toileting    [place] Level (Toileting) toileting skills;adjust/manage clothing;perform perineal hygiene  -AF     Assistive Device Use (Toileting) grab bar/safety frame;raised toilet seat  -AF     Position (Toileting) supported standing;supported sitting  -AF     Row Name 05/15/23 1513          Bed Mobility    Supine-Sit [place] (Bed Mobility) minimum assist (75% patient effort);verbal cues  -AF     Sit-Supine [place] (Bed Mobility) moderate assist (50% patient effort);verbal cues  -AF     Comment, (Bed Mobility) mat mobility  -AF     Row Name 05/15/23 1513          Transfer Assessment/Treatment     Comment, (Transfers) reinforced squat pivot to break up tone during transfers, with visual demonstration and tactile cues to stay forward on trunk to facilaite weight shifting and attention to the R side (forced use) completed squat pivot with MIN/MOD A  -AF     Row Name 05/15/23 1513          Stand-Sit Transfer    Stand-Sit [place] (Transfers) minimum assist (75% patient effort);verbal cues  -AF     Comment, (Stand-Sit Transfer) at sink  -AF     Row Name 05/15/23 1513          Toilet Transfer    Type (Toilet Transfer) stand pivot/stand step;squat pivot  -AF     [place] Level (Toilet Transfer) moderate assist (50% patient effort);verbal cues  -AF     Assistive Device (Toilet Transfer) commode;grab bars/safety frame;wheelchair  -AF     Comment, (Toilet Transfer) poor carry over with technique omayra when in a hurry to use the commode  -AF     Row Name 05/15/23 1513          Shoulder (Therapeutic Exercise)    Shoulder AROM (Therapeutic Exercise) right;flexion;extension;aBduction;aDduction;scapular elevation;scapular protraction;supine;5 repetitions;2 sets  wiht TENS applied  -AF     Row Name 05/15/23 1513          Neuromuscular Re-education    Comment (Neuromuscular Re-education) in supine with TEN on R shoudler, NMES on R wrist extensors, air cast used to break up tone with movement pt able to achieve shoulder flexion to 90 degrees without pain completed 3 reps, prior to AROM, multple relxation techniques utilized and educated completed on techniques/outcomes and reasons  -AF     Row Name 05/15/23 1513          Positioning and Restraints    Pre-Treatment Position sitting in chair/recliner  -AF     Post Treatment Position wheelchair  -AF     In Wheelchair sitting;exit alarm on;with SLP;patient within staff view  with SLP in Am, at [facility] station in PM  -AF           User Key  (r) = Recorded By, (t) = Taken By, (c) = Cosigned By    Initials Name Effective Dates    AF Carole Saenz, OTR 06/16/21 -                   Occupational Therapy Education     Title: PT OT SLP Therapies (In Progress)     Topic: Occupational Therapy (In Progress)     Point: ADL training (In Progress)     Description:   Instruct learner(s) on proper safety adaptation and remediation techniques during self care or transfers.   Instruct in proper use of assistive devices.              Learning Progress Summary           Patient Acceptance, E,D, NR by DN at 5/9/2023 1710    Comment: Ot role and karen adls and functional transfers                   Point: Home exercise program (In Progress)     Description:   Instruct learner(s) on appropriate technique for monitoring, assisting and/or progressing therapeutic exercises/activities.              Learning Progress Summary           Patient Acceptance, E,D, NR by DN at 5/9/2023 1710    Comment: Ot role and karen adls and functional transfers                   Point: Precautions (In Progress)     Description:   Instruct learner(s) on prescribed precautions during self-care and functional transfers.              Learning Progress Summary           Patient Acceptance, E,D, NR by DN at 5/9/2023 1710    Comment: Ot role and karen adls and functional transfers                   Point: Body mechanics (In Progress)     Description:   Instruct learner(s) on proper positioning and spine alignment during self-care, functional mobility activities and/or exercises.              Learning Progress Summary           Patient Acceptance, E,D, NR by DN at 5/9/2023 1710    Comment: Ot role and karen adls and functional transfers                               User Key     Initials Effective Dates Name Provider Type Discipline    CLAUDIO 06/16/21 -  Alek Duarte OT Occupational Therapist OT                    OT Recommendation and Plan                         Time Calculation:      Time Calculation- OT     Row Name 05/15/23 1520 05/15/23 1519          Time Calculation- OT    OT Start Time 1400  -AF 0800  -AF     OT Stop Time 1430  -AF  0830  -AF     OT Time Calculation (min) 30 min  -AF 30 min  -AF           User Key  (r) = Recorded By, (t) = Taken By, (c) = Cosigned By    Initials Name Provider Type    AF Carole Saenz, OTMAGUE Occupational Therapist              Therapy Charges for Today     Code Description Service Date Service Provider Modifiers Qty    24610114422 HC OT SELF CARE/MGMT/TRAIN EA 15 MIN 5/15/2023 Carole Saenz OTR GO 2    [id number]  OT NEUROMUSC RE EDUCATION EA 15 MIN 5/15/2023 Carole Saenz OTMAGUE GO 2                   HELLEN Arriaga  5/15/2023

## 2023-05-15 NOTE — PLAN OF CARE
Goal Outcome Evaluation:           Progress: improving  Outcome Evaluation: Carleen is alert and oriented to self place and situation unaware of month and year when assesed with interpretor, continues with right hemiparesis, right facial droop and right arm should and leg pain taking scheduled baclofen, gabapentin and PRN Tylenol, incontinent of bowel and bladder, no unsafe behaivors, particpated in all therapies.

## 2023-05-15 NOTE — THERAPY TREATMENT NOTE
Inpatient Rehabilitation - Speech Language Pathology Treatment Note    [facility]     Patient Name: Manuel Durant  : 1950  MRN: [id number]    Today's Date: 5/15/2023                   Admit Date: 2023       Visit Dx:    No diagnosis found.    Patient Active Problem List   Diagnosis   • Intracranial hemorrhage   • Acute DVT (deep venous thrombosis)   • HTN (hypertension)   • Severe Malnutrition (HCC)   • Hemiplegia   • ICH (intracerebral hemorrhage)   • Stroke       Past Medical History:   Diagnosis Date   • Hypertension    • Hypokalemia 2023   • Stroke        No past surgical history on file.    SLP Recommendation and Plan                                                            SLP EVALUATION (last 72 hours)     SLP SLC Evaluation     Row Name 05/15/23 1330 05/15/23 0830                Communication Assessment/Intervention    Document Type therapy note (daily note)  -AL therapy note (daily note)  -AL       Patient/Family/Caregiver Comments/Observations Pt participated well. Used Estonian interpter via Ipad video.  -AL Pt participated well. Used  via Ipad video. Reported his right shoulder feels better today. Denied pain.  -AL          Pain Scale: Numbers Pre/Post-Treatment    Pretreatment Pain Rating -- 0/10 - no pain  -AL       Pre/Posttreatment Pain Comment Pt did not c/o pain.  -AL --             User Key  (r) = Recorded By, (t) = Taken By, (c) = Cosigned By    Initials Name Effective Dates    Nurys Beavers, MS CCC-SLP 21 -                    EDUCATION    The patient has been educated in the following areas:       Cognitive Impairment Communication Impairment Dysphagia.             SLP GOALS     Row Name 05/15/23 1330 05/15/23 0830 23 1000       (STG) Swallow 1    (STG) Swallow 1 -- -- Pt will exihibit no overt s/s of aspiration or penetration with trials of water by cup  -TH    [place] (Swallow Short Term Goal 1) -- -- with minimal cues (75-90%  accuracy)  -TH    Time Frame (Swallow Short Term Goal 1) -- -- 1 week  Patient tolerated approximately 6 oz of thin liquids in correlation with completing effortful swallow exercise without s/sx of aspiration. effortful swallow completed x15  -TH       (STG) Pharyngeal Strengthening Exercise Goal 1 (SLP)    Progress/Outcomes (Pharyngeal Strengthening Goal 1, SLP) good progress toward goal  -AL good progress toward goal  -AL --    Comment (Pharyngeal Strengthening Goal 1, SLP) No overt s/s of aspiration or penetration observed in 5/6 trials of thins by cup (coffee) with MOD cues for small sips; cough x1.  -AL Pt completed 20 repetitions of effortful swallow with MIN-MOD cues. No overt s/s of aspiration or penetration observed 100% of the time with initial cues for small sips. Trialed mixed consistency (peaches): Pt exhibited strong cough on large bite of peaches. With cues for small bites, pt exhibited delayed light cough in 1/5 trials.  -AL --       Word Retrieval Skills Goal 1 (SLP)    Improve Word Retrieval Skills By Goal 1 (SLP) -- -- completing functional word finding tasks;completing a divergent task;80%;independently (over 90% accuracy)  -TH    Time Frame (Word Retrieval Goal 1, SLP) -- -- 1 week  -TH    Progress/Outcomes (Word Retrieval Goal 1, SLP) -- -- good progress toward goal  -TH    Comment (Word Retrieval Goal 1, SLP) -- -- patient participated in confrontational naming task with 100% accuracy  -TH       Connected Speech to Express Thoughts Goal 1 (SLP)    Improve Narrative Discourse to Express Thoughts By Goal 1 (SLP) -- -- describing a picture;conversational task on a given topic;80%;with minimal cues (75-90%)  -TH    Time Frame (Connected Speech Goal 1, SLP) -- -- 1 week  -TH    Progress/Outcomes (Connected Speech Goal 1, SLP) -- -- good progress toward goal  -TH    Comment (Connected Speech Goal 1, SLP) Describing problems in pictures: 60% with NO cues, 100% with MIN cues to clarify message. In  "conversation regarding his family, pt able to state number of his children, but only able to name 2/5 children; unclear whether memory vs. word finding difficulty.  -AL -- mod-max cues needed to accurately complete picture description task  -TH       Orientation Goal 1 (SLP)    Improve Orientation Through Goal 1 (SLP) -- -- demonstrating orientation to day;demonstrating orientation to month;demonstrating orientation to year;demonstrating orientation to place;demonstrating orientation to disease/impairment;80%;with minimal cues (75-90%)  -TH    Progress/Outcomes (Orientation Goal 1, SLP) -- -- good progress toward goal  -TH    Comment (Orientation Goal 1, SLP) Pt was oriented to situation (\"brain issue\"), city, state, month and month of his birth. Required MAX cues for year, date of his birth, and age (84 vs. 73).  -AL -- Patient was oriented to situaiton and place, but unsure of month/year.  -TH       Memory Skills Goal 1 (SLP)    Improve Memory Skills Through Goal 1 (SLP) -- -- recall details of the day;80%;with moderate cues (50-74%)  -TH    Time Frame (Memory Skills Goal 1, SLP) -- -- 1 week  -TH    Progress/Outcomes (Memory Skills Goal 1, SLP) -- -- good progress toward goal  -TH    Comment (Memory Skills Goal 1, SLP) -- -- Patient was able to recall 3/3 items given 5 minute delay without cues. Given 30 minute delay patient was able to call 1/3 independently increasing to 3/3 given category cue.  -TH          User Key  (r) = Recorded By, (t) = Taken By, (c) = Cosigned By    Initials Name Provider Type    Nurys Beavers MS CCC-SLP Speech and Language Pathologist     Jennie Senior Speech and Language Pathologist                            Time Calculation:        Time Calculation- SLP     Row Name 05/15/23 1600 05/15/23 0924          Time Calculation- SLP    SLP Start Time 1330  -AL 0830  -AL     SLP Stop Time 1400  -AL 0900  -AL     SLP Time Calculation (min) 30 min  -AL 30 min  -AL           User Key  " (r) = Recorded By, (t) = Taken By, (c) = Cosigned By    Initials Name Provider Type    Nurys Beavers, MS CCC-SLP Speech and Language Pathologist                  Therapy Charges for Today     Code Description Service Date Service Provider Modifiers Qty    36027138559  ST DEV OF COGN SKILLS INITIAL 15 MIN 5/15/2023 Nurys Dixon, MS CCC-SLP  1    [id number]  ST DEV OF COGN SKILLS EACH ADDT'L 15 MIN 5/15/2023 Nurys Dixon, MS CCC-SLP  1    [id number]  ST TREATMENT SWALLOW 2 5/15/2023 Nurys Dixon, MS CCC-SLP GN 1                           Nurys Dixon MS CCC-SLP  5/15/2023

## 2023-05-15 NOTE — PROGRESS NOTES
Inpatient Rehabilitation Plan of Care Note    Plan of Care  Care Plan Reviewed - No updates at this time.    Safety    [RN] Potential for Injury(Active)  Current Status(05/12/2023): Risk for falls r/t R hemiparesis  Weekly Goal(05/15/2023): Cue to use call bell  Discharge Goal: Patient will be aware of risk of fall and safety in the home  setting    Performed Intervention(s)  falls precautions  bed/chair alarm  safety rounds      Psychosocial    Performed Intervention(s)  Verbalizes needs and concerns  Therapeutic environmental set up      Sphincter Control    Performed Intervention(s)  Bowel/bladder training  Encourage fluid intake  Scheduled toileting    Signed by: Natalia Coyle RN

## 2023-05-15 NOTE — PROGRESS NOTES
Inpatient Rehabilitation Plan of Care Note    Plan of Care  Care Plan Reviewed - No updates at this time.    Psychosocial    [RN] Coping/Adjustment(Active)  Current Status(05/15/2023): Expresses appropriate coping  Weekly Goal(05/22/2023): Educate patient regarding stroke and hypertention  Discharge Goal: Knowledgeable of care needs and stroke recovery    Performed Intervention(s)  Verbalizes needs and concerns  Therapeutic environmental set up      Pain    [RN] Pain Management(Active)  Current Status(05/15/2023): Patient has pain to RUE and RLE, taking scheduled  baclofen and gabapentin and PRN Tylenol.  Weekly Goal(05/22/2023): Pain will be well controlled.  Discharge Goal: Pain controlled.        Safety    Performed Intervention(s)  falls precautions  bed/chair alarm  safety rounds      Sphincter Control    Performed Intervention(s)  Bowel/bladder training  Encourage fluid intake  Scheduled toileting    Signed by: Lisset Bhatti RN

## 2023-05-15 NOTE — PLAN OF CARE
Goal Outcome Evaluation:      Pt is A/Ox4 , may be forgetful, calm/cooperative, not OOB overnight. Meds taken whole w puree. Pt c/o pain to RUE; prn Tylenol given x2, scheduled Baclofen given. Pt turned and repositioned w wedge pillow. Pt has been incontinent overnight.  services utilized for communication.

## 2023-05-15 NOTE — THERAPY TREATMENT NOTE
Inpatient Rehabilitation - Physical Therapy Treatment Note       [facility]     Patient Name: Manuel Durant  : 1950  MRN: [id number]    Today's Date: 5/15/2023                    Admit Date: 2023      Visit Dx:   No diagnosis found.    Patient Active Problem List   Diagnosis   • Intracranial hemorrhage   • Acute DVT (deep venous thrombosis)   • HTN (hypertension)   • Severe Malnutrition (HCC)   • Hemiplegia   • ICH (intracerebral hemorrhage)   • Stroke       Past Medical History:   Diagnosis Date   • Hypertension    • Hypokalemia 2023   • Stroke        No past surgical history on file.    PT ASSESSMENT (last 12 hours)     IRF PT Evaluation and Treatment     Row Name 05/15/23 1200          PT Time and Intention    Document Type daily treatment  -AE     Mode of Treatment individual therapy;physical therapy  -AE     Patient/Family/Caregiver Comments/Observations pt having severe flexor tone in RLE today, unable to keep leg on ground for any extended period of time  -AE     Row Name 05/15/23 1200          General Information    Patient Profile Reviewed yes  -AE     General Observations of Patient no ipad used this session  -AE     Existing Precautions/Restrictions fall  -AE     Row Name 05/15/23 1200          Pain Assessment    Pretreatment Pain Rating 4/10  -AE     Posttreatment Pain Rating 4/10  -AE     Row Name 05/15/23 1200          Cognition/Psychosocial    Affect/Mental Status (Cognition) WFL  -AE     Orientation Status (Cognition) oriented to;person  -AE     Follows Commands (Cognition) follows one-step commands;repetition of directions required;verbal cues/prompting required;physical/tactile prompts required  -AE     Personal Safety Interventions fall prevention program maintained;muscle strengthening facilitated;gait belt;nonskid shoes/slippers when out of bed;supervised activity  -AE     Row Name 05/15/23 1200          Bed-Chair Transfer    Bed-Chair [place] (Transfers) moderate  assist (50% patient effort);maximum assist (25% patient effort)  -AE     Assistive Device (Bed-Chair Transfers) wheelchair  -AE     Comment, (Bed-Chair Transfer) stand and squat pivot  -AE     Row Name 05/15/23 1200          Chair-Bed Transfer    Chair-Bed [place] (Transfers) moderate assist (50% patient effort);maximum assist (25% patient effort);1 person assist  -AE     Assistive Device (Chair-Bed Transfers) wheelchair  -AE     Comment, (Chair-Bed Transfer) stand and squat pivot  -AE     Row Name 05/15/23 1200          Sit-Stand Transfer    Sit-Stand [place] (Transfers) minimum assist (75% patient effort);moderate assist (50% patient effort);1 person assist  -AE     Assistive Device (Sit-Stand Transfers) wheelchair  -AE     Comment, (Sit-Stand Transfer) at karen bars ~4x and twice with [facility]  -AE     Row Name 05/15/23 1200          Stand-Sit Transfer    Stand-Sit [place] (Transfers) minimum assist (75% patient effort)  -AE     Assistive Device (Stand-Sit Transfers) wheelchair  -AE     Row Name 05/15/23 1200          Gait/Stairs (Locomotion)    [place] Level (Gait) moderate assist (50% patient effort);2 person assist  -AE     Assistive Device (Gait) karen bars  -AE     Distance in Feet (Gait) 8ft  -AE     Pattern (Gait) step-to;step-through  -AE     Deviations/Abnormal Patterns (Gait) festinating/shuffling;gait speed decreased;sandhya decreased;ataxic;base of support, narrow;weight shifting decreased;scissoring;stride length decreased  -AE     Bilateral Gait Deviations forward flexed posture;weight shift ability decreased  -AE     Left Sided Gait Deviations weight shift ability decreased  -AE     Right Sided Gait Deviations foot slap;weight shift ability decreased  -AE     Gait Assessment/Intervention severely ataxic RLE with significant flexor tone, unable to get heel to touch down. mod/maxA from therapist to block RLE and prevent scissoring with max glute block to keep upright posture  -AE      Row Name 05/15/23 1200          Hip (Therapeutic Exercise)    Hip Strengthening (Therapeutic Exercise) supine;flexion;10 repetitions  SKTC and DKTC x 10  -AE     Row Name 05/15/23 1200          Knee (Therapeutic Exercise)    Knee Strengthening (Therapeutic Exercise) bilateral;SAQ (short arc quad);10 repetitions  -AE     Row Name 05/15/23 1200          Core Strength (Therapeutic Exercise)    Core Strength (Therapeutic Exercise) bridging, bilateral lower extremities;10 repetitions  -AE     Row Name 05/15/23 1200          Stretching (Therapeutic Exercise)    Comment (Stretching Therapeutic Exercise) LTR x 10reps, heel cord stretch x 3 min  -AE     Row Name 05/15/23 1200          Neuromuscular Re-education    Interventions (Neuromuscular Re-education) weight shifting  -AE     Positioning (Neuromuscular Re-education) standing  -AE     Equipment Used (Neuromuscular Re-education) stand with CGA/Amish and posterior lean  -AE     Row Name 05/15/23 1200          Positioning and Restraints    Pre-Treatment Position sitting in chair/recliner  -AE     Post Treatment Position wheelchair  -AE     In Wheelchair sitting;call light within reach;encouraged to call for assist;exit alarm on  -AE           User Key  (r) = Recorded By, (t) = Taken By, (c) = Cosigned By    Initials Name Provider Type    AE Angeles Altman PT Physical Therapist                 Physical Therapy Education     Title: PT OT SLP Therapies (In Progress)     Topic: Physical Therapy (In Progress)     Point: Mobility training (In Progress)     Learning Progress Summary           Patient Acceptance, E,TB, NR by LB at 5/10/2023 1211    Acceptance, E,D, NR by DP at 5/9/2023 1217                   Point: Home exercise program (In Progress)     Learning Progress Summary           Patient Acceptance, E,D, NR by DP at 5/9/2023 1217                   Point: Body mechanics (In Progress)     Learning Progress Summary           Patient Acceptance, E,D, NR by DP at 5/9/2023  1217                   Point: Precautions (In Progress)     Learning Progress Summary           Patient Acceptance, E,D, NR by DP at 5/9/2023 1217                               User Key     Initials Effective Dates Name Provider Type Discipline    LB 06/16/21 -  Le Reynoso, PT Physical Therapist PT    DP 08/24/21 -  Tristin Vicente PT Physical Therapist PT                PT Recommendation and Plan                          Time Calculation:      PT Charges     Row Name 05/15/23 1216             Time Calculation    Start Time 0930  -AE      Stop Time 1030  -AE      Time Calculation (min) 60 min  -AE      PT Received On 05/15/23  -AE      PT - Next Appointment 05/16/23  -AE         Time Calculation- PT    Total Timed Code Minutes- PT 60 minute(s)  -AE            User Key  (r) = Recorded By, (t) = Taken By, (c) = Cosigned By    Initials Name Provider Type    AE Angeles Altman, JOSE ELIAS Physical Therapist                Therapy Charges for Today     Code Description Service Date Service Provider Modifiers Qty    01251778782 HC GAIT TRAINING EA 15 MIN 5/15/2023 Angeles Altman, PT GP 1    97256802876 HC PT THER PROC EA 15 MIN 5/15/2023 Angeles Altman, PT GP 1    51336820199 HC PT THERAPEUTIC ACT EA 15 MIN 5/15/2023 Angeles Altman, PT GP 2    83243155210 HC PT THER SUPP EA 15 MIN 5/15/2023 Angeles Altman, PT GP 2            PT G-Codes  AM-PAC 6 Clicks Score (PT): 14      Angeles Altman PT  5/15/2023

## 2023-05-16 PROCEDURE — 97110 THERAPEUTIC EXERCISES: CPT

## 2023-05-16 PROCEDURE — 97130 THER IVNTJ EA ADDL 15 MIN: CPT

## 2023-05-16 PROCEDURE — 97129 THER IVNTJ 1ST 15 MIN: CPT

## 2023-05-16 PROCEDURE — 97112 NEUROMUSCULAR REEDUCATION: CPT

## 2023-05-16 PROCEDURE — 97116 GAIT TRAINING THERAPY: CPT

## 2023-05-16 PROCEDURE — 92526 ORAL FUNCTION THERAPY: CPT

## 2023-05-16 PROCEDURE — 97535 SELF CARE MNGMENT TRAINING: CPT

## 2023-05-16 RX ORDER — GABAPENTIN 100 MG/1
100 CAPSULE ORAL 3 TIMES DAILY
Qty: 180 CAPSULE | Refills: 0 | Status: SHIPPED | OUTPATIENT
Start: 2023-05-16

## 2023-05-16 RX ORDER — ATORVASTATIN CALCIUM 40 MG/1
40 TABLET, FILM COATED ORAL DAILY
Qty: 60 TABLET | Refills: 0 | Status: SHIPPED | OUTPATIENT
Start: 2023-05-16

## 2023-05-16 RX ORDER — CARVEDILOL 3.12 MG/1
3.12 TABLET ORAL 2 TIMES DAILY WITH MEALS
Qty: 120 TABLET | Refills: 0 | Status: SHIPPED | OUTPATIENT
Start: 2023-05-16

## 2023-05-16 RX ORDER — VALSARTAN 160 MG/1
160 TABLET ORAL
Qty: 60 TABLET | Refills: 0 | Status: SHIPPED | OUTPATIENT
Start: 2023-05-17

## 2023-05-16 RX ORDER — BACLOFEN 10 MG/1
5 TABLET ORAL EVERY 8 HOURS SCHEDULED
Qty: 90 TABLET | Refills: 0 | Status: SHIPPED | OUTPATIENT
Start: 2023-05-16

## 2023-05-16 RX ORDER — ACETAMINOPHEN 325 MG/1
650 TABLET ORAL EVERY 6 HOURS PRN
Qty: 90 TABLET | Refills: 0 | Status: SHIPPED | OUTPATIENT
Start: 2023-05-16

## 2023-05-16 RX ORDER — AMLODIPINE BESYLATE 10 MG/1
10 TABLET ORAL
Qty: 60 TABLET | Refills: 0 | Status: SHIPPED | OUTPATIENT
Start: 2023-05-17

## 2023-05-16 RX ADMIN — ACETAMINOPHEN 650 MG: 325 TABLET, FILM COATED ORAL at 05:19

## 2023-05-16 RX ADMIN — GABAPENTIN 100 MG: 100 CAPSULE ORAL at 22:51

## 2023-05-16 RX ADMIN — APIXABAN 5 MG: 5 TABLET, FILM COATED ORAL at 05:19

## 2023-05-16 RX ADMIN — BACLOFEN 5 MG: 10 TABLET ORAL at 05:19

## 2023-05-16 RX ADMIN — CARVEDILOL 3.12 MG: 3.12 TABLET, FILM COATED ORAL at 07:41

## 2023-05-16 RX ADMIN — APIXABAN 5 MG: 5 TABLET, FILM COATED ORAL at 17:53

## 2023-05-16 RX ADMIN — BACLOFEN 5 MG: 10 TABLET ORAL at 22:51

## 2023-05-16 RX ADMIN — BACLOFEN 5 MG: 10 TABLET ORAL at 16:12

## 2023-05-16 RX ADMIN — LIDOCAINE 1 PATCH: 50 PATCH CUTANEOUS at 07:41

## 2023-05-16 RX ADMIN — GABAPENTIN 100 MG: 100 CAPSULE ORAL at 07:41

## 2023-05-16 RX ADMIN — ACETAMINOPHEN 650 MG: 325 TABLET, FILM COATED ORAL at 23:03

## 2023-05-16 RX ADMIN — CARVEDILOL 3.12 MG: 3.12 TABLET, FILM COATED ORAL at 17:53

## 2023-05-16 RX ADMIN — VALSARTAN 160 MG: 160 TABLET, FILM COATED ORAL at 07:41

## 2023-05-16 RX ADMIN — AMLODIPINE BESYLATE 10 MG: 10 TABLET ORAL at 07:41

## 2023-05-16 RX ADMIN — ATORVASTATIN CALCIUM 40 MG: 20 TABLET, FILM COATED ORAL at 07:41

## 2023-05-16 RX ADMIN — GABAPENTIN 100 MG: 100 CAPSULE ORAL at 16:12

## 2023-05-16 NOTE — PLAN OF CARE
Goal Outcome Evaluation:  Plan of Care Reviewed With: patient             Problem: Rehabilitation (IRF) Plan of Care  Goal: Plan of Care Review  Outcome: Ongoing, Progressing  Flowsheets (Taken 5/16/2023 0213)  Plan of Care Reviewed With: patient  Outcome Evaluation:  Pt is alert and oriented x 4 this shift. Able to speak and understand some English.  tablet at bedside if needed. Took meds whole PO with AS; NTL. Right hemiparesis with right droop. Continues with double vision. PRN Tylenol and KPAD used for right shoulder discomfort and RLE pain. Incontinent of bowel and bladder. Wears brief  checked and changed q2 and as needed. Oral care completed. Turned and repositioned q2 during rounds. No unsafe behaviors. Call light within reach.

## 2023-05-16 NOTE — PROGRESS NOTES
Patient had two friends in room this afternoon visiting. One friend worked with patient at [email or web address] so gave me phone number of the owner at [email or web address]. Patient and friends were talking to patient's wife, 2 sons, and 2 daughters using [employer] ([employer]) SW talked with family about d/c plans and informed them that patient will be sent home with wheelchair and medications for 2 months. Discussed family getting a PCP for patient in [place], and son stated they will get him a doctor there. They plan to have him see their cousin, Azar, who is a therapist and has told family he can do therapy with patient. Scheduled family conference for Thursday, 5/18, at 1:00 p.m. Will plan to use [employer] so can have family on [employer] with team to discuss patient's progress and care needs for home. Discussed having them watch patient in therapies so therapists can do some teaching regarding transfers, etc. Patient's friend, Bridgett, did bring two suitcases filled with patient's belongings. She will plan to bring patient's documents he needs on Friday. Patient's son-in-law is flying here on Friday afternoon. Will plan for therapists to do necessary teaching with son-in-law on Saturday. Patient and son-in-law scheduled to fly back to [place] on Sunday, 5/21 at 6:00 a.m.

## 2023-05-16 NOTE — THERAPY TREATMENT NOTE
Inpatient Rehabilitation - Occupational Therapy Treatment Note    [facility]     Patient Name: Manuel Durant  : 1950  MRN: [id number]    Today's Date: 2023                 Admit Date: 2023         ICD-10-CM ICD-9-CM   1. Spastic hemiplegia of right dominant side as late effect of nontraumatic intraparenchymal hemorrhage of brain  I69.151 438.21       Patient Active Problem List   Diagnosis   • Intracranial hemorrhage   • Acute DVT (deep venous thrombosis)   • HTN (hypertension)   • Severe Malnutrition (HCC)   • Hemiplegia   • ICH (intracerebral hemorrhage)   • Stroke       Past Medical History:   Diagnosis Date   • Hypertension    • Hypokalemia 2023   • Stroke        No past surgical history on file.          IRF OT ASSESSMENT FLOWSHEET (last 12 hours)     IRF OT Evaluation and Treatment     Row Name 23 1511          OT Time and Intention    Document Type daily treatment  -AF     Mode of Treatment occupational therapy  -AF     Patient Effort good  -AF     Row Name 23 1511          General Information    Patient/Family/Caregiver Comments/Observations pt sitting up in w/c in AM and PM sessions  -AF     General Observations of Patient increased attention to the L side noted  -AF     Existing Precautions/Restrictions fall  -AF     Row Name 23 1511          Pain Assessment    Pretreatment Pain Rating 8/10  -AF     Posttreatment Pain Rating 8/10  -AF     Pain Location - Side/Orientation Right  -AF     Pain Location upper  -AF     Pain Location - shoulder  -AF     Pre/Posttreatment Pain Comment applied heat to R shoudler and TENS in PM  -AF     Row Name 23 1511          Cognition/Psychosocial    Affect/Mental Status (Cognition) WFL  -AF     Orientation Status (Cognition) oriented to;person  -AF     Follows Commands (Cognition) follows one-step commands;repetition of directions required;verbal cues/prompting required;physical/tactile prompts required  -AF     Personal  Safety Interventions fall prevention program maintained;gait belt;nonskid shoes/slippers when out of bed  -AF     Row Name 05/16/23 1511          Bathing    [place] Level (Bathing) bathing skills;verbal cues;nonverbal cues (demo/gesture);minimum assist (75% patient effort)  -AF     Assistive Device (Bathing) grab bar/tub rail;hand held shower spray hose;tub bench  -AF     Position (Bathing) supported sitting;supported standing  -AF     Set-up Assistance (Bathing) obtain supplies  -AF     Comment (Bathing) karen tech, worked on standing using grab bar and keeping R foot on the ground and weight shifting  -AF     Row Name 05/16/23 1511          Upper Body Dressing    [place] Level (Upper Body Dressing) upper body dressing skills;minimum assist (75% or more patient effort);verbal cues  -AF     Position (Upper Body Dressing) supported sitting  -AF     Set-up Assistance (Upper Body Dressing) obtain clothing  -AF     Comment (Upper Body Dressing) karen tech  -AF     Row Name 05/16/23 1511          Lower Body Dressing    [place] Level (Lower Body Dressing) doff;don;pants/bottoms;shoes/slippers;socks;maximum assist (25% patient effort);moderate assist (50% patient effort);verbal cues  -AF     Position (Lower Body Dressing) supported sitting;supported standing  -AF     Set-up Assistance (Lower Body Dressing) obtain clothing  -AF     Comment (Lower Body Dressing) karen tech, added elastic shoe laces to shoes increased independence noted  -AF     Row Name 05/16/23 1511          Grooming    [place] Level (Grooming) grooming skills;oral care regimen;hair care, combing/brushing;verbal cues;nonverbal cues (demo/gesture);standby assist;deodorant application;minimum assist (75% patient effort)  -AF     Position (Grooming) sink side;supported sitting  -AF     Row Name 05/16/23 1511          Bed Mobility    Sit-Supine [place] (Bed Mobility) minimum assist (75% patient effort);moderate assist (50% patient  effort);verbal cues  -AF     Assistive Device (Bed Mobility) bed rails  -AF     Row Name 05/16/23 1511          Transfer Assessment/Treatment    Comment, (Transfers) educated and reinforced a good aligned set up of body/feet and hand placement prior to coming to stand or transfer. required MOD vc's/tcs' to set up, squat pivot EOM <> w/c MIN A with vc's  -AF     Row Name 05/16/23 1511          Chair-Bed Transfer    Chair-Bed [place] (Transfers) minimum assist (75% patient effort);moderate assist (50% patient effort);verbal cues  -AF     Assistive Device (Chair-Bed Transfers) wheelchair  -AF     Comment, (Chair-Bed Transfer) attempted to stand and have R LE tone kick in, woekred/educated on squat pivot loading the RLE to increased weight bearing and decrease tone  -AF     Row Name 05/16/23 1511          Shower Transfer    Type (Shower Transfer) squat pivot  -AF     [place] Level (Shower Transfer) minimum assist (75% patient effort);verbal cues  -AF     Assistive Device (Shower Transfer) grab bar, tub/shower;tub bench;wheelchair  -AF     Comment, (Shower Transfer) cues for technique  -AF     Row Name 05/16/23 1511          Neuromuscular Re-education    Interventions (Neuromuscular Re-education) weight shifting;weight bearing;facilitation/inhibition  -AF     Positioning (Neuromuscular Re-education) unsupported;sitting  -AF     Equipment Used (Neuromuscular Re-education) with moist heat in AM with #2.5 wrist weight on RUE during prolonged stretch for elbow extension, shoulder distraction for tone reduction and working on pain free external rotation  -AF     Comment (Neuromuscular Re-education) in PM session seated on EOM with TENS on R shoulder, NMES On R wrist extensors, with gross grasp with cones and crossing midline, educated on relaxation, using RUE during funcitonal tasks, shoulder rolls/shrugs, placing hands together with reaching forward for self regulated stretch  -AF     Row Name 05/16/23 1511           Balance    Static Sitting Balance standby assist  -AF     Dynamic Sitting Balance contact guard  with funcitonal activity  -AF     Static Standing Balance minimal assist  worked on placing and grasping with R hand on grab bar in shower, pt has difficulty with maintaining grasp but demos increased wiehgt shift to the R side when hand is placed on the grab bar  -AF     Row Name 05/16/23 1511          Positioning and Restraints    Pre-Treatment Position sitting in chair/recliner  -AF     Post Treatment Position bed  -AF     In Bed supine;call light within reach;encouraged to call for assist;exit alarm on  in PM  -AF     In Wheelchair sitting;patient within staff view;exit alarm on  in AM  -AF           User Key  (r) = Recorded By, (t) = Taken By, (c) = Cosigned By    Initials Name Effective Dates    AF Carole Saenz, OTR 06/16/21 -                  Occupational Therapy Education     Title: PT OT SLP Therapies (In Progress)     Topic: Occupational Therapy (In Progress)     Point: ADL training (In Progress)     Description:   Instruct learner(s) on proper safety adaptation and remediation techniques during self care or transfers.   Instruct in proper use of assistive devices.              Learning Progress Summary           Patient Acceptance, E,D, NR by DN at 5/9/2023 1710    Comment: Ot role and karen adls and functional transfers                   Point: Home exercise program (In Progress)     Description:   Instruct learner(s) on appropriate technique for monitoring, assisting and/or progressing therapeutic exercises/activities.              Learning Progress Summary           Patient Acceptance, E,D, NR by DN at 5/9/2023 1710    Comment: Ot role and karen adls and functional transfers                   Point: Precautions (In Progress)     Description:   Instruct learner(s) on prescribed precautions during self-care and functional transfers.              Learning Progress Summary           Patient Acceptance, E,D,  NR by DN at 5/9/2023 1710    Comment: Ot role and karen adls and functional transfers                   Point: Body mechanics (In Progress)     Description:   Instruct learner(s) on proper positioning and spine alignment during self-care, functional mobility activities and/or exercises.              Learning Progress Summary           Patient Acceptance, E,D, NR by DN at 5/9/2023 1710    Comment: Ot role and karen adls and functional transfers                               User Key     Initials Effective Dates Name Provider Type Discipline    DN 06/16/21 -  Alek Duarte OT Occupational Therapist OT                    OT Recommendation and Plan                         Time Calculation:      Time Calculation- OT     Row Name 05/16/23 1533 05/16/23 1532          Time Calculation- OT    OT Start Time 1400  -AF 1030  -AF     OT Stop Time 1430  -AF 1115  -AF     OT Time Calculation (min) 30 min  -AF 45 min  -AF           User Key  (r) = Recorded By, (t) = Taken By, (c) = Cosigned By    Initials Name Provider Type    AF Carole Saenz, OTR Occupational Therapist              Therapy Charges for Today     Code Description Service Date Service Provider Modifiers Qty    20363648952 HC OT SELF CARE/MGMT/TRAIN EA 15 MIN 5/15/2023 Carole Saenz OTR GO 2    73036027404 HC OT NEUROMUSC RE EDUCATION EA 15 MIN 5/15/2023 Carole Saenz OTMAGUE GO 2    90872615470 HC OT SELF CARE/MGMT/TRAIN EA 15 MIN 5/16/2023 Carole Saenz OTMAGUE GO 3    22211854039 HC OT NEUROMUSC RE EDUCATION EA 15 MIN 5/16/2023 Carole Saenz OTR GO 3                   HELLEN Arriaga  5/16/2023

## 2023-05-16 NOTE — THERAPY TREATMENT NOTE
Inpatient Rehabilitation - Physical Therapy Treatment Note       [facility]     Patient Name: Manuel Durant  : 1950  MRN: [id number]    Today's Date: 2023                    Admit Date: 2023      Visit Dx:   No diagnosis found.    Patient Active Problem List   Diagnosis   • Intracranial hemorrhage   • Acute DVT (deep venous thrombosis)   • HTN (hypertension)   • Severe Malnutrition (HCC)   • Hemiplegia   • ICH (intracerebral hemorrhage)   • Stroke       Past Medical History:   Diagnosis Date   • Hypertension    • Hypokalemia 2023   • Stroke        No past surgical history on file.    PT ASSESSMENT (last 12 hours)     IRF PT Evaluation and Treatment     Row Name 23 1000          PT Time and Intention    Document Type daily treatment  -AE     Mode of Treatment individual therapy;physical therapy  -AE     Patient/Family/Caregiver Comments/Observations tone seems to be slightly improved from yesterday, still complaining of RUE pain with any movement  -AE     Row Name 23 1000          General Information    Patient Profile Reviewed yes  -AE     General Observations of Patient no ipad used this session  -AE     Existing Precautions/Restrictions fall  -AE     Row Name 23 1000          Cognition/Psychosocial    Affect/Mental Status (Cognition) WFL  -AE     Orientation Status (Cognition) oriented to;person  -AE     Follows Commands (Cognition) follows one-step commands;repetition of directions required;verbal cues/prompting required;physical/tactile prompts required  -AE     Personal Safety Interventions fall prevention program maintained;gait belt;muscle strengthening facilitated;nonskid shoes/slippers when out of bed;supervised activity  -AE     Row Name 23 1000          Transfer Assessment/Treatment    Comment, (Transfers) if pulling on // bars, or grab bars he is able to stand CGA/Amish. if pushing from wc requires Amish to come up into standing and up to modA to  maintain upright posture once tone kicks in  -AE     Row Name 05/16/23 1000          Bed-Chair Transfer    Bed-Chair [place] (Transfers) moderate assist (50% patient effort);maximum assist (25% patient effort)  -AE     Assistive Device (Bed-Chair Transfers) wheelchair;walker, front-wheeled;cane, quad  -AE     Comment, (Bed-Chair Transfer) trialed quad cane and FWW for stand pivot transfers today. modA to unaffected side. maxA to affected side  -AE     Row Name 05/16/23 1000          Chair-Bed Transfer    Chair-Bed [place] (Transfers) moderate assist (50% patient effort);maximum assist (25% patient effort);1 person assist  -AE     Assistive Device (Chair-Bed Transfers) wheelchair  -AE     Comment, (Chair-Bed Transfer) stand pivot with various LBQC or FWW  -AE     Row Name 05/16/23 1000          Sit-Stand Transfer    Sit-Stand [place] (Transfers) contact guard;minimum assist (75% patient effort)  -AE     Assistive Device (Sit-Stand Transfers) wheelchair;cane, quad  -AE     Row Name 05/16/23 1000          Stand-Sit Transfer    Stand-Sit [place] (Transfers) minimum assist (75% patient effort);1 person assist  -AE     Assistive Device (Stand-Sit Transfers) wheelchair  -AE     Comment, (Stand-Sit Transfer) tends to sit without reaching back  -AE     Row Name 05/16/23 1000          Gait/Stairs (Locomotion)    [place] Level (Gait) moderate assist (50% patient effort);2 person assist  -AE     Assistive Device (Gait) karen bars  -AE     Distance in Feet (Gait) 15ft  -AE     Pattern (Gait) step-to  -AE     Deviations/Abnormal Patterns (Gait) festinating/shuffling;gait speed decreased;sandhya decreased;ataxic;base of support, narrow;weight shifting decreased;scissoring;stride length decreased  -AE     Bilateral Gait Deviations forward flexed posture;weight shift ability decreased  -AE     Left Sided Gait Deviations weight shift ability decreased  -AE     Right Sided Gait Deviations foot slap;weight shift  "ability decreased  -AE     Gait Assessment/Intervention ataxic RLE and blocking R knee buckling. modA for weight shifting and to keep upright posture. step-to antalgic pattern throughout. cues to keep upright  -AE     Row Name 05/16/23 1000          Balance    Comment, Balance min/modA to maintian upright standing x 30sec duration with use of LBQC. significant RLE flexor synergy tone with inability to maintain R heel on the floor. tends to twist trunk to L or \"hang\" posteriorly over lumbar to try and \"stretch\"  -AE     Row Name 05/16/23 1000          Stretching (Therapeutic Exercise)    Comment (Stretching Therapeutic Exercise) seated prolong HS and heel cord stretch x 3 min  -AE     Row Name 05/16/23 1000          Positioning and Restraints    Pre-Treatment Position sitting in chair/recliner  -AE     Post Treatment Position wheelchair  -AE     In Chair sitting;call light within reach;encouraged to call for assist;exit alarm on  -AE           User Key  (r) = Recorded By, (t) = Taken By, (c) = Cosigned By    Initials Name Provider Type    [facility] [facility] PT Physical Therapist                 Physical Therapy Education     Title: PT OT SLP Therapies (In Progress)     Topic: Physical Therapy (In Progress)     Point: Mobility training (In Progress)     Learning Progress Summary           Patient Acceptance, E,TB, NR by LB at 5/10/2023 1211    Acceptance, E,D, NR by DP at 5/9/2023 1217                   Point: Home exercise program (In Progress)     Learning Progress Summary           Patient Acceptance, E,D, NR by DP at 5/9/2023 1217                   Point: Body mechanics (In Progress)     Learning Progress Summary           Patient Acceptance, E,D, NR by DP at 5/9/2023 1217                   Point: Precautions (In Progress)     Learning Progress Summary           Patient Acceptance, E,D, NR by DP at 5/9/2023 1217                               User Key     Initials Effective Dates Name Provider Type Discipline    " LB 06/16/21 -  Le Reynoso, PT Physical Therapist PT    DP 08/24/21 -  Tristin Vicente PT Physical Therapist PT                PT Recommendation and Plan                          Time Calculation:      PT Charges     Row Name 05/16/23 1201 05/16/23 1200          Time Calculation    Start Time -- 0930  -AE     Stop Time -- 1030  -AE     Time Calculation (min) -- 60 min  -AE     PT Received On -- 05/16/23  -AE     PT - Next Appointment -- 05/17/23  -AE     PT Goal Re-Cert Due Date 05/19/23  -AE --        Time Calculation- PT    Total Timed Code Minutes- PT -- 60 minute(s)  -AE           User Key  (r) = Recorded By, (t) = Taken By, (c) = Cosigned By    Initials Name Provider Type    AE Angeles Altman, PT Physical Therapist                Therapy Charges for Today     Code Description Service Date Service Provider Modifiers Qty    29060665400 HC GAIT TRAINING EA 15 MIN 5/15/2023 Angeles Altman, PT GP 1    90679068730 HC PT THER PROC EA 15 MIN 5/15/2023 Angeles Altman, PT GP 1    03854931617 HC PT THERAPEUTIC ACT EA 15 MIN 5/15/2023 Angeles Altman, PT GP 2    68053075242 HC PT THER SUPP EA 15 MIN 5/15/2023 Angeles Altman, PT GP 2    61627934910 HC GAIT TRAINING EA 15 MIN 5/16/2023 Angeles Altman, PT GP 1    28530327037 HC PT THER PROC EA 15 MIN 5/16/2023 Angeles Altman, PT GP 1    95333599872 HC PT NEUROMUSC RE EDUCATION EA 15 MIN 5/16/2023 Angeles Altman, PT GP 2    61243291229 HC PT THER SUPP EA 15 MIN 5/16/2023 Angeles Altman, PT GP 2            PT G-Codes  AM-PAC 6 Clicks Score (PT): 14      Angeles Altman, PT  5/16/2023

## 2023-05-16 NOTE — PROGRESS NOTES
Inpatient Rehabilitation Plan of Care Note    Plan of Care  Care Plan Reviewed - No updates at this time.    Safety    Performed Intervention(s)  falls precautions  bed/chair alarm  safety rounds      Psychosocial    Performed Intervention(s)  Verbalizes needs and concerns  Therapeutic environmental set up      Sphincter Control    Performed Intervention(s)  Bowel/bladder training  Encourage fluid intake  Scheduled toileting      Pain    Performed Intervention(s)  meds as ordered  Pain assessment    Signed by: Lisset Bhatti RN

## 2023-05-16 NOTE — PROGRESS NOTES
LOS: 8 days   Patient Care Team:  Provider, No Known as PCP - General SHABNAM MCDONALD  1950      ADMITTING DIAGNOSIS:  Hemorrhagic stroke-left basal ganglia/internal capsule with intraventricular extension on March 12, 2023-  Right hemiparesis   Impaired mobility/impaired self-care  Diplopia  Dysphagia-mechanical soft/chopped meat/nectar thick liquid  Spasticity-baclofen 2.5 mg every 8 hours  Hypertension-amlodipine/carvedilol/valsartan  DVT-right soleal vein-March 17, 2023-Eliquis      Subjective   Complains of R shoulder pain that is worse with bring arm away from his body. Tone has improved, states that his muscles are not as tight. Denies pain or tightness in his RLE.      Objective     Vitals:    05/16/23 0518   BP: 121/63   Pulse: 67   Resp: 16   Temp: 98.1 °F (36.7 °C)   SpO2: 98%       PHYSICAL EXAM:   MENTAL STATUS -  AWAKE / ALERT   HEENT- NCAT, PUPILS EQUALLY ROUND, SCLERAE ANICTERIC, CONJUNCTIVAE PINK, OP MOIST, NO JVD, EARS UNREMARKABLE EXTERNALLY  LUNGS - CTA, NO WHEEZES, RALES OR RHONCHI  HEART- RRR, NO RUB, MURMUR, OR GALLOP  ABD - NORMOACTIVE BOWEL SOUNDS, SOFT, NT. NO HEPATOSPLENOMEGALY APPRECIATED  EXT -edema in the right hand with decreased range of motion  Decreased range of motion right shoulder  NEURO -awake alert.  Oriented.  No significant dysarthria.     MOTOR EXAM -  LUE/LLE 5/5.    Right shoulder flexion 3-/5, elbow flexion 3-/5, finger flexion 3/5, hip flexion 3/5, knee extension 3/5, ankle dorsiflexion 1/5.  MAS 1+ in shoulder ADD, elbow flexors, elbow pronators, wrist flexors, finger flexors.      MEDICATIONS  Scheduled Meds:amLODIPine, 10 mg, Oral, QAM AC  apixaban, 5 mg, Oral, Q12H  atorvastatin, 40 mg, Oral, Daily  baclofen, 5 mg, Oral, Q8H  carvedilol, 3.125 mg, Oral, BID With Meals  gabapentin, 100 mg, Oral, TID  lidocaine, 1 patch, Transdermal, Q24H  valsartan, 160 mg, Oral, QAM AC      Continuous Infusions:   PRN Meds:.•  acetaminophen  •  senna-docusate sodium  **AND** polyethylene glycol **AND** bisacodyl **AND** bisacodyl  •  loperamide      RESULTS  No results found for: POCGLU  Results from last 7 days   Lab Units 05/15/23  0557   WBC 10*3/mm3 6.53   HEMOGLOBIN g/dL 11.7*   HEMATOCRIT % 33.9*   PLATELETS 10*3/mm3 201     Results from last 7 days   Lab Units 05/15/23  0557   SODIUM mmol/L 143   POTASSIUM mmol/L 4.1   CHLORIDE mmol/L 109*   CO2 mmol/L 27.0   BUN mg/dL 14   CREATININE mg/dL 0.69*   CALCIUM mg/dL 8.6   BILIRUBIN mg/dL 0.3   ALK PHOS U/L 109   ALT (SGPT) U/L 34   AST (SGOT) U/L 19   GLUCOSE mg/dL 84           ASSESSMENT and PLAN    ICH (intracerebral hemorrhage)    Stroke    Hemorrhagic stroke-left basal ganglia/internal capsule with intraventricular extension on March 12, 2023-    Right hemiparesis     Impaired mobility/impaired self-care    Diplopia    Dysphagia-mechanical soft/chopped meat/nectar thick liquid    Spasticity-baclofen 2.5 mg every 8 hours  May 9- Significant flexor synergy on the RUE with shoulder abd, elbow flexor and pronate tone. Currently on baclofen 2.5 q8h. Will evaluate tone with therapy today and make adjustments as needed.  May 10- titrate up on baclofen 5 mg every 8 hour  May 11- Continue to monitor RUE tone with increase of Baclofen yesterday. Will uptitrate as tolerated. OT to start NMES for motor training and spasticity to the RUE today.  May 12-spasticity improved with increase in baclofen as well as addition of low-dose gabapentin  May 13 - continue baclofen/gabapentin; tolerating well; denied side-effects  May 15-spasticity improving  May 16- Improving tone, pain in the R pect musculature. Educated patient to work on stretching exercises for R pect tone/spasticity    Right shoulder pain-May 12-no signs of complex regional pain syndrome.  Pain in part may be related to spasticity/immobility/degenerative changes.  X-ray of the right shoulder shows mild to moderate degenerative changes at the acromioclavicular joint.  May  titrate up on baclofen and gabapentin over time  May 14 - heat therapy w/ K-pad    Hypertension-amlodipine/carvedilol/valsartan    DVT-right soleal vein-March 17, 2023-Brendan Pruitt DO      During rounds, used appropriate personal protective equipment including mask and gloves.  Additional gown if indicated.  Mask used was standard procedure mask. Appropriate PPE was worn during the entire visit.  Hand hygiene was completed before and after.

## 2023-05-16 NOTE — PROGRESS NOTES
Inpatient Rehabilitation Plan of Care Note    Plan of Care  Care Plan Reviewed - No updates at this time.    Safety    [RN] Potential for Injury(Active)  Current Status(05/16/2023): Risk for falls r/t R hemiparesis  Weekly Goal(05/23/2023): Cue to use call bell  Discharge Goal: Patient will be aware of risk of fall and safety in the home  setting    Performed Intervention(s)  falls precautions  bed/chair alarm  safety rounds      Psychosocial    [RN] Coping/Adjustment(Active)  Current Status(05/16/2023): Expresses appropriate coping  Weekly Goal(05/22/2023): Educate patient regarding stroke and hypertention  Discharge Goal: Knowledgeable of care needs and stroke recovery    Performed Intervention(s)  Verbalizes needs and concerns  Therapeutic environmental set up      Sphincter Control    [RN] Bladder Management(Active)  Current Status(05/16/2023): Continent/incontinent of bladder at times  Weekly Goal(05/24/2023): Continent 75% of time  Discharge Goal: Continent 100% of time    [RN] Bowel Management(Active)  Current Status(05/16/2023): Continent/Incontinent at times of bowel  Weekly Goal(05/23/2023): Continent of bowel 75%  Discharge Goal: Continent of bowel 100%    Performed Intervention(s)  Bowel/bladder training  Encourage fluid intake  Scheduled toileting      Pain    [RN] Pain Management(Active)  Current Status(05/16/2023): Patient has pain to RUE and RLE, taking scheduled  baclofen and gabapentin and PRN Tylenol.  Weekly Goal(05/22/2023): Pain will be well controlled.  Discharge Goal: Pain controlled.    Performed Intervention(s)  meds as ordered  Pain assessment    Signed by: Anabela Taveras RN

## 2023-05-16 NOTE — THERAPY TREATMENT NOTE
Inpatient Rehabilitation - Speech Language Pathology Treatment Note    [facility]     Patient Name: Manuel Durant  : 1950  MRN: [id number]    Today's Date: 2023                   Admit Date: 2023       Visit Dx:      ICD-10-CM ICD-9-CM   1. Spastic hemiplegia of right dominant side as late effect of nontraumatic intraparenchymal hemorrhage of brain  I69.151 438.21       Patient Active Problem List   Diagnosis   • Intracranial hemorrhage   • Acute DVT (deep venous thrombosis)   • HTN (hypertension)   • Severe Malnutrition (HCC)   • Hemiplegia   • ICH (intracerebral hemorrhage)   • Stroke       Past Medical History:   Diagnosis Date   • Hypertension    • Hypokalemia 2023   • Stroke        No past surgical history on file.    SLP Recommendation and Plan                                                            SLP EVALUATION (last 72 hours)     SLP SLC Evaluation     Row Name 23 1330 23 0830 05/15/23 1330 05/15/23 0830          Communication Assessment/Intervention    Document Type therapy note (daily note)  -AL therapy note (daily note)  -AL therapy note (daily note)  -AL therapy note (daily note)  -AL     Patient/Family/Caregiver Comments/Observations Pt participated well. Used  via Ipad video.  -AL Pt participated well. Used  via Ipad video.  -AL Pt participated well. Used Greek interpter via Ipad video.  -AL Pt participated well. Used  via Ipad video. Reported his right shoulder feels better today. Denied pain.  -AL        Pain Scale: Numbers Pre/Post-Treatment    Pretreatment Pain Rating 10/10  -AL 10/10  -AL -- 0/10 - no pain  -AL     Posttreatment Pain Rating -- 10/10  -AL -- --     Pre/Posttreatment Pain Comment right shoulder  -AL right shoulder  -AL Pt did not c/o pain.  -AL --           User Key  (r) = Recorded By, (t) = Taken By, (c) = Cosigned By    Initials Name Effective Dates    Nurys Beavers, MS  CCC-SLP 06/16/21 -                    EDUCATION    The patient has been educated in the following areas:       Cognitive Impairment Communication Impairment Dysphagia (Swallowing Impairment).             SLP GOALS     Row Name 05/16/23 1330 05/16/23 0830 05/15/23 1330       (STG) Pharyngeal Strengthening Exercise Goal 1 (SLP)    Progress/Outcomes (Pharyngeal Strengthening Goal 1, SLP) good progress toward goal  -AL good progress toward goal  -AL good progress toward goal  -AL    Comment (Pharyngeal Strengthening Goal 1, SLP) No overt s/s of aspiration or penetration observed 100% of the time with water by cup with MIN cues for small sips. Completed 20 repetitions of effortful swallow exercise with MIN cues.  -AL No overt s/s of aspiration or penetration observed 100% of the time with initial cue for small sips. Pt completed 20 repetitions of effortful swallow exercise with MIN cues. Plan to complete meal tray observation of soft (chopped) with thins on next date.  -AL No overt s/s of aspiration or penetration observed in 5/6 trials of thins by cup (coffee) with MOD cues for small sips; cough x1.  -AL       Word Retrieval Skills Goal 1 (SLP)    Progress/Outcomes (Word Retrieval Goal 1, SLP) -- good progress toward goal  -AL --    Comment (Word Retrieval Goal 1, SLP) -- Confrontation naming of objects/food: 13/15 with NO cues, 15/15 with MOD-MAX cues. Right vision deficit impacted this task.  -AL --       Connected Speech to Express Thoughts Goal 1 (SLP)    Improve Narrative Discourse to Express Thoughts By Goal 1 (SLP) describing a picture;conversational task on a given topic;80%;with minimal cues (75-90%)  -AL -- --    Time Frame (Connected Speech Goal 1, SLP) 1 week  -AL -- --    Progress/Outcomes (Connected Speech Goal 1, SLP) good progress toward goal  -AL -- --    Comment (Connected Speech Goal 1, SLP) Describing problems in pictures: 100% with NO cues  -AL -- Describing problems in pictures: 60% with NO cues,  "100% with MIN cues to clarify message. In conversation regarding his family, pt able to state number of his children, but only able to name 2/5 children; unclear whether memory vs. word finding difficulty.  -AL       Attention Goal 1 (SLP)    Improve Attention by Goal 1 (SLP) attending to task;complete sustained attention task;80%;with minimal cues (75-90%)  -AL -- --    Progress/Outcomes (Attention Goal 1, SLP) good progress toward goal  -AL -- --    Comment (Attention Goal 1, SLP) Read time on clock with 70% accuracy with NO cues, 100% with MIN-MOD cues.  -AL -- --       Orientation Goal 1 (SLP)    Improve Orientation Through Goal 1 (SLP) -- demonstrating orientation to day;demonstrating orientation to month;demonstrating orientation to year;demonstrating orientation to place;demonstrating orientation to disease/impairment;80%;with minimal cues (75-90%)  -AL --    Time Frame (Orientation Goal 1, SLP) -- 2 weeks  -AL --    Progress/Outcomes (Orientation Goal 1, SLP) -- good progress toward goal  -AL --    Comment (Orientation Goal 1, SLP) -- Oriented to situation, city, state and month. Required MAX cues for year and age. Aphasia impacting task.  -AL Pt was oriented to situation (\"brain issue\"), city, state, month and month of his birth. Required MAX cues for year, date of his birth, and age (84 vs. 73).  -AL    Row Name 05/15/23 0830             (STG) Pharyngeal Strengthening Exercise Goal 1 (SLP)    Progress/Outcomes (Pharyngeal Strengthening Goal 1, SLP) good progress toward goal  -AL      Comment (Pharyngeal Strengthening Goal 1, SLP) Pt completed 20 repetitions of effortful swallow with MIN-MOD cues. No overt s/s of aspiration or penetration observed 100% of the time with initial cues for small sips. Trialed mixed consistency (peaches): Pt exhibited strong cough on large bite of peaches. With cues for small bites, pt exhibited delayed light cough in 1/5 trials.  -AL            User Key  (r) = Recorded By, (t) " = Taken By, (c) = Cosigned By    Initials Name Provider Type    Nurys Beavers MS CCC-SLP Speech and Language Pathologist                            Time Calculation:        Time Calculation- SLP     Row Name 05/16/23 1454 05/16/23 0916          Time Calculation- SLP    SLP Start Time 1330  -AL 0830  -AL     SLP Stop Time 1400  -AL 0900  -AL     SLP Time Calculation (min) 30 min  -AL 30 min  -AL           User Key  (r) = Recorded By, (t) = Taken By, (c) = Cosigned By    Initials Name Provider Type    Nurys Beavers MS CCC-SLP Speech and Language Pathologist                  Therapy Charges for Today     Code Description Service Date Service Provider Modifiers Qty    90077520763 HC ST DEV OF COGN SKILLS INITIAL 15 MIN 5/15/2023 Nurys Dixon, MS CCC-SLP  1    84474121608 HC ST DEV OF COGN SKILLS EACH ADDT'L 15 MIN 5/15/2023 Nurys Dixon, MS CCC-SLP  1    08741887965 HC ST TREATMENT SWALLOW 2 5/15/2023 Nurys Dixon, MS CCC-SLP GN 1    43814253954 HC ST DEV OF COGN SKILLS INITIAL 15 MIN 5/16/2023 Nurys Dixon, MS CCC-SLP  1    60017177779 HC ST DEV OF COGN SKILLS EACH ADDT'L 15 MIN 5/16/2023 Nurys Dixon, MS CCC-SLP  1    87927160487 HC ST TREATMENT SWALLOW 2 5/16/2023 Nurys Dixon MS CCC-SLP GN 1                           Nurys Dixon MS CCC-SLP  5/16/2023

## 2023-05-16 NOTE — PLAN OF CARE
Goal Outcome Evaluation:           Progress: improving  Outcome Evaluation: Amina is alert and oriented to self place and situation unaware of month or year, continues with right hemiparesis, continues with pain to right shoulder arm and RLE although is better today and patient explained he feels that the medication is helping, incontinent of bowel and bladder, working hard in therapies, using interpretor for communication although understands and speaks some english.

## 2023-05-17 PROCEDURE — 97530 THERAPEUTIC ACTIVITIES: CPT

## 2023-05-17 PROCEDURE — 92526 ORAL FUNCTION THERAPY: CPT

## 2023-05-17 PROCEDURE — 97110 THERAPEUTIC EXERCISES: CPT

## 2023-05-17 PROCEDURE — 97130 THER IVNTJ EA ADDL 15 MIN: CPT

## 2023-05-17 PROCEDURE — 97112 NEUROMUSCULAR REEDUCATION: CPT

## 2023-05-17 PROCEDURE — 97129 THER IVNTJ 1ST 15 MIN: CPT

## 2023-05-17 PROCEDURE — 97535 SELF CARE MNGMENT TRAINING: CPT

## 2023-05-17 RX ADMIN — GABAPENTIN 100 MG: 100 CAPSULE ORAL at 09:28

## 2023-05-17 RX ADMIN — GABAPENTIN 100 MG: 100 CAPSULE ORAL at 21:16

## 2023-05-17 RX ADMIN — BACLOFEN 5 MG: 10 TABLET ORAL at 05:45

## 2023-05-17 RX ADMIN — BACLOFEN 5 MG: 10 TABLET ORAL at 21:16

## 2023-05-17 RX ADMIN — BACLOFEN 5 MG: 10 TABLET ORAL at 13:37

## 2023-05-17 RX ADMIN — LIDOCAINE 1 PATCH: 50 PATCH CUTANEOUS at 09:28

## 2023-05-17 RX ADMIN — AMLODIPINE BESYLATE 10 MG: 10 TABLET ORAL at 09:28

## 2023-05-17 RX ADMIN — CARVEDILOL 3.12 MG: 3.12 TABLET, FILM COATED ORAL at 18:18

## 2023-05-17 RX ADMIN — ACETAMINOPHEN 650 MG: 325 TABLET, FILM COATED ORAL at 12:41

## 2023-05-17 RX ADMIN — GABAPENTIN 100 MG: 100 CAPSULE ORAL at 15:33

## 2023-05-17 RX ADMIN — ATORVASTATIN CALCIUM 40 MG: 20 TABLET, FILM COATED ORAL at 09:27

## 2023-05-17 RX ADMIN — APIXABAN 5 MG: 5 TABLET, FILM COATED ORAL at 18:18

## 2023-05-17 RX ADMIN — ACETAMINOPHEN 650 MG: 325 TABLET, FILM COATED ORAL at 21:17

## 2023-05-17 RX ADMIN — APIXABAN 5 MG: 5 TABLET, FILM COATED ORAL at 05:45

## 2023-05-17 RX ADMIN — CARVEDILOL 3.12 MG: 3.12 TABLET, FILM COATED ORAL at 09:28

## 2023-05-17 RX ADMIN — VALSARTAN 160 MG: 160 TABLET, FILM COATED ORAL at 09:28

## 2023-05-17 NOTE — THERAPY TREATMENT NOTE
"Inpatient Rehabilitation - Physical Therapy Treatment Note       [facility]     Patient Name: Manuel Durant  : 1950  MRN: [id number]    Today's Date: 2023                    Admit Date: 2023      Visit Dx:     ICD-10-CM ICD-9-CM   1. Spastic hemiplegia of right dominant side as late effect of nontraumatic intraparenchymal hemorrhage of brain  I69.151 438.21       Patient Active Problem List   Diagnosis   • Intracranial hemorrhage   • Acute DVT (deep venous thrombosis)   • HTN (hypertension)   • Severe Malnutrition (HCC)   • Hemiplegia   • ICH (intracerebral hemorrhage)   • Stroke       Past Medical History:   Diagnosis Date   • Hypertension    • Hypokalemia 2023   • Stroke        No past surgical history on file.    PT ASSESSMENT (last 12 hours)     IRF PT Evaluation and Treatment     Row Name 23 1200          PT Time and Intention    Document Type daily treatment  -AE     Mode of Treatment individual therapy;physical therapy  -AE     Patient/Family/Caregiver Comments/Observations pt complaining of RUE pain and mid thoracic pain.  -AE     Row Name 23 1200          General Information    Patient Profile Reviewed yes  -AE     General Observations of Patient increased attention to R side. still tends to \"lean\" and \"fall\" to right side when sitting  -AE     Existing Precautions/Restrictions fall  -AE     Row Name 23 1200          Pain Assessment    Pretreatment Pain Rating 10  -AE     Posttreatment Pain Rating 810  -AE     Pain Location - Side/Orientation Right  -AE     Pain Location upper  -AE     Pain Location - shoulder  -AE     Row Name 23 1200          Cognition/Psychosocial    Affect/Mental Status (Cognition) WFL  -AE     Orientation Status (Cognition) oriented to;person  -AE     Follows Commands (Cognition) follows one-step commands;repetition of directions required;verbal cues/prompting required;physical/tactile prompts required  -AE     Personal Safety " Pt was upstairs for an outpatient lumbar puncture to diagnose her headaches. Her blood pressure was high and she could not have her procedure. She has been having headaches for the past few months but getting worse.    Interventions fall prevention program maintained;gait belt;muscle strengthening facilitated;nonskid shoes/slippers when out of bed;supervised activity  -AE     Row Name 05/17/23 1200          Bed-Chair Transfer    Bed-Chair [place] (Transfers) moderate assist (50% patient effort);1 person assist  -AE     Assistive Device (Bed-Chair Transfers) wheelchair  -AE     Comment, (Bed-Chair Transfer) practiced scooting and squat pivot on mat. required hand over hip to keep head down at first but then able to maintain after few trials.  -AE     Row Name 05/17/23 1200          Chair-Bed Transfer    Chair-Bed [place] (Transfers) moderate assist (50% patient effort)  -AE     Assistive Device (Chair-Bed Transfers) wheelchair  -AE     Comment, (Chair-Bed Transfer) sit pivot  -AE     Row Name 05/17/23 1200          Sit-Stand Transfer    Sit-Stand [place] (Transfers) minimum assist (75% patient effort)  -AE     Assistive Device (Sit-Stand Transfers) wheelchair;parallel bars  -AE     Row Name 05/17/23 1200          Stand-Sit Transfer    Stand-Sit [place] (Transfers) minimum assist (75% patient effort);moderate assist (50% patient effort)  -AE     Assistive Device (Stand-Sit Transfers) wheelchair;parallel bars  -AE     Comment, (Stand-Sit Transfer) tends to lean so heavily to R during sitting requiring modA  -AE     Row Name 05/17/23 1200          Balance    Comment, Balance standing A/P and lateral weight shifting x 3. standing LLE marches to promote increased WB and upright posture during stance time on RLE 2 x 3 with modA  -AE     Row Name 05/17/23 1200          Hip (Therapeutic Exercise)    Hip Strengthening (Therapeutic Exercise) marching while seated;aBduction;green;resistance band;3 sets;10 repetitions  -AE     Row Name 05/17/23 1200          Knee (Therapeutic Exercise)    Knee Strengthening (Therapeutic Exercise) bilateral;LAQ (long arc quad);hamstring curls;green;resistance band;3 sets;10 repetitions  -AE      Row Name 05/17/23 1200          Positioning and Restraints    Pre-Treatment Position sitting in chair/recliner  -AE     Post Treatment Position wheelchair  -AE     In Wheelchair sitting;call light within reach;encouraged to call for assist;exit alarm on  -AE           User Key  (r) = Recorded By, (t) = Taken By, (c) = Cosigned By    Initials Name Provider Type    AE Angeles Altman, JOSE ELIAS Physical Therapist                 Physical Therapy Education     Title: PT OT SLP Therapies (In Progress)     Topic: Physical Therapy (In Progress)     Point: Mobility training (In Progress)     Learning Progress Summary           Patient Acceptance, E,TB, NR by LB at 5/10/2023 1211    Acceptance, E,D, NR by DP at 5/9/2023 1217                   Point: Home exercise program (In Progress)     Learning Progress Summary           Patient Acceptance, E,D, NR by DP at 5/9/2023 1217                   Point: Body mechanics (In Progress)     Learning Progress Summary           Patient Acceptance, E,D, NR by DP at 5/9/2023 1217                   Point: Precautions (In Progress)     Learning Progress Summary           Patient Acceptance, E,D, NR by DP at 5/9/2023 1217                               User Key     Initials Effective Dates Name Provider Type Discipline    LB 06/16/21 -  Le Reynoso PT Physical Therapist PT    DP 08/24/21 -  Tristin Vicente PT Physical Therapist PT                PT Recommendation and Plan                          Time Calculation:      PT Charges     Row Name 05/17/23 1216             Time Calculation    Start Time 0900  -AE      Stop Time 1000  -AE      Time Calculation (min) 60 min  -AE      PT Received On 05/17/23  -AE      PT - Next Appointment 05/18/23  -AE         Time Calculation- PT    Total Timed Code Minutes- PT 60 minute(s)  -AE            User Key  (r) = Recorded By, (t) = Taken By, (c) = Cosigned By    Initials Name Provider Type    AE Angeles Altman PT Physical Therapist                 Therapy Charges for Today     Code Description Service Date Service Provider Modifiers Qty    58946352155 HC GAIT TRAINING EA 15 MIN 5/16/2023 Angeles Altman, PT GP 1    01397507711 HC PT THER PROC EA 15 MIN 5/16/2023 Angeles Altman, PT GP 1    35356233435 HC PT NEUROMUSC RE EDUCATION EA 15 MIN 5/16/2023 Angeles Altman, PT GP 2    64282844143 HC PT THER SUPP EA 15 MIN 5/16/2023 Angeles Altman, PT GP 2    16529071417 HC PT THER PROC EA 15 MIN 5/17/2023 Angeles Altman, PT GP 2    24392556386 HC PT THERAPEUTIC ACT EA 15 MIN 5/17/2023 Angeles Altman, PT GP 2    26009981976 HC PT THER SUPP EA 15 MIN 5/17/2023 Angeles Altman, PT GP 2            PT G-Codes  AM-PAC 6 Clicks Score (PT): 14      Angeles Altman, PT  5/17/2023

## 2023-05-17 NOTE — PROGRESS NOTES
Spoke with patient's boss (owner of [employer]), Carolyn, by phone. She stated patient was paid twice in March and checks were direct deposited into his bank account at [employer]. Discussed with patient and will plan to call [employer] with him tomorrow about his the need to close his account and get his money out to take with him to [place]. Will need to take patient on Friday afternoon to [employer] so he can close account. Left message for his friend, Bridgett, as she is coming on Friday to bring patient's documents. Asked her if she could take patient to [employer]. So, will see if she can do this. Patient's friends were here visiting and one of them will try to talk with Bridgett about this for Friday and she stated she will plan to come Friday as well. So, she may be able to take him if Bridgett cannot. This friend has been in touch with patient's daughter regarding son-in-law coming on Friday. Friend showed me on her phone that daughter sent change in flight for son-in-law. He now will arrive in [place] at 1:45 p.m. on 5/19. Family conference to be held tomorrow at 1:00 p.m. Will connect family by using [facility] (video conference). Confirmed with son today. Will continue to assist with plans.

## 2023-05-17 NOTE — THERAPY TREATMENT NOTE
"Inpatient Rehabilitation - Speech Language Pathology Treatment Note    [facility]     Patient Name: Manuel Durant  : 1950  MRN: [id number]    Today's Date: 2023                   Admit Date: 2023       Visit Dx:      ICD-10-CM ICD-9-CM   1. Spastic hemiplegia of right dominant side as late effect of nontraumatic intraparenchymal hemorrhage of brain  I69.151 438.21       Patient Active Problem List   Diagnosis   • Intracranial hemorrhage   • Acute DVT (deep venous thrombosis)   • HTN (hypertension)   • Severe Malnutrition (HCC)   • Hemiplegia   • ICH (intracerebral hemorrhage)   • Stroke       Past Medical History:   Diagnosis Date   • Hypertension    • Hypokalemia 2023   • Stroke        No past surgical history on file.    SLP Recommendation and Plan                                                            SLP EVALUATION (last 72 hours)     SLP SLC Evaluation     Row Name 23 1300 23 0830 23 1330 23 0830 05/15/23 1330       Communication Assessment/Intervention    Document Type therapy note (daily note)  -AL therapy note (daily note)  -AL therapy note (daily note)  -AL therapy note (daily note)  -AL therapy note (daily note)  -AL    Patient/Family/Caregiver Comments/Observations Pt participated well. Used Afghan interpeter via Ipad video.  -AL Pt participated well. Used  via Ipad video.  -AL Pt participated well. Used  via Ipad video.  -AL Pt participated well. Used  via Ipad video.  -AL Pt participated well. Used Afghan interpter via Ipad video.  -AL       Pain Scale: Numbers Pre/Post-Treatment    Pretreatment Pain Rating -- -- 10/10  -AL 10/10  -AL --    Posttreatment Pain Rating -- -- -- 10/10  -AL --    Pre/Posttreatment Pain Comment Pt did not c/o pain; received baclofen at beginning of session.  -AL right shoulder pain; pt did not rate, but stated it is \"better\" today  -AL right shoulder  -AL right " shoulder  -AL Pt did not c/o pain.  -AL    Row Name 05/15/23 0830                   Communication Assessment/Intervention    Document Type therapy note (daily note)  -AL        Patient/Family/Caregiver Comments/Observations Pt participated well. Used  via Ipad video. Reported his right shoulder feels better today. Denied pain.  -AL           Pain Scale: Numbers Pre/Post-Treatment    Pretreatment Pain Rating 0/10 - no pain  -AL              User Key  (r) = Recorded By, (t) = Taken By, (c) = Cosigned By    Initials Name Effective Dates    Nurys Beavers, MS CCC-SLP 06/16/21 -                    EDUCATION    The patient has been educated in the following areas:       Cognitive Impairment Communication Impairment Dysphagia (Swallowing Impairment).             SLP GOALS     Row Name 05/17/23 1300 05/17/23 0900 05/16/23 1330       (STG) Pharyngeal Strengthening Exercise Goal 1 (SLP)    Progress/Outcomes (Pharyngeal Strengthening Goal 1, SLP) -- -- good progress toward goal  -AL    Comment (Pharyngeal Strengthening Goal 1, SLP) SLP provided education regarding VFSS which will be completed tomorrow. Pt voiced understanding and agreement.  -AL Re-assessed swallow function with thins by cup/straw, puree, mixed, and regular. No overt s/s of aspiration or penetration observed with any consistency. Pt exhibited no difficulty with mastication. Mild anterior loss of solid noted x1. Pt exhibited slow rate and took small bites/sips independently. Discussed with MD. Recommend continue soft (chopped)/no mixed with NTL. Trials of thin liquids with SLP only. Plan to complete VFSS tomorrow to objectively assess swallow function.  -AL No overt s/s of aspiration or penetration observed 100% of the time with water by cup with MIN cues for small sips. Completed 20 repetitions of effortful swallow exercise with MIN cues.  -AL       Word Retrieval Skills Goal 1 (SLP)    Improve Word Retrieval Skills By Goal 1 (SLP)  completing functional word finding tasks;completing a divergent task;80%;independently (over 90% accuracy)  -AL -- --    Progress/Outcomes (Word Retrieval Goal 1, SLP) good progress toward goal  -AL -- --    Comment (Word Retrieval Goal 1, SLP) Confrontation naming of common objects/food: 80% with NO cues, 100% with MIN cues.  -AL Divergent thinking: listing animals: 9 with NO cues in 1 minute.  -AL --       Connected Speech to Express Thoughts Goal 1 (SLP)    Improve Narrative Discourse to Express Thoughts By Goal 1 (SLP) -- -- describing a picture;conversational task on a given topic;80%;with minimal cues (75-90%)  -AL    Time Frame (Connected Speech Goal 1, SLP) -- -- 1 week  -AL    Progress/Outcomes (Connected Speech Goal 1, SLP) good progress toward goal  -AL -- good progress toward goal  -AL    Comment (Connected Speech Goal 1, SLP) Describing problems in pictures: 70% with NO cues, 100% with MIN cues.  -AL -- Describing problems in pictures: 100% with NO cues  -AL       Attention Goal 1 (SLP)    Improve Attention by Goal 1 (SLP) -- -- attending to task;complete sustained attention task;80%;with minimal cues (75-90%)  -AL    Progress/Outcomes (Attention Goal 1, SLP) -- -- good progress toward goal  -AL    Comment (Attention Goal 1, SLP) -- -- Read time on clock with 70% accuracy with NO cues, 100% with MIN-MOD cues.  -AL       Orientation Goal 1 (SLP)    Improve Orientation Through Goal 1 ([facility]) demonstrating orientation to day;demonstrating orientation to month;demonstrating orientation to year;demonstrating orientation to place;demonstrating orientation to disease/impairment;80%;with minimal cues (75-90%)  -AL -- --    Progress/Outcomes (Orientation Goal 1, SLP) goal ongoing  -AL good progress toward goal  -AL --    Comment (Orientation Goal 1, SLP) Oriented to month with MAX cues and year with MIN cues. Oriented to city, state and situation with NO cues.  -AL Oriented to situation, city, state and month;  "required MAX cues for year and MIN cues for DOW. Stated \"1992\" for year.  -AL --       Memory Skills Goal 1 (SLP)    Comment (Memory Skills Goal 1, SLP) -- Required MAX cues for recall of recent work history ([employer]).  -AL --    Row Name 05/16/23 0830 05/15/23 1330 05/15/23 0830       ([facility]) Pharyngeal Strengthening Exercise Goal 1 (SLP)    Progress/Outcomes (Pharyngeal Strengthening Goal 1, SLP) good progress toward goal  -AL good progress toward goal  -AL good progress toward goal  -AL    Comment (Pharyngeal Strengthening Goal 1, SLP) No overt s/s of aspiration or penetration observed 100% of the time with initial cue for small sips. Pt completed 20 repetitions of effortful swallow exercise with MIN cues. Plan to complete meal tray observation of soft (chopped) with thins on next date.  -AL No overt s/s of aspiration or penetration observed in 5/6 trials of thins by cup (coffee) with MOD cues for small sips; cough x1.  -AL Pt completed 20 repetitions of effortful swallow with MIN-MOD cues. No overt s/s of aspiration or penetration observed 100% of the time with initial cues for small sips. Trialed mixed consistency (peaches): Pt exhibited strong cough on large bite of peaches. With cues for small bites, pt exhibited delayed light cough in 1/5 trials.  -AL       Word Retrieval Skills Goal 1 (SLP)    Progress/Outcomes (Word Retrieval Goal 1, SLP) good progress toward goal  -AL -- --    Comment (Word Retrieval Goal 1, SLP) Confrontation naming of objects/food: 13/15 with NO cues, 15/15 with MOD-MAX cues. Right vision deficit impacted this task.  -AL -- --       Connected Speech to Express Thoughts Goal 1 (SLP)    Comment (Connected Speech Goal 1, SLP) -- Describing problems in pictures: 60% with NO cues, 100% with MIN cues to clarify message. In conversation regarding his family, pt able to state number of his children, but only able to name 2/5 children; unclear whether memory vs. word finding difficulty.  -AL -- " "      Orientation Goal 1 (SLP)    Improve Orientation Through Goal 1 (SLP) demonstrating orientation to day;demonstrating orientation to month;demonstrating orientation to year;demonstrating orientation to place;demonstrating orientation to disease/impairment;80%;with minimal cues (75-90%)  -AL -- --    Time Frame (Orientation Goal 1, SLP) 2 weeks  -AL -- --    Progress/Outcomes (Orientation Goal 1, SLP) good progress toward goal  -AL -- --    Comment (Orientation Goal 1, SLP) Oriented to situation, city, state and month. Required MAX cues for year and age. Aphasia impacting task.  -AL Pt was oriented to situation (\"brain issue\"), city, state, month and month of his birth. Required MAX cues for year, date of his birth, and age (84 vs. 73).  -AL --          User Key  (r) = Recorded By, (t) = Taken By, (c) = Cosigned By    Initials Name Provider Type    Nurys Beavers MS CCC-SLP Speech and Language Pathologist                            Time Calculation:        Time Calculation- SLP     Row Name 05/17/23 1543 05/17/23 0922          Time Calculation- SLP    SLP Start Time 1300  -AL 0830  -AL     SLP Stop Time 1330  -AL 0900  -AL     SLP Time Calculation (min) 30 min  -AL 30 min  -AL           User Key  (r) = Recorded By, (t) = Taken By, (c) = Cosigned By    Initials Name Provider Type    Nurys Beavers MS CCC-SLP Speech and Language Pathologist                  Therapy Charges for Today     Code Description Service Date Service Provider Modifiers Qty    82031734453 HC ST DEV OF COGN SKILLS INITIAL 15 MIN 5/16/2023 Nurys Dixon MS CCC-SLP  1    89336545951 HC ST DEV OF COGN SKILLS EACH ADDT'L 15 MIN 5/16/2023 Nurys Dixon MS CCC-SLP  1    17948537841 HC ST TREATMENT SWALLOW 2 5/16/2023 Nurys Dixon MS CCC-SLP GN 1    53209229451 HC ST DEV OF COGN SKILLS INITIAL 15 MIN 5/17/2023 Nurys Dixon MS CCC-SLP  1    95189208136 HC ST DEV OF COGN SKILLS EACH ADDT'L 15 MIN 5/17/2023 Destiny" Nurys [place], MS CCC-SLP  2    [id number]  ST TREATMENT SWALLOW 1 5/17/2023 Nurys Dixon, MS CCC-SLP GN 1                           Nurys Dixon MS CCC-SLP  5/17/2023

## 2023-05-17 NOTE — PLAN OF CARE
Goal Outcome Evaluation:      Pt is A/Ox4 , may be forgetful, calm/cooperative, not OOB overnight. Meds taken whole w puree. Pt c/o pain to RUE; prn Tylenol given x1, scheduled Baclofen given. Pt has been incontinent of urine overnight.  services utilized for communication.

## 2023-05-17 NOTE — PROGRESS NOTES
Inpatient Rehabilitation Functional Measures Assessment and Plan of Care    Plan of Care  Updated Problems/Interventions  Mobility    [OT] Toilet Transfers(Active)  Current Status(05/17/2023): MOD  Weekly Goal(05/19/2023): MIN  Discharge Goal: Min    [OT] Tub/Shower Transfers(Active)  Current Status(05/17/2023): MOD/MIN  Weekly Goal(05/19/2023): Min  Discharge Goal: MIN        Self Care    [OT] Bathing(Active)  Current Status(05/17/2023): MIN  Weekly Goal(05/19/2023): Min  Discharge Goal: Min    [OT] Dressing (Lower)(Active)  Current Status(05/17/2023): MOD  Weekly Goal(05/19/2023): MIN  Discharge Goal: Min    [OT] Dressing (Upper)(Active)  Current Status(05/17/2023): MIN  Weekly Goal(05/19/2023): Set up  Discharge Goal: SBA    [OT] Eating(Active)  Current Status(05/17/2023): setup  Weekly Goal(05/19/2023): SBA  Discharge Goal: SBA    [OT] Grooming(Active)  Current Status(05/17/2023): Min  Weekly Goal(05/19/2023): Min  Discharge Goal: SBA    [OT] Toileting(Active)  Current Status(05/17/2023): MOD  Weekly Goal(05/19/2023): MIN  Discharge Goal: Min    Functional Measures  SHEBLY Eating:  Branch  SHELBY Grooming: Branch  SHELBY Bathing:  Branch  SHELBY Upper Body Dressing:  Branch  SHELBY Lower Body Dressing:  Branch  SHELBY Toileting:  Branch    SHELBY Bladder Management  Level of Assistance:  Branch  Frequency/Number of Accidents this Shift:  Branch    SHELBY Bowel Management  Level of Assistance: Branch  Frequency/Number of Accidents this Shift: Branch    SHELBY Bed/Chair/Wheelchair Transfer:  Branch  SHELBY Toilet Transfer:  Branch  SHELBY Tub/Shower Transfer:  Branch    Previously Documented Mode of Locomotion at Discharge: Field  SHELBY Expected Mode of Locomotion at Discharge: Branch  SHELBY Walk/Wheelchair:  Branch  SHELBY Stairs:  Branch    SHELBY Comprehension:  Branch  SHELBY Expression:  Branch  SHELBY Social Interaction:  Branch  SHELBY Problem Solving:  Branch  SHELBY Memory:  Branch    Therapy Mode Minutes  Occupational Therapy: Branch  Physical Therapy:  Branch  Speech Language Pathology:  Branch    Signed by: Carole Saenz, OT

## 2023-05-17 NOTE — PROGRESS NOTES
Inpatient Rehabilitation Plan of Care Note    Plan of Care  Care Plan Reviewed - Updates as Follows    Safety    [RN] Potential for Injury(Active)  Current Status(05/17/2023): Risk for falls r/t R hemiparesis  Weekly Goal(05/24/2023): Cue to use call bell  Discharge Goal: Patient will be aware of risk of fall and safety in the home  setting    Performed Intervention(s)  falls precautions  bed/chair alarm  safety rounds      Psychosocial    [RN] Coping/Adjustment(Active)  Current Status(05/17/2023): Expresses appropriate coping  Weekly Goal(05/24/2023): Educate patient regarding stroke and hypertention  Discharge Goal: Knowledgeable of care needs and stroke recovery    Performed Intervention(s)  Verbalizes needs and concerns  Therapeutic environmental set up      Sphincter Control    [RN] Bladder Management(Active)  Current Status(05/16/2023): Continent/incontinent of bladder at times  Weekly Goal(05/24/2023): Continent 75% of time  Discharge Goal: Continent 100% of time    Performed Intervention(s)  Bowel/bladder training  Encourage fluid intake  Scheduled toileting      Pain    [RN] Pain Management(Active)  Current Status(05/17/2023): Patient has pain to RUE and RLE, taking scheduled  baclofen and gabapentin and PRN Tylenol.  Weekly Goal(05/24/2023): Pain will be well controlled.  Discharge Goal: Pain controlled.    Performed Intervention(s)  meds as ordered  Pain assessment    Signed by: Cecily Tobias RN

## 2023-05-17 NOTE — DISCHARGE INSTRUCTIONS
Put all imaging on a disc for patient to take with him to [place].  Print out videofluoroscopic swallow report from May 18, 2022 for patient to take with him to [place].  Print out all H&P's, consults, acute care discharge summary, and put in a packet for the patient to take to [place] this weekend.  When the discharge summary is dictated this weekend please also print that out for the patient to take with him.    Patient should continue with physical therapy, Occupational Therapy, and speech therapy in [place].    Pharmacy to see for education on Eliquis with the patient and will need to have done with caregiver this weekend as well

## 2023-05-17 NOTE — THERAPY TREATMENT NOTE
Inpatient Rehabilitation - Occupational Therapy Treatment Note    [facility]     Patient Name: Manuel Durant  : 1950  MRN: [id number]    Today's Date: 2023                 Admit Date: 2023         ICD-10-CM ICD-9-CM   1. Spastic hemiplegia of right dominant side as late effect of nontraumatic intraparenchymal hemorrhage of brain  I69.151 438.21       Patient Active Problem List   Diagnosis   • Intracranial hemorrhage   • Acute DVT (deep venous thrombosis)   • HTN (hypertension)   • Severe Malnutrition (HCC)   • Hemiplegia   • ICH (intracerebral hemorrhage)   • Stroke       Past Medical History:   Diagnosis Date   • Hypertension    • Hypokalemia 2023   • Stroke        No past surgical history on file.          IRF OT ASSESSMENT FLOWSHEET (last 12 hours)     IRF OT Evaluation and Treatment     Row Name 23 1516          OT Time and Intention    Document Type daily treatment  -AF     Mode of Treatment occupational therapy  -AF     Patient Effort good  -AF     Row Name 23 1516          General Information    Patient/Family/Caregiver Comments/Observations pt sitting upin w/c in AM aND pm SESSIONS  -AF     Existing Precautions/Restrictions fall  -AF     Row Name 23 1516          Pain Scale: FACES Pre/Post-Treatment    Pain: FACES Scale, Pretreatment 2-->hurts little bit  -AF     Posttreatment Pain Rating 4-->hurts little more  -AF     Pain Location - Side/Orientation Right  -AF     Pain Location upper  -AF     Pain Location - --  arm  -AF     Pre/Posttreatment Pain Comment added TENS in PM session at shoulder during funcitonal retraining tasks  -AF     Row Name 23 1516          Cognition/Psychosocial    Affect/Mental Status (Cognition) WFL  -AF     Orientation Status (Cognition) oriented to;person  -AF     Follows Commands (Cognition) follows one-step commands;repetition of directions required;verbal cues/prompting required;physical/tactile prompts required  -AF      Personal Safety Interventions fall prevention program maintained;gait belt;nonskid shoes/slippers when out of bed  -AF     Row Name 05/17/23 1516          Bathing    [place] Level (Bathing) bathing skills;verbal cues;nonverbal cues (demo/gesture);minimum assist (75% patient effort)  -AF     Assistive Device (Bathing) grab bar/tub rail;hand held shower spray hose;tub bench  -AF     Position (Bathing) supported sitting;supported standing  -AF     Set-up Assistance (Bathing) obtain supplies  -AF     Row Name 05/17/23 1516          Upper Body Dressing    [place] Level (Upper Body Dressing) upper body dressing skills;minimum assist (75% or more patient effort);verbal cues  -AF     Position (Upper Body Dressing) supported sitting  -AF     Set-up Assistance (Upper Body Dressing) obtain clothing  -AF     Row Name 05/17/23 1516          Lower Body Dressing    [place] Level (Lower Body Dressing) don;doff;pants/bottoms;shoes/slippers;socks;moderate assist (50% patient effort);maximum assist (25% patient effort);verbal cues  -AF     Position (Lower Body Dressing) supported sitting;supported standing  -AF     Set-up Assistance (Lower Body Dressing) obtain clothing  -AF     Row Name 05/17/23 1516          Grooming    [place] Level (Grooming) grooming skills;oral care regimen;hair care, combing/brushing;verbal cues;nonverbal cues (demo/gesture);standby assist;deodorant application;minimum assist (75% patient effort)  -AF     Position (Grooming) sink side;supported sitting  -AF     Row Name 05/17/23 1516          Toileting    [place] Level (Toileting) toileting skills;moderate assist (50% patient effort);maximum assist (25% patient effort);verbal cues  -AF     Assistive Device Use (Toileting) grab bar/safety frame;raised toilet seat  -AF     Position (Toileting) supported sitting;supported standing  -AF     Comment (Toileting) increased tone in RLE difficulty with maintaining balance and clothing management  tasks  -AF     Row Name 05/17/23 1516          Bed Mobility    Sit-Supine [place] (Bed Mobility) minimum assist (75% patient effort);moderate assist (50% patient effort);verbal cues  -AF     Row Name 05/17/23 1516          Transfer Assessment/Treatment    Comment, (Transfers) EOm <> w/c squat pivot MOD/MIN A  -AF     Row Name 05/17/23 1516          Chair-Bed Transfer    Chair-Bed [place] (Transfers) moderate assist (50% patient effort);minimum assist (75% patient effort);verbal cues  -AF     Assistive Device (Chair-Bed Transfers) wheelchair  -AF     Comment, (Chair-Bed Transfer) squat pivot  -AF     Row Name 05/17/23 1516          Stand-Sit Transfer    Comment, (Stand-Sit Transfer) sit to  shower with use of the grab bar and therapist assistance with RLE stead  -AF     Row Name 05/17/23 1516          Toilet Transfer    Type (Toilet Transfer) stand pivot/stand step  -AF     [place] Level (Toilet Transfer) moderate assist (50% patient effort);verbal cues  -AF     Assistive Device (Toilet Transfer) wheelchair;commode;grab bars/safety frame  -AF     Row Name 05/17/23 1516          Shower Transfer    Type (Shower Transfer) squat pivot  -AF     [place] Level (Shower Transfer) minimum assist (75% patient effort)  -AF     Assistive Device (Shower Transfer) grab bar, tub/shower;tub bench;wheelchair  -AF     Row Name 05/17/23 1516          Motor Skills    Functional Endurance fair plus wiht ADL tasks  -AF     Row Name 05/17/23 1516          Neuromuscular Re-education    Interventions (Neuromuscular Re-education) weight shifting;weight bearing;facilitation/inhibition;closed chain;joint approximation  -AF     Positioning (Neuromuscular Re-education) unsupported;sitting;standing  -AF     Comment (Neuromuscular Re-education) with TENS on R shoulder, and NMES On R wrist extensors to increase sensory input to the R side for tone reduciton and increased activaiton on the R side during funcitonal tasks. pt  participated in standing with mirror for visual feedback, therapist seated on patients R to assist with keeping R foot on the ground and alignment, pt keeps a one legged stance with feet very close together with seperation and wiehgt shifting patient demos increased awareness/balance and reduciton of tone on RLE. able to maintain balance in standing with and without UE support of the chair placed in front of him. with external cues to reach for increased wieght shifitng.  -AF     Row Name 05/17/23 1516          Balance    Static Sitting Balance standby assist  -AF     Dynamic Sitting Balance contact guard  -AF     Static Standing Balance minimal assist;contact guard;verbal cues  -AF     Dynamic Standing Balance minimal assist;moderate assist  with therapist on the R side blocking/aligning RLE  -AF     Row Name 05/17/23 1516          Positioning and Restraints    Pre-Treatment Position sitting in chair/recliner  -AF     Post Treatment Position bed  -AF     In Bed supine;call light within reach;encouraged to call for assist;exit alarm on;notified nsg  in PM  -AF     In Wheelchair sitting;exit alarm on;with other staff  in dining room in AM  -AF           User Key  (r) = Recorded By, (t) = Taken By, (c) = Cosigned By    Initials Name Effective Dates    AF Carole Saenz, OTR 06/16/21 -                  Occupational Therapy Education     Title: PT OT SLP Therapies (In Progress)     Topic: Occupational Therapy (In Progress)     Point: ADL training (In Progress)     Description:   Instruct learner(s) on proper safety adaptation and remediation techniques during self care or transfers.   Instruct in proper use of assistive devices.              Learning Progress Summary           Patient Acceptance, E,D, NR by DN at 5/9/2023 1710    Comment: Ot role and karen adls and functional transfers                   Point: Home exercise program (In Progress)     Description:   Instruct learner(s) on appropriate technique for  monitoring, assisting and/or progressing therapeutic exercises/activities.              Learning Progress Summary           Patient Acceptance, E,D, NR by DN at 5/9/2023 1710    Comment: Ot role and karen adls and functional transfers                   Point: Precautions (In Progress)     Description:   Instruct learner(s) on prescribed precautions during self-care and functional transfers.              Learning Progress Summary           Patient Acceptance, E,D, NR by DN at 5/9/2023 1710    Comment: Ot role and karen adls and functional transfers                   Point: Body mechanics (In Progress)     Description:   Instruct learner(s) on proper positioning and spine alignment during self-care, functional mobility activities and/or exercises.              Learning Progress Summary           Patient Acceptance, E,D, NR by DN at 5/9/2023 1710    Comment: Ot role and karen adls and functional transfers                               User Key     Initials Effective Dates Name Provider Type Discipline    DN 06/16/21 -  Alek Duarte OT Occupational Therapist OT                    OT Recommendation and Plan                         Time Calculation:      Time Calculation- OT     Row Name 05/17/23 1524 05/17/23 1523          Time Calculation- OT    OT Start Time 1400  -AF 1100  -AF     OT Stop Time 1430  -AF 1130  -AF     OT Time Calculation (min) 30 min  -AF 30 min  -AF           User Key  (r) = Recorded By, (t) = Taken By, (c) = Cosigned By    Initials Name Provider Type    AF Carole Saenz OTMAGUE Occupational Therapist              Therapy Charges for Today     Code Description Service Date Service Provider Modifiers Qty    99902015361 HC OT SELF CARE/MGMT/TRAIN EA 15 MIN 5/16/2023 Carole Saenz OTMAGUE GO 3    01587474490 HC OT NEUROMUSC RE EDUCATION EA 15 MIN 5/16/2023 Carole Saenz OTR GO 3    25229240034 HC OT SELF CARE/MGMT/TRAIN EA 15 MIN 5/17/2023 Carole Saenz OTMAGUE GO 2    76623668168 HC OT NEUROMUSC RE  EDUCATION EA 15 MIN 5/17/2023 Carole Saenz, OTR GO 2                   Carole Saenz, OTR  5/17/2023

## 2023-05-17 NOTE — PLAN OF CARE
Goal Outcome Evaluation:  Plan of Care Reviewed With: patient           Outcome Evaluation: Sebastain AOX3-4 at times, forgetful, continues with right hemiparesis, continues with pain to right shoulder arm and RLE PRN TYL given and patient explained he feels that the medication is helping, incontinent of bowel and bladder, working hard in therapies, using interpretor for communication although understands and speaks some english.

## 2023-05-17 NOTE — PROGRESS NOTES
"Nutrition Services    Patient Name:  Manuel Durant  YOB: 1950  MRN: [id number]  Admit Date:  5/8/2023      Assessment Date:  05/17/23    FOLLOW UP NOTE - CLINICAL NUTRITION    Encounter Information        Reason for Encounter Follow up   Current Issues R hemiparesis.  Family conference tomorrow.  Eating well, 100% at meals.  Patient and son-in-law to fly back to [place] Sunday (5/21).     Current Nutrition Orders & Evaluation of Intake       Oral Nutrition     Current PO Diet Diet: Regular/House Diet; No Mixed Consistencies, Feeding Assistance - Nursing; Texture: Soft to Chew (NDD 3); Soft to Chew: Chopped Meat; Fluid Consistency: Nectar Thick   Supplement n/a   PO Evaluation    % PO Intake/# of days 100% x 7 meals/3 days   Factors Affecting Intake  No factors at this time     Anthropometrics         Height   Weight Height: 167.6 cm (65.98\")  Weight: 61.1 kg (134 lb 11.2 oz) (05/08/23 2100)   BMI kg/m2 Body mass index is 21.75 kg/m².  Normal/Healthy (18.4 - 24.9)   Weight trend No new weight available     Physical Findings          Physical Appearance alert, oriented, room air   Oral/Mouth Cavity dental caries, tooth or teeth missing   Edema  1+ (trace), 2+ (mild)   Gastrointestinal fecal incontinence, non-distended , normoactive, last bowel movement:5/16   Skin  bruising   Tubes/Drains/Lines none     Labs       Pertinent Labs Reviewed, listed below     Results from last 7 days   Lab Units 05/15/23  0557   SODIUM mmol/L 143   POTASSIUM mmol/L 4.1   CHLORIDE mmol/L 109*   CO2 mmol/L 27.0   BUN mg/dL 14   CREATININE mg/dL 0.69*   CALCIUM mg/dL 8.6   BILIRUBIN mg/dL 0.3   ALK PHOS U/L 109   ALT (SGPT) U/L 34   AST (SGOT) U/L 19   GLUCOSE mg/dL 84     Results from last 7 days   Lab Units 05/15/23  0557   HEMOGLOBIN g/dL 11.7*   HEMATOCRIT % 33.9*   WBC 10*3/mm3 6.53     Results from last 7 days   Lab Units 05/15/23  0557   PLATELETS 10*3/mm3 201     No results found for: COVID19  Lab Results "   Component Value Date    HGBA1C 5.60 03/15/2023          Medications           Scheduled Medications amLODIPine, 10 mg, Oral, QAM AC  apixaban, 5 mg, Oral, Q12H  atorvastatin, 40 mg, Oral, Daily  baclofen, 5 mg, Oral, Q8H  carvedilol, 3.125 mg, Oral, BID With Meals  gabapentin, 100 mg, Oral, TID  lidocaine, 1 patch, Transdermal, Q24H  valsartan, 160 mg, Oral, QAM AC       Infusions     PRN Medications •  acetaminophen  •  senna-docusate sodium **AND** polyethylene glycol **AND** bisacodyl **AND** bisacodyl  •  loperamide     PLAN OF CARE  Intervention Goal        Intervention Goal(s) Maintain nutrition status, Disease management/therapy and Maintain weight     Nutrition Intervention        RD Action Follow Tx Progress and Care plan reviewed     Prescription        Diet Prescription    Supplement Prescription    EN/PN Prescription    Prescription Ordered No changes at this time   --  Monitor/Evaluation        Monitor Per protocol, PO intake, Pertinent labs, Symptoms   Discharge Plan Pending clinical course   Education Will educate as needed       Electronically signed by:  Florence Giang RD  05/17/23 14:29 EDT

## 2023-05-17 NOTE — PROGRESS NOTES
Case Management  Inpatient Rehabilitation Plan of Care and Discharge Plan Note    Rehabilitation Diagnosis:  Branch  Date of Onset:  Donovan    Medical Summary:  Branch  Past Medical History: Branch    Plan of Care  Updated Problems/Interventions  Field    Expected Intensity:  Branch  Interdisciplinary Team:  Donovan  Estimated Length of Stay/Anticipated Discharge Date: Branch  Anticipated Discharge Destination:  Anticipated discharge destination from inpatient rehabilitation is community  discharge with assistance. Patient lives alone in apartment.  Family conference scheduled for 5/18 at 1:00 p.m. with family via [employer].  D/C plan is home with wife, son, and 2 adult granddgts to [place]. Son-in-law  coming on 5/19 and will fly with patient back to [place] on 5/21.      Based on the patient's medical and functional status, their prognosis and  expected level of functional improvement is:  Donovan    Signed by: ROLAND Matthews

## 2023-05-17 NOTE — PROGRESS NOTES
LOS: 9 days   Patient Care Team:  Provider, No Known as PCP - General SHABNAM MCDONALD  1950      ADMITTING DIAGNOSIS:  Hemorrhagic stroke-left basal ganglia/internal capsule with intraventricular extension on March 12, 2023-  Right hemiparesis   Impaired mobility/impaired self-care  Diplopia  Dysphagia-mechanical soft/chopped meat/nectar thick liquid  Spasticity-baclofen 2.5 mg every 8 hours  Hypertension-amlodipine/carvedilol/valsartan  DVT-right soleal vein-March 17, 2023-Eliquis      Subjective     Patient tolerating therapies.  He does indicate that the right shoulder pain although still present is showing some improvement.  He is showing less spasticity in the right upper extremity.  Continues with right-sided weakness.  Tolerating therapies.  Discussed with speech therapy and will repeat videofluoroscopic swallow study tomorrow.  Pharmacy to see for education on Eliquis with the patient and will need to have done with caregiver this weekend as well      Objective     Vitals:    05/17/23 1135   BP: 116/72   Pulse: 60   Resp: 18   Temp: 97.1 °F (36.2 °C)   SpO2: 98%       PHYSICAL EXAM:   MENTAL STATUS -  AWAKE / ALERT   HEENT- NCAT, PUPILS EQUALLY ROUND, SCLERAE ANICTERIC, CONJUNCTIVAE PINK, OP MOIST, NO JVD, EARS UNREMARKABLE EXTERNALLY  LUNGS - CTA, NO WHEEZES, RALES OR RHONCHI  HEART- RRR, NO RUB, MURMUR, OR GALLOP  ABD - NORMOACTIVE BOWEL SOUNDS, SOFT, NT. NO HEPATOSPLENOMEGALY APPRECIATED  EXT -edema in the right hand with decreased range of motion  Decreased range of motion right shoulder  NEURO -awake alert.  Oriented.  No significant dysarthria.     MOTOR EXAM -  LUE/LLE 5/5.    Right shoulder flexion 3-/5, elbow flexion 3-/5, finger flexion 3/5, hip flexion 3/5, knee extension 3/5, ankle dorsiflexion 1/5.  MAS 1+ in shoulder ADD, elbow flexors, elbow pronators, wrist flexors, finger flexors.      MEDICATIONS  Scheduled Meds:amLODIPine, 10 mg, Oral, QAM AC  apixaban, 5 mg, Oral,  Q12H  atorvastatin, 40 mg, Oral, Daily  baclofen, 5 mg, Oral, Q8H  carvedilol, 3.125 mg, Oral, BID With Meals  gabapentin, 100 mg, Oral, TID  lidocaine, 1 patch, Transdermal, Q24H  valsartan, 160 mg, Oral, QAM AC      Continuous Infusions:   PRN Meds:.•  acetaminophen  •  senna-docusate sodium **AND** polyethylene glycol **AND** bisacodyl **AND** bisacodyl  •  loperamide      RESULTS  No results found for: POCGLU  Results from last 7 days   Lab Units 05/15/23  0557   WBC 10*3/mm3 6.53   HEMOGLOBIN g/dL 11.7*   HEMATOCRIT % 33.9*   PLATELETS 10*3/mm3 201     Results from last 7 days   Lab Units 05/15/23  0557   SODIUM mmol/L 143   POTASSIUM mmol/L 4.1   CHLORIDE mmol/L 109*   CO2 mmol/L 27.0   BUN mg/dL 14   CREATININE mg/dL 0.69*   CALCIUM mg/dL 8.6   BILIRUBIN mg/dL 0.3   ALK PHOS U/L 109   ALT (SGPT) U/L 34   AST (SGOT) U/L 19   GLUCOSE mg/dL 84           ASSESSMENT and PLAN    ICH (intracerebral hemorrhage)    Stroke    Hemorrhagic stroke-left basal ganglia/internal capsule with intraventricular extension on March 12, 2023-    Right hemiparesis   May 17-showing some improvement with the improvement in spasticity.  Still tight at the right shoulder but improved    Impaired mobility/impaired self-care    Diplopia    Dysphagia-mechanical soft/chopped meat/nectar thick liquid  May 17-repeat videofluoroscopic swallow study tomorrow    Spasticity-baclofen 2.5 mg every 8 hours  May 9- Significant flexor synergy on the RUE with shoulder abd, elbow flexor and pronate tone. Currently on baclofen 2.5 q8h. Will evaluate tone with therapy today and make adjustments as needed.  May 10- titrate up on baclofen 5 mg every 8 hour  May 11- Continue to monitor RUE tone with increase of Baclofen yesterday. Will uptitrate as tolerated. OT to start NMES for motor training and spasticity to the RUE today.  May 12-spasticity improved with increase in baclofen as well as addition of low-dose gabapentin  May 13 - continue  baclofen/gabapentin; tolerating well; denied side-effects  May 15-spasticity improving  May 16- Improving tone, pain in the R pect musculature. Educated patient to work on stretching exercises for R pect tone/spasticity    Right shoulder pain-May 12-no signs of complex regional pain syndrome.  Pain in part may be related to spasticity/immobility/degenerative changes.  X-ray of the right shoulder shows mild to moderate degenerative changes at the acromioclavicular joint.  May titrate up on baclofen and gabapentin over time  May 14 - heat therapy w/ K-pad    Hypertension-amlodipine/carvedilol/valsartan    DVT-right soleal vein-March 17, 2023-Eliquis  May 17 -Pharmacy to see for education on Eliquis with the patient and will need to have done with caregiver this weekend as well        Brad Magaña MD      During rounds, used appropriate personal protective equipment including mask and gloves.  Additional gown if indicated.  Mask used was standard procedure mask. Appropriate PPE was worn during the entire visit.  Hand hygiene was completed before and after.

## 2023-05-17 NOTE — PROGRESS NOTES
Inpatient Rehabilitation Plan of Care Note    Plan of Care  Updated Problems/Interventions  Mobility    [PT] Bed Mobility(Active)  Current Status(05/17/2023): ModA  Weekly Goal(05/25/2023): [place]  Discharge Goal: SUP    [PT] Bed/Chair/Wheelchair(Active)  Current Status(05/17/2023): min/mod  Weekly Goal(05/24/2023): Min  Discharge Goal: CGA/Min    [PT] Walk(Active)  Current Status(05/17/2023): 12 ft hemibars Mod  Weekly Goal(05/23/2023): PT only  Discharge Goal: 30 ft AAD Min    Signed by: Angeles Altman PT

## 2023-05-18 ENCOUNTER — APPOINTMENT (OUTPATIENT)
Dept: GENERAL RADIOLOGY | Facility: HOSPITAL | Age: 73
DRG: 305 | End: 2023-05-18
Payer: COMMERCIAL

## 2023-05-18 PROCEDURE — 74230 X-RAY XM SWLNG FUNCJ C+: CPT

## 2023-05-18 PROCEDURE — 92611 MOTION FLUOROSCOPY/SWALLOW: CPT

## 2023-05-18 PROCEDURE — 97535 SELF CARE MNGMENT TRAINING: CPT

## 2023-05-18 PROCEDURE — 97112 NEUROMUSCULAR REEDUCATION: CPT

## 2023-05-18 PROCEDURE — 97530 THERAPEUTIC ACTIVITIES: CPT

## 2023-05-18 PROCEDURE — 97110 THERAPEUTIC EXERCISES: CPT

## 2023-05-18 RX ADMIN — ACETAMINOPHEN 650 MG: 325 TABLET, FILM COATED ORAL at 06:25

## 2023-05-18 RX ADMIN — BACLOFEN 5 MG: 10 TABLET ORAL at 21:00

## 2023-05-18 RX ADMIN — ATORVASTATIN CALCIUM 40 MG: 20 TABLET, FILM COATED ORAL at 09:42

## 2023-05-18 RX ADMIN — LIDOCAINE 1 PATCH: 50 PATCH CUTANEOUS at 09:43

## 2023-05-18 RX ADMIN — BACLOFEN 5 MG: 10 TABLET ORAL at 06:25

## 2023-05-18 RX ADMIN — ACETAMINOPHEN 650 MG: 325 TABLET, FILM COATED ORAL at 21:00

## 2023-05-18 RX ADMIN — GABAPENTIN 100 MG: 100 CAPSULE ORAL at 09:43

## 2023-05-18 RX ADMIN — APIXABAN 5 MG: 5 TABLET, FILM COATED ORAL at 17:53

## 2023-05-18 RX ADMIN — GABAPENTIN 100 MG: 100 CAPSULE ORAL at 21:00

## 2023-05-18 RX ADMIN — AMLODIPINE BESYLATE 10 MG: 10 TABLET ORAL at 09:43

## 2023-05-18 RX ADMIN — APIXABAN 5 MG: 5 TABLET, FILM COATED ORAL at 06:25

## 2023-05-18 RX ADMIN — BACLOFEN 5 MG: 10 TABLET ORAL at 15:16

## 2023-05-18 RX ADMIN — CARVEDILOL 3.12 MG: 3.12 TABLET, FILM COATED ORAL at 17:53

## 2023-05-18 RX ADMIN — BARIUM SULFATE 55 ML: 0.81 POWDER, FOR SUSPENSION ORAL at 08:24

## 2023-05-18 RX ADMIN — VALSARTAN 160 MG: 160 TABLET, FILM COATED ORAL at 09:43

## 2023-05-18 RX ADMIN — BARIUM SULFATE 50 ML: 400 SUSPENSION ORAL at 08:24

## 2023-05-18 RX ADMIN — CARVEDILOL 3.12 MG: 3.12 TABLET, FILM COATED ORAL at 09:43

## 2023-05-18 RX ADMIN — BARIUM SULFATE 4 ML: 980 POWDER, FOR SUSPENSION ORAL at 08:24

## 2023-05-18 RX ADMIN — GABAPENTIN 100 MG: 100 CAPSULE ORAL at 15:16

## 2023-05-18 NOTE — PROGRESS NOTES
Case Management  Inpatient Rehabilitation Team Conference    Conference Date/Time: 5/18/2023 7:48:51 AM    Team Conference Attendees:  Derian Kaba MSSW Ashlee Erlandson, PT  Carole Saenz, OT  Nurys Dixon, SLP  Gabriela Du, CTRS  Sarah Grace, RN  Lisset Bhatti, RN   Dr. Kimbrough    Demographics            Age: 73Y            Gender: Male    Admission Date: 5/8/2023 7:29:00 PM  Rehabilitation Diagnosis:  Stroke  Past Medical History: Past Medical History:  DiagnosisDate  ?Hypokalemia3/17/2023  ?  No past sx hx      Plan of Care  Anticipated Discharge Date/Estimated Length of Stay: ELOS: May 21st to [facility] w/ family  Anticipated Discharge Destination: Community discharge with assistance  Discharge Plan : Patient lives alone in apartment.  Family conference scheduled for 5/18 at 1:00 p.m. with family via [employer].  D/C plan is home with wife, son, and 2 adult granddgts to [place]. Son-in-law  coming on 5/19 and will fly with patient back to [place] on 5/21.  Medical Necessity Expected Level Rationale: Goals are to achieve a level of min  assist of 1 with  mobility and self-care and improved swallow.   Rehabilitation  prognosis fair.  Medical prognosis fair.  Intensity and Duration: an average of 3 hours/5 days per week  Medical Supervision and 24 Hour Rehab Nursing: x  Physical Therapy: x  PT Intensity/Duration: 1 hour / day, 5 days / week, for approximately 2 weeks  Occupational Therapy: x  OT Intensity/Duration: 1 hour / day, 5 days / week, for approximately 2 weeks  Speech and Language Therapy: x  SLP Intensity/Duration: 1 hour / day, 5 days / week, for approximately 2 weeks  Social Work: x  Therapeutic Recreation: x  Psychology: x  Registered Dietician: x  Updated (if changes indicated)    Anticipated Discharge Date/Estimated Length of Stay:   ELOS: May 21st to [facility] w/ family    Based on the patient's medical and functional status, their prognosis and  expected level  of functional improvement is: Goals are to achieve a level of min  assist of 1 with  mobility and self-care and improved swallow.   Rehabilitation  prognosis fair.  Medical prognosis fair.      Interdisciplinary Problem/Goals/Status    All Rehab Problems:  Safety    [RN] Potential for Injury(Active)  Current Status(05/18/2023): Risk for falls r/t R hemiparesis  Weekly Goal(05/24/2023): Cue to use call bell  Discharge Goal: Patient will be aware of risk of fall and safety in the home  setting        Psychosocial    [RN] Coping/Adjustment(Active)  Current Status(05/18/2023): Expresses appropriate coping  Weekly Goal(05/24/2023): Educate patient regarding stroke and hypertention  Discharge Goal: Knowledgeable of care needs and stroke recovery        Sphincter Control    [RN] Bladder Management(Active)  Current Status(05/18/2023): Continent/incontinent of bladder at times  Weekly Goal(05/24/2023): Continent 75% of time  Discharge Goal: Continent 100% of time    [RN] Bowel Management(Active)  Current Status(05/18/2023): Continent/Incontinent at times of bowel  Weekly Goal(05/23/2023): Continent of bowel 75%  Discharge Goal: Continent of bowel 100%        Cognition    [ST] Memory(Active)  Current Status(05/18/2023): Pt now consistently oriented to person, city, state  and situation. Requires cues for month and year.  Weekly Goal(05/21/2023): Pt will be oriented to person, place, time and  situation with MOD cues.  Discharge Goal: Improved memory for home environment.    [ST] Attention(Active)  Current Status(05/18/2023): Pt now able to scan to right with MIN cues; however,  he reports visual field cut on right impacts ability to see full picture.  Weekly Goal(05/21/2023): Pt will scan to the right with NO cues.  Discharge Goal: Improved attention skills for home environment.        Communication    [ST] Comprehension(Active)  Current Status(05/18/2023): Now comprehends simple and personal yes/no questions  with NO cues.  Requires repetitions of complex yes/no questions and sequential  commands.  Weekly Goal(05/21/2023): Will answer complex yes/no questions with 80% accuracy.  Discharge Goal: Participate in conversation with occasional repetitions of  information.    [ST] Expression(Active)  Current Status(05/18/2023): Now presents with mild word finding difficulty in  picture description and naming tasks when speaking M[place]ese.  Weekly Goal(05/21/2023): Will describe a picture with NO cues.  Discharge Goal: Will participate in conversation with NO cues for use of  compensations for word finding difficulty.        Swallow Function    [ST] Swallowing(Active)  Current Status(05/18/2023): Pt tolerate soft (chopped meats)/no mixed  consistencies with NTL. Plan VFSS for 5/18.  Weekly Goal(05/21/2023): Will tolerate soft (chopped) with thins with no s/s of  aspiration or penetration.  Discharge Goal: Tolerate the least restrictive diet with NO cues.        Mobility    [PT] Bed Mobility(Active)  Current Status(05/17/2023): ModA  Weekly Goal(05/25/2023): [place]  Discharge Goal: SUP    [PT] Bed/Chair/Wheelchair(Active)  Current Status(05/17/2023): min/mod  Weekly Goal(05/24/2023): Min  Discharge Goal: CGA/Min    [OT] Toilet Transfers(Active)  Current Status(05/17/2023): MOD  Weekly Goal(05/19/2023): MIN  Discharge Goal: Min    [OT] Tub/Shower Transfers(Active)  Current Status(05/17/2023): MOD/MIN  Weekly Goal(05/19/2023): Min  Discharge Goal: MIN    [PT] Walk(Active)  Current Status(05/17/2023): 12 ft hemibars Mod  Weekly Goal(05/23/2023): PT only  Discharge Goal: 30 ft AAD Min        Self Care    [OT] Bathing(Active)  Current Status(05/17/2023): MIN  Weekly Goal(05/19/2023): Min  Discharge Goal: Min    [OT] Dressing (Lower)(Active)  Current Status(05/17/2023): MOD  Weekly Goal(05/19/2023): MIN  Discharge Goal: Min    [OT] Dressing (Upper)(Active)  Current Status(05/17/2023): MIN  Weekly Goal(05/19/2023): Set up  Discharge Goal: SBA    [OT]  Eating(Active)  Current Status(05/17/2023): setup  Weekly Goal(05/19/2023): SBA  Discharge Goal: SBA    [OT] Grooming(Active)  Current Status(05/17/2023): Min  Weekly Goal(05/19/2023): Min  Discharge Goal: SBA    [OT] Toileting(Active)  Current Status(05/17/2023): MOD  Weekly Goal(05/19/2023): MIN  Discharge Goal: Min        Pain    [RN] Pain Management(Active)  Current Status(05/18/2023): Patient has pain to RUE and RLE, taking scheduled  baclofen and gabapentin and PRN Tylenol.  Weekly Goal(05/24/2023): Pain will be well controlled.  Discharge Goal: Pain controlled.        Comments: 5/11 Bed mob Mod A, Mod A transfers, amb 8' at hemibars Mod A, Max -  Mod A toilet transfer, Mod a shower transfer.  Max A toileting.  Mod A bathing,  Max LBD, Mod A UBD.  Mod - Sev inattention to the right.  Mild - Mod difficulty  w/ word finding.  Tolerating Soft chopped meats, no mixed consistencies, NTL.  Cont / Incont bowel and bladder.  C/O pain to arm and shoulder, Baclofen  increased.    5/18 Mod A bed mob, Min / Mod transfers, amb 12' at hemibars Mod A, Mod A toilet  transfer, Mod / Min transfer for shower transfer.  Mod A toileting.  Tolerating  soft chopped no mixed consistencies NTL diet.  Swallow study today.  Cont /  Incont bowel and bladder.  Pain improving.    Signed by: Sarah Grace RN    Physician CoSigned By: Chace Kimbrough 05/18/2023 10:20:27

## 2023-05-18 NOTE — PLAN OF CARE
Goal Outcome Evaluation:  Plan of Care Reviewed With: patient        Progress: improving     VFSS completed. Pt presented with mild-moderate oropharyngeal dysphagia.  Discoordination between oral and pharyngeal phases, reduced laryngeal vestibule closure and reduced airway protection led to trace aspiration with large, consecutive sips of thins by cup with cough response and trace aspiration with light throat clear response with thins by cup following trial of puree. Use of small sips led to transient penetration above the vocal folds in 2 out of 4 trials of thins by cup and trace transient penetration above the vocal folds with thins by straw in 1 out of 2 trials; however, at end of study when cued for small sip, pt exhibited silent penetration to the vocal folds before the swallow with suspected trace silent aspiration.  Shallow, transient penetration also observed with use of chin tuck with thins by cup and with mixed consistency on second swallow. No penetration or aspiration observed with NTL by cup/straw, puree, regular and small bite of mixed consistency. Mild base of tongue residue observed with liquids, mixed and puree, which pt independently sensated and cleared with a second swallow. Mild pyriform residue noted with puree which did not clear with additional swallow, but did clear with liquid wash.         Recommend continue soft (chopped meats)/no mixed consistencies with [place] Thick Liquids. Hydration protocol 30 minutes after meals with adequate oral care. Take small bites/sips. Alternate bites/sips. Slow rate. Recommend supervision for meals. Recommend trials of thin liquids with SLP only in order to train strategies of small sip and chin tuck. Recommend meds one at a time with applesauce. Continue swallow therapy. Recommend repeat VFSS in ~1 month.

## 2023-05-18 NOTE — PLAN OF CARE
Goal Outcome Evaluation:  Plan of Care Reviewed With: patient             Problem: Rehabilitation (IRF) Plan of Care  Goal: Plan of Care Review  Outcome: Ongoing, Progressing  Flowsheets (Taken 5/18/2023 0318)  Plan of Care Reviewed With: patient  Outcome Evaluation:  Pt is alert and oriented x 3-4. Forgetfull at times.  tablet at bedside if needed. Cooperative and pleasant. Meds whole PO with AS; NTL. Oral completed. Continues with right hemiparesis, droop noted. Incontinent of bowel and bladder. Wears brief  checked and changed q2 and as needed. Using KPAD to right shoulder r/t discomfort. PRN Tylenol administered at HS. Turned and repositioned q2 and as needed. Family conference 5/18 at 1300. D/C 5/21. No unsafe behaviors. Resting well with eyes closed. Call light within reach.

## 2023-05-18 NOTE — THERAPY TREATMENT NOTE
Inpatient Rehabilitation - Occupational Therapy Treatment Note    [facility]     Patient Name: Manuel Durant  : 1950  MRN: [id number]    Today's Date: 2023                 Admit Date: 2023         ICD-10-CM ICD-9-CM   1. Spastic hemiplegia of right dominant side as late effect of nontraumatic intraparenchymal hemorrhage of brain  I69.151 438.21       Patient Active Problem List   Diagnosis   • Intracranial hemorrhage   • Acute DVT (deep venous thrombosis)   • HTN (hypertension)   • Severe Malnutrition (HCC)   • Hemiplegia   • ICH (intracerebral hemorrhage)   • Stroke       Past Medical History:   Diagnosis Date   • Hypertension    • Hypokalemia 2023   • Stroke        No past surgical history on file.          IRF OT ASSESSMENT FLOWSHEET (last 12 hours)     IRF OT Evaluation and Treatment     Row Name 23 1518          OT Time and Intention    Document Type daily treatment  -AF     Mode of Treatment occupational therapy  -AF     Patient Effort good  -AF     Row Name 23 1518          General Information    Patient/Family/Caregiver Comments/Observations pt sitting up in w/c after PT session  -AF     Existing Precautions/Restrictions fall  -AF     Row Name 23 1518          Pain Scale: FACES Pre/Post-Treatment    Pain: FACES Scale, Pretreatment 2-->hurts little bit  -AF     Posttreatment Pain Rating 4-->hurts little more  -AF     Pain Location - Side/Orientation Right  -AF     Pre/Posttreatment Pain Comment R shoulder/arm  -AF     Row Name 23 1518          Cognition/Psychosocial    Affect/Mental Status (Cognition) WFL  -AF     Orientation Status (Cognition) oriented to;person  -AF     Follows Commands (Cognition) follows one-step commands;repetition of directions required;verbal cues/prompting required;physical/tactile prompts required  -AF     Personal Safety Interventions fall prevention program maintained;gait belt;nonskid shoes/slippers when out of bed  -AF     Row  Name 05/18/23 1518          Bathing    [place] Level (Bathing) bathing skills;verbal cues;nonverbal cues (demo/gesture);minimum assist (75% patient effort)  -AF     Assistive Device (Bathing) grab bar/tub rail;hand held shower spray hose;shower chair  -AF     Position (Bathing) supported sitting;supported standing  -AF     Set-up Assistance (Bathing) obtain supplies  -AF     Comment (Bathing) karen tech  -AF     Row Name 05/18/23 1518          Upper Body Dressing    [place] Level (Upper Body Dressing) upper body dressing skills;minimum assist (75% or more patient effort)  -AF     Position (Upper Body Dressing) supported sitting  -AF     Set-up Assistance (Upper Body Dressing) obtain clothing  -AF     Comment (Upper Body Dressing) karen tech  -AF     Row Name 05/18/23 1518          Lower Body Dressing    [place] Level (Lower Body Dressing) doff;don;pants/bottoms;shoes/slippers;socks;moderate assist (50% patient effort);verbal cues  -AF     Position (Lower Body Dressing) supported sitting;supported standing  -AF     Set-up Assistance (Lower Body Dressing) obtain clothing  -AF     Comment (Lower Body Dressing) karen tech  -AF     Row Name 05/18/23 1518          Grooming    [place] Level (Grooming) grooming skills;oral care regimen;hair care, combing/brushing;verbal cues;nonverbal cues (demo/gesture);standby assist;deodorant application;minimum assist (75% patient effort)  -AF     Position (Grooming) sink side;supported sitting  -AF     Comment (Grooming) worke don R hand holding deoderant and applying under L armpit with MIN A from therapist  -AF     Row Name 05/18/23 1518          Toileting    [place] Level (Toileting) toileting skills;moderate assist (50% patient effort);maximum assist (25% patient effort);verbal cues  -AF     Assistive Device Use (Toileting) grab bar/safety frame;raised toilet seat  -AF     Position (Toileting) supported sitting;supported standing  -AF     Comment (Toileting)  increased balance noted with hygiene tasks  -AF     Row Name 05/18/23 1518          Transfer Assessment/Treatment    Comment, (Transfers) EOm <> w/c sit pivot MIN/MOD A with tactile anc verbal cues  -AF     Row Name 05/18/23 1518          Stand-Sit Transfer    Comment, (Stand-Sit Transfer) sit to  shower with RLE placed by therapist to support weight shifting and reduction of tone with WB  -AF     Row Name 05/18/23 1518          Toilet Transfer    Type (Toilet Transfer) squat pivot  -AF     [place] Level (Toilet Transfer) moderate assist (50% patient effort);verbal cues  -AF     Assistive Device (Toilet Transfer) commode;grab bars/safety frame;wheelchair  -AF     Row Name 05/18/23 1518          Shower Transfer    Type (Shower Transfer) squat pivot  -AF     [place] Level (Shower Transfer) minimum assist (75% patient effort);moderate assist (50% patient effort);verbal cues  -AF     Assistive Device (Shower Transfer) grab bar, tub/shower;tub bench;wheelchair  -AF     Comment, (Shower Transfer) cues for technique  -AF     Row Name 05/18/23 1518          Neuromuscular Re-education    Interventions (Neuromuscular Re-education) weight shifting;facilitation/inhibition  gross grasp/release with R hand  -AF     Positioning (Neuromuscular Re-education) unsupported;sitting  -AF     Equipment Used (Neuromuscular Re-education) TENS on R shoulder and NMES on R tricep  -AF     Comment (Neuromuscular Re-education) increased AROm of finger extensors on R hand allowwed patient to participate in funcitonal grasp/release of bean bags ot toss in the bucket with forward movement, increased active participation on RUE iwth lateral movement and trunk actviation. educated on and reviewed SROM/AAROM for HEP  -AF     Row Name 05/18/23 1518          Balance    Static Sitting Balance standby assist  -AF     Dynamic Sitting Balance contact guard  -AF     Row Name 05/18/23 1518          Modality Intervention    Treatment Location  1 (Modality) R shoulder and R tricep  -AF     Modality Performed NMES (neuromuscular electrical stimulation);TENS (transcutaneous electric nerve stimulation)  -AF     Modality Treatment Results reduction of tone and pain during funcitonal tasks  -AF     Comment, Modality Treatment 20 mins, tolerated well no skin or pain issues noted  -AF     Row Name 05/18/23 1518          Positioning and Restraints    Pre-Treatment Position sitting in chair/recliner  -AF     Post Treatment Position wheelchair  -AF     In Wheelchair sitting;exit alarm on;patient within staff view  set up wiht lunch tray  -AF           User Key  (r) = Recorded By, (t) = Taken By, (c) = Cosigned By    Initials Name Effective Dates    AF Carole Saenz, OTR 06/16/21 -                  Occupational Therapy Education     Title: PT OT SLP Therapies (In Progress)     Topic: Occupational Therapy (Done)     Point: ADL training (Done)     Description:   Instruct learner(s) on proper safety adaptation and remediation techniques during self care or transfers.   Instruct in proper use of assistive devices.              Learning Progress Summary           Patient Acceptance, E,TB,D,H, VU,DU,NR by AF at 5/18/2023 1527    Comment: pt and family particiapted in meeting with rehab team with  IPAD, educated on current transfer status, HEP, AE and current assistance level with ADLs. will complete video teaching friday and and then in person tranining on saturday prior to d/c    Acceptance, E,D, NR by DN at 5/9/2023 1710    Comment: Ot role and karen adls and functional transfers   Family Acceptance, E,TB,D,H, VU,DU,NR by AF at 5/18/2023 1527    Comment: pt and family particiapted in meeting with rehab team with  IPAD, educated on current transfer status, HEP, AE and current assistance level with ADLs. will complete video teaching friday and and then in person tranining on saturday prior to d/c                   Point: Home exercise program (Done)      Description:   Instruct learner(s) on appropriate technique for monitoring, assisting and/or progressing therapeutic exercises/activities.              Learning Progress Summary           Patient Acceptance, E,TB,D,H, VU,DU,NR by AF at 5/18/2023 1527    Comment: pt and family particiapted in meeting with rehab team with  IPAD, educated on current transfer status, HEP, AE and current assistance level with ADLs. will complete video teaching friday and and then in person tranining on saturday prior to d/c    Acceptance, E,D, NR by DN at 5/9/2023 1710    Comment: Ot role and karen adls and functional transfers   Family Acceptance, E,TB,D,H, VU,DU,NR by AF at 5/18/2023 1527    Comment: pt and family particiapted in meeting with rehab team with  IPAD, educated on current transfer status, HEP, AE and current assistance level with ADLs. will complete video teaching friday and and then in person tranining on saturday prior to d/c                   Point: Precautions (Done)     Description:   Instruct learner(s) on prescribed precautions during self-care and functional transfers.              Learning Progress Summary           Patient Acceptance, E,TB,D,H, VU,DU,NR by AF at 5/18/2023 1527    Comment: pt and family particiapted in meeting with rehab team with  IPAD, educated on current transfer status, HEP, AE and current assistance level with ADLs. will complete video teaching friday and and then in person tranining on saturday prior to d/c    Acceptance, E,D, NR by DN at 5/9/2023 1710    Comment: Ot role and karen adls and functional transfers   Family Acceptance, E,TB,D,H, VU,DU,NR by AF at 5/18/2023 1527    Comment: pt and family particiapted in meeting with rehab team with  IPAD, educated on current transfer status, HEP, AE and current assistance level with ADLs. will complete video teaching friday and and then in person tranining on saturday prior to d/c                   Point:  Body mechanics (Done)     Description:   Instruct learner(s) on proper positioning and spine alignment during self-care, functional mobility activities and/or exercises.              Learning Progress Summary           Patient Acceptance, E,TB,D,H, VU,DU,NR by AF at 5/18/2023 1527    Comment: pt and family particiapted in meeting with rehab team with  IPAD, educated on current transfer status, HEP, AE and current assistance level with ADLs. will complete video teaching friday and and then in person tranining on saturday prior to d/c    Acceptance, E,D, NR by DN at 5/9/2023 1710    Comment: Ot role and karen adls and functional transfers   Family Acceptance, E,TB,D,H, VU,DU,NR by AF at 5/18/2023 1527    Comment: pt and family particiapted in meeting with rehab team with  IPAD, educated on current transfer status, HEP, AE and current assistance level with ADLs. will complete video teaching friday and and then in person tranining on saturday prior to d/c                               User Key     Initials Effective Dates Name Provider Type Discipline    DN 06/16/21 -  Alek Duarte, OT Occupational Therapist OT    AF 06/16/21 -  Carole Saenz OTR Occupational Therapist OT                    OT Recommendation and Plan                         Time Calculation:      Time Calculation- OT     Row Name 05/18/23 1527             Time Calculation- OT    OT Start Time 1030  -AF      OT Stop Time 1130  -AF      OT Time Calculation (min) 60 min  -AF            User Key  (r) = Recorded By, (t) = Taken By, (c) = Cosigned By    Initials Name Provider Type    AF Carole Saenz OTR Occupational Therapist              Therapy Charges for Today     Code Description Service Date Service Provider Modifiers Qty    64229173021 HC OT SELF CARE/MGMT/TRAIN EA 15 MIN 5/17/2023 Carole Saenz OTR GO 2    17977194325 HC OT NEUROMUSC RE EDUCATION EA 15 MIN 5/17/2023 Carole Saenz OTR GO 2    04148967646 HC OT SELF  CARE/MGMT/TRAIN EA 15 MIN 5/18/2023 Carole Saenz, OTR GO 2    09887054666 HC OT NEUROMUSC RE EDUCATION EA 15 MIN 5/18/2023 Carole Saenz, HELLEN GO 2                   HELLEN Arriaga  5/18/2023

## 2023-05-18 NOTE — MBS/VFSS/FEES
Inpatient Rehabilitation - Speech Language Pathology   Swallow/MBS/VFSS [facility]     Patient Name: Manuel Durant  : 1950  MRN: [id number]  Today's Date: 2023               Admit Date: 2023    Visit Dx:     ICD-10-CM ICD-9-CM   1. Spastic hemiplegia of right dominant side as late effect of nontraumatic intraparenchymal hemorrhage of brain  I69.151 438.21     Patient Active Problem List   Diagnosis   • Intracranial hemorrhage   • Acute DVT (deep venous thrombosis)   • HTN (hypertension)   • Severe Malnutrition (HCC)   • Hemiplegia   • ICH (intracerebral hemorrhage)   • Stroke     Past Medical History:   Diagnosis Date   • Hypertension    • Hypokalemia 2023   • Stroke      No past surgical history on file.    SLP Recommendation and Plan  SLP Swallowing Diagnosis: mild, oral dysphagia, mild-moderate, pharyngeal dysphagia (23)  SLP Diet Recommendation:  (Trials of soft (chopped meats) with NTL with SLP only.) (23)  Recommended Precautions and Strategies: upright posture during/after eating, small bites of food and sips of liquid, alternate between small bites of food and sips of liquid, assist with feeding (23)  SLP Rec. for Method of Medication Administration: meds whole, with puree, with thick liquids, as tolerated (23)     Monitor for Signs of Aspiration: yes, notify SLP if any concerns (23)  Recommended Diagnostics: VFSS (MBS) (in 1 month) (23)  Swallow Criteria for Skilled Therapeutic Interventions Met: demonstrates skilled criteria (23)  Anticipated Discharge Disposition (SLP): home with OP services (23)  Rehab Potential/Prognosis, Swallowing: good, to achieve stated therapy goals (23)  Therapy Frequency (Swallow): 5 days per week (23)  Predicted Duration Therapy Intervention (Days): until discharge (23)                                        Plan of Care Reviewed  With: patient      SWALLOW EVALUATION (last 72 hours)     SLP Adult Swallow Evaluation     Row Name 05/18/23 0810       Rehab Evaluation    Document Type evaluation  VFSS  -AL    Subjective Information no complaints  -AL    Patient Observations alert;cooperative  -AL    Patient/Family/Caregiver Comments/Observations Used  via Ipad video.  -AL    Patient Effort good  -AL    Symptoms Noted During/After Treatment none  -AL       General Information    Patient Profile Reviewed yes  -AL    Pertinent History Of Current Problem Pt is a 73-year-old male admitted to inpatient rehab under diagnosis L thalamic and basal ganglia hemorrhage with extension to L lateral ventricle with onset date 3/12/23. His primary language is Latvian; however, he speaks some basic conversational English. Last V[facility] at [facility]/26/[facility] showed: Patient demonstrates mild oropharyngeal dysphagia characterized by mistiming and premature spillage with thins to pyriforms with deep silent penetration with thin liquids during the swallow and suspected trace silent aspiration x1. Patient impulsive and unable to follow directions for one sip at a time (IPAD  used). With NTL patient demonstrated  no penetration or  transient penetration during the swallow. No penetration with puree and  trace penetration with mechanical soft mixed consistency at times. No significant pharyngeal residue. Recommended soft (chopped meats) with NTL; hydration protocol.  -AL    Current Method of Nutrition soft to chew textures;chopped;no mixed consistencies;nectar/syrup-thick liquids;water between meals  -AL    Precautions/Limitations, Vision corrective lenses needed for reading;neglect, right  -AL    Precautions/Limitations, Hearing WFL;for purposes of eval  -AL    Prior Level of Function-Communication English as a second language;receptive language impairment;expressive language impairment  -AL    Prior Level of Function-Swallowing safe,  efficient swallowing in all situations  -AL    Plans/Goals Discussed with patient;agreed upon  -AL    Barriers to Rehab none identified  -AL    Patient's Goals for Discharge return to regular diet  -AL       Pain Scale: FACES Pre/Post-Treatment    Pain: FACES Scale, Pretreatment 2-->hurts little bit  -AL    Pre/Posttreatment Pain Comment right shoulder  -AL       Oral Motor Structure and Function    Dentition Assessment natural, present and adequate;missing teeth  -AL    Secretion Management WNL/WFL  -AL    Mucosal Quality moist, healthy  -AL       Oral Musculature and Cranial Nerve Assessment    Oral Motor, Comment Right lower face reduced sensation. No overt facial or lingual weakness.  -AL       General Eating/Swallowing Observations    Respiratory Support Currently in Use room air  -AL    Eating/Swallowing Skills self-fed;fed by SLP  -AL       MBS/VFSS    Utensils Used spoon;cup;straw  -AL    Consistencies Trialed regular textures;soft to chew textures;pureed;thin liquids;nectar/syrup-thick liquids;mixed consistency  -AL       MBS/VFSS Interpretation    VFSS Summary VFSS completed with Dr. Dumont. Pt presented with mild-moderate oropharyngeal dysphagia.  Discoordination between oral and pharyngeal phases, reduced laryngeal vestibule closure and reduced airway protection led to trace aspiration with large, consecutive sips of thins by cup with cough response and trace aspiration with light throat clear response with thins by cup following trial of puree. Use of small sips led to transient penetration above the vocal folds in 2 out of 4 trials of thins by cup and trace transient penetration above the vocal folds with thins by straw in 1 out of 2 trials; however, at end of study when cued for small sip, pt exhibited silent penetration to the vocal folds before the swallow with suspected trace silent aspiration.  Shallow, transient penetration also observed with use of chin tuck with thins by cup and with mixed  consistency on second swallow. No penetration or aspiration observed with NTL by cup/straw, puree, regular and small bite of mixed consistency. Mild base of tongue residue observed with liquids, mixed and puree, which pt independently sensated and cleared with a second swallow. Mild pyriform residue noted with puree which did not clear with additional swallow, but did clear with liquid wash.        Recommend continue soft (chopped meats)/no mixed consistencies with [place] Thick Liquids. Hydration protocol 30 minutes after meals with adequate oral care. Take small bites/sips. Alternate bites/sips. Slow rate. Recommend supervision for meals. Recommend trials of thin liquids with SLP only in order to train strategies of small sip and chin tuck. Recommend meds one at a time with applesauce. Continue swallow therapy. Recommend repeat VFSS in ~1 month.  -AL       SLP Communication to Radiology    Severity Level of Dysphagia mild-moderate dysphagia  -AL    Summary Statement VFSS completed with Dr. Dumont. Pt presented with mild-moderate oropharyngeal dysphagia.  Discoordination between oral and pharyngeal phases, reduced laryngeal vestibule closure and reduced airway protection led to trace aspiration with large, consecutive sips of thins by cup with cough response and trace aspiration with light throat clear response with thins by cup following trial of puree. Use of small sips led to transient penetration above the vocal folds in 2 out of 4 trials of thins by cup and trace transient penetration above the vocal folds with thins by straw in 1 out of 2 trials; however, at end of study when cued for small sip, pt exhibited silent penetration to the vocal folds before the swallow with suspected trace silent aspiration.  Shallow, transient penetration also observed with use of chin tuck with thins by cup and with mixed consistency on second swallow. No penetration or aspiration observed with NTL by cup/straw, puree, regular and  small bite of mixed consistency. Mild base of tongue residue observed with liquids, mixed and puree, which pt independently sensated and cleared with a second swallow. Mild pyriform residue noted with puree which did not clear with additional swallow, but did clear with liquid wash.  -AL       SLP Evaluation Clinical Impression    SLP Swallowing Diagnosis mild;oral dysphagia;mild-moderate;pharyngeal dysphagia  -AL    Functional Impact risk of aspiration/pneumonia;risk of malnutrition;risk of dehydration  -AL    Rehab Potential/Prognosis, Swallowing good, to achieve stated therapy goals  -AL    Swallow Criteria for Skilled Therapeutic Interventions Met demonstrates skilled criteria  -AL       Recommendations    Therapy Frequency (Swallow) 5 days per week  -AL    Predicted Duration Therapy Intervention (Days) until discharge  -AL    SLP Diet Recommendation --  Trials of soft (chopped meats) with NTL with SLP only.  -AL    Recommended Diagnostics VFSS (MBS)  in 1 month  -AL    Recommended Precautions and Strategies upright posture during/after eating;small bites of food and sips of liquid;alternate between small bites of food and sips of liquid;assist with feeding  -AL    Oral Care Recommendations Oral Care BID/PRN;Before ice/water  -AL    SLP Rec. for Method of Medication Administration meds whole;with puree;with thick liquids;as tolerated  -AL    Monitor for Signs of Aspiration yes;notify SLP if any concerns  -AL    Anticipated Discharge Disposition (SLP) home with OP services  -AL       (LTG) Patient will demonstrate functional swallow for    Diet Texture (Demonstrate functional swallow) soft to chew (chopped) textures  -AL    Liquid viscosity (Demonstrate functional swallow) thin liquids  -AL    [place] (Demonstrate functional swallow) with 1:1 assist/ supervision  -AL       (STG) Swallow 1    (STG) Swallow 1 Pt will exihibit no overt s/s of aspiration or penetration with trials of water by cup  -AL     [place] (Swallow Short Term Goal 1) with minimal cues (75-90% accuracy)  -AL       (STG) Pharyngeal Strengthening Exercise Goal 1 (SLP)    Activity (Pharyngeal Strengthening Goal 1, SLP) increase timing;increase superior movement of the hyolaryngeal complex;increase anterior movement of the hyolaryngeal complex  -AL    Increase Timing hard effortful swallow  -AL    Increase Superior Movement of the Hyolaryngeal Complex Mendelsohn;hard effortful swallow  -AL    Increase Anterior Movement of the Hyolaryngeal Complex EMST  -AL    [place]/Accuracy (Pharyngeal Strengthening Goal 1, SLP) with minimal cues (75-90% accuracy)  -AL          User Key  (r) = Recorded By, (t) = Taken By, (c) = Cosigned By    Initials Name Effective Dates    Nurys Beavers, MS CCC-SLP 06/16/21 -                 EDUCATION  The patient has been educated in the following areas:   Dysphagia (Swallowing Impairment).        SLP GOALS     Row Name 05/18/23 0810 05/17/23 1300 05/17/23 0900       (LTG) Patient will demonstrate functional swallow for    Diet Texture (Demonstrate functional swallow) soft to chew (chopped) textures  -AL -- --    Liquid viscosity (Demonstrate functional swallow) thin liquids  -AL -- --    [place] (Demonstrate functional swallow) with 1:1 assist/ supervision  -AL -- --       (STG) Swallow 1    (STG) Swallow 1 Pt will exihibit no overt s/s of aspiration or penetration with trials of water by cup  -AL -- --    [place] (Swallow Short Term Goal 1) with minimal cues (75-90% accuracy)  -AL -- --       (STG) Pharyngeal Strengthening Exercise Goal 1 (SLP)    Activity (Pharyngeal Strengthening Goal 1, SLP) increase timing;increase superior movement of the hyolaryngeal complex;increase anterior movement of the hyolaryngeal complex  -AL -- --    Increase Timing hard effortful swallow  -AL -- --    Increase Superior Movement of the Hyolaryngeal Complex Mendelsohn;hard effortful swallow  -AL -- --    Increase  Anterior Movement of the Hyolaryngeal Complex EMST  -AL -- --    [place]/Accuracy (Pharyngeal Strengthening Goal 1, SLP) with minimal cues (75-90% accuracy)  -AL -- --    Comment (Pharyngeal Strengthening Goal 1, SLP) -- SLP provided education regarding VFSS which will be completed tomorrow. Pt voiced understanding and agreement.  -AL Re-assessed swallow function with thins by cup/straw, puree, mixed, and regular. No overt s/s of aspiration or penetration observed with any consistency. Pt exhibited no difficulty with mastication. Mild anterior loss of solid noted x1. Pt exhibited slow rate and took small bites/sips independently. Discussed with MD. Recommend continue soft (chopped)/no mixed with NTL. Trials of thin liquids with SLP only. Plan to complete VFSS tomorrow to objectively assess swallow function.  -AL       Word Retrieval Skills Goal 1 (SLP)    Improve Word Retrieval Skills By Goal 1 (SLP) -- completing functional word finding tasks;completing a divergent task;80%;independently (over 90% accuracy)  -AL --    Progress/Outcomes (Word Retrieval Goal 1, SLP) -- good progress toward goal  -AL --    Comment (Word Retrieval Goal 1, SLP) -- Confrontation naming of common objects/food: 80% with NO cues, 100% with MIN cues.  -AL Divergent thinking: listing animals: 9 with NO cues in 1 minute.  -AL       Connected Speech to Express Thoughts Goal 1 (SLP)    Progress/Outcomes (Connected Speech Goal 1, SLP) -- good progress toward goal  -AL --    Comment (Connected Speech Goal 1, SLP) -- Describing problems in pictures: 70% with NO cues, 100% with MIN cues.  -AL --       Orientation Goal 1 (SLP)    Improve Orientation Through Goal 1 (SLP) -- demonstrating orientation to day;demonstrating orientation to month;demonstrating orientation to year;demonstrating orientation to place;demonstrating orientation to disease/impairment;80%;with minimal cues (75-90%)  -AL --    Progress/Outcomes (Orientation Goal 1, SLP) --  "goal ongoing  -AL good progress toward goal  -AL    Comment (Orientation Goal 1, SLP) -- Oriented to month with MAX cues and year with MIN cues. Oriented to city, state and situation with NO cues.  -AL Oriented to situation, city, state and month; required MAX cues for year and MIN cues for SARAH. Stated \"1992\" for year.  -AL       Memory Skills Goal 1 (SLP)    Comment (Memory Skills Goal 1, SLP) -- -- Required MAX cues for recall of recent work history ([employer]).  -AL    Row Name 05/16/23 1330 05/16/23 0830 05/15/23 1330       (STG) Pharyngeal Strengthening Exercise Goal 1 (SLP)    Progress/Outcomes (Pharyngeal Strengthening Goal 1, SLP) good progress toward goal  -AL good progress toward goal  -AL good progress toward goal  -AL    Comment (Pharyngeal Strengthening Goal 1, SLP) No overt s/s of aspiration or penetration observed 100% of the time with water by cup with MIN cues for small sips. Completed 20 repetitions of effortful swallow exercise with MIN cues.  -AL No overt s/s of aspiration or penetration observed 100% of the time with initial cue for small sips. Pt completed 20 repetitions of effortful swallow exercise with MIN cues. Plan to complete meal tray observation of soft (chopped) with thins on next date.  -AL No overt s/s of aspiration or penetration observed in 5/6 trials of thins by cup (coffee) with MOD cues for small sips; cough x1.  -AL       Word Retrieval Skills Goal 1 (SLP)    Progress/Outcomes (Word Retrieval Goal 1, SLP) -- good progress toward goal  -AL --    Comment (Word Retrieval Goal 1, SLP) -- Confrontation naming of objects/food: 13/15 with NO cues, 15/15 with MOD-MAX cues. Right vision deficit impacted this task.  -AL --       Connected Speech to Express Thoughts Goal 1 (SLP)    Improve Narrative Discourse to Express Thoughts By Goal 1 (SLP) describing a picture;conversational task on a given topic;80%;with minimal cues (75-90%)  -AL -- --    Time Frame (Connected Speech Goal 1, SLP) 1 " "week  -AL -- --    Progress/Outcomes (Connected Speech Goal 1, SLP) good progress toward goal  -AL -- --    Comment (Connected Speech Goal 1, SLP) Describing problems in pictures: 100% with NO cues  -AL -- Describing problems in pictures: 60% with NO cues, 100% with MIN cues to clarify message. In conversation regarding his family, pt able to state number of his children, but only able to name 2/5 children; unclear whether memory vs. word finding difficulty.  -AL       Attention Goal 1 (SLP)    Improve Attention by Goal 1 (SLP) attending to task;complete sustained attention task;80%;with minimal cues (75-90%)  -AL -- --    Progress/Outcomes (Attention Goal 1, SLP) good progress toward goal  -AL -- --    Comment (Attention Goal 1, SLP) Read time on clock with 70% accuracy with NO cues, 100% with MIN-MOD cues.  -AL -- --       Orientation Goal 1 (SLP)    Improve Orientation Through Goal 1 (SLP) -- demonstrating orientation to day;demonstrating orientation to month;demonstrating orientation to year;demonstrating orientation to place;demonstrating orientation to disease/impairment;80%;with minimal cues (75-90%)  -AL --    Time Frame (Orientation Goal 1, SLP) -- 2 weeks  -AL --    Progress/Outcomes (Orientation Goal 1, SLP) -- good progress toward goal  -AL --    Comment (Orientation Goal 1, SLP) -- Oriented to situation, city, state and month. Required MAX cues for year and age. Aphasia impacting task.  -AL Pt was oriented to situation (\"brain issue\"), city, state, month and month of his birth. Required MAX cues for year, date of his birth, and age (84 vs. 73).  -AL          User Key  (r) = Recorded By, (t) = Taken By, (c) = Cosigned By    Initials Name Provider Type    Nurys Beavers MS CCC-SLP Speech and Language Pathologist                   Time Calculation:    Time Calculation- SLP     Row Name 05/18/23 0910             Time Calculation- SLP    SLP Start Time 0810  -AL      SLP Stop Time 0910  -AL      SLP " Time Calculation (min) 60 min  -AL            User Key  (r) = Recorded By, (t) = Taken By, (c) = Cosigned By    Initials Name Provider Type    Nurys Beavers, MS CCC-SLP Speech and Language Pathologist                Therapy Charges for Today     Code Description Service Date Service Provider Modifiers Qty    80128312371 HC ST DEV OF COGN SKILLS INITIAL 15 MIN 5/17/2023 Nurys Dixon, MS CCC-SLP  1    14844468361 HC ST DEV OF COGN SKILLS EACH ADDT'L 15 MIN 5/17/2023 Nurys Dixon, MS CCC-SLP  2    [id number] HC ST TREATMENT SWALLOW 1 5/17/2023 Nurys Dixon, MS CCC-SLP GN 1    [id number] HC ST MOTION FLUORO EVAL SWALLOW 4 5/18/2023 Nurys Dioxn, MS CCC-SLP GN 1               Nurys Dixon MS CCC-SLP  5/18/2023

## 2023-05-18 NOTE — THERAPY TREATMENT NOTE
Inpatient Rehabilitation - Physical Therapy Treatment Note       [facility]     Patient Name: Manuel Durant  : 1950  MRN: [id number]    Today's Date: 2023                    Admit Date: 2023      Visit Dx:     ICD-10-CM ICD-9-CM   1. Spastic hemiplegia of right dominant side as late effect of nontraumatic intraparenchymal hemorrhage of brain  I69.151 438.21       Patient Active Problem List   Diagnosis   • Intracranial hemorrhage   • Acute DVT (deep venous thrombosis)   • HTN (hypertension)   • Severe Malnutrition (HCC)   • Hemiplegia   • ICH (intracerebral hemorrhage)   • Stroke       Past Medical History:   Diagnosis Date   • Hypertension    • Hypokalemia 2023   • Stroke        No past surgical history on file.    PT ASSESSMENT (last 12 hours)     IRF PT Evaluation and Treatment     Row Name 23 1100          PT Time and Intention    Document Type daily treatment  -AE     Mode of Treatment individual therapy;physical therapy  -AE     Patient/Family/Caregiver Comments/Observations complaining of neck pain and headache, fixated on visual field cut today  -AE     Row Name 23 1100          General Information    Patient Profile Reviewed yes  -AE     Existing Precautions/Restrictions fall  -AE     Row Name 23 1100          Pain Assessment    Pretreatment Pain Rating 10  -AE     Posttreatment Pain Rating 10  -AE     Pain Location - Side/Orientation Bilateral  -AE     Pain Location upper  -AE     Pain Location - neck  -AE     Row Name 23 1100          Cognition/Psychosocial    Affect/Mental Status (Cognition) WFL  -AE     Orientation Status (Cognition) oriented to;person  -AE     Follows Commands (Cognition) follows one-step commands;repetition of directions required;verbal cues/prompting required;physical/tactile prompts required  -AE     Personal Safety Interventions fall prevention program maintained;gait belt;muscle strengthening facilitated;nonskid  shoes/slippers when out of bed;supervised activity  -AE     Row Name 05/18/23 1100          Bed Mobility    Supine-Sit [place] (Bed Mobility) standby assist  -AE     Sit-Supine [place] (Bed Mobility) contact guard;minimum assist (75% patient effort)  -AE     Comment, (Bed Mobility) flat mat no rails into L side. performed 3x. has some difficulty advancing R leg off EOB and tries to perform long sitting even though to weak to do so. progressed to CGA/Amish  -AE     Row Name 05/18/23 1100          Transfer Assessment/Treatment    Transfers car transfer  -AE     Row Name 05/18/23 1100          Bed-Chair Transfer    Bed-Chair [place] (Transfers) minimum assist (75% patient effort);moderate assist (50% patient effort);1 person assist  -AE     Assistive Device (Bed-Chair Transfers) wheelchair  -AE     Comment, (Bed-Chair Transfer) squat pivot  -AE     Row Name 05/18/23 1100          Chair-Bed Transfer    Chair-Bed [place] (Transfers) minimum assist (75% patient effort);moderate assist (50% patient effort)  -AE     Assistive Device (Chair-Bed Transfers) wheelchair  -AE     Row Name 05/18/23 1100          Sit-Stand Transfer    Sit-Stand [place] (Transfers) minimum assist (75% patient effort);moderate assist (50% patient effort)  -AE     Assistive Device (Sit-Stand Transfers) wheelchair  therapist in front  -AE     Row Name 05/18/23 1100          Stand-Sit Transfer    Stand-Sit [place] (Transfers) minimum assist (75% patient effort);moderate assist (50% patient effort);1 person assist  -AE     Assistive Device (Stand-Sit Transfers) wheelchair  -AE     Comment, (Stand-Sit Transfer) heavy R lean when sitting  -AE     Row Name 05/18/23 1100          Car Transfer    Type (Car Transfer) stand pivot/stand step  -AE     [place] Level (Car Transfer) minimum assist (75% patient effort);moderate assist (50% patient effort);1 person assist  -AE     Comment, (Car Transfer) therapist in front. sometimes  tends to sit too soon. painful with increased WB on RLE  -AE     Row Name 05/18/23 1100          Hip (Therapeutic Exercise)    Hip Strengthening (Therapeutic Exercise) bilateral;marching while seated;3 sets;10 repetitions  -AE     Row Name 05/18/23 1100          Knee (Therapeutic Exercise)    Knee Strengthening (Therapeutic Exercise) bilateral;LAQ (long arc quad);3 sets;10 repetitions  -AE     Row Name 05/18/23 1100          Stretching (Therapeutic Exercise)    Comment (Stretching Therapeutic Exercise) cervical 4way stretching  -AE     Row Name 05/18/23 1100          Modality Intervention    Additional Documentation Modality Treatment (Group)  -AE     Row Name 05/18/23 1100          Modality Treatment    Treatment Location 1 (Modality) cervical  -AE     Modality Performed thermotherapy (hydrocollator packs)  -AE     Modality Treatment Results improved pain  -AE     Comment, Modality Treatment MHP x 10min  -AE     Row Name 05/18/23 1100          Positioning and Restraints    Pre-Treatment Position sitting in chair/recliner  -AE     Post Treatment Position wheelchair  -AE     In Wheelchair sitting;call light within reach;encouraged to call for assist;exit alarm on  -AE           User Key  (r) = Recorded By, (t) = Taken By, (c) = Cosigned By    Initials Name Provider Type    AE Angelse Altman PT Physical Therapist                 Physical Therapy Education     Title: PT OT SLP Therapies (In Progress)     Topic: Physical Therapy (In Progress)     Point: Mobility training (In Progress)     Learning Progress Summary           Patient Acceptance, E,TB, NR by LB at 5/10/2023 1211    Acceptance, E,D, NR by DP at 5/9/2023 1217                   Point: Home exercise program (In Progress)     Learning Progress Summary           Patient Acceptance, E,D, NR by DP at 5/9/2023 1217                   Point: Body mechanics (In Progress)     Learning Progress Summary           Patient Acceptance, E,D, NR by DP at 5/9/2023 1217                    Point: Precautions (In Progress)     Learning Progress Summary           Patient Acceptance, E,D, NR by DP at 5/9/2023 1217                               User Key     Initials Effective Dates Name Provider Type Discipline    LB 06/16/21 -  Le Reynoso, PT Physical Therapist PT    DP 08/24/21 -  Tristin Vicente PT Physical Therapist PT                PT Recommendation and Plan                          Time Calculation:      PT Charges     Row Name 05/18/23 1412 05/18/23 1206          Time Calculation    Start Time 1000  -AE 0830  -AE     Stop Time 1030  -AE 0900  -AE     Time Calculation (min) 30 min  -AE 30 min  -AE     PT Received On 05/18/23  -AE 05/18/23  -AE     PT - Next Appointment -- 05/19/23  -AE        Time Calculation- PT    Total Timed Code Minutes- PT 30 minute(s)  -AE 30 minute(s)  -AE           User Key  (r) = Recorded By, (t) = Taken By, (c) = Cosigned By    Initials Name Provider Type    AE Angeles Altman, PT Physical Therapist                Therapy Charges for Today     Code Description Service Date Service Provider Modifiers Qty    80572991326 HC PT THER PROC EA 15 MIN 5/17/2023 Angeles Altman, PT GP 2    27821304825 HC PT THERAPEUTIC ACT EA 15 MIN 5/17/2023 Angeles Altman, PT GP 2    70745827804 HC PT THER SUPP EA 15 MIN 5/17/2023 Angeles Altman, PT GP 2    35387726686 HC PT THERAPEUTIC ACT EA 15 MIN 5/18/2023 Angeles Altman, PT GP 3    33271907839 HC PT THER PROC EA 15 MIN 5/18/2023 Angeles Altman, PT GP 1            PT G-Codes  AM-PAC 6 Clicks Score (PT): 14      Angeles Altman PT  5/18/2023

## 2023-05-18 NOTE — PROGRESS NOTES
[facility]     Progress Note    Patient Name: Manuel Durant  : 1950  MRN: [id number]  Primary Care Physician:  Provider, No Known  Date of admission: 2023    Subjective Pt is awake and alert via . Pt in agreement with plan to dc early  am. (} + flight to [place]. Pain under better control.   Subjective     Chief Complaint: same    History of Present Illness  Patient Reports     Review of Systems    Objective  exam unchanged calves soft nasreen NT. Resp unlabored and regular. Pt intermittently incont of both B/B. Timed void schedule started.   Objective     Vitals:   Temp:  [97.1 °F (36.2 °C)-97.8 °F (36.6 °C)] 97.6 °F (36.4 °C)  Heart Rate:  [60-69] 67  Resp:  [18-19] 18  BP: (116-124)/(58-72) 124/63    Physical Exam     Result Review    Result Review:  I have personally reviewed the results from the time of this admission to 2023 11:05 EDT and agree with these findings:  []  Laboratory list / accordion  []  Microbiology  []  Radiology  []  EKG/Telemetry   []  Cardiology/Vascular   []  Pathology  []  Old records  []  Other:  Most notable findings include: No new labs to review this am.       Assessment & Plan Continue to prepare for dc . I participated in the care coordination conf this am and returned to pt's room to discuss the dc plan with th patient.   Assessment / Plan     Brief Patient Summary:  Manuel Durant is a 73 y.o. male who     Active Hospital Problems:  Active Hospital Problems    Diagnosis    • **ICH (intracerebral hemorrhage)    • Stroke      Plan:       DVT prophylaxis:  Medical and mechanical DVT prophylaxis orders are present.    CODE STATUS:    Code Status (Patient has no pulse and is not breathing): CPR (Attempt to Resuscitate)  Medical Interventions (Patient has pulse or is breathing): Full Support    Disposition:  I expect patient to be discharged.    Chace Kimbrough MD

## 2023-05-18 NOTE — PROGRESS NOTES
Inpatient Rehabilitation Plan of Care Note    Plan of Care  Care Plan Reviewed - No updates at this time.    Safety    [RN] Potential for Injury(Active)  Current Status(05/18/2023): Risk for falls r/t R hemiparesis  Weekly Goal(05/24/2023): Cue to use call bell  Discharge Goal: Patient will be aware of risk of fall and safety in the home  setting    Performed Intervention(s)  falls precautions  bed/chair alarm  safety rounds      Psychosocial    [RN] Coping/Adjustment(Active)  Current Status(05/18/2023): Expresses appropriate coping  Weekly Goal(05/24/2023): Educate patient regarding stroke and hypertention  Discharge Goal: Knowledgeable of care needs and stroke recovery    Performed Intervention(s)  Verbalizes needs and concerns  Therapeutic environmental set up      Sphincter Control    [RN] Bladder Management(Active)  Current Status(05/18/2023): Continent/incontinent of bladder at times  Weekly Goal(05/24/2023): Continent 75% of time  Discharge Goal: Continent 100% of time    [RN] Bowel Management(Active)  Current Status(05/18/2023): Continent/Incontinent at times of bowel  Weekly Goal(05/23/2023): Continent of bowel 75%  Discharge Goal: Continent of bowel 100%    Performed Intervention(s)  Bowel/bladder training  Encourage fluid intake  Scheduled toileting      Pain    [RN] Pain Management(Active)  Current Status(05/18/2023): Patient has pain to RUE and RLE, taking scheduled  baclofen and gabapentin and PRN Tylenol.  Weekly Goal(05/24/2023): Pain will be well controlled.  Discharge Goal: Pain controlled.    Performed Intervention(s)  meds as ordered  Pain assessment    Signed by: Anabela Taveras RN

## 2023-05-18 NOTE — PROGRESS NOTES
Family conference held today with patient, PT, OT, ST, NSG, and SW. Patient's family (2 sons, 2 daughter, and wife) were video conferenced in using [employer]. Team also utilized IPad . Discussed patient's progress and d/c recommendations.   PT reported patient requires Min assist for bed mobility and Min to Mod assist for transfers bed-chair using squat pivot transfer. PT tried car transfer today, which required Mod assist. Discussed weakness and tone in right leg which interferes with doing a stand transfer. Recommended using squat transfer at home. Discussed patient walking in therapy at karen bars 10 feet but walking not functional to do at home at this time. Patient using wheelchair for mobility.     OT reported patient does bathing in shower seated on bench with Min assist. Patient able to do dressing with Min to Mod assist dressing right side of body first. Commode and shower transfers require Min to Mod assist. Discussed stroke affected attention to right side so patient needs cues to attend to right arm and use it. Patient has tone in arm, which causes pain, but pain has been less with use of medication. Discussed doing home exercises for arm at home.     ST reported patient has some word finding difficulty. Working on memory which has improved. Patient able to remember after 5 minute delay. Is still having difficulty with long term memory and with remembering month and year. ST discussed results of VFSS that was done this morning. Discussed patient's diet for home with need to be soft food with chopped meats and no mixed consistencies. He will go home on nectar thick liquid and ST will send packets of thickener home with patient. Discussed patient is able to have water between meals after cleans mouth. Recommended patient have assistance with medication management.     ANGELITA reviewed medications. Discussed importance of taking medications as prescribed at home to prevent further strokes.  Discussed following up with a PCP in [place]. Family stated they have a doctor there that patient will see soon after getting home. SW to try to schedule appointment with a rehab doctor if possible. Patient has been continent/incontinent of bowel and bladder. Patient stated he knows when he has to go to bathroom so encouraged him to let staff know and not to go in brief.    SW discussed that patient will be sent home with 2 months of his medications.     Discussed equipment for home. Patient will be sent home with wheelchair and cushion. OT recommended family get shower bench.     Plan for family teaching tomorrow at 12:30 p.m. via video so family can see how patient doing and how to help him at home. Family teaching to be done with son-in-law on Saturday. He is coming to [place] tomorrow afternoon and will fly back with patient to [place] on Sunday 5/21.     Will continue to assist with d/c plans for home to [facility] on 5/21.Will contact granddaughter tomorrow to discuss flight home and whether she discussed with airlines the need for airline wheelchair.

## 2023-05-18 NOTE — PROGRESS NOTES
Inpatient Rehabilitation Functional Measures Assessment and Plan of Care    Plan of Care  Updated Problems/Interventions  Cognition    [ST] Memory(Active)  Current Status(05/18/2023): Pt now consistently oriented to person, city, state  and situation. Requires cues for month and year.  Weekly Goal(05/21/2023): Pt will be oriented to person, place, time and  situation with MOD cues.  Discharge Goal: Improved memory for home environment.    [ST] Attention(Active)  Current Status(05/18/2023): Pt now able to scan to right with MIN cues; however,  he reports visual field cut on right impacts ability to see full picture.  Weekly Goal(05/21/2023): Pt will scan to the right with NO cues.  Discharge Goal: Improved attention skills for home environment.        Communication    [ST] Comprehension(Active)  Current Status(05/18/2023): Now comprehends simple and personal yes/no questions  with NO cues. Requires repetitions of complex yes/no questions and sequential  commands.  Weekly Goal(05/21/2023): Will answer complex yes/no questions with 80% accuracy.  Discharge Goal: Participate in conversation with occasional repetitions of  information.    [ST] Expression(Active)  Current Status(05/18/2023): Now presents with mild word finding difficulty in  picture description and naming tasks when speaking Sinhala.  Weekly Goal(05/21/2023): Will describe a picture with NO cues.  Discharge Goal: Will participate in conversation with NO cues for use of  compensations for word finding difficulty.        Swallow Function    [ST] Swallowing(Active)  Current Status(05/18/2023): Pt tolerate soft (chopped meats)/no mixed  consistencies with NTL. Plan VFSS for 5/18.  Weekly Goal(05/21/2023): Will tolerate soft (chopped) with thins with no s/s of  aspiration or penetration.  Discharge Goal: Tolerate the least restrictive diet with NO cues.    Signed by: Nurys Dixon, SLP

## 2023-05-18 NOTE — PLAN OF CARE
Goal Outcome Evaluation:  Plan of Care Reviewed With: patient        Progress: improving  Outcome Evaluation: Manuel is alert and oriented x3 pleasant, continues with right hemiparesis, had left knee pain today and continues with right arm/ shoulder taking scheduled baclofen and gabapentin, more continent today, family conference held via cell phone jimmy, interpretor used for teaching regarding stroke education, and medications, no unsafe behaviors.

## 2023-05-19 PROCEDURE — 92526 ORAL FUNCTION THERAPY: CPT

## 2023-05-19 PROCEDURE — 97530 THERAPEUTIC ACTIVITIES: CPT

## 2023-05-19 PROCEDURE — 97535 SELF CARE MNGMENT TRAINING: CPT

## 2023-05-19 PROCEDURE — 97129 THER IVNTJ 1ST 15 MIN: CPT

## 2023-05-19 PROCEDURE — 97110 THERAPEUTIC EXERCISES: CPT

## 2023-05-19 RX ADMIN — LIDOCAINE 1 PATCH: 50 PATCH CUTANEOUS at 07:24

## 2023-05-19 RX ADMIN — BACLOFEN 5 MG: 10 TABLET ORAL at 21:16

## 2023-05-19 RX ADMIN — APIXABAN 5 MG: 5 TABLET, FILM COATED ORAL at 05:22

## 2023-05-19 RX ADMIN — CARVEDILOL 3.12 MG: 3.12 TABLET, FILM COATED ORAL at 07:24

## 2023-05-19 RX ADMIN — ATORVASTATIN CALCIUM 40 MG: 20 TABLET, FILM COATED ORAL at 07:24

## 2023-05-19 RX ADMIN — GABAPENTIN 100 MG: 100 CAPSULE ORAL at 07:23

## 2023-05-19 RX ADMIN — CARVEDILOL 3.12 MG: 3.12 TABLET, FILM COATED ORAL at 17:26

## 2023-05-19 RX ADMIN — APIXABAN 5 MG: 5 TABLET, FILM COATED ORAL at 17:26

## 2023-05-19 RX ADMIN — AMLODIPINE BESYLATE 10 MG: 10 TABLET ORAL at 07:24

## 2023-05-19 RX ADMIN — ACETAMINOPHEN 650 MG: 325 TABLET, FILM COATED ORAL at 21:16

## 2023-05-19 RX ADMIN — ACETAMINOPHEN 650 MG: 325 TABLET, FILM COATED ORAL at 05:22

## 2023-05-19 RX ADMIN — GABAPENTIN 100 MG: 100 CAPSULE ORAL at 17:21

## 2023-05-19 RX ADMIN — GABAPENTIN 100 MG: 100 CAPSULE ORAL at 21:16

## 2023-05-19 RX ADMIN — BACLOFEN 5 MG: 10 TABLET ORAL at 15:06

## 2023-05-19 RX ADMIN — BACLOFEN 5 MG: 10 TABLET ORAL at 05:22

## 2023-05-19 RX ADMIN — VALSARTAN 160 MG: 160 TABLET, FILM COATED ORAL at 07:24

## 2023-05-19 NOTE — PROGRESS NOTES
Family teaching completed today with patient's wife, daughter, and son, Jeovanny, via Vinveli through [employer]. IPad  also used during teaching. PT and OT reviewed transfers, bed mobility, and wheelchair management. OT showed family BSC, shower chair, and tub bench. Family to get these items in [place]. Patient received wheelchair and cushion today from [facility] to take home with him. Pharmacist, Evette, also did education regarding Eliquis medication, signs of stroke or bleeding with patient and son using IPad  and Vinveli. SW contacted patient's [employer] insurance office with patient to cancel his motor vehicle insurance. Patient's son-in-law, Terrence Atwood, arrived from [place] this afternoon. Discussed above with him as well as teaching scheduled for tomorrow with PT, OT, ST. Patient received medications from pharmacy. Discussed these with son-in-law. Contacted hospital security and they brought patient's wallet over to . SW gave wallet to patient and son-in-law. All of patient's money and cards were accounted for in wallet. Patient's friend, Bridgett, brought patient's Passport and other papers to him. Son-in-law has patient's Passport for flight home. Patient went on pass to [place] this afternoon via friend, Bridgett's car. SW met patient, son-in-law, and friends at [employer] and assisted patient with car transfers.  spoke [place], which was helpful to explain to patient and son-in-law about closing his account. Spoke with patient's granddaughter, Natalie, regarding making sure airlines knows patient will be in wheelchair. She stated she did tell them when she booked flights but she will call [employer] tomorrow to make sure they are aware and ready for patient once he is at airports. They will be flying to [place] and then to [place]. Contacted patient's son, Ariel, by phone to discuss all of the above information. Tried to call Dr. Shelby Rosales at [facility] in  [place] but once connected instructions were in Malawian. So, tried to call again with patient's friend, but we were unable to get an answer. Gave the name and number of this physician to patient's son-in-law so they can call to try to get appointment. Discussed this with son, Ariel, also. NSG will review all discharge information and give AVS in Malawian to patient and son-in-law. ST gave box of Simply gel thickener for them to take as well. Therapists to give home exercise programs to patient. Family to set up therapy for patient in [place] and have cousin who is a therapist that will help. Son-in-law stated they have a doctor in their [employer] who has offered to see patient and help as well. Discussed transportation to airport early Sunday morning with son-in-law and granddgt. They will most likely schedule Uber to take them to airport. Granddaughter has friends here in [place] so might be able to get someone to take them, but encouraged them to reserve Uber ahead of time. Patient will be d/c around 3:30 a.m. on 5/21. Encouraged son to call if any further questions once patient gets to [place]. Will assist with plans.

## 2023-05-19 NOTE — PLAN OF CARE
Goal Outcome Evaluation:  Plan of Care Reviewed With: patient        Progress: improving  Outcome Evaluation: Pt is A&OX3-4, able to anwser orientation questions when options are given.  used. Calm and cooperative. Assist X1 to wc. R karen, R facial droop, mild slurring of speech. Family came from out of state for teaching, pharmacy also consulted for teaching r/t Eliquis. C/O discomfort/pain in R shoulder, lidocaine patch applied. Kpad as needed. Con/Incon of B&B, last BM 5/19. Planned DC on Sunday 5/21. Meds whole in AC. NTL. Up in dining room for meals. No unsafe behaviors noted, pt educated on use of call light for assistance.

## 2023-05-19 NOTE — DISCHARGE SUMMARY
[facility] - REHABILITATION UNIT    SHABNAM MCDONALD  1950    ADMIT DATE:  5/8/2023  7:29 PM  DISCHARGE DATE:  05/19/23      CHIEF COMPLAINT:    Hemorrhagic stroke-left basal ganglia/internal capsule with intraventricular extension on March 12, 2023-  Right hemiparesis   Impaired mobility/impaired self-care  Diplopia    HISTORY OF PRESENT ILLNESS:    73 y.o. male  who is transferred to the acute rehab unit today for history of hemorrhagic stroke with right-sided weakness.  Per the discharge summary-[the patient initially presented to the hospital 3/12/23 for intracranial bleed. He was found unresponsive by friends and initially taken to [facility] where he was found to have poorly controlled BP and right sided hemiplegia. He was transferred here for closer monitoring in the ICU and neurosurgical consultation. He was cleared to move out of the ICU on 3/16/23. The patient had a stable CTH on 3/17/23 with left basal ganglia/internal capsule hemorrhage with left lateral ventricular hemorrhage. He was found to have an acute DVT in his RLE around this time and was cleared for anticoagulation by Neurosurgery. He was initially placed on Lovenox and repeat CTH was obtained on 3/18/23 showing stability. He was successfully transitioned to Eliquis therapy.    Neurosurgery initially signed off but given his prolonged stay re-evaluated on 4/20/23. Plans were for MRI but he couldn't be cleared for this imaging at that time so repeat CTA/CTH head was obtained with evidence of hemorrhage cavity left sided with some peripheral enhancement. This was felt to represent possible resolving hemorrhage but underlying mass could not be excluded so he will need either repeat CTH with contrast versus MRI in 6 to 8 weeks. Neurosurgery recommends he establish care with neurology/ANDRADE once he arrives in [place] and is to be given a disk with all his imaging on this when he leaves. He has been on baclofen for spacticity  and has been working  with PT/OT in an effort to be more mobile for flight back to [place].  His BP was treated with oral therapy and titrated to avoid hypotension as well. He is currently on Norvasc 10 mg daily, valsartan 160 mg daily as well as Coreg 3.125 mg BID. His overall stay has been prolonged due to his disposition as he did not have any adequate insurance to cover a SNF stay as well as no family assistance to qualify for BAR. [facility] has worked well to obtain a discharge plan and his family is flying in to [place] on May 20th to personally transport him to [place] to reside with his son.  . He is discharged to [facility] acute rehabilitation unit today as he has been accepted now that there is a concrete discharge plan in place.    HOSPITAL COURSE:    Patient participated in a comprehensive acute inpatient rehabilitation program.     During the hospital stay the following areas were addressed:      Hemorrhagic stroke-left basal ganglia/internal capsule with intraventricular extension on March 12, 2023-     Right hemiparesis   May 17-showing some improvement with the improvement in spasticity.  Still tight at the right shoulder but improved     Impaired mobility/impaired self-care     Diplopia     Dysphagia-mechanical soft/chopped meat/nectar thick liquid  May 17-repeat videofluoroscopic swallow study tomorrow     Spasticity-baclofen 2.5 mg every 8 hours  May 9- Significant flexor synergy on the RUE with shoulder abd, elbow flexor and pronate tone. Currently on baclofen 2.5 q8h. Will evaluate tone with therapy today and make adjustments as needed.  May 10- titrate up on baclofen 5 mg every 8 hour  May 11- Continue to monitor RUE tone with increase of Baclofen yesterday. Will uptitrate as tolerated. OT to start NMES for motor training and spasticity to the RUE today.  May 12-spasticity improved with increase in baclofen as well as addition of low-dose gabapentin  May 13 - continue  baclofen/gabapentin; tolerating well; denied side-effects  May 15-spasticity improving  May 16- Improving tone, pain in the R pect musculature. Educated patient to work on stretching exercises for R pect tone/spasticity  May 19- Will discharge patient with current Baclofen regimen. Continue stretching or R pec major and shoulder      Right shoulder pain-May 12-no signs of complex regional pain syndrome.  Pain in part may be related to spasticity/immobility/degenerative changes.  X-ray of the right shoulder shows mild to moderate degenerative changes at the acromioclavicular joint.  May titrate up on baclofen and gabapentin over time  May 14 - heat therapy w/ K-pad     Hypertension-amlodipine/carvedilol/valsartan     DVT-right soleal vein-March 17, 2023-Eliquis  May 17 -Pharmacy to see for education on Eliquis with the patient and will need to have done with caregiver this weekend as well      Physical Exam near the time of discharge:    PHYSICAL EXAM:   MENTAL STATUS -  AWAKE / ALERT   HEENT- NCAT, PUPILS EQUALLY ROUND, SCLERAE ANICTERIC, CONJUNCTIVAE PINK, OP MOIST, NO JVD, EARS UNREMARKABLE EXTERNALLY  LUNGS - CTA, NO WHEEZES, RALES OR RHONCHI  HEART- RRR, NO RUB, MURMUR, OR GALLOP  ABD - NORMOACTIVE BOWEL SOUNDS, SOFT, NT. NO HEPATOSPLENOMEGALY APPRECIATED  EXT -edema in the right hand with decreased range of motion  Decreased range of motion right shoulder  NEURO -awake alert.  Oriented.  No significant dysarthria.     MOTOR EXAM -  LUE/LLE 5/5.    Right shoulder flexion 3-/5, elbow flexion 3-/5, finger flexion 3/5, hip flexion 3/5, knee extension 3/5, ankle dorsiflexion 1/5.  MAS 1+ in shoulder ADD, elbow flexors, elbow pronators, wrist flexors, finger flexors.       RESULTS:  No results found for: POCGLU  Results from last 7 days   Lab Units 05/15/23  0557   WBC 10*3/mm3 6.53   HEMOGLOBIN g/dL 11.7*   HEMATOCRIT % 33.9*   PLATELETS 10*3/mm3 201     Results from last 7 days   Lab Units 05/15/23  0557   SODIUM  mmol/L 143   POTASSIUM mmol/L 4.1   CHLORIDE mmol/L 109*   CO2 mmol/L 27.0   BUN mg/dL 14   CREATININE mg/dL 0.69*   CALCIUM mg/dL 8.6   BILIRUBIN mg/dL 0.3   ALK PHOS U/L 109   ALT (SGPT) U/L 34   AST (SGOT) U/L 19   GLUCOSE mg/dL 84              Discharge Medications      New Medications      Instructions Start Date   acetaminophen 325 MG tablet  Commonly known as: TYLENOL   650 mg, Oral, Every 6 Hours PRN      amLODIPine 10 MG tablet  Commonly known as: NORVASC   10 mg, Oral, Every Morning Before Breakfast      atorvastatin 40 MG tablet  Commonly known as: LIPITOR   40 mg, Oral, Daily      baclofen 10 MG tablet  Commonly known as: LIORESAL   5 mg, Oral, Every 8 Hours Scheduled      carvedilol 3.125 MG tablet  Commonly known as: COREG   3.125 mg, Oral, 2 Times Daily With Meals      Eliquis 5 MG tablet tablet  Generic drug: apixaban   5 mg, Oral, Every 12 Hours Scheduled      gabapentin 100 MG capsule  Commonly known as: NEURONTIN   100 mg, Oral, 3 Times Daily      valsartan 160 MG tablet  Commonly known as: DIOVAN   160 mg, Oral, Every Morning Before Breakfast              Follow-up Information     Jorge Logan MD .    Specialty: Neurosurgery  Contact information:  [address] [place] [address]  [phone]             will need either repeat CTH with contrast versus MRI if can be cleared for an MRI in 6 to 8 weeks from April 20, 2023. Neurosurgery recommends he establish care with neurology/ANDRADE once he arrives in [place] Follow up.           to be given a disk with all his imaging on this when he leaves Follow up.           Dr Azar Chan Follow up.    Contact information:  [facility]  Phone:  [phone]           Contact to help establish follow up rehab care in [facility].    Contact information:  Dr Shelby Leigh    [facility]  [facility]  [phone]           Provider, No Known .    Contact information:  [place]  [facility] [address]  [phone]                         AVS and Handouts     ED to Hosp-Admission (Discharged) (3/12/23 - 5/8/23)   - [address] Principal Problem: Intracranial hemorrhage    Hemorrhagic Stroke (Cypriot)     Video: Risk Factors For Hemorrhagic Stroke (Cypriot)     Video: Hemorrhagic Stroke (Cypriot)     Video: Medical Treatment For Hemorrhagic Stroke (Cypriot)                 Discharge Instructions       Put all imaging on a disc for patient to take with him to [place].  Print out videofluoroscopic swallow report from May 18, 2022 for patient to take with him to [place].  Print out all H&P's, consults, acute care discharge summary, and put in a packet for the patient to take to [place] this weekend.  When the discharge summary is dictated this weekend please also print that out for the patient to take with him.    Patient should continue with physical therapy, Occupational Therapy, and speech therapy in [place].    Pharmacy to see for education on Eliquis with the patient and will need to have done with caregiver this weekend as well            >30 on discharge    Chace Pruitt, DO

## 2023-05-19 NOTE — THERAPY TREATMENT NOTE
Inpatient Rehabilitation - Speech Language Pathology Treatment Note    [facility]     Patient Name: Manuel Durant  : 1950  MRN: [id number]    Today's Date: 2023                   Admit Date: 2023       Visit Dx:      ICD-10-CM ICD-9-CM   1. Spastic hemiplegia of right dominant side as late effect of nontraumatic intraparenchymal hemorrhage of brain  I69.151 438.21       Patient Active Problem List   Diagnosis   • Intracranial hemorrhage   • Acute DVT (deep venous thrombosis)   • HTN (hypertension)   • Severe Malnutrition (HCC)   • Hemiplegia   • ICH (intracerebral hemorrhage)   • Stroke       Past Medical History:   Diagnosis Date   • Hypertension    • Hypokalemia 2023   • Stroke        No past surgical history on file.    SLP Recommendation and Plan                                                            SLP EVALUATION (last 72 hours)     SLP SLC Evaluation     Row Name 23 1400 23 1345 23 0830 23 1300 23 0830       Communication Assessment/Intervention    Document Type therapy note (daily note)  -AL therapy note (daily note)  -AL therapy note (daily note)  -AL therapy note (daily note)  -AL therapy note (daily note)  -AL    Patient/Family/Caregiver Comments/Observations Used  via Ipad video. Family was no longer present for this portion of session.  -AL Used  via Ipad video. Pt's family members participated via cell phone video jimmy.  -AL Pt participated well. Used  via Ipad video.  -AL Pt participated well. Used Venezuelan interpeter via Ipad video.  -AL Pt participated well. Used  via Ipad video.  -AL       Pain Scale: Numbers Pre/Post-Treatment    Pretreatment Pain Rating 0/10 - no pain  -AL -- -- -- --    Pre/Posttreatment Pain Comment -- Pt did not complain of pain.  -AL Pt did not c/o pain during session.  -AL Pt did not c/o pain; received baclofen at beginning of session.  -AL  "right shoulder pain; pt did not rate, but stated it is \"better\" today  -AL          User Key  (r) = Recorded By, (t) = Taken By, (c) = Cosigned By    Initials Name Effective Dates    Nurys Beavers, MS CCC-SLP 06/16/21 -                    EDUCATION    The patient has been educated in the following areas:       Cognitive Impairment Communication Impairment Dysphagia (Swallowing Impairment).             SLP GOALS     Row Name 05/19/23 1400 05/19/23 1345 05/19/23 0830       (STG) Pharyngeal Strengthening Exercise Goal 1 (SLP)    Progress/Outcomes (Pharyngeal Strengthening Goal 1, SLP) -- good progress toward goal  -AL good progress toward goal  -AL    Comment (Pharyngeal Strengthening Goal 1, SLP) -- SLP provided education to pt and his family members regarding thickening of liquids to nectar thick consistency. Demonstrated use of gel packet to thicken 4 oz of water. Discussed water protocol, which would allow pt to have water 30 minutes after meals with adequate oral care. Also, discussed use of chin tuck strategy when drinking water. Pt's family asked good questions. SLP will send pt home with box of thickener packets.  -AL SLP provided education regarding results of VFSS. Discussed rationale for taking sips of thins by cup with use of small sip, chin tuck and double swallow, as pt had aspiration x1 even when using small sip compensation on VFSS. Practiced chin tuck with pt; pt exhibited strong cough in 1/10 trials, suspect due to taking large sip and not having chin fully down. Viewed images from VFSS to demonstrate strategies.  -AL       Word Retrieval Skills Goal 1 (SLP)    Improve Word Retrieval Skills By Goal 1 (SLP) completing functional word finding tasks;completing a divergent task;80%;independently (over 90% accuracy)  -AL -- --    Time Frame (Word Retrieval Goal 1, SLP) 1 week  -AL -- --    Progress/Outcomes (Word Retrieval Goal 1, SLP) good progress toward goal  -AL -- --    Comment (Word Retrieval " Goal 1, SLP) Confrontation naming of common objects/food in [place]ayan:70% with NO cues, 100% with MIN cues.  -AL -- --       Connected Speech to Express Thoughts Goal 1 (SLP)    Improve Narrative Discourse to Express Thoughts By Goal 1 (SLP) describing a picture;conversational task on a given topic;80%;with minimal cues (75-90%)  -AL -- --    Time Frame (Connected Speech Goal 1, SLP) 1 week  -AL -- --    Progress/Outcomes (Connected Speech Goal 1, SLP) good progress toward goal  -AL -- --    Comment (Connected Speech Goal 1, SLP) Describing problems in pictures: 100% with NO cues; self corrected error x1.  -AL -- --       Orientation Goal 1 (SLP)    Improve Orientation Through Goal 1 (SLP) demonstrating orientation to day;demonstrating orientation to month;demonstrating orientation to year;demonstrating orientation to place;demonstrating orientation to disease/impairment;80%;with minimal cues (75-90%)  -AL -- --    Progress/Outcomes (Orientation Goal 1, SLP) good progress toward goal  -AL -- --    Comment (Orientation Goal 1, SLP) Oriented to month and year with MAX cues. Oriented to place and situation with NO cues. Suspect aphasia impacting task.  -AL -- --       Memory Skills Goal 1 (SLP)    Improve Memory Skills Through Goal 1 (SLP) recall details of the day;80%;with moderate cues (50-74%)  -AL -- --    Time Frame (Memory Skills Goal 1, SLP) 1 week  -AL -- --    Progress/Outcomes (Memory Skills Goal 1, SLP) good progress toward goal  -AL -- --    Comment (Memory Skills Goal 1, SLP) Recalled month/year after 2 minute delay with NO cues.  -AL -- --    Row Name 05/18/23 0810 05/17/23 1300 05/17/23 0900       (LTG) Patient will demonstrate functional swallow for    Diet Texture (Demonstrate functional swallow) soft to chew (chopped) textures  -AL -- --    Liquid viscosity (Demonstrate functional swallow) thin liquids  -AL -- --    [place] (Demonstrate functional swallow) with 1:1 assist/ supervision  -AL -- --        (STG) Swallow 1    (STG) Swallow 1 Pt will exihibit no overt s/s of aspiration or penetration with trials of water by cup  -AL -- --    [place] (Swallow Short Term Goal 1) with minimal cues (75-90% accuracy)  -AL -- --       (STG) Pharyngeal Strengthening Exercise Goal 1 (SLP)    Activity (Pharyngeal Strengthening Goal 1, SLP) increase timing;increase superior movement of the hyolaryngeal complex;increase anterior movement of the hyolaryngeal complex  -AL -- --    Increase Timing hard effortful swallow  -AL -- --    Increase Superior Movement of the Hyolaryngeal Complex Mendelsohn;hard effortful swallow  -AL -- --    Increase Anterior Movement of the Hyolaryngeal Complex EMST  -AL -- --    [place]/Accuracy (Pharyngeal Strengthening Goal 1, SLP) with minimal cues (75-90% accuracy)  -AL -- --    Comment (Pharyngeal Strengthening Goal 1, SLP) -- SLP provided education regarding VFSS which will be completed tomorrow. Pt voiced understanding and agreement.  -AL Re-assessed swallow function with thins by cup/straw, puree, mixed, and regular. No overt s/s of aspiration or penetration observed with any consistency. Pt exhibited no difficulty with mastication. Mild anterior loss of solid noted x1. Pt exhibited slow rate and took small bites/sips independently. Discussed with MD. Recommend continue soft (chopped)/no mixed with NTL. Trials of thin liquids with SLP only. Plan to complete VFSS tomorrow to objectively assess swallow function.  -AL       Word Retrieval Skills Goal 1 (SLP)    Improve Word Retrieval Skills By Goal 1 (SLP) -- completing functional word finding tasks;completing a divergent task;80%;independently (over 90% accuracy)  -AL --    Progress/Outcomes (Word Retrieval Goal 1, SLP) -- good progress toward goal  -AL --    Comment (Word Retrieval Goal 1, SLP) -- Confrontation naming of common objects/food: 80% with NO cues, 100% with MIN cues.  -AL Divergent thinking: listing animals: 9 with NO cues  "in 1 minute.  -AL       Connected Speech to Express Thoughts Goal 1 (SLP)    Progress/Outcomes (Connected Speech Goal 1, SLP) -- good progress toward goal  -AL --    Comment (Connected Speech Goal 1, SLP) -- Describing problems in pictures: 70% with NO cues, 100% with MIN cues.  -AL --       Orientation Goal 1 (SLP)    Improve Orientation Through Goal 1 (SLP) -- demonstrating orientation to day;demonstrating orientation to month;demonstrating orientation to year;demonstrating orientation to place;demonstrating orientation to disease/impairment;80%;with minimal cues (75-90%)  -AL --    Progress/Outcomes (Orientation Goal 1, SLP) -- goal ongoing  -AL good progress toward goal  -AL    Comment (Orientation Goal 1, SLP) -- Oriented to month with MAX cues and year with MIN cues. Oriented to city, state and situation with NO cues.  -AL Oriented to situation, city, state and month; required MAX cues for year and MIN cues for SARAH. Stated \"1992\" for year.  -AL       Memory Skills Goal 1 (SLP)    Comment (Memory Skills Goal 1, SLP) -- -- Required MAX cues for recall of recent work history ([employer]).  -AL          User Key  (r) = Recorded By, (t) = Taken By, (c) = Cosigned By    Initials Name Provider Type    Nurys Beavers MS CCC-SLP Speech and Language Pathologist                            Time Calculation:        Time Calculation- SLP     Row Name 05/19/23 1517 05/19/23 1516 05/19/23 0913       Time Calculation- SLP    SLP Start Time 1400  -AL 1315  -AL 0830  -AL    SLP Stop Time 1415  -AL 1330  -AL 0900  -AL    SLP Time Calculation (min) 15 min  -AL 15 min  -AL 30 min  -AL          User Key  (r) = Recorded By, (t) = Taken By, (c) = Cosigned By    Initials Name Provider Type    Nurys Beavers MS CCC-SLP Speech and Language Pathologist                  Therapy Charges for Today     Code Description Service Date Service Provider Modifiers Qty    [id number]  ST MOTION FLUORO EVAL SWALLOW 4 5/18/2023 Destiny, " Nurys Dover, MS CCC-SLP GN 1    [id number] [facility] DEV OF COGN SKILLS INITIAL 15 MIN 5/19/2023 Nurys Dixon, MS CCC-SLP  1    [id number]  ST TREATMENT SWALLOW 3 5/19/2023 Nurys Dixon, MS CCC-SLP GN 1                           Nurys Dixon MS SAHIL-SLP  5/19/2023

## 2023-05-19 NOTE — THERAPY DISCHARGE NOTE
Inpatient Rehabilitation - IRF Occupational Therapy Treatment Note/Discharge  [facility]     Patient Name: Manuel Durant  : 1950  MRN: [id number]  Today's Date: 2023               Admit Date: 2023       ICD-10-CM ICD-9-CM   1. Spastic hemiplegia of right dominant side as late effect of nontraumatic intraparenchymal hemorrhage of brain  I69.151 438.21     Patient Active Problem List   Diagnosis   • Intracranial hemorrhage   • Acute DVT (deep venous thrombosis)   • HTN (hypertension)   • Severe Malnutrition (HCC)   • Hemiplegia   • ICH (intracerebral hemorrhage)   • Stroke     Past Medical History:   Diagnosis Date   • Hypertension    • Hypokalemia 2023   • Stroke      No past surgical history on file.    IRF OT ASSESSMENT FLOWSHEET (last 12 hours)     IRF OT Evaluation and Treatment     Row Name 23 1517          OT Time and Intention    Document Type discharge evaluation;daily treatment  -AF     Mode of Treatment occupational therapy  -AF     Patient Effort good  -AF     Row Name 23 1517          General Information    Patient/Family/Caregiver Comments/Observations pt sitting up in w/c in dining room  -AF     General Observations of Patient completed family teaching via phone jimmy and IPAD   -AF     Existing Precautions/Restrictions fall  Czech speaking  -AF     Row Name 23 1517          Pain Assessment    Pretreatment Pain Rating 0/10 - no pain  -AF     Posttreatment Pain Rating 5/10  -AF     Pain Location - Side/Orientation Right  -AF     Pain Location - shoulder  -AF     Row Name 23 1517          Cognition/Psychosocial    Affect/Mental Status (Cognition) WFL  -AF     Orientation Status (Cognition) oriented to;person  -AF     Follows Commands (Cognition) follows one-step commands;repetition of directions required;verbal cues/prompting required;physical/tactile prompts required  -AF     Personal Safety Interventions fall prevention program  maintained;gait belt;nonskid shoes/slippers when out of bed  -AF     Row Name 05/19/23 1517          Range of Motion (ROM)    Left Upper Extremity (ROM) left UE ROM is WFL  -AF     Shoulder, Right (Range of Motion) AROM 1/2, Abduction/ER 3/4 PROM shoulder flexion in supine 7/8  -AF     Elbow, Right (Range of Motion) AROM 3/4 PROM WFL, AROM sup/pronation 1/2-2/3  -AF     Wrist, Right (Range of Motion) AROM 1/4, PROM 3/4  -AF     Hand, Right (Range of Motion) finger extension 3/4, finger flexion 3/4  -AF     Row Name 05/19/23 1517          Vision Assessment/Intervention    Visual Impairment/Limitations visual/perceptual impairments present;peripheral vision impaired right  -AF     Visual Processing Deficit visual attention, right  -AF     Vision Assessment Comment increased attention to the R side during funcitonal tasks with less cues from therapist  -AF     Row Name 05/19/23 1517          Bathing    [place] Level (Bathing) bathing skills;verbal cues;nonverbal cues (demo/gesture);minimum assist (75% patient effort)  -AF     Assistive Device (Bathing) grab bar/tub rail;hand held shower spray hose;tub bench  -AF     Position (Bathing) supported sitting;supported standing  -AF     Set-up Assistance (Bathing) obtain supplies  -AF     Comment (Bathing) karen tech, verbal cues  -AF     Row Name 05/19/23 1517          Upper Body Dressing    [place] Level (Upper Body Dressing) upper body dressing skills;minimum assist (75% or more patient effort)  -AF     Position (Upper Body Dressing) supported sitting  -AF     Set-up Assistance (Upper Body Dressing) obtain clothing  -AF     Comment (Upper Body Dressing) karen tech  -AF     Row Name 05/19/23 1517          Lower Body Dressing    [place] Level (Lower Body Dressing) doff;pants/bottoms;don;shoes/slippers;socks;moderate assist (50% patient effort);verbal cues  -AF     Position (Lower Body Dressing) supported sitting;supported standing  -AF     Set-up Assistance  (Lower Body Dressing) obtain clothing  -AF     Comment (Lower Body Dressing) karen tech  -AF     Row Name 05/19/23 1517          Grooming    [place] Level (Grooming) grooming skills;oral care regimen;hair care, combing/brushing;verbal cues;nonverbal cues (demo/gesture);standby assist;deodorant application;minimum assist (75% patient effort)  -AF     Position (Grooming) sink side;supported sitting  -AF     Row Name 05/19/23 1517          Toileting    [place] Level (Toileting) toileting skills;moderate assist (50% patient effort);maximum assist (25% patient effort);verbal cues  -AF     Assistive Device Use (Toileting) grab bar/safety frame;raised toilet seat  -AF     Position (Toileting) supported sitting;supported standing  -AF     Row Name 05/19/23 1517          Sit-Stand Transfer    Sit-Stand [place] (Transfers) minimum assist (75% patient effort)  -AF     Comment, (Sit-Stand Transfer) at grabbar with positioning of RLE  -AF     Row Name 05/19/23 1517          Stand-Sit Transfer    Stand-Sit [place] (Transfers) minimum assist (75% patient effort)  -AF     Comment, (Stand-Sit Transfer) with positioning of R LE  -AF     Row Name 05/19/23 1517          Toilet Transfer    Type (Toilet Transfer) squat pivot  -AF     [place] Level (Toilet Transfer) moderate assist (50% patient effort)  -AF     Assistive Device (Toilet Transfer) commode;grab bars/safety frame;wheelchair  -AF     Comment, (Toilet Transfer) completed during family teaching 2 times with and without BSC placed over commode  -AF     Row Name 05/19/23 1517          Shower Transfer    Type (Shower Transfer) squat pivot  -AF     [place] Level (Shower Transfer) minimum assist (75% patient effort);verbal cues  -AF     Assistive Device (Shower Transfer) grab bar, tub/shower;tub bench;wheelchair  -AF     Comment, (Shower Transfer) educated family on shower chair with back or tub transfer bench at home  -AF     Row Name 05/19/23 1517           Balance    Static Sitting Balance standby assist  -AF     Sit to Stand Dynamic Balance minimal assist  -AF     Static Standing Balance minimal assist;contact guard  -AF     Row Name 05/19/23 1517          Positioning and Restraints    Pre-Treatment Position sitting in chair/recliner  -AF     Post Treatment Position wheelchair  -AF     In Wheelchair sitting;call light within reach;encouraged to call for assist;exit alarm on;with PT  with  and PT for continued family training q  -AF           User Key  (r) = Recorded By, (t) = Taken By, (c) = Cosigned By    Initials Name Effective Dates    AF SaenzCarole contreras, OTR 06/16/21 -                    Occupational Therapy Education     Title: PT OT SLP Therapies (In Progress)     Topic: Occupational Therapy (Done)     Point: ADL training (Done)     Description:   Instruct learner(s) on proper safety adaptation and remediation techniques during self care or transfers.   Instruct in proper use of assistive devices.              Learning Progress Summary           Patient Acceptance, E, VU,DU by AF at 5/19/2023 1528    Comment: family present via video on phone with IPAD intrepreter. demod transfer BSC over commode, shower chair with back, tub transfer bench. gait belt use, blocking RLE, footwear with transfers, educated on muscle tone, positioning, not pulling on R arm.    Acceptance, E,TB,D,H, VU,DU,NR by AF at 5/18/2023 1527    Comment: pt and family particiapted in meeting with rehab team with  IPAD, educated on current transfer status, HEP, AE and current assistance level with ADLs. will complete video teaching friday and and then in person tranining on saturday prior to d/c    Acceptance, E,D, NR by DN at 5/9/2023 1710    Comment: Ot role and karen adls and functional transfers   Family Acceptance, E, VU,DU by AF at 5/19/2023 1528    Comment: family present via video on phone with IPAD intrepreter. demod transfer BSC over commode, shower chair with  back, tub transfer bench. gait belt use, blocking RLE, footwear with transfers, educated on muscle tone, positioning, not pulling on R arm.    Acceptance, E,TB,D,H, VU,DU,NR by AF at 5/18/2023 1527    Comment: pt and family particiapted in meeting with rehab team with  IPAD, educated on current transfer status, HEP, AE and current assistance level with ADLs. will complete video teaching friday and and then in person tranining on saturday prior to d/c                   Point: Home exercise program (Done)     Description:   Instruct learner(s) on appropriate technique for monitoring, assisting and/or progressing therapeutic exercises/activities.              Learning Progress Summary           Patient Acceptance, E,TB,D,H, VU,DU,NR by AF at 5/18/2023 1527    Comment: pt and family particiapted in meeting with rehab team with  IPAD, educated on current transfer status, HEP, AE and current assistance level with ADLs. will complete video teaching friday and and then in person tranining on saturday prior to d/c    Acceptance, E,D, NR by DN at 5/9/2023 1710    Comment: Ot role and karen adls and functional transfers   Family Acceptance, E,TB,D,H, VU,DU,NR by AF at 5/18/2023 1527    Comment: pt and family particiapted in meeting with rehab team with  IPAD, educated on current transfer status, HEP, AE and current assistance level with ADLs. will complete video teaching friday and and then in person tranining on saturday prior to d/c                   Point: Precautions (Done)     Description:   Instruct learner(s) on prescribed precautions during self-care and functional transfers.              Learning Progress Summary           Patient Acceptance, E,TB,D,H, VU,DU,NR by AF at 5/18/2023 1527    Comment: pt and family particiapted in meeting with rehab team with  IPAD, educated on current transfer status, HEP, AE and current assistance level with ADLs. will complete video teaching friday  and and then in person tranining on saturday prior to d/c    Acceptance, E,D, NR by DN at 5/9/2023 1710    Comment: Ot role and karen adls and functional transfers   Family Acceptance, E,TB,D,H, VU,DU,NR by AF at 5/18/2023 1527    Comment: pt and family particiapted in meeting with rehab team with  IPAD, educated on current transfer status, HEP, AE and current assistance level with ADLs. will complete video teaching friday and and then in person tranining on saturday prior to d/c                   Point: Body mechanics (Done)     Description:   Instruct learner(s) on proper positioning and spine alignment during self-care, functional mobility activities and/or exercises.              Learning Progress Summary           Patient Acceptance, E,TB,D,H, VU,DU,NR by AF at 5/18/2023 1527    Comment: pt and family particiapted in meeting with rehab team with  IPAD, educated on current transfer status, HEP, AE and current assistance level with ADLs. will complete video teaching friday and and then in person tranining on saturday prior to d/c    Acceptance, E,D, NR by DN at 5/9/2023 1710    Comment: Ot role and karen adls and functional transfers   Family Acceptance, E,TB,D,H, VU,DU,NR by AF at 5/18/2023 1527    Comment: pt and family particiapted in meeting with rehab team with  IPAD, educated on current transfer status, HEP, AE and current assistance level with ADLs. will complete video teaching friday and and then in person tranining on saturday prior to d/c                               User Key     Initials Effective Dates Name Provider Type Discipline    DN 06/16/21 -  Alek Duarte OT Occupational Therapist OT    AF 06/16/21 -  Carole Saenz OTR Occupational Therapist OT                OT Recommendation and Plan  Planned Therapy Interventions (OT): BADL retraining, cognitive/visual perception retraining, functional balance retraining, neuromuscular control/coordination retraining,  passive ROM/stretching, patient/caregiver education/training, ROM/therapeutic exercise, strengthening exercise, transfer/mobility retraining           OT IRF GOALS     Row Name 05/19/23 1530 05/16/23 1531 05/09/23 1636       Transfer Goal 1 (OT-IRF)    Activity/Assistive Device (Transfer Goal 1, OT-IRF) -- toilet;walk-in shower  -AF toilet;walk-in shower  -DN    [place] Level (Transfer Goal 1, OT-IRF) -- minimum assist (75% or more patient effort);verbal cues required  -AF moderate assist (50-74% patient effort);verbal cues required;nonverbal cues (demo/gesture) required  -DN    Time Frame (Transfer Goal 1, OT-IRF) -- short-term goal (STG)  -AF short-term goal (STG)  -DN    Progress/Outcomes (Transfer Goal 1, OT-IRF) goal met  -AF continuing progress toward goal  -AF continuing progress toward goal  -DN       Transfer Goal 2 (OT-IRF)    Activity/Assistive Device (Transfer Goal 2, OT-IRF) -- toilet;walk-in shower;tub bench;commode chair  -AF toilet;walk-in shower;tub bench;commode chair  -DN    [place] Level (Transfer Goal 2, OT-IRF) -- minimum assist (75% or more patient effort);verbal cues required  -AF minimum assist (75% or more patient effort)  -DN    Time Frame (Transfer Goal 2, OT-IRF) -- long-term goal (LTG)  -AF long-term goal (LTG)  -DN    Progress/Outcomes (Transfer Goal 2, OT-IRF) goal met  -AF continuing progress toward goal  -AF continuing progress toward goal  -DN       Bathing Goal 1 (OT-IRF)    Activity/Device (Bathing Goal 1, OT-IRF) -- bathing skills, all  -AF bathing skills, all  -DN    [place] Level (Bathing Goal 1, OT-IRF) -- contact guard required;verbal cues required;minimum assist (75% or more patient effort)  -AF minimum assist (75% or more patient effort)  -DN    Time Frame (Bathing Goal 1, OT-IRF) -- short-term goal (STG)  -AF short-term goal (STG)  -DN    Progress/Outcomes (Bathing Goal 1, OT-IRF) goal partially met  -AF continuing progress toward goal  -AF continuing  progress toward goal  -DN       Bathing Goal 2 (OT-IRF)    Activity/Device (Bathing Goal 2, OT-IRF) -- bathing skills, all  -AF bathing skills, all  -DN    [place] Level (Bathing Goal 2, OT-IRF) -- minimum assist (75% or more patient effort);contact guard required;verbal cues required  -AF minimum assist (75% or more patient effort)  -DN    Time Frame (Bathing Goal 2, OT-IRF) -- long-term goal (LTG)  -AF long-term goal (LTG)  -DN    Progress/Outcomes (Bathing Goal 2, OT-IRF) goal met  -AF continuing progress toward goal  -AF continuing progress toward goal  -DN       UB Dressing Goal 1 (OT-IRF)    Activity/Device (UB Dressing Goal 1, OT-IRF) -- upper body dressing  -AF upper body dressing  -DN    [place] (UB Dress Goal 1, OT-IRF) -- minimum assist (75% or more patient effort)  consistently  -AF minimum assist (75% or more patient effort)  -DN    Time Frame (UB Dressing Goal 1, OT-IRF) -- short-term goal (STG)  -AF short-term goal (STG)  -DN    Progress/Outcomes (UB Dressing Goal 1, OT-IRF) goal met  -AF continuing progress toward goal  -AF continuing progress toward goal  -DN       UB Dressing Goal 2 (OT-IRF)    Activity/Device (UB Dressing Goal 2, OT-IRF) -- upper body dressing  -AF upper body dressing  -DN    [place] (UB Dress Goal 2, OT-IRF) -- standby assist  -AF standby assist  -DN    Time Frame (UB Dressing Goal 2, OT-IRF) -- long-term goal (LTG)  -AF long-term goal (LTG)  -DN    Progress/Outcomes (UB Dressing Goal 2, OT-IRF) goal not met  -AF continuing progress toward goal  -AF continuing progress toward goal  -DN       LB Dressing Goal 1 (OT-IRF)    Activity/Device (LB Dressing Goal 1, OT-IRF) -- lower body dressing  -AF lower body dressing  -DN    [place] (LB Dressing Goal 1, OT-IRF) -- moderate assist (50-74% patient effort)  -AF maximum assist (25-49% patient effort)  -DN    Time Frame (LB Dressing Goal 1, OT-IRF) -- short-term goal (STG)  -AF short-term goal (STG)  -DN     Progress/Outcomes (LB Dressing Goal 1, OT-IRF) goal partially met  -AF continuing progress toward goal  -AF continuing progress toward goal  -DN       LB Dressing Goal 2 (OT-IRF)    Activity/Device (LB Dressing Goal 2, OT-IRF) -- lower body dressing  -AF lower body dressing  -DN    [place] (LB Dressing Goal 2, OT-IRF) -- moderate assist (50-74% patient effort)  -AF moderate assist (50-74% patient effort)  -DN    Time Frame (LB Dressing Goal 2, OT-IRF) -- long-term goal (LTG)  -AF long-term goal (LTG)  -DN    Progress/Outcomes (LB Dressing Goal 2, OT-IRF) goal partially met  -AF continuing progress toward goal  -AF continuing progress toward goal  -DN       Grooming Goal 1 (OT-IRF)    Activity/Device (Grooming Goal 1, OT-IRF) -- grooming skills, all  -AF grooming skills, all  -DN    [place] (Grooming Goal 1, OT-IRF) -- minimum assist (75% or more patient effort)  -AF minimum assist (75% or more patient effort)  -DN    Time Frame (Grooming Goal 1, OT-IRF) -- short-term goal (STG)  -AF short-term goal (STG)  -DN    Progress/Outcomes (Grooming Goal 1, OT-IRF) goal met  -AF continuing progress toward goal  -AF continuing progress toward goal  -DN       Grooming Goal 2 (OT-IRF)    Activity/Device (Grooming Goal 2, OT-IRF) -- grooming skills, all  -AF grooming skills, all  -DN    [place] (Grooming Goal 2, OT-IRF) -- supervision required  -AF supervision required  -DN    Time Frame (Grooming Goal 2, OT-IRF) -- long-term goal (LTG)  -AF long-term goal (LTG)  -DN    Progress/Outcomes (Grooming Goal 2, OT-IRF) goal not met  -AF continuing progress toward goal  -AF continuing progress toward goal  -DN       Toileting Goal 1 (OT-IRF)    Activity/Device (Toileting Goal 1, OT-IRF) -- toileting skills, all  -AF toileting skills, all  -DN    [place] Level (Toileting Goal 1, OT-IRF) -- moderate assist (50-74% patient effort)  -AF moderate assist (50-74% patient effort)  -DN    Progress/Outcomes (Toileting Goal 1,  OT-IRF) goal partially met  -AF continuing progress toward goal  -AF continuing progress toward goal  -DN    Time Frame (Toileting Goal 1, OT-IRF) -- long-term goal (LTG)  -AF long-term goal (LTG)  -DN       Toileting Goal 2 (OT-IRF)    Activity/Device (Toileting Goal 2, OT-IRF) -- toileting skills, all  -AF toileting skills, all  -DN    [place] Level (Toileting Goal 2, OT-IRF) -- moderate assist (50-74% patient effort)  -AF moderate assist (50-74% patient effort)  -DN    Progress/Outcomes (Toileting Goal 2, OT-IRF) goal partially met  -AF continuing progress toward goal  -AF continuing progress toward goal  -DN    Time Frame (Toileting Goal 2, OT-IRF) -- long-term goal (LTG)  -AF long-term goal (LTG)  -DN       Self-Feeding Goal 1 (OT-IRF)    Activity/Device (Self-Feeding Goal 1, OT-IRF) -- -- self-feeding skills, all  -DN    Strategies/Barriers (Self-Feeding Goal 1, OT-IRF) -- -- to be assessed  -DN       Self-Feeding Goal 2 (OT-IRF)    Activity/Device (Self-Feeding Goal 2, OT-IRF) -- -- self-feeding skills, all  -DN    Strategies/Barriers (Self-Feeding Goal 2, OT-IRF) -- -- to be assessed  -DN       Balance Goal 1 (OT)    Activity/Assistive Device (Balance Goal 1, OT) -- standing, static  -AF standing, static  -DN    [place] Level/Cues Needed (Balance Goal 1, OT) -- contact guard assist;verbal cues required  -AF contact guard assist;verbal cues required  -DN    Time Frame (Balance Goal 1, OT) -- short term goal (STG)  -AF short term goal (STG)  -DN    Barriers (Balance Goal 1, OT) -- toileting  -AF toileting  -DN    Progress/Outcomes (Balance Goal 1, OT) goal met  -AF continuing progress toward goal  -AF continuing progress toward goal  -DN       Balance Goal 2 (OT)    Activity/Assistive Device (Balance Goal 2, OT) -- standing, static  -AF standing, static  -DN    [place] Level (Balance Goal 2, OT) -- contact guard assist  -AF supervision required  -DN    Time Frame (Balance Goal 2, OT) -- long  term goal (LTG)  -AF long term goal (LTG)  -DN    Barriers (Balance Goal 2, OT) -- toileting  -AF toileting  -DN    Progress/Outcome (Balance Goal 2, OT) goal met  -AF continuing progress toward goal  -AF continuing progress toward goal  -DN       Caregiver Training Goal 2 (OT-IRF)    Caregiver Training Goal 2 (OT-IRF) -- pt family to be independent with assist with pt adls and functional transfers for pt d/c home  -AF pt family to be independent with assist with pt adls and functional transfers for pt d/c home  -DN    Time Frame (Caregiver Training Goal 2, OT-IRF) -- long-term goal (LTG)  -AF long-term goal (LTG)  -DN    Progress/Outcomes (Caregiver Training Goal 2, OT-IRF) -- continuing progress toward goal  -AF continuing progress toward goal  -DN          User Key  (r) = Recorded By, (t) = Taken By, (c) = Cosigned By    Initials Name Provider Type    DN Alek Duarte, OT Occupational Therapist    AF Carole Saenz OTMAGUE Occupational Therapist                    Time Calculation:    Time Calculation- OT     Row Name 05/19/23 1532             Time Calculation- OT    OT Start Time 1200  -AF      OT Stop Time 1315  -AF      OT Time Calculation (min) 75 min  -AF            User Key  (r) = Recorded By, (t) = Taken By, (c) = Cosigned By    Initials Name Provider Type    AF Carole Saenz, OTMAGUE Occupational Therapist                Therapy Charges for Today     Code Description Service Date Service Provider Modifiers Qty    53520721134 HC OT SELF CARE/MGMT/TRAIN EA 15 MIN 5/18/2023 Carole Saenz OTR GO 2    38436938340 HC OT NEUROMUSC RE EDUCATION EA 15 MIN 5/18/2023 Carole Saenz, OTR GO 2    [id number] HC OT SELF CARE/MGMT/TRAIN EA 15 MIN 5/19/2023 Carole Saenz, OTMAGUE GO 5               OT Discharge Summary  Reason for Discharge: Discharge from facility  Outcomes Achieved: Able to achieve all goals within established timeline, Patient able to partially acheive established goals  Discharge Destination: Home with  assist (home with 24 hour assistance)    Carole Saenz, OTR  5/19/2023

## 2023-05-19 NOTE — PLAN OF CARE
Goal Outcome Evaluation:  Plan of Care Reviewed With: patient             Problem: Rehabilitation (IRF) Plan of Care  Goal: Plan of Care Review  Outcome: Ongoing, Progressing  Flowsheets (Taken 5/19/2023 0304)  Plan of Care Reviewed With: patient  Outcome Evaluation:  Alert and oriented x 3-4. Calm and pleasant. Meds whole in AS; NTL. Right hemiparesis with right droop. Incontinent of bowel and bladder at night. Wears brief; checked and changed q 2 and as needed. Toileting offered but pt already incontinent. Using KPAD to right shoulder. PRN Tylenol administered 2100. Turned and repositioned q2 during rounding. No unsafe behaviors. Call light within reach.

## 2023-05-19 NOTE — PROGRESS NOTES
[facility]     Progress Note    Patient Name: Manuel Durant  : 1950  MRN: [id number]  Primary Care Physician:  Provider, No Known  Date of admission: 2023    Subjective  Pt is awake and alert. Interrogated via  service. No c/o. Pt is ready for dc  early am.   Subjective     Chief Complaint: same    History of Present Illness  Patient Reports     Review of Systems    Objective  exam unchanged. Calves soft and nt. Resp unlabored and regular.   Objective     Vitals:   Temp:  [97.2 °F (36.2 °C)-97.6 °F (36.4 °C)] 97.6 °F (36.4 °C)  Heart Rate:  [55-74] 61  Resp:  [18] 18  BP: (106-146)/(61-76) 110/61    Physical Exam     Result Review    Result Review:  I have personally reviewed the results from the time of this admission to 2023 08:52 EDT and agree with these findings:  []  Laboratory list / accordion  []  Microbiology  []  Radiology  []  EKG/Telemetry   []  Cardiology/Vascular   []  Pathology  []  Old records  []  Other:  Most notable findings include: No new labs to review.       Assessment & Plan  DC . Pt to live in [place].   Assessment / Plan     Brief Patient Summary:  Manuel Durant is a 73 y.o. male who    Active Hospital Problems:  Active Hospital Problems    Diagnosis    • **ICH (intracerebral hemorrhage)    • Stroke      Plan:       DVT prophylaxis:  Medical and mechanical DVT prophylaxis orders are present.    CODE STATUS:    Code Status (Patient has no pulse and is not breathing): CPR (Attempt to Resuscitate)  Medical Interventions (Patient has pulse or is breathing): Full Support    Disposition:  I expect patient to be discharged .     Chace Kimbrough MD

## 2023-05-19 NOTE — PROGRESS NOTES
SECTION GG      Self Care Performance Discharge:   Oral Hygiene: [place] provides verbal cues and/or touching/steadying and/or  contact guard assistance as patient completes activity.   Toileting Hygiene: : [place] does less than half the effort. [place] lifts, holds  or supports trunk or limbs but provides less than half the effort.   Upper Body Dressing: [place] provides verbal cues and/or touching/steadying  and/or contact guard assistance as patient completes activity.   Lower Body Dressing: [place] does less than half the effort. [place] lifts, holds  or supports trunk or limbs but provides less than half the effort.   Putting On/Taking Off Footwear: [place] does all of the effort. Patient does  none of the effort to complete the activity. Or, the assistance of 2 or more  helpers is required for the patient to complete the activity.    Mobility Toilet Transfer Discharge: [place] does less than half the effort.  [place] lifts, holds or supports trunk or limbs but provides less than half the  effort.    Signed by: Carole Saenz, OT

## 2023-05-19 NOTE — PROGRESS NOTES
Inpatient Rehabilitation Plan of Care Note    Plan of Care  Care Plan Reviewed - No updates at this time.    Safety    [RN] Potential for Injury(Active)  Current Status(05/19/2023): Risk for falls r/t R hemiparesis  Weekly Goal(05/24/2023): Cue to use call bell  Discharge Goal: Patient will be aware of risk of fall and safety in the home  setting    Performed Intervention(s)  falls precautions  bed/chair alarm  safety rounds      Psychosocial    [RN] Coping/Adjustment(Active)  Current Status(05/19/2023): Expresses appropriate coping  Weekly Goal(05/24/2023): Educate patient regarding stroke and hypertention  Discharge Goal: Knowledgeable of care needs and stroke recovery    Performed Intervention(s)  Verbalizes needs and concerns  Therapeutic environmental set up      Sphincter Control    [RN] Bladder Management(Active)  Current Status(05/19/2023): Continent/incontinent of bladder at times  Weekly Goal(05/24/2023): Continent 75% of time  Discharge Goal: Continent 100% of time    [RN] Bowel Management(Active)  Current Status(05/19/2023): Continent/Incontinent at times of bowel  Weekly Goal(05/23/2023): Continent of bowel 75%  Discharge Goal: Continent of bowel 100%    Performed Intervention(s)  Bowel/bladder training  Encourage fluid intake  Scheduled toileting      Pain    [RN] Pain Management(Active)  Current Status(05/19/2023): Patient has pain to RUE and RLE, taking scheduled  baclofen and gabapentin and PRN Tylenol.  Weekly Goal(05/24/2023): Pain will be well controlled.  Discharge Goal: Pain controlled.    Performed Intervention(s)  meds as ordered  Pain assessment    Signed by: Anabela Taveras RN

## 2023-05-19 NOTE — PROGRESS NOTES
Inpatient Rehabilitation Plan of Care Note    Plan of Care  Care Plan Reviewed - No updates at this time.    Safety    Performed Intervention(s)  falls precautions  bed/chair alarm  safety rounds      Psychosocial    Performed Intervention(s)  Verbalizes needs and concerns  Therapeutic environmental set up      Sphincter Control    Performed Intervention(s)  Bowel/bladder training  Encourage fluid intake  Scheduled toileting      Pain    Performed Intervention(s)  meds as ordered  Pain assessment    Signed by: Marichuy Murguia RN

## 2023-05-19 NOTE — THERAPY DISCHARGE NOTE
Inpatient Rehabilitation - Physical Therapy Treatment Note/Discharge  [facility]     Patient Name: Manuel Durant  : 1950  MRN: [id number]  Today's Date: 2023                Admit Date: 2023    Visit Dx:    ICD-10-CM ICD-9-CM   1. Spastic hemiplegia of right dominant side as late effect of nontraumatic intraparenchymal hemorrhage of brain  I69.151 438.21     Patient Active Problem List   Diagnosis   • Intracranial hemorrhage   • Acute DVT (deep venous thrombosis)   • HTN (hypertension)   • Severe Malnutrition (HCC)   • Hemiplegia   • ICH (intracerebral hemorrhage)   • Stroke     Past Medical History:   Diagnosis Date   • Hypertension    • Hypokalemia 2023   • Stroke      No past surgical history on file.    PT ASSESSMENT (last 12 hours)     IRF PT Evaluation and Treatment     Row Name 23 1200          PT Time and Intention    Document Type discharge evaluation  -AE     Mode of Treatment individual therapy;physical therapy  -AE     Patient/Family/Caregiver Comments/Observations video call/teaching performed with son today  -AE     Row Name 23 1200          General Information    Patient Profile Reviewed yes  -AE     Existing Precautions/Restrictions fall  -AE     Row Name 23 1200          Pain Assessment    Pretreatment Pain Rating 5/10  -AE     Posttreatment Pain Rating 5/10  -AE     Pain Location - Side/Orientation Right  -AE     Pain Location proximal  -AE     Pain Location - shoulder  -AE     Row Name 23 1200          Cognition/Psychosocial    Affect/Mental Status (Cognition) WFL  -AE     Orientation Status (Cognition) oriented to;person  -AE     Follows Commands (Cognition) follows one-step commands;repetition of directions required;verbal cues/prompting required;physical/tactile prompts required  -AE     Personal Safety Interventions fall prevention program maintained;gait belt;muscle strengthening facilitated;nonskid shoes/slippers when out of bed;supervised  activity  -AE     Row Name 05/19/23 1200          Bed Mobility    Supine-Sit Rush (Bed Mobility) contact guard  -AE     Sit-Supine Rush (Bed Mobility) minimum assist (75% patient effort)  -AE     Comment, (Bed Mobility) flat mat no rails in PM session. hospital bed in AM session  -AE     Row Name 05/19/23 1200          Transfer Assessment/Treatment    Comment, (Transfers) fluctuates min/mod depending on height of surface and direction towards affected vs unaffected side. sometimes he tends to stand instead of sit  -AE     Row Name 05/19/23 1200          Bed-Chair Transfer    Bed-Chair Rush (Transfers) minimum assist (75% patient effort)  -AE     Assistive Device (Bed-Chair Transfers) wheelchair  -AE     Comment, (Bed-Chair Transfer) squat pivot  -AE     Row Name 05/19/23 1200          Chair-Bed Transfer    Chair-Bed Rush (Transfers) minimum assist (75% patient effort)  -AE     Assistive Device (Chair-Bed Transfers) wheelchair  -AE     Comment, (Chair-Bed Transfer) squat pivot  -AE     Row Name 05/19/23 1200          Sit-Stand Transfer    Sit-Stand Rush (Transfers) minimum assist (75% patient effort);moderate assist (50% patient effort)  -AE     Assistive Device (Sit-Stand Transfers) wheelchair  -AE     Row Name 05/19/23 1200          Stand-Sit Transfer    Stand-Sit Rush (Transfers) minimum assist (75% patient effort);moderate assist (50% patient effort)  -AE     Assistive Device (Stand-Sit Transfers) wheelchair  -AE     Row Name 05/19/23 1200          Car Transfer    Type (Car Transfer) stand pivot/stand step  -AE     [place]ush Level (Car Transfer) moderate assist (50% patient effort)  -AE     Comment, (Car Transfer) performed 2x in AM session and 2x in PM session  -AE     Row Name 05/19/23 1200          Hip (Therapeutic Exercise)    Hip Strengthening (Therapeutic Exercise) bilateral;marching while seated;red;resistance band;3 sets;10 repetitions  -AE     Row Name  05/19/23 1200          Knee (Therapeutic Exercise)    Knee Strengthening (Therapeutic Exercise) bilateral;LAQ (long arc quad);3 sets;10 repetitions;red;resistance band  -AE     Row Name 05/19/23 1200          Positioning and Restraints    Pre-Treatment Position sitting in chair/recliner  -AE     Post Treatment Position bed  -AE     In Bed supine;call light within reach;encouraged to call for assist;exit alarm on  -AE     Row Name 05/19/23 1200          Bed Mobility Goal 1 (PT-IRF)    Activity/Assistive Device (Bed Mobility Goal 1, PT-IRF) bed mobility activities, all  -AE     [place] Level (Bed Mobility Goal 1, PT-IRF) contact guard required  -AE     Progress/Outcomes (Bed Mobility Goal 1, PT-IRF) goal not met  SBA for sit->supine, [place]ish for supine->sit  -AE     Row Name 05/19/23 1200          Bed Mobility Goal 2 (PT-IRF)    Activity/Assistive Device (Bed Mobility Goal 2, PT-IRF) bed mobility activities, all  -AE     [place] Level (Bed Mobility Goal 2, PT-IRF) supervision required  -AE     Progress/Outcomes (Bed Mobility Goal 2, PT-IRF) goal not met  -AE     Row Name 05/19/23 1200          Transfer Goal 1 (PT-IRF)    Activity/Assistive Device (Transfer Goal 1, PT-IRF) sit-to-stand/stand-to-sit;bed-to-chair/chair-to-bed  -AE     [place] Level (Transfer Goal 1, PT-IRF) minimum assist (75% or more patient effort)  -AE     Progress/Outcomes (Transfer Goal 1, PT-IRF) goal partially met  fluctuates with assistance based on pain and fatigue. 75% Amish, at times require modA  -AE     Row Name 05/19/23 1200          Transfer Goal 2 (PT-IRF)    Activity/Assistive Device (Transfer Goal 2, PT-IRF) sit-to-stand/stand-to-sit;bed-to-chair/chair-to-bed  -AE     [place] Level (Transfer Goal 2, PT-IRF) standby assist  -AE     Progress/Outcomes (Transfer Goal 2, PT-IRF) goal not met;goal no longer appropriate  -AE     Row Name 05/19/23 1200          Transfer Goal 3 (PT-IRF)    Activity/Assistive Device (Transfer Goal  3, PT-IRF) car transfer  -AE     [place] Level (Transfer Goal 3, PT-IRF) minimum assist (75% or more patient effort)  -AE     Progress/Outcomes (Transfer Goal 3, PT-IRF) goal not met  requires modA  -AE     Row Name 05/19/23 1200          Gait/Walking Locomotion Goal 1 (PT-IRF)    Activity/Assistive Device (Gait/Walking Locomotion Goal 1, PT-IRF) gait (walking locomotion)  -AE     Gait/Walking Locomotion Distance Goal 1 (PT-IRF) 16  -AE     [place] Level (Gait/Walking Locomotion Goal 1, PT-IRF) minimum assist (75% or more patient effort)  -AE     Progress/Outcomes (Gait/Walking Locomotion Goal 1, PT-IRF) goal not met  -AE     Row Name 05/19/23 1200          Gait/Walking Locomotion Goal 2 (PT-IRF)    Activity/Assistive Device (Gait/Walking Locomotion Goal 2, PT-IRF) gait (walking locomotion);assistive device use  -AE     Gait/Walking Locomotion Distance Goal 2 (PT-IRF) 20'  -AE     Progress/Outcomes (Gait/Walking Locomotion Goal 2, PT-IRF) goal not met  -AE     Row Name 05/19/23 1200          Wheelchair Locomotion Goal 1 (PT-IRF)    Activity (Wheelchair Locomotion Goal 1, PT-IRF) wheelchair mobility skills, all  -AE     [place] Level (Wheelchair Locomotion Goal 1, PT-IRF) supervision required  -AE     Distance Goal 1 (Wheelchair Locomotion, PT-IRF) 150  -AE     Progress/Outcomes (Wheelchair Locomotion Goal 1, PT-IRF) goal not met  -AE     Row Name 05/19/23 1200          Discharge Summary (PT)    Outcomes Achieved/Progress Made Upon Discharge (PT) progress was made toward goals;patient transferred to another care setting/facility;other (see comments)  -AE     Transfer to Another Level of Care or Facility (PT) home with assist recommended;home with supervision recommended  -AE     Discharge Summary Statement (PT) Pt improved from a maxA x 2 assist level to min/modA x 1 person assist level within a 2 week rehab stay. Duration of rehab cut short due to arrival of family members from [place] who transported  him back to [place] to provide 24hr care for patient. Although progress was made, unable to achieve goals in the shortened stay. caregiver training performed via video call and in-person prior to DC.  -AE           User Key  (r) = Recorded By, (t) = Taken By, (c) = Cosigned By    Initials Name Provider Type    AE Angeles Altman, JOSE ELIAS Physical Therapist                Physical Therapy Education     Title: PT OT SLP Therapies (In Progress)     Topic: Physical Therapy (In Progress)     Point: Mobility training (In Progress)     Learning Progress Summary           Patient Acceptance, E,TB, NR by LB at 5/10/2023 1211    Acceptance, E,D, NR by DP at 5/9/2023 1217                   Point: Home exercise program (In Progress)     Learning Progress Summary           Patient Acceptance, E,D, NR by DP at 5/9/2023 1217                   Point: Body mechanics (In Progress)     Learning Progress Summary           Patient Acceptance, E,D, NR by DP at 5/9/2023 1217                   Point: Precautions (In Progress)     Learning Progress Summary           Patient Acceptance, E,D, NR by DP at 5/9/2023 1217                               User Key     Initials Effective Dates Name Provider Type Discipline    LB 06/16/21 -  Le Reynoso, PT Physical Therapist PT    DP 08/24/21 -  Tristin Vicente PT Physical Therapist PT                PT Recommendation and Plan                  Time Calculation:    PT Charges     Row Name 05/19/23 1544 05/19/23 1239          Time Calculation    Start Time 1315  -AE 0930  -AE     Stop Time 1345  -AE 1000  -AE     Time Calculation (min) 30 min  -AE 30 min  -AE     PT Received On 05/19/23  -AE 05/19/23  -AE     PT - Next Appointment -- 05/20/23  -AE     PT Goal Re-Cert Due Date -- 05/26/23  -AE        Time Calculation- PT    Total Timed Code Minutes- PT 30 minute(s)  -AE 30 minute(s)  -AE           User Key  (r) = Recorded By, (t) = Taken By, (c) = Cosigned By    Initials Name Provider Type    AE Agapito  Angeles, PT Physical Therapist                Therapy Charges for Today     Code Description Service Date Service Provider Modifiers Qty    41433410429 HC PT THERAPEUTIC ACT EA 15 MIN 5/18/2023 Angeles Altman, PT GP 3    83003016497 HC PT THER PROC EA 15 MIN 5/18/2023 Angeles Altman, PT GP 1    78033913807 HC PT THERAPEUTIC ACT EA 15 MIN 5/19/2023 Angeles Altman, PT GP 1    50505764659 HC PT THER PROC EA 15 MIN 5/19/2023 Angeles Altman, PT GP 1    07567778021 HC PT THERAPEUTIC ACT EA 15 MIN 5/19/2023 Angeles Altman, PT GP 2          PT G-Codes  AM-PAC 6 Clicks Score (PT): 14         Angeles Altman, PT  5/19/2023

## 2023-05-19 NOTE — PROGRESS NOTES
[facility] Clinical Pharmacy Services: Pharmacy Education - Direct Oral Anticoagulant - Apixaban    Manuel Durant has been ordered apixaban for DVT/PE.     Counseling in the presence of family, , . Counseling points included the followin. Apixaban's indication, patient's need for the medication, and dosing/frequency of this medication.  2. Enforced the importance of taking their medication as instructed every day and the reason why the medication is dosed that way.  3. Explained possible side effects of anticoagulation therapy, including increased risk of bleeding, and s/sx of bleeding. Also talked about ways to control bleeding for minor cuts and scrapes.  4. Emphasized the importance of going to the emergency room if any of the following occur: Falling and hitting your head; noticing bright red blood in urine or dark/tarry stools; vomiting up blood or vomit has a coffee-ground like texture; coughing up blood.  5. Discussed the importance of informing any physician or dentist that they have been started on a DOAC, in case they need to be taken off for a procedure.  6. Discussed all important drug interactions, including over-the-counter medications and supplements.  7. Instructed the patient not to begin or discontinue any medications without informing his/her physician/pharmacist.  8. Provided [place] drug information sheet in his paper chart to send home with son.      I also explained to the patient that this medication may have a high copay associated with it and to let the provider know if it is unaffordable.     Patient expressed understanding and had no further questions.      Evette Walsh, [facility]  Clinical Pharmacist

## 2023-05-19 NOTE — DISCHARGE SUMMARY
[facility] - REHABILITATION UNIT    SHABNAM MCDONALD  1950    ADMIT DATE:  5/8/2023  7:29 PM  DISCHARGE DATE:  05/19/23      CHIEF COMPLAINT:    Hemorrhagic stroke-left basal ganglia/internal capsule with intraventricular extension on March 12, 2023-  Right hemiparesis   Impaired mobility/impaired self-care  Diplopia    HISTORY OF PRESENT ILLNESS:    73 y.o. male  who is transferred to the acute rehab unit today for history of hemorrhagic stroke with right-sided weakness.  Per the discharge summary-[the patient initially presented to the hospital 3/12/23 for intracranial bleed. He was found unresponsive by friends and initially taken to [facility] where he was found to have poorly controlled BP and right sided hemiplegia. He was transferred here for closer monitoring in the ICU and neurosurgical consultation. He was cleared to move out of the ICU on 3/16/23. The patient had a stable CTH on 3/17/23 with left basal ganglia/internal capsule hemorrhage with left lateral ventricular hemorrhage. He was found to have an acute DVT in his RLE around this time and was cleared for anticoagulation by Neurosurgery. He was initially placed on Lovenox and repeat CTH was obtained on 3/18/23 showing stability. He was successfully transitioned to Eliquis therapy.    Neurosurgery initially signed off but given his prolonged stay re-evaluated on 4/20/23. Plans were for MRI but he couldn't be cleared for this imaging at that time so repeat CTA/CTH head was obtained with evidence of hemorrhage cavity left sided with some peripheral enhancement. This was felt to represent possible resolving hemorrhage but underlying mass could not be excluded so he will need either repeat CTH with contrast versus MRI in 6 to 8 weeks. Neurosurgery recommends he establish care with neurology/ANDRADE once he arrives in [place] and is to be given a disk with all his imaging on this when he leaves. He has been on baclofen for spacticity  and has been working  with PT/OT in an effort to be more mobile for flight back to [place].  His BP was treated with oral therapy and titrated to avoid hypotension as well. He is currently on Norvasc 10 mg daily, valsartan 160 mg daily as well as Coreg 3.125 mg BID. His overall stay has been prolonged due to his disposition as he did not have any adequate insurance to cover a SNF stay as well as no family assistance to qualify for BAR. [facility] has worked well to obtain a discharge plan and his family is flying in to [place] on May 20th to personally transport him to [place] to reside with his son.  . He is discharged to [facility] acute rehabilitation unit today as he has been accepted now that there is a concrete discharge plan in place.    HOSPITAL COURSE:    Patient participated in a comprehensive acute inpatient rehabilitation program.     During the hospital stay the following areas were addressed:      Hemorrhagic stroke-left basal ganglia/internal capsule with intraventricular extension on March 12, 2023-     Right hemiparesis   May 17-showing some improvement with the improvement in spasticity.  Still tight at the right shoulder but improved     Impaired mobility/impaired self-care     Diplopia     Dysphagia-mechanical soft/chopped meat/nectar thick liquid  May 17-repeat videofluoroscopic swallow study tomorrow     Spasticity-baclofen 2.5 mg every 8 hours  May 9- Significant flexor synergy on the RUE with shoulder abd, elbow flexor and pronate tone. Currently on baclofen 2.5 q8h. Will evaluate tone with therapy today and make adjustments as needed.  May 10- titrate up on baclofen 5 mg every 8 hour  May 11- Continue to monitor RUE tone with increase of Baclofen yesterday. Will uptitrate as tolerated. OT to start NMES for motor training and spasticity to the RUE today.  May 12-spasticity improved with increase in baclofen as well as addition of low-dose gabapentin  May 13 - continue  baclofen/gabapentin; tolerating well; denied side-effects  May 15-spasticity improving  May 16- Improving tone, pain in the R pect musculature. Educated patient to work on stretching exercises for R pect tone/spasticity  May 19- Will discharge patient with current Baclofen regimen. Continue stretching or R pec major and shoulder      Right shoulder pain-May 12-no signs of complex regional pain syndrome.  Pain in part may be related to spasticity/immobility/degenerative changes.  X-ray of the right shoulder shows mild to moderate degenerative changes at the acromioclavicular joint.  May titrate up on baclofen and gabapentin over time  May 14 - heat therapy w/ K-pad     Hypertension-amlodipine/carvedilol/valsartan     DVT-right soleal vein-March 17, 2023-Eliquis  May 17 -Pharmacy to see for education on Eliquis with the patient and will need to have done with caregiver this weekend as well      Physical Exam near the time of discharge:    PHYSICAL EXAM:   MENTAL STATUS -  AWAKE / ALERT   HEENT- NCAT, PUPILS EQUALLY ROUND, SCLERAE ANICTERIC, CONJUNCTIVAE PINK, OP MOIST, NO JVD, EARS UNREMARKABLE EXTERNALLY  LUNGS - CTA, NO WHEEZES, RALES OR RHONCHI  HEART- RRR, NO RUB, MURMUR, OR GALLOP  ABD - NORMOACTIVE BOWEL SOUNDS, SOFT, NT. NO HEPATOSPLENOMEGALY APPRECIATED  EXT -edema in the right hand with decreased range of motion  Decreased range of motion right shoulder  NEURO -awake alert.  Oriented.  No significant dysarthria.     MOTOR EXAM -  LUE/LLE 5/5.    Right shoulder flexion 3-/5, elbow flexion 3-/5, finger flexion 3/5, hip flexion 3/5, knee extension 3/5, ankle dorsiflexion 1/5.  MAS 1+ in shoulder ADD, elbow flexors, elbow pronators, wrist flexors, finger flexors.       RESULTS:  No results found for: POCGLU  Results from last 7 days   Lab Units 05/15/23  0557   WBC 10*3/mm3 6.53   HEMOGLOBIN g/dL 11.7*   HEMATOCRIT % 33.9*   PLATELETS 10*3/mm3 201     Results from last 7 days   Lab Units 05/15/23  0557   SODIUM  mmol/L 143   POTASSIUM mmol/L 4.1   CHLORIDE mmol/L 109*   CO2 mmol/L 27.0   BUN mg/dL 14   CREATININE mg/dL 0.69*   CALCIUM mg/dL 8.6   BILIRUBIN mg/dL 0.3   ALK PHOS U/L 109   ALT (SGPT) U/L 34   AST (SGOT) U/L 19   GLUCOSE mg/dL 84              Discharge Medications      New Medications      Instructions Start Date   acetaminophen 325 MG tablet  Commonly known as: TYLENOL   650 mg, Oral, Every 6 Hours PRN      amLODIPine 10 MG tablet  Commonly known as: NORVASC   10 mg, Oral, Every Morning Before Breakfast      atorvastatin 40 MG tablet  Commonly known as: LIPITOR   40 mg, Oral, Daily      baclofen 10 MG tablet  Commonly known as: LIORESAL   5 mg, Oral, Every 8 Hours Scheduled      carvedilol 3.125 MG tablet  Commonly known as: COREG   3.125 mg, Oral, 2 Times Daily With Meals      Eliquis 5 MG tablet tablet  Generic drug: apixaban   5 mg, Oral, Every 12 Hours Scheduled      gabapentin 100 MG capsule  Commonly known as: NEURONTIN   100 mg, Oral, 3 Times Daily      valsartan 160 MG tablet  Commonly known as: DIOVAN   160 mg, Oral, Every Morning Before Breakfast              Follow-up Information     Jorge Logan MD .    Specialty: Neurosurgery  Contact information:  [address] [facility] [place] [address]  [phone]             will need either repeat CTH with contrast versus MRI if can be cleared for an MRI in 6 to 8 weeks from April 20, 2023. Neurosurgery recommends he establish care with neurology/ANDRADE once he arrives in [place] Follow up.           to be given a disk with all his imaging on this when he leaves Follow up.           Dr Azar Chan Follow up.    Contact information:  [facility]  Phone:  [phone]           Contact to help establish follow up rehab care in [facility].    Contact information:  Dr Shelby Leigh    [facility]  [facility]  [phone]           Provider, No Known .    Contact information:  [address]  [facility] [address]  [phone]                         AVS and Handouts     ED to Hosp-Admission (Discharged) (3/12/23 - 5/8/23)   - [address] Principal Problem: Intracranial hemorrhage    Hemorrhagic Stroke ([place])     Video: Risk Factors For Hemorrhagic Stroke (K[place]ovan)     Video: Hemorrhagic Stroke (K[place]ovan)     Video: Medical Treatment For Hemorrhagic Stroke ([place])                 Discharge Instructions       Put all imaging on a disc for patient to take with him to [place].  Print out videofluoroscopic swallow report from May 18, 2022 for patient to take with him to [place].  Print out all H&P's, consults, acute care discharge summary, and put in a packet for the patient to take to [place] this weekend.  When the discharge summary is dictated this weekend please also print that out for the patient to take with him.    Patient should continue with physical therapy, Occupational Therapy, and speech therapy in [place].    Pharmacy to see for education on Eliquis with the patient and will need to have done with caregiver this weekend as well            >30 on discharge    Chace Pruitt, DO

## 2023-05-19 NOTE — CONSULTS
Orientation: Severely Impaired  Attention: Mildly to Moderately Impaired  Executive Functioning: Severely Impaired  Abstract Reasoning: Mildly Impaired Judgment; Severely Impaired Similarities  Arithmetic: Moderately Impaired  Visuospatial Perception: Severely Impaired  Visuospatial Praxis: Severely Impaired  Verbal Memory: Due to using an , verbal memory skills were unable to be formally assessed.  Visual Memory: Moderately Impaired  Emotional: Minimal symptoms reported on rating scales and during the clinical interview    Summary and Recommendations: This BNE was completed using a , and while most measures were able to be completed, verbal memory testing could not be completed due to the limitations of using an .  Otherwise, the patient is demonstrating diffuse, largely moderate to severe cognitive deficits across domains, likely related to both the extensive nature of his stroke, as well as because of his limited education (i.e., he reported only having completed two years of school as a child).  He is likely to require extensive assistance when completing activities of daily living when he returns home, and ongoing outpatient participation in speech, occupational, and physical therapies is strongly encouraged.

## 2023-05-20 VITALS
RESPIRATION RATE: 18 BRPM | WEIGHT: 134.7 LBS | BODY MASS INDEX: 21.65 KG/M2 | HEART RATE: 62 BPM | OXYGEN SATURATION: 98 % | SYSTOLIC BLOOD PRESSURE: 114 MMHG | DIASTOLIC BLOOD PRESSURE: 69 MMHG | HEIGHT: 66 IN | TEMPERATURE: 97.6 F

## 2023-05-20 PROCEDURE — 97530 THERAPEUTIC ACTIVITIES: CPT

## 2023-05-20 PROCEDURE — 97110 THERAPEUTIC EXERCISES: CPT

## 2023-05-20 PROCEDURE — 97535 SELF CARE MNGMENT TRAINING: CPT

## 2023-05-20 PROCEDURE — 92526 ORAL FUNCTION THERAPY: CPT

## 2023-05-20 RX ADMIN — AMLODIPINE BESYLATE 10 MG: 10 TABLET ORAL at 09:47

## 2023-05-20 RX ADMIN — GABAPENTIN 100 MG: 100 CAPSULE ORAL at 21:10

## 2023-05-20 RX ADMIN — LIDOCAINE 1 PATCH: 50 PATCH CUTANEOUS at 09:47

## 2023-05-20 RX ADMIN — CARVEDILOL 3.12 MG: 3.12 TABLET, FILM COATED ORAL at 17:49

## 2023-05-20 RX ADMIN — BACLOFEN 5 MG: 10 TABLET ORAL at 07:10

## 2023-05-20 RX ADMIN — BACLOFEN 5 MG: 10 TABLET ORAL at 21:10

## 2023-05-20 RX ADMIN — CARVEDILOL 3.12 MG: 3.12 TABLET, FILM COATED ORAL at 09:47

## 2023-05-20 RX ADMIN — GABAPENTIN 100 MG: 100 CAPSULE ORAL at 16:26

## 2023-05-20 RX ADMIN — APIXABAN 5 MG: 5 TABLET, FILM COATED ORAL at 07:11

## 2023-05-20 RX ADMIN — APIXABAN 5 MG: 5 TABLET, FILM COATED ORAL at 17:49

## 2023-05-20 RX ADMIN — GABAPENTIN 100 MG: 100 CAPSULE ORAL at 09:47

## 2023-05-20 RX ADMIN — ACETAMINOPHEN 650 MG: 325 TABLET, FILM COATED ORAL at 21:10

## 2023-05-20 RX ADMIN — ATORVASTATIN CALCIUM 40 MG: 20 TABLET, FILM COATED ORAL at 09:47

## 2023-05-20 RX ADMIN — BACLOFEN 5 MG: 10 TABLET ORAL at 14:47

## 2023-05-20 RX ADMIN — VALSARTAN 160 MG: 160 TABLET, FILM COATED ORAL at 09:47

## 2023-05-20 NOTE — THERAPY TREATMENT NOTE
Inpatient Rehabilitation - Occupational Therapy Treatment Note    [facility]     Patient Name: Manuel Durant  : 1950  MRN: [id number]    Today's Date: 2023                 Admit Date: 2023         ICD-10-CM ICD-9-CM   1. Spastic hemiplegia of right dominant side as late effect of nontraumatic intraparenchymal hemorrhage of brain  I69.151 438.21       Patient Active Problem List   Diagnosis   • Intracranial hemorrhage   • Acute DVT (deep venous thrombosis)   • HTN (hypertension)   • Severe Malnutrition (HCC)   • Hemiplegia   • ICH (intracerebral hemorrhage)   • Stroke       Past Medical History:   Diagnosis Date   • Hypertension    • Hypokalemia 2023   • Stroke        No past surgical history on file.          IRF OT ASSESSMENT FLOWSHEET (last 12 hours)     IRF OT Evaluation and Treatment     Row Name 23 1000          OT Time and Intention    Document Type daily treatment  -CE     Mode of Treatment occupational therapy  -CE     Total Minutes, Occupational Therapy 60  -CE     Patient Effort good  -CE     Comment, Evaluation/Treatment Not Performed addendum to OT d/c; family training and education completed this date  -CE     Row Name 23 1000          General Information    Patient Profile Reviewed yes  -CE     General Observations of Patient completed family training w/ son-in-law present  -CE     Existing Precautions/Restrictions fall  -CE     Row Name 23 1000          Pain Assessment    Pretreatment Pain Rating --  no complaints  -CE     Posttreatment Pain Rating --  unable to rate but pt with R shoulder pain during AAROM  -CE     Pain Location - Side/Orientation Right  -CE     Pain Location - shoulder  -CE     Additional Documentation --  RUE supported on pillow  -CE     Row Name 23 1000          Cognition/Psychosocial    Affect/Mental Status (Cognition) WFL  -CE     Personal Safety Interventions fall prevention program maintained  -CE     Row Name 23 1000           Range of Motion (ROM)    Range of Motion --  Reviewed HEP including AAROM, self-ROM w/ pts son-in-law; handouts issued with verbal and visual instructions  -CE     Row Name 05/20/23 1000          Basic Activities of Daily Living (BADLs)    24857 - OT Self Care/Mgmt Minutes 60  -CE     Row Name 05/20/23 1000          Bathing    [place] Level (Bathing) bathing skills;upper body;lower body;minimum assist (75% patient effort)  -CE     Assistive Device (Bathing) grab bar/tub rail;hand held shower spray hose;tub bench  -CE     Position (Bathing) supported sitting  -CE     Set-up Assistance (Bathing) obtain supplies;adjust water temperature  -CE     Comment (Bathing) karen-techniques with verbal cues to incorporate; assist for R lower leg and buttocks in standing  -CE     Row Name 05/20/23 1000          Upper Body Dressing    [place] Level (Upper Body Dressing) don;pull over garment;verbal cues;minimum assist (75% or more patient effort)  -CE     Position (Upper Body Dressing) supported sitting  -CE     Set-up Assistance (Upper Body Dressing) obtain clothing  -CE     Comment (Upper Body Dressing) karen techinques used and pts son-in-law present to verbalize understanding of techniques  -CE     Row Name 05/20/23 1000          Lower Body Dressing    [place] Level (Lower Body Dressing) don;pants/bottoms;shoes/slippers;socks;moderate assist (50% patient effort);verbal cues;nonverbal cues (demo/gesture)  brief  -CE     Position (Lower Body Dressing) supported sitting  -CE     Set-up Assistance (Lower Body Dressing) obtain clothing  -CE     Comment (Lower Body Dressing) pts son-in-law present for education on karen techniques  -CE     Row Name 05/20/23 1000          Toilet Transfer    Type (Toilet Transfer) squat pivot  -CE     [place] Level (Toilet Transfer) moderate assist (50% patient effort);1 person assist  -CE     Assistive Device (Toilet Transfer) commode  -CE     Comment, (Toilet Transfer) able  to complete with modA from son-in-law transferring to/from standard ht toilet in prep for discharge to home tomorrow. min cues req'd after verbal/visual instructions given.  -CE     Row Name 05/20/23 1000          Shower Transfer    Type (Shower Transfer) squat pivot  -CE     [place] Level (Shower Transfer) minimum assist (75% patient effort);1 person assist  -CE     Assistive Device (Shower Transfer) grab bar, tub/shower;tub bench  -CE     Comment, (Shower Transfer) educated family on DME options (handouts issued with visual aides as well)  -CE     Row Name 05/20/23 1000          Family/Support System    Caregiver Engagement (Family/Support System) --  son-in-law present and assisting physically throughout session for family training  -CE     Row Name 05/20/23 1000          Positioning and Restraints    Pre-Treatment Position sitting in chair/recliner  -CE     Post Treatment Position wheelchair  -CE     In Wheelchair call light within reach;encouraged to call for assist;exit alarm on;with family/caregiver  -CE     Row Name 05/20/23 1000          Therapy Plan Review/Discharge Plan (OT)    Anticipated Equipment Needs At Discharge (OT) tub bench;commode, bedside with drop arms  pts son-in-law stating family would purchase in [place] and that family plans to have grab bars installed in shower as well  -CE     Anticipated Discharge Disposition (OT) home with 24/7 care  -CE           User Key  (r) = Recorded By, (t) = Taken By, (c) = Cosigned By    Initials Name Effective Dates    CE Lianna Lorenzo, OT 10/17/22 -                  Occupational Therapy Education     Title: PT OT SLP Therapies (In Progress)     Topic: Occupational Therapy (Done)     Point: ADL training (Done)     Description:   Instruct learner(s) on proper safety adaptation and remediation techniques during self care or transfers.   Instruct in proper use of assistive devices.              Learning Progress Summary           Patient Acceptance, E,  VU,DU by AF at 5/19/2023 1528    Comment: family present via video on phone with IPAD intrepreter. demod transfer BSC over commode, shower chair with back, tub transfer bench. gait belt use, blocking RLE, footwear with transfers, educated on muscle tone, positioning, not pulling on R arm.    Acceptance, E,TB,D,H, VU,DU,NR by AF at 5/18/2023 1527    Comment: pt and family particiapted in meeting with rehab team with  IPAD, educated on current transfer status, HEP, AE and current assistance level with ADLs. will complete video teaching friday and and then in person tranining on saturday prior to d/c    Acceptance, E,D, NR by DN at 5/9/2023 1710    Comment: Ot role and karen adls and functional transfers   Family Acceptance, E, VU,DU by AF at 5/19/2023 1528    Comment: family present via video on phone with IPAD intrepreter. demod transfer BSC over commode, shower chair with back, tub transfer bench. gait belt use, blocking RLE, footwear with transfers, educated on muscle tone, positioning, not pulling on R arm.    Acceptance, E,TB,D,H, VU,DU,NR by AF at 5/18/2023 1527    Comment: pt and family particiapted in meeting with rehab team with  IPAD, educated on current transfer status, HEP, AE and current assistance level with ADLs. will complete video teaching friday and and then in person tranining on saturday prior to d/c                   Point: Home exercise program (Done)     Description:   Instruct learner(s) on appropriate technique for monitoring, assisting and/or progressing therapeutic exercises/activities.              Learning Progress Summary           Patient Acceptance, E,TB,D,H, VU,DU,NR by AF at 5/18/2023 1527    Comment: pt and family particiapted in meeting with rehab team with  IPAD, educated on current transfer status, HEP, AE and current assistance level with ADLs. will complete video teaching friday and and then in person tranining on saturday prior to d/c    Acceptance,  E,D, NR by DN at 5/9/2023 1710    Comment: Ot role and karen adls and functional transfers   Family Acceptance, E,TB,D,H, VU,DU,NR by AF at 5/18/2023 1527    Comment: pt and family particiapted in meeting with rehab team with  IPAD, educated on current transfer status, HEP, AE and current assistance level with ADLs. will complete video teaching friday and and then in person tranining on saturday prior to d/c                   Point: Precautions (Done)     Description:   Instruct learner(s) on prescribed precautions during self-care and functional transfers.              Learning Progress Summary           Patient Acceptance, E,TB,D,H, VU,DU,NR by AF at 5/18/2023 1527    Comment: pt and family particiapted in meeting with rehab team with  IPAD, educated on current transfer status, HEP, AE and current assistance level with ADLs. will complete video teaching friday and and then in person tranining on saturday prior to d/c    Acceptance, E,D, NR by DN at 5/9/2023 1710    Comment: Ot role and karen adls and functional transfers   Family Acceptance, E,TB,D,H, VU,DU,NR by AF at 5/18/2023 1527    Comment: pt and family particiapted in meeting with rehab team with  IPAD, educated on current transfer status, HEP, AE and current assistance level with ADLs. will complete video teaching friday and and then in person tranining on saturday prior to d/c                   Point: Body mechanics (Done)     Description:   Instruct learner(s) on proper positioning and spine alignment during self-care, functional mobility activities and/or exercises.              Learning Progress Summary           Patient Acceptance, E,TB,D,H, VU,DU,NR by AF at 5/18/2023 1527    Comment: pt and family particiapted in meeting with rehab team with  IPAD, educated on current transfer status, HEP, AE and current assistance level with ADLs. will complete video teaching friday and and then in person tranining on saturday  prior to d/c    Acceptance, E,D, NR by DN at 5/9/2023 1710    Comment: Ot role and karen adls and functional transfers   Family Acceptance, E,TB,D,H, VU,DU,NR by AF at 5/18/2023 1527    Comment: pt and family particiapted in meeting with rehab team with  IPAD, educated on current transfer status, HEP, AE and current assistance level with ADLs. will complete video teaching friday and and then in person tranining on saturday prior to d/c                               User Key     Initials Effective Dates Name Provider Type Discipline    DN 06/16/21 -  Alek Duarte OT Occupational Therapist OT    AF 06/16/21 -  Carole Saenz OTR Occupational Therapist OT                    OT Recommendation and Plan                         Time Calculation:      Time Calculation- OT     Row Name 05/20/23 1540 05/20/23 1000          Time Calculation- OT    OT Start Time 1000  -CE --     OT Stop Time 1100  -CE --     OT Time Calculation (min) 60 min  -CE --     Total Timed Code Minutes- OT 60 minute(s)  -CE --        Timed Charges    06627 - OT Self Care/Mgmt Minutes 60  -CE 60  -CE        Total Minutes    Timed Charges Total Minutes 60  -CE 60  -CE      Total Minutes 60  -CE 60  -CE           User Key  (r) = Recorded By, (t) = Taken By, (c) = Cosigned By    Initials Name Provider Type    CE Lianna Lorenzo OT Occupational Therapist              Therapy Charges for Today     Code Description Service Date Service Provider Modifiers Qty    [id number] HC OT SELF CARE/MGMT/TRAIN EA 15 MIN 5/20/2023 Lianna Lorenzo OT GO 4                   Lianna Lorenzo OT  5/20/2023

## 2023-05-20 NOTE — THERAPY TREATMENT NOTE
Inpatient Rehabilitation - Physical Therapy Treatment Note       [facility]     Patient Name: Manuel Durant  : 1950  MRN: [id number]    Today's Date: 2023                    Admit Date: 2023      Visit Dx:     ICD-10-CM ICD-9-CM   1. Spastic hemiplegia of right dominant side as late effect of nontraumatic intraparenchymal hemorrhage of brain  I69.151 438.21       Patient Active Problem List   Diagnosis   • Intracranial hemorrhage   • Acute DVT (deep venous thrombosis)   • HTN (hypertension)   • Severe Malnutrition (HCC)   • Hemiplegia   • ICH (intracerebral hemorrhage)   • Stroke       Past Medical History:   Diagnosis Date   • Hypertension    • Hypokalemia 2023   • Stroke        No past surgical history on file.    PT ASSESSMENT (last 12 hours)     IRF PT Evaluation and Treatment     Row Name 23 1100          PT Time and Intention    Document Type daily treatment  -     Mode of Treatment individual therapy;physical therapy  -     Patient/Family/Caregiver Comments/Observations family teaching performed w pt and pt son in law in preparation for DC tmrw AMKAVEH performed translations  -     Evaluation/Treatment Not Performed --  DC note as written   -     Row Name 23 1100          General Information    Patient Profile Reviewed yes  -     Existing Precautions/Restrictions fall  -     Row Name 23 1100          Pain Assessment    Pretreatment Pain Rating 0/10 - no pain  -     Posttreatment Pain Rating 0/10 - no pain  -     Row Name 23 1100          Cognition/Psychosocial    Orientation Status (Cognition) oriented to;person;situation  -     Follows Commands (Cognition) follows one-step commands;repetition of directions required;verbal cues/prompting required;physical/tactile prompts required  -     Personal Safety Interventions fall prevention program maintained;gait belt;supervised activity  -     Row Name 23 1100          Bed Mobility     Rolling Left Jordan (Bed Mobility) standby assist  -     Rolling Right Jordan (Bed Mobility) minimum assist (75% patient effort);set up;verbal cues;nonverbal cues (demo/gesture)  -     Supine-Sit Jordan (Bed Mobility) contact guard;set up;verbal cues;nonverbal cues (demo/gesture)  -     Sit-Supine Jordan (Bed Mobility) set up;contact guard;verbal cues;nonverbal cues (demo/gesture)  -     Bed Mobility, Safety Issues cognitive deficits limit understanding;decreased use of arms for pushing/pulling;decreased use of legs for bridging/pushing  -     Comment, (Bed Mobility) tx mat  -     Row Name 05/20/23 1100          Bed-Chair Transfer    Bed-Chair Jordan (Transfers) set up;minimum assist (75% patient effort);verbal cues;nonverbal cues (demo/gesture)  -     Assistive Device (Bed-Chair Transfers) wheelchair  -     Comment, (Bed-Chair Transfer) stand pivot  -     Row Name 05/20/23 1100          Chair-Bed Transfer    Chair-Bed Jordan (Transfers) set up;minimum assist (75% patient effort);verbal cues;nonverbal cues (demo/gesture)  -     Assistive Device (Chair-Bed Transfers) wheelchair  -     Comment, (Chair-Bed Transfer) stand pivot  -     Row Name 05/20/23 1100          Car Transfer    Type (Car Transfer) stand pivot/stand step  -     Jordan Level (Car Transfer) moderate assist (50% patient effort);set up;verbal cues;nonverbal cues (demo/gesture)  -     Assistive Device (Car Transfer) wheelchair  -     Row Name 05/20/23 1100          Safety Issues, Functional Mobility    Safety Issues Affecting Function (Mobility) insight into deficits/self-awareness  -     Impairments Affecting Function (Mobility) balance;cognition;visual/perceptual;muscle tone abnormal;range of motion (ROM);sensation/sensory awareness;motor planning;motor control;endurance/activity tolerance;grasp;pain;strength  -     Row Name 05/20/23 1100          Family/Support System     Caregiver Engagement (Family/Support System) hands on during training;active learner related to care delivery;advocates for the patient;anticipates patient needs;takes an active role with patient  -     Family/Support System Care other (see comments)  son in law present and participated in hands on transfer training and provided appropriate care and support for pt. Pt and KAVEH report that PT has answered all questions.  -     Row Name 05/20/23 1100          Coping Strategies    Trust Relationship/Rapport care explained;questions answered;questions encouraged  -     Row Name 05/20/23 1100          Positioning and Restraints    Pre-Treatment Position sitting in chair/recliner  -     Post Treatment Position wheelchair  -     In Wheelchair sitting;call light within reach;encouraged to call for assist;with family/caregiver;RUE elevated;with SLP  -           User Key  (r) = Recorded By, (t) = Taken By, (c) = Cosigned By    Initials Name Provider Type     Daphne Drew, PT Physical Therapist                 Physical Therapy Education     Title: PT OT SLP Therapies (In Progress)     Topic: Physical Therapy (Done)     Point: Mobility training (Done)     Learning Progress Summary           Patient Eager, E,TB,D, DU,NR,VU by  at 5/20/2023 1142    Comment: son in law present for family teaching    Acceptance, E,TB, NR by LB at 5/10/2023 1211    Acceptance, E,D, NR by DP at 5/9/2023 1217   Family Eager, E,TB,D, DU,NR,VU by  at 5/20/2023 1142    Comment: son in law present for family teaching                   Point: Home exercise program (Done)     Learning Progress Summary           Patient Eager, E,TB,D, DU,NR,VU by  at 5/20/2023 1142    Comment: son in law present for family teaching    Acceptance, E,D, NR by DP at 5/9/2023 1217   Family Eager, E,TB,D, DU,NR,VU by  at 5/20/2023 1142    Comment: son in law present for family teaching                   Point: Body mechanics (Done)     Learning Progress  Summary           Patient Eager, E,TB,D, DU,NR,VU by  at 5/20/2023 1142    Comment: son in law present for family teaching    Acceptance, E,D, NR by DP at 5/9/2023 1217   Family Eager, E,TB,D, DU,NR,VU by  at 5/20/2023 1142    Comment: son in law present for family teaching                   Point: Precautions (Done)     Learning Progress Summary           Patient Eager, E,TB,D, DU,NR,VU by  at 5/20/2023 1142    Comment: son in law present for family teaching    Acceptance, E,D, NR by DP at 5/9/2023 1217   Family Eager, E,TB,D, DU,NR,VU by  at 5/20/2023 1142    Comment: son in law present for family teaching                               User Key     Initials Effective Dates Name Provider Type Discipline     06/16/21 -  Le Reynoso, PT Physical Therapist PT     06/16/21 -  Daphne Drew, PT Physical Therapist PT    DP 08/24/21 -  Tristin Vicente, PT Physical Therapist PT                PT Recommendation and Plan                          Time Calculation:      PT Charges     Row Name 05/20/23 1143             Time Calculation    Start Time 1100  -      Stop Time 1130  -      Time Calculation (min) 30 min  -      PT Received On 05/20/23  -         Time Calculation- PT    Total Timed Code Minutes- PT 30 minute(s)  -            User Key  (r) = Recorded By, (t) = Taken By, (c) = Cosigned By    Initials Name Provider Type     Daphne Drew, PT Physical Therapist                Therapy Charges for Today     Code Description Service Date Service Provider Modifiers Qty    84087405875 HC PT THER PROC EA 15 MIN 5/20/2023 Daphne Drew, PT GP 1    96532927724 HC PT THERAPEUTIC ACT EA 15 MIN 5/20/2023 Daphne Drew, PT GP 1            PT G-Codes  AM-PAC 6 Clicks Score (PT): 14      Daphne Drew, PT  5/20/2023

## 2023-05-20 NOTE — PLAN OF CARE
Goal Outcome Evaluation:  Plan of Care Reviewed With: patient, family           Outcome Evaluation: Patient alert and oriented x3 to 4, forgetful at times. He is assist x1, continent /incontinent and medication with applesauce or pudding. He has had family and friends here all day, discharge information discussed with family and . Family or Uber to take to airport about 4 am. Patient has medications, discs, and all paperwork in package- Sudanese and english provided. Pain patch to right shoulder and neck, VS stable, and family teaching completed today.

## 2023-05-20 NOTE — THERAPY TREATMENT NOTE
Inpatient Rehabilitation - Speech Language Pathology Treatment Note    [facility]     Patient Name: Manuel Durant  : 1950  MRN: [id number]    Today's Date: 2023                   Admit Date: 2023       Visit Dx:      ICD-10-CM ICD-9-CM   1. Spastic hemiplegia of right dominant side as late effect of nontraumatic intraparenchymal hemorrhage of brain  I69.151 438.21       Patient Active Problem List   Diagnosis   • Intracranial hemorrhage   • Acute DVT (deep venous thrombosis)   • HTN (hypertension)   • Severe Malnutrition (HCC)   • Hemiplegia   • ICH (intracerebral hemorrhage)   • Stroke       Past Medical History:   Diagnosis Date   • Hypertension    • Hypokalemia 2023   • Stroke        No past surgical history on file.    SLP Recommendation and Plan                                                            SLP EVALUATION (last 72 hours)     SLP SLC Evaluation     Row Name 23 1400 23 1345 23 0830 23 1300          Communication Assessment/Intervention    Document Type therapy note (daily note)  -AL therapy note (daily note)  -AL therapy note (daily note)  -AL therapy note (daily note)  -AL     Patient/Family/Caregiver Comments/Observations Used  via Ipad video. Family was no longer present for this portion of session.  -AL Used  via Ipad video. Pt's family members participated via cell phone video jimmy.  -AL Pt participated well. Used  via Ipad video.  -AL Pt participated well. Used Albanian interpeter via Ipad video.  -AL        Pain Scale: Numbers Pre/Post-Treatment    Pretreatment Pain Rating 0/10 - no pain  -AL -- -- --     Pre/Posttreatment Pain Comment -- Pt did not complain of pain.  -AL Pt did not c/o pain during session.  -AL Pt did not c/o pain; received baclofen at beginning of session.  -AL           User Key  (r) = Recorded By, (t) = Taken By, (c) = Cosigned By    Initials Name Effective Dates     Nurys Beavers, MS CCC-SLP 06/16/21 -                    EDUCATION    The patient has been educated in the following areas:       Dysphagia (Swallowing Impairment).             SLP GOALS     Row Name 05/20/23 1200 05/19/23 1400 05/19/23 1345       (STG) Pharyngeal Strengthening Exercise Goal 1 (SLP)    Progress/Outcomes (Pharyngeal Strengthening Goal 1, SLP) -- -- good progress toward goal  -AL    Comment (Pharyngeal Strengthening Goal 1, SLP) SLP spoke with pt and son in law as well as two mother family mebers via the .. SLP explained that the pt was given a swall evaluation(VFSS) 5/18. Results indicated silent penetration and silent aspiration of thin liquids. SLP explained the meanings of penetration and aspiration.  SLP also explained the safe swallow strategies  and the son-in -law was provided with a copy of the strategies.   SLP demonstrated how to thicken thin liquids to nectar.  The family was informed if the liquid became too thick it could be thinned to a nectar consistency using more of the thin liquids. All were in agreement that they understood. It was recommended pt see a doctor to retest swallow for diet upgrade. SLP reiterated in order to have thin liquids pt should have not eaten for 30 minutes.  mouth should be clear of all food. Small sips with a chin tuck every time. SLP demonstrated a chin tuck.  -LS -- SLP provided education to pt and his family members regarding thickening of liquids to nectar thick consistency. Demonstrated use of gel packet to thicken 4 oz of water. Discussed water protocol, which would allow pt to have water 30 minutes after meals with adequate oral care. Also, discussed use of chin tuck strategy when drinking water. Pt's family asked good questions. SLP will send pt home with box of thickener packets.  -AL       Word Retrieval Skills Goal 1 (SLP)    Improve Word Retrieval Skills By Goal 1 (SLP) -- completing functional word finding  tasks;completing a divergent task;80%;independently (over 90% accuracy)  -AL --    Time Frame (Word Retrieval Goal 1, SLP) -- 1 week  -AL --    Progress/Outcomes (Word Retrieval Goal 1, SLP) -- good progress toward goal  -AL --    Comment (Word Retrieval Goal 1, SLP) -- Confrontation naming of common objects/food in Macedonian:70% with NO cues, 100% with MIN cues.  -AL --       Connected Speech to Express Thoughts Goal 1 (SLP)    Improve Narrative Discourse to Express Thoughts By Goal 1 (SLP) -- describing a picture;conversational task on a given topic;80%;with minimal cues (75-90%)  -AL --    Time Frame (Connected Speech Goal 1, SLP) -- 1 week  -AL --    Progress/Outcomes (Connected Speech Goal 1, SLP) -- good progress toward goal  -AL --    Comment (Connected Speech Goal 1, SLP) -- Describing problems in pictures: 100% with NO cues; self corrected error x1.  -AL --       Orientation Goal 1 (SLP)    Improve Orientation Through Goal 1 (SLP) -- demonstrating orientation to day;demonstrating orientation to month;demonstrating orientation to year;demonstrating orientation to place;demonstrating orientation to disease/impairment;80%;with minimal cues (75-90%)  -AL --    Progress/Outcomes (Orientation Goal 1, SLP) -- good progress toward goal  -AL --    Comment (Orientation Goal 1, SLP) -- Oriented to month and year with MAX cues. Oriented to place and situation with NO cues. Suspect aphasia impacting task.  -AL --       Memory Skills Goal 1 (SLP)    Improve Memory Skills Through Goal 1 (SLP) -- recall details of the day;80%;with moderate cues (50-74%)  -AL --    Time Frame (Memory Skills Goal 1, SLP) -- 1 week  -AL --    Progress/Outcomes (Memory Skills Goal 1, SLP) -- good progress toward goal  -AL --    Comment (Memory Skills Goal 1, SLP) -- Recalled month/year after 2 minute delay with NO cues.  -AL --    Row Name 05/19/23 0830 05/18/23 0810 05/17/23 1300       (LTG) Patient will demonstrate functional swallow for     Diet Texture (Demonstrate functional swallow) -- soft to chew (chopped) textures  -AL --    Liquid viscosity (Demonstrate functional swallow) -- thin liquids  -AL --    [place] (Demonstrate functional swallow) -- with 1:1 assist/ supervision  -AL --       (STG) Swallow 1    (STG) Swallow 1 -- Pt will exihibit no overt s/s of aspiration or penetration with trials of water by cup  -AL --    [place] (Swallow Short Term Goal 1) -- with minimal cues (75-90% accuracy)  -AL --       (STG) Pharyngeal Strengthening Exercise Goal 1 (SLP)    Activity (Pharyngeal Strengthening Goal 1, SLP) -- increase timing;increase superior movement of the hyolaryngeal complex;increase anterior movement of the hyolaryngeal complex  -AL --    Increase Timing -- hard effortful swallow  -AL --    Increase Superior Movement of the Hyolaryngeal Complex -- Mendelsohn;hard effortful swallow  -AL --    Increase Anterior Movement of the Hyolaryngeal Complex -- EMST  -AL --    [place]/Accuracy (Pharyngeal Strengthening Goal 1, SLP) -- with minimal cues (75-90% accuracy)  -AL --    Progress/Outcomes (Pharyngeal Strengthening Goal 1, SLP) good progress toward goal  -AL -- --    Comment (Pharyngeal Strengthening Goal 1, SLP) SLP provided education regarding results of VFSS. Discussed rationale for taking sips of thins by cup with use of small sip, chin tuck and double swallow, as pt had aspiration x1 even when using small sip compensation on VFSS. Practiced chin tuck with pt; pt exhibited strong cough in 1/10 trials, suspect due to taking large sip and not having chin fully down. Viewed images from VFSS to demonstrate strategies.  -AL -- SLP provided education regarding VFSS which will be completed tomorrow. Pt voiced understanding and agreement.  -AL       Word Retrieval Skills Goal 1 (SLP)    Improve Word Retrieval Skills By Goal 1 (SLP) -- -- completing functional word finding tasks;completing a divergent task;80%;independently (over 90%  accuracy)  -AL    Progress/Outcomes (Word Retrieval Goal 1, SLP) -- -- good progress toward goal  -AL    Comment (Word Retrieval Goal 1, SLP) -- -- Confrontation naming of common objects/food: 80% with NO cues, 100% with MIN cues.  -AL       Connected Speech to Express Thoughts Goal 1 (SLP)    Progress/Outcomes (Connected Speech Goal 1, SLP) -- -- good progress toward goal  -AL    Comment (Connected Speech Goal 1, SLP) -- -- Describing problems in pictures: 70% with NO cues, 100% with MIN cues.  -AL       Orientation Goal 1 (SLP)    Improve Orientation Through Goal 1 (SLP) -- -- demonstrating orientation to day;demonstrating orientation to month;demonstrating orientation to year;demonstrating orientation to place;demonstrating orientation to disease/impairment;80%;with minimal cues (75-90%)  -AL    Progress/Outcomes (Orientation Goal 1, SLP) -- -- goal ongoing  -AL    Comment (Orientation Goal 1, SLP) -- -- Oriented to month with MAX cues and year with MIN cues. Oriented to city, state and situation with NO cues.  -AL          User Key  (r) = Recorded By, (t) = Taken By, (c) = Cosigned By    Initials Name Provider Type    Ashley Tellez MS CCC-SLP Speech and Language Pathologist    Nurys Beavers MS CCC-SLP Speech and Language Pathologist                            Time Calculation:        Time Calculation- SLP     Row Name 05/20/23 1225             Time Calculation- [facility]    SLP Start Time 1130  -      SLP Stop Time 1200  -      SLP Time Calculation (min) 30 min  -            User Key  (r) = Recorded By, (t) = Taken By, (c) = Cosigned By    Initials Name Provider Type    Ashley Tellez MS CCC-SLP Speech and Language Pathologist                  Therapy Charges for Today     Code Description Service Date Service Provider Modifiers Qty    15000466514 HC ST TREATMENT SWALLOW 2 5/20/2023 Ashley Manning MS CCC-ROSALIO GN 1                           MS DEEPAK King  5/20/2023

## 2023-05-20 NOTE — PROGRESS NOTES
Inpatient Rehabilitation Discharge  Section A. Transportation  Issues Due to Lack of Transportation:  No    Section A. Medication List  Medication List to Subsequent Provider:  Not applicable.  Patient was not  discharged to a subsequent provider.  Discharge Location:  01 - Home  Medication List to Patient at Discharge:  Yes - Current reconciled medication  list provided to the patient, family and/or caregiver  Route(s) of Medication List Transmission to Patient:  Verbal (e.g., in-person,  telephone, video conferencing), Paper-based (e.g., fax, copies, printouts)    Signed by: ROLAND Matthews

## 2023-05-20 NOTE — PROGRESS NOTES
Patient's son-in-law completed family teaching today with PT, OT, ST. SW also used [employer] via [email or web address] to conference in patient's son, Ariel, and daughter, Celena, so they could also see instructions from PT and ST. ST also utilized IPad interepreter to go over swallowing information and showed family how to thicken liquids to nectar consistency. NSG reviewed AVS and all discharge information with patient and family with use of IPad . NSG has printed out information on medications and AVS in Yemeni for family. Therapists have given information also for home exercises. Patient's cousin, Azar, is PT in [place] and was also on video call. Discussed with him about patient's current status and need for continued PT, OT, ST to be set up in [place]. He will help coordinate therapy team for patient in [facility]. Printed out therapy d/c notes as well as H&P and d/c summary from acute care stay, MD progress note from 5/17, and d/c summary from rehab stay. This information given to patient and son-in-law in packet. Patient's son-in-law feels comfortable with teaching and information given to him regarding patient's medications, care needs for traveling and for home. Family also voiced understanding of all information given and had no questions. Discussed setting up MyChart for patient so also have access to his medical information during this hospital stay. NSG to get discs of his imaging so can also send this with patient. Family will set up PCP and other doctors needed once he is home. Patient scheduled to leave around 3:30 a.m. on 5/21 for airport with son-in-law to fly back to [place]. Granddaughter also flying here to [place] this afternoon. She was supposed to call airline to make sure they are aware of patient being in wheelchair and will need accommodations. They will plan to use Uber for transportation to airport unless granddaughter has friend locally who can transport therm. Elsyeint's son,  Ariel, has phone number of rehab SW to call if need any further information or have questions about patient's care once he is home.

## 2023-05-20 NOTE — PROGRESS NOTES
Inpatient Rehabilitation Plan of Care Note    Plan of Care  Care Plan Reviewed - No updates at this time.    Safety    [RN] Potential for Injury(Active)  Current Status(05/20/2023): Risk for falls r/t R hemiparesis  Weekly Goal(05/24/2023): Cue to use call bell  Discharge Goal: Patient will be aware of risk of fall and safety in the home  setting    Performed Intervention(s)  falls precautions  bed/chair alarm  safety rounds      Psychosocial    [RN] Coping/Adjustment(Active)  Current Status(05/20/2023): Expresses appropriate coping  Weekly Goal(05/24/2023): Educate patient regarding stroke and hypertention  Discharge Goal: Knowledgeable of care needs and stroke recovery    Performed Intervention(s)  Verbalizes needs and concerns  Therapeutic environmental set up      Sphincter Control    [RN] Bladder Management(Active)  Current Status(05/20/2023): Continent/incontinent of bladder at times  Weekly Goal(05/24/2023): Continent 75% of time  Discharge Goal: Continent 100% of time    [RN] Bowel Management(Active)  Current Status(05/20/2023): Continent/Incontinent at times of bowel  Weekly Goal(05/23/2023): Continent of bowel 75%  Discharge Goal: Continent of bowel 100%    Performed Intervention(s)  Bowel/bladder training  Encourage fluid intake  Scheduled toileting      Pain    [RN] Pain Management(Active)  Current Status(05/20/2023): Patient has pain to RUE and RLE, taking scheduled  baclofen and gabapentin and PRN Tylenol.  Weekly Goal(05/24/2023): Pain will be well controlled.  Discharge Goal: Pain controlled.    Performed Intervention(s)  meds as ordered  Pain assessment    Signed by: Anabela Taveras RN

## 2023-05-20 NOTE — PROGRESS NOTES
SECTION GG    Eating Performance Discharge: Patient completed the activities by themself with  no assistance from a helper.    Section B. Health Literacy  Frequency of Needing Assistance Reading:  Always    Section D. Mood  Presence of little interest or pleasure in doing things:   No  Frequency of having little interest or pleasure in doing things:   Never or 1  day  Presence of feeling down, depressed, or hopeless:   No  Frequency of feeling down, depressed, or hopeless:   Never or 1 day   Interview Ended. Above responses do not meet criteria to continue  Total Severity Score:   0    Section D. Social Isolation  Frequency of Feeling Lonely or Isolated:  Never    Section J. Health Conditions (Pain Effect on Sleep)  Pain Effect on Sleep:   Does not apply - Patient has not had any pain or hurting  in the past 5 days    Signed by: Charito Pantoja RN

## 2023-05-20 NOTE — PROGRESS NOTES
Inpatient Rehabilitation Plan of Care Note    Plan of Care  Care Plan Reviewed - No updates at this time.    Safety    Performed Intervention(s)  falls precautions  bed/chair alarm  safety rounds      Psychosocial    Performed Intervention(s)  Therapeutic environmental set up      Sphincter Control    Performed Intervention(s)  Bowel/bladder training  Encourage fluid intake  Scheduled toileting    Signed by: Charito Pantoja, RN

## 2023-05-20 NOTE — PLAN OF CARE
Goal Outcome Evaluation:  Plan of Care Reviewed With: patient             Problem: Rehabilitation (IRF) Plan of Care  Goal: Plan of Care Review  Outcome: Ongoing, Progressing  Flowsheets (Taken 5/20/2023 0302)  Plan of Care Reviewed With: patient  Outcome Evaluation:  Alert and oriented x 3-4. Forgetful at times. [place] speaking;  tablet at bedside if needed. son in law at bedside at this time. Right hemiparesis with droop noted. Very friendly and cooperative. Meds whole PO in AS  NTL. Incontinent of bladder throughout the night. Checked and changed q2 and as needed. Turned and repositioned during rounds. KPAD used to right shoulder r/t discomfort. PRN Tylenol administered at HS. D/C set for 330am 5/21. Resting well with eyes closed. Call light within reach.

## 2023-05-21 NOTE — PROGRESS NOTES
Inpatient Rehabilitation Plan of Care Note    Plan of Care  Care Plan Reviewed - No updates at this time.    Safety    Performed Intervention(s)  falls precautions  bed/chair alarm  safety rounds      Psychosocial    Performed Intervention(s)  Verbalizes needs and concerns  Therapeutic environmental set up      Sphincter Control    Performed Intervention(s)  Bowel/bladder training  Encourage fluid intake  Scheduled toileting      Pain    Performed Intervention(s)  meds as ordered  Pain assessment    Signed by: Carina Fournier RN

## 2023-05-21 NOTE — PLAN OF CARE
Problem: Rehabilitation (IRF) Plan of Care  Goal: Plan of Care Review  Outcome: Ongoing, Progressing  Flowsheets (Taken 5/21/2023 0048)  Progress: improving  Plan of Care Reviewed With: patient  Outcome Evaluation:   Patient A&OX3-4, calm and cooperative. Continues with R hemiparesis. VSS. C.o pain to R shoulder tylenol given at HS. Pt to DC early in the AM. Family to  patient.

## 2023-05-22 NOTE — THERAPY DISCHARGE NOTE
Inpatient Rehabilitation - Speech Language Pathology Discharge  [facility]     Patient Name: Manuel Durant  : 1950  MRN: [id number]  Today's Date: 2023               Admit Date: 2023     Visit Dx:    ICD-10-CM ICD-9-CM   1. Spastic hemiplegia of right dominant side as late effect of nontraumatic intraparenchymal hemorrhage of brain  I69.151 438.21     Patient Active Problem List   Diagnosis   • Intracranial hemorrhage   • Acute DVT (deep venous thrombosis)   • HTN (hypertension)   • Severe Malnutrition (HCC)   • Hemiplegia   • ICH (intracerebral hemorrhage)   • Stroke     Past Medical History:   Diagnosis Date   • Hypertension    • Hypokalemia 2023   • Stroke      No past surgical history on file.    SLP Recommendation and Plan  Pt made good progress during inpatient stay. Treatment was administered with use of  via Ipad video.    Cognitive-Communication Status: At discharge, pt presented with mild anomic aphasia and moderate cognitive-communication deficit. He required intermittent MIN cues for functional word finding tasks and picture description. He was oriented to person, place and situation, but required inconsistent cueing for month and consistent cueing for year. He exhibit a right visual field cut; however, he compensated well for visual field deficit by moving object or turning his head. He was able to read basic time on a clock with 70% accuracy with NO cues, MIN-MOD cues to increase. He was able to recall 3 related items after 5 minutes with NO cues. He required MAX cues for long term memory for personal information, such as recent work history and names of children; however, suspect aphasia impacted this task.     Swallow Status: At discharge, pt was tolerating soft (chopped meats)/no mixed consistencies with NTL. VFSS completed 23 showed the following: VFSS completed. Pt presented with mild-moderate oropharyngeal dysphagia.  Discoordination between  oral and pharyngeal phases, reduced laryngeal vestibule closure and reduced airway protection led to trace aspiration with large, consecutive sips of thins by cup with cough response and trace aspiration with light throat clear response with thins by cup following trial of puree. Use of small sips led to transient penetration above the vocal folds in 2 out of 4 trials of thins by cup and trace transient penetration above the vocal folds with thins by straw in 1 out of 2 trials; however, at end of study when cued for small sip, pt exhibited silent penetration to the vocal folds before the swallow with suspected trace silent aspiration.  Shallow, transient penetration also observed with use of chin tuck with thins by cup and with mixed consistency on second swallow. No penetration or aspiration observed with NTL by cup/straw, puree, regular and small bite of mixed consistency. Mild base of tongue residue observed with liquids, mixed and puree, which pt independently sensated and cleared with a second swallow. Mild pyriform residue noted with puree which did not clear with additional swallow, but did clear with liquid wash.        Recommend continue soft (chopped meats)/no mixed consistencies with [place] Thick Liquids. Hydration protocol 30 minutes after meals with adequate oral care. Take small bites/sips. Alternate bites/sips. Slow rate. Recommend supervision for meals. Recommend trials of thin liquids with SLP only in order to train strategies of small sip and chin tuck. Recommend meds one at a time with applesauce. Continue swallow therapy. Recommend repeat VFSS in ~1 month.     Recommend outpatient speech therapy to improve cognition, communication and swallowing. Recommend 24 hour indirect supervision at home.     SLP EVALUATION (last 72 hours)     SLP SLC Evaluation     Row Name 05/19/23 1400 05/19/23 1345 05/19/23 2238             Communication Assessment/Intervention    Document Type therapy note (daily note)   -AL therapy note (daily note)  -AL therapy note (daily note)  -AL      Patient/Family/Caregiver Comments/Observations Used  via Ipad video. Family was no longer present for this portion of session.  -AL Used  via Ipad video. Pt's family members participated via cell phone video jimmy.  -AL Pt participated well. Used  via Ipad video.  -AL         Pain Scale: Numbers Pre/Post-Treatment    Pretreatment Pain Rating 0/10 - no pain  -AL -- --      Pre/Posttreatment Pain Comment -- Pt did not complain of pain.  -AL Pt did not c/o pain during session.  -AL            User Key  (r) = Recorded By, (t) = Taken By, (c) = Cosigned By    Initials Name Effective Dates    Nurys Beavers, MS CCC-SLP 06/16/21 -                    EDUCATION  The patient has been educated in the following areas:   Cognitive Impairment Communication Impairment Dysphagia (Swallowing Impairment).           SLP GOALS     Row Name 05/20/23 1200 05/19/23 1400 05/19/23 1345       (STG) Pharyngeal Strengthening Exercise Goal 1 (SLP)    Progress/Outcomes (Pharyngeal Strengthening Goal 1, SLP) -- -- good progress toward goal  -AL    Comment (Pharyngeal Strengthening Goal 1, SLP) SLP spoke with pt and son in law as well as two mother family mebers via the .. SLP explained that the pt was given a swall evaluation(VFSS) 5/18. Results indicated silent penetration and silent aspiration of thin liquids. SLP explained the meanings of penetration and aspiration.  SLP also explained the safe swallow strategies  and the son-in -law was provided with a copy of the strategies.   SLP demonstrated how to thicken thin liquids to nectar.  The family was informed if the liquid became too thick it could be thinned to a nectar consistency using more of the thin liquids. All were in agreement that they understood. It was recommended pt see a doctor to retest swallow for diet upgrade. SLP reiterated in  order to have thin liquids pt should have not eaten for 30 minutes.  mouth should be clear of all food. Small sips with a chin tuck every time. SLP demonstrated a chin tuck.  -LS -- SLP provided education to pt and his family members regarding thickening of liquids to nectar thick consistency. Demonstrated use of gel packet to thicken 4 oz of water. Discussed water protocol, which would allow pt to have water 30 minutes after meals with adequate oral care. Also, discussed use of chin tuck strategy when drinking water. Pt's family asked good questions. SLP will send pt home with box of thickener packets.  -AL       Word Retrieval Skills Goal 1 (SLP)    Improve Word Retrieval Skills By Goal 1 (SLP) -- completing functional word finding tasks;completing a divergent task;80%;independently (over 90% accuracy)  -AL --    Time Frame (Word Retrieval Goal 1, SLP) -- 1 week  -AL --    Progress/Outcomes (Word Retrieval Goal 1, SLP) -- good progress toward goal  -AL --    Comment (Word Retrieval Goal 1, SLP) -- Confrontation naming of common objects/food in [place]ian:70% with NO cues, 100% with MIN cues.  -AL --       Connected Speech to Express Thoughts Goal 1 (SLP)    Improve Narrative Discourse to Express Thoughts By Goal 1 (SLP) -- describing a picture;conversational task on a given topic;80%;with minimal cues (75-90%)  -AL --    Time Frame (Connected Speech Goal 1, SLP) -- 1 week  -AL --    Progress/Outcomes (Connected Speech Goal 1, SLP) -- good progress toward goal  -AL --    Comment (Connected Speech Goal 1, SLP) -- Describing problems in pictures: 100% with NO cues; self corrected error x1.  -AL --       Orientation Goal 1 (SLP)    Improve Orientation Through Goal 1 (SLP) -- demonstrating orientation to day;demonstrating orientation to month;demonstrating orientation to year;demonstrating orientation to place;demonstrating orientation to disease/impairment;80%;with minimal cues (75-90%)  -AL --    Progress/Outcomes  (Orientation Goal 1, SLP) -- good progress toward goal  -AL --    Comment (Orientation Goal 1, SLP) -- Oriented to month and year with MAX cues. Oriented to place and situation with NO cues. Suspect aphasia impacting task.  -AL --       Memory Skills Goal 1 (SLP)    Improve Memory Skills Through Goal 1 (SLP) -- recall details of the day;80%;with moderate cues (50-74%)  -AL --    Time Frame (Memory Skills Goal 1, SLP) -- 1 week  -AL --    Progress/Outcomes (Memory Skills Goal 1, SLP) -- good progress toward goal  -AL --    Comment (Memory Skills Goal 1, SLP) -- Recalled month/year after 2 minute delay with NO cues.  -AL --    Row Name 05/19/23 0830             ([facility]) Pharyngeal Strengthening Exercise Goal 1 (SLP)    Progress/Outcomes (Pharyngeal Strengthening Goal 1, SLP) good progress toward goal  -AL      Comment (Pharyngeal Strengthening Goal 1, SLP) SLP provided education regarding results of VFSS. Discussed rationale for taking sips of thins by cup with use of small sip, chin tuck and double swallow, as pt had aspiration x1 even when using small sip compensation on VFSS. Practiced chin tuck with pt; pt exhibited strong cough in 1/10 trials, suspect due to taking large sip and not having chin fully down. Viewed images from VFSS to demonstrate strategies.  -AL            User Key  (r) = Recorded By, (t) = Taken By, (c) = Cosigned By    Initials Name Provider Type    Ashley Tellez MS CCC-SLP Speech and Language Pathologist    Nurys Beavers, MS CCC-SLP Speech and Language Pathologist                    Time Calculation:                    SLP Discharge Summary  Anticipated Discharge Disposition (SLP): home with OP services    Nurys Dixon MS CCC-SLP  5/22/2023

## 2024-05-14 NOTE — PLAN OF CARE
Dicussed with pt. During office visit. Goal Outcome Evaluation:  Plan of Care Reviewed With: patient             Problem: Rehabilitation (IRF) Plan of Care  Goal: Plan of Care Review  Outcome: Ongoing, Progressing  Flowsheets (Taken 5/12/2023 0352)  Plan of Care Reviewed With: patient  Outcome Evaluation:  Pt is alert; disoriented to time. Confused at times.  tablet at bedside; can speak some English. Meds whole Po with NTL. Incontinent of bladder at HS. Wears brief. Complains of RUE discomfort. PRN Tylenol administered at HS. Right hemiparesis with droop. refused SCD this evening. turned and repositioned q2 during rounds. oral care provided. no unsafe behaviors. Call light within reach.

## 2024-05-24 NOTE — PROGRESS NOTES
"Section C. BIMS  Brief Interview for Mental Status (BIMS) was conducted.  Repetition of Three Words: Three words  Able to report correct year: Correct  Able to report correct month: Missed by 6 days to 1 month  Able to report correct day of the week: Correct  Able to recall \"sock\": No, could not recall  Able to recall \"blue\": Yes, no cue required  Able to recall \"bed\": No, could not recall    BIMS SUMMARY SCORE: 10 Moderately impaired    Section C. Signs and Symptoms of Delirium (from CAM)  Acute Change in Mental Status:   Yes  Inattention:   Behavior not present  Disorganized Thinking:   Behavior not present  Altered Level of Consciousness:   Behavior not present    Signed by: Nurys Dixon, SLP    " no chest pain, no cough, and no shortness of breath.

## 2024-10-28 NOTE — PROGRESS NOTES
Name: Manuel Durant ADMIT: 3/12/2023   : 1950  PCP: Provider, No Known    MRN: [id number] LOS: 48 days   AGE/SEX: 73 y.o. male  ROOM: Counts include 234 beds at the [facility]     Subjective   Subjective   Reports some shoulder discomfort but states that it is going on for months now.  Otherwise no problems    Review of Systems     Objective   Objective   Vital Signs  Temp:  [97.5 °F (36.4 °C)-98.2 °F (36.8 °C)] 97.5 °F (36.4 °C)  Heart Rate:  [55-66] 55  Resp:  [18] 18  BP: (108-129)/(63-74) 126/74  SpO2:  [93 %-97 %] 96 %  on   ;   Device (Oxygen Therapy): room air  Body mass index is 23.46 kg/m².  Physical Exam  Vitals reviewed.   Constitutional:       Appearance: He is not ill-appearing.   Cardiovascular:      Rate and Rhythm: Normal rate and regular rhythm.      Heart sounds: No murmur heard.  Pulmonary:      Effort: Pulmonary effort is normal. No respiratory distress.   Abdominal:      General: Bowel sounds are normal. There is no distension.      Palpations: Abdomen is soft.      Tenderness: There is no abdominal tenderness.   Musculoskeletal:      Right lower leg: No edema.      Left lower leg: No edema.      Comments: Somewhat diminished range of motion with right shoulder   Skin:     General: Skin is warm and dry.   Neurological:      Mental Status: He is alert.       Results Review     I reviewed the patient's new clinical results.  Results from last 7 days   Lab Units 23  0813   WBC 10*3/mm3 7.17   HEMOGLOBIN g/dL 14.4   PLATELETS 10*3/mm3 223     Results from last 7 days   Lab Units 23  0813   SODIUM mmol/L 140   POTASSIUM mmol/L 4.0   CHLORIDE mmol/L 107   CO2 mmol/L 24.0   BUN mg/dL 13   CREATININE mg/dL 0.79   GLUCOSE mg/dL 103*   EGFR mL/min/1.73 95.0       Results from last 7 days   Lab Units 23  0813   CALCIUM mg/dL 9.4       No results found for: HGBA1C, POCGLU    No radiology results for the last day  I have personally reviewed all medications:  Scheduled Medications  amLODIPine, 10 mg, Oral, QAM  Goal Outcome Evaluation:  Plan of Care Reviewed With: patient        Progress: improving     Pt a/o x4, RA, VSS. Compazine effective in controlling nausea. Ambulated assist x1 w/ walker to bathroom throughout shift without any dizziness. MRI head completed. WCTM.                             AC  apixaban, 5 mg, Oral, Q12H  baclofen, 2.5 mg, Oral, Q8H  carvedilol, 3.125 mg, Oral, BID With Meals  hydrALAZINE, 10 mg, Oral, Q8H  lidocaine, 1 patch, Transdermal, Q24H  sodium chloride, 10 mL, Intravenous, Q12H  valsartan, 160 mg, Oral, QAM AC    Infusions   Diet  Diet: Regular/House Diet; No Mixed Consistencies, Feeding Assistance - Nursing; Texture: Soft to Chew (NDD 3); Soft to Chew: Chopped Meat; Fluid Consistency: Nectar Thick    I have personally reviewed:  [x]  Laboratory   []  Microbiology   []  Radiology   []  EKG/Telemetry  []  Cardiology/Vascular   []  Pathology    [x]  Records       Assessment/Plan     Active Hospital Problems    Diagnosis  POA   • **Intracranial hemorrhage [I62.9]  Yes   • Hemiplegia [G81.90]  Yes   • Right shoulder pain [M25.511]  Yes   • Severe Malnutrition (HCC) [E43]  Yes   • Acute DVT (deep venous thrombosis) [I82.409]  Yes   • HTN (hypertension) [I10]  Yes      Resolved Hospital Problems    Diagnosis Date Resolved POA   • Hypokalemia [E87.6] 04/10/2023 Unknown   • Hypertensive emergency [I16.1] 04/29/2023 Yes       Mr. Durant is a 73 year old male who initially presented to the hospital 3/12/23 for intracranial bleed. He was found unresponsive by friends and initially taken to [facility] where he was found to have poorly controlled BP and right sided hemiplegia. He was transferred here for closer monitoring in the ICU and neurosurgical consultation. He was cleared to move out of the ICU on 3/16/23.     • Intracranial hemorrhage: CTH 3/17/23 stable left basal ganglia/internal capsule hemorrhage with left lateral ventricular hemorrhage. On Lovenox for DVT and repeat CTH 3/18 also stable. ANDRADE followed and signed off 3/23 with plans for repeat MRI brain/MRA head in 1 month- ANDRADE saw again 4/20 and ordered MRI but this was not done as patient couldn't be cleared for MRI. CTA/CT head with evidence of hemorrhage cavity left sided with some peripheral enhancement which may just  be related to resolving hemorrhage but underlying mass cannot be excluded- needs repeat CTH with contrast or MRI brain in 6-8 weeks and local follow up with neurology/ANDRADE in [place]. Goal BP < 150 systolic. Remains on baclofen for spasticity.  Continuing PT and OT      • HTN: Well-controlled on current regimen.     • Acute DVT: Found 3/17/23 with acute RLE DVT in soleal. ANDRADE okayed for blood thinners. Now on Eliquis.     Disposition pending family arrangements for transport back to [place].  Apparently unable to place any facility here.  Medically stable but remains weak      Giovany Romeo MD  [facility]  04/29/23  17:47 EDT